# Patient Record
Sex: FEMALE | Race: WHITE | NOT HISPANIC OR LATINO | URBAN - METROPOLITAN AREA
[De-identification: names, ages, dates, MRNs, and addresses within clinical notes are randomized per-mention and may not be internally consistent; named-entity substitution may affect disease eponyms.]

---

## 2017-02-05 ENCOUNTER — EMERGENCY (EMERGENCY)
Facility: HOSPITAL | Age: 27
LOS: 0 days | Discharge: HOME | End: 2017-02-05
Attending: EMERGENCY MEDICINE | Admitting: EMERGENCY MEDICINE
Payer: COMMERCIAL

## 2017-02-05 VITALS — WEIGHT: 229.94 LBS | HEIGHT: 67 IN

## 2017-02-05 VITALS
SYSTOLIC BLOOD PRESSURE: 134 MMHG | HEIGHT: 67 IN | OXYGEN SATURATION: 99 % | RESPIRATION RATE: 17 BRPM | WEIGHT: 229.94 LBS | TEMPERATURE: 99 F | HEART RATE: 86 BPM | DIASTOLIC BLOOD PRESSURE: 80 MMHG

## 2017-02-05 DIAGNOSIS — R10.13 EPIGASTRIC PAIN: ICD-10-CM

## 2017-02-05 DIAGNOSIS — K29.00 ACUTE GASTRITIS WITHOUT BLEEDING: ICD-10-CM

## 2017-02-05 LAB
ALBUMIN SERPL ELPH-MCNC: 3.4 G/DL — SIGNIFICANT CHANGE UP (ref 3.3–5)
ALP SERPL-CCNC: 91 U/L — SIGNIFICANT CHANGE UP (ref 40–120)
ALT FLD-CCNC: 67 U/L — SIGNIFICANT CHANGE UP (ref 12–78)
ANION GAP SERPL CALC-SCNC: 6 MMOL/L — SIGNIFICANT CHANGE UP (ref 5–17)
APPEARANCE UR: CLEAR — SIGNIFICANT CHANGE UP
AST SERPL-CCNC: 51 U/L — HIGH (ref 15–37)
BACTERIA # UR AUTO: (no result)
BASOPHILS # BLD AUTO: 0.1 K/UL — SIGNIFICANT CHANGE UP (ref 0–0.2)
BASOPHILS NFR BLD AUTO: 0.9 % — SIGNIFICANT CHANGE UP (ref 0–2)
BILIRUB SERPL-MCNC: 0.3 MG/DL — SIGNIFICANT CHANGE UP (ref 0.2–1.2)
BILIRUB UR-MCNC: NEGATIVE — SIGNIFICANT CHANGE UP
BUN SERPL-MCNC: 11 MG/DL — SIGNIFICANT CHANGE UP (ref 7–23)
CALCIUM SERPL-MCNC: 8.3 MG/DL — LOW (ref 8.5–10.1)
CHLORIDE SERPL-SCNC: 104 MMOL/L — SIGNIFICANT CHANGE UP (ref 96–108)
CO2 SERPL-SCNC: 30 MMOL/L — SIGNIFICANT CHANGE UP (ref 22–31)
COLOR SPEC: YELLOW — SIGNIFICANT CHANGE UP
CREAT SERPL-MCNC: 0.75 MG/DL — SIGNIFICANT CHANGE UP (ref 0.5–1.3)
DIFF PNL FLD: (no result)
EOSINOPHIL # BLD AUTO: 0.1 K/UL — SIGNIFICANT CHANGE UP (ref 0–0.5)
EOSINOPHIL NFR BLD AUTO: 0.9 % — SIGNIFICANT CHANGE UP (ref 0–6)
EPI CELLS # UR: SIGNIFICANT CHANGE UP
GLUCOSE SERPL-MCNC: 107 MG/DL — HIGH (ref 70–99)
GLUCOSE UR QL: NEGATIVE MG/DL — SIGNIFICANT CHANGE UP
HCT VFR BLD CALC: 43.1 % — SIGNIFICANT CHANGE UP (ref 34.5–45)
HGB BLD-MCNC: 14.6 G/DL — SIGNIFICANT CHANGE UP (ref 11.5–15.5)
KETONES UR-MCNC: NEGATIVE — SIGNIFICANT CHANGE UP
LEUKOCYTE ESTERASE UR-ACNC: NEGATIVE — SIGNIFICANT CHANGE UP
LIDOCAIN IGE QN: 246 U/L — SIGNIFICANT CHANGE UP (ref 73–393)
LYMPHOCYTES # BLD AUTO: 31.2 % — SIGNIFICANT CHANGE UP (ref 13–44)
LYMPHOCYTES # BLD AUTO: 4.6 K/UL — HIGH (ref 1–3.3)
MCHC RBC-ENTMCNC: 27.8 PG — SIGNIFICANT CHANGE UP (ref 27–34)
MCHC RBC-ENTMCNC: 34 GM/DL — SIGNIFICANT CHANGE UP (ref 32–36)
MCV RBC AUTO: 81.9 FL — SIGNIFICANT CHANGE UP (ref 80–100)
MONOCYTES # BLD AUTO: 0.9 K/UL — SIGNIFICANT CHANGE UP (ref 0–0.9)
MONOCYTES NFR BLD AUTO: 6.3 % — SIGNIFICANT CHANGE UP (ref 2–14)
NEUTROPHILS # BLD AUTO: 9 K/UL — HIGH (ref 1.8–7.4)
NEUTROPHILS NFR BLD AUTO: 60.7 % — SIGNIFICANT CHANGE UP (ref 43–77)
NITRITE UR-MCNC: NEGATIVE — SIGNIFICANT CHANGE UP
PH UR: 5 — SIGNIFICANT CHANGE UP (ref 4.8–8)
PLATELET # BLD AUTO: 391 K/UL — SIGNIFICANT CHANGE UP (ref 150–400)
POTASSIUM SERPL-MCNC: 4.1 MMOL/L — SIGNIFICANT CHANGE UP (ref 3.5–5.3)
POTASSIUM SERPL-SCNC: 4.1 MMOL/L — SIGNIFICANT CHANGE UP (ref 3.5–5.3)
PROT SERPL-MCNC: 7.8 GM/DL — SIGNIFICANT CHANGE UP (ref 6–8.3)
PROT UR-MCNC: 30 MG/DL
RBC # BLD: 5.26 M/UL — HIGH (ref 3.8–5.2)
RBC # FLD: 11.9 % — SIGNIFICANT CHANGE UP (ref 10.3–14.5)
RBC CASTS # UR COMP ASSIST: SIGNIFICANT CHANGE UP /HPF (ref 0–4)
SODIUM SERPL-SCNC: 140 MMOL/L — SIGNIFICANT CHANGE UP (ref 135–145)
SP GR SPEC: 1.02 — SIGNIFICANT CHANGE UP (ref 1.01–1.02)
UROBILINOGEN FLD QL: NEGATIVE MG/DL — SIGNIFICANT CHANGE UP
WBC # BLD: 14.8 K/UL — HIGH (ref 3.8–10.5)
WBC # FLD AUTO: 14.8 K/UL — HIGH (ref 3.8–10.5)
WBC UR QL: SIGNIFICANT CHANGE UP

## 2017-02-05 PROCEDURE — 99284 EMERGENCY DEPT VISIT MOD MDM: CPT

## 2017-02-05 PROCEDURE — 76705 ECHO EXAM OF ABDOMEN: CPT | Mod: 26

## 2017-02-05 RX ORDER — ONDANSETRON 8 MG/1
4 TABLET, FILM COATED ORAL ONCE
Qty: 0 | Refills: 0 | Status: COMPLETED | OUTPATIENT
Start: 2017-02-05 | End: 2017-02-05

## 2017-02-05 RX ORDER — MORPHINE SULFATE 50 MG/1
4 CAPSULE, EXTENDED RELEASE ORAL ONCE
Qty: 0 | Refills: 0 | Status: DISCONTINUED | OUTPATIENT
Start: 2017-02-05 | End: 2017-02-05

## 2017-02-05 RX ORDER — LIDOCAINE 4 G/100G
5 CREAM TOPICAL ONCE
Qty: 0 | Refills: 0 | Status: COMPLETED | OUTPATIENT
Start: 2017-02-05 | End: 2017-02-05

## 2017-02-05 RX ORDER — FAMOTIDINE 10 MG/ML
20 INJECTION INTRAVENOUS ONCE
Qty: 0 | Refills: 0 | Status: COMPLETED | OUTPATIENT
Start: 2017-02-05 | End: 2017-02-05

## 2017-02-05 RX ORDER — FAMOTIDINE 10 MG/ML
1 INJECTION INTRAVENOUS
Qty: 20 | Refills: 0 | OUTPATIENT
Start: 2017-02-05 | End: 2017-02-15

## 2017-02-05 RX ADMIN — Medication 30 MILLILITER(S): at 21:07

## 2017-02-05 RX ADMIN — MORPHINE SULFATE 4 MILLIGRAM(S): 50 CAPSULE, EXTENDED RELEASE ORAL at 22:06

## 2017-02-05 RX ADMIN — ONDANSETRON 4 MILLIGRAM(S): 8 TABLET, FILM COATED ORAL at 21:00

## 2017-02-05 RX ADMIN — FAMOTIDINE 20 MILLIGRAM(S): 10 INJECTION INTRAVENOUS at 21:01

## 2017-02-05 NOTE — ED STATDOCS - OBJECTIVE STATEMENT
25 y/o F with Hx of cholecystectomy presents to ED c/o severe epigastric pain and nausea. Pt states she has had no recent episodes like this which concerned her and prompted her to come to the ED. Pain is constant and is worse when lying down. Denies v/d, HA, fever, SOB.

## 2017-02-05 NOTE — ED STATDOCS - NS ED MD SCRIBE ATTENDING SCRIBE SECTIONS
PAST MEDICAL/SURGICAL/SOCIAL HISTORY/REVIEW OF SYSTEMS/DISPOSITION/PHYSICAL EXAM/HISTORY OF PRESENT ILLNESS/VITAL SIGNS( Pullset)

## 2017-02-07 DIAGNOSIS — Z90.49 ACQUIRED ABSENCE OF OTHER SPECIFIED PARTS OF DIGESTIVE TRACT: Chronic | ICD-10-CM

## 2017-03-05 ENCOUNTER — EMERGENCY (EMERGENCY)
Facility: HOSPITAL | Age: 27
LOS: 0 days | Discharge: HOME | End: 2017-03-05
Attending: EMERGENCY MEDICINE | Admitting: EMERGENCY MEDICINE
Payer: COMMERCIAL

## 2017-03-05 VITALS
SYSTOLIC BLOOD PRESSURE: 162 MMHG | DIASTOLIC BLOOD PRESSURE: 104 MMHG | HEART RATE: 114 BPM | OXYGEN SATURATION: 100 % | RESPIRATION RATE: 18 BRPM | TEMPERATURE: 99 F

## 2017-03-05 VITALS — WEIGHT: 229.94 LBS | HEIGHT: 67 IN

## 2017-03-05 DIAGNOSIS — Z90.49 ACQUIRED ABSENCE OF OTHER SPECIFIED PARTS OF DIGESTIVE TRACT: Chronic | ICD-10-CM

## 2017-03-05 DIAGNOSIS — M54.32 SCIATICA, LEFT SIDE: ICD-10-CM

## 2017-03-05 DIAGNOSIS — M54.2 CERVICALGIA: ICD-10-CM

## 2017-03-05 LAB
ALBUMIN SERPL ELPH-MCNC: 4 G/DL — SIGNIFICANT CHANGE UP (ref 3.3–5)
ALP SERPL-CCNC: 104 U/L — SIGNIFICANT CHANGE UP (ref 40–120)
ALT FLD-CCNC: 100 U/L — HIGH (ref 12–78)
ANION GAP SERPL CALC-SCNC: 9 MMOL/L — SIGNIFICANT CHANGE UP (ref 5–17)
APPEARANCE UR: CLEAR — SIGNIFICANT CHANGE UP
AST SERPL-CCNC: 94 U/L — HIGH (ref 15–37)
BACTERIA # UR AUTO: (no result)
BASOPHILS # BLD AUTO: 0.1 K/UL — SIGNIFICANT CHANGE UP (ref 0–0.2)
BASOPHILS NFR BLD AUTO: 1 % — SIGNIFICANT CHANGE UP (ref 0–2)
BILIRUB SERPL-MCNC: 0.2 MG/DL — SIGNIFICANT CHANGE UP (ref 0.2–1.2)
BILIRUB UR-MCNC: NEGATIVE — SIGNIFICANT CHANGE UP
BUN SERPL-MCNC: 14 MG/DL — SIGNIFICANT CHANGE UP (ref 7–23)
CALCIUM SERPL-MCNC: 9.6 MG/DL — SIGNIFICANT CHANGE UP (ref 8.5–10.1)
CHLORIDE SERPL-SCNC: 105 MMOL/L — SIGNIFICANT CHANGE UP (ref 96–108)
CO2 SERPL-SCNC: 26 MMOL/L — SIGNIFICANT CHANGE UP (ref 22–31)
COLOR SPEC: YELLOW — SIGNIFICANT CHANGE UP
CREAT SERPL-MCNC: 0.83 MG/DL — SIGNIFICANT CHANGE UP (ref 0.5–1.3)
CRP SERPL-MCNC: 0.4 MG/DL — SIGNIFICANT CHANGE UP (ref 0–0.4)
DIFF PNL FLD: (no result)
EOSINOPHIL # BLD AUTO: 0 K/UL — SIGNIFICANT CHANGE UP (ref 0–0.5)
EOSINOPHIL NFR BLD AUTO: 0.3 % — SIGNIFICANT CHANGE UP (ref 0–6)
EPI CELLS # UR: SIGNIFICANT CHANGE UP
ERYTHROCYTE [SEDIMENTATION RATE] IN BLOOD: 32 MM/HR — HIGH (ref 0–15)
GLUCOSE SERPL-MCNC: 92 MG/DL — SIGNIFICANT CHANGE UP (ref 70–99)
GLUCOSE UR QL: NEGATIVE MG/DL — SIGNIFICANT CHANGE UP
HCG SERPL-ACNC: <1 MIU/ML — SIGNIFICANT CHANGE UP
HCT VFR BLD CALC: 48.9 % — HIGH (ref 34.5–45)
HGB BLD-MCNC: 16.5 G/DL — HIGH (ref 11.5–15.5)
KETONES UR-MCNC: (no result)
LEUKOCYTE ESTERASE UR-ACNC: (no result)
LYMPHOCYTES # BLD AUTO: 2.7 K/UL — SIGNIFICANT CHANGE UP (ref 1–3.3)
LYMPHOCYTES # BLD AUTO: 23 % — SIGNIFICANT CHANGE UP (ref 13–44)
MCHC RBC-ENTMCNC: 27.7 PG — SIGNIFICANT CHANGE UP (ref 27–34)
MCHC RBC-ENTMCNC: 33.7 GM/DL — SIGNIFICANT CHANGE UP (ref 32–36)
MCV RBC AUTO: 82.1 FL — SIGNIFICANT CHANGE UP (ref 80–100)
MONOCYTES # BLD AUTO: 0.6 K/UL — SIGNIFICANT CHANGE UP (ref 0–0.9)
MONOCYTES NFR BLD AUTO: 5.2 % — SIGNIFICANT CHANGE UP (ref 2–14)
NEUTROPHILS # BLD AUTO: 8.4 K/UL — HIGH (ref 1.8–7.4)
NEUTROPHILS NFR BLD AUTO: 70.4 % — SIGNIFICANT CHANGE UP (ref 43–77)
NITRITE UR-MCNC: NEGATIVE — SIGNIFICANT CHANGE UP
PH UR: 5 — SIGNIFICANT CHANGE UP (ref 4.8–8)
PLATELET # BLD AUTO: 460 K/UL — HIGH (ref 150–400)
POTASSIUM SERPL-MCNC: 3.9 MMOL/L — SIGNIFICANT CHANGE UP (ref 3.5–5.3)
POTASSIUM SERPL-SCNC: 3.9 MMOL/L — SIGNIFICANT CHANGE UP (ref 3.5–5.3)
PROT SERPL-MCNC: 9.1 GM/DL — HIGH (ref 6–8.3)
PROT UR-MCNC: 100 MG/DL
RBC # BLD: 5.96 M/UL — HIGH (ref 3.8–5.2)
RBC # FLD: 12.4 % — SIGNIFICANT CHANGE UP (ref 10.3–14.5)
RBC CASTS # UR COMP ASSIST: SIGNIFICANT CHANGE UP /HPF (ref 0–4)
SODIUM SERPL-SCNC: 140 MMOL/L — SIGNIFICANT CHANGE UP (ref 135–145)
SP GR SPEC: 1.02 — SIGNIFICANT CHANGE UP (ref 1.01–1.02)
UROBILINOGEN FLD QL: NEGATIVE MG/DL — SIGNIFICANT CHANGE UP
WBC # BLD: 12 K/UL — HIGH (ref 3.8–10.5)
WBC # FLD AUTO: 12 K/UL — HIGH (ref 3.8–10.5)
WBC UR QL: SIGNIFICANT CHANGE UP

## 2017-03-05 PROCEDURE — 99285 EMERGENCY DEPT VISIT HI MDM: CPT

## 2017-03-05 PROCEDURE — 74176 CT ABD & PELVIS W/O CONTRAST: CPT | Mod: 26

## 2017-03-05 RX ORDER — ONDANSETRON 8 MG/1
4 TABLET, FILM COATED ORAL ONCE
Qty: 0 | Refills: 0 | Status: COMPLETED | OUTPATIENT
Start: 2017-03-05 | End: 2017-03-05

## 2017-03-05 RX ORDER — METHOCARBAMOL 500 MG/1
1500 TABLET, FILM COATED ORAL ONCE
Qty: 0 | Refills: 0 | Status: COMPLETED | OUTPATIENT
Start: 2017-03-05 | End: 2017-03-05

## 2017-03-05 RX ORDER — SODIUM CHLORIDE 9 MG/ML
1000 INJECTION INTRAMUSCULAR; INTRAVENOUS; SUBCUTANEOUS ONCE
Qty: 0 | Refills: 0 | Status: COMPLETED | OUTPATIENT
Start: 2017-03-05 | End: 2017-03-05

## 2017-03-05 RX ORDER — HYDROMORPHONE HYDROCHLORIDE 2 MG/ML
1 INJECTION INTRAMUSCULAR; INTRAVENOUS; SUBCUTANEOUS ONCE
Qty: 0 | Refills: 0 | Status: DISCONTINUED | OUTPATIENT
Start: 2017-03-05 | End: 2017-03-05

## 2017-03-05 RX ORDER — KETOROLAC TROMETHAMINE 30 MG/ML
15 SYRINGE (ML) INJECTION ONCE
Qty: 0 | Refills: 0 | Status: DISCONTINUED | OUTPATIENT
Start: 2017-03-05 | End: 2017-03-05

## 2017-03-05 RX ADMIN — HYDROMORPHONE HYDROCHLORIDE 1 MILLIGRAM(S): 2 INJECTION INTRAMUSCULAR; INTRAVENOUS; SUBCUTANEOUS at 10:39

## 2017-03-05 RX ADMIN — Medication 15 MILLIGRAM(S): at 10:39

## 2017-03-05 RX ADMIN — Medication 60 MILLIGRAM(S): at 12:30

## 2017-03-05 RX ADMIN — METHOCARBAMOL 1500 MILLIGRAM(S): 500 TABLET, FILM COATED ORAL at 10:40

## 2017-03-05 RX ADMIN — SODIUM CHLORIDE 1000 MILLILITER(S): 9 INJECTION INTRAMUSCULAR; INTRAVENOUS; SUBCUTANEOUS at 11:10

## 2017-03-05 RX ADMIN — ONDANSETRON 4 MILLIGRAM(S): 8 TABLET, FILM COATED ORAL at 11:53

## 2017-03-05 NOTE — ED ADULT NURSE NOTE - CHPI ED SYMPTOMS NEG
no chills/no fever/no decreased eating/drinking/no dizziness/no weakness/no numbness/no vomiting/no tingling/no nausea

## 2017-03-05 NOTE — ED ADULT NURSE NOTE - ED STAT RN HANDOFF DETAILS
Handoff given to Chelita ELLSWORTH, pt s/p pain medication, NSB infusing, labs and CT results pending. Pt reports mild relief of symptoms, appears more comfortably at this time. Safety and comfort maintained.

## 2017-03-05 NOTE — ED STATDOCS - OBJECTIVE STATEMENT
27 y/o female pmhx spinal stenosis, htn, anxiety, ADHD  presents to ED due to severe lower back pain that began yesterday. On Thursday pt c/o dysuria, hematuria, and was prescribed Macrobid. Pt states back pain radiates down left leg. Pt usually takes 7.5 mg hydrocodone once a day for pain. Today she took 3 Advil 5:30 am. Denies fever, chills, numbness, tingling, vaginal discharge, vaginal bleeding, hx of kidney stones, v/d. States she feels tired, fatigued, nauseous. Pt had a nerve ablation in Jan for sciatica, that relieved pain sx. 25 y/o female pmhx spinal stenosis, sciatica, htn, anxiety, ADHD  presents to ED due to severe lower back pain that began yesterday. On Thursday pt c/o dysuria, hematuria, and was prescribed Macrobid. Pt states back pain radiates down left leg. Pt usually takes 7.5 mg hydrocodone once a day for pain, as well as gabapentin. Today she took 3 Advil 5:30 am. Denies fever, chills, numbness, tingling, vaginal discharge, vaginal bleeding, hx of kidney stones, v/d. States she feels tired, fatigued, nauseous. 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 Pt had a nerve ablation in Jan for sciatica, that relieved pain sx.

## 2017-03-05 NOTE — ED STATDOCS - DETAILS:
I, Katerin Harkins, performed the initial face to face bedside interview with this patient regarding history of present illness, review of symptoms and relevant past medical, social and family history.  I completed an independent physical examination.  I was the initial provider who evaluated this patient. I have signed out the follow up of any pending tests (i.e. labs, radiological studies) to the ACP.  I have communicated the patient’s plan of care and disposition with the ACP.  The history, relevant review of systems, past medical and surgical history, medical decision making, and physical examination was documented by the scribe in my presence and I attest to the accuracy of the documentation.

## 2017-03-05 NOTE — ED STATDOCS - MEDICAL DECISION MAKING DETAILS
acute on chronic reticular back pain   no red flags no signs of sx cord compression will check ct stone hunt r/o kidney stone, ua, pain control, reassess

## 2017-03-05 NOTE — ED STATDOCS - PROGRESS NOTE DETAILS
ALICJA Heath:  PA NOTE: Pt seen by intake physician and hpi/orders/plan reviewed. PT presenting to ED with complaints of  left sided back pain started yesterday. Pt had dysuria and hematuria 2 days ago, went to see her PMD Dr. Murphy and was given macrobid. Denies any trauma or injury. Denies any fever, chills, vaginal bleeding, pelvic pain or any other complaint.   PE: GEN: Awake, alert,  NAD,  EYES: PERRL CARDIAC: Reg rate and rhythm, S1,S2, RRR  RESP: No distress noted. Lungs CTA bilaterally no wheeze, ronchi, rales. ABD: soft,  non-tender, no guarding. left CVA tenderness. NEURO: AOx3, no focal deficits, strength intact bilat LE. EXT: LSpien no midline TTP, left paraspinous tenderness.   PLAN: labs. CT A/P, reevaluate. pt is feeling better, results discussed, it is likely exacerbation of her back pain and will refer her to ortho spine.

## 2017-03-05 NOTE — ED ADULT NURSE NOTE - OBJECTIVE STATEMENT
This is a 26b year old female back pain since yesterday currently being treated for a uti denies mechanical injury .

## 2017-03-06 LAB
CULTURE RESULTS: SIGNIFICANT CHANGE UP
SPECIMEN SOURCE: SIGNIFICANT CHANGE UP

## 2017-06-12 ENCOUNTER — INPATIENT (INPATIENT)
Facility: HOSPITAL | Age: 27
LOS: 1 days | Discharge: HOME | End: 2017-06-14
Attending: STUDENT IN AN ORGANIZED HEALTH CARE EDUCATION/TRAINING PROGRAM | Admitting: STUDENT IN AN ORGANIZED HEALTH CARE EDUCATION/TRAINING PROGRAM
Payer: COMMERCIAL

## 2017-06-12 VITALS — WEIGHT: 229.94 LBS | HEIGHT: 67 IN

## 2017-06-12 DIAGNOSIS — Z98.890 OTHER SPECIFIED POSTPROCEDURAL STATES: Chronic | ICD-10-CM

## 2017-06-12 DIAGNOSIS — Z90.49 ACQUIRED ABSENCE OF OTHER SPECIFIED PARTS OF DIGESTIVE TRACT: Chronic | ICD-10-CM

## 2017-06-12 LAB
ALBUMIN SERPL ELPH-MCNC: 3.5 G/DL — SIGNIFICANT CHANGE UP (ref 3.3–5)
ALP SERPL-CCNC: 80 U/L — SIGNIFICANT CHANGE UP (ref 40–120)
ALT FLD-CCNC: 69 U/L — SIGNIFICANT CHANGE UP (ref 12–78)
ANION GAP SERPL CALC-SCNC: 11 MMOL/L — SIGNIFICANT CHANGE UP (ref 5–17)
APPEARANCE UR: CLEAR — SIGNIFICANT CHANGE UP
AST SERPL-CCNC: 78 U/L — HIGH (ref 15–37)
BACTERIA # UR AUTO: (no result)
BASOPHILS # BLD AUTO: 0.1 K/UL — SIGNIFICANT CHANGE UP (ref 0–0.2)
BASOPHILS NFR BLD AUTO: 0.3 % — SIGNIFICANT CHANGE UP (ref 0–2)
BILIRUB SERPL-MCNC: 0.4 MG/DL — SIGNIFICANT CHANGE UP (ref 0.2–1.2)
BILIRUB UR-MCNC: NEGATIVE — SIGNIFICANT CHANGE UP
BUN SERPL-MCNC: 16 MG/DL — SIGNIFICANT CHANGE UP (ref 7–23)
CALCIUM SERPL-MCNC: 9 MG/DL — SIGNIFICANT CHANGE UP (ref 8.5–10.1)
CHLORIDE SERPL-SCNC: 107 MMOL/L — SIGNIFICANT CHANGE UP (ref 96–108)
CO2 SERPL-SCNC: 21 MMOL/L — LOW (ref 22–31)
COLOR SPEC: YELLOW — SIGNIFICANT CHANGE UP
COMMENT - URINE: SIGNIFICANT CHANGE UP
COMMENT - URINE: SIGNIFICANT CHANGE UP
CREAT SERPL-MCNC: 0.89 MG/DL — SIGNIFICANT CHANGE UP (ref 0.5–1.3)
DIFF PNL FLD: (no result)
EOSINOPHIL # BLD AUTO: 0 K/UL — SIGNIFICANT CHANGE UP (ref 0–0.5)
EOSINOPHIL NFR BLD AUTO: 0.1 % — SIGNIFICANT CHANGE UP (ref 0–6)
EPI CELLS # UR: (no result)
GLUCOSE SERPL-MCNC: 93 MG/DL — SIGNIFICANT CHANGE UP (ref 70–99)
GLUCOSE UR QL: NEGATIVE MG/DL — SIGNIFICANT CHANGE UP
HCT VFR BLD CALC: 45.9 % — HIGH (ref 34.5–45)
HGB BLD-MCNC: 16.1 G/DL — HIGH (ref 11.5–15.5)
KETONES UR-MCNC: NEGATIVE — SIGNIFICANT CHANGE UP
LEUKOCYTE ESTERASE UR-ACNC: (no result)
LIDOCAIN IGE QN: 236 U/L — SIGNIFICANT CHANGE UP (ref 73–393)
LYMPHOCYTES # BLD AUTO: 0.9 K/UL — LOW (ref 1–3.3)
LYMPHOCYTES # BLD AUTO: 5.3 % — LOW (ref 13–44)
MCHC RBC-ENTMCNC: 28 PG — SIGNIFICANT CHANGE UP (ref 27–34)
MCHC RBC-ENTMCNC: 35 GM/DL — SIGNIFICANT CHANGE UP (ref 32–36)
MCV RBC AUTO: 80 FL — SIGNIFICANT CHANGE UP (ref 80–100)
MONOCYTES # BLD AUTO: 0.7 K/UL — SIGNIFICANT CHANGE UP (ref 0–0.9)
MONOCYTES NFR BLD AUTO: 3.8 % — SIGNIFICANT CHANGE UP (ref 2–14)
NEUTROPHILS # BLD AUTO: 16 K/UL — HIGH (ref 1.8–7.4)
NEUTROPHILS NFR BLD AUTO: 90.6 % — HIGH (ref 43–77)
NITRITE UR-MCNC: NEGATIVE — SIGNIFICANT CHANGE UP
PH UR: 5 — SIGNIFICANT CHANGE UP (ref 5–8)
PLATELET # BLD AUTO: 390 K/UL — SIGNIFICANT CHANGE UP (ref 150–400)
POTASSIUM SERPL-MCNC: 4.2 MMOL/L — SIGNIFICANT CHANGE UP (ref 3.5–5.3)
POTASSIUM SERPL-SCNC: 4.2 MMOL/L — SIGNIFICANT CHANGE UP (ref 3.5–5.3)
PROT SERPL-MCNC: 8.5 GM/DL — HIGH (ref 6–8.3)
PROT UR-MCNC: 100 MG/DL
RBC # BLD: 5.74 M/UL — HIGH (ref 3.8–5.2)
RBC # FLD: 12.4 % — SIGNIFICANT CHANGE UP (ref 10.3–14.5)
RBC CASTS # UR COMP ASSIST: (no result) /HPF (ref 0–4)
SODIUM SERPL-SCNC: 139 MMOL/L — SIGNIFICANT CHANGE UP (ref 135–145)
SP GR SPEC: 1.02 — SIGNIFICANT CHANGE UP (ref 1.01–1.02)
UROBILINOGEN FLD QL: NEGATIVE MG/DL — SIGNIFICANT CHANGE UP
WBC # BLD: 17.6 K/UL — HIGH (ref 3.8–10.5)
WBC # FLD AUTO: 17.6 K/UL — HIGH (ref 3.8–10.5)
WBC UR QL: SIGNIFICANT CHANGE UP /HPF (ref 0–5)

## 2017-06-12 PROCEDURE — 74177 CT ABD & PELVIS W/CONTRAST: CPT | Mod: 26

## 2017-06-12 RX ORDER — VANCOMYCIN HCL 1 G
125 VIAL (EA) INTRAVENOUS ONCE
Qty: 0 | Refills: 0 | Status: COMPLETED | OUTPATIENT
Start: 2017-06-12 | End: 2017-06-12

## 2017-06-12 RX ORDER — KETOROLAC TROMETHAMINE 30 MG/ML
30 SYRINGE (ML) INJECTION ONCE
Qty: 0 | Refills: 0 | Status: DISCONTINUED | OUTPATIENT
Start: 2017-06-12 | End: 2017-06-12

## 2017-06-12 RX ORDER — CITALOPRAM 10 MG/1
20 TABLET, FILM COATED ORAL DAILY
Qty: 0 | Refills: 0 | Status: DISCONTINUED | OUTPATIENT
Start: 2017-06-12 | End: 2017-06-14

## 2017-06-12 RX ORDER — PANTOPRAZOLE SODIUM 20 MG/1
40 TABLET, DELAYED RELEASE ORAL ONCE
Qty: 0 | Refills: 0 | Status: COMPLETED | OUTPATIENT
Start: 2017-06-12 | End: 2017-06-12

## 2017-06-12 RX ORDER — ONDANSETRON 8 MG/1
4 TABLET, FILM COATED ORAL ONCE
Qty: 0 | Refills: 0 | Status: COMPLETED | OUTPATIENT
Start: 2017-06-12 | End: 2017-06-12

## 2017-06-12 RX ORDER — HYDROXYZINE HCL 10 MG
50 TABLET ORAL AT BEDTIME
Qty: 0 | Refills: 0 | Status: DISCONTINUED | OUTPATIENT
Start: 2017-06-12 | End: 2017-06-14

## 2017-06-12 RX ORDER — LISINOPRIL 2.5 MG/1
20 TABLET ORAL DAILY
Qty: 0 | Refills: 0 | Status: DISCONTINUED | OUTPATIENT
Start: 2017-06-12 | End: 2017-06-14

## 2017-06-12 RX ORDER — MORPHINE SULFATE 50 MG/1
4 CAPSULE, EXTENDED RELEASE ORAL ONCE
Qty: 0 | Refills: 0 | Status: DISCONTINUED | OUTPATIENT
Start: 2017-06-12 | End: 2017-06-12

## 2017-06-12 RX ORDER — SODIUM CHLORIDE 9 MG/ML
1000 INJECTION INTRAMUSCULAR; INTRAVENOUS; SUBCUTANEOUS ONCE
Qty: 0 | Refills: 0 | Status: COMPLETED | OUTPATIENT
Start: 2017-06-12 | End: 2017-06-12

## 2017-06-12 RX ORDER — FAMOTIDINE 10 MG/ML
20 INJECTION INTRAVENOUS
Qty: 0 | Refills: 0 | Status: DISCONTINUED | OUTPATIENT
Start: 2017-06-12 | End: 2017-06-14

## 2017-06-12 RX ORDER — GABAPENTIN 400 MG/1
600 CAPSULE ORAL AT BEDTIME
Qty: 0 | Refills: 0 | Status: DISCONTINUED | OUTPATIENT
Start: 2017-06-12 | End: 2017-06-14

## 2017-06-12 RX ORDER — METOCLOPRAMIDE HCL 10 MG
10 TABLET ORAL ONCE
Qty: 0 | Refills: 0 | Status: COMPLETED | OUTPATIENT
Start: 2017-06-12 | End: 2017-06-12

## 2017-06-12 RX ADMIN — SODIUM CHLORIDE 33.33 MILLILITER(S): 9 INJECTION INTRAMUSCULAR; INTRAVENOUS; SUBCUTANEOUS at 18:00

## 2017-06-12 RX ADMIN — Medication 30 MILLIGRAM(S): at 19:02

## 2017-06-12 RX ADMIN — Medication 30 MILLIGRAM(S): at 17:59

## 2017-06-12 RX ADMIN — PANTOPRAZOLE SODIUM 40 MILLIGRAM(S): 20 TABLET, DELAYED RELEASE ORAL at 18:15

## 2017-06-12 RX ADMIN — MORPHINE SULFATE 4 MILLIGRAM(S): 50 CAPSULE, EXTENDED RELEASE ORAL at 19:21

## 2017-06-12 RX ADMIN — MORPHINE SULFATE 4 MILLIGRAM(S): 50 CAPSULE, EXTENDED RELEASE ORAL at 19:45

## 2017-06-12 RX ADMIN — ONDANSETRON 4 MILLIGRAM(S): 8 TABLET, FILM COATED ORAL at 17:59

## 2017-06-12 RX ADMIN — Medication 10 MILLIGRAM(S): at 21:15

## 2017-06-12 RX ADMIN — SODIUM CHLORIDE 2000 MILLILITER(S): 9 INJECTION INTRAMUSCULAR; INTRAVENOUS; SUBCUTANEOUS at 19:02

## 2017-06-12 NOTE — ED ADULT NURSE NOTE - OBJECTIVE STATEMENT
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  Pt states starting today that she developed n/v/diarrhea, all symptoms similar to her last cdif experience

## 2017-06-12 NOTE — ED STATDOCS - PROGRESS NOTE DETAILS
ALICJA Barrios: Patient has been seen, evaluated and orders have been written by the attending in intake. Patient is stable.  I will follow up the results of orders written and I will continue to evaluate/observe the patient. signed Sarah Barrios PA-C   pt c/o continuous abd pain. elevated WBC 17. Will order CT. Pt agrees with plan of  care. signed Sarah Barrios PA-C signed Sarah Barrios PA-C  Pt with extended ER stay, did not get CT scan first time due to nausea and refused exam. Elevated WBC, pt vomiting after antiemetics, diffculty with PO. will start tx for presumptive c diff while labs pending. Pt feels uncomfortable going home as she feel she will not be able to take her meds or stay hydrated. Pt agrees with admission and plan of care.

## 2017-06-12 NOTE — H&P ADULT - NSHPPHYSICALEXAM_GEN_ALL_CORE
Vital Signs Last 24 Hrs  T(C): 37, Max: 37.3 (06-12 @ 16:54)  T(F): 98.6, Max: 99.2 (06-12 @ 16:54)  HR: 121 (121 - 126)  BP: 148/81 (148/81 - 156/111)  RR: 18 (17 - 18)  SpO2: 97% (97% - 97%)

## 2017-06-12 NOTE — ED STATDOCS - MEDICAL DECISION MAKING DETAILS
26y year old Female presents to ED complaining of abdominal pain. Plan fluids and nausea medication.

## 2017-06-12 NOTE — H&P ADULT - ATTENDING COMMENTS
Patient seen and examined after initial evalution above by ALICJA Lobato. Case discussed and reviewed in detail. Please note my plan below.    27 yo F with PMH of C.diff, mixed connective tissue disorder, PCOS, anxiety, depression, HTN p/w abdominal pain, nausea / vomiting / diarrhea x 1 day.     *Sepsis 2/2 gastroenteritis   -R/o c.diff given h/o c.diff in the past  -IV cipro / flagyl  -NPO for now - advance diet as tolerated  -IVF  -Blood cx   -Stool studies  -Isolation until c.diff ruled out  -CT abdomen doesn't report any acute pathology  -Utox  -Lactate    *DVT ppx  -SCDs

## 2017-06-12 NOTE — ED STATDOCS - OBJECTIVE STATEMENT
27 y/o female pt w/ hx of anxiety, depression, HTN presents to the ED c/o abd pain and vomiting with sudden onset this morning. Reports having watery diarrhea x 5 and feeling nauseous and states that her throat is burning from throwing up. Pt has no other complaints and denies CP and SOB. 25 y/o female pt w/ hx of PCOS, C.diff, anxiety, depression, HTN presents to the ED c/o abd pain and vomiting with sudden onset this morning. Reports having watery diarrhea x 5 and feeling nauseous and states that her throat is burning from throwing up. Pt has no other complaints and denies CP and SOB.  Some abdominal pain in the upper abd.  Hx of Choleystectomy.  Pt a RN.

## 2017-06-12 NOTE — ED STATDOCS - ATTENDING CONTRIBUTION TO CARE
I, Low Davies, performed the initial face to face bedside interview with this patient regarding history of present illness, review of symptoms and relevant past medical, social and family history.  I completed an independent physical examination.  I was the initial provider who evaluated this patient. I have signed out the follow up of any pending tests (i.e. labs, radiological studies) to the ACP.  I have communicated the patient’s plan of care and disposition with the ACP.  The history, relevant review of systems, past medical and surgical history, medical decision making, and physical examination was documented by the scribe in my presence and I attest to the accuracy of the documentation.

## 2017-06-12 NOTE — ED STATDOCS - CARE PLAN
Principal Discharge DX:	Vomiting, persistent, in adult  Secondary Diagnosis:	Diarrhea, unspecified type

## 2017-06-12 NOTE — H&P ADULT - HISTORY OF PRESENT ILLNESS
25 yo F with PMH significant for Hx of C.Diff in the past, Mixed connective tissue disorder, PCOS, Anxiety, Depression, secondary hypertension presents to the ED c/o Abdominal pain, N/V/D x 1 day. Pt states that she is having diffuse abdominal pain ( Although looks comfortable for now),nausea, vomited 20 + times and loose watery diarrhea 10+ times today. Pt had C.diff 3 yrs ago and was treated with IV Flagyl. Denies taking any Abx recently. Denies fever or chills. No recent travel.     In the ED, found to have leucocytosis 17.6 and CTAP came back negative as acute intraabdominal or pelvic pathology.  Meds: Vanco 125 ml PO x1, Toradol, IV Morphine,. IV Zofran, IV Reglan, Protonix and Bentyl 25 yo F with PMH significant for Hx of C.Diff in the past, Mixed connective tissue disorder, PCOS, Anxiety, Depression, secondary hypertension due to MCTD? presents to the ED c/o Abdominal pain, N/V/D x 1 day. Pt states that she is having diffuse abdominal pain ( Although looks comfortable for now),nausea, vomited 20 + times and loose watery diarrhea 10+ times today. Pt had C.diff 3 yrs ago and was treated with IV Flagyl. Denies taking any Abx recently. Denies fever or chills. No recent travel.     In the ED, found to have leucocytosis 17.6 and CTAP came back negative as acute intraabdominal or pelvic pathology.  Meds: Vanco 125 ml PO x1, Toradol, IV Morphine,. IV Zofran, IV Reglan, Protonix and Bentyl

## 2017-06-12 NOTE — H&P ADULT - PMH
Anxiety    Cholecystitis    Depression    HTN (hypertension)    Mixed connective tissue disease    Polycystic ovarian syndrome

## 2017-06-12 NOTE — ED STATDOCS - DIAGNOSIS COUNSELING, MDM
conducted a detailed discussion... Samson MDM:  Young female w/ N/V/D.  Abd soft on exam.  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  Will add CT if labs off or pt not improving.

## 2017-06-12 NOTE — H&P ADULT - ASSESSMENT
25 yo F with PMH significant for Hx of C.Diff in the past, Mixed connective tissue disorder, PCOS, Anxiety, Depression, secondary hypertension presents to the ED c/o Abdominal pain, N/V/D x 1 day.    # Abdominal pain, N/V/D :  2/2 to Viral gastroenteritis Vs C.Diff  - Admit to Med-Surg  - IVF NS hydration  - Pain meds PRN  - Reglan PRN  - Stool for C.Diff and Stool studies  - c/w Prophylactic Vancomycin PO  - f/u BC  - Monitor vitals    # Secondary Hypertension 2/2 to Mixed connective tissue   - c/w Lisinopril  - Monitor BP    # Anxiety/Depression  - c/w home meds    # DVT Ppx  - SCD's    Case d/w      # 27 yo F with PMH significant for Hx of C.Diff in the past, Mixed connective tissue disorder, PCOS, Anxiety, Depression, secondary hypertension presents to the ED c/o Abdominal pain, N/V/D x 1 day.    # Abdominal pain, N/V/D :  2/2 to Viral gastroenteritis Vs C.Diff  - Admit to Med-Surg  - IVF NS hydration  - Pain meds PRN  - Reglan PRN  - Stool for C.Diff and Stool studies  - c/w Prophylactic Vancomycin PO  - ID consult  - f/u BC  - Monitor vitals    # Secondary Hypertension 2/2 to Mixed connective tissue   - c/w Lisinopril  - Monitor BP    # Anxiety/Depression  - c/w home meds    # DVT Ppx  - SCD's    Case d/w  25 yo F with PMH significant for Hx of C.Diff in the past, Mixed connective tissue disorder, PCOS, Anxiety, Depression, secondary hypertension due to MCTD? presents to the ED c/o Abdominal pain, N/V/D x 1 day.    # Abdominal pain, N/V/D :  2/2 to Viral gastroenteritis Vs C.Diff  - Admit to Med-Surg  - IVF NS hydration  - Pain meds PRN  - Reglan PRN  - Stool for C.Diff and Stool studies  - c/w Prophylactic Vancomycin PO  - ID consult  - f/u BC  - Monitor vitals    # Secondary Hypertension 2/2 to Mixed connective tissue disease  - c/w Lisinopril  - Monitor BP    # Anxiety/Depression  - c/w home meds    # DVT Ppx  - SCD's    Case d/w  27 yo F with PMH significant for Hx of C.Diff in the past, Mixed connective tissue disorder, PCOS, Anxiety, Depression, secondary hypertension due to MCTD? presents to the ED c/o Abdominal pain, N/V/D x 1 day.    # Abdominal pain, N/V/D :  2/2 to Viral gastroenteritis Vs C.Diff  - Admit to Med-Surg  - IVF NS hydration  - Pain meds PRN  - Reglan PRN  - Stool for C.Diff and Stool studies - contact precaution  - IV Cipro and IV flagyl  - Utox  - f/u BC  - Monitor vitals  - NPO - advance diet as tolerated    # Secondary Hypertension 2/2 to Mixed connective tissue disease  - c/w Lisinopril  - Monitor BP    # Anxiety/Depression  - c/w home meds    # DVT Ppx  - SCD's    Case d/w Dr. Goins

## 2017-06-12 NOTE — ED ADULT TRIAGE NOTE - CHIEF COMPLAINT QUOTE
c/o nausea and diarrhea started at 11:30 today, c/o mid upper abdominal pain, denies blood in stool, pt states she had similar episode when she was diagnosed with cdiff

## 2017-06-12 NOTE — ED STATDOCS - NS ED MD SCRIBE ATTENDING SCRIBE SECTIONS
PHYSICAL EXAM/RESULTS/PROGRESS NOTE/REVIEW OF SYSTEMS/PAST MEDICAL/SURGICAL/SOCIAL HISTORY/DISPOSITION/HISTORY OF PRESENT ILLNESS

## 2017-06-13 DIAGNOSIS — F41.9 ANXIETY DISORDER, UNSPECIFIED: ICD-10-CM

## 2017-06-13 DIAGNOSIS — I10 ESSENTIAL (PRIMARY) HYPERTENSION: ICD-10-CM

## 2017-06-13 DIAGNOSIS — E28.2 POLYCYSTIC OVARIAN SYNDROME: ICD-10-CM

## 2017-06-13 DIAGNOSIS — R19.7 DIARRHEA, UNSPECIFIED: ICD-10-CM

## 2017-06-13 LAB
ANION GAP SERPL CALC-SCNC: 7 MMOL/L — SIGNIFICANT CHANGE UP (ref 5–17)
APTT BLD: 35 SEC — SIGNIFICANT CHANGE UP (ref 27.5–37.4)
BASOPHILS # BLD AUTO: 0 K/UL — SIGNIFICANT CHANGE UP (ref 0–0.2)
BASOPHILS NFR BLD AUTO: 0.5 % — SIGNIFICANT CHANGE UP (ref 0–2)
BUN SERPL-MCNC: 16 MG/DL — SIGNIFICANT CHANGE UP (ref 7–23)
C DIFF BY PCR RESULT: SIGNIFICANT CHANGE UP
C DIFF TOX GENS STL QL NAA+PROBE: SIGNIFICANT CHANGE UP
CALCIUM SERPL-MCNC: 7.5 MG/DL — LOW (ref 8.5–10.1)
CHLORIDE SERPL-SCNC: 111 MMOL/L — HIGH (ref 96–108)
CO2 SERPL-SCNC: 24 MMOL/L — SIGNIFICANT CHANGE UP (ref 22–31)
CREAT SERPL-MCNC: 0.84 MG/DL — SIGNIFICANT CHANGE UP (ref 0.5–1.3)
EOSINOPHIL # BLD AUTO: 0 K/UL — SIGNIFICANT CHANGE UP (ref 0–0.5)
EOSINOPHIL NFR BLD AUTO: 0.2 % — SIGNIFICANT CHANGE UP (ref 0–6)
GLUCOSE SERPL-MCNC: 115 MG/DL — HIGH (ref 70–99)
HCT VFR BLD CALC: 37.8 % — SIGNIFICANT CHANGE UP (ref 34.5–45)
HGB BLD-MCNC: 13.1 G/DL — SIGNIFICANT CHANGE UP (ref 11.5–15.5)
INR BLD: 1.22 RATIO — HIGH (ref 0.88–1.16)
LACTATE SERPL-SCNC: 1.4 MMOL/L — SIGNIFICANT CHANGE UP (ref 0.7–2)
LACTATE SERPL-SCNC: 2.6 MMOL/L — HIGH (ref 0.7–2)
LYMPHOCYTES # BLD AUTO: 1.1 K/UL — SIGNIFICANT CHANGE UP (ref 1–3.3)
LYMPHOCYTES # BLD AUTO: 17 % — SIGNIFICANT CHANGE UP (ref 13–44)
MAGNESIUM SERPL-MCNC: 1.6 MG/DL — SIGNIFICANT CHANGE UP (ref 1.6–2.6)
MANUAL DIF COMMENT BLD-IMP: SIGNIFICANT CHANGE UP
MCHC RBC-ENTMCNC: 27.8 PG — SIGNIFICANT CHANGE UP (ref 27–34)
MCHC RBC-ENTMCNC: 34.6 GM/DL — SIGNIFICANT CHANGE UP (ref 32–36)
MCV RBC AUTO: 80.4 FL — SIGNIFICANT CHANGE UP (ref 80–100)
MONOCYTES # BLD AUTO: 0.6 K/UL — SIGNIFICANT CHANGE UP (ref 0–0.9)
MONOCYTES NFR BLD AUTO: 9.1 % — SIGNIFICANT CHANGE UP (ref 2–14)
NEUTROPHILS # BLD AUTO: 4.7 K/UL — SIGNIFICANT CHANGE UP (ref 1.8–7.4)
NEUTROPHILS NFR BLD AUTO: 73.2 % — SIGNIFICANT CHANGE UP (ref 43–77)
PHOSPHATE SERPL-MCNC: 2.8 MG/DL — SIGNIFICANT CHANGE UP (ref 2.5–4.5)
PLAT MORPH BLD: NORMAL — SIGNIFICANT CHANGE UP
PLATELET # BLD AUTO: 305 K/UL — SIGNIFICANT CHANGE UP (ref 150–400)
POTASSIUM SERPL-MCNC: 3.7 MMOL/L — SIGNIFICANT CHANGE UP (ref 3.5–5.3)
POTASSIUM SERPL-SCNC: 3.7 MMOL/L — SIGNIFICANT CHANGE UP (ref 3.5–5.3)
PROTHROM AB SERPL-ACNC: 13.2 SEC — HIGH (ref 9.8–12.7)
RBC # BLD: 4.7 M/UL — SIGNIFICANT CHANGE UP (ref 3.8–5.2)
RBC # FLD: 12.6 % — SIGNIFICANT CHANGE UP (ref 10.3–14.5)
RBC BLD AUTO: NORMAL — SIGNIFICANT CHANGE UP
SODIUM SERPL-SCNC: 142 MMOL/L — SIGNIFICANT CHANGE UP (ref 135–145)
WBC # BLD: 6.5 K/UL — SIGNIFICANT CHANGE UP (ref 3.8–10.5)
WBC # FLD AUTO: 6.5 K/UL — SIGNIFICANT CHANGE UP (ref 3.8–10.5)

## 2017-06-13 PROCEDURE — 99285 EMERGENCY DEPT VISIT HI MDM: CPT

## 2017-06-13 RX ORDER — HYDROXYZINE HCL 10 MG
50 TABLET ORAL ONCE
Qty: 0 | Refills: 0 | Status: COMPLETED | OUTPATIENT
Start: 2017-06-13 | End: 2017-06-13

## 2017-06-13 RX ORDER — METOCLOPRAMIDE HCL 10 MG
10 TABLET ORAL EVERY 8 HOURS
Qty: 0 | Refills: 0 | Status: DISCONTINUED | OUTPATIENT
Start: 2017-06-13 | End: 2017-06-14

## 2017-06-13 RX ORDER — METRONIDAZOLE 500 MG
500 TABLET ORAL ONCE
Qty: 0 | Refills: 0 | Status: COMPLETED | OUTPATIENT
Start: 2017-06-13 | End: 2017-06-13

## 2017-06-13 RX ORDER — ALPRAZOLAM 0.25 MG
0.5 TABLET ORAL AT BEDTIME
Qty: 0 | Refills: 0 | Status: DISCONTINUED | OUTPATIENT
Start: 2017-06-13 | End: 2017-06-14

## 2017-06-13 RX ORDER — CIPROFLOXACIN LACTATE 400MG/40ML
400 VIAL (ML) INTRAVENOUS EVERY 12 HOURS
Qty: 0 | Refills: 0 | Status: DISCONTINUED | OUTPATIENT
Start: 2017-06-13 | End: 2017-06-14

## 2017-06-13 RX ORDER — AMITRIPTYLINE HCL 25 MG
10 TABLET ORAL ONCE
Qty: 0 | Refills: 0 | Status: COMPLETED | OUTPATIENT
Start: 2017-06-13 | End: 2017-06-13

## 2017-06-13 RX ORDER — GABAPENTIN 400 MG/1
600 CAPSULE ORAL ONCE
Qty: 0 | Refills: 0 | Status: COMPLETED | OUTPATIENT
Start: 2017-06-13 | End: 2017-06-13

## 2017-06-13 RX ORDER — METRONIDAZOLE 500 MG
500 TABLET ORAL EVERY 8 HOURS
Qty: 0 | Refills: 0 | Status: DISCONTINUED | OUTPATIENT
Start: 2017-06-13 | End: 2017-06-14

## 2017-06-13 RX ORDER — METRONIDAZOLE 500 MG
TABLET ORAL
Qty: 0 | Refills: 0 | Status: DISCONTINUED | OUTPATIENT
Start: 2017-06-13 | End: 2017-06-14

## 2017-06-13 RX ORDER — ACETAMINOPHEN 500 MG
650 TABLET ORAL EVERY 6 HOURS
Qty: 0 | Refills: 0 | Status: DISCONTINUED | OUTPATIENT
Start: 2017-06-12 | End: 2017-06-13

## 2017-06-13 RX ORDER — SODIUM CHLORIDE 9 MG/ML
2000 INJECTION INTRAMUSCULAR; INTRAVENOUS; SUBCUTANEOUS
Qty: 0 | Refills: 0 | Status: DISCONTINUED | OUTPATIENT
Start: 2017-06-13 | End: 2017-06-13

## 2017-06-13 RX ORDER — OXYCODONE HYDROCHLORIDE 5 MG/1
10 TABLET ORAL EVERY 6 HOURS
Qty: 0 | Refills: 0 | Status: DISCONTINUED | OUTPATIENT
Start: 2017-06-13 | End: 2017-06-14

## 2017-06-13 RX ORDER — OXYCODONE HYDROCHLORIDE 5 MG/1
5 TABLET ORAL EVERY 6 HOURS
Qty: 0 | Refills: 0 | Status: DISCONTINUED | OUTPATIENT
Start: 2017-06-13 | End: 2017-06-13

## 2017-06-13 RX ORDER — ZINC OXIDE 200 MG/G
1 OINTMENT TOPICAL
Qty: 0 | Refills: 0 | Status: DISCONTINUED | OUTPATIENT
Start: 2017-06-13 | End: 2017-06-14

## 2017-06-13 RX ORDER — SODIUM CHLORIDE 9 MG/ML
2000 INJECTION INTRAMUSCULAR; INTRAVENOUS; SUBCUTANEOUS
Qty: 0 | Refills: 0 | Status: DISCONTINUED | OUTPATIENT
Start: 2017-06-13 | End: 2017-06-14

## 2017-06-13 RX ADMIN — Medication 100 MILLIGRAM(S): at 21:02

## 2017-06-13 RX ADMIN — FAMOTIDINE 20 MILLIGRAM(S): 10 INJECTION INTRAVENOUS at 06:30

## 2017-06-13 RX ADMIN — GABAPENTIN 600 MILLIGRAM(S): 400 CAPSULE ORAL at 01:58

## 2017-06-13 RX ADMIN — SODIUM CHLORIDE 500 MILLILITER(S): 9 INJECTION INTRAMUSCULAR; INTRAVENOUS; SUBCUTANEOUS at 00:08

## 2017-06-13 RX ADMIN — Medication 10 MILLIGRAM(S): at 11:49

## 2017-06-13 RX ADMIN — OXYCODONE HYDROCHLORIDE 5 MILLIGRAM(S): 5 TABLET ORAL at 06:31

## 2017-06-13 RX ADMIN — Medication 0.5 MILLIGRAM(S): at 21:03

## 2017-06-13 RX ADMIN — Medication 50 MILLIGRAM(S): at 02:10

## 2017-06-13 RX ADMIN — SODIUM CHLORIDE 100 MILLILITER(S): 9 INJECTION INTRAMUSCULAR; INTRAVENOUS; SUBCUTANEOUS at 17:27

## 2017-06-13 RX ADMIN — Medication 10 MILLIGRAM(S): at 01:58

## 2017-06-13 RX ADMIN — GABAPENTIN 600 MILLIGRAM(S): 400 CAPSULE ORAL at 21:03

## 2017-06-13 RX ADMIN — Medication 200 MILLIGRAM(S): at 06:30

## 2017-06-13 RX ADMIN — CITALOPRAM 20 MILLIGRAM(S): 10 TABLET, FILM COATED ORAL at 11:58

## 2017-06-13 RX ADMIN — Medication 10 MILLIGRAM(S): at 02:10

## 2017-06-13 RX ADMIN — ZINC OXIDE 1 APPLICATION(S): 200 OINTMENT TOPICAL at 17:23

## 2017-06-13 RX ADMIN — LISINOPRIL 20 MILLIGRAM(S): 2.5 TABLET ORAL at 06:30

## 2017-06-13 RX ADMIN — Medication 650 MILLIGRAM(S): at 00:14

## 2017-06-13 RX ADMIN — OXYCODONE HYDROCHLORIDE 10 MILLIGRAM(S): 5 TABLET ORAL at 23:10

## 2017-06-13 RX ADMIN — FAMOTIDINE 20 MILLIGRAM(S): 10 INJECTION INTRAVENOUS at 17:27

## 2017-06-13 RX ADMIN — Medication 100 MILLIGRAM(S): at 16:27

## 2017-06-13 RX ADMIN — SODIUM CHLORIDE 125 MILLILITER(S): 9 INJECTION INTRAMUSCULAR; INTRAVENOUS; SUBCUTANEOUS at 01:58

## 2017-06-13 RX ADMIN — OXYCODONE HYDROCHLORIDE 10 MILLIGRAM(S): 5 TABLET ORAL at 17:04

## 2017-06-13 RX ADMIN — Medication 50 MILLIGRAM(S): at 21:03

## 2017-06-13 RX ADMIN — ZINC OXIDE 1 APPLICATION(S): 200 OINTMENT TOPICAL at 11:57

## 2017-06-13 RX ADMIN — Medication 200 MILLIGRAM(S): at 17:27

## 2017-06-13 RX ADMIN — Medication 100 MILLIGRAM(S): at 02:11

## 2017-06-13 RX ADMIN — Medication 125 MILLIGRAM(S): at 00:45

## 2017-06-13 RX ADMIN — ZINC OXIDE 1 APPLICATION(S): 200 OINTMENT TOPICAL at 21:06

## 2017-06-13 NOTE — ED ADULT NURSE REASSESSMENT NOTE - NS ED NURSE REASSESS COMMENT FT1
Pt returned from CT scan without procedure as pt could not tolerate nausea; Pt seen by this nurse upon her return from CT scan, ALICJA Barrios aware pt is requesting something different than Zofran.
Pt returned from CT, Sophie aware pt requesting to speak with her; Pt's mother returned to visit pt at this time. Pt appears comfortable, will continue to monitor.
Pt states nausea relieved by Reglan, pt is well appearing, resting comfortably at this time.
Verbal order of Reglan 10mg to treat pt c/o nausea. Pt given comfort care with warm blanket and medication. Pt states she is ready to try for CT scan again at this time.
Visited patient multiple times for comfort care and to explain to patient about being admitted and how long wait time will be.  Pt states she is still having diarrhea, commode changed and cleaned.  Tylenol given for pain relief.  mother at bedside, will continue to monitor,

## 2017-06-13 NOTE — PROGRESS NOTE ADULT - PROBLEM SELECTOR PLAN 1
Admit to med surg   IVF, lactate was 2.6 and now normal   sepsis secondary to gastroenteritis   cause of diarrhea is viral gastroenteritis vs Colitis   Imaging negative for colitis   Hx pf C diff, C diff by PCR negative , off isolation  on IV cipro 400 mg BID  and Flagyl 500 mg Q8H  Day 1   WBC remarkably improved   advance diet as tolerated   GI consult    stool cx results awaited

## 2017-06-13 NOTE — PROGRESS NOTE ADULT - ASSESSMENT
25 yo F with PMH significant for Hx of C.Diff in the past, Mixed connective tissue disorder, PCOS, Anxiety, Depression, secondary hypertension due to MCTD? presents to the ED c/o Abdominal pain, N/V/D x 1 day.

## 2017-06-13 NOTE — PROGRESS NOTE ADULT - SUBJECTIVE AND OBJECTIVE BOX
HPI:  27 yo F with PMH significant for Hx of C.Diff in the past, Mixed connective tissue disorder, PCOS, Anxiety, Depression, secondary hypertension due to MCTD? presents to the ED c/o Abdominal pain, N/V/D x 1 day. Pt states that she is having diffuse abdominal pain ( Although looks comfortable for now),nausea, vomited 20 + times and loose watery diarrhea 10+ times today. Pt had C.diff 3 yrs ago and was treated with IV Flagyl. Denies taking any Abx recently. Denies fever or chills. No recent travel.     In the ED, found to have leucocytosis 17.6 and CTAP came back negative as acute intraabdominal or pelvic pathology.  Meds: Vanco 125 ml PO x1, Toradol, IV Morphine,. IV Zofran, IV Reglan, Protonix and Bentyl (2017 23:37)      : Pt seen and examined at bedside. Wished to sign out AMA /d/c  to home but was convinced to stay . Still c/o dairrhea. 10-15 episodes.  Watery non bloody. Nausea has improved.     T(C): 37, Max: 37.3 (- @ 02:00)  HR: 88 (77 - 117)  BP: 120/66 (98/44 - 145/87)  RR: 18 (17 - 20)  SpO2: 99% (96% - 100%)  Wt(kg): --                              13.1   6.5   )-----------( 305      ( 2017 06:59 )             37.8     2017 06:59    142    |  111    |  16     ----------------------------<  115    3.7     |  24     |  0.84     Ca    7.5        2017 06:59  Phos  2.8       2017 06:59  Mg     1.6       2017 06:59    TPro  8.5    /  Alb  3.5    /  TBili  0.4    /  DBili  x      /  AST  78     /  ALT  69     /  AlkPhos  80     2017 16:54    PT/INR - ( 2017 06:59 )   PT: 13.2 sec;   INR: 1.22 ratio         PTT - ( 2017 06:59 )  PTT:35.0 sec  CAPILLARY BLOOD GLUCOSE    LIVER FUNCTIONS - ( 2017 16:54 )  Alb: 3.5 g/dL / Pro: 8.5 gm/dL / ALK PHOS: 80 U/L / ALT: 69 U/L / AST: 78 U/L / GGT: x           Urinalysis Basic - ( 2017 18:05 )    Color: Yellow / Appearance: Clear / S.020 / pH: x  Gluc: x / Ketone: Negative  / Bili: Negative / Urobili: Negative mg/dL   Blood: x / Protein: 100 mg/dL / Nitrite: Negative   Leuk Esterase: Trace / RBC: 3-5 /HPF / WBC 5-7 /HPF   Sq Epi: x / Non Sq Epi: Moderate / Bacteria: Many        REVIEW OF SYSTEMS:  General: multiple episodes of diarrhea  HEENT:  Eyes:  No visual loss, blurred vision, double vision or yellow sclerae. Ears, Nose, Throat:  No hearing loss, sneezing, congestion, runny nose or sore throat.  SKIN:  No rash or itching.  CARDIOVASCULAR:  No chest pain, chest pressure or chest discomfort. No palpitations or edema.  RESPIRATORY:  No shortness of breath, cough or sputum.  GASTROINTESTINAL:  abdominal pain, cramping  NEUROLOGICAL:  No headache, dizziness, syncope, paralysis, ataxia, numbness or tingling in the extremities. No change in bowel or bladder control.  MUSCULOSKELETAL:  No muscle, back pain, joint pain or stiffness.  HEMATOLOGIC:  No anemia, bleeding or bruising.  LYMPHATICS:  No enlarged nodes. No history of splenectomy.  ENDOCRINOLOGIC:  No reports of sweating, cold or heat intolerance. No polyuria or polydipsia.  ALLERGIES:  No history of asthma, hives, eczema or rhinitis.    Physical Exam:   GENERAL APPEARANCE:  NAD, hemodynamically stable  HEENT:  Head is normocephalic    Skin:  Warm and dry without any rash   NECK:  Supple without lymphadenopathy.   HEART:  Regular rate and rhythm. normal S1 and S2, No M/R/G  LUNGS:  Good ins/exp effort, no W/R/R/C  ABDOMEN:  multiple episodes of diarrhea described as green   EXTREMITIES:  Without cyanosis, clubbing or edema.   NEUROLOGICAL:  Gross nonfocal     CT abdomen pelvis with IC     IMPRESSION:     No acute intra-abdominal or pelvic pathology.    C Diff by PCR Result: Elizabeth (17 @ 18:26)

## 2017-06-13 NOTE — ED ADULT NURSE REASSESSMENT NOTE - GENERAL PATIENT STATE
improvement verbalized/comfortable appearance/family/SO at bedside/Pt states she is comfortable at this time, changed patients linens and commode.

## 2017-06-13 NOTE — PROGRESS NOTE ADULT - SUBJECTIVE AND OBJECTIVE BOX
Chief Complaint/Reason for Admission: c/o Abodminal pain, N/V/D 	  History of Present Illness: 	  pt is a 25 yo F with PMH significant for Hx of C.Diff in the past, Mixed connective tissue disorder, PCOS, Anxiety, Depression, secondary hypertension due to MCTD? presents to the ED c/o Abdominal pain, N/V/D x 1 day. Pt states that she is having diffuse abdominal pain ( Although looks comfortable for now),nausea, vomited 20 + times and loose watery diarrhea 10+ times today. Pt had C.diff 3 yrs ago and was treated with IV Flagyl. Denies taking any Abx recently. Denies fever or chills. No recent travel.     In the ED, found to have wbc of  17.6 and CTAP came back negative as acute intraabdominal or pelvic pathology.  Meds: Vanco 125 ml PO x1, Toradol, IV Morphine,. IV Zofran, IV Reglan, Protonix and Bentyl    REVIEW OF SYSTEMS:  General: NAD, hemodynamically stable, (-)  fever, (-) chills, (-) weakness  HEENT:  Eyes:  No visual loss, blurred vision, double vision or yellow sclerae. Ears, Nose, Throat:  No hearing loss, sneezing, congestion, runny nose or sore throat.  SKIN:  No rash or itching.  CARDIOVASCULAR:  No chest pain, chest pressure or chest discomfort. No palpitations or edema.  RESPIRATORY:  No shortness of breath, cough or sputum.  GASTROINTESTINAL:  No anorexia, nausea, vomiting or diarrhea. No abdominal pain or blood.  NEUROLOGICAL:  No headache, dizziness, syncope, paralysis, ataxia, numbness or tingling in the extremities. No change in bowel or bladder control.  MUSCULOSKELETAL:  No muscle, back pain, joint pain or stiffness.  HEMATOLOGIC:  No anemia, bleeding or bruising.  LYMPHATICS:  No enlarged nodes. No history of splenectomy.  ENDOCRINOLOGIC:  No reports of sweating, cold or heat intolerance. No polyuria or polydipsia.  ALLERGIES:  No history of asthma, hives, eczema or rhinitis.    Physical Exam:   GENERAL APPEARANCE:  NAD, hemodynamically stable  T(C): 36.8, Max: 37.3 (06-12 @ 16:54)  HR: 77 (77 - 126)  BP: 98/44 (98/44 - 156/111)  RR: 18 (17 - 18)  SpO2: 96% (96% - 97%)  Wt(kg): --  HEENT:  Head is normocephalic    Skin:  Warm and dry without any rash   NECK:  Supple without lymphadenopathy.   HEART:  Regular rate and rhythm. normal S1 and S2, No M/R/G  LUNGS:  Good ins/exp effort, no W/R/R/C  ABDOMEN:  Soft, nontender, nondistended with good bowel sounds heard  EXTREMITIES:  Without cyanosis, clubbing or edema.   NEUROLOGICAL:  Gross nonfocal       CBC Full  -  ( 2017 06:59 )  WBC Count : 6.5 K/uL  Hemoglobin : 13.1 g/dL  Hematocrit : 37.8 %  Platelet Count - Automated : 305 K/uL  Mean Cell Volume : 80.4 fl  Mean Cell Hemoglobin : 27.8 pg  Mean Cell Hemoglobin Concentration : 34.6 gm/dL  Auto Neutrophil # : 4.7 K/uL  Auto Lymphocyte # : 1.1 K/uL  Auto Monocyte # : 0.6 K/uL  Auto Eosinophil # : 0.0 K/uL  Auto Basophil # : 0.0 K/uL  Auto Neutrophil % : 73.2 %  Auto Lymphocyte % : 17.0 %  Auto Monocyte % : 9.1 %  Auto Eosinophil % : 0.2 %  Auto Basophil % : 0.5 %    PT/INR - ( 2017 06:59 )   PT: 13.2 sec;   INR: 1.22 ratio         PTT - ( 2017 06:59 )  PTT:35.0 sec  Urinalysis Basic - ( 2017 18:05 )    Color: Yellow / Appearance: Clear / S.020 / pH: x  Gluc: x / Ketone: Negative  / Bili: Negative / Urobili: Negative mg/dL   Blood: x / Protein: 100 mg/dL / Nitrite: Negative   Leuk Esterase: Trace / RBC: 3-5 /HPF / WBC 5-7 /HPF   Sq Epi: x / Non Sq Epi: Moderate / Bacteria: Many      142  |  111<H>  |  16  ----------------------------<  115<H>  3.7   |  24  |  0.84    Ca    7.5<L>      2017 06:59  Phos  2.8     06-13  Mg     1.6     06-13    TPro  8.5<H>  /  Alb  3.5  /  TBili  0.4  /  DBili  x   /  AST  78<H>  /  ALT  69  /  AlkPhos  80  06-12        # Sepsis 2/2 gastroenteritis   - r/o c.diff given h/o c.diff in the past  - IV cipro / flagyl  - advance po diet  - d/c IV fluids  - Isolation until c.diff ruled out  - CT abdomen doesn't report any acute pathology Chief Complaint/Reason for Admission: c/o Abodminal pain, N/V/D 	  History of Present Illness: 	  pt is a 25 yo F with PMH significant for Hx of C.Diff in the past, Mixed connective tissue disorder, PCOS, Anxiety, Depression, secondary hypertension due to MCTD? presents to the ED c/o Abdominal pain, N/V/D x 1 day. Pt states that she is having diffuse abdominal pain ( Although looks comfortable for now),nausea, vomited 20 + times and loose watery diarrhea 10+ times today. Pt had C.diff 3 yrs ago and was treated with IV Flagyl. Denies taking any Abx recently. Denies fever or chills. No recent travel.     In the ED, found to have wbc of  17.6 and CTAP came back negative as acute intraabdominal or pelvic pathology.  Meds: Vanco 125 ml PO x1, Toradol, IV Morphine,. IV Zofran, IV Reglan, Protonix and Bentyl    : seen and eval. hemodynamically stable. Denies any HA, CP, SOB. No fevers, chills or shakes. Abdominal pain free. 7 episodes of diarrhea overnight.       REVIEW OF SYSTEMS:  General: NAD, hemodynamically stable, (-)  fever, (-) chills, (-) weakness  HEENT:  Eyes:  No visual loss, blurred vision, double vision or yellow sclerae. Ears, Nose, Throat:  No hearing loss, sneezing, congestion, runny nose or sore throat.  SKIN:  No rash or itching.  CARDIOVASCULAR:  No chest pain, chest pressure or chest discomfort. No palpitations or edema.  RESPIRATORY:  No shortness of breath, cough or sputum.  GASTROINTESTINAL:  No anorexia, nausea, vomiting or diarrhea. No abdominal pain or blood.  NEUROLOGICAL:  No headache, dizziness, syncope, paralysis, ataxia, numbness or tingling in the extremities. No change in bowel or bladder control.  MUSCULOSKELETAL:  No muscle, back pain, joint pain or stiffness.  HEMATOLOGIC:  No anemia, bleeding or bruising.  LYMPHATICS:  No enlarged nodes. No history of splenectomy.  ENDOCRINOLOGIC:  No reports of sweating, cold or heat intolerance. No polyuria or polydipsia.  ALLERGIES:  No history of asthma, hives, eczema or rhinitis.    Physical Exam:   GENERAL APPEARANCE:  NAD, hemodynamically stable  T(C): 36.8, Max: 37.3 (12 @ 16:54)  HR: 77 (77 - 126)  BP: 98/44 (98/44 - 156/111)  RR: 18 (17 - 18)  SpO2: 96% (96% - 97%)  HEENT:  Head is normocephalic    Skin:  Warm and dry without any rash   NECK:  Supple without lymphadenopathy.   HEART:  Regular rate and rhythm. normal S1 and S2, No M/R/G  LUNGS:  Good ins/exp effort, no W/R/R/C  ABDOMEN:  Soft, nontender, nondistended with good bowel sounds heard  EXTREMITIES:  Without cyanosis, clubbing or edema.   NEUROLOGICAL:  Gross nonfocal       CBC Full  -  ( 2017 06:59 )  WBC Count : 6.5 K/uL  Hemoglobin : 13.1 g/dL  Hematocrit : 37.8 %  Platelet Count - Automated : 305 K/uL    PT/INR - ( 2017 06:59 )   PT: 13.2 sec; INR: 1.22 ratio         PTT - ( 2017 06:59 )  PTT:35.0 sec  Urinalysis Basic - ( 2017 18:05 )    Color: Yellow / Appearance: Clear / S.020 / pH: x  Gluc: x / Ketone: Negative  / Bili: Negative / Urobili: Negative mg/dL   Blood: x / Protein: 100 mg/dL / Nitrite: Negative   Leuk Esterase: Trace / RBC: 3-5 /HPF / WBC 5-7 /HPF   Sq Epi: x / Non Sq Epi: Moderate / Bacteria: Many      142  |  111<H>  |  16  ----------------------------<  115<H>  3.7   |  24  |  0.84    Ca    7.5<L>      2017 06:59  Phos  2.8     06-13  Mg     1.6     06-13    TPro  8.5<H>  /  Alb  3.5  /  TBili  0.4  /  DBili  x   /  AST  78<H>  /  ALT  69  /  AlkPhos  80  06-12        # Sepsis 2/2 gastroenteritis   - r/o c.diff given h/o c.diff in the past  - IV cipro / flagyl  - advance po diet  - d/c IV fluids  - Isolation until c.diff ruled out  - CT abdomen doesn't report any acute pathology      # Back pain  - takes at home hydrocodone. Chief Complaint/Reason for Admission: c/o Abodminal pain, N/V/D 	  History of Present Illness: 	  pt is a 25 yo F with PMH significant for Hx of C.Diff in the past, Mixed connective tissue disorder, PCOS, Anxiety, Depression, secondary hypertension due to MCTD? presents to the ED c/o Abdominal pain, N/V/D x 1 day. Pt states that she is having diffuse abdominal pain ( Although looks comfortable for now),nausea, vomited 20 + times and loose watery diarrhea 10+ times today. Pt had C.diff 3 yrs ago and was treated with IV Flagyl. Denies taking any Abx recently. Denies fever or chills. No recent travel. last visit at Dr. Alcira galarza's office o    In the ED, found to have wbc of  17.6 and CTAP came back negative as acute intraabdominal or pelvic pathology.  Meds: Vanco 125 ml PO x1, Toradol, IV Morphine,. IV Zofran, IV Reglan, Protonix and Bentyl    : seen and eval. hemodynamically stable. Denies any HA, CP, SOB. No fevers, chills or shakes. Abdominal pain free. 7 episodes of diarrhea overnight.  8 episodes throughout the day. Care discussed with Dr. Alcira Galarza.       REVIEW OF SYSTEMS:  General: multiple episodes of diarrhea  HEENT:  Eyes:  No visual loss, blurred vision, double vision or yellow sclerae. Ears, Nose, Throat:  No hearing loss, sneezing, congestion, runny nose or sore throat.  SKIN:  No rash or itching.  CARDIOVASCULAR:  No chest pain, chest pressure or chest discomfort. No palpitations or edema.  RESPIRATORY:  No shortness of breath, cough or sputum.  GASTROINTESTINAL:  abdominal pain, cramping  NEUROLOGICAL:  No headache, dizziness, syncope, paralysis, ataxia, numbness or tingling in the extremities. No change in bowel or bladder control.  MUSCULOSKELETAL:  No muscle, back pain, joint pain or stiffness.  HEMATOLOGIC:  No anemia, bleeding or bruising.  LYMPHATICS:  No enlarged nodes. No history of splenectomy.  ENDOCRINOLOGIC:  No reports of sweating, cold or heat intolerance. No polyuria or polydipsia.  ALLERGIES:  No history of asthma, hives, eczema or rhinitis.    Physical Exam:   GENERAL APPEARANCE:  NAD, hemodynamically stable  T(C): 36.8, Max: 37.3 (- @ 16:54)  HR: 77 (77 - 126)  BP: 98/44 (98/44 - 156/111)  RR: 18 (17 - 18)  SpO2: 96% (96% - 97%)  HEENT:  Head is normocephalic    Skin:  Warm and dry without any rash   NECK:  Supple without lymphadenopathy.   HEART:  Regular rate and rhythm. normal S1 and S2, No M/R/G  LUNGS:  Good ins/exp effort, no W/R/R/C  ABDOMEN:  multiple episodes of diarrhea described as green   EXTREMITIES:  Without cyanosis, clubbing or edema.   NEUROLOGICAL:  Gross nonfocal       CBC Full  -  ( 2017 06:59 )  WBC Count : 6.5 K/uL  Hemoglobin : 13.1 g/dL  Hematocrit : 37.8 %  Platelet Count - Automated : 305 K/uL  Mean Cell Volume : 80.4 fl  Mean Cell Hemoglobin : 27.8 pg  Mean Cell Hemoglobin Concentration : 34.6 gm/dL  Auto Neutrophil # : 4.7 K/uL  Auto Lymphocyte # : 1.1 K/uL  Auto Monocyte # : 0.6 K/uL  Auto Eosinophil # : 0.0 K/uL  Auto Basophil # : 0.0 K/uL  Auto Neutrophil % : 73.2 %  Auto Lymphocyte % : 17.0 %  Auto Monocyte % : 9.1 %  Auto Eosinophil % : 0.2 %  Auto Basophil % : 0.5 %    PT/INR - ( 2017 06:59 )   PT: 13.2 sec;   INR: 1.22 ratio         PTT - ( 2017 06:59 )  PTT:35.0 sec  Urinalysis Basic - ( 2017 18:05 )    Color: Yellow / Appearance: Clear / S.020 / pH: x  Gluc: x / Ketone: Negative  / Bili: Negative / Urobili: Negative mg/dL   Blood: x / Protein: 100 mg/dL / Nitrite: Negative   Leuk Esterase: Trace / RBC: 3-5 /HPF / WBC 5-7 /HPF   Sq Epi: x / Non Sq Epi: Moderate / Bacteria: Many          142  |  111<H>  |  16  ----------------------------<  115<H>  3.7   |  24  |  0.84    Ca    7.5<L>      2017 06:59  Phos  2.8     06-13  Mg     1.6     06-13    TPro  8.5<H>  /  Alb  3.5  /  TBili  0.4  /  DBili  x   /  AST  78<H>  /  ALT  69  /  AlkPhos  80  06-12          # Sepsis 2/2 gastroenteritis   - c.diff PCR (-)  - IV cipro / flagyl  - advance po diet  - IV fluids  - CT abdomen doesn't report any acute pathology    # Back pain  - takes at home hydrocodone.     # Diffuse steatosis  - advice weight loss

## 2017-06-14 VITALS
DIASTOLIC BLOOD PRESSURE: 63 MMHG | OXYGEN SATURATION: 99 % | HEART RATE: 65 BPM | SYSTOLIC BLOOD PRESSURE: 114 MMHG | RESPIRATION RATE: 17 BRPM

## 2017-06-14 LAB
ANION GAP SERPL CALC-SCNC: 7 MMOL/L — SIGNIFICANT CHANGE UP (ref 5–17)
BUN SERPL-MCNC: 10 MG/DL — SIGNIFICANT CHANGE UP (ref 7–23)
CALCIUM SERPL-MCNC: 7.9 MG/DL — LOW (ref 8.5–10.1)
CHLORIDE SERPL-SCNC: 109 MMOL/L — HIGH (ref 96–108)
CO2 SERPL-SCNC: 27 MMOL/L — SIGNIFICANT CHANGE UP (ref 22–31)
CREAT SERPL-MCNC: 0.76 MG/DL — SIGNIFICANT CHANGE UP (ref 0.5–1.3)
CULTURE RESULTS: SIGNIFICANT CHANGE UP
GLUCOSE SERPL-MCNC: 93 MG/DL — SIGNIFICANT CHANGE UP (ref 70–99)
HBA1C BLD-MCNC: 5.1 % — SIGNIFICANT CHANGE UP (ref 4–5.6)
HCT VFR BLD CALC: 35.6 % — SIGNIFICANT CHANGE UP (ref 34.5–45)
HGB BLD-MCNC: 12.5 G/DL — SIGNIFICANT CHANGE UP (ref 11.5–15.5)
MCHC RBC-ENTMCNC: 28.4 PG — SIGNIFICANT CHANGE UP (ref 27–34)
MCHC RBC-ENTMCNC: 35.2 GM/DL — SIGNIFICANT CHANGE UP (ref 32–36)
MCV RBC AUTO: 80.6 FL — SIGNIFICANT CHANGE UP (ref 80–100)
PLATELET # BLD AUTO: 283 K/UL — SIGNIFICANT CHANGE UP (ref 150–400)
POTASSIUM SERPL-MCNC: 3.4 MMOL/L — LOW (ref 3.5–5.3)
POTASSIUM SERPL-SCNC: 3.4 MMOL/L — LOW (ref 3.5–5.3)
RBC # BLD: 4.42 M/UL — SIGNIFICANT CHANGE UP (ref 3.8–5.2)
RBC # FLD: 12.4 % — SIGNIFICANT CHANGE UP (ref 10.3–14.5)
SODIUM SERPL-SCNC: 143 MMOL/L — SIGNIFICANT CHANGE UP (ref 135–145)
SPECIMEN SOURCE: SIGNIFICANT CHANGE UP
WBC # BLD: 7.1 K/UL — SIGNIFICANT CHANGE UP (ref 3.8–10.5)
WBC # FLD AUTO: 7.1 K/UL — SIGNIFICANT CHANGE UP (ref 3.8–10.5)

## 2017-06-14 RX ORDER — ACETAMINOPHEN 500 MG
2 TABLET ORAL
Qty: 0 | Refills: 0 | COMMUNITY
Start: 2017-06-14

## 2017-06-14 RX ORDER — ALPRAZOLAM 0.25 MG
0.5 TABLET ORAL ONCE
Qty: 0 | Refills: 0 | Status: DISCONTINUED | OUTPATIENT
Start: 2017-06-14 | End: 2017-06-14

## 2017-06-14 RX ORDER — METRONIDAZOLE 500 MG
1 TABLET ORAL
Qty: 15 | Refills: 0 | OUTPATIENT
Start: 2017-06-14 | End: 2017-06-19

## 2017-06-14 RX ORDER — ACETAMINOPHEN 500 MG
650 TABLET ORAL EVERY 6 HOURS
Qty: 0 | Refills: 0 | Status: DISCONTINUED | OUTPATIENT
Start: 2017-06-14 | End: 2017-06-14

## 2017-06-14 RX ORDER — METOCLOPRAMIDE HCL 10 MG
1 TABLET ORAL
Qty: 20 | Refills: 0 | OUTPATIENT
Start: 2017-06-14 | End: 2017-06-19

## 2017-06-14 RX ORDER — TIZANIDINE 4 MG/1
0 TABLET ORAL
Qty: 0 | Refills: 0 | COMMUNITY

## 2017-06-14 RX ORDER — MOXIFLOXACIN HYDROCHLORIDE TABLETS, 400 MG 400 MG/1
1 TABLET, FILM COATED ORAL
Qty: 10 | Refills: 0 | OUTPATIENT
Start: 2017-06-14 | End: 2017-06-19

## 2017-06-14 RX ORDER — ONDANSETRON 8 MG/1
1 TABLET, FILM COATED ORAL
Qty: 16 | Refills: 0 | OUTPATIENT
Start: 2017-06-14 | End: 2017-06-18

## 2017-06-14 RX ADMIN — Medication 100 MILLIGRAM(S): at 05:03

## 2017-06-14 RX ADMIN — FAMOTIDINE 20 MILLIGRAM(S): 10 INJECTION INTRAVENOUS at 05:17

## 2017-06-14 RX ADMIN — SODIUM CHLORIDE 100 MILLILITER(S): 9 INJECTION INTRAMUSCULAR; INTRAVENOUS; SUBCUTANEOUS at 05:02

## 2017-06-14 RX ADMIN — Medication 100 MILLIGRAM(S): at 14:15

## 2017-06-14 RX ADMIN — LISINOPRIL 20 MILLIGRAM(S): 2.5 TABLET ORAL at 05:16

## 2017-06-14 RX ADMIN — OXYCODONE HYDROCHLORIDE 10 MILLIGRAM(S): 5 TABLET ORAL at 14:18

## 2017-06-14 RX ADMIN — ZINC OXIDE 1 APPLICATION(S): 200 OINTMENT TOPICAL at 05:20

## 2017-06-14 RX ADMIN — OXYCODONE HYDROCHLORIDE 10 MILLIGRAM(S): 5 TABLET ORAL at 05:18

## 2017-06-14 RX ADMIN — Medication 1 TABLET(S): at 11:18

## 2017-06-14 RX ADMIN — ZINC OXIDE 1 APPLICATION(S): 200 OINTMENT TOPICAL at 11:18

## 2017-06-14 RX ADMIN — Medication 200 MILLIGRAM(S): at 06:41

## 2017-06-14 RX ADMIN — Medication 650 MILLIGRAM(S): at 09:47

## 2017-06-14 RX ADMIN — Medication 0.5 MILLIGRAM(S): at 09:47

## 2017-06-14 RX ADMIN — CITALOPRAM 20 MILLIGRAM(S): 10 TABLET, FILM COATED ORAL at 11:18

## 2017-06-14 RX ADMIN — Medication 10 MILLIGRAM(S): at 07:48

## 2017-06-14 NOTE — DISCHARGE NOTE ADULT - CARE PLAN
Principal Discharge DX:	Diarrhea, unspecified type  Goal:	soft diet as tolerated cont with Antibiotics  Instructions for follow-up, activity and diet:	likely cause is gastroenteritis   cont with ciprofloxacin and flagyl for 5 days   f/u with Dr. Galarza  Secondary Diagnosis:	Depression  Goal:	stable mood  Instructions for follow-up, activity and diet:	cont with celexa  Secondary Diagnosis:	Anxiety  Goal:	stable mood and prevent attacks  Instructions for follow-up, activity and diet:	cont with home meds and f/u with primary  Secondary Diagnosis:	HTN (hypertension)  Goal:	stable BP  Instructions for follow-up, activity and diet:	cont with lisinopril  Secondary Diagnosis:	Polycystic ovarian syndrome  Instructions for follow-up, activity and diet:	f/u with PCP and OBGYN

## 2017-06-14 NOTE — DISCHARGE NOTE ADULT - CARE PROVIDERS DIRECT ADDRESSES
[email or web address]

## 2017-06-14 NOTE — DISCHARGE NOTE ADULT - PATIENT PORTAL LINK FT
“You can access the Follow[facility] Patient Portal, offered by [facility], by registering with the following website: [email or web address]”

## 2017-06-14 NOTE — DISCHARGE NOTE ADULT - PLAN OF CARE
soft diet as tolerated cont with Antibiotics likely cause is gastroenteritis   cont with ciprofloxacin and flagyl for 5 days   f/u with Dr. Galarza stable mood cont with celexa stable mood and prevent attacks cont with home meds and f/u with primary stable BP cont with lisinopril f/u with PCP and OBGYN

## 2017-06-14 NOTE — PROGRESS NOTE ADULT - SUBJECTIVE AND OBJECTIVE BOX
Chief Complaint/Reason for Admission: c/o Abodminal pain, N/V/D 	  History of Present Illness: 	  pt is a 27 yo F with PMH significant for Hx of C.Diff in the past, Mixed connective tissue disorder, PCOS, Anxiety, Depression, secondary hypertension due to MCTD? presents to the ED c/o Abdominal pain, N/V/D x 1 day. Pt states that she is having diffuse abdominal pain ( Although looks comfortable for now),nausea, vomited 20 + times and loose watery diarrhea 10+ times today. Pt had C.diff 3 yrs ago and was treated with IV Flagyl. Denies taking any Abx recently. Denies fever or chills. No recent travel. last visit at Dr. Alcira galarza's office o    In the ED, found to have wbc of  17.6 and CTAP came back negative as acute intraabdominal or pelvic pathology.  Meds: Vanco 125 ml PO x1, Toradol, IV Morphine,. IV Zofran, IV Reglan, Protonix and Bentyl    : seen and eval. hemodynamically stable. Denies any HA, CP, SOB. No fevers, chills or shakes. Abdominal pain free. 7 episodes of diarrhea overnight.  8 episodes throughout the day. Care discussed with Dr. Alcira Galarza.     : Seen and eval. patient states that her stools are forming. Denies any HA, CP, SOB. No fevers, chills or shakes. comfortably sitting up and wants to go home. GI evaluation appreciated for gastroenteritis. Care discussed with residents. d/c planning with 5 days of antibiotics.       REVIEW OF SYSTEMS:  General: multiple episodes of diarrhea  HEENT:  Eyes:  No visual loss, blurred vision, double vision or yellow sclerae. Ears, Nose, Throat:  No hearing loss, sneezing, congestion, runny nose or sore throat.  SKIN:  No rash or itching.  CARDIOVASCULAR:  No chest pain, chest pressure or chest discomfort. No palpitations or edema.  RESPIRATORY:  No shortness of breath, cough or sputum.  GASTROINTESTINAL:  forming stools  NEUROLOGICAL:  No headache, dizziness, syncope, paralysis, ataxia, numbness or tingling in the extremities. No change in bowel or bladder control.  MUSCULOSKELETAL:  No muscle, back pain, joint pain or stiffness.  HEMATOLOGIC:  No anemia, bleeding or bruising.  LYMPHATICS:  No enlarged nodes. No history of splenectomy.  ENDOCRINOLOGIC:  No reports of sweating, cold or heat intolerance. No polyuria or polydipsia.  ALLERGIES:  No history of asthma, hives, eczema or rhinitis.    Physical Exam:   GENERAL APPEARANCE:  NAD, hemodynamically stable  Vital Signs Last 24 Hrs  T(C): 37.1, Max: 37.1 ( @ 05:40)  T(F): 98.8, Max: 98.8 ( @ 05:40)  HR: 65 (65 - 88)  BP: 114/63 (114/63 - 130/60)  BP(mean): --  RR: 17 (16 - 18)  SpO2: 99% (99% - 99%)  HEENT:  Head is normocephalic    Skin:  Warm and dry without any rash   NECK:  Supple without lymphadenopathy.   HEART:  Regular rate and rhythm. normal S1 and S2, No M/R/G  LUNGS:  Good ins/exp effort, no W/R/R/C  ABDOMEN:  multiple episodes of diarrhea described as green   EXTREMITIES:  Without cyanosis, clubbing or edema.   NEUROLOGICAL:  Gross nonfocal       CBC Full  -  ( 2017 08:49 )  WBC Count : 7.1 K/uL  Hemoglobin : 12.5 g/dL  Hematocrit : 35.6 %  Platelet Count - Automated : 283 K/uL  Mean Cell Volume : 80.6 fl  Mean Cell Hemoglobin : 28.4 pg  Mean Cell Hemoglobin Concentration : 35.2 gm/dL  Auto Neutrophil # : x  Auto Lymphocyte # : x  Auto Monocyte # : x  Auto Eosinophil # : x  Auto Basophil # : x  Auto Neutrophil % : x  Auto Lymphocyte % : x  Auto Monocyte % : x  Auto Eosinophil % : x  Auto Basophil % : x    PT/INR - ( 2017 06:59 )   PT: 13.2 sec;   INR: 1.22 ratio         PTT - ( 2017 06:59 )  PTT:35.0 sec  Urinalysis Basic - ( 2017 18:05 )    Color: Yellow / Appearance: Clear / S.020 / pH: x  Gluc: x / Ketone: Negative  / Bili: Negative / Urobili: Negative mg/dL   Blood: x / Protein: 100 mg/dL / Nitrite: Negative   Leuk Esterase: Trace / RBC: 3-5 /HPF / WBC 5-7 /HPF   Sq Epi: x / Non Sq Epi: Moderate / Bacteria: Many          143  |  109<H>  |  10  ----------------------------<  93  3.4<L>   |  27  |  0.76    Ca    7.9<L>      2017 08:49  Phos  2.8     06-  Mg     1.6     -    TPro  8.5<H>  /  Alb  3.5  /  TBili  0.4  /  DBili  x   /  AST  78<H>  /  ALT  69  /  AlkPhos  80  06-12            # Sepsis 2/2 gastroenteritis   - c.diff PCR (-)  - IV cipro / flagyl - 5 more days of po antibiotics  - tolerating po diet  - encourage po hydration  - CT abdomen doesn't report any acute pathology  - care discussed with Dr. Alcira Galarza    # hypokalemia  - repleated, encourage po diet    # Back pain  - takes at home hydrocodone.     # Diffuse steatosis  - advice weight loss

## 2017-06-14 NOTE — CONSULT NOTE ADULT - ASSESSMENT
n/v diarrhea /epig discomfort  likely gastroenteritis    stool c diff neg  continue cipro and flagyl for 5 more days then stop  follow up in office next week  check stool culture

## 2017-06-14 NOTE — DISCHARGE NOTE ADULT - CARE PROVIDER_API CALL
Kristal Barba), Internal Medicine  [address] [place] [address]  Phone: [phone]  Fax: [phone]    Alcira Valdez), Gastroenterology; Internal Medicine  [address] [place] [address]  Phone: [phone]  Fax: [phone]

## 2017-06-14 NOTE — DISCHARGE NOTE ADULT - MEDICATION SUMMARY - MEDICATIONS TO STOP TAKING
I will STOP taking the medications listed below when I get home from the hospital:    Medrol 4 mg oral tablet  -- 1 dose(s) by mouth as directed  -- It is very important that you take or use this exactly as directed.  Do not skip doses or discontinue unless directed by your doctor.  Obtain medical advice before taking any non-prescription drugs as some may affect the action of this medication.  Take with food or milk.

## 2017-06-14 NOTE — CONSULT NOTE ADULT - SUBJECTIVE AND OBJECTIVE BOX
HPI:  27 yo F with PMH significant for Hx of C.Diff in the past, Mixed connective tissue disorder, PCOS who presents with three days of diarrhea  had n/v three days ago and then upper abdominal cramping and voluminous diarrhea, non bloody   she feels better today with less diarrhea noted. tolerating diet  no blood in stool  no n/v  has her menstrual period today  wants to go home    PAST MEDICAL & SURGICAL HISTORY:  HTN (hypertension)  Polycystic ovarian syndrome  Depression  Anxiety  Mixed connective tissue disease  Cholecystitis  H/O ovarian cystectomy  H/O knee surgery  History of cholecystectomy  Hx c diff diarrhea     Allergies    No Known Drug Allergies  Tree Nuts (Unknown)    Intolerances    morphine (Pruritus)      MEDICATIONS  (STANDING):  lisinopril 20milliGRAM(s) Oral daily  gabapentin 600milliGRAM(s) Oral at bedtime  citalopram 20milliGRAM(s) Oral daily  hydrOXYzine hydrochloride 50milliGRAM(s) Oral at bedtime  famotidine    Tablet 20milliGRAM(s) Oral two times a day  multivitamin 1Tablet(s) Oral daily  ciprofloxacin   IVPB 400milliGRAM(s) IV Intermittent every 12 hours  metroNIDAZOLE  IVPB  IV Intermittent   metroNIDAZOLE  IVPB 500milliGRAM(s) IV Intermittent every 8 hours  zinc oxide 40% Ointment 1Application(s) Topical four times a day  sodium chloride 0.9%. 2000milliLiter(s) IV Continuous <Continuous>  ALPRAZolam 0.5milliGRAM(s) Oral at bedtime    MEDICATIONS  (PRN):  aluminum hydroxide/magnesium hydroxide/simethicone Suspension 30milliLiter(s) Oral every 4 hours PRN Dyspepsia  metoclopramide Injectable 10milliGRAM(s) IV Push every 8 hours PRN Nausea and vomiting  oxyCODONE IR 10milliGRAM(s) Oral every 6 hours PRN Moderate Pain (4 - 6)  acetaminophen   Tablet 650milliGRAM(s) Oral every 6 hours PRN For Temp greater than 38 C (100.4 F)      FAMILY HISTORY:  No pertinent family history in first degree relatives      Social History:    REVIEW OF SYSTEMS      General:	No fever or chills    Skin/Breast: No jaundice or rash   		  ENMT: denies sore throat or thrush    Respiratory and Thorax: Denies dyspnea or cough or shortness of breath  	  Cardiovascular: Denies chest pain or palpitations 	    Gastrointestinal: Denies jaundice or pruritis    Genitourinary: Denies dysuria or hematuria	    Musculoskeletal: Denies muscular pain or swelling	    Neurological: Denies confusion or tremor	    Hematology/Lymphatics: Denies easy bruising or bleeding 	    Endocrine:	Denies polyphagia or polyuria    See above hx otherwise neg for any major organ systems    PHYSICAL EXAM:    Vital Signs Last 24 Hrs  T(C): 37.1, Max: 37.1 (06-14 @ 05:40)  T(F): 98.8, Max: 98.8 (06-14 @ 05:40)  HR: 65 (65 - 88)  BP: 114/63 (110/64 - 130/60)  BP(mean): --  RR: 17 (16 - 20)  SpO2: 99% (99% - 100%)    Constitutional: no acute distress    ENMT: NC/AT scl anicteric opm pink no lesions     Neck: supple. No jvd or LN    Respiratory: Clear     Cardiovascular: RRR s1s2     Gastrointestinal: Pos bs , soft , not tender, no hepatosplenomegaly,  no mass      Back: No CVA tenderness    Extremities: NO cce     Neurological: Alert and oriented x 3     Skin: No rash or jaundice     Date/Time:06-14 @ 08:49    Albumin: --  ALT/SGPT: --  Alk Phos: --  AST/SGOT: --  Bilirubin Direct: --  Bilirubin Total: --  Ca: 7.9  eGFR : 125  eGFR Non-: 108  Lipase: --  Amylase: --  INR: --  PTT: --      06-14    143  |  109<H>  |  10  ----------------------------<  93  3.4<L>   |  27  |  0.76    Ca    7.9<L>      14 Jun 2017 08:49  Phos  2.8     06-13  Mg     1.6     06-13    TPro  8.5<H>  /  Alb  3.5  /  TBili  0.4  /  DBili  x   /  AST  78<H>  /  ALT  69  /  AlkPhos  80  06-12                            12.5   7.1   )-----------( 283      ( 14 Jun 2017 08:49 )             35.6

## 2017-06-14 NOTE — DISCHARGE NOTE ADULT - MEDICATION SUMMARY - MEDICATIONS TO CHANGE
I will SWITCH the dose or number of times a day I take the medications listed below when I get home from the hospital:    Pepcid 20 mg oral tablet  -- 1 tab(s) by mouth 2 times a day  -- It is very important that you take or use this exactly as directed.  Do not skip doses or discontinue unless directed by your doctor.  Obtain medical advice before taking any non-prescription drugs as some may affect the action of this medication.

## 2017-06-14 NOTE — DISCHARGE NOTE ADULT - MEDICATION SUMMARY - MEDICATIONS TO TAKE
I will START or STAY ON the medications listed below when I get home from the hospital:    Flagyl 500 mg oral tablet  -- 1 tab(s) by mouth 3 times a day  -- Do not drink alcoholic beverages when taking this medication.  Finish all this medication unless otherwise directed by prescriber.  May discolor urine or feces.    -- Indication: For gastroenteritis     HYDROcodone  -- 7.5 milligram(s) by mouth , As Needed  -- Indication: For Pain    acetaminophen 325 mg oral tablet  -- 2 tab(s) by mouth every 6 hours, As needed, For Temp greater than 38 C (100.4 F)  -- Indication: For fever    lisinopril 20 mg oral tablet  --  by mouth once a day (at bedtime)  -- Indication: For HTN (hypertension)    Neurontin 600 mg oral tablet  --  by mouth once (at bedtime)  -- Indication: For Pain    CeleXA 20 mg oral tablet  -- 1 tab(s) by mouth once a day  -- Indication: For Depression    metoclopramide 5 mg oral tablet  -- 1 tab(s) by mouth 4 times a day  -- May cause drowsiness.  Alcohol may intensify this effect.  Use care when operating dangerous machinery.  Take medication on an empty stomach 1 hour before or 2 to 3 hours after a meal unless otherwise directed by your doctor.    -- Indication: For nausea    ondansetron 4 mg oral tablet  -- 1 tab(s) by mouth 4 times a day  -- Indication: For nausea    Vistaril 50 mg oral capsule  --  by mouth once (at bedtime)  -- Indication: For Anxiety    Bentyl 20 mg oral tablet  -- 1 tab(s) by mouth 4 times a day  -- Indication: For Abdominal pain    Pepcid 20 mg oral tablet  -- 1 tab(s) by mouth 2 times a day  -- It is very important that you take or use this exactly as directed.  Do not skip doses or discontinue unless directed by your doctor.  Obtain medical advice before taking any non-prescription drugs as some may affect the action of this medication.    -- Indication: For gastritis     Cipro 500 mg oral tablet  -- 1 tab(s) by mouth 2 times a day  -- Avoid prolonged or excessive exposure to direct and/or artificial sunlight while taking this medication.  Check with your doctor before becoming pregnant.  Do not take dairy products, antacids, or iron preparations within one hour of this medication.  Finish all this medication unless otherwise directed by prescriber.  Medication should be taken with plenty of water.    -- Indication: For gastroenteritis     Multiple Vitamins oral tablet  -- 1 tab(s) by mouth once a day  -- Indication: For Minerals and vitamins

## 2017-06-15 LAB
CULTURE RESULTS: SIGNIFICANT CHANGE UP
SPECIMEN SOURCE: SIGNIFICANT CHANGE UP

## 2017-06-18 LAB
CULTURE RESULTS: SIGNIFICANT CHANGE UP
CULTURE RESULTS: SIGNIFICANT CHANGE UP
SPECIMEN SOURCE: SIGNIFICANT CHANGE UP
SPECIMEN SOURCE: SIGNIFICANT CHANGE UP

## 2017-06-19 DIAGNOSIS — I15.9 SECONDARY HYPERTENSION, UNSPECIFIED: ICD-10-CM

## 2017-06-19 DIAGNOSIS — E87.6 HYPOKALEMIA: ICD-10-CM

## 2017-06-19 DIAGNOSIS — M54.9 DORSALGIA, UNSPECIFIED: ICD-10-CM

## 2017-06-19 DIAGNOSIS — D72.829 ELEVATED WHITE BLOOD CELL COUNT, UNSPECIFIED: ICD-10-CM

## 2017-06-19 DIAGNOSIS — E28.2 POLYCYSTIC OVARIAN SYNDROME: ICD-10-CM

## 2017-06-19 DIAGNOSIS — K52.9 NONINFECTIVE GASTROENTERITIS AND COLITIS, UNSPECIFIED: ICD-10-CM

## 2017-06-19 DIAGNOSIS — A41.9 SEPSIS, UNSPECIFIED ORGANISM: ICD-10-CM

## 2017-06-19 DIAGNOSIS — R10.9 UNSPECIFIED ABDOMINAL PAIN: ICD-10-CM

## 2017-06-19 DIAGNOSIS — F41.9 ANXIETY DISORDER, UNSPECIFIED: ICD-10-CM

## 2017-06-19 DIAGNOSIS — M35.1 OTHER OVERLAP SYNDROMES: ICD-10-CM

## 2017-06-19 DIAGNOSIS — K76.0 FATTY (CHANGE OF) LIVER, NOT ELSEWHERE CLASSIFIED: ICD-10-CM

## 2017-06-19 DIAGNOSIS — F32.9 MAJOR DEPRESSIVE DISORDER, SINGLE EPISODE, UNSPECIFIED: ICD-10-CM

## 2017-12-05 ENCOUNTER — INPATIENT (INPATIENT)
Facility: HOSPITAL | Age: 27
LOS: 5 days | Discharge: HOME | End: 2017-12-11
Attending: PSYCHIATRY & NEUROLOGY | Admitting: PSYCHIATRY & NEUROLOGY
Payer: COMMERCIAL

## 2017-12-05 VITALS
HEART RATE: 80 BPM | DIASTOLIC BLOOD PRESSURE: 96 MMHG | RESPIRATION RATE: 17 BRPM | OXYGEN SATURATION: 100 % | SYSTOLIC BLOOD PRESSURE: 159 MMHG | TEMPERATURE: 98 F | WEIGHT: 229.94 LBS

## 2017-12-05 DIAGNOSIS — Z98.890 OTHER SPECIFIED POSTPROCEDURAL STATES: Chronic | ICD-10-CM

## 2017-12-05 DIAGNOSIS — Z90.49 ACQUIRED ABSENCE OF OTHER SPECIFIED PARTS OF DIGESTIVE TRACT: Chronic | ICD-10-CM

## 2017-12-05 DIAGNOSIS — F43.29 ADJUSTMENT DISORDER WITH OTHER SYMPTOMS: ICD-10-CM

## 2017-12-05 DIAGNOSIS — T50.904A POISONING BY UNSPECIFIED DRUGS, MEDICAMENTS AND BIOLOGICAL SUBSTANCES, UNDETERMINED, INITIAL ENCOUNTER: ICD-10-CM

## 2017-12-05 DIAGNOSIS — R69 ILLNESS, UNSPECIFIED: ICD-10-CM

## 2017-12-05 LAB
ALBUMIN SERPL ELPH-MCNC: 3.5 G/DL — SIGNIFICANT CHANGE UP (ref 3.3–5)
ALP SERPL-CCNC: 91 U/L — SIGNIFICANT CHANGE UP (ref 40–120)
ALT FLD-CCNC: 98 U/L — HIGH (ref 12–78)
AMPHET UR-MCNC: NEGATIVE — SIGNIFICANT CHANGE UP
ANION GAP SERPL CALC-SCNC: 9 MMOL/L — SIGNIFICANT CHANGE UP (ref 5–17)
APAP SERPL-MCNC: < 2 UG/ML (ref 10–30)
APPEARANCE UR: CLEAR — SIGNIFICANT CHANGE UP
AST SERPL-CCNC: 54 U/L — HIGH (ref 15–37)
BARBITURATES UR SCN-MCNC: NEGATIVE — SIGNIFICANT CHANGE UP
BASOPHILS # BLD AUTO: 0.1 K/UL — SIGNIFICANT CHANGE UP (ref 0–0.2)
BASOPHILS NFR BLD AUTO: 0.8 % — SIGNIFICANT CHANGE UP (ref 0–2)
BENZODIAZ UR-MCNC: POSITIVE — SIGNIFICANT CHANGE UP
BILIRUB SERPL-MCNC: 0.3 MG/DL — SIGNIFICANT CHANGE UP (ref 0.2–1.2)
BILIRUB UR-MCNC: NEGATIVE — SIGNIFICANT CHANGE UP
BUN SERPL-MCNC: 19 MG/DL — SIGNIFICANT CHANGE UP (ref 7–23)
CALCIUM SERPL-MCNC: 9.2 MG/DL — SIGNIFICANT CHANGE UP (ref 8.5–10.1)
CHLORIDE SERPL-SCNC: 105 MMOL/L — SIGNIFICANT CHANGE UP (ref 96–108)
CO2 SERPL-SCNC: 24 MMOL/L — SIGNIFICANT CHANGE UP (ref 22–31)
COCAINE METAB.OTHER UR-MCNC: NEGATIVE — SIGNIFICANT CHANGE UP
COLOR SPEC: YELLOW — SIGNIFICANT CHANGE UP
CREAT SERPL-MCNC: 0.9 MG/DL — SIGNIFICANT CHANGE UP (ref 0.5–1.3)
DIFF PNL FLD: (no result)
EOSINOPHIL # BLD AUTO: 0.1 K/UL — SIGNIFICANT CHANGE UP (ref 0–0.5)
EOSINOPHIL NFR BLD AUTO: 1.1 % — SIGNIFICANT CHANGE UP (ref 0–6)
ETHANOL SERPL-MCNC: <10 MG/DL — SIGNIFICANT CHANGE UP (ref 0–10)
GLUCOSE SERPL-MCNC: 78 MG/DL — SIGNIFICANT CHANGE UP (ref 70–99)
GLUCOSE UR QL: NEGATIVE MG/DL — SIGNIFICANT CHANGE UP
HCG UR QL: NEGATIVE — SIGNIFICANT CHANGE UP
HCT VFR BLD CALC: 43.4 % — SIGNIFICANT CHANGE UP (ref 34.5–45)
HGB BLD-MCNC: 14.8 G/DL — SIGNIFICANT CHANGE UP (ref 11.5–15.5)
KETONES UR-MCNC: NEGATIVE — SIGNIFICANT CHANGE UP
LEUKOCYTE ESTERASE UR-ACNC: NEGATIVE — SIGNIFICANT CHANGE UP
LYMPHOCYTES # BLD AUTO: 3.5 K/UL — HIGH (ref 1–3.3)
LYMPHOCYTES # BLD AUTO: 35.8 % — SIGNIFICANT CHANGE UP (ref 13–44)
MCHC RBC-ENTMCNC: 27.3 PG — SIGNIFICANT CHANGE UP (ref 27–34)
MCHC RBC-ENTMCNC: 34.1 GM/DL — SIGNIFICANT CHANGE UP (ref 32–36)
MCV RBC AUTO: 80.1 FL — SIGNIFICANT CHANGE UP (ref 80–100)
METHADONE UR-MCNC: NEGATIVE — SIGNIFICANT CHANGE UP
MONOCYTES # BLD AUTO: 0.9 K/UL — SIGNIFICANT CHANGE UP (ref 0–0.9)
MONOCYTES NFR BLD AUTO: 8.7 % — SIGNIFICANT CHANGE UP (ref 2–14)
NEUTROPHILS # BLD AUTO: 5.3 K/UL — SIGNIFICANT CHANGE UP (ref 1.8–7.4)
NEUTROPHILS NFR BLD AUTO: 53.7 % — SIGNIFICANT CHANGE UP (ref 43–77)
NITRITE UR-MCNC: NEGATIVE — SIGNIFICANT CHANGE UP
OPIATES UR-MCNC: POSITIVE — SIGNIFICANT CHANGE UP
PCP SPEC-MCNC: SIGNIFICANT CHANGE UP
PCP UR-MCNC: NEGATIVE — SIGNIFICANT CHANGE UP
PH UR: 6 — SIGNIFICANT CHANGE UP (ref 5–8)
PLATELET # BLD AUTO: 360 K/UL — SIGNIFICANT CHANGE UP (ref 150–400)
POTASSIUM SERPL-MCNC: 4 MMOL/L — SIGNIFICANT CHANGE UP (ref 3.5–5.3)
POTASSIUM SERPL-SCNC: 4 MMOL/L — SIGNIFICANT CHANGE UP (ref 3.5–5.3)
PROT SERPL-MCNC: 8.5 GM/DL — HIGH (ref 6–8.3)
PROT UR-MCNC: 30 MG/DL
RBC # BLD: 5.42 M/UL — HIGH (ref 3.8–5.2)
RBC # FLD: 12.7 % — SIGNIFICANT CHANGE UP (ref 10.3–14.5)
SALICYLATES SERPL-MCNC: <1.7 MG/DL — LOW (ref 2.8–20)
SODIUM SERPL-SCNC: 138 MMOL/L — SIGNIFICANT CHANGE UP (ref 135–145)
SP GR SPEC: 1.01 — SIGNIFICANT CHANGE UP (ref 1.01–1.02)
THC UR QL: NEGATIVE — SIGNIFICANT CHANGE UP
UROBILINOGEN FLD QL: NEGATIVE MG/DL — SIGNIFICANT CHANGE UP
WBC # BLD: 9.9 K/UL — SIGNIFICANT CHANGE UP (ref 3.8–10.5)
WBC # FLD AUTO: 9.9 K/UL — SIGNIFICANT CHANGE UP (ref 3.8–10.5)

## 2017-12-05 PROCEDURE — 93010 ELECTROCARDIOGRAM REPORT: CPT

## 2017-12-05 PROCEDURE — 99285 EMERGENCY DEPT VISIT HI MDM: CPT

## 2017-12-05 RX ORDER — OXYCODONE AND ACETAMINOPHEN 5; 325 MG/1; MG/1
1 TABLET ORAL ONCE
Qty: 0 | Refills: 0 | Status: DISCONTINUED | OUTPATIENT
Start: 2017-12-05 | End: 2017-12-05

## 2017-12-05 RX ORDER — ALPRAZOLAM 0.25 MG
1 TABLET ORAL ONCE
Qty: 0 | Refills: 0 | Status: DISCONTINUED | OUTPATIENT
Start: 2017-12-05 | End: 2017-12-05

## 2017-12-05 RX ORDER — ONDANSETRON 8 MG/1
4 TABLET, FILM COATED ORAL ONCE
Qty: 0 | Refills: 0 | Status: COMPLETED | OUTPATIENT
Start: 2017-12-05 | End: 2017-12-05

## 2017-12-05 RX ORDER — FAMOTIDINE 10 MG/ML
20 INJECTION INTRAVENOUS ONCE
Qty: 0 | Refills: 0 | Status: COMPLETED | OUTPATIENT
Start: 2017-12-05 | End: 2017-12-05

## 2017-12-05 RX ORDER — GABAPENTIN 400 MG/1
600 CAPSULE ORAL ONCE
Qty: 0 | Refills: 0 | Status: COMPLETED | OUTPATIENT
Start: 2017-12-05 | End: 2017-12-05

## 2017-12-05 RX ORDER — CYCLOBENZAPRINE HYDROCHLORIDE 10 MG/1
10 TABLET, FILM COATED ORAL ONCE
Qty: 0 | Refills: 0 | Status: COMPLETED | OUTPATIENT
Start: 2017-12-05 | End: 2017-12-05

## 2017-12-05 RX ORDER — LISINOPRIL 2.5 MG/1
20 TABLET ORAL DAILY
Qty: 0 | Refills: 0 | Status: DISCONTINUED | OUTPATIENT
Start: 2017-12-05 | End: 2017-12-06

## 2017-12-05 RX ADMIN — FAMOTIDINE 20 MILLIGRAM(S): 10 INJECTION INTRAVENOUS at 20:20

## 2017-12-05 RX ADMIN — Medication 1 MILLIGRAM(S): at 22:55

## 2017-12-05 RX ADMIN — ONDANSETRON 4 MILLIGRAM(S): 8 TABLET, FILM COATED ORAL at 20:20

## 2017-12-05 NOTE — ED ADULT NURSE NOTE - CHPI ED SYMPTOMS NEG
no hallucinations/no weakness/no disorientation/no change in level of consciousness/no weight loss/no agitation/no confusion

## 2017-12-05 NOTE — ED BEHAVIORAL HEALTH ASSESSMENT NOTE - DIFFERENTIAL
Impulse Control disorder, MDD, Bipolar disorder, substance misuse, personality dis personality d/o , Impulse Control disorder, MDD, Bipolar disorder, substance misuse

## 2017-12-05 NOTE — ED ADULT NURSE NOTE - ED STAT RN HANDOFF DETAILS
Pt given Celexa, flexeril, and Xanax po , pt rewanded by security , report given to 5N, pt transported to 5n

## 2017-12-05 NOTE — ED PROVIDER NOTE - OBJECTIVE STATEMENT
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 The pt states she got into an argument with a co worker and was terminated, the pt felt depressed and took a handful of Advil- no more than 15 200mg pills. The pt denies taking Tylenol, or asa. Pt feels remorseful and currently denies HI/SI, and had never done this before. Pt currently has epigastric discomfort, nausea and was retching prior to arrival. 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 The pt states she got into an argument with a co worker and was terminated, the pt felt depressed and took a handful of Advil- no more than 15 200mg pills. The pt denies taking Tylenol, or asa. Pt feels remorseful and currently denies HI/SI, and had never done this before. Pt currently has epigastric discomfort, nausea and was retching prior to arrival.

## 2017-12-05 NOTE — ED BEHAVIORAL HEALTH ASSESSMENT NOTE - CURRENT MEDICATION
Celexa 20 mg qd since 2010, Adderall 25 mg time released, qd, Xanax hs for sleep (dose unk) Celexa 20 mg qd since 2010, Adderall 25 mg time released (not found on ISTOP recently), qd, Xanax hs for sleep 0.5 mg po daily

## 2017-12-05 NOTE — ED PROVIDER NOTE - MEDICAL DECISION MAKING DETAILS
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 The pt states she got into an argument with a co worker and was terminated, the pt felt depressed and took a handful of Advil- no more than 15 200mg pills. The pt denies taking Tylenol, or asa. Pt feels remorseful and currently denies HI/SI, and had never done this before. Pt currently has epigastric discomfort, nausea and was retching prior to arrival.  On exam pt is remorseful denies HI or SI at this time, low concern for Advil OD w/ given amount of pills taken, will check for Tylenol or salicylate intake, and psych eval.

## 2017-12-05 NOTE — ED BEHAVIORAL HEALTH ASSESSMENT NOTE - SUICIDE RISK FACTORS
Agitation/severe anxiety/Chronic pain or acute medical issue/Global insomnia/Highly impulsive behavior/Access to means (pills, firearms, etc.)/Family history of suicide/Mood episode Agitation/severe anxiety/Chronic pain or acute medical issue/Access to means (pills, firearms, etc.)/Family history of suicide/Unable to engage in safety planning/Global insomnia/Mood episode/Highly impulsive behavior/Substance abuse/dependence

## 2017-12-05 NOTE — ED ADULT NURSE NOTE - CHIEF COMPLAINT QUOTE
pt presents to ED with mother after taking 15-20advils in suicide attempt. pt has no s/s of acute distress. pt states she wants to "go away"

## 2017-12-05 NOTE — ED BEHAVIORAL HEALTH ASSESSMENT NOTE - HPI (INCLUDE ILLNESS QUALITY, SEVERITY, DURATION, TIMING, CONTEXT, MODIFYING FACTORS, ASSOCIATED SIGNS AND SYMPTOMS)
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    Pt reports taking OD but could not give intention or say why she took the pills.  Admits to feeling upset, but denies this was SA.  Pt reports she was called from work and was told not to come in by NM who she has had issues with in past.  Pt mother reports Pt written up in past due to complaints by patients x 4, which Pt grieved, and won.  Pt was told to report to nursing office tomorrow and feels she will be fired and states she will not go in.  Mother reports no known h/o self harm but is worried about leaving her home alone tomorrow.  Mother called Pt therapist who also recommends admission, per mother.   Pt did not answer when asked if depressed and stated, "Its really anxiety".  Pt admits to "No coping skills, but would not elaborate.  Pt was guarded and withholding and gave minimal responses to questions asked.  Pt reports chronic Insomnia and takes Xanax to sleep.  She has been on ADHD meds since Middle school.  Pt denies SI /plan or intent, however reliability is in question due to evasive and brief responses. 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    Pt reports taking OD but could not give intention or say why she took the pills.  Admits to feeling upset, but denies this was SA.  Pt reports she was called from work and was told not to come in by NM who she has had issues with in past.  Pt mother reports Pt written up in past due to complaints by patients x 4, which Pt grieved, and won.  Pt was told to report to nursing office tomorrow and feels she will be fired and states she will not go in.  Mother reports no known h/o self harm but is worried about leaving her home alone tomorrow.  Mother called Pt therapist who also recommends admission, per mother.   Pt did not answer when asked if depressed and stated, "Its really anxiety".  Pt admits to "No coping skills, but would not elaborate.  Pt was guarded and withholding and gave minimal responses to questions asked.  Pt reports chronic Insomnia and takes Xanax to sleep.  She has been on ADHD meds since Middle school.  Pt denies SI /plan or intent, however reliability is in question due to evasive and brief responses.    12/6/17:  Pt. demonstrating poor frustration tolerance, difficulty waiting for disposition information and upset that "my pain isn't helped and my meds are all messed up; don't you care?"  Mother brought clothes to pt, pt. found in clothing yelling that she would not take them off. Pt. perceives she has not been taken care of despite multiple interventions by ED RN's and CO maintained.   Tearfully yelling that she does not want to be with "them" (psychiatric pts) "they better not talk to me, I won't be in a room with one of them".   Pt. states wants to go home. Denies current A/V/O/T hallucinations. "Impulsive" OD yesterday was reportedly precipitated by call from work with pt. anticipating being fired. Mother called job([employer]). Mother voicing concern that pain management be addressed "that is the base of her problems with others issues".

## 2017-12-05 NOTE — ED ADULT NURSE NOTE - OBJECTIVE STATEMENT
Pt presents to ED BIB mother s/p ingestion of "15-20" advil. Pt at this point denies SI/HI. Pt states that she does not know why she took the advil and reports that she did so at around 1830. 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 Pt states she has a PMH of anxiety, depresion, chronic back pain, kidney disease. Denies pain, chest pain, SOB at this time. Belongings removed and sent home with mother. 1:1 observation initiated.

## 2017-12-05 NOTE — ED ADULT TRIAGE NOTE - CHIEF COMPLAINT QUOTE
pt presents to ED with mother after taking 15-20advils in suicide attempt. pt has no s/s of acute distress. pt states she wants to "go away" pt presents to ED with mother after taking 15-20advils in suicide attempt. pt has no s/s of acute distress.

## 2017-12-05 NOTE — ED ADULT NURSE REASSESSMENT NOTE - NS ED NURSE REASSESS COMMENT FT1
Pt safety maintained. Pt resting in bed. Mother at bedside. 1:1 observation maintained. Belongings sent home w/ father. Security called for pt to be wanded.

## 2017-12-05 NOTE — ED BEHAVIORAL HEALTH ASSESSMENT NOTE - SUICIDE PROTECTIVE FACTORS
Supportive social network or family Positive therapeutic relationships/Supportive social network or family/Engaged in work or school

## 2017-12-05 NOTE — ED PROVIDER NOTE - NS_ ATTENDINGSCRIBEDETAILS _ED_A_ED_FT
I, Zak Antonio MD,  performed the initial face to face bedside interview with this patient regarding history of present illness, review of symptoms and relevant past medical, social and family history.  I completed an independent physical examination.  I was the initial provider who evaluated this patient.  The history, relevant review of systems, past medical and surgical history, medical decision making, and physical examination was documented by the scribe in my presence and I attest to the accuracy of the documentation.

## 2017-12-05 NOTE — ED BEHAVIORAL HEALTH ASSESSMENT NOTE - OTHER PAST PSYCHIATRIC HISTORY (INCLUDE DETAILS REGARDING ONSET, COURSE OF ILLNESS, INPATIENT/OUTPATIENT TREATMENT)
Angelina Stone therapist [phone]  Dr Jansen, neurologist prescriber of psych meds (looking for psychiatrist, has new appt in Jan). Angelina Stone therapist [phone]  Dr Warren, neurologist prescriber of psych meds (looking for psychiatrist, has new appt in Jan).

## 2017-12-05 NOTE — ED BEHAVIORAL HEALTH ASSESSMENT NOTE - SUMMARY
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  Pt could not give explanation for overdose and denies active SI but is guarded and evasive and reliability is questionable.  Pt is  potential danger to self s/p impulsive overdose and will need psych admission for safety.  Case reviewed with Dr Lemons.  No beds available on 5N, but expecting discharge in am.  Pt and mother notified of above. 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  Pt could not give explanation for overdose and denies active SI but is guarded and evasive and reliability is questionable.  Pt is  potential danger to self s/p impulsive overdose and will need psych admission for safety.  Case reviewed with Dr Lemons.  No beds available on , but expecting discharge in am.  Pt and mother notified of above.    12/6/17: Pt. agitated , put on clothing brought in by mother. Had pt. remove clothing while yelling, and demanding. Then reviewed with pt. that bed is expected to be available later today and will be admitted to . Offered transferred to [facility] if did not want to wait for bed availability on  as was complaining of wait time. Both pt. and mother declined [facility].   Report given to , JODEE MJ. 9.39 paperwork completed.   CO maintained. Addressed medication requests with Dr. Verma. Reviewed pharmacy report of prescribed meds and ISTOP.  Plan: 9.39 admission to , Dr. Gould

## 2017-12-05 NOTE — ED PROVIDER NOTE - PHYSICAL EXAMINATION
Constitutional: mild distress AAOx3  Eyes: PERRLA EOMI  Head: Normocephalic atraumatic  Mouth: MMM  Cardiac: regular rate   Resp: Lungs CTAB  GI: Abd s/nt/nd no tenderness to RUQ or epigastric region  Neuro: CN2-12 intact  Skin: No rashes  psych: no SI or HI

## 2017-12-05 NOTE — ED PROVIDER NOTE - NS ED ROS FT
Constitutional: No fever or chills  Eyes: No visual changes  HEENT: No throat pain  CV: No chest pain  Resp: No SOB no cough  GI: + abd pain, epigastric discomfort +nausea + vomiting  : No dysuria  MSK: No musculoskeletal pain  Skin: No rash  Neuro: No headache   psych: anxiety, depression. Constitutional: No fever or chills  Eyes: No visual changes  HEENT: No throat pain  CV: No chest pain  Resp: No SOB no cough  GI: no  abd pain, + epigastric discomfort +nausea + vomiting  : No dysuria  MSK: No musculoskeletal pain  Skin: No rash  Neuro: No headache   psych: anxiety, depression.

## 2017-12-05 NOTE — ED PROVIDER NOTE - PROGRESS NOTE DETAILS
psych evaluated patient - recommends a 939 - hold in same hospital until the morning. no beds at this time. will place in behavioral health. Zak Antonio M.D., Attending Physician
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
## 2017-12-05 NOTE — ED BEHAVIORAL HEALTH ASSESSMENT NOTE - AXIS III
Obese, Back and knee pain, polycystic ovaries, hardened kidneys Obese, Back and knee pain, polycystic ovaries, "hardened kidneys"

## 2017-12-05 NOTE — ED BEHAVIORAL HEALTH ASSESSMENT NOTE - DESCRIPTION
Mood is irritable and depression with anxiety and at times hostile, minimally cooperative, brevity of speech with one word responses with inappropriate smile.  Denies SI or SA but could not give explanation for overdose, including if was impulsive or not, just shrugged shoulders.  Pt affect is constricted, good eye contact, agitated and restless.   Pt judgement and insight are limited at this time and behavior is impulsive "hardening of kidneys", polycystic ovaries, chronic pain back and knee, obese lives with mother and step father, only child, lived in [place] where she attended college and returned to this area in Dec 2016 Mood is irritable and depression with anxiety and at times hostile, minimally cooperative, brevity of speech with one word responses with inappropriate smile.  Denies SI or SA but could not give explanation for overdose, including if was impulsive or not, just shrugged shoulders.  Pt affect is constricted, good eye contact, agitated and restless.   Pt judgement and insight are limited at this time and behavior is impulsive.    12/6/17: poor frustration, tearful, yelling and demanding behaviors. Maintained on CO.

## 2017-12-06 PROBLEM — E28.2 POLYCYSTIC OVARIAN SYNDROME: Chronic | Status: ACTIVE | Noted: 2017-06-12

## 2017-12-06 PROBLEM — F32.9 MAJOR DEPRESSIVE DISORDER, SINGLE EPISODE, UNSPECIFIED: Chronic | Status: ACTIVE | Noted: 2017-06-12

## 2017-12-06 PROBLEM — F41.9 ANXIETY DISORDER, UNSPECIFIED: Chronic | Status: ACTIVE | Noted: 2017-06-12

## 2017-12-06 PROBLEM — M35.1 OTHER OVERLAP SYNDROMES: Chronic | Status: ACTIVE | Noted: 2017-06-12

## 2017-12-06 LAB
BACTERIA # UR AUTO: (no result)
EPI CELLS # UR: SIGNIFICANT CHANGE UP
RBC CASTS # UR COMP ASSIST: SIGNIFICANT CHANGE UP /HPF (ref 0–4)
WBC UR QL: NEGATIVE — SIGNIFICANT CHANGE UP

## 2017-12-06 RX ORDER — GABAPENTIN 400 MG/1
600 CAPSULE ORAL AT BEDTIME
Qty: 0 | Refills: 0 | Status: COMPLETED | OUTPATIENT
Start: 2017-12-06 | End: 2017-12-06

## 2017-12-06 RX ORDER — OXYCODONE AND ACETAMINOPHEN 5; 325 MG/1; MG/1
1 TABLET ORAL ONCE
Qty: 0 | Refills: 0 | Status: DISCONTINUED | OUTPATIENT
Start: 2017-12-06 | End: 2017-12-06

## 2017-12-06 RX ORDER — OXYCODONE HYDROCHLORIDE 5 MG/1
10 TABLET ORAL ONCE
Qty: 0 | Refills: 0 | Status: DISCONTINUED | OUTPATIENT
Start: 2017-12-06 | End: 2017-12-06

## 2017-12-06 RX ORDER — DIPHENHYDRAMINE HCL 50 MG
50 CAPSULE ORAL ONCE
Qty: 0 | Refills: 0 | Status: COMPLETED | OUTPATIENT
Start: 2017-12-06 | End: 2017-12-07

## 2017-12-06 RX ORDER — HYDROCODONE BITARTRATE 50 MG/1
7.5 CAPSULE, EXTENDED RELEASE ORAL
Qty: 0 | Refills: 0 | COMMUNITY

## 2017-12-06 RX ORDER — ALPRAZOLAM 0.25 MG
0.5 TABLET ORAL
Qty: 0 | Refills: 0 | Status: DISCONTINUED | OUTPATIENT
Start: 2017-12-06 | End: 2017-12-11

## 2017-12-06 RX ORDER — LISINOPRIL 2.5 MG/1
0 TABLET ORAL
Qty: 0 | Refills: 0 | COMMUNITY

## 2017-12-06 RX ORDER — CITALOPRAM 10 MG/1
20 TABLET, FILM COATED ORAL DAILY
Qty: 0 | Refills: 0 | Status: DISCONTINUED | OUTPATIENT
Start: 2017-12-06 | End: 2017-12-07

## 2017-12-06 RX ORDER — DIPHENHYDRAMINE HCL 50 MG
50 CAPSULE ORAL ONCE
Qty: 0 | Refills: 0 | Status: DISCONTINUED | OUTPATIENT
Start: 2017-12-06 | End: 2017-12-11

## 2017-12-06 RX ORDER — LISINOPRIL 2.5 MG/1
20 TABLET ORAL
Qty: 0 | Refills: 0 | Status: DISCONTINUED | OUTPATIENT
Start: 2017-12-06 | End: 2017-12-11

## 2017-12-06 RX ORDER — CYCLOBENZAPRINE HYDROCHLORIDE 10 MG/1
10 TABLET, FILM COATED ORAL ONCE
Qty: 0 | Refills: 0 | Status: COMPLETED | OUTPATIENT
Start: 2017-12-06 | End: 2017-12-06

## 2017-12-06 RX ORDER — HALOPERIDOL DECANOATE 100 MG/ML
5 INJECTION INTRAMUSCULAR ONCE
Qty: 0 | Refills: 0 | Status: DISCONTINUED | OUTPATIENT
Start: 2017-12-06 | End: 2017-12-11

## 2017-12-06 RX ORDER — ALPRAZOLAM 0.25 MG
0.5 TABLET ORAL ONCE
Qty: 0 | Refills: 0 | Status: DISCONTINUED | OUTPATIENT
Start: 2017-12-06 | End: 2017-12-06

## 2017-12-06 RX ORDER — MIRTAZAPINE 45 MG/1
7.5 TABLET, ORALLY DISINTEGRATING ORAL AT BEDTIME
Qty: 0 | Refills: 0 | Status: DISCONTINUED | OUTPATIENT
Start: 2017-12-06 | End: 2017-12-11

## 2017-12-06 RX ADMIN — CYCLOBENZAPRINE HYDROCHLORIDE 10 MILLIGRAM(S): 10 TABLET, FILM COATED ORAL at 01:05

## 2017-12-06 RX ADMIN — Medication 0.5 MILLIGRAM(S): at 22:55

## 2017-12-06 RX ADMIN — Medication 0.5 MILLIGRAM(S): at 16:38

## 2017-12-06 RX ADMIN — GABAPENTIN 600 MILLIGRAM(S): 400 CAPSULE ORAL at 01:05

## 2017-12-06 RX ADMIN — OXYCODONE HYDROCHLORIDE 10 MILLIGRAM(S): 5 TABLET ORAL at 22:55

## 2017-12-06 RX ADMIN — CITALOPRAM 20 MILLIGRAM(S): 10 TABLET, FILM COATED ORAL at 16:38

## 2017-12-06 RX ADMIN — OXYCODONE AND ACETAMINOPHEN 1 TABLET(S): 5; 325 TABLET ORAL at 08:06

## 2017-12-06 RX ADMIN — OXYCODONE HYDROCHLORIDE 10 MILLIGRAM(S): 5 TABLET ORAL at 12:08

## 2017-12-06 RX ADMIN — OXYCODONE AND ACETAMINOPHEN 1 TABLET(S): 5; 325 TABLET ORAL at 18:20

## 2017-12-06 RX ADMIN — OXYCODONE HYDROCHLORIDE 10 MILLIGRAM(S): 5 TABLET ORAL at 10:54

## 2017-12-06 RX ADMIN — OXYCODONE AND ACETAMINOPHEN 1 TABLET(S): 5; 325 TABLET ORAL at 06:42

## 2017-12-06 RX ADMIN — OXYCODONE AND ACETAMINOPHEN 1 TABLET(S): 5; 325 TABLET ORAL at 20:11

## 2017-12-06 RX ADMIN — OXYCODONE AND ACETAMINOPHEN 1 TABLET(S): 5; 325 TABLET ORAL at 07:49

## 2017-12-06 RX ADMIN — LISINOPRIL 20 MILLIGRAM(S): 2.5 TABLET ORAL at 01:06

## 2017-12-06 RX ADMIN — Medication 0.5 MILLIGRAM(S): at 08:08

## 2017-12-06 RX ADMIN — Medication 1 MILLIGRAM(S): at 02:15

## 2017-12-06 RX ADMIN — OXYCODONE AND ACETAMINOPHEN 1 TABLET(S): 5; 325 TABLET ORAL at 01:06

## 2017-12-06 RX ADMIN — OXYCODONE HYDROCHLORIDE 10 MILLIGRAM(S): 5 TABLET ORAL at 23:57

## 2017-12-06 RX ADMIN — CYCLOBENZAPRINE HYDROCHLORIDE 10 MILLIGRAM(S): 10 TABLET, FILM COATED ORAL at 16:38

## 2017-12-06 NOTE — ED ADULT NURSE REASSESSMENT NOTE - NS ED NURSE REASSESS COMMENT FT1
Pt became agitated , yelling she wants to leave , pt's mother brought pt's belonging from her car , Bing Keith at bedside ,pt will be admitted to 5[facility], awaiting bed assignment , pt wanded by security and belongings locked up

## 2017-12-06 NOTE — SBIRT NOTE. - NSSBIRTFULLSCREEN_GEN_A_ED_FT
Meeting with patient attempted however Full Screen Not Performed due to    Consult ordered - Psychiatry, Adult; patient admitted to Behavioral Health Unit/5N

## 2017-12-06 NOTE — ED ADULT NURSE REASSESSMENT NOTE - NS ED NURSE REASSESS COMMENT FT1
Pt resting. 1:1 observation maintained. Safety maintained. Pt now agreeable to staying overnight. Concerns verbalized and questions answered. Will continue to monitor.

## 2017-12-06 NOTE — ED ADULT NURSE REASSESSMENT NOTE - NS ED NURSE REASSESS COMMENT FT1
Pt awaiting psych NP for placement to psych unit , pt also awaiting a shower, awaiting admission orders

## 2017-12-06 NOTE — ED BEHAVIORAL HEALTH NOTE - BEHAVIORAL HEALTH NOTE
Pt. having difficulty with wait time, awaiting for bed. Mother gave pt. clothes. After returned to [facility], given option of admission to  or transfer to [facility]. Prefers [facility]. Awaiting bed. 9.39 admission as pt. has poor insight and judgement at this times and presents risk to self at this time.   Support and education provided to pt. and mother.   Orders written. Report given to ERICK Lyons, RN on 5N and Dr. Verma that pt. is requesting medications for pain anxiety and routine meds. CO maintained in ED,  on 5N.  ISTOP([id number]) and pharmacy med. history reviewed with Dr. Verma.

## 2017-12-06 NOTE — ED ADULT NURSE REASSESSMENT NOTE - NS ED NURSE REASSESS COMMENT FT1
Pt reports feeling anxious and scared to be admitted to 5 , xanax 0.5mg po given , awaiting bed assignment to , pt is on a 1:1

## 2017-12-07 DIAGNOSIS — F60.3 BORDERLINE PERSONALITY DISORDER: ICD-10-CM

## 2017-12-07 DIAGNOSIS — F34.1 DYSTHYMIC DISORDER: ICD-10-CM

## 2017-12-07 LAB
CHOLEST SERPL-MCNC: 262 MG/DL — HIGH (ref 10–199)
HBA1C BLD-MCNC: 5.2 % — SIGNIFICANT CHANGE UP (ref 4–5.6)
HDLC SERPL-MCNC: 41 MG/DL — SIGNIFICANT CHANGE UP (ref 40–125)
LIPID PNL WITH DIRECT LDL SERPL: 185 MG/DL — HIGH
TOTAL CHOLESTEROL/HDL RATIO MEASUREMENT: 6.4 RATIO — SIGNIFICANT CHANGE UP (ref 3.3–7.1)
TRIGL SERPL-MCNC: 180 MG/DL — HIGH (ref 10–149)

## 2017-12-07 RX ORDER — DIPHENHYDRAMINE HCL 50 MG
50 CAPSULE ORAL ONCE
Qty: 0 | Refills: 0 | Status: COMPLETED | OUTPATIENT
Start: 2017-12-07 | End: 2017-12-07

## 2017-12-07 RX ORDER — LIDOCAINE 4 G/100G
1 CREAM TOPICAL DAILY
Qty: 0 | Refills: 0 | Status: DISCONTINUED | OUTPATIENT
Start: 2017-12-07 | End: 2017-12-11

## 2017-12-07 RX ORDER — GABAPENTIN 400 MG/1
600 CAPSULE ORAL AT BEDTIME
Qty: 0 | Refills: 0 | Status: DISCONTINUED | OUTPATIENT
Start: 2017-12-07 | End: 2017-12-11

## 2017-12-07 RX ORDER — CYCLOBENZAPRINE HYDROCHLORIDE 10 MG/1
10 TABLET, FILM COATED ORAL THREE TIMES A DAY
Qty: 0 | Refills: 0 | Status: DISCONTINUED | OUTPATIENT
Start: 2017-12-07 | End: 2017-12-11

## 2017-12-07 RX ORDER — ONDANSETRON 8 MG/1
4 TABLET, FILM COATED ORAL
Qty: 0 | Refills: 0 | COMMUNITY

## 2017-12-07 RX ORDER — DULOXETINE HYDROCHLORIDE 30 MG/1
30 CAPSULE, DELAYED RELEASE ORAL DAILY
Qty: 0 | Refills: 0 | Status: DISCONTINUED | OUTPATIENT
Start: 2017-12-08 | End: 2017-12-11

## 2017-12-07 RX ORDER — METOCLOPRAMIDE HCL 10 MG
5 TABLET ORAL
Qty: 0 | Refills: 0 | COMMUNITY

## 2017-12-07 RX ADMIN — Medication 50 MILLIGRAM(S): at 00:21

## 2017-12-07 RX ADMIN — LISINOPRIL 20 MILLIGRAM(S): 2.5 TABLET ORAL at 21:34

## 2017-12-07 RX ADMIN — GABAPENTIN 600 MILLIGRAM(S): 400 CAPSULE ORAL at 00:21

## 2017-12-07 RX ADMIN — Medication 0.1 MILLIGRAM(S): at 20:24

## 2017-12-07 RX ADMIN — CITALOPRAM 20 MILLIGRAM(S): 10 TABLET, FILM COATED ORAL at 08:58

## 2017-12-07 RX ADMIN — Medication 30 MILLILITER(S): at 21:30

## 2017-12-07 RX ADMIN — CYCLOBENZAPRINE HYDROCHLORIDE 10 MILLIGRAM(S): 10 TABLET, FILM COATED ORAL at 20:22

## 2017-12-07 RX ADMIN — Medication 0.5 MILLIGRAM(S): at 07:50

## 2017-12-07 RX ADMIN — LIDOCAINE 1 PATCH: 4 CREAM TOPICAL at 12:45

## 2017-12-07 RX ADMIN — Medication 50 MILLIGRAM(S): at 23:55

## 2017-12-07 RX ADMIN — Medication 0.5 MILLIGRAM(S): at 21:32

## 2017-12-07 RX ADMIN — LISINOPRIL 20 MILLIGRAM(S): 2.5 TABLET ORAL at 08:55

## 2017-12-07 RX ADMIN — CYCLOBENZAPRINE HYDROCHLORIDE 10 MILLIGRAM(S): 10 TABLET, FILM COATED ORAL at 12:46

## 2017-12-07 RX ADMIN — Medication 0.1 MILLIGRAM(S): at 00:21

## 2017-12-07 RX ADMIN — GABAPENTIN 600 MILLIGRAM(S): 400 CAPSULE ORAL at 20:22

## 2017-12-07 NOTE — H&P ADULT - NSHPLABSRESULTS_GEN_ALL_CORE
14.8   9.9   )-----------( 360      ( 05 Dec 2017 19:42 )             43.4     12    138  |  105  |  19  ----------------------------<  78  4.0   |  24  |  0.90    Ca    9.2      05 Dec 2017 19:42    TPro  8.5<H>  /  Alb  3.5  /  TBili  0.3  /  DBili  x   /  AST  54<H>  /  ALT  98<H>  /  AlkPhos  91  12-          Urinalysis Basic - ( 05 Dec 2017 19:50 )    Color: Yellow / Appearance: Clear / S.015 / pH: x  Gluc: x / Ketone: Negative  / Bili: Negative / Urobili: Negative mg/dL   Blood: x / Protein: 30 mg/dL / Nitrite: Negative   Leuk Esterase: Negative / RBC: 0-2 /HPF / WBC Negative   Sq Epi: x / Non Sq Epi: Occasional / Bacteria: Occasional

## 2017-12-07 NOTE — H&P ADULT - ASSESSMENT
27 y/o female with PMHx of HTN, PCOS, Chronic back pain 2/2 herniated disc ( on Clonidine and Vicodin prescribed by pain management), Anxiety, Depression presented to ED for suicidal attempt by OD of Advil.    # Depression/ Anxiety/ Suicidal attempt  -Management as per primary team    # HTN  -C/w Lisinopril 20 mg bid with parameters  -Monitor bp    # Chronic lower back pain 2/2 herniated disc  -ISTOP ([id number])   -C/w Hydrocodone 10 mg q6hrs prn   -C/w Neurontin and Clonidine     # DVT Ppx  Encourage ambulation 25 y/o female with PMHx of HTN, chronic proteinuria, PCOS, Chronic back pain 2/2 herniated disc ( on Clonidine and Vicodin prescribed by pain management), Anxiety, Depression presented to ED for suicidal attempt by OD of Advil.    # Depression/ Anxiety/ Suicidal attempt  -Management as per primary team    # HTN  -C/w Lisinopril 20 mg bid with parameters  -Monitor bp    # Chronic lower back pain 2/2 herniated disc  -ISTOP ([id number])   -C/w Hydrocodone 10 mg q6hrs prn   -C/w Neurontin and Clonidine     # Transaminitis likely related to fatty liver disease  -Repeat LFTs, hepatitis panel     # DVT Ppx  Encourage ambulation 27 y/o female with PMHx of HTN, chronic proteinuria, PCOS, Chronic back pain 2/2 herniated disc ( on Clonidine and Vicodin prescribed by pain management), Anxiety, Depression presented to ED for suicidal attempt by OD of Advil.    # Depression/ Anxiety/ Suicidal attempt  -Management as per primary team    # HTN  -C/w Lisinopril 20 mg bid with parameters  -Monitor bp    # Chronic lower back pain 2/2 herniated disc  -ISTOP ([id number])   -C/w Hydrocodone 10 mg q6hrs prn   -C/w Neurontin and Clonidine     # Transaminitis likely related to fatty liver disease  -Monitor LFTs    # DVT Ppx  Encourage ambulation 25 y/o female with PMHx of HTN, chronic proteinuria, PCOS, Chronic back pain 2/2 herniated disc ( on Clonidine and Vicodin prescribed by pain management), Anxiety, Depression presented to ED for suicidal attempt by OD of Advil.    # Depression/ Anxiety/ Suicidal attempt  -Management as per primary team    # HTN  -C/w Lisinopril 20 mg bid with parameters  -Monitor bp    # Chronic lower back pain 2/2 herniated disc  -ISTOP ([id number])   -C/w Hydrocodone 10 mg q6hrs prn   -C/w Neurontin and Clonidine     # Transaminitis likely related to fatty liver disease  -Monitor LFTs    #chronic proteinuria  -outpatient f/u with nephrology    # DVT Ppx  Encourage ambulation 27 y/o female with PMHx of HTN, chronic proteinuria, PCOS, Chronic back pain 2/2 herniated disc ( on Clonidine and Vicodin prescribed by pain management), Anxiety, Depression presented to ED for suicidal attempt by OD of Advil.    # Depression/ Anxiety/ Suicidal attempt  -Management as per primary team    # HTN  -C/w Lisinopril 20 mg bid with parameters  -Monitor bp    # Chronic lower back pain 2/2 herniated disc  -ISTOP ([id number])   -C/w Hydrocodone 10 mg q6hrs prn   -C/w Neurontin and Clonidine     # Mild  Transaminesemia likely related to fatty liver disease  -Monitor LFTs    #chronic proteinuria  -outpatient f/u with nephrology    # DVT Ppx  Encourage ambulation 25 y/o female with PMHx of HTN, chronic proteinuria, PCOS, Chronic back pain 2/2 herniated disc ( on Clonidine and Vicodin prescribed by pain management), Anxiety, Depression presented to ED for suicidal attempt by OD of Advil.    # Depression/ Anxiety/ Suicidal attempt  -Management as per primary team    # HTN  -C/w Lisinopril 20 mg bid with parameters  -Monitor bp    # Chronic lower back pain 2/2 herniated disc  -ISTOP ([id number])   -C/w Hydrocodone 10 mg q6hrs prn   -C/w Neurontin and Clonidine     # Mild  Transaminesemia likely related to fatty liver disease  -Monitor LFTs    #chronic proteinuria  -outpatient f/u with nephrology    # DVT Ppx  Encourage ambulation    will sign off.  please call hospitalist consult prn

## 2017-12-07 NOTE — PROGRESS NOTE BEHAVIORAL HEALTH - NSBHFUPINTERVALCCFT_PSY_A_CORE
patient complaining of pain and how she does not want to be here Reports feeling depressed and that she feels a medication change is in order

## 2017-12-07 NOTE — PROGRESS NOTE BEHAVIORAL HEALTH - NSBHADMITIPBHPROVFT_PSY_A_CORE
see above One episode of cutting stomach and another possible OD  Patient admits to cutting self superficially but denies other overdose attempt

## 2017-12-07 NOTE — DISCHARGE NOTE BEHAVIORAL HEALTH - MEDICATION SUMMARY - MEDICATIONS TO TAKE
I will START or STAY ON the medications listed below when I get home from the hospital:    HYDROcodone 10 mg oral capsule, extended release  -- 1 cap(s) by mouth every 12 hours until told to discontinue by outpatient MD  -- Indication: For Pain    lisinopril 20 mg oral tablet  -- 20 milligram(s) by mouth 2 times a day until told to discontinue by outpatient MD  -- Indication: For hypertension    cloNIDine 0.1 mg oral tablet  -- 1 tab(s) by mouth once a day (at bedtime) until told to discontinue by outpatient MD  -- Indication: For Blood pressure    Neurontin 600 mg oral tablet  --  by mouth once (at bedtime) until told to discontinue by outpatient MD  -- Indication: For Pain/insomnia    DULoxetine 60 mg oral delayed release capsule  -- 1 cap(s) by mouth once a day until told to discontinue by outpatient MD  -- Indication: For Depression    Xanax 0.5 mg oral tablet  -- 1 tab(s) by mouth once a day (at bedtime), As Needed until told to discontinue by MD  -- Indication: For Anxiety    Vistaril 50 mg oral capsule  --  by mouth once (at bedtime) until told to discontinue by outpatient MD  -- Indication: For insomnia    raNITIdine 150 mg oral tablet  -- 1 tab(s) by mouth 2 times a day  -- Indication: For GI

## 2017-12-07 NOTE — PROGRESS NOTE BEHAVIORAL HEALTH - NSBHCHARTREVIEWLAB_PSY_A_CORE FT
Hemoglobin A1C, Whole Blood (12.07.17 @ 07:05)    Hemoglobin A1C, Whole Blood: 5.2: Method: Immunoassay       Reference Range                4.0-5.6%       High risk (prediabetic)        5.7-6.4%       Diabetic, diagnostic             >=6.5%       ADA diabetic treatment goal       <7.0%  The Hemoglobin A1c testing is NGSP-certified.Reference ranges are based  upon the 2010 recommendations of  the American Diabetes Association.  Interpretation may vary for children  and adolescents. %      Lipid Profile (12.07.17 @ 07:05)    Total Cholesterol/HDL Ratio Measurement: 6.4 RATIO    Cholesterol, Serum: 262 mg/dL    Triglycerides, Serum: 180 mg/dL    HDL Cholesterol, Serum: 41 mg/dL    Direct LDL: 185: LDL Cholesterol --- Interpretive Comment (for adults 18 and over)  Optimal LDL Level may vary based on clinical situation  Below 70                  Ideal for people at very high risk of heart  disease  Below 100                Ideal for people at risk of heart disease  100 - 129                   [place]  130 - 159                   Borderline high  160 - 189                   High  190 and Above          Very high mg/dL

## 2017-12-07 NOTE — DISCHARGE NOTE BEHAVIORAL HEALTH - NSBHDCSUICFCTRSFT_PSY_A_CORE
Mood episode, Highly impulsive behavior, Substance abuse/dependence, Agitation/severe anxiety, Chronic pain

## 2017-12-07 NOTE — DISCHARGE NOTE BEHAVIORAL HEALTH - HPI (INCLUDE ILLNESS QUALITY, SEVERITY, DURATION, TIMING, CONTEXT, MODIFYING FACTORS, ASSOCIATED SIGNS AND SYMPTOMS)
From admit note:  · HPI (include illness quality, severity, duration, timing, context, modifying factors, associated signs and symptoms)  26 yowf, employed as RN at [employer], lives with mother and step father, PPH Anxiety, ADHD, chronic Insomnia, no prior inpt psych admissions or SA denies h/o cutting alcohol or drug abuse, on psych meds by Neurologist Adderall, Xanax, Celexa, at [facility], [facility] Obesity, Chronic Pain, bulging discs, Patella alignment surgery left leg, poly cystic ovaries, "Hardening  of Kidneys", bib mother after taking an OD of Advil, 15-20,  200 mg pills after phone call from work telling her she is off schedule, perceived as "fired".    Pt reports taking OD but could not give intention or say why she took the pills.  Admits to feeling upset, but denies this was SA.  Pt reports she was called from work and was told not to come in by NM who she has had issues with in past.  Pt mother reports Pt written up in past due to complaints by patients x 4, which Pt grieved, and won.  Pt was told to report to nursing office tomorrow and feels she will be fired and states she will not go in.  Mother reports no known h/o self harm but is worried about leaving her home alone tomorrow.  Mother called Pt therapist who also recommends admission, per mother.   Pt did not answer when asked if depressed and stated, "Its really anxiety".  Pt admits to "No coping skills, but would not elaborate.  Pt was guarded and withholding and gave minimal responses to questions asked.  Pt reports chronic Insomnia and takes Xanax to sleep.  She has been on ADHD meds since Middle school.  Pt denies SI /plan or intent, however reliability is in question due to evasive and brief responses. 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    Pt reports taking OD but could not give intention or say why she took the pills.  Admits to feeling upset, but denies this was SA.  Pt reports she was called from work and was told not to come in by NM who she has had issues with in past.  Pt mother reports Pt written up in past due to complaints by patients x 4, which Pt grieved, and won.  Pt was told to report to nursing office tomorrow and feels she will be fired and states she will not go in.  Mother reports no known h/o self harm but is worried about leaving her home alone tomorrow.  Mother called Pt therapist who also recommends admission, per mother.   Pt did not answer when asked if depressed and stated, "Its really anxiety".  Pt admits to "No coping skills, but would not elaborate.  Pt was guarded and withholding and gave minimal responses to questions asked.  Pt reports chronic Insomnia and takes Xanax to sleep.  She has been on ADHD meds since Middle school.  Pt denies SI /plan or intent, however reliability is in question due to evasive and brief responses.

## 2017-12-07 NOTE — DISCHARGE NOTE BEHAVIORAL HEALTH - NSBHDCADDR1FT_A_CORE
[address] [place] [address] [address] [place] [address]  Appointment with : [address] [place] [address]  Appointment with Kimi Lynn LCSW  Appointment with :
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
## 2017-12-07 NOTE — DISCHARGE NOTE BEHAVIORAL HEALTH - NSBHDCADDR3FT_A_CORE
[address]  [place] [address]  Patient has an appointment January 13, 2018  *MD trying to move up appointment

## 2017-12-07 NOTE — DISCHARGE NOTE BEHAVIORAL HEALTH - PAST PSYCHIATRIC HISTORY
· Other Past Psychiatric History (include details regarding onset, course of illness, inpatient/outpatient treatment)  Angelina Stone therapist [phone]  Dr Warren, neurologist prescriber of psych meds (looking for psychiatrist, has new appt in Jan).  Reportedly cut stomach in past but was not admitted.  One other questionable overdose  Celexa 20 mg qd since 2010- had been up to 40 mg with no additional benefit, Adderall 25 mg time released (not found on ISTOP recently), qd, Xanax hs for sleep 0.5 mg po daily

## 2017-12-07 NOTE — DISCHARGE NOTE BEHAVIORAL HEALTH - NSBHDCCRISISPLAN1FT_PSY_A_CORE
Call 911, go to the nearest emergency room, call my MD, tell a family member or call the crisis intervention number provided.

## 2017-12-07 NOTE — DISCHARGE NOTE BEHAVIORAL HEALTH - NSBHDCMEDICALFT_PSY_A_CORE
"hardening of kidneys", polycystic ovaries, chronic pain back and knee, obese  MEDICATIONS  (STANDING):  cloNIDine 0.1 milliGRAM(s) Oral at bedtime  cyclobenzaprine 10 milliGRAM(s) Oral three times a day  gabapentin 600 milliGRAM(s) Oral at bedtime  lidocaine   Patch 1 Patch Transdermal daily  lisinopril 20 milliGRAM(s) Oral two times a day "hardening of kidneys", polycystic ovaries, chronic pain back and knee  MEDICATIONS  (STANDING):  cloNIDine 0.1 milliGRAM(s) Oral at bedtime  cyclobenzaprine 10 milliGRAM(s) Oral three times a day  gabapentin 600 milliGRAM(s) Oral at bedtime  lidocaine   Patch 1 Patch Transdermal daily  lisinopril 20 milliGRAM(s) Oral two times a day

## 2017-12-07 NOTE — DISCHARGE NOTE BEHAVIORAL HEALTH - NSBHDCSUICFCTRMIT_PSY_A_CORE
New antidepressant was started  Patient instructed in DBT techniques  Referral made to facility where she can see psychiatrist and therapist

## 2017-12-07 NOTE — DISCHARGE NOTE BEHAVIORAL HEALTH - NSBHDCMEDSFT_PSY_A_CORE
DULoxetine 60 milliGRAM(s) Oral daily  MEDICATIONS  (PRN):  ALPRAZolam 0.5 milliGRAM(s) Oral two times a day PRN severe anxiety  diphenhydrAMINE   Capsule 50 milliGRAM(s) Oral at bedtime PRN Insomnia DULoxetine 60 milliGRAM(s) Oral daily  MEDICATIONS  (PRN):  ALPRAZolam 0.5 milliGRAM(s) Oral two times a day PRN severe anxiety

## 2017-12-07 NOTE — PROGRESS NOTE BEHAVIORAL HEALTH - NSBHFUPSTRENGTHS_PSY_A_CORE
Motivated and ready for change/Intelligent/Has access to housing/residential stability/Has supportive interpersonal relationships with family, friends or peers/Has vocational interests or hobbies

## 2017-12-07 NOTE — DISCHARGE NOTE BEHAVIORAL HEALTH - REASON FOR ADMISSION
overdose  "I took a bunch of pills..about 15-20 Advil". overdose  "I took a bunch of pills, about 15-20 Advil".

## 2017-12-07 NOTE — PROGRESS NOTE BEHAVIORAL HEALTH - NSBHFUPINTERVALHXFT_PSY_A_CORE
Patient initially demanding to leave and when told she had no providers to see her she was more agreeable to referral to outpatient DBT and a new antidepressant  Patient places a bit too much emphasis on medications as opposed to therapy but later asked therapist for DBT materials
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
## 2017-12-07 NOTE — DISCHARGE NOTE BEHAVIORAL HEALTH - NSBHDCDXVALIDYESFT_PSY_A_CORE
patient initially demanding to leave; however, when she learned that current psychologist would not take her back due to her history, current overdose, and suicidal texts, she agreed to stay and make some changes to her treatment.  She agreed to change Celexa to Cymbalta which may also help her with her pain.  She remained somatically focused but engaged in unit activities and became less irritable.  mood improve although she remained dysphoric and irritable at times.  She was no longer suicidal at time of discharge. patient initially demanding to leave; however, when she learned that current psychologist would not take her back due to her history, current overdose, and suicidal texts, she agreed to stay and make some changes to her treatment.  She agreed to change Celexa to Cymbalta which may also help her with her pain.  She remained somatically focused but engaged in unit activities and became less irritable.  mood improved since admission  She was no longer suicidal at time of discharge.

## 2017-12-08 RX ORDER — HYDROCODONE BITARTRATE 50 MG/1
1 CAPSULE, EXTENDED RELEASE ORAL
Qty: 0 | Refills: 0 | COMMUNITY

## 2017-12-08 RX ORDER — ALPRAZOLAM 0.25 MG
1 TABLET ORAL
Qty: 0 | Refills: 0 | COMMUNITY
Start: 2017-12-08

## 2017-12-08 RX ORDER — CITALOPRAM 10 MG/1
1 TABLET, FILM COATED ORAL
Qty: 0 | Refills: 0 | COMMUNITY

## 2017-12-08 RX ORDER — GABAPENTIN 400 MG/1
0 CAPSULE ORAL
Qty: 0 | Refills: 0 | COMMUNITY

## 2017-12-08 RX ORDER — LISINOPRIL 2.5 MG/1
20 TABLET ORAL
Qty: 0 | Refills: 0 | COMMUNITY

## 2017-12-08 RX ORDER — HYDROXYZINE HCL 10 MG
0 TABLET ORAL
Qty: 0 | Refills: 0 | COMMUNITY

## 2017-12-08 RX ORDER — DIPHENHYDRAMINE HCL 50 MG
50 CAPSULE ORAL AT BEDTIME
Qty: 0 | Refills: 0 | Status: DISCONTINUED | OUTPATIENT
Start: 2017-12-08 | End: 2017-12-11

## 2017-12-08 RX ORDER — DULOXETINE HYDROCHLORIDE 30 MG/1
1 CAPSULE, DELAYED RELEASE ORAL
Qty: 14 | Refills: 1 | OUTPATIENT
Start: 2017-12-08 | End: 2018-01-04

## 2017-12-08 RX ORDER — HYDROCODONE BITARTRATE 50 MG/1
1 CAPSULE, EXTENDED RELEASE ORAL
Qty: 0 | Refills: 0 | COMMUNITY
Start: 2017-12-08

## 2017-12-08 RX ADMIN — CYCLOBENZAPRINE HYDROCHLORIDE 10 MILLIGRAM(S): 10 TABLET, FILM COATED ORAL at 21:13

## 2017-12-08 RX ADMIN — Medication 0.5 MILLIGRAM(S): at 09:53

## 2017-12-08 RX ADMIN — LISINOPRIL 20 MILLIGRAM(S): 2.5 TABLET ORAL at 08:49

## 2017-12-08 RX ADMIN — LISINOPRIL 20 MILLIGRAM(S): 2.5 TABLET ORAL at 21:13

## 2017-12-08 RX ADMIN — DULOXETINE HYDROCHLORIDE 30 MILLIGRAM(S): 30 CAPSULE, DELAYED RELEASE ORAL at 08:49

## 2017-12-08 RX ADMIN — LIDOCAINE 1 PATCH: 4 CREAM TOPICAL at 08:50

## 2017-12-08 RX ADMIN — GABAPENTIN 600 MILLIGRAM(S): 400 CAPSULE ORAL at 21:13

## 2017-12-08 RX ADMIN — CYCLOBENZAPRINE HYDROCHLORIDE 10 MILLIGRAM(S): 10 TABLET, FILM COATED ORAL at 08:49

## 2017-12-08 RX ADMIN — Medication 30 MILLILITER(S): at 07:22

## 2017-12-08 RX ADMIN — CYCLOBENZAPRINE HYDROCHLORIDE 10 MILLIGRAM(S): 10 TABLET, FILM COATED ORAL at 14:27

## 2017-12-08 RX ADMIN — Medication 0.1 MILLIGRAM(S): at 21:13

## 2017-12-08 RX ADMIN — Medication 0.5 MILLIGRAM(S): at 21:12

## 2017-12-08 RX ADMIN — Medication 30 MILLILITER(S): at 14:28

## 2017-12-08 RX ADMIN — Medication 50 MILLIGRAM(S): at 22:27

## 2017-12-08 NOTE — PROGRESS NOTE BEHAVIORAL HEALTH - NSBHFUPINTERVALHXFT_PSY_A_CORE
Patient is acclimating to unit- interacting with peers  Feeling nauseated. this Am  Willing to take referral for outpatient treatment.  Mood is improving, less hostile     MEDICATIONS  (STANDING):  cloNIDine 0.1 milliGRAM(s) Oral at bedtime  cyclobenzaprine 10 milliGRAM(s) Oral three times a day  DULoxetine 30 milliGRAM(s) Oral daily  gabapentin 600 milliGRAM(s) Oral at bedtime  lidocaine   Patch 1 Patch Transdermal daily  lisinopril 20 milliGRAM(s) Oral two times a day    MEDICATIONS  (PRN):  ALPRAZolam 0.5 milliGRAM(s) Oral two times a day PRN severe anxiety  aluminum hydroxide/magnesium hydroxide/simethicone Suspension 30 milliLiter(s) Oral every 6 hours PRN Dyspepsia  diphenhydrAMINE   Capsule 50 milliGRAM(s) Oral at bedtime PRN Insomnia  diphenhydrAMINE   Injectable 50 milliGRAM(s) IntraMuscular once PRN Agitation  haloperidol    Injectable 5 milliGRAM(s) IntraMuscular once PRN Agitation  HYDROcodone  7.5 mG/acetaminophen 300 mG. 2 Tablet(s) Oral every 6 hours PRN Severe Pain (7 - 10)  LORazepam   Injectable 2 milliGRAM(s) IntraMuscular once PRN Agitation  mirtazapine 7.5 milliGRAM(s) Oral at bedtime PRN insomnia

## 2017-12-09 LAB
ALBUMIN SERPL ELPH-MCNC: 3.3 G/DL — SIGNIFICANT CHANGE UP (ref 3.3–5)
ALP SERPL-CCNC: 83 U/L — SIGNIFICANT CHANGE UP (ref 40–120)
ALT FLD-CCNC: 82 U/L — HIGH (ref 12–78)
ANION GAP SERPL CALC-SCNC: 7 MMOL/L — SIGNIFICANT CHANGE UP (ref 5–17)
AST SERPL-CCNC: 54 U/L — HIGH (ref 15–37)
BILIRUB SERPL-MCNC: 0.4 MG/DL — SIGNIFICANT CHANGE UP (ref 0.2–1.2)
BUN SERPL-MCNC: 13 MG/DL — SIGNIFICANT CHANGE UP (ref 7–23)
CALCIUM SERPL-MCNC: 9 MG/DL — SIGNIFICANT CHANGE UP (ref 8.5–10.1)
CHLORIDE SERPL-SCNC: 103 MMOL/L — SIGNIFICANT CHANGE UP (ref 96–108)
CO2 SERPL-SCNC: 29 MMOL/L — SIGNIFICANT CHANGE UP (ref 22–31)
CREAT SERPL-MCNC: 0.81 MG/DL — SIGNIFICANT CHANGE UP (ref 0.5–1.3)
GLUCOSE SERPL-MCNC: 91 MG/DL — SIGNIFICANT CHANGE UP (ref 70–99)
POTASSIUM SERPL-MCNC: 4.2 MMOL/L — SIGNIFICANT CHANGE UP (ref 3.5–5.3)
POTASSIUM SERPL-SCNC: 4.2 MMOL/L — SIGNIFICANT CHANGE UP (ref 3.5–5.3)
PROT SERPL-MCNC: 7.7 GM/DL — SIGNIFICANT CHANGE UP (ref 6–8.3)
SODIUM SERPL-SCNC: 139 MMOL/L — SIGNIFICANT CHANGE UP (ref 135–145)

## 2017-12-09 RX ADMIN — DULOXETINE HYDROCHLORIDE 30 MILLIGRAM(S): 30 CAPSULE, DELAYED RELEASE ORAL at 08:49

## 2017-12-09 RX ADMIN — LISINOPRIL 20 MILLIGRAM(S): 2.5 TABLET ORAL at 08:49

## 2017-12-09 RX ADMIN — Medication 50 MILLIGRAM(S): at 22:20

## 2017-12-09 RX ADMIN — Medication 0.1 MILLIGRAM(S): at 21:10

## 2017-12-09 RX ADMIN — LIDOCAINE 1 PATCH: 4 CREAM TOPICAL at 08:49

## 2017-12-09 RX ADMIN — CYCLOBENZAPRINE HYDROCHLORIDE 10 MILLIGRAM(S): 10 TABLET, FILM COATED ORAL at 21:10

## 2017-12-09 RX ADMIN — GABAPENTIN 600 MILLIGRAM(S): 400 CAPSULE ORAL at 21:10

## 2017-12-09 RX ADMIN — Medication 0.5 MILLIGRAM(S): at 21:12

## 2017-12-09 RX ADMIN — CYCLOBENZAPRINE HYDROCHLORIDE 10 MILLIGRAM(S): 10 TABLET, FILM COATED ORAL at 08:49

## 2017-12-09 RX ADMIN — CYCLOBENZAPRINE HYDROCHLORIDE 10 MILLIGRAM(S): 10 TABLET, FILM COATED ORAL at 12:49

## 2017-12-09 RX ADMIN — LISINOPRIL 20 MILLIGRAM(S): 2.5 TABLET ORAL at 21:11

## 2017-12-09 RX ADMIN — LIDOCAINE 1 PATCH: 4 CREAM TOPICAL at 20:59

## 2017-12-09 RX ADMIN — Medication 0.5 MILLIGRAM(S): at 05:07

## 2017-12-09 NOTE — PROGRESS NOTE BEHAVIORAL HEALTH - NSBHFUPINTERVALHXFT_PSY_A_CORE
Patient is acclimating to unit- interacting with peers  Feeling nauseated. this Am  Willing to take referral for outpatient treatment.  Mood is improving, less hostile     MEDICATIONS  (STANDING):  cloNIDine 0.1 milliGRAM(s) Oral at bedtime  cyclobenzaprine 10 milliGRAM(s) Oral three times a day  DULoxetine 30 milliGRAM(s) Oral daily  gabapentin 600 milliGRAM(s) Oral at bedtime  lidocaine   Patch 1 Patch Transdermal daily  lisinopril 20 milliGRAM(s) Oral two times a day    MEDICATIONS  (PRN):  ALPRAZolam 0.5 milliGRAM(s) Oral two times a day PRN severe anxiety  aluminum hydroxide/magnesium hydroxide/simethicone Suspension 30 milliLiter(s) Oral every 6 hours PRN Dyspepsia  diphenhydrAMINE   Capsule 50 milliGRAM(s) Oral at bedtime PRN Insomnia  diphenhydrAMINE   Injectable 50 milliGRAM(s) IntraMuscular once PRN Agitation  haloperidol    Injectable 5 milliGRAM(s) IntraMuscular once PRN Agitation  HYDROcodone  7.5 mG/acetaminophen 300 mG. 2 Tablet(s) Oral every 6 hours PRN Severe Pain (7 - 10)  LORazepam   Injectable 2 milliGRAM(s) IntraMuscular once PRN Agitation  mirtazapine 7.5 milliGRAM(s) Oral at bedtime PRN insomnia Pt with no initial distress as she was relaxing on her bed and appeared calm and comfortable.  Pt noted to not have any SOB and did not seem uncomfortable. Reviewed the ekg which did not indicate any specific issue.      Pt may use the zantac of her own supply after the pharmacy approves the zantac that mother brought in

## 2017-12-09 NOTE — PROGRESS NOTE BEHAVIORAL HEALTH - NSBHFUPINTERVALCCFT_PSY_A_CORE
Patient had numerous questions about her treatment.  Reported some nausea and some insomnia Covering MD note     "I think I have a chest pain and the pain goes to my arm. Is that something I should be concerned about."

## 2017-12-10 RX ORDER — ASPIRIN/CALCIUM CARB/MAGNESIUM 324 MG
81 TABLET ORAL DAILY
Qty: 0 | Refills: 0 | Status: DISCONTINUED | OUTPATIENT
Start: 2017-12-10 | End: 2017-12-11

## 2017-12-10 RX ORDER — RANITIDINE HYDROCHLORIDE 150 MG/1
150 TABLET, FILM COATED ORAL
Qty: 0 | Refills: 0 | Status: DISCONTINUED | OUTPATIENT
Start: 2017-12-10 | End: 2017-12-11

## 2017-12-10 RX ADMIN — CYCLOBENZAPRINE HYDROCHLORIDE 10 MILLIGRAM(S): 10 TABLET, FILM COATED ORAL at 09:23

## 2017-12-10 RX ADMIN — GABAPENTIN 600 MILLIGRAM(S): 400 CAPSULE ORAL at 21:49

## 2017-12-10 RX ADMIN — LISINOPRIL 20 MILLIGRAM(S): 2.5 TABLET ORAL at 09:23

## 2017-12-10 RX ADMIN — LIDOCAINE 1 PATCH: 4 CREAM TOPICAL at 20:29

## 2017-12-10 RX ADMIN — MIRTAZAPINE 7.5 MILLIGRAM(S): 45 TABLET, ORALLY DISINTEGRATING ORAL at 23:24

## 2017-12-10 RX ADMIN — Medication 0.5 MILLIGRAM(S): at 09:22

## 2017-12-10 RX ADMIN — Medication 0.1 MILLIGRAM(S): at 21:50

## 2017-12-10 RX ADMIN — Medication 50 MILLIGRAM(S): at 22:31

## 2017-12-10 RX ADMIN — Medication 0.5 MILLIGRAM(S): at 22:28

## 2017-12-10 RX ADMIN — CYCLOBENZAPRINE HYDROCHLORIDE 10 MILLIGRAM(S): 10 TABLET, FILM COATED ORAL at 21:49

## 2017-12-10 RX ADMIN — LISINOPRIL 20 MILLIGRAM(S): 2.5 TABLET ORAL at 21:50

## 2017-12-10 RX ADMIN — LIDOCAINE 1 PATCH: 4 CREAM TOPICAL at 09:24

## 2017-12-10 RX ADMIN — DULOXETINE HYDROCHLORIDE 30 MILLIGRAM(S): 30 CAPSULE, DELAYED RELEASE ORAL at 09:23

## 2017-12-10 RX ADMIN — CYCLOBENZAPRINE HYDROCHLORIDE 10 MILLIGRAM(S): 10 TABLET, FILM COATED ORAL at 12:51

## 2017-12-10 NOTE — PROGRESS NOTE BEHAVIORAL HEALTH - NSBHFUPINTERVALCCFT_PSY_A_CORE
Pt claimed that she has chest pt denies any SOB , denies any nausea vomiting, Pt claimed that she has chest pt denies any SOB , denies any nausea vomiting ,"I'm going back to my room relax in my bed and read a book"

## 2017-12-10 NOTE — PROGRESS NOTE BEHAVIORAL HEALTH - NSBHFUPINTERVALHXFT_PSY_A_CORE
Reviewed the labs and the ekg and the vitals pt with no increase in BP or HR no overt signs of distress.  Added asprin 81 daily.    Vital Signs Last 24 Hrs  T(C): 37.3 (10 Dec 2017 10:08), Max: 37.3 (09 Dec 2017 22:00)  T(F): 99.1 (10 Dec 2017 10:08), Max: 99.1 (09 Dec 2017 22:00)  HR: 85 (10 Dec 2017 10:08) (85 - 129)  BP: 123/76 (10 Dec 2017 10:08) (123/76 - 149/86)  BP(mean): --  RR: 14 (10 Dec 2017 10:08) (14 - 16)  SpO2: 100% (10 Dec 2017 10:08) (100% - 100%)

## 2017-12-10 NOTE — PROGRESS NOTE BEHAVIORAL HEALTH - NSBHCHARTREVIEWVS_PSY_A_CORE FT
Vital Signs Last 24 Hrs  T(C): 37.2 (07 Dec 2017 08:37), Max: 37.2 (07 Dec 2017 08:37)  T(F): 99 (07 Dec 2017 08:37), Max: 99 (07 Dec 2017 08:37)  HR: 94 (07 Dec 2017 08:37) (80 - 94)  BP: 150/94 (07 Dec 2017 08:37) (135/79 - 150/94)  BP(mean): 97 (07 Dec 2017 00:52) (97 - 97)  RR: 16 (07 Dec 2017 08:37) (16 - 16)  SpO2: 100% (07 Dec 2017 08:37) (99% - 100%)
Vital Signs Last 24 Hrs  T(C): 37.2 (08 Dec 2017 08:56), Max: 37.2 (07 Dec 2017 20:18)  T(F): 98.9 (08 Dec 2017 08:56), Max: 98.9 (07 Dec 2017 20:18)  HR: 93 (08 Dec 2017 08:56) (93 - 109)  BP: 138/88 (08 Dec 2017 08:56) (138/88 - 144/86)  BP(mean): --  RR: 14 (08 Dec 2017 08:56) (14 - 16)  SpO2: 100% (08 Dec 2017 08:56) (100% - 100%)

## 2017-12-11 VITALS
DIASTOLIC BLOOD PRESSURE: 83 MMHG | TEMPERATURE: 99 F | HEART RATE: 82 BPM | OXYGEN SATURATION: 99 % | SYSTOLIC BLOOD PRESSURE: 141 MMHG | RESPIRATION RATE: 14 BRPM

## 2017-12-11 RX ORDER — RANITIDINE HYDROCHLORIDE 150 MG/1
1 TABLET, FILM COATED ORAL
Qty: 0 | Refills: 0 | COMMUNITY
Start: 2017-12-11

## 2017-12-11 RX ORDER — DULOXETINE HYDROCHLORIDE 30 MG/1
1 CAPSULE, DELAYED RELEASE ORAL
Qty: 14 | Refills: 1 | OUTPATIENT
Start: 2017-12-11 | End: 2018-01-07

## 2017-12-11 RX ADMIN — CYCLOBENZAPRINE HYDROCHLORIDE 10 MILLIGRAM(S): 10 TABLET, FILM COATED ORAL at 13:15

## 2017-12-11 RX ADMIN — DULOXETINE HYDROCHLORIDE 30 MILLIGRAM(S): 30 CAPSULE, DELAYED RELEASE ORAL at 08:59

## 2017-12-11 RX ADMIN — Medication 0.5 MILLIGRAM(S): at 09:29

## 2017-12-11 RX ADMIN — LISINOPRIL 20 MILLIGRAM(S): 2.5 TABLET ORAL at 09:00

## 2017-12-11 RX ADMIN — LIDOCAINE 1 PATCH: 4 CREAM TOPICAL at 10:53

## 2017-12-11 RX ADMIN — RANITIDINE HYDROCHLORIDE 150 MILLIGRAM(S): 150 TABLET, FILM COATED ORAL at 09:00

## 2017-12-11 RX ADMIN — CYCLOBENZAPRINE HYDROCHLORIDE 10 MILLIGRAM(S): 10 TABLET, FILM COATED ORAL at 10:52

## 2017-12-11 NOTE — PROGRESS NOTE BEHAVIORAL HEALTH - PROBLEM SELECTOR PROBLEM 1
Persistent depressive disorder

## 2017-12-11 NOTE — PROGRESS NOTE BEHAVIORAL HEALTH - NSBHFUPINTERVALHXFT_PSY_A_CORE
patient did fairly well over the weekend and despite her initial concerns about being here appeared to get something out of her stay.  Visibly more calm and less depressed and is now taking Cymbalta for her depression.  Still ultimately looking for outpatient DBT treatment    MEDICATIONS  (STANDING):  aspirin enteric coated 81 milliGRAM(s) Oral daily  cloNIDine 0.1 milliGRAM(s) Oral at bedtime  cyclobenzaprine 10 milliGRAM(s) Oral three times a day  DULoxetine 30 milliGRAM(s) Oral daily  gabapentin 600 milliGRAM(s) Oral at bedtime  lidocaine   Patch 1 Patch Transdermal daily  lisinopril 20 milliGRAM(s) Oral two times a day  ranitidine 150 milliGRAM(s) Oral two times a day

## 2017-12-11 NOTE — PROGRESS NOTE BEHAVIORAL HEALTH - NSBHADMITMEDEDUDETAILS_A_CORE FT
Discussed potential beenfits of Cymbalta on depression and pain  For discharge today
Discussed potential beenfits of Cymbalta on depression and pain

## 2017-12-11 NOTE — PROGRESS NOTE BEHAVIORAL HEALTH - RISK ASSESSMENT
moderate- recent suicidal behavior as well as past self-injury, poor impulse control and stress of possible job loss
moderate- recent suicidal behavior as well as past self-injury, poor impulse control and stress of likely job loss

## 2017-12-11 NOTE — PROGRESS NOTE BEHAVIORAL HEALTH - SECONDARY DX1
Borderline personality disorder in adult

## 2017-12-11 NOTE — PROGRESS NOTE BEHAVIORAL HEALTH - AXIS III
Obese, Back and knee pain, polycystic ovaries, "hardened kidneys"

## 2017-12-11 NOTE — PROGRESS NOTE BEHAVIORAL HEALTH - NSBHADMITIPOBSFT_PSY_A_CORE
no imminent risk of harm on unit

## 2017-12-11 NOTE — PROGRESS NOTE BEHAVIORAL HEALTH - PRIMARY DX
Persistent depressive disorder
Persistent depressive disorder
Adjustment disorder with emotional disturbance
Adjustment disorder with emotional disturbance
Persistent depressive disorder

## 2017-12-11 NOTE — PROGRESS NOTE BEHAVIORAL HEALTH - PROBLEM SELECTOR PLAN 2
CBT/DBT therapy  Coping skills groups
CBT/DBT therapy  Coping skills groups
CBT/DBT therapy  Cioping skills groups
CBT/DBT therapy  Coping skills groups
CBT/DBT therapy  Coping skills groups

## 2017-12-11 NOTE — PROGRESS NOTE BEHAVIORAL HEALTH - THOUGHT CONTENT
Preoccupations/Ruminations
Ruminations/Preoccupations

## 2017-12-11 NOTE — PROGRESS NOTE BEHAVIORAL HEALTH - THOUGHT PROCESS
Perseverative/Impaired reasoning/Linear
Linear/Impaired reasoning/Perseverative
Perseverative/Impaired reasoning/Linear

## 2017-12-15 DIAGNOSIS — R74.0 NONSPECIFIC ELEVATION OF LEVELS OF TRANSAMINASE AND LACTIC ACID DEHYDROGENASE [LDH]: ICD-10-CM

## 2017-12-15 DIAGNOSIS — F43.29 ADJUSTMENT DISORDER WITH OTHER SYMPTOMS: ICD-10-CM

## 2017-12-15 DIAGNOSIS — G89.29 OTHER CHRONIC PAIN: ICD-10-CM

## 2017-12-15 DIAGNOSIS — F41.9 ANXIETY DISORDER, UNSPECIFIED: ICD-10-CM

## 2017-12-15 DIAGNOSIS — M35.1 OTHER OVERLAP SYNDROMES: ICD-10-CM

## 2017-12-15 DIAGNOSIS — M54.5 LOW BACK PAIN: ICD-10-CM

## 2017-12-15 DIAGNOSIS — I10 ESSENTIAL (PRIMARY) HYPERTENSION: ICD-10-CM

## 2017-12-15 DIAGNOSIS — F34.1 DYSTHYMIC DISORDER: ICD-10-CM

## 2017-12-15 DIAGNOSIS — F32.9 MAJOR DEPRESSIVE DISORDER, SINGLE EPISODE, UNSPECIFIED: ICD-10-CM

## 2017-12-15 DIAGNOSIS — R80.9 PROTEINURIA, UNSPECIFIED: ICD-10-CM

## 2017-12-15 DIAGNOSIS — T39.312A POISONING BY PROPIONIC ACID DERIVATIVES, INTENTIONAL SELF-HARM, INITIAL ENCOUNTER: ICD-10-CM
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
## 2017-12-15 DIAGNOSIS — G47.00 INSOMNIA, UNSPECIFIED: ICD-10-CM

## 2017-12-15 DIAGNOSIS — F60.3 BORDERLINE PERSONALITY DISORDER: ICD-10-CM

## 2017-12-15 DIAGNOSIS — E66.9 OBESITY, UNSPECIFIED: ICD-10-CM

## 2017-12-15 DIAGNOSIS — E28.2 POLYCYSTIC OVARIAN SYNDROME: ICD-10-CM

## 2017-12-15 DIAGNOSIS — K76.0 FATTY (CHANGE OF) LIVER, NOT ELSEWHERE CLASSIFIED: ICD-10-CM

## 2018-01-01 ENCOUNTER — EMERGENCY (EMERGENCY)
Facility: HOSPITAL | Age: 28
LOS: 0 days | Discharge: HOME | End: 2018-01-02
Attending: EMERGENCY MEDICINE | Admitting: EMERGENCY MEDICINE
Payer: COMMERCIAL

## 2018-01-01 VITALS
OXYGEN SATURATION: 100 % | HEART RATE: 102 BPM | WEIGHT: 235.01 LBS | DIASTOLIC BLOOD PRESSURE: 102 MMHG | SYSTOLIC BLOOD PRESSURE: 158 MMHG | HEIGHT: 67 IN | TEMPERATURE: 99 F

## 2018-01-01 DIAGNOSIS — R45.851 SUICIDAL IDEATIONS: ICD-10-CM

## 2018-01-01 DIAGNOSIS — T42.4X4S: ICD-10-CM

## 2018-01-01 DIAGNOSIS — T42.4X2A POISONING BY BENZODIAZEPINES, INTENTIONAL SELF-HARM, INITIAL ENCOUNTER: ICD-10-CM

## 2018-01-01 DIAGNOSIS — F41.9 ANXIETY DISORDER, UNSPECIFIED: ICD-10-CM

## 2018-01-01 DIAGNOSIS — F60.3 BORDERLINE PERSONALITY DISORDER: ICD-10-CM

## 2018-01-01 DIAGNOSIS — F32.9 MAJOR DEPRESSIVE DISORDER, SINGLE EPISODE, UNSPECIFIED: ICD-10-CM

## 2018-01-01 DIAGNOSIS — Z98.890 OTHER SPECIFIED POSTPROCEDURAL STATES: Chronic | ICD-10-CM

## 2018-01-01 DIAGNOSIS — Z90.49 ACQUIRED ABSENCE OF OTHER SPECIFIED PARTS OF DIGESTIVE TRACT: Chronic | ICD-10-CM
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
## 2018-01-01 LAB
ALBUMIN SERPL ELPH-MCNC: 3.3 G/DL — SIGNIFICANT CHANGE UP (ref 3.3–5)
ALP SERPL-CCNC: 98 U/L — SIGNIFICANT CHANGE UP (ref 40–120)
ALT FLD-CCNC: 72 U/L — SIGNIFICANT CHANGE UP (ref 12–78)
AMPHET UR-MCNC: NEGATIVE — SIGNIFICANT CHANGE UP
ANION GAP SERPL CALC-SCNC: 5 MMOL/L — SIGNIFICANT CHANGE UP (ref 5–17)
APAP SERPL-MCNC: <2 UG/ML — LOW (ref 10–30)
APPEARANCE UR: (no result)
APTT BLD: 34.5 SEC — SIGNIFICANT CHANGE UP (ref 27.5–37.4)
AST SERPL-CCNC: 50 U/L — HIGH (ref 15–37)
BACTERIA # UR AUTO: (no result)
BARBITURATES UR SCN-MCNC: NEGATIVE — SIGNIFICANT CHANGE UP
BASOPHILS # BLD AUTO: 0.1 K/UL — SIGNIFICANT CHANGE UP (ref 0–0.2)
BASOPHILS NFR BLD AUTO: 0.8 % — SIGNIFICANT CHANGE UP (ref 0–2)
BENZODIAZ UR-MCNC: POSITIVE — SIGNIFICANT CHANGE UP
BILIRUB SERPL-MCNC: 0.3 MG/DL — SIGNIFICANT CHANGE UP (ref 0.2–1.2)
BILIRUB UR-MCNC: NEGATIVE — SIGNIFICANT CHANGE UP
BUN SERPL-MCNC: 12 MG/DL — SIGNIFICANT CHANGE UP (ref 7–23)
CALCIUM SERPL-MCNC: 8.8 MG/DL — SIGNIFICANT CHANGE UP (ref 8.5–10.1)
CHLORIDE SERPL-SCNC: 103 MMOL/L — SIGNIFICANT CHANGE UP (ref 96–108)
CO2 SERPL-SCNC: 30 MMOL/L — SIGNIFICANT CHANGE UP (ref 22–31)
COCAINE METAB.OTHER UR-MCNC: NEGATIVE — SIGNIFICANT CHANGE UP
COLOR SPEC: YELLOW — SIGNIFICANT CHANGE UP
CREAT SERPL-MCNC: 0.76 MG/DL — SIGNIFICANT CHANGE UP (ref 0.5–1.3)
DIFF PNL FLD: (no result)
EOSINOPHIL # BLD AUTO: 0.1 K/UL — SIGNIFICANT CHANGE UP (ref 0–0.5)
EOSINOPHIL NFR BLD AUTO: 0.5 % — SIGNIFICANT CHANGE UP (ref 0–6)
EPI CELLS # UR: (no result)
ETHANOL SERPL-MCNC: <10 MG/DL — SIGNIFICANT CHANGE UP (ref 0–10)
GLUCOSE SERPL-MCNC: 86 MG/DL — SIGNIFICANT CHANGE UP (ref 70–99)
GLUCOSE UR QL: NEGATIVE MG/DL — SIGNIFICANT CHANGE UP
HCT VFR BLD CALC: 42.8 % — SIGNIFICANT CHANGE UP (ref 34.5–45)
HGB BLD-MCNC: 14.2 G/DL — SIGNIFICANT CHANGE UP (ref 11.5–15.5)
INR BLD: 1.06 RATIO — SIGNIFICANT CHANGE UP (ref 0.88–1.16)
KETONES UR-MCNC: NEGATIVE — SIGNIFICANT CHANGE UP
LEUKOCYTE ESTERASE UR-ACNC: (no result)
LYMPHOCYTES # BLD AUTO: 27 % — SIGNIFICANT CHANGE UP (ref 13–44)
LYMPHOCYTES # BLD AUTO: 3.4 K/UL — HIGH (ref 1–3.3)
MCHC RBC-ENTMCNC: 27.4 PG — SIGNIFICANT CHANGE UP (ref 27–34)
MCHC RBC-ENTMCNC: 33.3 GM/DL — SIGNIFICANT CHANGE UP (ref 32–36)
MCV RBC AUTO: 82.2 FL — SIGNIFICANT CHANGE UP (ref 80–100)
METHADONE UR-MCNC: NEGATIVE — SIGNIFICANT CHANGE UP
MONOCYTES # BLD AUTO: 0.8 K/UL — SIGNIFICANT CHANGE UP (ref 0–0.9)
MONOCYTES NFR BLD AUTO: 6.6 % — SIGNIFICANT CHANGE UP (ref 2–14)
NEUTROPHILS # BLD AUTO: 8.1 K/UL — HIGH (ref 1.8–7.4)
NEUTROPHILS NFR BLD AUTO: 65.1 % — SIGNIFICANT CHANGE UP (ref 43–77)
NITRITE UR-MCNC: NEGATIVE — SIGNIFICANT CHANGE UP
OPIATES UR-MCNC: POSITIVE — SIGNIFICANT CHANGE UP
PCP SPEC-MCNC: SIGNIFICANT CHANGE UP
PCP UR-MCNC: NEGATIVE — SIGNIFICANT CHANGE UP
PH UR: 6 — SIGNIFICANT CHANGE UP (ref 5–8)
PLATELET # BLD AUTO: 353 K/UL — SIGNIFICANT CHANGE UP (ref 150–400)
POTASSIUM SERPL-MCNC: 4.1 MMOL/L — SIGNIFICANT CHANGE UP (ref 3.5–5.3)
POTASSIUM SERPL-SCNC: 4.1 MMOL/L — SIGNIFICANT CHANGE UP (ref 3.5–5.3)
PROT SERPL-MCNC: 8.1 GM/DL — SIGNIFICANT CHANGE UP (ref 6–8.3)
PROT UR-MCNC: 100 MG/DL
PROTHROM AB SERPL-ACNC: 11.5 SEC — SIGNIFICANT CHANGE UP (ref 9.8–12.7)
RBC # BLD: 5.2 M/UL — SIGNIFICANT CHANGE UP (ref 3.8–5.2)
RBC # FLD: 12.9 % — SIGNIFICANT CHANGE UP (ref 10.3–14.5)
RBC CASTS # UR COMP ASSIST: (no result) /HPF (ref 0–4)
SALICYLATES SERPL-MCNC: <1.7 MG/DL — LOW (ref 2.8–20)
SODIUM SERPL-SCNC: 138 MMOL/L — SIGNIFICANT CHANGE UP (ref 135–145)
SP GR SPEC: 1.01 — SIGNIFICANT CHANGE UP (ref 1.01–1.02)
THC UR QL: NEGATIVE — SIGNIFICANT CHANGE UP
TROPONIN I SERPL-MCNC: <0.015 NG/ML — SIGNIFICANT CHANGE UP (ref 0.01–0.04)
TSH SERPL-MCNC: 1.04 UIU/ML — SIGNIFICANT CHANGE UP (ref 0.36–3.74)
UROBILINOGEN FLD QL: NEGATIVE MG/DL — SIGNIFICANT CHANGE UP
WBC # BLD: 12.5 K/UL — HIGH (ref 3.8–10.5)
WBC # FLD AUTO: 12.5 K/UL — HIGH (ref 3.8–10.5)
WBC UR QL: (no result)

## 2018-01-01 PROCEDURE — 93010 ELECTROCARDIOGRAM REPORT: CPT

## 2018-01-01 RX ORDER — GABAPENTIN 400 MG/1
600 CAPSULE ORAL ONCE
Qty: 0 | Refills: 0 | Status: COMPLETED | OUTPATIENT
Start: 2018-01-01 | End: 2018-01-01

## 2018-01-01 RX ORDER — IBUPROFEN 200 MG
600 TABLET ORAL ONCE
Qty: 0 | Refills: 0 | Status: COMPLETED | OUTPATIENT
Start: 2018-01-01 | End: 2018-01-01

## 2018-01-01 RX ORDER — LISINOPRIL 2.5 MG/1
20 TABLET ORAL DAILY
Qty: 0 | Refills: 0 | Status: DISCONTINUED | OUTPATIENT
Start: 2018-01-01 | End: 2018-01-02

## 2018-01-01 RX ORDER — SODIUM CHLORIDE 9 MG/ML
1000 INJECTION INTRAMUSCULAR; INTRAVENOUS; SUBCUTANEOUS ONCE
Qty: 0 | Refills: 0 | Status: COMPLETED | OUTPATIENT
Start: 2018-01-01 | End: 2018-01-01

## 2018-01-01 RX ADMIN — SODIUM CHLORIDE 1000 MILLILITER(S): 9 INJECTION INTRAMUSCULAR; INTRAVENOUS; SUBCUTANEOUS at 19:22

## 2018-01-01 RX ADMIN — Medication 600 MILLIGRAM(S): at 23:16

## 2018-01-01 RX ADMIN — Medication 1 TABLET(S): at 22:31

## 2018-01-01 RX ADMIN — GABAPENTIN 600 MILLIGRAM(S): 400 CAPSULE ORAL at 22:31

## 2018-01-01 RX ADMIN — LISINOPRIL 20 MILLIGRAM(S): 2.5 TABLET ORAL at 22:31

## 2018-01-01 NOTE — ED ADULT TRIAGE NOTE - CHIEF COMPLAINT QUOTE
report received from EMS. Patient took 20 pills of 0.5 xanax. report received from EMS. Patient took 20 pills of 0.5 xanax. complaining of chest pain.

## 2018-01-01 NOTE — ED BEHAVIORAL HEALTH ASSESSMENT NOTE - OTHER PAST PSYCHIATRIC HISTORY (INCLUDE DETAILS REGARDING ONSET, COURSE OF ILLNESS, INPATIENT/OUTPATIENT TREATMENT)
Angelina Stone therapist [phone]  Dr Wraren, neurologist prescriber of psych meds prior to last hospitalization

## 2018-01-01 NOTE — ED BEHAVIORAL HEALTH ASSESSMENT NOTE - DESCRIPTION
Mood appears sad, admits taking pills was impulsive.   Pt affect is constricted, fair eye contact. Mother voiced annoyance of getting upset at the dog and responding in this manner.       Maintained on CO. Behaviorally maintained control. "hardening of kidneys", polycystic ovaries, chronic pain back and knee, obese lives with mother and step father, only child, lived in [place] where she attended college and returned to this area in Dec 2016
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
## 2018-01-01 NOTE — ED PROVIDER NOTE - PROGRESS NOTE DETAILS
I, Lucita Daniel am scribing for and in the presence of Dr. Yadav for this pt. Discussed results with pt, Will give abx. Pt wants to take her regular medications so as not to fall behind. Will discuss with psych.

## 2018-01-01 NOTE — ED BEHAVIORAL HEALTH ASSESSMENT NOTE - SUICIDE RISK FACTORS
Mood episode/Agitation/severe anxiety/Chronic pain or acute medical issue/Access to means (pills, firearms, etc.)/Highly impulsive behavior/Substance abuse/dependence/Family history of suicide

## 2018-01-01 NOTE — ED PROVIDER NOTE - OBJECTIVE STATEMENT
26 y/o female pmhx anxiety, depression, HTN presents to ED due to overdose. Patient took 20 pills of 0.5 xanax. complaining of chest pain, SI, depression. Denies HI.

## 2018-01-01 NOTE — ED PROVIDER NOTE - CARE PLAN
Principal Discharge DX:	Benzodiazepine overdose of undetermined intent, sequela  Secondary Diagnosis:	Depression  Secondary Diagnosis:	Anxiety

## 2018-01-01 NOTE — ED BEHAVIORAL HEALTH ASSESSMENT NOTE - SUMMARY
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 PPH adhd, anxiety chronic pain, bib EMS after took "an overdose of 20 0.5mg Xanax pills" pr pt. in front of her during a verbal altercation starting after dog ate her dinner. Mother attempted to calm pt. down she reports and pt. spontaneously put pills in her mouth.   Discharged from 1st psychiatric admission on 5N at [facility] on 12/11/17 and states "if I'm going to be admitted I want to only go to 5N". Mother refuses pt. be sent to [facility] as "it's in [place]".   Pt. admits to [facility] for taking pills and denies current SIIP/HIIP. No evidence of psychosis.   Pt. admits has been having difficulty getting OOB and being motivated to engage in interpersonal activities. She does feel that she has improved since prescribed Cymbalta. "Maybe it's isn't enough ot they could give me something with it".   CO maintained. Discussed plan  with Dr. Yadav. Reviewed  report of ISTOP.  Plan: Hold in ED for medical clearance and psychiatric reevaluation in AM
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
## 2018-01-01 NOTE — ED BEHAVIORAL HEALTH ASSESSMENT NOTE - CURRENT MEDICATION
Celexa 20 mg qd since 2010, Adderall 25 mg time released (not found on ISTOP recently), qd, Xanax hs for sleep 0.5 mg po daily

## 2018-01-01 NOTE — ED BEHAVIORAL HEALTH ASSESSMENT NOTE - RISK ASSESSMENT
chronic  potential  risk of self harm due to poor coping skills and attention seeking behaviors , especially with mother

## 2018-01-01 NOTE — PROVIDER CONTACT NOTE (FALL NOTIFICATION) - ASSESSMENT
Able to ambulate, move all extremities. Moved herself back into bed without assistance. Equal strength bi laterally +5 upper and lower extremities. Denies new pain. No external signs of trauma. Denies LOC, denies head trauma.

## 2018-01-01 NOTE — PROVIDER CONTACT NOTE (FALL NOTIFICATION) - SITUATION
Patient was getting changed for safety. Male 1:1 stepped to the side of curtain so mother could assist patient with undressing. Patient slipped and fell onto floor.

## 2018-01-02 VITALS
SYSTOLIC BLOOD PRESSURE: 130 MMHG | RESPIRATION RATE: 15 BRPM | OXYGEN SATURATION: 100 % | TEMPERATURE: 98 F | DIASTOLIC BLOOD PRESSURE: 72 MMHG | HEART RATE: 85 BPM

## 2018-01-02 LAB
CULTURE RESULTS: SIGNIFICANT CHANGE UP
SPECIMEN SOURCE: SIGNIFICANT CHANGE UP

## 2018-01-02 PROCEDURE — 99285 EMERGENCY DEPT VISIT HI MDM: CPT

## 2018-01-02 RX ORDER — CYCLOBENZAPRINE HYDROCHLORIDE 10 MG/1
5 TABLET, FILM COATED ORAL ONCE
Qty: 0 | Refills: 0 | Status: COMPLETED | OUTPATIENT
Start: 2018-01-02 | End: 2018-01-02

## 2018-01-02 RX ORDER — CYCLOBENZAPRINE HYDROCHLORIDE 10 MG/1
10 TABLET, FILM COATED ORAL ONCE
Qty: 0 | Refills: 0 | Status: COMPLETED | OUTPATIENT
Start: 2018-01-02 | End: 2018-01-02

## 2018-01-02 RX ORDER — IBUPROFEN 200 MG
600 TABLET ORAL ONCE
Qty: 0 | Refills: 0 | Status: COMPLETED | OUTPATIENT
Start: 2018-01-02 | End: 2018-01-02

## 2018-01-02 RX ADMIN — LISINOPRIL 20 MILLIGRAM(S): 2.5 TABLET ORAL at 09:59

## 2018-01-02 RX ADMIN — Medication 600 MILLIGRAM(S): at 00:08

## 2018-01-02 RX ADMIN — CYCLOBENZAPRINE HYDROCHLORIDE 10 MILLIGRAM(S): 10 TABLET, FILM COATED ORAL at 04:36

## 2018-01-02 RX ADMIN — Medication 600 MILLIGRAM(S): at 09:56

## 2018-01-02 RX ADMIN — CYCLOBENZAPRINE HYDROCHLORIDE 5 MILLIGRAM(S): 10 TABLET, FILM COATED ORAL at 00:08

## 2018-01-02 RX ADMIN — Medication 600 MILLIGRAM(S): at 04:36

## 2018-01-02 NOTE — ED ADULT NURSE REASSESSMENT NOTE - NS ED NURSE REASSESS COMMENT FT1
Pt c/o back pain at this time. MD made aware. Medicated as ordered. Pt ambulated to bathroom w/o assistance. Steady gait noted. 1:1 maintained for safety. All needs are met and safety maintained. Will continue to monitor.
Pt received in stretcher. Handoff given by JODEE Kendall. +flat effect. stated of feeling fine. C/O back pain and CORONA. MD Yadav Made aware. Motrin to be ordered. VS as documented. 1:1 maintained for safety. All needs are met and safety maintained. Will continue to monitor.
Pt stated of feeling anxious at this time. Appears to be restless. MD made aware. NNO given. Will continue to monitor.
pt restless and wanting to AMA stating "I am in so much pain. Med you are giving me is not working. I want to go home. I want to sign AMA". Pt made aware she can't leave and need to be re-evaluated in AM by psych. Pt back in the stretcher. 1:1 in place for safety. will continue to monitor.
Pt requesting additional Flexeril.  No distress.  CO maintained.
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
## 2018-01-03 RX ORDER — CEPHALEXIN 500 MG
1 CAPSULE ORAL
Qty: 14 | Refills: 0 | OUTPATIENT
Start: 2018-01-03 | End: 2018-01-09

## 2018-02-13 ENCOUNTER — INPATIENT (INPATIENT)
Facility: HOSPITAL | Age: 28
LOS: 12 days | Discharge: HOME | End: 2018-02-26
Attending: PSYCHIATRY & NEUROLOGY | Admitting: PSYCHIATRY & NEUROLOGY
Payer: COMMERCIAL

## 2018-02-13 VITALS
RESPIRATION RATE: 18 BRPM | HEART RATE: 108 BPM | WEIGHT: 225.09 LBS | DIASTOLIC BLOOD PRESSURE: 70 MMHG | HEIGHT: 67 IN | OXYGEN SATURATION: 99 % | SYSTOLIC BLOOD PRESSURE: 131 MMHG | TEMPERATURE: 99 F

## 2018-02-13 DIAGNOSIS — G89.29 OTHER CHRONIC PAIN: ICD-10-CM

## 2018-02-13 DIAGNOSIS — S31.119A LACERATION WITHOUT FOREIGN BODY OF ABDOMINAL WALL, UNSPECIFIED QUADRANT WITHOUT PENETRATION INTO PERITONEAL CAVITY, INITIAL ENCOUNTER: ICD-10-CM

## 2018-02-13 DIAGNOSIS — E28.2 POLYCYSTIC OVARIAN SYNDROME: ICD-10-CM

## 2018-02-13 DIAGNOSIS — Z98.890 OTHER SPECIFIED POSTPROCEDURAL STATES: Chronic | ICD-10-CM

## 2018-02-13 DIAGNOSIS — F60.3 BORDERLINE PERSONALITY DISORDER: ICD-10-CM

## 2018-02-13 DIAGNOSIS — Z90.49 ACQUIRED ABSENCE OF OTHER SPECIFIED PARTS OF DIGESTIVE TRACT: Chronic | ICD-10-CM

## 2018-02-13 DIAGNOSIS — E66.9 OBESITY, UNSPECIFIED: ICD-10-CM

## 2018-02-13 DIAGNOSIS — I10 ESSENTIAL (PRIMARY) HYPERTENSION: ICD-10-CM
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
## 2018-02-13 DIAGNOSIS — F41.9 ANXIETY DISORDER, UNSPECIFIED: ICD-10-CM
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
## 2018-02-13 DIAGNOSIS — F33.1 MAJOR DEPRESSIVE DISORDER, RECURRENT, MODERATE: ICD-10-CM

## 2018-02-13 DIAGNOSIS — N05.1 UNSPECIFIED NEPHRITIC SYNDROME WITH FOCAL AND SEGMENTAL GLOMERULAR LESIONS: ICD-10-CM

## 2018-02-13 DIAGNOSIS — T46.4X2A POISONING BY ANGIOTENSIN-CONVERTING-ENZYME INHIBITORS, INTENTIONAL SELF-HARM, INITIAL ENCOUNTER: ICD-10-CM

## 2018-02-13 DIAGNOSIS — Z91.010 ALLERGY TO PEANUTS: ICD-10-CM

## 2018-02-13 DIAGNOSIS — R45.851 SUICIDAL IDEATIONS: ICD-10-CM

## 2018-02-13 DIAGNOSIS — F51.04 PSYCHOPHYSIOLOGIC INSOMNIA: ICD-10-CM

## 2018-02-13 DIAGNOSIS — F90.9 ATTENTION-DEFICIT HYPERACTIVITY DISORDER, UNSPECIFIED TYPE: ICD-10-CM

## 2018-02-13 LAB
ALBUMIN SERPL ELPH-MCNC: 3.6 G/DL — SIGNIFICANT CHANGE UP (ref 3.3–5)
ALP SERPL-CCNC: 78 U/L — SIGNIFICANT CHANGE UP (ref 40–120)
ALT FLD-CCNC: 51 U/L — SIGNIFICANT CHANGE UP (ref 12–78)
AMPHET UR-MCNC: NEGATIVE — SIGNIFICANT CHANGE UP
ANION GAP SERPL CALC-SCNC: 5 MMOL/L — SIGNIFICANT CHANGE UP (ref 5–17)
APAP SERPL-MCNC: <2 UG/ML — LOW (ref 10–30)
APPEARANCE UR: CLEAR — SIGNIFICANT CHANGE UP
AST SERPL-CCNC: 53 U/L — HIGH (ref 15–37)
BACTERIA # UR AUTO: (no result)
BARBITURATES UR SCN-MCNC: NEGATIVE — SIGNIFICANT CHANGE UP
BASOPHILS # BLD AUTO: 0.1 K/UL — SIGNIFICANT CHANGE UP (ref 0–0.2)
BASOPHILS NFR BLD AUTO: 0.9 % — SIGNIFICANT CHANGE UP (ref 0–2)
BENZODIAZ UR-MCNC: POSITIVE — SIGNIFICANT CHANGE UP
BILIRUB SERPL-MCNC: 0.3 MG/DL — SIGNIFICANT CHANGE UP (ref 0.2–1.2)
BILIRUB UR-MCNC: NEGATIVE — SIGNIFICANT CHANGE UP
BUN SERPL-MCNC: 11 MG/DL — SIGNIFICANT CHANGE UP (ref 7–23)
CALCIUM SERPL-MCNC: 9.6 MG/DL — SIGNIFICANT CHANGE UP (ref 8.5–10.1)
CHLORIDE SERPL-SCNC: 105 MMOL/L — SIGNIFICANT CHANGE UP (ref 96–108)
CO2 SERPL-SCNC: 28 MMOL/L — SIGNIFICANT CHANGE UP (ref 22–31)
COCAINE METAB.OTHER UR-MCNC: NEGATIVE — SIGNIFICANT CHANGE UP
COLOR SPEC: YELLOW — SIGNIFICANT CHANGE UP
CREAT SERPL-MCNC: 0.83 MG/DL — SIGNIFICANT CHANGE UP (ref 0.5–1.3)
DIFF PNL FLD: (no result)
EOSINOPHIL # BLD AUTO: 0.1 K/UL — SIGNIFICANT CHANGE UP (ref 0–0.5)
EOSINOPHIL NFR BLD AUTO: 0.6 % — SIGNIFICANT CHANGE UP (ref 0–6)
ETHANOL SERPL-MCNC: <10 MG/DL — SIGNIFICANT CHANGE UP (ref 0–10)
GLUCOSE SERPL-MCNC: 83 MG/DL — SIGNIFICANT CHANGE UP (ref 70–99)
GLUCOSE UR QL: NEGATIVE MG/DL — SIGNIFICANT CHANGE UP
HCT VFR BLD CALC: 43.7 % — SIGNIFICANT CHANGE UP (ref 34.5–45)
HGB BLD-MCNC: 14.8 G/DL — SIGNIFICANT CHANGE UP (ref 11.5–15.5)
KETONES UR-MCNC: NEGATIVE — SIGNIFICANT CHANGE UP
LEUKOCYTE ESTERASE UR-ACNC: NEGATIVE — SIGNIFICANT CHANGE UP
LYMPHOCYTES # BLD AUTO: 28.3 % — SIGNIFICANT CHANGE UP (ref 13–44)
LYMPHOCYTES # BLD AUTO: 3.5 K/UL — HIGH (ref 1–3.3)
MCHC RBC-ENTMCNC: 27.6 PG — SIGNIFICANT CHANGE UP (ref 27–34)
MCHC RBC-ENTMCNC: 33.8 GM/DL — SIGNIFICANT CHANGE UP (ref 32–36)
MCV RBC AUTO: 81.5 FL — SIGNIFICANT CHANGE UP (ref 80–100)
METHADONE UR-MCNC: NEGATIVE — SIGNIFICANT CHANGE UP
MONOCYTES # BLD AUTO: 1.1 K/UL — HIGH (ref 0–0.9)
MONOCYTES NFR BLD AUTO: 8.7 % — SIGNIFICANT CHANGE UP (ref 2–14)
NEUTROPHILS # BLD AUTO: 7.6 K/UL — HIGH (ref 1.8–7.4)
NEUTROPHILS NFR BLD AUTO: 61.6 % — SIGNIFICANT CHANGE UP (ref 43–77)
NITRITE UR-MCNC: NEGATIVE — SIGNIFICANT CHANGE UP
OPIATES UR-MCNC: POSITIVE — SIGNIFICANT CHANGE UP
PCP SPEC-MCNC: SIGNIFICANT CHANGE UP
PCP UR-MCNC: NEGATIVE — SIGNIFICANT CHANGE UP
PH UR: 5 — SIGNIFICANT CHANGE UP (ref 5–8)
PLATELET # BLD AUTO: 382 K/UL — SIGNIFICANT CHANGE UP (ref 150–400)
POTASSIUM SERPL-MCNC: 4.3 MMOL/L — SIGNIFICANT CHANGE UP (ref 3.5–5.3)
POTASSIUM SERPL-SCNC: 4.3 MMOL/L — SIGNIFICANT CHANGE UP (ref 3.5–5.3)
PROT SERPL-MCNC: 8.3 GM/DL — SIGNIFICANT CHANGE UP (ref 6–8.3)
PROT UR-MCNC: 100 MG/DL
RBC # BLD: 5.36 M/UL — HIGH (ref 3.8–5.2)
RBC # FLD: 12.4 % — SIGNIFICANT CHANGE UP (ref 10.3–14.5)
RBC CASTS # UR COMP ASSIST: (no result) /HPF (ref 0–4)
SALICYLATES SERPL-MCNC: <1.7 MG/DL — LOW (ref 2.8–20)
SODIUM SERPL-SCNC: 138 MMOL/L — SIGNIFICANT CHANGE UP (ref 135–145)
SP GR SPEC: 1.02 — SIGNIFICANT CHANGE UP (ref 1.01–1.02)
THC UR QL: NEGATIVE — SIGNIFICANT CHANGE UP
UROBILINOGEN FLD QL: NEGATIVE MG/DL — SIGNIFICANT CHANGE UP
WBC # BLD: 12.4 K/UL — HIGH (ref 3.8–10.5)
WBC # FLD AUTO: 12.4 K/UL — HIGH (ref 3.8–10.5)
WBC UR QL: SIGNIFICANT CHANGE UP

## 2018-02-13 PROCEDURE — 93010 ELECTROCARDIOGRAM REPORT: CPT | Mod: 76

## 2018-02-13 PROCEDURE — 99285 EMERGENCY DEPT VISIT HI MDM: CPT

## 2018-02-13 RX ORDER — ACETAMINOPHEN 500 MG
650 TABLET ORAL ONCE
Qty: 0 | Refills: 0 | Status: COMPLETED | OUTPATIENT
Start: 2018-02-13 | End: 2018-02-13

## 2018-02-13 RX ADMIN — Medication 650 MILLIGRAM(S): at 23:00

## 2018-02-13 RX ADMIN — Medication 650 MILLIGRAM(S): at 23:30

## 2018-02-13 NOTE — ED ADULT NURSE NOTE - OBJECTIVE STATEMENT
Pt brought into ED by mom for overdose. Pt states that she "wants to kill myself", states that she took a handful of lisinopril. Pt has hx of depression and cutting and previous suicide attempt. Pt has multiple superficial lacerations to her abdomen that she admits to administering today. Pt complaining of chest pain.

## 2018-02-13 NOTE — ED PROVIDER NOTE - PROGRESS NOTE DETAILS
Yajaira Mcneill): 6 hr observation period from time of arrival. Will obtain EKG at end of observation period, around 02:00. telepsych will see patient d/w dr valdes, telepsych, pt will be admitted 5N voluntary status

## 2018-02-13 NOTE — ED PROVIDER NOTE - MEDICAL DECISION MAKING DETAILS
26 y/o F presents to ED s/p overdose on Lisinopril +SI, plan for labs, toxicology. 28 y/o F presents to ED s/p overdose on Lisinopril +SI, plan for labs, toxicology.    Positive opiate and benzo.  Spoke with Luis Espinoza NP here, will give signout to Tele Psych.  Spoke with Toxicology, will be medically clear around 2 AM, will need repeat EKG at that time.  Will signout to Dr. Garcia.

## 2018-02-13 NOTE — ED PROVIDER NOTE - OBJECTIVE STATEMENT
28 y/o F w/ pmhx of depression presents to pt states she took a "hand full" of lisinopril around 18:00 because she "wanted to die". Currently sees family service psychiatrist. On Cymbalta 60mg qd for depression and Remeron 7.5mg qhs. Admits to self harm and SI. Also reports she cut her abdomen and reports previous self-harm. Last attempt 1/1/18.

## 2018-02-14 DIAGNOSIS — F32.89 OTHER SPECIFIED DEPRESSIVE EPISODES: ICD-10-CM

## 2018-02-14 DIAGNOSIS — F60.3 BORDERLINE PERSONALITY DISORDER: ICD-10-CM

## 2018-02-14 DIAGNOSIS — F33.1 MAJOR DEPRESSIVE DISORDER, RECURRENT, MODERATE: ICD-10-CM

## 2018-02-14 LAB
ALBUMIN SERPL ELPH-MCNC: 3.8 G/DL — SIGNIFICANT CHANGE UP (ref 3.3–5)
ALP SERPL-CCNC: 76 U/L — SIGNIFICANT CHANGE UP (ref 40–120)
ALT FLD-CCNC: 52 U/L — SIGNIFICANT CHANGE UP (ref 12–78)
ANION GAP SERPL CALC-SCNC: 7 MMOL/L — SIGNIFICANT CHANGE UP (ref 5–17)
AST SERPL-CCNC: 41 U/L — HIGH (ref 15–37)
BILIRUB SERPL-MCNC: 0.4 MG/DL — SIGNIFICANT CHANGE UP (ref 0.2–1.2)
BUN SERPL-MCNC: 9 MG/DL — SIGNIFICANT CHANGE UP (ref 7–23)
CALCIUM SERPL-MCNC: 9.6 MG/DL — SIGNIFICANT CHANGE UP (ref 8.5–10.1)
CHLORIDE SERPL-SCNC: 105 MMOL/L — SIGNIFICANT CHANGE UP (ref 96–108)
CO2 SERPL-SCNC: 27 MMOL/L — SIGNIFICANT CHANGE UP (ref 22–31)
CREAT SERPL-MCNC: 0.93 MG/DL — SIGNIFICANT CHANGE UP (ref 0.5–1.3)
GLUCOSE SERPL-MCNC: 123 MG/DL — HIGH (ref 70–99)
HCT VFR BLD CALC: 44 % — SIGNIFICANT CHANGE UP (ref 34.5–45)
HGB BLD-MCNC: 15.1 G/DL — SIGNIFICANT CHANGE UP (ref 11.5–15.5)
MCHC RBC-ENTMCNC: 27.8 PG — SIGNIFICANT CHANGE UP (ref 27–34)
MCHC RBC-ENTMCNC: 34.3 GM/DL — SIGNIFICANT CHANGE UP (ref 32–36)
MCV RBC AUTO: 81.2 FL — SIGNIFICANT CHANGE UP (ref 80–100)
PLATELET # BLD AUTO: 428 K/UL — HIGH (ref 150–400)
POTASSIUM SERPL-MCNC: 4.3 MMOL/L — SIGNIFICANT CHANGE UP (ref 3.5–5.3)
POTASSIUM SERPL-SCNC: 4.3 MMOL/L — SIGNIFICANT CHANGE UP (ref 3.5–5.3)
PROT SERPL-MCNC: 8.4 GM/DL — HIGH (ref 6–8.3)
RBC # BLD: 5.42 M/UL — HIGH (ref 3.8–5.2)
RBC # FLD: 12.1 % — SIGNIFICANT CHANGE UP (ref 10.3–14.5)
SODIUM SERPL-SCNC: 139 MMOL/L — SIGNIFICANT CHANGE UP (ref 135–145)
WBC # BLD: 9.1 K/UL — SIGNIFICANT CHANGE UP (ref 3.8–10.5)
WBC # FLD AUTO: 9.1 K/UL — SIGNIFICANT CHANGE UP (ref 3.8–10.5)

## 2018-02-14 PROCEDURE — G0427: CPT | Mod: GT

## 2018-02-14 PROCEDURE — 93010 ELECTROCARDIOGRAM REPORT: CPT

## 2018-02-14 PROCEDURE — 90792 PSYCH DIAG EVAL W/MED SRVCS: CPT | Mod: GT

## 2018-02-14 RX ORDER — LIDOCAINE 4 G/100G
1 CREAM TOPICAL DAILY
Qty: 0 | Refills: 0 | Status: DISCONTINUED | OUTPATIENT
Start: 2018-02-14 | End: 2018-02-26

## 2018-02-14 RX ORDER — GABAPENTIN 400 MG/1
300 CAPSULE ORAL
Qty: 0 | Refills: 0 | Status: DISCONTINUED | OUTPATIENT
Start: 2018-02-14 | End: 2018-02-14

## 2018-02-14 RX ORDER — ALPRAZOLAM 0.25 MG
0.5 TABLET ORAL
Qty: 0 | Refills: 0 | Status: DISCONTINUED | OUTPATIENT
Start: 2018-02-14 | End: 2018-02-18

## 2018-02-14 RX ORDER — ZOLPIDEM TARTRATE 10 MG/1
5 TABLET ORAL AT BEDTIME
Qty: 0 | Refills: 0 | Status: DISCONTINUED | OUTPATIENT
Start: 2018-02-14 | End: 2018-02-18

## 2018-02-14 RX ORDER — OXYCODONE HYDROCHLORIDE 5 MG/1
10 TABLET ORAL EVERY 8 HOURS
Qty: 0 | Refills: 0 | Status: DISCONTINUED | OUTPATIENT
Start: 2018-02-14 | End: 2018-02-14

## 2018-02-14 RX ORDER — OXYCODONE HYDROCHLORIDE 5 MG/1
15 TABLET ORAL EVERY 12 HOURS
Qty: 0 | Refills: 0 | Status: DISCONTINUED | OUTPATIENT
Start: 2018-02-14 | End: 2018-02-14

## 2018-02-14 RX ORDER — BACITRACIN ZINC 500 UNIT/G
1 OINTMENT IN PACKET (EA) TOPICAL
Qty: 0 | Refills: 0 | Status: DISCONTINUED | OUTPATIENT
Start: 2018-02-14 | End: 2018-02-23

## 2018-02-14 RX ORDER — OXYCODONE HYDROCHLORIDE 5 MG/1
10 TABLET ORAL EVERY 6 HOURS
Qty: 0 | Refills: 0 | Status: DISCONTINUED | OUTPATIENT
Start: 2018-02-14 | End: 2018-02-15

## 2018-02-14 RX ORDER — LIDOCAINE 4 G/100G
1 CREAM TOPICAL THREE TIMES A DAY
Qty: 0 | Refills: 0 | Status: DISCONTINUED | OUTPATIENT
Start: 2018-02-14 | End: 2018-02-26

## 2018-02-14 RX ORDER — CARVEDILOL PHOSPHATE 80 MG/1
3.12 CAPSULE, EXTENDED RELEASE ORAL EVERY 12 HOURS
Qty: 0 | Refills: 0 | Status: DISCONTINUED | OUTPATIENT
Start: 2018-02-14 | End: 2018-02-18

## 2018-02-14 RX ORDER — DULOXETINE HYDROCHLORIDE 30 MG/1
60 CAPSULE, DELAYED RELEASE ORAL DAILY
Qty: 0 | Refills: 0 | Status: DISCONTINUED | OUTPATIENT
Start: 2018-02-14 | End: 2018-02-26

## 2018-02-14 RX ORDER — OXYCODONE HYDROCHLORIDE 5 MG/1
10 TABLET ORAL EVERY 12 HOURS
Qty: 0 | Refills: 0 | Status: DISCONTINUED | OUTPATIENT
Start: 2018-02-14 | End: 2018-02-14

## 2018-02-14 RX ORDER — OXYCODONE AND ACETAMINOPHEN 5; 325 MG/1; MG/1
1 TABLET ORAL EVERY 6 HOURS
Qty: 0 | Refills: 0 | Status: DISCONTINUED | OUTPATIENT
Start: 2018-02-14 | End: 2018-02-14

## 2018-02-14 RX ORDER — GABAPENTIN 400 MG/1
600 CAPSULE ORAL AT BEDTIME
Qty: 0 | Refills: 0 | Status: DISCONTINUED | OUTPATIENT
Start: 2018-02-14 | End: 2018-02-26

## 2018-02-14 RX ORDER — CYCLOBENZAPRINE HYDROCHLORIDE 10 MG/1
5 TABLET, FILM COATED ORAL THREE TIMES A DAY
Qty: 0 | Refills: 0 | Status: DISCONTINUED | OUTPATIENT
Start: 2018-02-14 | End: 2018-02-15

## 2018-02-14 RX ORDER — HYDROXYZINE HCL 10 MG
50 TABLET ORAL AT BEDTIME
Qty: 0 | Refills: 0 | Status: DISCONTINUED | OUTPATIENT
Start: 2018-02-14 | End: 2018-02-26

## 2018-02-14 RX ORDER — ACETAMINOPHEN 500 MG
650 TABLET ORAL EVERY 12 HOURS
Qty: 0 | Refills: 0 | Status: DISCONTINUED | OUTPATIENT
Start: 2018-02-14 | End: 2018-02-14

## 2018-02-14 RX ORDER — HALOPERIDOL DECANOATE 100 MG/ML
5 INJECTION INTRAMUSCULAR EVERY 6 HOURS
Qty: 0 | Refills: 0 | Status: DISCONTINUED | OUTPATIENT
Start: 2018-02-14 | End: 2018-02-26

## 2018-02-14 RX ORDER — ONDANSETRON 8 MG/1
4 TABLET, FILM COATED ORAL ONCE
Qty: 0 | Refills: 0 | Status: COMPLETED | OUTPATIENT
Start: 2018-02-14 | End: 2018-02-14

## 2018-02-14 RX ADMIN — OXYCODONE HYDROCHLORIDE 10 MILLIGRAM(S): 5 TABLET ORAL at 15:00

## 2018-02-14 RX ADMIN — OXYCODONE HYDROCHLORIDE 10 MILLIGRAM(S): 5 TABLET ORAL at 22:28

## 2018-02-14 RX ADMIN — Medication 0.5 MILLIGRAM(S): at 16:17

## 2018-02-14 RX ADMIN — CARVEDILOL PHOSPHATE 3.12 MILLIGRAM(S): 80 CAPSULE, EXTENDED RELEASE ORAL at 13:38

## 2018-02-14 RX ADMIN — ZOLPIDEM TARTRATE 5 MILLIGRAM(S): 10 TABLET ORAL at 21:39

## 2018-02-14 RX ADMIN — CYCLOBENZAPRINE HYDROCHLORIDE 5 MILLIGRAM(S): 10 TABLET, FILM COATED ORAL at 17:58

## 2018-02-14 RX ADMIN — CYCLOBENZAPRINE HYDROCHLORIDE 5 MILLIGRAM(S): 10 TABLET, FILM COATED ORAL at 14:31

## 2018-02-14 RX ADMIN — Medication 0.5 MILLIGRAM(S): at 23:51

## 2018-02-14 RX ADMIN — Medication 1 MILLIGRAM(S): at 00:28

## 2018-02-14 RX ADMIN — CARVEDILOL PHOSPHATE 3.12 MILLIGRAM(S): 80 CAPSULE, EXTENDED RELEASE ORAL at 20:32

## 2018-02-14 RX ADMIN — OXYCODONE HYDROCHLORIDE 10 MILLIGRAM(S): 5 TABLET ORAL at 14:32

## 2018-02-14 RX ADMIN — ONDANSETRON 4 MILLIGRAM(S): 8 TABLET, FILM COATED ORAL at 00:28

## 2018-02-14 RX ADMIN — GABAPENTIN 300 MILLIGRAM(S): 400 CAPSULE ORAL at 05:16

## 2018-02-14 RX ADMIN — Medication 2 MILLIGRAM(S): at 21:38

## 2018-02-14 RX ADMIN — GABAPENTIN 600 MILLIGRAM(S): 400 CAPSULE ORAL at 20:33

## 2018-02-14 RX ADMIN — Medication 50 MILLIGRAM(S): at 21:37

## 2018-02-14 RX ADMIN — Medication 0.1 MILLIGRAM(S): at 20:32

## 2018-02-14 RX ADMIN — OXYCODONE HYDROCHLORIDE 10 MILLIGRAM(S): 5 TABLET ORAL at 21:38

## 2018-02-14 RX ADMIN — Medication 2 MILLIGRAM(S): at 09:17

## 2018-02-14 RX ADMIN — Medication 1 APPLICATION(S): at 21:40

## 2018-02-14 RX ADMIN — DULOXETINE HYDROCHLORIDE 60 MILLIGRAM(S): 30 CAPSULE, DELAYED RELEASE ORAL at 09:07

## 2018-02-14 RX ADMIN — OXYCODONE AND ACETAMINOPHEN 1 TABLET(S): 5; 325 TABLET ORAL at 05:14

## 2018-02-14 RX ADMIN — OXYCODONE AND ACETAMINOPHEN 1 TABLET(S): 5; 325 TABLET ORAL at 07:54

## 2018-02-14 NOTE — PATIENT PROFILE BEHAVIORAL HEALTH - SURGICAL SITE INCISION
no/superficial abrasions on abdomen self inflicted  small drainage from one site. evaluated in ED not to need sutures.

## 2018-02-14 NOTE — CONSULT NOTE ADULT - ASSESSMENT
27/F with PMHx of   of C.Diff , DDD,  Mixed connective tissue disorder, PCOS, Anxiety, Depression, secondary hypertension due to MCTD? , HTN, obesity seen in medical consult for     1) Drug Overdose with Lisinopril 400 mg:  monitor for now  KFTs and LFTs are stable for now  check daily  BP elevated  lisinopril on hold  renal consult called in with Dr. Clarence Peterson, discussed the case with him.    2) Uncontrolled HTN:  cont clonidine 0.1 mg po daily  add lopressor 25 mg po q 12 hrs with holding parameters    3) Abd Skin cut: bacitracin topically    4) DDD + chronic pain + cont neurontin, flexeril, opiates, lidocaine patch    5) Suicidal attempt/depression: as per Psych service    poc discussed with pt, team, Dr. Peterson    would f/u pt closely along with you. Thanks for the courtesy of this consult. 27/F with PMHx of   of C.Diff , DDD,  Mixed connective tissue disorder, PCOS, Anxiety, Depression, secondary hypertension due to MCTD? , HTN, obesity seen in medical consult for     1) Drug Overdose with Lisinopril 400 mg:  monitor for now  KFTs and LFTs are stable for now  check daily  BP elevated  lisinopril on hold  renal consult called in with Dr. Clarence Peterson, discussed the case with him.    2) Uncontrolled HTN:  cont clonidine 0.1 mg po daily  add coreg 3.125 mg po q 12 hrs with holding parameters; would titrate as needed.     3) Abd Skin cut: bacitracin topically    4) DDD + chronic pain + cont neurontin, flexeril, opiates, lidocaine patch    5) Suicidal attempt/depression: as per Psych service    poc discussed with pt, team, Dr. Peterson    would f/u pt closely along with you. Thanks for the courtesy of this consult.

## 2018-02-14 NOTE — PATIENT PROFILE BEHAVIORAL HEALTH - NSBHTYPATTPT_PSY_A_CORE
D/c instructions and rx reviewed w/ pt  All questions and concerns addressed at this time         Matthew Corado RN  01/09/18 [id number]
aborted/previous attempts

## 2018-02-14 NOTE — ED BEHAVIORAL HEALTH ASSESSMENT NOTE - AXIS III
Obese, Back and knee pain, polycystic ovaries, "hardened kidneys" Obese, Back and knee pain, polycystic ovaries,

## 2018-02-14 NOTE — CONSULT NOTE ADULT - SUBJECTIVE AND OBJECTIVE BOX
27/F with PMHx of   of C.Diff , Mixed connective tissue disorder, PCOS, Anxiety, Depression, secondary hypertension due to MCTD? , HTN, obesity was admitted to psych service and seen in medical consult for Drug overdose with Lisinopril 400 mg and suicidal attempt. Pt states that she took 20 tabs of 20 mg of Lisinopril on  at 6 pm. Her renal function, LFTs and BP has been stable so far. She also cut herself superficially on abdominal skin. She denies any chest pain/sob/abd pain at this time.    PMHx as above  PSHx as above  Non smoker    ROS all 10 systems have been reviewed and except those mentioned above everything else is negative      PHYSICAL EXAM:    Daily Height in cm: 170.18 (2018 06:02)    Daily     ICU Vital Signs Last 24 Hrs  T(C): 37.5 (2018 09:00), Max: 37.5 (2018 09:00)  T(F): 99.5 (2018 09:00), Max: 99.5 (2018 09:00)  HR: 110 (2018 09:00) (108 - 112)  BP: 140/100 (2018 09:00) (131/70 - 140/100)  BP(mean): --  ABP: --  ABP(mean): --  RR: 14 (2018 09:00) (14 - 18)  SpO2: 99% (2018 09:00) (99% - 100%)      Constitutional: Well appearing  HEENT: Atraumatic,  Respiratory: Breath Sounds normal, no rhonchi/wheeze  Cardiovascular: N S1S2; KARISSA present  Gastrointestinal: Abdomen soft, non tender, Bowel Sounds present;   Extremities: No edema, peripheral pulses present  Neurological: AAO x 3, no gross focal motor deficits  Skin: about 2 inch linear superficial skin cut in mid abdomen                          15.1   9.1   )-----------( 428      ( 2018 09:04 )             44.0       CBC Full  -  ( 2018 09:04 )  WBC Count : 9.1 K/uL  Hemoglobin : 15.1 g/dL  Hematocrit : 44.0 %  Platelet Count - Automated : 428 K/uL  Mean Cell Volume : 81.2 fl  Mean Cell Hemoglobin : 27.8 pg  Mean Cell Hemoglobin Concentration : 34.3 gm/dL  Auto Neutrophil # : x  Auto Lymphocyte # : x  Auto Monocyte # : x  Auto Eosinophil # : x  Auto Basophil # : x  Auto Neutrophil % : x  Auto Lymphocyte % : x  Auto Monocyte % : x  Auto Eosinophil % : x  Auto Basophil % : x          139  |  105  |  9   ----------------------------<  123<H>  4.3   |  27  |  0.93    Ca    9.6      2018 09:04    TPro  8.4<H>  /  Alb  3.8  /  TBili  0.4  /  DBili  x   /  AST  41<H>  /  ALT  52  /  AlkPhos  76        LIVER FUNCTIONS - ( 2018 09:04 )  Alb: 3.8 g/dL / Pro: 8.4 gm/dL / ALK PHOS: 76 U/L / ALT: 52 U/L / AST: 41 U/L / GGT: x                       Urinalysis Basic - ( 2018 21:22 )    Color: Yellow / Appearance: Clear / S.020 / pH: x  Gluc: x / Ketone: Negative  / Bili: Negative / Urobili: Negative mg/dL   Blood: x / Protein: 100 mg/dL / Nitrite: Negative   Leuk Esterase: Negative / RBC: 3-5 /HPF / WBC 0-2   Sq Epi: x / Non Sq Epi: x / Bacteria: Few            MEDICATIONS  (STANDING):  cloNIDine 0.1 milliGRAM(s) Oral daily  DULoxetine 60 milliGRAM(s) Oral daily  gabapentin 600 milliGRAM(s) Oral at bedtime  hydrOXYzine hydrochloride 50 milliGRAM(s) Oral at bedtime  lidocaine   Patch 1 Patch Transdermal daily

## 2018-02-14 NOTE — PROGRESS NOTE BEHAVIORAL HEALTH - NSBHFUPINTERVALHXFT_PSY_A_CORE
Patient remains depressed and anxious, somatically focused and impulsive  Will consider starting mood stabilizer with lower weight gain potential such as Lamictal as opposed to Depakote.        MEDICATIONS  (STANDING):  cloNIDine 0.1 milliGRAM(s) Oral daily  DULoxetine 60 milliGRAM(s) Oral daily  gabapentin 600 milliGRAM(s) Oral at bedtime  hydrOXYzine hydrochloride 50 milliGRAM(s) Oral at bedtime  lidocaine   Patch 1 Patch Transdermal daily    MEDICATIONS  (PRN):  acetaminophen   Tablet 650 milliGRAM(s) Oral every 12 hours PRN mild pain  ALPRAZolam 0.5 milliGRAM(s) Oral two times a day PRN anxiety  cyclobenzaprine 5 milliGRAM(s) Oral three times a day PRN Muscle Spasm  haloperidol     Tablet 5 milliGRAM(s) Oral every 6 hours PRN agitation  LORazepam     Tablet 2 milliGRAM(s) Oral every 8 hours PRN severe anxiety  oxyCODONE    5 mG/acetaminophen 325 mG 1 Tablet(s) Oral every 6 hours PRN Moderate Pain (4 - 6)  zolpidem 5 milliGRAM(s) Oral at bedtime PRN Insomnia  zolpidem 5 milliGRAM(s) Oral at bedtime PRN Insomnia

## 2018-02-14 NOTE — ED BEHAVIORAL HEALTH ASSESSMENT NOTE - AXIS IV
Other psychosocial and environmental problems/Occupational problems/Problems with access to healthcare services

## 2018-02-14 NOTE — PROGRESS NOTE BEHAVIORAL HEALTH - NSBHFUPINTERVALCCFT_PSY_A_CORE
Patient reports poor sleep and that this dysregulated her Joked about how she cut herself with expensive knives she bought for her mother     Would like to shift to psychiatrist outside [facility] Ambien is only medication that helped her sleep and curent provider will not write for it.

## 2018-02-14 NOTE — PROGRESS NOTE BEHAVIORAL HEALTH - NSBHFUPSTRENGTHS_PSY_A_CORE
Able to set and pursue goals/Knowledge of medications/Attempting to realize his/her potential/Has access to housing/residential stability/Motivated and ready for change/Steady employment/Cooperative with treatment/Intelligent

## 2018-02-14 NOTE — CONSULT NOTE ADULT - SUBJECTIVE AND OBJECTIVE BOX
27y Female whom presented to the hospital with a h istory of depression, HTN, FSGS with proteinuria known to Dr. Talamantes and now followed by me as outpatient here with suicide attempt. Patient with apparent lisinopril attempted OD with 20 Tab's. Per ER notes, toxicology was contacted. Patient now admitted to  and being followed by medicine, call from medicine for evaluation. Patient reports leaving job at [employer], pending new job at [employer]. No other current complaints or events.     PAST MEDICAL & SURGICAL HISTORY:  HTN (hypertension)  Polycystic ovarian syndrome  Depression  Anxiety  Mixed connective tissue disease  Cholecystitis  H/O ovarian cystectomy  H/O knee surgery  History of cholecystectomy      MEDICATIONS  (STANDING):  cloNIDine 0.1 milliGRAM(s) Oral daily  DULoxetine 60 milliGRAM(s) Oral daily  gabapentin 600 milliGRAM(s) Oral at bedtime  hydrOXYzine hydrochloride 50 milliGRAM(s) Oral at bedtime  lidocaine   Patch 1 Patch Transdermal daily    MEDICATIONS  (PRN):  acetaminophen   Tablet 650 milliGRAM(s) Oral every 12 hours PRN mild pain  ALPRAZolam 0.5 milliGRAM(s) Oral two times a day PRN anxiety  cyclobenzaprine 5 milliGRAM(s) Oral three times a day PRN Muscle Spasm  haloperidol     Tablet 5 milliGRAM(s) Oral every 6 hours PRN agitation  LORazepam     Tablet 2 milliGRAM(s) Oral every 8 hours PRN severe anxiety  oxyCODONE    5 mG/acetaminophen 325 mG 1 Tablet(s) Oral every 6 hours PRN Moderate Pain (4 - 6)  zolpidem 5 milliGRAM(s) Oral at bedtime PRN Insomnia  zolpidem 5 milliGRAM(s) Oral at bedtime PRN Insomnia      Allergies    No Known Drug Allergies  peanuts (Unknown)  Tree Nuts (Unknown)    Intolerances    morphine (Pruritus)      SOCIAL HISTORY:  no etoh/cigg    FAMILY HISTORY:  No pertinent family history in first degree relatives      REVIEW OF SYSTEMS:    CONSTITUTIONAL: No current weakness, fevers or chills  EYES/ENT: No visual changes;  No vertigo or throat pain   NECK: No pain or stiffness  RESPIRATORY: No cough, wheezing, hemoptysis; No shortness of breath  CARDIOVASCULAR: No chest pain or palpitations  GASTROINTESTINAL: No abdominal or epigastric pain. No nausea, vomiting, or hematemesis; No diarrhea or constipation. No melena or hematochezia.  GENITOURINARY: No dysuria, frequency or hematuria  NEUROLOGICAL: No numbness or weakness  SKIN: No itching, burning, rashes, or lesions   All other review of systems is negative unless indicated above.      T(C): , Max: 37.5 (18 @ 09:00)  T(F): , Max: 99.5 (18 @ 09:00)  HR: 110 (18 09:00)  BP: 140/100 (18 09:00)  BP(mean): --  RR: 14 (18 @ 09:00)  SpO2: 99% (18 09:00)  Wt(kg): --    Height (cm): 170.18 ( 06:02)  Weight (kg): 102.1 ( @ 06:02)  BMI (kg/m2): 35.3 ( @ 06:02)  BSA (m2): 2.13 ( 06:02)    PHYSICAL EXAM:    Constitutional: NAD,  HEENT: PERRLA, EOMI,  MMM  Neck: No LAD, No JVD  Respiratory: good aeration  Cardiovascular: S1 and S2, RRR  Gastrointestinal: BS+, soft, NT/ND  Extremities: No peripheral edema  Neurological: A/O x 3, no focal deficits  Psychiatric: Normal mood, normal affect  : No Alvares  Skin: No rashes  Access: Not applicable      LABS:                        15.1   9.1   )-----------( 428      ( 2018 09:04 )             44.0     2018 09:04    139    |  105    |  9      ----------------------------<  123    4.3     |  27     |  0.93   2018 20:30    138    |  105    |  11     ----------------------------<  83     4.3     |  28     |  0.83     Ca    9.6        2018 09:04  Ca    9.6        2018 20:30    TPro  8.4    /  Alb  3.8    /  TBili  0.4    /  DBili  x      /  AST  41     /  ALT  52     /  AlkPhos  76     2018 09:04  TPro  8.3    /  Alb  3.6    /  TBili  0.3    /  DBili  x      /  AST  53     /  ALT  51     /  AlkPhos  78     2018 20:30          Urine Studies:  Urinalysis Basic - ( 2018 21:22 )    Color: Yellow / Appearance: Clear / S.020 / pH: x  Gluc: x / Ketone: Negative  / Bili: Negative / Urobili: Negative mg/dL   Blood: x / Protein: 100 mg/dL / Nitrite: Negative   Leuk Esterase: Negative / RBC: 3-5 /HPF / WBC 0-2   Sq Epi: x / Non Sq Epi: x / Bacteria: Few            RADIOLOGY & ADDITIONAL STUDIES:

## 2018-02-14 NOTE — ED ADULT NURSE REASSESSMENT NOTE - NS ED NURSE REASSESS COMMENT FT1
Pt remains on constant observation and mother at bedside. Pt with behavioral outburst, pulling off telemetry, and pulling out IV. Pt states that she does not want to stay, she wants to sign out AMA and that she "just wants to die". Pt placed back into bed and medicated with zofran for nausea/vomiting and ativan PO as per MD orders. Pt resting calmly in bed at present time. constant observation remains in effect.
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
## 2018-02-14 NOTE — CONSULT NOTE ADULT - ASSESSMENT
27y Female whom presented to the hospital with a h istory of depression, HTN, FSGS with proteinuria known to Dr. Talamantes and now followed by me as outpatient here with suicide attempt.     FSGS  -Monitor urinalysis/protein:creatinine with outpatient follow up  -NSAID avoidance  -Ok to hold ace for now    SI  -Psych  -Toxicology was following as per ER notes    HTN  -May need addition of agent for bp control with ace stoped  -Coreg may be a consideration with tachycardia    thanks, will follow with you    d/c with Dr. Pedroza

## 2018-02-14 NOTE — ED BEHAVIORAL HEALTH ASSESSMENT NOTE - OTHER PAST PSYCHIATRIC HISTORY (INCLUDE DETAILS REGARDING ONSET, COURSE OF ILLNESS, INPATIENT/OUTPATIENT TREATMENT)
Angelina Stone therapist [phone]  Dr Warren, neurologist prescriber of psych meds prior to last hospitalization

## 2018-02-14 NOTE — ED BEHAVIORAL HEALTH ASSESSMENT NOTE - DESCRIPTION
lives with mother and step father, only child, lived in [place] where she attended college and returned to this area in Dec 2016 "hardening of kidneys", polycystic ovaries, chronic pain back and knee, obese Mood appears sad, admits taking pills was impulsive.   Pt affect is constricted, fair eye contact. Mother voiced annoyance of getting upset at the dog and responding in this manner.       Maintained on CO. Behaviorally maintained control. Patient. remained in good behavioral control during interview
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
## 2018-02-14 NOTE — PROGRESS NOTE BEHAVIORAL HEALTH - RISK ASSESSMENT
· Risk Assessment  chronic  potential  risk of self harm due to poor coping skills and attention seeking behaviors , especially with mother
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
## 2018-02-14 NOTE — ED BEHAVIORAL HEALTH ASSESSMENT NOTE - PRIMARY DX
Benzodiazepine overdose of undetermined intent, sequela Borderline personality disorder MDD (major depressive disorder), recurrent episode, moderate

## 2018-02-14 NOTE — ED BEHAVIORAL HEALTH ASSESSMENT NOTE - SUICIDE RISK FACTORS
Highly impulsive behavior/Substance abuse/dependence/Agitation/severe anxiety/Chronic pain or acute medical issue/Access to means (pills, firearms, etc.)/Family history of suicide/Mood episode

## 2018-02-14 NOTE — ED BEHAVIORAL HEALTH ASSESSMENT NOTE - SUBSTANCE ISSUES AND PLAN (INCLUDE STANDING AND PRN MEDICATION)
per ED MD, prescribed opiates as standing meds  per pt Uses benzos and pain meds and when works adderall

## 2018-02-15 LAB
ALBUMIN SERPL ELPH-MCNC: 3.6 G/DL — SIGNIFICANT CHANGE UP (ref 3.3–5)
ALP SERPL-CCNC: 84 U/L — SIGNIFICANT CHANGE UP (ref 40–120)
ALT FLD-CCNC: 74 U/L — SIGNIFICANT CHANGE UP (ref 12–78)
ANION GAP SERPL CALC-SCNC: 9 MMOL/L — SIGNIFICANT CHANGE UP (ref 5–17)
AST SERPL-CCNC: 110 U/L — HIGH (ref 15–37)
BILIRUB SERPL-MCNC: 0.6 MG/DL — SIGNIFICANT CHANGE UP (ref 0.2–1.2)
BUN SERPL-MCNC: 14 MG/DL — SIGNIFICANT CHANGE UP (ref 7–23)
CALCIUM SERPL-MCNC: 9.1 MG/DL — SIGNIFICANT CHANGE UP (ref 8.5–10.1)
CHLORIDE SERPL-SCNC: 101 MMOL/L — SIGNIFICANT CHANGE UP (ref 96–108)
CO2 SERPL-SCNC: 27 MMOL/L — SIGNIFICANT CHANGE UP (ref 22–31)
CREAT SERPL-MCNC: 0.89 MG/DL — SIGNIFICANT CHANGE UP (ref 0.5–1.3)
GLUCOSE SERPL-MCNC: 94 MG/DL — SIGNIFICANT CHANGE UP (ref 70–99)
POTASSIUM SERPL-MCNC: 3.8 MMOL/L — SIGNIFICANT CHANGE UP (ref 3.5–5.3)
POTASSIUM SERPL-SCNC: 3.8 MMOL/L — SIGNIFICANT CHANGE UP (ref 3.5–5.3)
PROT SERPL-MCNC: 8.3 GM/DL — SIGNIFICANT CHANGE UP (ref 6–8.3)
SODIUM SERPL-SCNC: 137 MMOL/L — SIGNIFICANT CHANGE UP (ref 135–145)

## 2018-02-15 RX ORDER — ACETAMINOPHEN 500 MG
650 TABLET ORAL EVERY 6 HOURS
Qty: 0 | Refills: 0 | Status: DISCONTINUED | OUTPATIENT
Start: 2018-02-15 | End: 2018-02-26

## 2018-02-15 RX ORDER — HALOPERIDOL DECANOATE 100 MG/ML
5 INJECTION INTRAMUSCULAR ONCE
Qty: 0 | Refills: 0 | Status: DISCONTINUED | OUTPATIENT
Start: 2018-02-15 | End: 2018-02-26

## 2018-02-15 RX ORDER — LAMOTRIGINE 25 MG/1
25 TABLET, ORALLY DISINTEGRATING ORAL DAILY
Qty: 0 | Refills: 0 | Status: DISCONTINUED | OUTPATIENT
Start: 2018-02-15 | End: 2018-02-26

## 2018-02-15 RX ORDER — CYCLOBENZAPRINE HYDROCHLORIDE 10 MG/1
10 TABLET, FILM COATED ORAL THREE TIMES A DAY
Qty: 0 | Refills: 0 | Status: DISCONTINUED | OUTPATIENT
Start: 2018-02-15 | End: 2018-02-26

## 2018-02-15 RX ORDER — ACETAMINOPHEN 500 MG
650 TABLET ORAL EVERY 6 HOURS
Qty: 0 | Refills: 0 | Status: COMPLETED | OUTPATIENT
Start: 2018-02-15 | End: 2018-02-15

## 2018-02-15 RX ADMIN — Medication 0.1 MILLIGRAM(S): at 20:10

## 2018-02-15 RX ADMIN — OXYCODONE HYDROCHLORIDE 10 MILLIGRAM(S): 5 TABLET ORAL at 02:52

## 2018-02-15 RX ADMIN — Medication 50 MILLIGRAM(S): at 21:15

## 2018-02-15 RX ADMIN — Medication 650 MILLIGRAM(S): at 11:30

## 2018-02-15 RX ADMIN — Medication 2 MILLIGRAM(S): at 10:31

## 2018-02-15 RX ADMIN — LIDOCAINE 1 APPLICATION(S): 4 CREAM TOPICAL at 22:08

## 2018-02-15 RX ADMIN — CYCLOBENZAPRINE HYDROCHLORIDE 5 MILLIGRAM(S): 10 TABLET, FILM COATED ORAL at 02:52

## 2018-02-15 RX ADMIN — LIDOCAINE 1 PATCH: 4 CREAM TOPICAL at 08:29

## 2018-02-15 RX ADMIN — OXYCODONE HYDROCHLORIDE 10 MILLIGRAM(S): 5 TABLET ORAL at 03:52

## 2018-02-15 RX ADMIN — Medication 650 MILLIGRAM(S): at 15:58

## 2018-02-15 RX ADMIN — OXYCODONE HYDROCHLORIDE 10 MILLIGRAM(S): 5 TABLET ORAL at 09:50

## 2018-02-15 RX ADMIN — OXYCODONE HYDROCHLORIDE 10 MILLIGRAM(S): 5 TABLET ORAL at 10:33

## 2018-02-15 RX ADMIN — DULOXETINE HYDROCHLORIDE 60 MILLIGRAM(S): 30 CAPSULE, DELAYED RELEASE ORAL at 08:29

## 2018-02-15 RX ADMIN — LIDOCAINE 1 PATCH: 4 CREAM TOPICAL at 20:09

## 2018-02-15 RX ADMIN — Medication 1 APPLICATION(S): at 08:30

## 2018-02-15 RX ADMIN — CARVEDILOL PHOSPHATE 3.12 MILLIGRAM(S): 80 CAPSULE, EXTENDED RELEASE ORAL at 08:29

## 2018-02-15 RX ADMIN — Medication 650 MILLIGRAM(S): at 10:53

## 2018-02-15 RX ADMIN — CARVEDILOL PHOSPHATE 3.12 MILLIGRAM(S): 80 CAPSULE, EXTENDED RELEASE ORAL at 20:08

## 2018-02-15 RX ADMIN — Medication 1 APPLICATION(S): at 20:26

## 2018-02-15 RX ADMIN — GABAPENTIN 600 MILLIGRAM(S): 400 CAPSULE ORAL at 20:08

## 2018-02-15 RX ADMIN — Medication 2 MILLIGRAM(S): at 21:17

## 2018-02-15 RX ADMIN — LAMOTRIGINE 25 MILLIGRAM(S): 25 TABLET, ORALLY DISINTEGRATING ORAL at 12:19

## 2018-02-15 RX ADMIN — LIDOCAINE 1 APPLICATION(S): 4 CREAM TOPICAL at 08:29

## 2018-02-15 RX ADMIN — ZOLPIDEM TARTRATE 5 MILLIGRAM(S): 10 TABLET ORAL at 22:07
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
## 2018-02-15 NOTE — PROGRESS NOTE ADULT - ASSESSMENT
27/F with PMHx of   of C.Diff , DDD,  Mixed connective tissue disorder, PCOS, Anxiety, Depression, secondary hypertension due to MCTD? , HTN, obesity seen in medical consult for     1) Drug Overdose with Lisinopril 400 mg:  monitor for now  KFTs and LFTs are stable for now  check in am on 2/16  BP better controlled  lisinopril on hold sec to OD  renal consult called in with Dr. Clarence Peterson, discussed the case with him.    2) Uncontrolled HTN:  cont clonidine 0.1 mg po daily  added coreg 3.125 mg po q 12 hrs with holding parameters on 2/14; would titrate as needed for optimal BP control    3) Abd Skin cut: bacitracin topically    4) DDD + chronic pain + cont neurontin, flexeril, opiates, lidocaine patch, hydrocodone prn    5) Suicidal attempt/depression: as per Psych service    poc discussed with pt, team,    would f/u pt closely along with you. Thanks for the courtesy of this consult. 27/F with PMHx of   of C.Diff , DDD,  Mixed connective tissue disorder, PCOS, Anxiety, Depression, secondary hypertension due to MCTD? , HTN, obesity seen in medical consult for     1) Drug Overdose with Lisinopril 400 mg:  monitor for now  BUN Cr stable; mild increase in AST; monitor for now; check in am on 2/16  BP better controlled  lisinopril on hold sec to OD  renal consult called in with Dr. Clarence Peterson, discussed the case with him.    2) Uncontrolled HTN:  cont clonidine 0.1 mg po daily  added coreg 3.125 mg po q 12 hrs with holding parameters on 2/14; would titrate as needed for optimal BP control    3) Abd Skin cut: bacitracin topically    4) DDD + chronic pain + cont neurontin, flexeril, opiates, lidocaine patch, hydrocodone prn    5) Suicidal attempt/depression: as per Psych service    poc discussed with pt, team,    would f/u pt closely along with you. Thanks for the courtesy of this consult.

## 2018-02-15 NOTE — PROGRESS NOTE ADULT - SUBJECTIVE AND OBJECTIVE BOX
c/c lisinopril overdose 400 mg    HPI:  with PMHx of   of C.Diff , Mixed connective tissue disorder, PCOS, Anxiety, Depression, secondary hypertension due to MCTD? , HTN, obesity was admitted to psych service and seen in medical consult for Drug overdose with Lisinopril 400 mg and suicidal attempt. Pt states that she took 20 tabs of 20 mg of Lisinopril on  at 6 pm. Her renal function, LFTs and BP has been stable so far. She also cut herself superficially on abdominal skin. She denies any chest pain/sob/abd pain at this time.    2/15: BP, LFTs, KFTs stable so far  no complaints       PHYSICAL EXAM:    Daily     Daily     ICU Vital Signs Last 24 Hrs  T(C): 36.6 (15 Feb 2018 07:25), Max: 37.3 (2018 20:17)  T(F): 97.8 (15 Feb 2018 07:25), Max: 99.1 (2018 20:17)  HR: 104 (15 Feb 2018 07:25) (104 - 127)  BP: 135/88 (15 Feb 2018 07:25) (135/88 - 139/94)  BP(mean): --  ABP: --  ABP(mean): --  RR: 16 (15 Feb 2018 07:25) (16 - 16)  SpO2: 99% (15 Feb 2018 07:25) (99% - 100%)      Constitutional: Well appearing  HEENT: Atraumatic,                          15.1   9.1   )-----------( 428      ( 2018 09:04 )             44.0       CBC Full  -  ( 2018 09:04 )  WBC Count : 9.1 K/uL  Hemoglobin : 15.1 g/dL  Hematocrit : 44.0 %  Platelet Count - Automated : 428 K/uL  Mean Cell Volume : 81.2 fl  Mean Cell Hemoglobin : 27.8 pg  Mean Cell Hemoglobin Concentration : 34.3 gm/dL  Auto Neutrophil # : x  Auto Lymphocyte # : x  Auto Monocyte # : x  Auto Eosinophil # : x  Auto Basophil # : x  Auto Neutrophil % : x  Auto Lymphocyte % : x  Auto Monocyte % : x  Auto Eosinophil % : x  Auto Basophil % : x      02-15    137  |  101  |  14  ----------------------------<  94  3.8   |  27  |  0.89    Ca    9.1      15 Feb 2018 07:27    TPro  8.3  /  Alb  3.6  /  TBili  0.6  /  DBili  x   /  AST  110<H>  /  ALT  74  /  AlkPhos  84  02-15      LIVER FUNCTIONS - ( 15 Feb 2018 07:27 )  Alb: 3.6 g/dL / Pro: 8.3 gm/dL / ALK PHOS: 84 U/L / ALT: 74 U/L / AST: 110 U/L / GGT: x                       Urinalysis Basic - ( 2018 21:22 )    Color: Yellow / Appearance: Clear / S.020 / pH: x  Gluc: x / Ketone: Negative  / Bili: Negative / Urobili: Negative mg/dL   Blood: x / Protein: 100 mg/dL / Nitrite: Negative   Leuk Esterase: Negative / RBC: 3-5 /HPF / WBC 0-2   Sq Epi: x / Non Sq Epi: x / Bacteria: Few            MEDICATIONS  (STANDING):  BACItracin   Ointment 1 Application(s) Topical two times a day  carvedilol 3.125 milliGRAM(s) Oral every 12 hours  cloNIDine 0.1 milliGRAM(s) Oral daily  DULoxetine 60 milliGRAM(s) Oral daily  gabapentin 600 milliGRAM(s) Oral at bedtime  hydrOXYzine hydrochloride 50 milliGRAM(s) Oral at bedtime  lamoTRIgine 25 milliGRAM(s) Oral daily  lidocaine   Patch 1 Patch Transdermal daily c/c lisinopril overdose 400 mg    HPI:  with PMHx of   of C.Diff , Mixed connective tissue disorder, PCOS, Anxiety, Depression, secondary hypertension due to MCTD? , HTN, obesity was admitted to psych service and seen in medical consult for Drug overdose with Lisinopril 400 mg and suicidal attempt. Pt states that she took 20 tabs of 20 mg of Lisinopril on  at 6 pm. Her renal function, LFTs and BP has been stable so far. She also cut herself superficially on abdominal skin. She denies any chest pain/sob/abd pain at this time.    2/15: mild increase in AST   BP,  KFTs stable so far  no complaints       PHYSICAL EXAM:    Daily     Daily     ICU Vital Signs Last 24 Hrs  T(C): 36.6 (15 Feb 2018 07:25), Max: 37.3 (2018 20:17)  T(F): 97.8 (15 Feb 2018 07:25), Max: 99.1 (2018 20:17)  HR: 104 (15 Feb 2018 07:25) (104 - 127)  BP: 135/88 (15 Feb 2018 07:25) (135/88 - 139/94)  BP(mean): --  ABP: --  ABP(mean): --  RR: 16 (15 Feb 2018 07:25) (16 - 16)  SpO2: 99% (15 Feb 2018 07:25) (99% - 100%)      Constitutional: Well appearing  HEENT: Atraumatic,                          15.1   9.1   )-----------( 428      ( 2018 09:04 )             44.0       CBC Full  -  ( 2018 09:04 )  WBC Count : 9.1 K/uL  Hemoglobin : 15.1 g/dL  Hematocrit : 44.0 %  Platelet Count - Automated : 428 K/uL  Mean Cell Volume : 81.2 fl  Mean Cell Hemoglobin : 27.8 pg  Mean Cell Hemoglobin Concentration : 34.3 gm/dL  Auto Neutrophil # : x  Auto Lymphocyte # : x  Auto Monocyte # : x  Auto Eosinophil # : x  Auto Basophil # : x  Auto Neutrophil % : x  Auto Lymphocyte % : x  Auto Monocyte % : x  Auto Eosinophil % : x  Auto Basophil % : x      02-15    137  |  101  |  14  ----------------------------<  94  3.8   |  27  |  0.89    Ca    9.1      15 Feb 2018 07:27    TPro  8.3  /  Alb  3.6  /  TBili  0.6  /  DBili  x   /  AST  110<H>  /  ALT  74  /  AlkPhos  84  02-15      LIVER FUNCTIONS - ( 15 Feb 2018 07:27 )  Alb: 3.6 g/dL / Pro: 8.3 gm/dL / ALK PHOS: 84 U/L / ALT: 74 U/L / AST: 110 U/L / GGT: x                       Urinalysis Basic - ( 2018 21:22 )    Color: Yellow / Appearance: Clear / S.020 / pH: x  Gluc: x / Ketone: Negative  / Bili: Negative / Urobili: Negative mg/dL   Blood: x / Protein: 100 mg/dL / Nitrite: Negative   Leuk Esterase: Negative / RBC: 3-5 /HPF / WBC 0-2   Sq Epi: x / Non Sq Epi: x / Bacteria: Few            MEDICATIONS  (STANDING):  BACItracin   Ointment 1 Application(s) Topical two times a day  carvedilol 3.125 milliGRAM(s) Oral every 12 hours  cloNIDine 0.1 milliGRAM(s) Oral daily  DULoxetine 60 milliGRAM(s) Oral daily  gabapentin 600 milliGRAM(s) Oral at bedtime  hydrOXYzine hydrochloride 50 milliGRAM(s) Oral at bedtime  lamoTRIgine 25 milliGRAM(s) Oral daily  lidocaine   Patch 1 Patch Transdermal daily
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
## 2018-02-16 LAB
ALBUMIN SERPL ELPH-MCNC: 3.6 G/DL — SIGNIFICANT CHANGE UP (ref 3.3–5)
ALP SERPL-CCNC: 83 U/L — SIGNIFICANT CHANGE UP (ref 40–120)
ALT FLD-CCNC: 76 U/L — SIGNIFICANT CHANGE UP (ref 12–78)
ANION GAP SERPL CALC-SCNC: 7 MMOL/L — SIGNIFICANT CHANGE UP (ref 5–17)
AST SERPL-CCNC: 71 U/L — HIGH (ref 15–37)
BILIRUB SERPL-MCNC: 0.4 MG/DL — SIGNIFICANT CHANGE UP (ref 0.2–1.2)
BUN SERPL-MCNC: 9 MG/DL — SIGNIFICANT CHANGE UP (ref 7–23)
CALCIUM SERPL-MCNC: 9.2 MG/DL — SIGNIFICANT CHANGE UP (ref 8.5–10.1)
CHLORIDE SERPL-SCNC: 102 MMOL/L — SIGNIFICANT CHANGE UP (ref 96–108)
CO2 SERPL-SCNC: 29 MMOL/L — SIGNIFICANT CHANGE UP (ref 22–31)
CREAT SERPL-MCNC: 0.79 MG/DL — SIGNIFICANT CHANGE UP (ref 0.5–1.3)
GLUCOSE SERPL-MCNC: 89 MG/DL — SIGNIFICANT CHANGE UP (ref 70–99)
POTASSIUM SERPL-MCNC: 3.9 MMOL/L — SIGNIFICANT CHANGE UP (ref 3.5–5.3)
POTASSIUM SERPL-SCNC: 3.9 MMOL/L — SIGNIFICANT CHANGE UP (ref 3.5–5.3)
PROT SERPL-MCNC: 8.3 GM/DL — SIGNIFICANT CHANGE UP (ref 6–8.3)
SODIUM SERPL-SCNC: 138 MMOL/L — SIGNIFICANT CHANGE UP (ref 135–145)

## 2018-02-16 RX ORDER — MAGNESIUM HYDROXIDE 400 MG/1
30 TABLET, CHEWABLE ORAL DAILY
Qty: 0 | Refills: 0 | Status: DISCONTINUED | OUTPATIENT
Start: 2018-02-16 | End: 2018-02-26

## 2018-02-16 RX ORDER — DOCUSATE SODIUM 100 MG
100 CAPSULE ORAL
Qty: 0 | Refills: 0 | Status: DISCONTINUED | OUTPATIENT
Start: 2018-02-16 | End: 2018-02-26

## 2018-02-16 RX ADMIN — Medication 100 MILLIGRAM(S): at 21:08

## 2018-02-16 RX ADMIN — Medication 50 MILLIGRAM(S): at 21:09

## 2018-02-16 RX ADMIN — Medication 0.1 MILLIGRAM(S): at 21:07

## 2018-02-16 RX ADMIN — LIDOCAINE 1 PATCH: 4 CREAM TOPICAL at 09:15

## 2018-02-16 RX ADMIN — Medication 2 MILLIGRAM(S): at 10:45

## 2018-02-16 RX ADMIN — LIDOCAINE 1 PATCH: 4 CREAM TOPICAL at 20:25

## 2018-02-16 RX ADMIN — MAGNESIUM HYDROXIDE 30 MILLILITER(S): 400 TABLET, CHEWABLE ORAL at 10:45

## 2018-02-16 RX ADMIN — Medication 650 MILLIGRAM(S): at 15:32

## 2018-02-16 RX ADMIN — LAMOTRIGINE 25 MILLIGRAM(S): 25 TABLET, ORALLY DISINTEGRATING ORAL at 09:14

## 2018-02-16 RX ADMIN — CARVEDILOL PHOSPHATE 3.12 MILLIGRAM(S): 80 CAPSULE, EXTENDED RELEASE ORAL at 21:08

## 2018-02-16 RX ADMIN — Medication 650 MILLIGRAM(S): at 05:15

## 2018-02-16 RX ADMIN — Medication 2 MILLIGRAM(S): at 21:10

## 2018-02-16 RX ADMIN — LIDOCAINE 1 APPLICATION(S): 4 CREAM TOPICAL at 09:14

## 2018-02-16 RX ADMIN — Medication 1 APPLICATION(S): at 22:14

## 2018-02-16 RX ADMIN — CYCLOBENZAPRINE HYDROCHLORIDE 10 MILLIGRAM(S): 10 TABLET, FILM COATED ORAL at 21:09

## 2018-02-16 RX ADMIN — Medication 0.5 MILLIGRAM(S): at 15:33

## 2018-02-16 RX ADMIN — ZOLPIDEM TARTRATE 5 MILLIGRAM(S): 10 TABLET ORAL at 22:15

## 2018-02-16 RX ADMIN — GABAPENTIN 600 MILLIGRAM(S): 400 CAPSULE ORAL at 21:08

## 2018-02-16 RX ADMIN — CYCLOBENZAPRINE HYDROCHLORIDE 10 MILLIGRAM(S): 10 TABLET, FILM COATED ORAL at 12:21

## 2018-02-16 RX ADMIN — CARVEDILOL PHOSPHATE 3.12 MILLIGRAM(S): 80 CAPSULE, EXTENDED RELEASE ORAL at 09:14

## 2018-02-16 RX ADMIN — Medication 1 APPLICATION(S): at 09:15

## 2018-02-16 RX ADMIN — LIDOCAINE 1 APPLICATION(S): 4 CREAM TOPICAL at 05:15

## 2018-02-16 RX ADMIN — DULOXETINE HYDROCHLORIDE 60 MILLIGRAM(S): 30 CAPSULE, DELAYED RELEASE ORAL at 09:14

## 2018-02-16 NOTE — PROGRESS NOTE ADULT - SUBJECTIVE AND OBJECTIVE BOX
Patient is a 27y Female who reports no complaints overnight. No chest pain or SOB    REVIEW OF SYSTEMS:    CONSTITUTIONAL: No weakness, fevers or chills  RESPIRATORY: No cough, wheezing, hemoptysis; No shortness of breath  CARDIOVASCULAR: No chest pain or palpitations  GENITOURINARY: No dysuria, frequency or hematuria  All other review of systems is negative unless indicated above.    MEDICATIONS  (STANDING):  BACItracin   Ointment 1 Application(s) Topical two times a day  carvedilol 3.125 milliGRAM(s) Oral every 12 hours  cloNIDine 0.1 milliGRAM(s) Oral daily  DULoxetine 60 milliGRAM(s) Oral daily  gabapentin 600 milliGRAM(s) Oral at bedtime  hydrOXYzine hydrochloride 50 milliGRAM(s) Oral at bedtime  lamoTRIgine 25 milliGRAM(s) Oral daily  lidocaine   Patch 1 Patch Transdermal daily    MEDICATIONS  (PRN):  acetaminophen   Tablet 650 milliGRAM(s) Oral every 6 hours PRN pain  ALPRAZolam 0.5 milliGRAM(s) Oral two times a day PRN anxiety  cyclobenzaprine 10 milliGRAM(s) Oral three times a day PRN Muscle Spasm  haloperidol     Tablet 5 milliGRAM(s) Oral every 6 hours PRN agitation  haloperidol    Injectable 5 milliGRAM(s) IntraMuscular once PRN Severe Agitation  HYDROcodone  7.5 mG/acetaminophen 300 mG. 2 Tablet(s) Oral every 12 hours PRN Moderate Pain (4 - 6)  lidocaine 2% Gel 1 Application(s) Topical three times a day PRN pain  LORazepam     Tablet 2 milliGRAM(s) Oral every 8 hours PRN severe anxiety  LORazepam   Injectable 2 milliGRAM(s) IntraMuscular once PRN Severe Anxiety/Agitation  zolpidem 5 milliGRAM(s) Oral at bedtime PRN Insomnia  zolpidem 5 milliGRAM(s) Oral at bedtime PRN Insomnia        T(C): , Max: 37 (02-16-18 @ 07:48)  T(F): , Max: 98.6 (02-16-18 @ 07:48)  HR: 92 (02-16-18 @ 07:48)  BP: 144/76 (02-16-18 @ 07:48)  BP(mean): --  RR: 16 (02-16-18 @ 07:48)  SpO2: 98% (02-16-18 @ 07:48)  Wt(kg): --        PHYSICAL EXAM:    Constitutional: NAD,  HEENT: PERRLA, EOMI,  MMM  Neck: No LAD, No JVD  Respiratory: CTAB  Cardiovascular: S1 and S2, RRR  Gastrointestinal: BS+, soft, NT/ND  Extremities: No peripheral edema  Neurological: A/O x 3, no focal deficits  : No Alvares  Skin: No rashes  Access: Not applicable        LABS:    16 Feb 2018 07:44    138    |  102    |  9      ----------------------------<  89     3.9     |  29     |  0.79   15 Feb 2018 07:27    137    |  101    |  14     ----------------------------<  94     3.8     |  27     |  0.89   14 Feb 2018 09:04    139    |  105    |  9      ----------------------------<  123    4.3     |  27     |  0.93   13 Feb 2018 20:30    138    |  105    |  11     ----------------------------<  83     4.3     |  28     |  0.83     Ca    9.2        16 Feb 2018 07:44  Ca    9.1        15 Feb 2018 07:27  Ca    9.6        14 Feb 2018 09:04  Ca    9.6        13 Feb 2018 20:30    TPro  8.3    /  Alb  3.6    /  TBili  0.4    /  DBili  x      /  AST  71     /  ALT  76     /  AlkPhos  83     16 Feb 2018 07:44  TPro  8.3    /  Alb  3.6    /  TBili  0.6    /  DBili  x      /  AST  110    /  ALT  74     /  AlkPhos  84     15 Feb 2018 07:27  TPro  8.4    /  Alb  3.8    /  TBili  0.4    /  DBili  x      /  AST  41     /  ALT  52     /  AlkPhos  76     14 Feb 2018 09:04  TPro  8.3    /  Alb  3.6    /  TBili  0.3    /  DBili  x      /  AST  53     /  ALT  51     /  AlkPhos  78     13 Feb 2018 20:30          Urine Studies:          RADIOLOGY & ADDITIONAL STUDIES:

## 2018-02-16 NOTE — PROGRESS NOTE BEHAVIORAL HEALTH - NSBHFUPINTERVALCCFT_PSY_A_CORE
Patient reports feeling somewhat better today  Less suicidal thoughts.  Also complains of constipation.  More invested in leaving well this hospitalization.

## 2018-02-16 NOTE — PROGRESS NOTE ADULT - ASSESSMENT
27/F with PMHx of   of C.Diff , DDD,  Mixed connective tissue disorder, PCOS, Anxiety, Depression, secondary hypertension due to MCTD? , HTN, obesity seen in medical consult for     1) Drug Overdose with Lisinopril 400 mg:  monitor for now  BUN Cr stable;LFT trended down today monitor for now; check in am on 2/17  BP better controlled  lisinopril on hold sec to OD  renal consult called in with Dr. Clarence Peterson, discussed the case with him.    2) Uncontrolled HTN:  cont clonidine 0.1 mg po daily  added coreg 3.125 mg po q 12 hrs with holding parameters on 2/14; would titrate as needed for optimal BP control    3) Abd Skin cut: bacitracin topically    4) DDD + chronic pain + cont neurontin, flexeril, opiates, lidocaine patch, hydrocodone prn    5) Suicidal attempt/depression: as per Psych service    poc discussed with pt, team,  if 2/17/18 LFt further trends down will sign off

## 2018-02-16 NOTE — PROGRESS NOTE BEHAVIORAL HEALTH - NSBHFUPINTERVALHXFT_PSY_A_CORE
patient tends to fluctuate throughout the day.  Easily agitated when needs are not met, labile mood, somatic focus.  MEDICATIONS  (STANDING):  BACItracin   Ointment 1 Application(s) Topical two times a day  carvedilol 3.125 milliGRAM(s) Oral every 12 hours  cloNIDine 0.1 milliGRAM(s) Oral daily  docusate sodium 100 milliGRAM(s) Oral two times a day  DULoxetine 60 milliGRAM(s) Oral daily  gabapentin 600 milliGRAM(s) Oral at bedtime  hydrOXYzine hydrochloride 50 milliGRAM(s) Oral at bedtime  lamoTRIgine 25 milliGRAM(s) Oral daily  lidocaine   Patch 1 Patch Transdermal daily    MEDICATIONS  (PRN):  acetaminophen   Tablet 650 milliGRAM(s) Oral every 6 hours PRN pain  ALPRAZolam 0.5 milliGRAM(s) Oral two times a day PRN anxiety  cyclobenzaprine 10 milliGRAM(s) Oral three times a day PRN Muscle Spasm  haloperidol     Tablet 5 milliGRAM(s) Oral every 6 hours PRN agitation  haloperidol    Injectable 5 milliGRAM(s) IntraMuscular once PRN Severe Agitation  HYDROcodone  7.5 mG/acetaminophen 300 mG. 2 Tablet(s) Oral every 12 hours PRN Moderate Pain (4 - 6)  lidocaine 2% Gel 1 Application(s) Topical three times a day PRN pain  LORazepam     Tablet 2 milliGRAM(s) Oral every 8 hours PRN severe anxiety  LORazepam   Injectable 2 milliGRAM(s) IntraMuscular once PRN Severe Anxiety/Agitation  magnesium hydroxide Suspension 30 milliLiter(s) Oral daily PRN Constipation  zolpidem 5 milliGRAM(s) Oral at bedtime PRN Insomnia  zolpidem 5 milliGRAM(s) Oral at bedtime PRN Insomnia

## 2018-02-16 NOTE — PROGRESS NOTE ADULT - SUBJECTIVE AND OBJECTIVE BOX
c/c lisinopril overdose 400 mg    HPI: 27/F with PMHx of   of C.Diff , Mixed connective tissue disorder, PCOS, Anxiety, Depression, secondary hypertension due to MCTD? , HTN, obesity was admitted to psych service and seen in medical consult for Drug overdose with Lisinopril 400 mg and suicidal attempt. Pt states that she took 20 tabs of 20 mg of Lisinopril on 2/13 /18 at 6 pm. Her renal function, LFTs and BP has been stable so far. She also cut herself superficially on abdominal skin. She denies any chest pain/sob/abd pain at this time.    2/15: mild increase in AST   BP,  KFTs stable so far  no complaints     2/16- no complaints    Constitutional: Well appearing  HEENT: Atraumatic,      PHYSICAL EXAM:    Daily     Daily     ICU Vital Signs Last 24 Hrs  T(C): 37 (16 Feb 2018 07:48), Max: 37 (16 Feb 2018 07:48)  T(F): 98.6 (16 Feb 2018 07:48), Max: 98.6 (16 Feb 2018 07:48)  HR: 92 (16 Feb 2018 07:48) (92 - 100)  BP: 144/76 (16 Feb 2018 07:48) (140/82 - 144/76)  BP(mean): --  ABP: --  ABP(mean): --  RR: 16 (16 Feb 2018 07:48) (16 - 16)  SpO2: 98% (16 Feb 2018 07:48) (98% - 98%)              02-16    138  |  102  |  9   ----------------------------<  89  3.9   |  29  |  0.79    Ca    9.2      16 Feb 2018 07:44    TPro  8.3  /  Alb  3.6  /  TBili  0.4  /  DBili  x   /  AST  71<H>  /  ALT  76  /  AlkPhos  83  02-16      LIVER FUNCTIONS - ( 16 Feb 2018 07:44 )  Alb: 3.6 g/dL / Pro: 8.3 gm/dL / ALK PHOS: 83 U/L / ALT: 76 U/L / AST: 71 U/L / GGT: x                               MEDICATIONS  (STANDING):  BACItracin   Ointment 1 Application(s) Topical two times a day  carvedilol 3.125 milliGRAM(s) Oral every 12 hours  cloNIDine 0.1 milliGRAM(s) Oral daily  docusate sodium 100 milliGRAM(s) Oral two times a day  DULoxetine 60 milliGRAM(s) Oral daily  gabapentin 600 milliGRAM(s) Oral at bedtime  hydrOXYzine hydrochloride 50 milliGRAM(s) Oral at bedtime  lamoTRIgine 25 milliGRAM(s) Oral daily  lidocaine   Patch 1 Patch Transdermal daily

## 2018-02-16 NOTE — PROGRESS NOTE ADULT - ASSESSMENT
27y Female whom presented to the hospital with a h istory of depression, HTN, FSGS with proteinuria here with suicide attempt with taking multiple lisinopril T's.     FSGS  -Monitor urinalysis/protein:creatinine with outpatient follow up  -NSAID avoidance  -Ok to hold ace for now    SI  -Psych  -Toxicology was following as per ER notes. Renal function has been stable    HTN  -Clonidine resumed  -May need coreg increased to 6.25 mg po bid if bp not reaching goal    d/c with RN staff

## 2018-02-17 LAB
ALBUMIN SERPL ELPH-MCNC: 3.3 G/DL — SIGNIFICANT CHANGE UP (ref 3.3–5)
ALP SERPL-CCNC: 79 U/L — SIGNIFICANT CHANGE UP (ref 40–120)
ALT FLD-CCNC: 68 U/L — SIGNIFICANT CHANGE UP (ref 12–78)
ANION GAP SERPL CALC-SCNC: 6 MMOL/L — SIGNIFICANT CHANGE UP (ref 5–17)
AST SERPL-CCNC: 55 U/L — HIGH (ref 15–37)
BILIRUB SERPL-MCNC: 0.3 MG/DL — SIGNIFICANT CHANGE UP (ref 0.2–1.2)
BUN SERPL-MCNC: 9 MG/DL — SIGNIFICANT CHANGE UP (ref 7–23)
CALCIUM SERPL-MCNC: 9 MG/DL — SIGNIFICANT CHANGE UP (ref 8.5–10.1)
CHLORIDE SERPL-SCNC: 103 MMOL/L — SIGNIFICANT CHANGE UP (ref 96–108)
CO2 SERPL-SCNC: 29 MMOL/L — SIGNIFICANT CHANGE UP (ref 22–31)
CREAT SERPL-MCNC: 0.72 MG/DL — SIGNIFICANT CHANGE UP (ref 0.5–1.3)
GLUCOSE SERPL-MCNC: 85 MG/DL — SIGNIFICANT CHANGE UP (ref 70–99)
POTASSIUM SERPL-MCNC: 4.1 MMOL/L — SIGNIFICANT CHANGE UP (ref 3.5–5.3)
POTASSIUM SERPL-SCNC: 4.1 MMOL/L — SIGNIFICANT CHANGE UP (ref 3.5–5.3)
PROT SERPL-MCNC: 7.7 GM/DL — SIGNIFICANT CHANGE UP (ref 6–8.3)
SODIUM SERPL-SCNC: 138 MMOL/L — SIGNIFICANT CHANGE UP (ref 135–145)

## 2018-02-17 RX ORDER — LIDOCAINE 4 G/100G
1 CREAM TOPICAL
Qty: 0 | Refills: 0 | Status: DISCONTINUED | OUTPATIENT
Start: 2018-02-17 | End: 2018-02-17

## 2018-02-17 RX ORDER — LIDOCAINE 4 G/100G
1 CREAM TOPICAL THREE TIMES A DAY
Qty: 0 | Refills: 0 | Status: DISCONTINUED | OUTPATIENT
Start: 2018-02-17 | End: 2018-02-26

## 2018-02-17 RX ADMIN — GABAPENTIN 600 MILLIGRAM(S): 400 CAPSULE ORAL at 21:18

## 2018-02-17 RX ADMIN — CYCLOBENZAPRINE HYDROCHLORIDE 10 MILLIGRAM(S): 10 TABLET, FILM COATED ORAL at 15:38

## 2018-02-17 RX ADMIN — LIDOCAINE 1 APPLICATION(S): 4 CREAM TOPICAL at 16:21

## 2018-02-17 RX ADMIN — Medication 0.5 MILLIGRAM(S): at 16:20

## 2018-02-17 RX ADMIN — LIDOCAINE 1 PATCH: 4 CREAM TOPICAL at 21:38

## 2018-02-17 RX ADMIN — LIDOCAINE 1 APPLICATION(S): 4 CREAM TOPICAL at 23:18

## 2018-02-17 RX ADMIN — ZOLPIDEM TARTRATE 5 MILLIGRAM(S): 10 TABLET ORAL at 22:25

## 2018-02-17 RX ADMIN — LIDOCAINE 1 APPLICATION(S): 4 CREAM TOPICAL at 02:47

## 2018-02-17 RX ADMIN — Medication 650 MILLIGRAM(S): at 14:24

## 2018-02-17 RX ADMIN — CYCLOBENZAPRINE HYDROCHLORIDE 10 MILLIGRAM(S): 10 TABLET, FILM COATED ORAL at 02:45

## 2018-02-17 RX ADMIN — LIDOCAINE 1 APPLICATION(S): 4 CREAM TOPICAL at 09:04

## 2018-02-17 RX ADMIN — Medication 1 APPLICATION(S): at 21:23

## 2018-02-17 RX ADMIN — CARVEDILOL PHOSPHATE 3.12 MILLIGRAM(S): 80 CAPSULE, EXTENDED RELEASE ORAL at 09:03

## 2018-02-17 RX ADMIN — Medication 650 MILLIGRAM(S): at 02:25

## 2018-02-17 RX ADMIN — Medication 0.1 MILLIGRAM(S): at 21:20

## 2018-02-17 RX ADMIN — Medication 50 MILLIGRAM(S): at 21:18

## 2018-02-17 RX ADMIN — Medication 100 MILLIGRAM(S): at 09:03

## 2018-02-17 RX ADMIN — LAMOTRIGINE 25 MILLIGRAM(S): 25 TABLET, ORALLY DISINTEGRATING ORAL at 09:03

## 2018-02-17 RX ADMIN — Medication 0.5 MILLIGRAM(S): at 02:24

## 2018-02-17 RX ADMIN — CARVEDILOL PHOSPHATE 3.12 MILLIGRAM(S): 80 CAPSULE, EXTENDED RELEASE ORAL at 21:17

## 2018-02-17 RX ADMIN — LIDOCAINE 1 PATCH: 4 CREAM TOPICAL at 09:04

## 2018-02-17 RX ADMIN — Medication 2 MILLIGRAM(S): at 21:21

## 2018-02-17 RX ADMIN — Medication 100 MILLIGRAM(S): at 21:17

## 2018-02-17 RX ADMIN — DULOXETINE HYDROCHLORIDE 60 MILLIGRAM(S): 30 CAPSULE, DELAYED RELEASE ORAL at 09:04

## 2018-02-17 RX ADMIN — Medication 1 APPLICATION(S): at 10:43

## 2018-02-17 RX ADMIN — Medication 2 MILLIGRAM(S): at 09:03

## 2018-02-17 NOTE — PROGRESS NOTE BEHAVIORAL HEALTH - RISK ASSESSMENT
Risk Assessment  chronic  potential  risk of self harm due to poor coping skills and attention seeking behaviors , especially with mother

## 2018-02-17 NOTE — PROGRESS NOTE BEHAVIORAL HEALTH - NSBHFUPINTERVALCCFT_PSY_A_CORE
Patient had verbal confrontation with peer she knows from the community  became very upset, feeling angry at herself and empty and wonders if she needs residential treatment.  Woke up at 3AM

## 2018-02-17 NOTE — PROGRESS NOTE ADULT - SUBJECTIVE AND OBJECTIVE BOX
COVERING TIFFANY DR. HARRISON 2/17-19    27y Female whom presented to the hospital with a h istory of depression, HTN, FSGS with proteinuria here with suicide attempt with taking multiple lisinopril T's.     2/17 - no cp/sob, reports lower UOP, also admits to sipping on fluids throughout day, no edema    REVIEW OF SYSTEMS:    CONSTITUTIONAL: No weakness, fevers or chills  RESPIRATORY: No cough, wheezing, hemoptysis; No shortness of breath  CARDIOVASCULAR: No chest pain or palpitations  GENITOURINARY: No dysuria, frequency or hematuria  All other review of systems is negative unless indicated above.    MEDICATIONS  (STANDING):  BACItracin   Ointment 1 Application(s) Topical two times a day  carvedilol 3.125 milliGRAM(s) Oral every 12 hours  cloNIDine 0.1 milliGRAM(s) Oral daily  docusate sodium 100 milliGRAM(s) Oral two times a day  DULoxetine 60 milliGRAM(s) Oral daily  gabapentin 600 milliGRAM(s) Oral at bedtime  hydrOXYzine hydrochloride 50 milliGRAM(s) Oral at bedtime  lamoTRIgine 25 milliGRAM(s) Oral daily  lidocaine   Patch 1 Patch Transdermal daily      Vital Signs Last 24 Hrs  T(C): 37.1 (16 Feb 2018 20:37), Max: 37.1 (16 Feb 2018 20:37)  T(F): 98.8 (16 Feb 2018 20:37), Max: 98.8 (16 Feb 2018 20:37)  HR: 100 (16 Feb 2018 20:37) (100 - 100)  BP: 142/83 (17 Feb 2018 20:37) 137/87  BP(mean): --  RR: 16 (16 Feb 2018 20:37) (16 - 16)  SpO2: 98% (16 Feb 2018 20:37) (98% - 98%)  Daily     Daily   I&O's Summary    PHYSICAL EXAM:    Constitutional: NAD, awake  HEENT: PERRLA, EOMI,  MMM  Neck: No LAD, No JVD  Respiratory: CTAB, no wheeze or crackles  Cardiovascular: S1 and S2, RRR  Gastrointestinal: BS+, soft, NT/ND  Extremities: No peripheral edema  Neurological: A/O x 3, no focal deficits  : No Alvares  Skin: No rashes  Access: Not applicable        LABS:      138    |  103    |  9      ----------------------------<  85        17 Feb 2018 07:33  4.1     |  29     |  0.72     138    |  102    |  9      ----------------------------<  89        16 Feb 2018 07:44  3.9     |  29     |  0.79     137    |  101    |  14     ----------------------------<  94        15 Feb 2018 07:27  3.8     |  27     |  0.89     Ca    9.0        17 Feb 2018 07:33  Ca    9.2        16 Feb 2018 07:44        TPro  7.7    /  Alb  3.3    /  TBili  0.3    /        17 Feb 2018 07:33  DBili  x      /  AST  55     /  ALT  68     /  AlkPhos  79       TPro  8.3    /  Alb  3.6    /  TBili  0.4    /        16 Feb 2018 07:44  DBili  x      /  AST  71     /  ALT  76     /  AlkPhos  83         14 Feb 2018 09:04    139    |  105    |  9      ----------------------------<  123    4.3     |  27     |  0.93   13 Feb 2018 20:30    138    |  105    |  11     ----------------------------<  83     4.3     |  28     |  0.83     Ca    9.2        16 Feb 2018 07:44  Ca    9.1        15 Feb 2018 07:27  Ca    9.6        14 Feb 2018 09:04  Ca    9.6        13 Feb 2018 20:30    TPro  8.3    /  Alb  3.6    /  TBili  0.4    /  DBili  x      /  AST  71     /  ALT  76     /  AlkPhos  83     16 Feb 2018 07:44  TPro  8.3    /  Alb  3.6    /  TBili  0.6    /  DBili  x      /  AST  110    /  ALT  74     /  AlkPhos  84     15 Feb 2018 07:27  TPro  8.4    /  Alb  3.8    /  TBili  0.4    /  DBili  x      /  AST  41     /  ALT  52     /  AlkPhos  76     14 Feb 2018 09:04  TPro  8.3    /  Alb  3.6    /  TBili  0.3    /  DBili  x      /  AST  53     /  ALT  51     /  AlkPhos  78     13 Feb 2018 20:30          Urine Studies:          RADIOLOGY & ADDITIONAL STUDIES:

## 2018-02-17 NOTE — PROGRESS NOTE BEHAVIORAL HEALTH - NSBHFUPINTERVALHXFT_PSY_A_CORE
patient continues to fluctuate-  affect is labile  Difficulty interacting with peers, often with complaints of pain    MEDICATIONS  (STANDING):  BACItracin   Ointment 1 Application(s) Topical two times a day  carvedilol 3.125 milliGRAM(s) Oral every 12 hours  cloNIDine 0.1 milliGRAM(s) Oral daily  docusate sodium 100 milliGRAM(s) Oral two times a day  DULoxetine 60 milliGRAM(s) Oral daily  gabapentin 600 milliGRAM(s) Oral at bedtime  hydrOXYzine hydrochloride 50 milliGRAM(s) Oral at bedtime  lamoTRIgine 25 milliGRAM(s) Oral daily  lidocaine   Patch 1 Patch Transdermal daily    MEDICATIONS  (PRN):  acetaminophen   Tablet 650 milliGRAM(s) Oral every 6 hours PRN pain  ALPRAZolam 0.5 milliGRAM(s) Oral two times a day PRN anxiety  cyclobenzaprine 10 milliGRAM(s) Oral three times a day PRN Muscle Spasm  haloperidol     Tablet 5 milliGRAM(s) Oral every 6 hours PRN agitation  haloperidol    Injectable 5 milliGRAM(s) IntraMuscular once PRN Severe Agitation  HYDROcodone  7.5 mG/acetaminophen 300 mG. 2 Tablet(s) Oral every 12 hours PRN Moderate Pain (4 - 6)  lidocaine 2% Gel 1 Application(s) Topical three times a day PRN pain  LORazepam     Tablet 2 milliGRAM(s) Oral every 8 hours PRN severe anxiety  LORazepam   Injectable 2 milliGRAM(s) IntraMuscular once PRN Severe Anxiety/Agitation  magnesium hydroxide Suspension 30 milliLiter(s) Oral daily PRN Constipation  zolpidem 5 milliGRAM(s) Oral at bedtime PRN Insomnia  zolpidem 5 milliGRAM(s) Oral at bedtime PRN Insomnia

## 2018-02-17 NOTE — PROGRESS NOTE ADULT - ASSESSMENT
27y Female whom presented to the hospital with a history of depression, HTN, FSGS with proteinuria here with suicide attempt with taking multiple lisinopril T's.     FSGS  -Monitor urinalysis/protein:creatinine with outpatient follow up  -NSAID avoidance  -Ok to hold ace for now    SI  -Psych  -Toxicology was following as per ER notes. Renal function has been stable    HTN  -Clonidine resumed - BP improved today, will monitor daily  -May need coreg increased to 6.25 mg po bid if bp not reaching goal    d/c with RN staff

## 2018-02-18 RX ORDER — OLANZAPINE 15 MG/1
10 TABLET, FILM COATED ORAL ONCE
Qty: 0 | Refills: 0 | Status: DISCONTINUED | OUTPATIENT
Start: 2018-02-18 | End: 2018-02-26

## 2018-02-18 RX ORDER — CARVEDILOL PHOSPHATE 80 MG/1
6.25 CAPSULE, EXTENDED RELEASE ORAL EVERY 12 HOURS
Qty: 0 | Refills: 0 | Status: DISCONTINUED | OUTPATIENT
Start: 2018-02-18 | End: 2018-02-26

## 2018-02-18 RX ORDER — OLANZAPINE 15 MG/1
10 TABLET, FILM COATED ORAL ONCE
Qty: 0 | Refills: 0 | Status: COMPLETED | OUTPATIENT
Start: 2018-02-18 | End: 2018-02-18

## 2018-02-18 RX ORDER — ALPRAZOLAM 0.25 MG
0.5 TABLET ORAL
Qty: 0 | Refills: 0 | Status: DISCONTINUED | OUTPATIENT
Start: 2018-02-18 | End: 2018-02-23

## 2018-02-18 RX ORDER — CHOLECALCIFEROL (VITAMIN D3) 125 MCG
2000 CAPSULE ORAL DAILY
Qty: 0 | Refills: 0 | Status: DISCONTINUED | OUTPATIENT
Start: 2018-02-19 | End: 2018-02-26

## 2018-02-18 RX ORDER — ZOLPIDEM TARTRATE 10 MG/1
5 TABLET ORAL AT BEDTIME
Qty: 0 | Refills: 0 | Status: DISCONTINUED | OUTPATIENT
Start: 2018-02-18 | End: 2018-02-23

## 2018-02-18 RX ADMIN — ZOLPIDEM TARTRATE 5 MILLIGRAM(S): 10 TABLET ORAL at 22:15

## 2018-02-18 RX ADMIN — LIDOCAINE 1 PATCH: 4 CREAM TOPICAL at 08:47

## 2018-02-18 RX ADMIN — Medication 50 MILLIGRAM(S): at 21:06

## 2018-02-18 RX ADMIN — Medication 0.5 MILLIGRAM(S): at 06:27

## 2018-02-18 RX ADMIN — Medication 100 MILLIGRAM(S): at 21:06

## 2018-02-18 RX ADMIN — CARVEDILOL PHOSPHATE 3.12 MILLIGRAM(S): 80 CAPSULE, EXTENDED RELEASE ORAL at 08:46

## 2018-02-18 RX ADMIN — Medication 1 APPLICATION(S): at 08:51

## 2018-02-18 RX ADMIN — Medication 0.1 MILLIGRAM(S): at 21:05

## 2018-02-18 RX ADMIN — LIDOCAINE 1 APPLICATION(S): 4 CREAM TOPICAL at 23:24

## 2018-02-18 RX ADMIN — Medication 2 MILLIGRAM(S): at 21:07

## 2018-02-18 RX ADMIN — Medication 650 MILLIGRAM(S): at 15:56

## 2018-02-18 RX ADMIN — Medication 1 APPLICATION(S): at 21:05

## 2018-02-18 RX ADMIN — CARVEDILOL PHOSPHATE 6.25 MILLIGRAM(S): 80 CAPSULE, EXTENDED RELEASE ORAL at 21:19

## 2018-02-18 RX ADMIN — Medication 100 MILLIGRAM(S): at 08:45

## 2018-02-18 RX ADMIN — CYCLOBENZAPRINE HYDROCHLORIDE 10 MILLIGRAM(S): 10 TABLET, FILM COATED ORAL at 21:06

## 2018-02-18 RX ADMIN — DULOXETINE HYDROCHLORIDE 60 MILLIGRAM(S): 30 CAPSULE, DELAYED RELEASE ORAL at 08:46

## 2018-02-18 RX ADMIN — CYCLOBENZAPRINE HYDROCHLORIDE 10 MILLIGRAM(S): 10 TABLET, FILM COATED ORAL at 05:10

## 2018-02-18 RX ADMIN — Medication 650 MILLIGRAM(S): at 08:45

## 2018-02-18 RX ADMIN — GABAPENTIN 600 MILLIGRAM(S): 400 CAPSULE ORAL at 21:06

## 2018-02-18 RX ADMIN — OLANZAPINE 10 MILLIGRAM(S): 15 TABLET, FILM COATED ORAL at 11:39

## 2018-02-18 RX ADMIN — LAMOTRIGINE 25 MILLIGRAM(S): 25 TABLET, ORALLY DISINTEGRATING ORAL at 08:45

## 2018-02-18 RX ADMIN — LIDOCAINE 1 APPLICATION(S): 4 CREAM TOPICAL at 08:46

## 2018-02-18 RX ADMIN — LIDOCAINE 1 PATCH: 4 CREAM TOPICAL at 21:04

## 2018-02-18 NOTE — PROGRESS NOTE ADULT - SUBJECTIVE AND OBJECTIVE BOX
COVERING FOR DR. HARRISON 2/17-19    27y Female whom presented to the hospital with a h istory of depression, HTN, FSGS with proteinuria here with suicide attempt with taking multiple lisinopril T's.     2/17 - no cp/sob, reports lower UOP, also admits to sipping on fluids throughout day, no edema  2/18 - agitated in AM, banging head on wall, went to quite room, seen as she was coming out of quiet room, did not want to be examined    REVIEW OF SYSTEMS:    CONSTITUTIONAL: No weakness, fevers or chills  RESPIRATORY: No cough, wheezing, hemoptysis; No shortness of breath  CARDIOVASCULAR: No chest pain or palpitations  GENITOURINARY: No dysuria, frequency or hematuria  All other review of systems is negative unless indicated above.    MEDICATIONS  (STANDING):  BACItracin   Ointment 1 Application(s) Topical two times a day  carvedilol 3.125 milliGRAM(s) Oral every 12 hours  cloNIDine 0.1 milliGRAM(s) Oral daily  docusate sodium 100 milliGRAM(s) Oral two times a day  DULoxetine 60 milliGRAM(s) Oral daily  gabapentin 600 milliGRAM(s) Oral at bedtime  hydrOXYzine hydrochloride 50 milliGRAM(s) Oral at bedtime  lamoTRIgine 25 milliGRAM(s) Oral daily  lidocaine   Patch 1 Patch Transdermal daily      Vital Signs Last 24 Hrs  T(C): 37.1 (18 Feb 2018 09:05), Max: 37.1 (18 Feb 2018 09:05)  T(F): 98.7 (18 Feb 2018 09:05), Max: 98.7 (18 Feb 2018 09:05)  HR: 87 (18 Feb 2018 09:05) (87 - 100)  BP: 143/90 (18 Feb 2018 09:05) (143/90 - 145/90)  BP(mean): --  RR: 14 (18 Feb 2018 09:05) (14 - 14)  SpO2: 99% (18 Feb 2018 09:05) (99% - 99%)    PHYSICAL EXAM: did not examine due to patient's request as coming out of quiet room    Constitutional: agitated, nonverbal  HEENT: MMM  Extremities: No peripheral edema  Neurological: awake  : No Alvares  Skin: No rashes  Access: Not applicable        LABS:        138    |  103    |  9      ----------------------------<  85        17 Feb 2018 07:33  4.1     |  29     |  0.72     138    |  102    |  9      ----------------------------<  89        16 Feb 2018 07:44  3.9     |  29     |  0.79     137    |  101    |  14     ----------------------------<  94        15 Feb 2018 07:27  3.8     |  27     |  0.89     Ca    9.0        17 Feb 2018 07:33  Ca    9.2        16 Feb 2018 07:44        TPro  7.7    /  Alb  3.3    /  TBili  0.3    /        17 Feb 2018 07:33  DBili  x      /  AST  55     /  ALT  68     /  AlkPhos  79       TPro  8.3    /  Alb  3.6    /  TBili  0.4    /        16 Feb 2018 07:44  DBili  x      /  AST  71     /  ALT  76     /  AlkPhos  83         14 Feb 2018 09:04    139    |  105    |  9      ----------------------------<  123    4.3     |  27     |  0.93   13 Feb 2018 20:30    138    |  105    |  11     ----------------------------<  83     4.3     |  28     |  0.83     Ca    9.2        16 Feb 2018 07:44  Ca    9.1        15 Feb 2018 07:27  Ca    9.6        14 Feb 2018 09:04  Ca    9.6        13 Feb 2018 20:30    TPro  8.3    /  Alb  3.6    /  TBili  0.4    /  DBili  x      /  AST  71     /  ALT  76     /  AlkPhos  83     16 Feb 2018 07:44  TPro  8.3    /  Alb  3.6    /  TBili  0.6    /  DBili  x      /  AST  110    /  ALT  74     /  AlkPhos  84     15 Feb 2018 07:27  TPro  8.4    /  Alb  3.8    /  TBili  0.4    /  DBili  x      /  AST  41     /  ALT  52     /  AlkPhos  76     14 Feb 2018 09:04  TPro  8.3    /  Alb  3.6    /  TBili  0.3    /  DBili  x      /  AST  53     /  ALT  51     /  AlkPhos  78     13 Feb 2018 20:30          Urine Studies:          RADIOLOGY & ADDITIONAL STUDIES:

## 2018-02-18 NOTE — PROGRESS NOTE BEHAVIORAL HEALTH - NSBHFUPINTERVALCCFT_PSY_A_CORE
Patient became increasingly agitated attempting to open doors to get off unit.  Banged head on wall when attempts made to redirect her.  Was redirected to quiet room and was pounding on door and IM medication needed for patient's own safety

## 2018-02-18 NOTE — PROGRESS NOTE BEHAVIORAL HEALTH - NSBHFUPINTERVALHXFT_PSY_A_CORE
More agitated today, needed Im medication as above for her own safety  Continues to have extremely poor frustration tolerance and lack of coping skills.    MEDICATIONS  (STANDING):  BACItracin   Ointment 1 Application(s) Topical two times a day  carvedilol 6.25 milliGRAM(s) Oral every 12 hours  cloNIDine 0.1 milliGRAM(s) Oral daily  docusate sodium 100 milliGRAM(s) Oral two times a day  DULoxetine 60 milliGRAM(s) Oral daily  gabapentin 600 milliGRAM(s) Oral at bedtime  hydrOXYzine hydrochloride 50 milliGRAM(s) Oral at bedtime  lamoTRIgine 25 milliGRAM(s) Oral daily  lidocaine   Patch 1 Patch Transdermal daily  OLANZapine Injectable 10 milliGRAM(s) IntraMuscular once    MEDICATIONS  (PRN):  acetaminophen   Tablet 650 milliGRAM(s) Oral every 6 hours PRN pain  ALPRAZolam 0.5 milliGRAM(s) Oral two times a day PRN anxiety  cyclobenzaprine 10 milliGRAM(s) Oral three times a day PRN Muscle Spasm  haloperidol     Tablet 5 milliGRAM(s) Oral every 6 hours PRN agitation  haloperidol    Injectable 5 milliGRAM(s) IntraMuscular once PRN Severe Agitation  HYDROcodone  7.5 mG/acetaminophen 300 mG. 2 Tablet(s) Oral every 8 hours PRN Moderate Pain (4 - 6)  lidocaine 2% Gel 1 Application(s) Topical three times a day PRN pain  lidocaine 5% Ointment 1 Application(s) Topical three times a day PRN pain  LORazepam     Tablet 2 milliGRAM(s) Oral every 8 hours PRN severe anxiety  LORazepam   Injectable 2 milliGRAM(s) IntraMuscular once PRN Severe Anxiety/Agitation  magnesium hydroxide Suspension 30 milliLiter(s) Oral daily PRN Constipation  zolpidem 5 milliGRAM(s) Oral at bedtime PRN Insomnia  zolpidem 5 milliGRAM(s) Oral at bedtime PRN Insomnia

## 2018-02-18 NOTE — PROGRESS NOTE ADULT - ASSESSMENT
27y Female whom presented to the hospital with a history of depression, HTN, FSGS with proteinuria here with suicide attempt with taking multiple lisinopril T's.     FSGS  -Monitor urinalysis/protein:creatinine with outpatient follow up  -NSAID avoidance  -Ok to hold ace for now    SI  -Psych  -Toxicology was following as per ER notes. Renal function has been stable    HTN  -Clonidine resumed - BP remains suboptimal with high/normal HR  -Increase Coreg to 6.25 mg po bid     d/c with RN staff  Dr. Peterson to resume care 2/20

## 2018-02-19 RX ORDER — ACETAMINOPHEN 500 MG
650 TABLET ORAL ONCE
Qty: 0 | Refills: 0 | Status: COMPLETED | OUTPATIENT
Start: 2018-02-19 | End: 2018-02-19

## 2018-02-19 RX ADMIN — CARVEDILOL PHOSPHATE 6.25 MILLIGRAM(S): 80 CAPSULE, EXTENDED RELEASE ORAL at 08:42

## 2018-02-19 RX ADMIN — Medication 650 MILLIGRAM(S): at 13:09

## 2018-02-19 RX ADMIN — GABAPENTIN 600 MILLIGRAM(S): 400 CAPSULE ORAL at 21:21

## 2018-02-19 RX ADMIN — Medication 650 MILLIGRAM(S): at 16:55

## 2018-02-19 RX ADMIN — LIDOCAINE 1 APPLICATION(S): 4 CREAM TOPICAL at 22:21

## 2018-02-19 RX ADMIN — Medication 1 APPLICATION(S): at 21:19

## 2018-02-19 RX ADMIN — LIDOCAINE 1 PATCH: 4 CREAM TOPICAL at 19:26

## 2018-02-19 RX ADMIN — DULOXETINE HYDROCHLORIDE 60 MILLIGRAM(S): 30 CAPSULE, DELAYED RELEASE ORAL at 08:44

## 2018-02-19 RX ADMIN — Medication 650 MILLIGRAM(S): at 15:50

## 2018-02-19 RX ADMIN — Medication 0.5 MILLIGRAM(S): at 23:23

## 2018-02-19 RX ADMIN — Medication 100 MILLIGRAM(S): at 21:20

## 2018-02-19 RX ADMIN — Medication 100 MILLIGRAM(S): at 08:43

## 2018-02-19 RX ADMIN — Medication 1 APPLICATION(S): at 08:42

## 2018-02-19 RX ADMIN — Medication 2 MILLIGRAM(S): at 21:21

## 2018-02-19 RX ADMIN — MAGNESIUM HYDROXIDE 30 MILLILITER(S): 400 TABLET, CHEWABLE ORAL at 18:13

## 2018-02-19 RX ADMIN — CYCLOBENZAPRINE HYDROCHLORIDE 10 MILLIGRAM(S): 10 TABLET, FILM COATED ORAL at 21:21

## 2018-02-19 RX ADMIN — Medication 2000 UNIT(S): at 08:42

## 2018-02-19 RX ADMIN — Medication 650 MILLIGRAM(S): at 03:51

## 2018-02-19 RX ADMIN — LIDOCAINE 1 PATCH: 4 CREAM TOPICAL at 08:44

## 2018-02-19 RX ADMIN — CARVEDILOL PHOSPHATE 6.25 MILLIGRAM(S): 80 CAPSULE, EXTENDED RELEASE ORAL at 21:18

## 2018-02-19 RX ADMIN — LAMOTRIGINE 25 MILLIGRAM(S): 25 TABLET, ORALLY DISINTEGRATING ORAL at 08:43

## 2018-02-19 RX ADMIN — LIDOCAINE 1 APPLICATION(S): 4 CREAM TOPICAL at 08:41

## 2018-02-19 RX ADMIN — Medication 50 MILLIGRAM(S): at 21:21

## 2018-02-19 RX ADMIN — Medication 0.5 MILLIGRAM(S): at 09:29

## 2018-02-19 RX ADMIN — ZOLPIDEM TARTRATE 5 MILLIGRAM(S): 10 TABLET ORAL at 22:20

## 2018-02-19 RX ADMIN — CYCLOBENZAPRINE HYDROCHLORIDE 10 MILLIGRAM(S): 10 TABLET, FILM COATED ORAL at 11:55

## 2018-02-19 NOTE — PROGRESS NOTE BEHAVIORAL HEALTH - NSBHCHARTREVIEWINVESTIGATE_PSY_A_CORE FT
QRS axis to [] ° and NSR at a rate of [] BPM. There was no atrial enlargement. There was no ventricular hypertrophy. There were no ST-T changes and all intervals were normal.

## 2018-02-19 NOTE — PROGRESS NOTE BEHAVIORAL HEALTH - NSBHFUPINTERVALHXFT_PSY_A_CORE
Pt is less agitated and is resting in bed. Pt claims " I am in pain most of the time. I have to learn how to conserve my energy ."  Pt with no other cc at this time and is resting quietly.     MEDICATIONS  (STANDING):  BACItracin   Ointment 1 Application(s) Topical two times a day  carvedilol 6.25 milliGRAM(s) Oral every 12 hours  cholecalciferol 2000 Unit(s) Oral daily  cloNIDine 0.1 milliGRAM(s) Oral daily  docusate sodium 100 milliGRAM(s) Oral two times a day  DULoxetine 60 milliGRAM(s) Oral daily  gabapentin 600 milliGRAM(s) Oral at bedtime  hydrOXYzine hydrochloride 50 milliGRAM(s) Oral at bedtime  lamoTRIgine 25 milliGRAM(s) Oral daily  lidocaine   Patch 1 Patch Transdermal daily

## 2018-02-20 LAB
ALBUMIN SERPL ELPH-MCNC: 3.2 G/DL — LOW (ref 3.3–5)
ALP SERPL-CCNC: 71 U/L — SIGNIFICANT CHANGE UP (ref 40–120)
ALT FLD-CCNC: 49 U/L — SIGNIFICANT CHANGE UP (ref 12–78)
AST SERPL-CCNC: 40 U/L — HIGH (ref 15–37)
BILIRUB DIRECT SERPL-MCNC: <0.1 MG/DL — SIGNIFICANT CHANGE UP (ref 0–0.2)
BILIRUB INDIRECT FLD-MCNC: >0.2 MG/DL — SIGNIFICANT CHANGE UP (ref 0.2–1)
BILIRUB SERPL-MCNC: 0.3 MG/DL — SIGNIFICANT CHANGE UP (ref 0.2–1.2)
PROT SERPL-MCNC: 7.6 GM/DL — SIGNIFICANT CHANGE UP (ref 6–8.3)

## 2018-02-20 RX ADMIN — Medication 0.1 MILLIGRAM(S): at 22:20

## 2018-02-20 RX ADMIN — Medication 50 MILLIGRAM(S): at 22:18

## 2018-02-20 RX ADMIN — Medication 650 MILLIGRAM(S): at 10:42

## 2018-02-20 RX ADMIN — LIDOCAINE 1 APPLICATION(S): 4 CREAM TOPICAL at 23:38

## 2018-02-20 RX ADMIN — LIDOCAINE 1 APPLICATION(S): 4 CREAM TOPICAL at 10:44

## 2018-02-20 RX ADMIN — LIDOCAINE 1 APPLICATION(S): 4 CREAM TOPICAL at 06:01

## 2018-02-20 RX ADMIN — Medication 1 APPLICATION(S): at 08:23

## 2018-02-20 RX ADMIN — LIDOCAINE 1 PATCH: 4 CREAM TOPICAL at 20:48

## 2018-02-20 RX ADMIN — Medication 0.5 MILLIGRAM(S): at 22:17

## 2018-02-20 RX ADMIN — CYCLOBENZAPRINE HYDROCHLORIDE 10 MILLIGRAM(S): 10 TABLET, FILM COATED ORAL at 06:01

## 2018-02-20 RX ADMIN — Medication 100 MILLIGRAM(S): at 22:17

## 2018-02-20 RX ADMIN — Medication 100 MILLIGRAM(S): at 08:25

## 2018-02-20 RX ADMIN — CYCLOBENZAPRINE HYDROCHLORIDE 10 MILLIGRAM(S): 10 TABLET, FILM COATED ORAL at 11:32

## 2018-02-20 RX ADMIN — ZOLPIDEM TARTRATE 5 MILLIGRAM(S): 10 TABLET ORAL at 23:38

## 2018-02-20 RX ADMIN — CARVEDILOL PHOSPHATE 6.25 MILLIGRAM(S): 80 CAPSULE, EXTENDED RELEASE ORAL at 22:18

## 2018-02-20 RX ADMIN — CYCLOBENZAPRINE HYDROCHLORIDE 10 MILLIGRAM(S): 10 TABLET, FILM COATED ORAL at 23:38

## 2018-02-20 RX ADMIN — LIDOCAINE 1 PATCH: 4 CREAM TOPICAL at 08:25

## 2018-02-20 RX ADMIN — DULOXETINE HYDROCHLORIDE 60 MILLIGRAM(S): 30 CAPSULE, DELAYED RELEASE ORAL at 08:25

## 2018-02-20 RX ADMIN — LAMOTRIGINE 25 MILLIGRAM(S): 25 TABLET, ORALLY DISINTEGRATING ORAL at 08:25

## 2018-02-20 RX ADMIN — Medication 2000 UNIT(S): at 08:23

## 2018-02-20 RX ADMIN — CARVEDILOL PHOSPHATE 6.25 MILLIGRAM(S): 80 CAPSULE, EXTENDED RELEASE ORAL at 08:23

## 2018-02-20 RX ADMIN — Medication 1 APPLICATION(S): at 22:17

## 2018-02-20 RX ADMIN — GABAPENTIN 600 MILLIGRAM(S): 400 CAPSULE ORAL at 22:19

## 2018-02-20 RX ADMIN — Medication 5 MILLIGRAM(S): at 10:44

## 2018-02-20 RX ADMIN — Medication 0.5 MILLIGRAM(S): at 08:34

## 2018-02-20 RX ADMIN — Medication 650 MILLIGRAM(S): at 03:36

## 2018-02-20 NOTE — PROGRESS NOTE BEHAVIORAL HEALTH - NSBHFUPINTERVALHXFT_PSY_A_CORE
Patient in better control today but remains highly reactive, prone to mood swings and anger.  Continues to be somatically focused  Family meeting tomorrow for discharge planning      MEDICATIONS  (STANDING):  BACItracin   Ointment 1 Application(s) Topical two times a day  carvedilol 6.25 milliGRAM(s) Oral every 12 hours  cholecalciferol 2000 Unit(s) Oral daily  cloNIDine 0.1 milliGRAM(s) Oral daily  docusate sodium 100 milliGRAM(s) Oral two times a day  DULoxetine 60 milliGRAM(s) Oral daily  gabapentin 600 milliGRAM(s) Oral at bedtime  hydrOXYzine hydrochloride 50 milliGRAM(s) Oral at bedtime  lamoTRIgine 25 milliGRAM(s) Oral daily  lidocaine   Patch 1 Patch Transdermal daily

## 2018-02-20 NOTE — PROGRESS NOTE ADULT - SUBJECTIVE AND OBJECTIVE BOX
Patient is a 27y Female who reports no complaints overnight. She feels mild edema to ankels    REVIEW OF SYSTEMS:    CONSTITUTIONAL: No weakness, fevers or chills  RESPIRATORY: No cough, wheezing, hemoptysis; No shortness of breath  CARDIOVASCULAR: No chest pain or palpitations  GENITOURINARY: No dysuria, frequency or hematuria  All other review of systems is negative unless indicated above.    MEDICATIONS  (STANDING):  BACItracin   Ointment 1 Application(s) Topical two times a day  carvedilol 6.25 milliGRAM(s) Oral every 12 hours  cholecalciferol 2000 Unit(s) Oral daily  cloNIDine 0.1 milliGRAM(s) Oral daily  docusate sodium 100 milliGRAM(s) Oral two times a day  DULoxetine 60 milliGRAM(s) Oral daily  gabapentin 600 milliGRAM(s) Oral at bedtime  hydrOXYzine hydrochloride 50 milliGRAM(s) Oral at bedtime  lamoTRIgine 25 milliGRAM(s) Oral daily  lidocaine   Patch 1 Patch Transdermal daily    MEDICATIONS  (PRN):  acetaminophen   Tablet 650 milliGRAM(s) Oral every 6 hours PRN pain  ALPRAZolam 0.5 milliGRAM(s) Oral two times a day PRN anxiety  bisacodyl 5 milliGRAM(s) Oral every 12 hours PRN Constipation  cyclobenzaprine 10 milliGRAM(s) Oral three times a day PRN Muscle Spasm  haloperidol     Tablet 5 milliGRAM(s) Oral every 6 hours PRN agitation  haloperidol    Injectable 5 milliGRAM(s) IntraMuscular once PRN Severe Agitation  HYDROcodone  7.5 mG/acetaminophen 300 mG. 2 Tablet(s) Oral every 8 hours PRN Moderate Pain (4 - 6)  lidocaine 2% Gel 1 Application(s) Topical three times a day PRN pain  lidocaine 5% Ointment 1 Application(s) Topical three times a day PRN pain  LORazepam     Tablet 2 milliGRAM(s) Oral every 8 hours PRN severe anxiety  LORazepam   Injectable 2 milliGRAM(s) IntraMuscular once PRN Severe Anxiety/Agitation  magnesium hydroxide Suspension 30 milliLiter(s) Oral daily PRN Constipation  OLANZapine Injectable 10 milliGRAM(s) IntraMuscular once PRN agitation  zolpidem 5 milliGRAM(s) Oral at bedtime PRN Insomnia  zolpidem 5 milliGRAM(s) Oral at bedtime PRN Insomnia        T(C): , Max: 37.4 (02-20-18 @ 07:25)  T(F): , Max: 99.4 (02-20-18 @ 07:25)  HR: 84 (02-20-18 @ 07:25)  BP: 141/88 (02-20-18 @ 07:25)  BP(mean): --  RR: 16 (02-20-18 @ 07:25)  SpO2: 99% (02-20-18 @ 07:25)  Wt(kg): --        PHYSICAL EXAM:    Constitutional: NAD,   HEENT: PERRLA, EOMI,  MMM  Neck: No LAD, No JVD  Respiratory: CTAB  Cardiovascular: S1 and S2, RRR  Gastrointestinal: BS+, soft, NT/ND  Extremities: tr to no peripheral edema  Neurological: A/O x 3, no focal deficits  Psychiatric: Normal mood, normal affect  : No Alvares  Skin: No rashes  Access: Not applicable        LABS:    17 Feb 2018 07:33    138    |  103    |  9      ----------------------------<  85     4.1     |  29     |  0.72     Ca    9.0        17 Feb 2018 07:33    TPro  7.6    /  Alb  3.2    /  TBili  0.3    /  DBili  <0.1   /  AST  40     /  ALT  49     /  AlkPhos  71     20 Feb 2018 06:53  TPro  7.7    /  Alb  3.3    /  TBili  0.3    /  DBili  x      /  AST  55     /  ALT  68     /  AlkPhos  79     17 Feb 2018 07:33          Urine Studies:          RADIOLOGY & ADDITIONAL STUDIES:

## 2018-02-20 NOTE — PROGRESS NOTE ADULT - ASSESSMENT
27y Female whom presented to the hospital with a history of depression, HTN, FSGS with proteinuria here with suicide attempt with taking multiple lisinopril T's.     FSGS  -NSAID avoidance  -Ok to hold ace for now    SI  -Psych    HTN  -Clonidine   - Coreg keep at 6.25 mg po bid, doubt BBL playing a role in edema (none noticeable clinically)  -Maintain current anti-htns    d/c with staff

## 2018-02-21 RX ADMIN — LIDOCAINE 1 PATCH: 4 CREAM TOPICAL at 21:42

## 2018-02-21 RX ADMIN — Medication 1 APPLICATION(S): at 21:36

## 2018-02-21 RX ADMIN — CYCLOBENZAPRINE HYDROCHLORIDE 10 MILLIGRAM(S): 10 TABLET, FILM COATED ORAL at 21:35

## 2018-02-21 RX ADMIN — Medication 1 APPLICATION(S): at 10:40

## 2018-02-21 RX ADMIN — Medication 0.1 MILLIGRAM(S): at 20:55

## 2018-02-21 RX ADMIN — Medication 2 MILLIGRAM(S): at 02:20

## 2018-02-21 RX ADMIN — Medication 2000 UNIT(S): at 10:37

## 2018-02-21 RX ADMIN — GABAPENTIN 600 MILLIGRAM(S): 400 CAPSULE ORAL at 21:35

## 2018-02-21 RX ADMIN — CARVEDILOL PHOSPHATE 6.25 MILLIGRAM(S): 80 CAPSULE, EXTENDED RELEASE ORAL at 10:38

## 2018-02-21 RX ADMIN — Medication 50 MILLIGRAM(S): at 21:35

## 2018-02-21 RX ADMIN — Medication 650 MILLIGRAM(S): at 23:40

## 2018-02-21 RX ADMIN — Medication 100 MILLIGRAM(S): at 21:35

## 2018-02-21 RX ADMIN — LIDOCAINE 1 APPLICATION(S): 4 CREAM TOPICAL at 17:21

## 2018-02-21 RX ADMIN — LIDOCAINE 1 PATCH: 4 CREAM TOPICAL at 10:35

## 2018-02-21 RX ADMIN — Medication 650 MILLIGRAM(S): at 16:32

## 2018-02-21 RX ADMIN — LAMOTRIGINE 25 MILLIGRAM(S): 25 TABLET, ORALLY DISINTEGRATING ORAL at 10:38

## 2018-02-21 RX ADMIN — ZOLPIDEM TARTRATE 5 MILLIGRAM(S): 10 TABLET ORAL at 22:33

## 2018-02-21 RX ADMIN — Medication 2 MILLIGRAM(S): at 21:39

## 2018-02-21 RX ADMIN — DULOXETINE HYDROCHLORIDE 60 MILLIGRAM(S): 30 CAPSULE, DELAYED RELEASE ORAL at 10:37

## 2018-02-21 RX ADMIN — Medication 0.5 MILLIGRAM(S): at 12:49

## 2018-02-21 RX ADMIN — Medication 100 MILLIGRAM(S): at 10:38

## 2018-02-21 RX ADMIN — Medication 650 MILLIGRAM(S): at 02:19

## 2018-02-21 RX ADMIN — CARVEDILOL PHOSPHATE 6.25 MILLIGRAM(S): 80 CAPSULE, EXTENDED RELEASE ORAL at 20:54

## 2018-02-21 NOTE — PROGRESS NOTE BEHAVIORAL HEALTH - NSBHFUPINTERVALHXFT_PSY_A_CORE
patient remains somatically focused nut somewhat less easily emotionally dysregulated and less depressed.  More interactive with selected peers    MEDICATIONS  (STANDING):  BACItracin   Ointment 1 Application(s) Topical two times a day  carvedilol 6.25 milliGRAM(s) Oral every 12 hours  cholecalciferol 2000 Unit(s) Oral daily  cloNIDine 0.1 milliGRAM(s) Oral daily  docusate sodium 100 milliGRAM(s) Oral two times a day  DULoxetine 60 milliGRAM(s) Oral daily  gabapentin 600 milliGRAM(s) Oral at bedtime  hydrOXYzine hydrochloride 50 milliGRAM(s) Oral at bedtime  lamoTRIgine 25 milliGRAM(s) Oral daily  lidocaine   Patch 1 Patch Transdermal daily    MEDICATIONS  (PRN):  acetaminophen   Tablet 650 milliGRAM(s) Oral every 6 hours PRN pain  ALPRAZolam 0.5 milliGRAM(s) Oral two times a day PRN anxiety  bisacodyl 5 milliGRAM(s) Oral every 12 hours PRN Constipation  cyclobenzaprine 10 milliGRAM(s) Oral three times a day PRN Muscle Spasm  haloperidol     Tablet 5 milliGRAM(s) Oral every 6 hours PRN agitation  haloperidol    Injectable 5 milliGRAM(s) IntraMuscular once PRN Severe Agitation  HYDROcodone  7.5 mG/acetaminophen 300 mG. 2 Tablet(s) Oral every 8 hours PRN Moderate Pain (4 - 6)  lidocaine 2% Gel 1 Application(s) Topical three times a day PRN pain  lidocaine 5% Ointment 1 Application(s) Topical three times a day PRN pain  LORazepam     Tablet 2 milliGRAM(s) Oral every 8 hours PRN severe anxiety  LORazepam   Injectable 2 milliGRAM(s) IntraMuscular once PRN Severe Anxiety/Agitation  magnesium hydroxide Suspension 30 milliLiter(s) Oral daily PRN Constipation  OLANZapine Injectable 10 milliGRAM(s) IntraMuscular once PRN agitation  zolpidem 5 milliGRAM(s) Oral at bedtime PRN Insomnia  zolpidem 5 milliGRAM(s) Oral at bedtime PRN Insomnia

## 2018-02-21 NOTE — PROGRESS NOTE BEHAVIORAL HEALTH - NSBHFUPINTERVALCCFT_PSY_A_CORE
Patient reporting ambivalence about going away to residential program because of everything she may miss in the process  Also reports ankle swelling

## 2018-02-22 PROCEDURE — 93010 ELECTROCARDIOGRAM REPORT: CPT

## 2018-02-22 RX ADMIN — LIDOCAINE 1 PATCH: 4 CREAM TOPICAL at 21:09

## 2018-02-22 RX ADMIN — GABAPENTIN 600 MILLIGRAM(S): 400 CAPSULE ORAL at 21:25

## 2018-02-22 RX ADMIN — LIDOCAINE 1 APPLICATION(S): 4 CREAM TOPICAL at 02:29

## 2018-02-22 RX ADMIN — Medication 0.1 MILLIGRAM(S): at 21:24

## 2018-02-22 RX ADMIN — Medication 50 MILLIGRAM(S): at 21:25

## 2018-02-22 RX ADMIN — Medication 2000 UNIT(S): at 08:49

## 2018-02-22 RX ADMIN — Medication 1 APPLICATION(S): at 08:48

## 2018-02-22 RX ADMIN — ZOLPIDEM TARTRATE 5 MILLIGRAM(S): 10 TABLET ORAL at 22:21

## 2018-02-22 RX ADMIN — CARVEDILOL PHOSPHATE 6.25 MILLIGRAM(S): 80 CAPSULE, EXTENDED RELEASE ORAL at 08:50

## 2018-02-22 RX ADMIN — Medication 2 MILLIGRAM(S): at 13:33

## 2018-02-22 RX ADMIN — CARVEDILOL PHOSPHATE 6.25 MILLIGRAM(S): 80 CAPSULE, EXTENDED RELEASE ORAL at 21:24

## 2018-02-22 RX ADMIN — Medication 0.5 MILLIGRAM(S): at 02:24

## 2018-02-22 RX ADMIN — LIDOCAINE 1 APPLICATION(S): 4 CREAM TOPICAL at 23:24

## 2018-02-22 RX ADMIN — LIDOCAINE 1 PATCH: 4 CREAM TOPICAL at 09:30

## 2018-02-22 RX ADMIN — Medication 100 MILLIGRAM(S): at 08:51

## 2018-02-22 RX ADMIN — Medication 2 MILLIGRAM(S): at 21:41

## 2018-02-22 RX ADMIN — Medication 1 APPLICATION(S): at 21:24

## 2018-02-22 RX ADMIN — Medication 650 MILLIGRAM(S): at 13:33

## 2018-02-22 RX ADMIN — CYCLOBENZAPRINE HYDROCHLORIDE 10 MILLIGRAM(S): 10 TABLET, FILM COATED ORAL at 13:54

## 2018-02-22 RX ADMIN — Medication 30 MILLILITER(S): at 18:30

## 2018-02-22 RX ADMIN — LAMOTRIGINE 25 MILLIGRAM(S): 25 TABLET, ORALLY DISINTEGRATING ORAL at 08:50

## 2018-02-22 RX ADMIN — Medication 100 MILLIGRAM(S): at 21:25

## 2018-02-22 RX ADMIN — MAGNESIUM HYDROXIDE 30 MILLILITER(S): 400 TABLET, CHEWABLE ORAL at 23:24

## 2018-02-22 RX ADMIN — CYCLOBENZAPRINE HYDROCHLORIDE 10 MILLIGRAM(S): 10 TABLET, FILM COATED ORAL at 22:22

## 2018-02-22 RX ADMIN — Medication 0.5 MILLIGRAM(S): at 08:55

## 2018-02-22 RX ADMIN — DULOXETINE HYDROCHLORIDE 60 MILLIGRAM(S): 30 CAPSULE, DELAYED RELEASE ORAL at 08:51

## 2018-02-22 NOTE — PROGRESS NOTE BEHAVIORAL HEALTH - NSBHFUPINTERVALCCFT_PSY_A_CORE
Patient reports that she is concerned that she has put on 10 lbs in 4 days and that her ankles are swollen. Reports that shortly after she woke today, she felt dizzy with left sided chest pain. Currently, Pt   darrell Patient reports that she is concerned that she has put on 10 lbs in 4 days and that her ankles are swollen. Reports that shortly after she woke today, she felt dizzy with left sided chest pain radiating through the left arm. Reports that her sleeping still has not improved. Reports that her chronic back pain may also be contributing to her disrupted sleep.

## 2018-02-22 NOTE — PROGRESS NOTE BEHAVIORAL HEALTH - OTHER
Per patient she states she has lower leg weakness at times Per patient she states she has lower leg weakness at times yet normal gait was observed

## 2018-02-22 NOTE — PROGRESS NOTE BEHAVIORAL HEALTH - NSBHFUPINTERVALHXFT_PSY_A_CORE
Reports that her sleeping still has not improved. Pt states that she normally takes 10mg of Ambien at night and she is not receiving this . Stat EKG ordered  - NSR.   Consult for cardiology - consult for Md Kang called in.   Bilateral ankles assessed - +1 trace edema noted. Encouraged patient to elevate her feet when at rest.  Reviewed pain medication management and encouraged for the patient to request flexeril and sleeping aide at [facility]
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
## 2018-02-22 NOTE — PROGRESS NOTE BEHAVIORAL HEALTH - RISK ASSESSMENT
Risk Assessment  chronic  potential  risk of self harm due to poor coping skills and attention seeking behaviors , especially with mother Risk Assessment  chronic  potential  risk of self harm due to poor coping skills and attention seeking behaviors ,

## 2018-02-23 PROCEDURE — 93306 TTE W/DOPPLER COMPLETE: CPT | Mod: 26

## 2018-02-23 RX ORDER — LIDOCAINE 4 G/100G
1 CREAM TOPICAL
Qty: 1 | Refills: 0 | OUTPATIENT
Start: 2018-02-23

## 2018-02-23 RX ORDER — DULOXETINE HYDROCHLORIDE 30 MG/1
1 CAPSULE, DELAYED RELEASE ORAL
Qty: 15 | Refills: 1 | OUTPATIENT
Start: 2018-02-23 | End: 2018-03-24

## 2018-02-23 RX ORDER — HYDROXYZINE HCL 10 MG
0 TABLET ORAL
Qty: 0 | Refills: 0 | COMMUNITY

## 2018-02-23 RX ORDER — LAMOTRIGINE 25 MG/1
2 TABLET, ORALLY DISINTEGRATING ORAL
Qty: 30 | Refills: 1 | OUTPATIENT
Start: 2018-02-23 | End: 2018-03-24

## 2018-02-23 RX ORDER — ZOLPIDEM TARTRATE 10 MG/1
10 TABLET ORAL AT BEDTIME
Qty: 0 | Refills: 0 | Status: DISCONTINUED | OUTPATIENT
Start: 2018-02-23 | End: 2018-02-26

## 2018-02-23 RX ORDER — CHOLECALCIFEROL (VITAMIN D3) 125 MCG
2000 CAPSULE ORAL
Qty: 0 | Refills: 0 | COMMUNITY
Start: 2018-02-23

## 2018-02-23 RX ORDER — ACETAMINOPHEN 500 MG
2 TABLET ORAL
Qty: 0 | Refills: 0 | COMMUNITY
Start: 2018-02-23

## 2018-02-23 RX ORDER — ALPRAZOLAM 0.25 MG
1 TABLET ORAL
Qty: 304 | Refills: 0 | OUTPATIENT
Start: 2018-02-23 | End: 2018-07-24

## 2018-02-23 RX ORDER — LISINOPRIL 2.5 MG/1
20 TABLET ORAL
Qty: 0 | Refills: 0 | COMMUNITY

## 2018-02-23 RX ORDER — ALPRAZOLAM 0.25 MG
2 TABLET ORAL
Qty: 0 | Refills: 0 | DISCHARGE
Start: 2018-02-23 | End: 2018-03-09

## 2018-02-23 RX ORDER — LIDOCAINE 4 G/100G
1 CREAM TOPICAL
Qty: 1 | Refills: 0 | OUTPATIENT
Start: 2018-02-23 | End: 2018-03-24

## 2018-02-23 RX ORDER — BACITRACIN ZINC 500 UNIT/G
1 OINTMENT IN PACKET (EA) TOPICAL
Qty: 0 | Refills: 0 | Status: DISCONTINUED | OUTPATIENT
Start: 2018-02-23 | End: 2018-02-26

## 2018-02-23 RX ORDER — ALPRAZOLAM 0.25 MG
1 TABLET ORAL
Qty: 30 | Refills: 0
Start: 2018-02-23 | End: 2018-03-09

## 2018-02-23 RX ORDER — GABAPENTIN 400 MG/1
3 CAPSULE ORAL
Qty: 0 | Refills: 1 | COMMUNITY
Start: 2018-02-23 | End: 2018-03-24

## 2018-02-23 RX ORDER — GABAPENTIN 400 MG/1
2 CAPSULE ORAL
Qty: 30 | Refills: 1 | OUTPATIENT
Start: 2018-02-23 | End: 2018-03-24

## 2018-02-23 RX ORDER — CYCLOBENZAPRINE HYDROCHLORIDE 10 MG/1
1 TABLET, FILM COATED ORAL
Qty: 45 | Refills: 1 | OUTPATIENT
Start: 2018-02-23 | End: 2018-03-24

## 2018-02-23 RX ORDER — BACITRACIN ZINC 500 UNIT/G
1 OINTMENT IN PACKET (EA) TOPICAL
Qty: 0 | Refills: 0 | COMMUNITY
Start: 2018-02-23

## 2018-02-23 RX ORDER — ALPRAZOLAM 0.25 MG
0.5 TABLET ORAL
Qty: 0 | Refills: 0 | Status: DISCONTINUED | OUTPATIENT
Start: 2018-02-23 | End: 2018-02-26

## 2018-02-23 RX ORDER — ZOLPIDEM TARTRATE 10 MG/1
1 TABLET ORAL
Qty: 15 | Refills: 1 | OUTPATIENT
Start: 2018-02-23 | End: 2018-03-24

## 2018-02-23 RX ORDER — FUROSEMIDE 40 MG
20 TABLET ORAL ONCE
Qty: 0 | Refills: 0 | Status: COMPLETED | OUTPATIENT
Start: 2018-02-23 | End: 2018-02-23

## 2018-02-23 RX ORDER — HYDROXYZINE HCL 10 MG
1 TABLET ORAL
Qty: 15 | Refills: 1 | OUTPATIENT
Start: 2018-02-23 | End: 2018-03-24

## 2018-02-23 RX ORDER — LAMOTRIGINE 25 MG/1
4 TABLET, ORALLY DISINTEGRATING ORAL
Qty: 0 | Refills: 1 | COMMUNITY
Start: 2018-02-23 | End: 2018-03-24

## 2018-02-23 RX ORDER — CARVEDILOL PHOSPHATE 80 MG/1
1 CAPSULE, EXTENDED RELEASE ORAL
Qty: 30 | Refills: 1 | OUTPATIENT
Start: 2018-02-23 | End: 2018-03-24

## 2018-02-23 RX ORDER — GABAPENTIN 400 MG/1
0 CAPSULE ORAL
Qty: 0 | Refills: 0 | COMMUNITY

## 2018-02-23 RX ADMIN — Medication 2 MILLIGRAM(S): at 13:49

## 2018-02-23 RX ADMIN — Medication 2000 UNIT(S): at 09:08

## 2018-02-23 RX ADMIN — Medication 650 MILLIGRAM(S): at 09:54

## 2018-02-23 RX ADMIN — CYCLOBENZAPRINE HYDROCHLORIDE 10 MILLIGRAM(S): 10 TABLET, FILM COATED ORAL at 09:54

## 2018-02-23 RX ADMIN — Medication 100 MILLIGRAM(S): at 21:33

## 2018-02-23 RX ADMIN — CARVEDILOL PHOSPHATE 6.25 MILLIGRAM(S): 80 CAPSULE, EXTENDED RELEASE ORAL at 21:33

## 2018-02-23 RX ADMIN — LIDOCAINE 1 PATCH: 4 CREAM TOPICAL at 21:37

## 2018-02-23 RX ADMIN — CYCLOBENZAPRINE HYDROCHLORIDE 10 MILLIGRAM(S): 10 TABLET, FILM COATED ORAL at 21:32

## 2018-02-23 RX ADMIN — LIDOCAINE 1 APPLICATION(S): 4 CREAM TOPICAL at 09:54

## 2018-02-23 RX ADMIN — LIDOCAINE 1 APPLICATION(S): 4 CREAM TOPICAL at 22:41

## 2018-02-23 RX ADMIN — Medication 20 MILLIGRAM(S): at 08:15

## 2018-02-23 RX ADMIN — Medication 0.1 MILLIGRAM(S): at 21:33

## 2018-02-23 RX ADMIN — ZOLPIDEM TARTRATE 10 MILLIGRAM(S): 10 TABLET ORAL at 22:48

## 2018-02-23 RX ADMIN — LAMOTRIGINE 25 MILLIGRAM(S): 25 TABLET, ORALLY DISINTEGRATING ORAL at 09:08

## 2018-02-23 RX ADMIN — Medication 1 APPLICATION(S): at 21:32

## 2018-02-23 RX ADMIN — CARVEDILOL PHOSPHATE 6.25 MILLIGRAM(S): 80 CAPSULE, EXTENDED RELEASE ORAL at 09:08

## 2018-02-23 RX ADMIN — DULOXETINE HYDROCHLORIDE 60 MILLIGRAM(S): 30 CAPSULE, DELAYED RELEASE ORAL at 09:08

## 2018-02-23 RX ADMIN — Medication 1 APPLICATION(S): at 09:08

## 2018-02-23 RX ADMIN — Medication 5 MILLIGRAM(S): at 21:35

## 2018-02-23 RX ADMIN — LIDOCAINE 1 PATCH: 4 CREAM TOPICAL at 09:07

## 2018-02-23 RX ADMIN — Medication 0.5 MILLIGRAM(S): at 08:15

## 2018-02-23 RX ADMIN — Medication 50 MILLIGRAM(S): at 21:33

## 2018-02-23 RX ADMIN — Medication 0.5 MILLIGRAM(S): at 21:32

## 2018-02-23 RX ADMIN — Medication 100 MILLIGRAM(S): at 09:07

## 2018-02-23 RX ADMIN — GABAPENTIN 600 MILLIGRAM(S): 400 CAPSULE ORAL at 21:33

## 2018-02-23 NOTE — PROGRESS NOTE BEHAVIORAL HEALTH - NSBHFUPINTERVALCCFT_PSY_A_CORE
patient reporting pain as well as swelling.  Still maintains she is looking forward to discharge next week.

## 2018-02-23 NOTE — DISCHARGE NOTE BEHAVIORAL HEALTH - NSBHDCMEDICALFT_PSY_A_CORE
renal consult for glomerulosclerosis  Cardiology consult for LE edema and chest pain    BACItracin   Ointment 1 Application(s) Topical two times a day for cut on abdomen  carvedilol 6.25 milliGRAM(s) Oral every 12 hours  cholecalciferol 2000 Unit(s) Oral daily  cloNIDine 0.1 milliGRAM(s) Oral daily  docusate sodium 100 milliGRAM(s) Oral two times a day  gabapentin 600 milliGRAM(s) Oral at bedtime    lidocaine   Patch 1 Patch Transdermal daily    MEDICATIONS  (PRN):  acetaminophen   Tablet 650 milliGRAM(s) Oral every 6 hours PRN pain  aluminum hydroxide/magnesium hydroxide/simethicone Suspension 30 milliLiter(s) Oral every 6 hours PRN Dyspepsia  bisacodyl 5 milliGRAM(s) Oral every 12 hours PRN Constipation  cyclobenzaprine 10 milliGRAM(s) Oral three times a day PRN Muscle Spasm  haloperidol     Tablet 5 milliGRAM(s) Oral every 6 hours PRN agitation  HYDROcodone  7.5 mG/acetaminophen 300 mG. 2 Tablet(s) Oral every 8 hours PRN Moderate Pain (4 - 6)  lidocaine 5% Ointment 1 Application(s) Topical three times a day PRN pain  magnesium hydroxide Suspension 30 milliLiter(s) Oral daily PRN Constipation

## 2018-02-23 NOTE — DISCHARGE NOTE BEHAVIORAL HEALTH - REASON FOR ADMISSION
· Reason For Referral  overdose Xanax  · Patient's Chief Complaint  "I took a handful of pills ,Lisinipril - about 20

## 2018-02-23 NOTE — DISCHARGE NOTE BEHAVIORAL HEALTH - NSBHDCDXVALIDYESFT_PSY_A_CORE
Patient was continued on Cymbalta and lamotrirgien was added for mood stabilization in patient with borderline personality disorder.  patient continued to strugglew with anxiety and chronic pain as well as overall somatic preoccupation.  She was seen byrenal and cardiology consults during her stay.  Patient became less labile, less depressed and no longer suicidal.  Family meeting held and patient decided to transfer to [facility] outpatient psychiatrist along with DBT Therapy.  Residential DBT program was also discussed as an option but patient decided against that as she did not want to miss certain events that were going to be happening Patient was continued on Cymbalta and lamotrigine was added for mood stabilization in patient with borderline personality disorder.  patient continued to strugglew with anxiety and chronic pain as well as overall somatic preoccupation.  She was seen byrenal and cardiology consults during her stay.  Patient became less labile, less depressed and no longer suicidal.  Family meeting held and [facility]  Residential DBT program was also discussed as an option but patient decided against that as she did not want to miss certain events that were going to be happening

## 2018-02-23 NOTE — PROGRESS NOTE ADULT - SUBJECTIVE AND OBJECTIVE BOX
Patient is a 27y Female who reports no complaints overnight. Reports some swelling increase. Seen by [facility]    REVIEW OF SYSTEMS:    CONSTITUTIONAL: No weakness, fevers or chills  RESPIRATORY: No cough, wheezing, hemoptysis; No shortness of breath  CARDIOVASCULAR: No chest pain or palpitations  GENITOURINARY: No dysuria, frequency or hematuria  All other review of systems is negative unless indicated above.    MEDICATIONS  (STANDING):  BACItracin   Ointment 1 Application(s) Topical two times a day  carvedilol 6.25 milliGRAM(s) Oral every 12 hours  cholecalciferol 2000 Unit(s) Oral daily  cloNIDine 0.1 milliGRAM(s) Oral daily  docusate sodium 100 milliGRAM(s) Oral two times a day  DULoxetine 60 milliGRAM(s) Oral daily  gabapentin 600 milliGRAM(s) Oral at bedtime  hydrOXYzine hydrochloride 50 milliGRAM(s) Oral at bedtime  lamoTRIgine 25 milliGRAM(s) Oral daily  lidocaine   Patch 1 Patch Transdermal daily    MEDICATIONS  (PRN):  acetaminophen   Tablet 650 milliGRAM(s) Oral every 6 hours PRN pain  ALPRAZolam 0.5 milliGRAM(s) Oral two times a day PRN anxiety  aluminum hydroxide/magnesium hydroxide/simethicone Suspension 30 milliLiter(s) Oral every 6 hours PRN Dyspepsia  bisacodyl 5 milliGRAM(s) Oral every 12 hours PRN Constipation  cyclobenzaprine 10 milliGRAM(s) Oral three times a day PRN Muscle Spasm  haloperidol     Tablet 5 milliGRAM(s) Oral every 6 hours PRN agitation  haloperidol    Injectable 5 milliGRAM(s) IntraMuscular once PRN Severe Agitation  HYDROcodone  7.5 mG/acetaminophen 300 mG. 2 Tablet(s) Oral every 8 hours PRN Moderate Pain (4 - 6)  lidocaine 2% Gel 1 Application(s) Topical three times a day PRN pain  lidocaine 5% Ointment 1 Application(s) Topical three times a day PRN pain  LORazepam     Tablet 2 milliGRAM(s) Oral every 8 hours PRN severe anxiety  magnesium hydroxide Suspension 30 milliLiter(s) Oral daily PRN Constipation  OLANZapine Injectable 10 milliGRAM(s) IntraMuscular once PRN agitation  zolpidem 10 milliGRAM(s) Oral at bedtime PRN Insomnia        T(C): , Max: 37.2 (02-23-18 @ 08:52)  T(F): , Max: 99 (02-23-18 @ 08:52)  HR: 97 (02-23-18 @ 08:52)  BP: 151/88 (02-23-18 @ 08:52)  BP(mean): --  RR: 14 (02-23-18 @ 08:52)  SpO2: 100% (02-23-18 @ 08:52)  Wt(kg): --        PHYSICAL EXAM:    Constitutional: NAD, well-groomed, well-developed  HEENT: PERRLA, EOMI,  MMM  Neck: No LAD, No JVD  Respiratory: CTAB  Cardiovascular: S1 and S2, RRR  Gastrointestinal: BS+, soft, NT/ND  Extremities: tr peripheral edema  Neurological: A/O x 3, no focal deficits  Psychiatric: Normal mood, normal affect  : No Alvares  Skin: No rashes  Access: Not applicable        LABS:        TPro  7.6    /  Alb  3.2    /  TBili  0.3    /  DBili  <0.1   /  AST  40     /  ALT  49     /  AlkPhos  71     20 Feb 2018 06:53          Urine Studies:          RADIOLOGY & ADDITIONAL STUDIES:

## 2018-02-23 NOTE — CONSULT NOTE ADULT - SUBJECTIVE AND OBJECTIVE BOX
CHIEF COMPLAINT: Patient is a 27y old  Female who presents with a chief complaint of SA from OD on HTN medications after an upsetting phone call with biological dad. (14 Feb 2018 05:30)      HPI:27/F with PMHx of   of C.Diff , Mixed connective tissue disorder, PCOS, Anxiety, Depression, secondary hypertension due to MCTD? , HTN, obesity was admitted to psych service and seen in medical consult for Drug overdose with Lisinopril 400 mg and suicidal attempt. Pt states that she took 20 tabs of 20 mg of Lisinopril on 2/13 /18 at 6 pm. Her renal function, LFTs and BP has been stable so far. She also cut herself superficially on abdominal skin. She denies any chest pain/sob/abd pain at this time.    complains of LE edema , tachycardia      PMHx: PAST MEDICAL & SURGICAL HISTORY:  HTN (hypertension)  Polycystic ovarian syndrome  Depression  Anxiety  Mixed connective tissue disease  Cholecystitis  H/O ovarian cystectomy  H/O knee surgery  History of cholecystectomy        Allergies: Allergies    No Known Drug Allergies  peanuts (Unknown)  Tree Nuts (Unknown)    Intolerances    morphine (Pruritus)        REVIEW OF SYSTEMS:    CONSTITUTIONAL: No weakness, fevers or chills  EYES/ENT: No visual changes;  No vertigo or throat pain   NECK: No pain or stiffness  RESPIRATORY: No cough, wheezing, hemoptysis; No shortness of breath  CARDIOVASCULAR: No chest pain or palpitations  GASTROINTESTINAL: No abdominal or epigastric pain. No nausea, vomiting, or hematemesis; No diarrhea or constipation. No melena or hematochezia.  GENITOURINARY: No dysuria, frequency or hematuria  NEUROLOGICAL: No numbness or weakness  SKIN: No itching, burning, rashes, or lesions   All other review of systems is negative unless indicated above    Vital Signs Last 24 Hrs  T(C): 37.2 (22 Feb 2018 08:59), Max: 37.2 (22 Feb 2018 08:59)  T(F): 99 (22 Feb 2018 08:59), Max: 99 (22 Feb 2018 08:59)  HR: 83 (22 Feb 2018 10:59) (83 - 97)  BP: 140/90 (22 Feb 2018 10:59) (133/94 - 140/90)  BP(mean): --  RR: 14 (22 Feb 2018 10:59) (14 - 14)  SpO2: 100% (22 Feb 2018 10:59) (100% - 100%)    I&O's Summary          PHYSICAL EXAM:   Constitutional: NAD, awake and alert, well-developed  HEENT: PERR, EOMI, Normal Hearing, MMM  Neck: Soft and supple, No LAD, No JVD  Respiratory: Breath sounds are clear bilaterally, No wheezing, rales or rhonchi  Cardiovascular: S1 and S2, regular rate and rhythm, no Murmurs, gallops or rubs  Gastrointestinal: Bowel Sounds present, soft, nontender, nondistended, no guarding, no rebound  Extremities: 1+ peripheral edema  Vascular: 2+ peripheral pulses  Neurological: A/O x 3, no focal deficits  Musculoskeletal: 5/5 strength b/l upper and lower extremities  Skin: No rashes    MEDICATIONS:  MEDICATIONS  (STANDING):  BACItracin   Ointment 1 Application(s) Topical two times a day  carvedilol 6.25 milliGRAM(s) Oral every 12 hours  cholecalciferol 2000 Unit(s) Oral daily  cloNIDine 0.1 milliGRAM(s) Oral daily  docusate sodium 100 milliGRAM(s) Oral two times a day  DULoxetine 60 milliGRAM(s) Oral daily  gabapentin 600 milliGRAM(s) Oral at bedtime  hydrOXYzine hydrochloride 50 milliGRAM(s) Oral at bedtime  lamoTRIgine 25 milliGRAM(s) Oral daily  lidocaine   Patch 1 Patch Transdermal daily      LABS: All Labs Reviewed:                  EKG: NSR, normal axis and intervals no signfiicant ST changes

## 2018-02-23 NOTE — DISCHARGE NOTE BEHAVIORAL HEALTH - SECONDARY DIAGNOSIS.
Borderline personality disorder in adult Hypertension, unspecified type Polycystic ovarian syndrome Chronic pain disorder Glomerulosclerosis, focal

## 2018-02-23 NOTE — CONSULT NOTE ADULT - ASSESSMENT
27 year old woman with HTN ahd LE edema with episode of atypical chest pain and palpitations     recommend repeat chemistry  will give 1 dose of lasix and monitor LE edema    may consider echo as an out patient     will follow as needed

## 2018-02-23 NOTE — DISCHARGE NOTE BEHAVIORAL HEALTH - FAMILY HISTORY OF PSYCHIATRIC ILLNESS
lives with mother and step father, only child, lived in [place] where she attended college and returned to this area in Dec 2016

## 2018-02-23 NOTE — DISCHARGE NOTE BEHAVIORAL HEALTH - MEDICATION SUMMARY - MEDICATIONS TO CHANGE
I will SWITCH the dose or number of times a day I take the medications listed below when I get home from the hospital:    cloNIDine 0.1 mg oral tablet  -- 1 tab(s) by mouth once a day (at bedtime) until told to discontinue by outpatient MD    HYDROcodone 10 mg oral capsule, extended release  -- 1 cap(s) by mouth every 12 hours until told to discontinue by outpatient MD    Xanax 0.5 mg oral tablet  -- 1 tab(s) by mouth once a day (at bedtime), As Needed until told to discontinue by MD    cloNIDine 0.1 mg oral tablet  -- 1 tab(s) by mouth once a day until told to discontinue by MD
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
## 2018-02-23 NOTE — DISCHARGE NOTE BEHAVIORAL HEALTH - CONDITIONS AT DISCHARGE
Pt mood shows improvement since admission to . Pt denies SI/HI. Pt denies experiencing any A/V hallucinations. Pt verbalizes intent to remain compliant with all medications and to attend all aftercare appointments. Pt was provided with medication education for all prescribed medications and verbalized understanding of the information provided. Pt mood shows improvement since admission to [facility]. Pt denies SI/HI. Pt denies experiencing any A/V hallucinations. Pt verbalizes intent to remain compliant with all medications and to attend all aftercare appointments. Pt was provided with medication education for all prescribed medications and verbalized understanding of the information provided.  Patient was provided with a copy of discharge papers.

## 2018-02-23 NOTE — DISCHARGE NOTE BEHAVIORAL HEALTH - MEDICATION SUMMARY - MEDICATIONS TO TAKE
I will START or STAY ON the medications listed below when I get home from the hospital:    Vicodin ES 7.5 mg-300 mg oral tablet  -- 2 tab(s) by mouth every 8 hours, As needed, Moderate Pain (4 - 6) until told to discontinue by MD MDD:6 tabs  -- Indication: For Pain    acetaminophen 325 mg oral tablet  -- 2 tab(s) by mouth every 6 hours, As needed, pain  -- Indication: For Pain    Catapres 0.1 mg oral tablet  -- 1 tab(s) by mouth 2 times a day until told to discontinue by MD  -- Indication: For Hypertension, unspecified type    gabapentin 300 mg oral capsule  -- 2 cap(s) by mouth once a day (at bedtime) until told to discontinue by MD  -- Indication: For Pain    lamoTRIgine 25 mg oral tablet  -- 2 tab(s) by mouth once a day until told to discontinue by MD  Take 25 mg through Feb 28 then increase dose to 50 mg  -- Indication: For Mood stabilizer    DULoxetine 60 mg oral delayed release capsule  -- 1 cap(s) by mouth once a day until told to discontinue by MD  -- Indication: For Depression, unspecified depression type    hydrOXYzine hydrochloride 50 mg oral tablet  -- 1 tab(s) by mouth once a day (at bedtime) until told to discontinue by MD  -- Indication: For insomnia    zolpidem 10 mg oral tablet  -- 1 tab(s) by mouth once a day (at bedtime), As needed, Insomnia until told to discontinue by MD MDD:1 tab  -- Indication: For insomnia    ALPRAZolam 0.5 mg oral tablet  -- 1 tab(s) by mouth 2 times a day, As needed, anxiety until told to discontinue by MD MDD:2 tabs  -- Indication: For anxiety    carvedilol 6.25 mg oral tablet  -- 1 tab(s) by mouth every 12 hours until told to discontinue by MD  -- Indication: For Hypertension, unspecified type    bacitracin 500 units/g topical ointment  -- 1 application on skin 2 times a day  -- Indication: For infection    lidocaine 5% topical film  -- Apply on skin to affected area once a day  until told to discontinue by MD  -- Indication: For Pain    lidocaine 5% topical ointment  -- 1 application on skin 3 times a day, As needed, pain until told to discontinue by MD  -- Indication: For Pain    raNITIdine 150 mg oral tablet  -- 1 tab(s) by mouth 2 times a day  -- Indication: For GERD    cyclobenzaprine 10 mg oral tablet  -- 1 tab(s) by mouth 3 times a day, As needed, Muscle Spasm until told to discontinue by MD  -- Indication: For Muscle relaxant    cholecalciferol oral tablet  -- 2000 unit(s) by mouth once a day  -- Indication: For General

## 2018-02-23 NOTE — DISCHARGE NOTE BEHAVIORAL HEALTH - NSBHDCADMRISKMITFT_PSY_A_CORE
Referred for twice a week DBT and new psychiatrist  medical issues were addressed with consults and pain was also addressed  Family meeting held with mother and patient to discuss treatment

## 2018-02-23 NOTE — DISCHARGE NOTE BEHAVIORAL HEALTH - INSTRUCTIONS
All instructions for aftercare were provided and pt was also provided with education on all prescribed medications. Pt stated that she understood all instructions provided.

## 2018-02-23 NOTE — DISCHARGE NOTE BEHAVIORAL HEALTH - MEDICATION SUMMARY - MEDICATIONS TO STOP TAKING
I will STOP taking the medications listed below when I get home from the hospital:    lisinopril 20 mg oral tablet  -- 20 milligram(s) by mouth 2 times a day until told to discontinue by outpatient MD

## 2018-02-23 NOTE — DISCHARGE NOTE BEHAVIORAL HEALTH - NSBHDCHANDOFFFT_PSY_A_CORE
to outpatient provider Clinical reviewed by [facility] and accepted back into the program

## 2018-02-23 NOTE — DISCHARGE NOTE BEHAVIORAL HEALTH - NSBHDCMEDSFT_PSY_A_CORE
DULoxetine 60 milliGRAM(s) Oral daily  gabapentin 600 milliGRAM(s) Oral at bedtime  hydrOXYzine hydrochloride 50 milliGRAM(s) Oral at bedtime  lamoTRIgine 25 milliGRAM(s) Oral daily  lidocaine   Patch 1 Patch Transdermal daily    MEDICATIONS  (PRN):  ALPRAZolam 0.5 milliGRAM(s) Oral two times a day PRN anxiety  zolpidem 10 milliGRAM(s) Oral at bedtime PRN Insomnia

## 2018-02-23 NOTE — PROGRESS NOTE BEHAVIORAL HEALTH - RISK ASSESSMENT
Risk Assessment  chronic  potential  risk of self harm due to poor coping skills and attention seeking behaviors ,

## 2018-02-23 NOTE — PROGRESS NOTE BEHAVIORAL HEALTH - NSBHFUPINTERVALHXFT_PSY_A_CORE
patient remains somatically focused especially related to pain  Now also reporting constipation  Appears less depressed and anxious and seen socializing with peers  Cardiology consult completed and reviewed- lasix given for edema    MEDICATIONS  (STANDING):  BACItracin   Ointment 1 Application(s) Topical two times a day  carvedilol 6.25 milliGRAM(s) Oral every 12 hours  cholecalciferol 2000 Unit(s) Oral daily  cloNIDine 0.1 milliGRAM(s) Oral daily  docusate sodium 100 milliGRAM(s) Oral two times a day  DULoxetine 60 milliGRAM(s) Oral daily  gabapentin 600 milliGRAM(s) Oral at bedtime  hydrOXYzine hydrochloride 50 milliGRAM(s) Oral at bedtime  lamoTRIgine 25 milliGRAM(s) Oral daily  lidocaine   Patch 1 Patch Transdermal daily

## 2018-02-24 LAB
ALBUMIN SERPL ELPH-MCNC: 3.4 G/DL — SIGNIFICANT CHANGE UP (ref 3.3–5)
ALP SERPL-CCNC: 83 U/L — SIGNIFICANT CHANGE UP (ref 40–120)
ALT FLD-CCNC: 69 U/L — SIGNIFICANT CHANGE UP (ref 12–78)
ANION GAP SERPL CALC-SCNC: 7 MMOL/L — SIGNIFICANT CHANGE UP (ref 5–17)
AST SERPL-CCNC: 57 U/L — HIGH (ref 15–37)
BILIRUB SERPL-MCNC: 0.2 MG/DL — SIGNIFICANT CHANGE UP (ref 0.2–1.2)
BUN SERPL-MCNC: 10 MG/DL — SIGNIFICANT CHANGE UP (ref 7–23)
CALCIUM SERPL-MCNC: 8.7 MG/DL — SIGNIFICANT CHANGE UP (ref 8.5–10.1)
CHLORIDE SERPL-SCNC: 104 MMOL/L — SIGNIFICANT CHANGE UP (ref 96–108)
CO2 SERPL-SCNC: 26 MMOL/L — SIGNIFICANT CHANGE UP (ref 22–31)
CREAT SERPL-MCNC: 0.81 MG/DL — SIGNIFICANT CHANGE UP (ref 0.5–1.3)
GLUCOSE SERPL-MCNC: 118 MG/DL — HIGH (ref 70–99)
POTASSIUM SERPL-MCNC: 3.6 MMOL/L — SIGNIFICANT CHANGE UP (ref 3.5–5.3)
POTASSIUM SERPL-SCNC: 3.6 MMOL/L — SIGNIFICANT CHANGE UP (ref 3.5–5.3)
PROT SERPL-MCNC: 8.2 GM/DL — SIGNIFICANT CHANGE UP (ref 6–8.3)
SODIUM SERPL-SCNC: 137 MMOL/L — SIGNIFICANT CHANGE UP (ref 135–145)

## 2018-02-24 RX ADMIN — Medication 0.1 MILLIGRAM(S): at 08:49

## 2018-02-24 RX ADMIN — Medication 100 MILLIGRAM(S): at 21:27

## 2018-02-24 RX ADMIN — GABAPENTIN 600 MILLIGRAM(S): 400 CAPSULE ORAL at 21:28

## 2018-02-24 RX ADMIN — Medication 100 MILLIGRAM(S): at 08:49

## 2018-02-24 RX ADMIN — Medication 2000 UNIT(S): at 14:58

## 2018-02-24 RX ADMIN — Medication 0.5 MILLIGRAM(S): at 08:49

## 2018-02-24 RX ADMIN — Medication 2 MILLIGRAM(S): at 14:58

## 2018-02-24 RX ADMIN — CYCLOBENZAPRINE HYDROCHLORIDE 10 MILLIGRAM(S): 10 TABLET, FILM COATED ORAL at 14:00

## 2018-02-24 RX ADMIN — Medication 1 APPLICATION(S): at 08:48

## 2018-02-24 RX ADMIN — LIDOCAINE 1 APPLICATION(S): 4 CREAM TOPICAL at 22:10

## 2018-02-24 RX ADMIN — LIDOCAINE 1 APPLICATION(S): 4 CREAM TOPICAL at 09:26

## 2018-02-24 RX ADMIN — CARVEDILOL PHOSPHATE 6.25 MILLIGRAM(S): 80 CAPSULE, EXTENDED RELEASE ORAL at 08:49

## 2018-02-24 RX ADMIN — CARVEDILOL PHOSPHATE 6.25 MILLIGRAM(S): 80 CAPSULE, EXTENDED RELEASE ORAL at 21:27

## 2018-02-24 RX ADMIN — LAMOTRIGINE 25 MILLIGRAM(S): 25 TABLET, ORALLY DISINTEGRATING ORAL at 08:49

## 2018-02-24 RX ADMIN — ZOLPIDEM TARTRATE 10 MILLIGRAM(S): 10 TABLET ORAL at 22:04

## 2018-02-24 RX ADMIN — Medication 0.1 MILLIGRAM(S): at 21:27

## 2018-02-24 RX ADMIN — Medication 0.5 MILLIGRAM(S): at 21:30

## 2018-02-24 RX ADMIN — Medication 1 APPLICATION(S): at 21:29

## 2018-02-24 RX ADMIN — DULOXETINE HYDROCHLORIDE 60 MILLIGRAM(S): 30 CAPSULE, DELAYED RELEASE ORAL at 08:49

## 2018-02-24 RX ADMIN — LIDOCAINE 1 PATCH: 4 CREAM TOPICAL at 08:48

## 2018-02-24 RX ADMIN — CYCLOBENZAPRINE HYDROCHLORIDE 10 MILLIGRAM(S): 10 TABLET, FILM COATED ORAL at 21:29

## 2018-02-24 RX ADMIN — LIDOCAINE 1 PATCH: 4 CREAM TOPICAL at 21:33

## 2018-02-24 RX ADMIN — Medication 50 MILLIGRAM(S): at 21:28

## 2018-02-25 RX ORDER — ACETAMINOPHEN 500 MG
650 TABLET ORAL EVERY 8 HOURS
Qty: 0 | Refills: 0 | Status: DISCONTINUED | OUTPATIENT
Start: 2018-02-25 | End: 2018-02-25

## 2018-02-25 RX ADMIN — LAMOTRIGINE 25 MILLIGRAM(S): 25 TABLET, ORALLY DISINTEGRATING ORAL at 08:56

## 2018-02-25 RX ADMIN — Medication 0.1 MILLIGRAM(S): at 21:15

## 2018-02-25 RX ADMIN — Medication 0.5 MILLIGRAM(S): at 07:55

## 2018-02-25 RX ADMIN — MAGNESIUM HYDROXIDE 30 MILLILITER(S): 400 TABLET, CHEWABLE ORAL at 18:44

## 2018-02-25 RX ADMIN — Medication 2 MILLIGRAM(S): at 13:11

## 2018-02-25 RX ADMIN — CARVEDILOL PHOSPHATE 6.25 MILLIGRAM(S): 80 CAPSULE, EXTENDED RELEASE ORAL at 08:56

## 2018-02-25 RX ADMIN — LIDOCAINE 1 PATCH: 4 CREAM TOPICAL at 22:12

## 2018-02-25 RX ADMIN — LIDOCAINE 1 PATCH: 4 CREAM TOPICAL at 08:58

## 2018-02-25 RX ADMIN — ZOLPIDEM TARTRATE 10 MILLIGRAM(S): 10 TABLET ORAL at 22:13

## 2018-02-25 RX ADMIN — DULOXETINE HYDROCHLORIDE 60 MILLIGRAM(S): 30 CAPSULE, DELAYED RELEASE ORAL at 08:57

## 2018-02-25 RX ADMIN — CYCLOBENZAPRINE HYDROCHLORIDE 10 MILLIGRAM(S): 10 TABLET, FILM COATED ORAL at 21:17

## 2018-02-25 RX ADMIN — Medication 50 MILLIGRAM(S): at 21:16

## 2018-02-25 RX ADMIN — LIDOCAINE 1 APPLICATION(S): 4 CREAM TOPICAL at 07:56

## 2018-02-25 RX ADMIN — LIDOCAINE 1 APPLICATION(S): 4 CREAM TOPICAL at 23:25

## 2018-02-25 RX ADMIN — Medication 2000 UNIT(S): at 08:56

## 2018-02-25 RX ADMIN — Medication 1 APPLICATION(S): at 07:56

## 2018-02-25 RX ADMIN — Medication 100 MILLIGRAM(S): at 21:15

## 2018-02-25 RX ADMIN — Medication 100 MILLIGRAM(S): at 08:56

## 2018-02-25 RX ADMIN — GABAPENTIN 600 MILLIGRAM(S): 400 CAPSULE ORAL at 21:15

## 2018-02-25 RX ADMIN — CARVEDILOL PHOSPHATE 6.25 MILLIGRAM(S): 80 CAPSULE, EXTENDED RELEASE ORAL at 21:15

## 2018-02-25 RX ADMIN — CYCLOBENZAPRINE HYDROCHLORIDE 10 MILLIGRAM(S): 10 TABLET, FILM COATED ORAL at 07:55

## 2018-02-25 RX ADMIN — Medication 0.5 MILLIGRAM(S): at 21:16

## 2018-02-25 RX ADMIN — Medication 650 MILLIGRAM(S): at 08:57

## 2018-02-25 RX ADMIN — Medication 0.1 MILLIGRAM(S): at 08:56

## 2018-02-25 NOTE — PROGRESS NOTE ADULT - SUBJECTIVE AND OBJECTIVE BOX
Patient is a 27y Female who reports no complaints overnight. She reports edema absent, bp better.     REVIEW OF SYSTEMS:    CONSTITUTIONAL: No weakness, fevers or chills  RESPIRATORY: No cough, wheezing, hemoptysis; No shortness of breath  CARDIOVASCULAR: No chest pain or palpitations  GENITOURINARY: No dysuria, frequency or hematuria  All other review of systems is negative unless indicated above.    MEDICATIONS  (STANDING):  BACItracin   Ointment 1 Application(s) Topical two times a day  carvedilol 6.25 milliGRAM(s) Oral every 12 hours  cholecalciferol 2000 Unit(s) Oral daily  cloNIDine 0.1 milliGRAM(s) Oral two times a day  docusate sodium 100 milliGRAM(s) Oral two times a day  DULoxetine 60 milliGRAM(s) Oral daily  gabapentin 600 milliGRAM(s) Oral at bedtime  hydrOXYzine hydrochloride 50 milliGRAM(s) Oral at bedtime  lamoTRIgine 25 milliGRAM(s) Oral daily  lidocaine   Patch 1 Patch Transdermal daily    MEDICATIONS  (PRN):  acetaminophen   Tablet 650 milliGRAM(s) Oral every 6 hours PRN pain  ALPRAZolam 0.5 milliGRAM(s) Oral two times a day PRN anxiety  aluminum hydroxide/magnesium hydroxide/simethicone Suspension 30 milliLiter(s) Oral every 6 hours PRN Dyspepsia  bisacodyl 5 milliGRAM(s) Oral every 12 hours PRN Constipation  cyclobenzaprine 10 milliGRAM(s) Oral three times a day PRN Muscle Spasm  haloperidol     Tablet 5 milliGRAM(s) Oral every 6 hours PRN agitation  haloperidol    Injectable 5 milliGRAM(s) IntraMuscular once PRN Severe Agitation  HYDROcodone  7.5 mG/acetaminophen 300 mG. 2 Tablet(s) Oral every 8 hours PRN Moderate Pain (4 - 6)  lidocaine 2% Gel 1 Application(s) Topical three times a day PRN pain  lidocaine 5% Ointment 1 Application(s) Topical three times a day PRN pain  LORazepam     Tablet 2 milliGRAM(s) Oral every 8 hours PRN severe anxiety  magnesium hydroxide Suspension 30 milliLiter(s) Oral daily PRN Constipation  OLANZapine Injectable 10 milliGRAM(s) IntraMuscular once PRN agitation  zolpidem 10 milliGRAM(s) Oral at bedtime PRN Insomnia        T(C): , Max: 37.3 (02-24-18 @ 20:40)  T(F): , Max: 99.1 (02-24-18 @ 20:40)  HR: 101 (02-25-18 @ 07:19)  BP: 143/87 (02-25-18 @ 07:19)  BP(mean): --  RR: 16 (02-25-18 @ 07:19)  SpO2: 99% (02-25-18 @ 07:19)  Wt(kg): --        PHYSICAL EXAM:    Constitutional: NAD, well-groomed, well-developed  HEENT: PERRLA, EOMI,  MMM  Neck: No LAD, No JVD  Respiratory: CTAB  Cardiovascular: S1 and S2, RRR  Gastrointestinal: BS+, soft, NT/ND  Extremities: trace to no peripheral edema  Neurological: A/O x 3, no focal deficits  Psychiatric: Normal mood, normal affect  : No Alvares  Skin: No rashes  Access: Not applicable        LABS:    24 Feb 2018 07:19    137    |  104    |  10     ----------------------------<  118    3.6     |  26     |  0.81     Ca    8.7        24 Feb 2018 07:19    TPro  8.2    /  Alb  3.4    /  TBili  0.2    /  DBili  x      /  AST  57     /  ALT  69     /  AlkPhos  83     24 Feb 2018 07:19          Urine Studies:          RADIOLOGY & ADDITIONAL STUDIES:

## 2018-02-25 NOTE — PROGRESS NOTE ADULT - ASSESSMENT
27y Female whom presented to the hospital with a history of depression, HTN, FSGS with proteinuria here with suicide attempt with taking multiple lisinopril T's.     FSGS  -NSAID avoidance  -Ok to hold ace for now  -No urine available, check at office    HTN  -Clonidine at BID  - Coreg6.25 mg po bid    [facility]  -[facility] following  -ECHO    see me as outpatient in 2-3 weeks to monitor bp, fsgs

## 2018-02-26 VITALS
TEMPERATURE: 98 F | RESPIRATION RATE: 18 BRPM | OXYGEN SATURATION: 100 % | HEART RATE: 94 BPM | DIASTOLIC BLOOD PRESSURE: 85 MMHG | SYSTOLIC BLOOD PRESSURE: 131 MMHG

## 2018-02-26 RX ADMIN — Medication 100 MILLIGRAM(S): at 08:49

## 2018-02-26 RX ADMIN — CARVEDILOL PHOSPHATE 6.25 MILLIGRAM(S): 80 CAPSULE, EXTENDED RELEASE ORAL at 08:49

## 2018-02-26 RX ADMIN — Medication 650 MILLIGRAM(S): at 03:33

## 2018-02-26 RX ADMIN — LIDOCAINE 1 PATCH: 4 CREAM TOPICAL at 09:30

## 2018-02-26 RX ADMIN — Medication 0.5 MILLIGRAM(S): at 09:26

## 2018-02-26 RX ADMIN — Medication 2000 UNIT(S): at 08:49

## 2018-02-26 RX ADMIN — LAMOTRIGINE 25 MILLIGRAM(S): 25 TABLET, ORALLY DISINTEGRATING ORAL at 08:49

## 2018-02-26 RX ADMIN — Medication 0.1 MILLIGRAM(S): at 08:49

## 2018-02-26 RX ADMIN — Medication 1 APPLICATION(S): at 08:48

## 2018-02-26 RX ADMIN — DULOXETINE HYDROCHLORIDE 60 MILLIGRAM(S): 30 CAPSULE, DELAYED RELEASE ORAL at 08:49

## 2018-02-26 RX ADMIN — Medication 2 MILLIGRAM(S): at 03:33

## 2018-02-26 RX ADMIN — LIDOCAINE 1 PATCH: 4 CREAM TOPICAL at 08:48

## 2018-02-26 NOTE — PROGRESS NOTE BEHAVIORAL HEALTH - PROBLEM SELECTOR PLAN 2
Started Lamictal as mood stabilizer
Consider Lamictal as mood stabilizer
Started Lamictal as mood stabilizer

## 2018-02-26 NOTE — PROGRESS NOTE BEHAVIORAL HEALTH - NSBHADMITDANGERSELF_PSY_A_CORE
suicidal behavior

## 2018-02-26 NOTE — PROGRESS NOTE BEHAVIORAL HEALTH - AXIS III
Obese, Back and knee pain, polycystic ovaries, glomerulosclerosis
Obese, Back and knee pain, polycystic ovaries, glomerulosclerosis
Overweight, Back and knee pain, polycystic ovaries, glomerulosclerosis
Obese, Back and knee pain, polycystic ovaries, glomerulosclerosis
Overweight, Back and knee pain, polycystic ovaries, glomerulosclerosis
Obese, Back and knee pain, polycystic ovaries, glomerulosclerosis
Overweight, Back and knee pain, polycystic ovaries, glomerulosclerosis
Obese, Back and knee pain, polycystic ovaries, glomerulosclerosis
Obese, Back and knee pain, polycystic ovaries, glomerulosclerosis

## 2018-02-26 NOTE — PROGRESS NOTE BEHAVIORAL HEALTH - NSBHFUPMEDSE_PSY_A_CORE
None known

## 2018-02-26 NOTE — PROGRESS NOTE BEHAVIORAL HEALTH - NSBHADMITIPOBSFT_PSY_A_CORE
no current risk to self on unit

## 2018-02-26 NOTE — PROGRESS NOTE BEHAVIORAL HEALTH - NSBHCHARTREVIEWLAB_PSY_A_CORE FT
Comprehensive Metabolic Panel in AM (02.24.18 @ 07:19)    Sodium, Serum: 137 mmol/L    Potassium, Serum: 3.6 mmol/L    Chloride, Serum: 104 mmol/L    Carbon Dioxide, Serum: 26 mmol/L    Anion Gap, Serum: 7 mmol/L    Blood Urea Nitrogen, Serum: 10 mg/dL    Creatinine, Serum: 0.81 mg/dL    Glucose, Serum: 118 mg/dL    Calcium, Total Serum: 8.7 mg/dL    Protein Total, Serum: 8.2 gm/dL    Albumin, Serum: 3.4 g/dL    Bilirubin Total, Serum: 0.2 mg/dL    Alkaline Phosphatase, Serum: 83 U/L    Aspartate Aminotransferase (AST/SGOT): 57 U/L    Alanine Aminotransferase (ALT/SGPT): 69 U/L    eGFR if Non : 100: Interpretative comment  The units for eGFR are ml/min/1.73m2 (normalized body surface area). The  eGFR is calculated from a serum creatinine using the CKD-EPI equation.  Other variables required for calculation are race, age and sex. Among  patients with chronic kidney disease (CKD), the eGFR is useful in  determining the stage of disease according to KDOQI CKD classification.  All eGFR results are reported numerically with the following  interpretation.          GFR                    With                 Without     (ml/min/1.73 m2)    Kidney Damage       Kidney Damage        >= 90                    Stage 1                     Normal        60-89                    Stage 2                     Decreased GFR        30-59     Stage 3                     Stage 3        15-29                    Stage 4                     Stage 4        < 15                      Stage 5                     Stage 5  Each stage of CKD assumes that the associated GFR level has been in  effect for at least 3 months. Determination of stages one and two (with  eGFR > 59 ml/min/m2) requires estimation of kidney damage for at least 3  months as defined by structural or functional abnormalities.  Limitations: All estimates of GFR will be less accurate for patients at  extremes of muscle mass (including but not limited to frail elderly,  critically ill, or cancer patients), those with unusual diets, and those  with conditions associated with reduced secretion or extrarenal  elimination of creatinine. The eGFR equation is not recommended for use  in patients with unstable creatinine levels. mL/min/1.73M2    eGFR if African American: 115 mL/min/1.73M2
Comprehensive Metabolic Panel in AM (02.15.18 @ 07:27)    Sodium, Serum: 137 mmol/L    Potassium, Serum: 3.8 mmol/L    Chloride, Serum: 101 mmol/L    Carbon Dioxide, Serum: 27 mmol/L    Anion Gap, Serum: 9 mmol/L    Blood Urea Nitrogen, Serum: 14 mg/dL    Creatinine, Serum: 0.89 mg/dL    Glucose, Serum: 94 mg/dL    Calcium, Total Serum: 9.1 mg/dL    Protein Total, Serum: 8.3 gm/dL    Albumin, Serum: 3.6 g/dL    Bilirubin Total, Serum: 0.6 mg/dL    Alkaline Phosphatase, Serum: 84 U/L    Aspartate Aminotransferase (AST/SGOT): 110 U/L    Alanine Aminotransferase (ALT/SGPT): 74 U/L    eGFR if Non : 89: Interpretative comment  The units for eGFR are ml/min/1.73m2 (normalized body surface area). The  eGFR is calculated from a serum creatinine using the CKD-EPI equation.  Other variables required for calculation are race, age and sex. Among  patients with chronic kidney disease (CKD), the eGFR is useful in  determining the stage of disease according to KDOQI CKD classification.  All eGFR results are reported numerically with the following  interpretation.          GFR                    With                 Without     (ml/min/1.73 m2)    Kidney Damage       Kidney Damage        >= 90                    Stage 1                     Normal        60-89                    Stage 2                     Decreased GFR        30-59     Stage 3                     Stage 3        15-29                    Stage 4                     Stage 4        < 15                      Stage 5                     Stage 5  Each stage of CKD assumes that the associated GFR level has been in  effect for at least 3 months. Determination of stages one and two (with  eGFR > 59 ml/min/m2) requires estimation of kidney damage for at least 3  months as defined by structural or functional abnormalities.  Limitations: All estimates of GFR will be less accurate for patients at  extremes of muscle mass (including but not limited to frail elderly,  critically ill, or cancer patients), those with unusual diets, and those  with conditions associated with reduced secretion or extrarenal  elimination of creatinine. The eGFR equation is not recommended for use  in patients with unstable creatinine levels. mL/min/1.73M2    eGFR if African American: 103 mL/min/1.73M2
Comprehensive Metabolic Panel in AM (02.17.18 @ 07:33)    Sodium, Serum: 138 mmol/L    Potassium, Serum: 4.1 mmol/L    Chloride, Serum: 103 mmol/L    Carbon Dioxide, Serum: 29 mmol/L    Anion Gap, Serum: 6 mmol/L    Blood Urea Nitrogen, Serum: 9 mg/dL    Creatinine, Serum: 0.72 mg/dL    Glucose, Serum: 85 mg/dL    Calcium, Total Serum: 9.0 mg/dL    Protein Total, Serum: 7.7 gm/dL    Albumin, Serum: 3.3 g/dL    Bilirubin Total, Serum: 0.3 mg/dL    Alkaline Phosphatase, Serum: 79 U/L    Aspartate Aminotransferase (AST/SGOT): 55 U/L    Alanine Aminotransferase (ALT/SGPT): 68 U/L    eGFR if Non : 115: Interpretative comment  The units for eGFR are ml/min/1.73m2 (normalized body surface area). The  eGFR is calculated from a serum creatinine using the CKD-EPI equation.  Other variables required for calculation are race, age and sex. Among  patients with chronic kidney disease (CKD), the eGFR is useful in  determining the stage of disease according to KDOQI CKD classification.  All eGFR results are reported numerically with the following  interpretation.          GFR                    With                 Without     (ml/min/1.73 m2)    Kidney Damage       Kidney Damage        >= 90                    Stage 1                     Normal        60-89                    Stage 2                     Decreased GFR        30-59     Stage 3                     Stage 3        15-29                    Stage 4                     Stage 4        < 15                      Stage 5                     Stage 5  Each stage of CKD assumes that the associated GFR level has been in  effect for at least 3 months. Determination of stages one and two (with  eGFR > 59 ml/min/m2) requires estimation of kidney damage for at least 3  months as defined by structural or functional abnormalities.  Limitations: All estimates of GFR will be less accurate for patients at  extremes of muscle mass (including but not limited to frail elderly,  critically ill, or cancer patients), those with unusual diets, and those  with conditions associated with reduced secretion or extrarenal  elimination of creatinine. The eGFR equation is not recommended for use  in patients with unstable creatinine levels. mL/min/1.73M2    eGFR if African American: 133 mL/min/1.73M2
Comprehensive Metabolic Panel in AM (02.16.18 @ 07:44)    Sodium, Serum: 138 mmol/L    Potassium, Serum: 3.9 mmol/L    Chloride, Serum: 102 mmol/L    Carbon Dioxide, Serum: 29 mmol/L    Anion Gap, Serum: 7 mmol/L    Blood Urea Nitrogen, Serum: 9 mg/dL    Creatinine, Serum: 0.79 mg/dL    Glucose, Serum: 89 mg/dL    Calcium, Total Serum: 9.2 mg/dL    Protein Total, Serum: 8.3 gm/dL    Albumin, Serum: 3.6 g/dL    Bilirubin Total, Serum: 0.4 mg/dL    Alkaline Phosphatase, Serum: 83 U/L    Aspartate Aminotransferase (AST/SGOT): 71 U/L    Alanine Aminotransferase (ALT/SGPT): 76 U/L    eGFR if Non : 103: Interpretative comment  The units for eGFR are ml/min/1.73m2 (normalized body surface area). The  eGFR is calculated from a serum creatinine using the CKD-EPI equation.  Other variables required for calculation are race, age and sex. Among  patients with chronic kidney disease (CKD), the eGFR is useful in  determining the stage of disease according to KDOQI CKD classification.  All eGFR results are reported numerically with the following  interpretation.          GFR                    With                 Without     (ml/min/1.73 m2)    Kidney Damage       Kidney Damage        >= 90                    Stage 1                     Normal        60-89                    Stage 2                     Decreased GFR        30-59     Stage 3                     Stage 3        15-29                    Stage 4                     Stage 4        < 15                      Stage 5                     Stage 5  Each stage of CKD assumes that the associated GFR level has been in  effect for at least 3 months. Determination of stages one and two (with  eGFR > 59 ml/min/m2) requires estimation of kidney damage for at least 3  months as defined by structural or functional abnormalities.  Limitations: All estimates of GFR will be less accurate for patients at  extremes of muscle mass (including but not limited to frail elderly,  critically ill, or cancer patients), those with unusual diets, and those  with conditions associated with reduced secretion or extrarenal  elimination of creatinine. The eGFR equation is not recommended for use  in patients with unstable creatinine levels. mL/min/1.73M2    eGFR if African American: 119 mL/min/1.73M2

## 2018-02-26 NOTE — PROGRESS NOTE BEHAVIORAL HEALTH - NSBHFUPINTERVALHXFT_PSY_A_CORE
Patient is improved from admission Continues to have some difficulty with frustration tolerance but otherwise in fair control    MEDICATIONS  (STANDING):  BACItracin   Ointment 1 Application(s) Topical two times a day  carvedilol 6.25 milliGRAM(s) Oral every 12 hours  cholecalciferol 2000 Unit(s) Oral daily  cloNIDine 0.1 milliGRAM(s) Oral two times a day  docusate sodium 100 milliGRAM(s) Oral two times a day  DULoxetine 60 milliGRAM(s) Oral daily  gabapentin 600 milliGRAM(s) Oral at bedtime  hydrOXYzine hydrochloride 50 milliGRAM(s) Oral at bedtime  lamoTRIgine 25 milliGRAM(s) Oral daily  lidocaine   Patch 1 Patch Transdermal daily    MEDICATIONS  (PRN):  acetaminophen   Tablet 650 milliGRAM(s) Oral every 6 hours PRN pain  ALPRAZolam 0.5 milliGRAM(s) Oral two times a day PRN anxiety  aluminum hydroxide/magnesium hydroxide/simethicone Suspension 30 milliLiter(s) Oral every 6 hours PRN Dyspepsia  bisacodyl 5 milliGRAM(s) Oral every 12 hours PRN Constipation  cyclobenzaprine 10 milliGRAM(s) Oral three times a day PRN Muscle Spasm  haloperidol     Tablet 5 milliGRAM(s) Oral every 6 hours PRN agitation  haloperidol    Injectable 5 milliGRAM(s) IntraMuscular once PRN Severe Agitation  HYDROcodone  7.5 mG/acetaminophen 300 mG. 2 Tablet(s) Oral every 8 hours PRN Moderate Pain (4 - 6)  lidocaine 2% Gel 1 Application(s) Topical three times a day PRN pain  lidocaine 5% Ointment 1 Application(s) Topical three times a day PRN pain  LORazepam     Tablet 2 milliGRAM(s) Oral every 8 hours PRN severe anxiety  magnesium hydroxide Suspension 30 milliLiter(s) Oral daily PRN Constipation  OLANZapine Injectable 10 milliGRAM(s) IntraMuscular once PRN agitation  zolpidem 10 milliGRAM(s) Oral at bedtime PRN Insomnia

## 2018-02-26 NOTE — PROGRESS NOTE BEHAVIORAL HEALTH - NS ED [facility] REVIEW OF ED CHART AVAILABLE INVESTIGATIONS REVIEWED
None available
Yes
None available
Yes
None available

## 2018-02-26 NOTE — PROGRESS NOTE BEHAVIORAL HEALTH - PROBLEM SELECTOR PLAN 1
Continue Cymbalta 60 mg    Ambien for insomnia
Continue Cymbalta 60 mg  Add Ambien for insomnia
Continue Cymbalta 60 mg    Ambien for insomnia
Continue Cymbalta 60 mg    Ambien for insomnia
Continue Cymbalta 60 mg    Ambien for insomnia increased to 10 mg
Continue Cymbalta 60 mg  Add Ambien for insomnia
Continue Cymbalta 60 mg    Ambien for insomnia increased to 10 mg
Continue Cymbalta 60 mg    Ambien for insomnia
Continue Cymbalta 60 mg  Add Ambien for insomnia

## 2018-02-26 NOTE — PROGRESS NOTE BEHAVIORAL HEALTH - NSBHCHARTREVIEWVS_PSY_A_CORE FT
Vital Signs Last 24 Hrs  T(C): 36.6 (26 Feb 2018 07:14), Max: 37.3 (25 Feb 2018 21:02)  T(F): 97.9 (26 Feb 2018 07:14), Max: 99.2 (25 Feb 2018 21:02)  HR: 94 (26 Feb 2018 07:14) (94 - 100)  BP: 131/85 (26 Feb 2018 07:14) (131/85 - 138/74)  BP(mean): --  RR: 18 (26 Feb 2018 07:14) (16 - 18)  SpO2: 100% (26 Feb 2018 07:14) (99% - 100%)
Vital Signs Last 24 Hrs  T(C): 37.2 (23 Feb 2018 08:52), Max: 37.2 (23 Feb 2018 08:52)  T(F): 99 (23 Feb 2018 08:52), Max: 99 (23 Feb 2018 08:52)  HR: 97 (23 Feb 2018 08:52) (97 - 97)  BP: 151/88 (23 Feb 2018 08:52) (151/88 - 151/88)  BP(mean): --  RR: 14 (23 Feb 2018 08:52) (14 - 14)  SpO2: 100% (23 Feb 2018 08:52) (100% - 100%)
ICU Vital Signs Last 24 Hrs  T(C): 37.2 (22 Feb 2018 08:59), Max: 37.2 (22 Feb 2018 08:59)  T(F): 99 (22 Feb 2018 08:59), Max: 99 (22 Feb 2018 08:59)  HR: 83 (22 Feb 2018 10:59) (83 - 106)  BP: 140/90 (22 Feb 2018 10:59) (133/94 - 154/92)  BP(mean): --  ABP: --  ABP(mean): --  RR: 14 (22 Feb 2018 10:59) (14 - 18)  SpO2: 100% (22 Feb 2018 10:59) (100% - 100%)
Vital Signs Last 24 Hrs  T(C): 36.6 (15 Feb 2018 07:25), Max: 37.3 (14 Feb 2018 20:17)  T(F): 97.8 (15 Feb 2018 07:25), Max: 99.1 (14 Feb 2018 20:17)  HR: 104 (15 Feb 2018 07:25) (104 - 127)  BP: 135/88 (15 Feb 2018 07:25) (135/88 - 139/94)  BP(mean): --  RR: 16 (15 Feb 2018 07:25) (16 - 16)  SpO2: 99% (15 Feb 2018 07:25) (99% - 100%)
Vital Signs Last 24 Hrs  T(C): 37.1 (16 Feb 2018 20:37), Max: 37.1 (16 Feb 2018 20:37)  T(F): 98.8 (16 Feb 2018 20:37), Max: 98.8 (16 Feb 2018 20:37)  HR: 100 (16 Feb 2018 20:37) (100 - 100)  BP: 142/83 (16 Feb 2018 20:37) (142/83 - 142/83)  BP(mean): --  RR: 16 (16 Feb 2018 20:37) (16 - 16)  SpO2: 98% (16 Feb 2018 20:37) (98% - 98%)
Vital Signs Last 24 Hrs  T(C): 37.2 (21 Feb 2018 08:28), Max: 37.4 (20 Feb 2018 20:51)  T(F): 99 (21 Feb 2018 08:28), Max: 99.3 (20 Feb 2018 20:51)  HR: 90 (21 Feb 2018 08:28) (90 - 100)  BP: 133/95 (21 Feb 2018 08:28) (133/95 - 146/94)  BP(mean): --  RR: 16 (21 Feb 2018 08:28) (16 - 16)  SpO2: 100% (21 Feb 2018 08:28) (99% - 100%)
Vital Signs Last 24 Hrs  T(C): 37.5 (14 Feb 2018 09:00), Max: 37.5 (14 Feb 2018 09:00)  T(F): 99.5 (14 Feb 2018 09:00), Max: 99.5 (14 Feb 2018 09:00)  HR: 110 (14 Feb 2018 09:00) (108 - 112)  BP: 140/100 (14 Feb 2018 09:00) (131/70 - 140/100)  BP(mean): --  RR: 14 (14 Feb 2018 09:00) (14 - 18)  SpO2: 99% (14 Feb 2018 09:00) (99% - 100%)
Vital Signs Last 24 Hrs  T(C): 37 (16 Feb 2018 07:48), Max: 37 (16 Feb 2018 07:48)  T(F): 98.6 (16 Feb 2018 07:48), Max: 98.6 (16 Feb 2018 07:48)  HR: 92 (16 Feb 2018 07:48) (92 - 100)  BP: 144/76 (16 Feb 2018 07:48) (140/82 - 144/76)  BP(mean): --  RR: 16 (16 Feb 2018 07:48) (16 - 16)  SpO2: 98% (16 Feb 2018 07:48) (98% - 98%)
Vital Signs Last 24 Hrs  T(C): 37.2 (19 Feb 2018 08:36), Max: 37.2 (19 Feb 2018 08:36)  T(F): 98.9 (19 Feb 2018 08:36), Max: 98.9 (19 Feb 2018 08:36)  HR: 102 (19 Feb 2018 08:36) (102 - 102)  BP: 142/84 (19 Feb 2018 08:36) (134/82 - 142/84)  BP(mean): --  RR: 16 (19 Feb 2018 08:36) (16 - 16)  SpO2: 100% (19 Feb 2018 08:36) (100% - 100%)
Vital Signs Last 24 Hrs  T(C): 37.4 (20 Feb 2018 07:25), Max: 37.4 (20 Feb 2018 07:25)  T(F): 99.4 (20 Feb 2018 07:25), Max: 99.4 (20 Feb 2018 07:25)  HR: 84 (20 Feb 2018 07:25) (84 - 84)  BP: 141/88 (20 Feb 2018 07:25) (141/88 - 144/88)  BP(mean): --  RR: 16 (20 Feb 2018 07:25) (16 - 16)  SpO2: 99% (20 Feb 2018 07:25) (99% - 100%)

## 2018-02-26 NOTE — PROGRESS NOTE BEHAVIORAL HEALTH - NSBHFUPTYPE_PSY_A_CORE
Inpatient
Inpatient-On Service Note

## 2018-02-26 NOTE — PROGRESS NOTE BEHAVIORAL HEALTH - ESTIMATED INTELLIGENCE
Above average
Average
Above average

## 2018-02-26 NOTE — PROGRESS NOTE BEHAVIORAL HEALTH - NS ED BHA MSE GENERAL APPEARANCE
Well developed

## 2018-02-26 NOTE — PROGRESS NOTE BEHAVIORAL HEALTH - THOUGHT CONTENT
Preoccupations/Ruminations
Preoccupations
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
## 2018-02-26 NOTE — PROGRESS NOTE BEHAVIORAL HEALTH - NSBHADMITIPREASON_PSY_A_CORE
Danger to self; mental illness expected to respond to inpatient care

## 2018-02-26 NOTE — PROGRESS NOTE BEHAVIORAL HEALTH - PROBLEM SELECTOR PROBLEM 2
Borderline personality disorder in adult

## 2018-02-26 NOTE — PROGRESS NOTE BEHAVIORAL HEALTH - IMPULSE CONTROL
Impaired
Impaired/Other
Impaired

## 2018-02-26 NOTE — PROGRESS NOTE BEHAVIORAL HEALTH - THOUGHT PROCESS
Impaired reasoning/Circumstantial/Overinclusive
Overinclusive/Linear
Overinclusive
Overinclusive/Linear
Overinclusive
Overinclusive/Linear
Linear/Overinclusive
Overinclusive/Linear
Overinclusive/Linear
Linear/Overinclusive
Linear/Overinclusive

## 2018-02-26 NOTE — PROGRESS NOTE BEHAVIORAL HEALTH - AFFECT CONGRUENCE
Congruent

## 2018-02-26 NOTE — PROGRESS NOTE BEHAVIORAL HEALTH - PERCEPTIONS
Auditory hallucinations/No abnormalities
No abnormalities

## 2018-02-26 NOTE — PROGRESS NOTE BEHAVIORAL HEALTH - NSBHPTASSESSDT_PSY_A_CORE
15-Feb-2018 13:24
16-Feb-2018 10:44
17-Feb-2018 11:47
18-Feb-2018 11:39
20-Feb-2018 10:10
21-Feb-2018 14:39
22-Feb-2018 10:00
23-Feb-2018 13:38
26-Feb-2018 11:24
19-Feb-2018 14:14
14-Feb-2018 11:09

## 2018-02-26 NOTE — PROGRESS NOTE BEHAVIORAL HEALTH - BEHAVIOR
Cooperative

## 2018-02-26 NOTE — PROGRESS NOTE BEHAVIORAL HEALTH - MOOD
Depressed/Anxious
Irritable/Anxious/Angry/Depressed
Depressed/Anxious
Anxious/Irritable/Depressed/Angry
Depressed
Depressed/Anxious/Irritable
Depressed/Anxious
Depressed
Depressed/Anxious/Angry
Normal
Depressed/Irritable/Anxious/Angry

## 2018-02-26 NOTE — PROGRESS NOTE BEHAVIORAL HEALTH - PRIMARY DX
Other depression
MDD (major depressive disorder), recurrent episode, moderate
Other depression
Other depression
MDD (major depressive disorder), recurrent episode, moderate
Other depression
Other depression
MDD (major depressive disorder), recurrent episode, moderate

## 2018-02-26 NOTE — PROGRESS NOTE BEHAVIORAL HEALTH - AFFECT QUALITY
Irritable/Anxious/Depressed
Depressed/Anxious
Depressed
Depressed/Anxious/Irritable
Depressed/Anxious
Depressed
Depressed/Anxious
Euthymic
Depressed/Anxious

## 2018-02-26 NOTE — PROGRESS NOTE BEHAVIORAL HEALTH - PROBLEM SELECTOR PROBLEM 1
Other depression

## 2018-02-26 NOTE — PROGRESS NOTE BEHAVIORAL HEALTH - AFFECT RANGE
Constricted
Labile
Constricted
Labile
Constricted
Labile
Constricted
Labile

## 2018-04-06 NOTE — PROGRESS NOTE BEHAVIORAL HEALTH - NSBHADMITIPDSM_PSY_A_CORE
see above for Axis I, II, III
Statement Selected
see above for Axis I, II, III

## 2018-04-19 ENCOUNTER — APPOINTMENT (OUTPATIENT)
Dept: NEUROSURGERY | Facility: CLINIC | Age: 28
End: 2018-04-19
Payer: COMMERCIAL

## 2018-04-19 DIAGNOSIS — E78.5 HYPERLIPIDEMIA, UNSPECIFIED: ICD-10-CM

## 2018-04-19 DIAGNOSIS — I10 ESSENTIAL (PRIMARY) HYPERTENSION: ICD-10-CM

## 2018-04-19 DIAGNOSIS — M54.5 LOW BACK PAIN: ICD-10-CM

## 2018-04-19 DIAGNOSIS — R20.0 ANESTHESIA OF SKIN: ICD-10-CM

## 2018-04-19 PROCEDURE — 99244 OFF/OP CNSLTJ NEW/EST MOD 40: CPT

## 2018-04-20 VITALS
RESPIRATION RATE: 16 BRPM | HEIGHT: 67 IN | TEMPERATURE: 98.8 F | SYSTOLIC BLOOD PRESSURE: 156 MMHG | HEART RATE: 93 BPM | WEIGHT: 240 LBS | BODY MASS INDEX: 37.67 KG/M2 | DIASTOLIC BLOOD PRESSURE: 85 MMHG

## 2018-04-20 PROBLEM — I10 HIGH BLOOD PRESSURE: Status: RESOLVED | Noted: 2018-04-20 | Resolved: 2018-04-20

## 2018-04-20 PROBLEM — E78.5 HYPERLIPIDEMIA: Status: RESOLVED | Noted: 2018-04-20 | Resolved: 2018-04-20

## 2018-04-22 PROBLEM — M54.5 LUMBAR PAIN WITH RADIATION DOWN LEFT LEG: Status: ACTIVE | Noted: 2018-04-22

## 2018-04-22 PROBLEM — R20.0 NUMBNESS IN LEFT LEG: Status: ACTIVE | Noted: 2018-04-22

## 2018-06-03 ENCOUNTER — EMERGENCY (EMERGENCY)
Facility: HOSPITAL | Age: 28
LOS: 0 days | Discharge: HOME | End: 2018-06-03
Attending: EMERGENCY MEDICINE | Admitting: EMERGENCY MEDICINE
Payer: COMMERCIAL

## 2018-06-03 VITALS
WEIGHT: 244.93 LBS | TEMPERATURE: 98 F | SYSTOLIC BLOOD PRESSURE: 151 MMHG | DIASTOLIC BLOOD PRESSURE: 80 MMHG | RESPIRATION RATE: 18 BRPM | OXYGEN SATURATION: 98 % | HEART RATE: 114 BPM

## 2018-06-03 DIAGNOSIS — Z98.890 OTHER SPECIFIED POSTPROCEDURAL STATES: Chronic | ICD-10-CM

## 2018-06-03 DIAGNOSIS — Z91.010 ALLERGY TO PEANUTS: ICD-10-CM

## 2018-06-03 DIAGNOSIS — M54.5 LOW BACK PAIN: ICD-10-CM

## 2018-06-03 DIAGNOSIS — Z90.49 ACQUIRED ABSENCE OF OTHER SPECIFIED PARTS OF DIGESTIVE TRACT: Chronic | ICD-10-CM

## 2018-06-03 DIAGNOSIS — F32.9 MAJOR DEPRESSIVE DISORDER, SINGLE EPISODE, UNSPECIFIED: ICD-10-CM

## 2018-06-03 DIAGNOSIS — Z79.891 LONG TERM (CURRENT) USE OF OPIATE ANALGESIC: ICD-10-CM

## 2018-06-03 DIAGNOSIS — I10 ESSENTIAL (PRIMARY) HYPERTENSION: ICD-10-CM

## 2018-06-03 DIAGNOSIS — Z91.018 ALLERGY TO OTHER FOODS: ICD-10-CM

## 2018-06-03 DIAGNOSIS — E28.2 POLYCYSTIC OVARIAN SYNDROME: ICD-10-CM

## 2018-06-03 DIAGNOSIS — F41.9 ANXIETY DISORDER, UNSPECIFIED: ICD-10-CM

## 2018-06-03 PROCEDURE — 99283 EMERGENCY DEPT VISIT LOW MDM: CPT

## 2018-06-03 RX ORDER — KETOROLAC TROMETHAMINE 30 MG/ML
60 SYRINGE (ML) INJECTION ONCE
Qty: 0 | Refills: 0 | Status: DISCONTINUED | OUTPATIENT
Start: 2018-06-03 | End: 2018-06-03

## 2018-06-03 RX ORDER — KETOROLAC TROMETHAMINE 30 MG/ML
30 SYRINGE (ML) INJECTION ONCE
Qty: 0 | Refills: 0 | Status: DISCONTINUED | OUTPATIENT
Start: 2018-06-03 | End: 2018-06-03

## 2018-06-03 RX ORDER — LIDOCAINE 4 G/100G
1 CREAM TOPICAL ONCE
Qty: 0 | Refills: 0 | Status: COMPLETED | OUTPATIENT
Start: 2018-06-03 | End: 2018-06-03

## 2018-06-03 RX ADMIN — Medication 60 MILLIGRAM(S): at 12:09

## 2018-06-03 NOTE — ED STATDOCS - ATTENDING CONTRIBUTION TO CARE
I, Yaakov Chavarria, performed the initial face to face bedside interview with this patient regarding history of present illness, review of symptoms and relevant past medical, social and family history.  I completed an independent physical examination.  I was the initial provider who evaluated this patient. I have signed out the follow up of any pending tests (i.e. labs, radiological studies) to the ACP.  I have communicated the patient’s plan of care and disposition with the ACP.  The history, relevant review of systems, past medical and surgical history, medical decision making, and physical examination was documented by the scribe in my presence and I attest to the accuracy of the documentation.

## 2018-06-03 NOTE — ED ADULT NURSE NOTE - CHPI ED SYMPTOMS NEG
no decreased eating/drinking/no nausea/no numbness/no tingling/no dizziness/no chills/no weakness/no vomiting/no fever

## 2018-06-03 NOTE — ED STATDOCS - PROGRESS NOTE DETAILS
Pt. evaluated by Dr. Chavarria in intake.  Pt. with normal gait.  States "morphine just makes me itch".  No morphine to be ordered as per attending.  Will give Toradol and Lidoderm patch.  Malinda Whitley PA-C Pt. refusing LIdoderm patch.  Malinda Whitley PA-C I spoke to Dr. Ha on the phone saying that pt called him and stated she wanted to be evaluated by psychiatry, i spoke to pt in the presence of PA and denied HI, SI twice. Will give pt IV Toradol.

## 2018-06-03 NOTE — ED ADULT NURSE NOTE - OBJECTIVE STATEMENT
Pt complains that chronic low back pain has "been unmanageable," reports pain radiating down L leg. Pt states pain is chronic and will have "triggers" for pain, this time has no "triggers" and pain just intensified over the last 2 days with no trauma. Pt states her pain is unresponsive to hydrocodone/apap and she cannot take steroids due to abrasion in her eye that she is being worked up for and the pain is exacerbated by her fallopian tubes putting pressure on the nerve in her back due to accumulating fluid in them. Pt states she called her pain management MD who referred her to ED and asked ED MD to called pain management MD on arrival for further discussion to manage her pain

## 2018-06-03 NOTE — ED STATDOCS - OBJECTIVE STATEMENT
26 y/o f Anxiety, Depression, HTN on Lisinopril, chronic back pain presenting to the ED c/o chronic lower back pain, with worsening sciatica pain down BLLE. States pain is shooting. Denies fever, chills, loss of bladder function, any other acute c/o. Hx of nerve ablations, epidurals in the past for current sx. Prescribed Gabapentin 900mg at night, 300mg in the morning, muscle relaxer, hydrocodone x4/day by pain management. Recently saw spinal surgeon Dr. Oz Sena. Pain management Dr. Zak Nguyen.

## 2018-06-03 NOTE — ED STATDOCS - MUSCULOSKELETAL, MLM
+Left lower lumbar perispinal muscles TTP. +limited extension of lumbar spine +ROM limited in all motions.

## 2018-06-03 NOTE — ED STATDOCS - PMH
Anxiety    Cholecystitis    Chronic back pain    Depression    HTN (hypertension)    Mixed connective tissue disease    Polycystic ovarian syndrome

## 2018-06-03 NOTE — ED STATDOCS - MEDICAL DECISION MAKING DETAILS
Toradol, topical lidocaine. Toradol, topical lidocaine.  No red flags for cord compression.  Pt denies SI/HI

## 2018-06-03 NOTE — ED ADULT NURSE REASSESSMENT NOTE - NS ED NURSE REASSESS COMMENT FT1
Pt was verbally abusive to Dr Chavarria upon discharge and stated "FUCK YOU" to Dr Chavarria at the intake desk. Pt stated she was not satisfied with the treatment she received while her mother was asking her to please take the discharge papers and leave. Dr Chavarria attempted to have a conversation with the patient and she continued to escalate verbally and then walked out.

## 2018-06-03 NOTE — ED ADULT NURSE REASSESSMENT NOTE - NS ED NURSE REASSESS COMMENT FT1
Pt yelling and crying and stating she does not want an "IM injection" Refusing lidocaine patch and toradol at this time. Dr. Shields made aware. MD shields at bedside

## 2018-06-04 ENCOUNTER — INPATIENT (INPATIENT)
Facility: HOSPITAL | Age: 28
LOS: 8 days | Discharge: HOME | End: 2018-06-13
Attending: PSYCHIATRY & NEUROLOGY | Admitting: PSYCHIATRY & NEUROLOGY
Payer: COMMERCIAL

## 2018-06-04 VITALS — WEIGHT: 240.08 LBS

## 2018-06-04 DIAGNOSIS — Z98.890 OTHER SPECIFIED POSTPROCEDURAL STATES: Chronic | ICD-10-CM

## 2018-06-04 DIAGNOSIS — Z90.49 ACQUIRED ABSENCE OF OTHER SPECIFIED PARTS OF DIGESTIVE TRACT: Chronic | ICD-10-CM

## 2018-06-04 LAB
ALBUMIN SERPL ELPH-MCNC: 3.5 G/DL — SIGNIFICANT CHANGE UP (ref 3.3–5)
ALP SERPL-CCNC: 78 U/L — SIGNIFICANT CHANGE UP (ref 40–120)
ALT FLD-CCNC: 59 U/L — SIGNIFICANT CHANGE UP (ref 12–78)
AMPHET UR-MCNC: NEGATIVE — SIGNIFICANT CHANGE UP
ANION GAP SERPL CALC-SCNC: 7 MMOL/L — SIGNIFICANT CHANGE UP (ref 5–17)
APAP SERPL-MCNC: <2 UG/ML — LOW (ref 10–30)
APPEARANCE UR: CLEAR — SIGNIFICANT CHANGE UP
AST SERPL-CCNC: 71 U/L — HIGH (ref 15–37)
BACTERIA # UR AUTO: ABNORMAL
BARBITURATES UR SCN-MCNC: NEGATIVE — SIGNIFICANT CHANGE UP
BASOPHILS # BLD AUTO: 0.06 K/UL — SIGNIFICANT CHANGE UP (ref 0–0.2)
BASOPHILS NFR BLD AUTO: 0.4 % — SIGNIFICANT CHANGE UP (ref 0–2)
BENZODIAZ UR-MCNC: POSITIVE — SIGNIFICANT CHANGE UP
BILIRUB SERPL-MCNC: 0.3 MG/DL — SIGNIFICANT CHANGE UP (ref 0.2–1.2)
BILIRUB UR-MCNC: NEGATIVE — SIGNIFICANT CHANGE UP
BUN SERPL-MCNC: 15 MG/DL — SIGNIFICANT CHANGE UP (ref 7–23)
CALCIUM SERPL-MCNC: 9.1 MG/DL — SIGNIFICANT CHANGE UP (ref 8.5–10.1)
CHLORIDE SERPL-SCNC: 105 MMOL/L — SIGNIFICANT CHANGE UP (ref 96–108)
CO2 SERPL-SCNC: 26 MMOL/L — SIGNIFICANT CHANGE UP (ref 22–31)
COCAINE METAB.OTHER UR-MCNC: NEGATIVE — SIGNIFICANT CHANGE UP
COLOR SPEC: YELLOW — SIGNIFICANT CHANGE UP
CREAT SERPL-MCNC: 0.86 MG/DL — SIGNIFICANT CHANGE UP (ref 0.5–1.3)
DIFF PNL FLD: NEGATIVE — SIGNIFICANT CHANGE UP
EOSINOPHIL # BLD AUTO: 0.01 K/UL — SIGNIFICANT CHANGE UP (ref 0–0.5)
EOSINOPHIL NFR BLD AUTO: 0.1 % — SIGNIFICANT CHANGE UP (ref 0–6)
EPI CELLS # UR: SIGNIFICANT CHANGE UP
ETHANOL SERPL-MCNC: <10 MG/DL — SIGNIFICANT CHANGE UP (ref 0–10)
GLUCOSE SERPL-MCNC: 82 MG/DL — SIGNIFICANT CHANGE UP (ref 70–99)
GLUCOSE UR QL: NEGATIVE MG/DL — SIGNIFICANT CHANGE UP
HCT VFR BLD CALC: 41.1 % — SIGNIFICANT CHANGE UP (ref 34.5–45)
HGB BLD-MCNC: 14.5 G/DL — SIGNIFICANT CHANGE UP (ref 11.5–15.5)
IMM GRANULOCYTES NFR BLD AUTO: 1.1 % — SIGNIFICANT CHANGE UP (ref 0–1.5)
KETONES UR-MCNC: NEGATIVE — SIGNIFICANT CHANGE UP
LEUKOCYTE ESTERASE UR-ACNC: ABNORMAL
LYMPHOCYTES # BLD AUTO: 18.8 % — SIGNIFICANT CHANGE UP (ref 13–44)
LYMPHOCYTES # BLD AUTO: 2.98 K/UL — SIGNIFICANT CHANGE UP (ref 1–3.3)
MCHC RBC-ENTMCNC: 28.1 PG — SIGNIFICANT CHANGE UP (ref 27–34)
MCHC RBC-ENTMCNC: 35.3 GM/DL — SIGNIFICANT CHANGE UP (ref 32–36)
MCV RBC AUTO: 79.7 FL — LOW (ref 80–100)
METHADONE UR-MCNC: NEGATIVE — SIGNIFICANT CHANGE UP
MONOCYTES # BLD AUTO: 0.98 K/UL — HIGH (ref 0–0.9)
MONOCYTES NFR BLD AUTO: 6.2 % — SIGNIFICANT CHANGE UP (ref 2–14)
NEUTROPHILS # BLD AUTO: 11.6 K/UL — HIGH (ref 1.8–7.4)
NEUTROPHILS NFR BLD AUTO: 73.4 % — SIGNIFICANT CHANGE UP (ref 43–77)
NITRITE UR-MCNC: NEGATIVE — SIGNIFICANT CHANGE UP
NRBC # BLD: 0 /100 WBCS — SIGNIFICANT CHANGE UP (ref 0–0)
OPIATES UR-MCNC: POSITIVE — SIGNIFICANT CHANGE UP
PCP SPEC-MCNC: SIGNIFICANT CHANGE UP
PCP UR-MCNC: NEGATIVE — SIGNIFICANT CHANGE UP
PH UR: 6 — SIGNIFICANT CHANGE UP (ref 5–8)
PLATELET # BLD AUTO: 391 K/UL — SIGNIFICANT CHANGE UP (ref 150–400)
POTASSIUM SERPL-MCNC: 4.1 MMOL/L — SIGNIFICANT CHANGE UP (ref 3.5–5.3)
POTASSIUM SERPL-SCNC: 4.1 MMOL/L — SIGNIFICANT CHANGE UP (ref 3.5–5.3)
PROT SERPL-MCNC: 8.5 GM/DL — HIGH (ref 6–8.3)
PROT UR-MCNC: 30 MG/DL
RBC # BLD: 5.16 M/UL — SIGNIFICANT CHANGE UP (ref 3.8–5.2)
RBC # FLD: 13.5 % — SIGNIFICANT CHANGE UP (ref 10.3–14.5)
RBC CASTS # UR COMP ASSIST: SIGNIFICANT CHANGE UP /HPF (ref 0–4)
SALICYLATES SERPL-MCNC: <1.7 MG/DL — LOW (ref 2.8–20)
SODIUM SERPL-SCNC: 138 MMOL/L — SIGNIFICANT CHANGE UP (ref 135–145)
SP GR SPEC: 1.01 — SIGNIFICANT CHANGE UP (ref 1.01–1.02)
THC UR QL: NEGATIVE — SIGNIFICANT CHANGE UP
TSH SERPL-MCNC: 0.83 UIU/ML — SIGNIFICANT CHANGE UP (ref 0.36–3.74)
UROBILINOGEN FLD QL: NEGATIVE MG/DL — SIGNIFICANT CHANGE UP
WBC # BLD: 15.81 K/UL — HIGH (ref 3.8–10.5)
WBC # FLD AUTO: 15.81 K/UL — HIGH (ref 3.8–10.5)
WBC UR QL: SIGNIFICANT CHANGE UP

## 2018-06-04 PROCEDURE — 93010 ELECTROCARDIOGRAM REPORT: CPT

## 2018-06-04 PROCEDURE — 90792 PSYCH DIAG EVAL W/MED SRVCS: CPT

## 2018-06-04 PROCEDURE — G0427: CPT | Mod: GT

## 2018-06-04 PROCEDURE — 99285 EMERGENCY DEPT VISIT HI MDM: CPT

## 2018-06-04 RX ORDER — ACETAMINOPHEN 500 MG
1000 TABLET ORAL ONCE
Qty: 0 | Refills: 0 | Status: COMPLETED | OUTPATIENT
Start: 2018-06-04 | End: 2018-06-04

## 2018-06-04 RX ORDER — OXYCODONE HYDROCHLORIDE 5 MG/1
5 TABLET ORAL ONCE
Qty: 0 | Refills: 0 | Status: DISCONTINUED | OUTPATIENT
Start: 2018-06-04 | End: 2018-06-04

## 2018-06-04 RX ORDER — CYCLOBENZAPRINE HYDROCHLORIDE 10 MG/1
10 TABLET, FILM COATED ORAL ONCE
Qty: 0 | Refills: 0 | Status: COMPLETED | OUTPATIENT
Start: 2018-06-04 | End: 2018-06-04

## 2018-06-04 RX ORDER — LIDOCAINE 4 G/100G
1 CREAM TOPICAL ONCE
Qty: 0 | Refills: 0 | Status: COMPLETED | OUTPATIENT
Start: 2018-06-04 | End: 2018-06-04

## 2018-06-04 RX ADMIN — LIDOCAINE 1 PATCH: 4 CREAM TOPICAL at 21:38

## 2018-06-04 RX ADMIN — Medication 1000 MILLIGRAM(S): at 18:04

## 2018-06-04 RX ADMIN — CYCLOBENZAPRINE HYDROCHLORIDE 10 MILLIGRAM(S): 10 TABLET, FILM COATED ORAL at 19:27

## 2018-06-04 RX ADMIN — OXYCODONE HYDROCHLORIDE 5 MILLIGRAM(S): 5 TABLET ORAL at 19:27

## 2018-06-04 NOTE — ED PROVIDER NOTE - MUSCULOSKELETAL, MLM
Low salt, low cholesterol diet
Spine appears normal, range of motion is not limited, +Tense lower back

## 2018-06-04 NOTE — ED PROVIDER NOTE - CARE PLAN
Principal Discharge DX:	Suicidal behavior with attempted self-injury  Secondary Diagnosis:	Depression, unspecified depression type

## 2018-06-04 NOTE — ED STATDOCS - PROGRESS NOTE DETAILS
Radha BIRD for ED attending, Dr. Lynne: 28 y/o female with a PMHx of anxiety, depression, chronic back pain, HTN, PCOS presents to the ED c/o SI. Pt states she has had SI and tried to hang herself last night. Pt sees a psychiatrist who called Dr. Gould for pt to be admitted with a medication change. Pt was recently fired from her job due to falling asleep on the job from taking the psych medications. Will send pt to main ED for further evaluation.

## 2018-06-04 NOTE — ED ADULT NURSE NOTE - OBJECTIVE STATEMENT
Pt reports that she has chronic back pain/sciatica/disability that contributes to her depression and suicidal thoughts. Pt reports that she has suicidal thoughts at this time, but denies plan. Pt mother reports that pt has prior SA by taking more blood pressure pills than prescribed. Pt belongings taken by mother. Pt reports that her psychiatrist promised her a room in the psych department upstairs and that she will walk out if she needs to stay overnight in ED.  Pt and mother notified that RN cannot guarantee room availability or specific timing, but verified with Psych NP that pt will be evaluated today.  Labs and meds as ordered. Pt reports she has not taken any tylenol/vicodin today.  Will monitor for relief.  Constant observation maintained for safety.

## 2018-06-04 NOTE — ED PROVIDER NOTE - OBJECTIVE STATEMENT
26 y/o female with PMHx of PCOS presents to the ED with suicidal thoughts and sciatic pain to the left leg. Pt states that she tried to commit suicide yesterday due to her physical and mental pain. Pt states that she has gone through so much within the past seven months and is currently a different person and believes that she does not deserve such bad luck. Yesterday, she and her mother spoke about relieving her physical pain which is what brought her to the ED today. 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 Psychiatric meds were changed by doctor Sb Ha. Pt was approved for medical marijuana due to chronic pain, but pain has been higher in the past 2 days.

## 2018-06-04 NOTE — ED ADULT NURSE REASSESSMENT NOTE - NS ED NURSE REASSESS COMMENT FT1
patient calm and cooperative at this time. brief episode of agitation due to waiting now resolved. spoke to telepsych on phone, who is currently speaking to patient's mother. during episode of agitation, patient got dressed. instructed that she would have to remove clothing and get back in patient gown if she is admitted to hospital, patient agreeable to plan. assistant nurse manager aware. awaiting telepsych consult, will continue to monitor.

## 2018-06-05 LAB
CHOLEST SERPL-MCNC: 238 MG/DL — HIGH (ref 10–199)
CHOLEST SERPL-MCNC: 250 MG/DL — HIGH (ref 10–199)
HBA1C BLD-MCNC: 5.6 % — SIGNIFICANT CHANGE UP (ref 4–5.6)
HCT VFR BLD CALC: 41.5 % — SIGNIFICANT CHANGE UP (ref 34.5–45)
HDLC SERPL-MCNC: 46 MG/DL — SIGNIFICANT CHANGE UP (ref 40–125)
HDLC SERPL-MCNC: 48 MG/DL — SIGNIFICANT CHANGE UP (ref 40–125)
HGB BLD-MCNC: 14.6 G/DL — SIGNIFICANT CHANGE UP (ref 11.5–15.5)
LIPID PNL WITH DIRECT LDL SERPL: 154 MG/DL — HIGH
LIPID PNL WITH DIRECT LDL SERPL: 170 MG/DL — HIGH
MCHC RBC-ENTMCNC: 28 PG — SIGNIFICANT CHANGE UP (ref 27–34)
MCHC RBC-ENTMCNC: 35.2 GM/DL — SIGNIFICANT CHANGE UP (ref 32–36)
MCV RBC AUTO: 79.5 FL — LOW (ref 80–100)
NRBC # BLD: 0 /100 WBCS — SIGNIFICANT CHANGE UP (ref 0–0)
PLATELET # BLD AUTO: 407 K/UL — HIGH (ref 150–400)
RBC # BLD: 5.22 M/UL — HIGH (ref 3.8–5.2)
RBC # FLD: 13.2 % — SIGNIFICANT CHANGE UP (ref 10.3–14.5)
TOTAL CHOLESTEROL/HDL RATIO MEASUREMENT: 5.2 RATIO — SIGNIFICANT CHANGE UP (ref 3.3–7.1)
TOTAL CHOLESTEROL/HDL RATIO MEASUREMENT: 5.2 RATIO — SIGNIFICANT CHANGE UP (ref 3.3–7.1)
TRIGL SERPL-MCNC: 161 MG/DL — HIGH (ref 10–149)
TRIGL SERPL-MCNC: 190 MG/DL — HIGH (ref 10–149)
WBC # BLD: 14.37 K/UL — HIGH (ref 3.8–10.5)
WBC # FLD AUTO: 14.37 K/UL — HIGH (ref 3.8–10.5)

## 2018-06-05 PROCEDURE — 99223 1ST HOSP IP/OBS HIGH 75: CPT

## 2018-06-05 RX ORDER — ZOLPIDEM TARTRATE 10 MG/1
5 TABLET ORAL AT BEDTIME
Qty: 0 | Refills: 0 | Status: DISCONTINUED | OUTPATIENT
Start: 2018-06-05 | End: 2018-06-05

## 2018-06-05 RX ORDER — LAMOTRIGINE 25 MG/1
200 TABLET, ORALLY DISINTEGRATING ORAL ONCE
Qty: 0 | Refills: 0 | Status: COMPLETED | OUTPATIENT
Start: 2018-06-05 | End: 2018-06-05

## 2018-06-05 RX ORDER — ZOLPIDEM TARTRATE 10 MG/1
10 TABLET ORAL AT BEDTIME
Qty: 0 | Refills: 0 | Status: DISCONTINUED | OUTPATIENT
Start: 2018-06-05 | End: 2018-06-12

## 2018-06-05 RX ORDER — LIDOCAINE 4 G/100G
1 CREAM TOPICAL THREE TIMES A DAY
Qty: 0 | Refills: 0 | Status: DISCONTINUED | OUTPATIENT
Start: 2018-06-05 | End: 2018-06-13

## 2018-06-05 RX ORDER — OXYCODONE AND ACETAMINOPHEN 5; 325 MG/1; MG/1
1 TABLET ORAL ONCE
Qty: 0 | Refills: 0 | Status: DISCONTINUED | OUTPATIENT
Start: 2018-06-05 | End: 2018-06-05

## 2018-06-05 RX ORDER — CITALOPRAM 10 MG/1
20 TABLET, FILM COATED ORAL DAILY
Qty: 0 | Refills: 0 | Status: DISCONTINUED | OUTPATIENT
Start: 2018-06-05 | End: 2018-06-05

## 2018-06-05 RX ORDER — CARVEDILOL PHOSPHATE 80 MG/1
6.25 CAPSULE, EXTENDED RELEASE ORAL EVERY 12 HOURS
Qty: 0 | Refills: 0 | Status: DISCONTINUED | OUTPATIENT
Start: 2018-06-05 | End: 2018-06-05

## 2018-06-05 RX ORDER — LISINOPRIL 2.5 MG/1
20 TABLET ORAL DAILY
Qty: 0 | Refills: 0 | Status: DISCONTINUED | OUTPATIENT
Start: 2018-06-05 | End: 2018-06-09

## 2018-06-05 RX ORDER — LAMOTRIGINE 25 MG/1
200 TABLET, ORALLY DISINTEGRATING ORAL AT BEDTIME
Qty: 0 | Refills: 0 | Status: DISCONTINUED | OUTPATIENT
Start: 2018-06-05 | End: 2018-06-05

## 2018-06-05 RX ORDER — ALPRAZOLAM 0.25 MG
1 TABLET ORAL ONCE
Qty: 0 | Refills: 0 | Status: DISCONTINUED | OUTPATIENT
Start: 2018-06-05 | End: 2018-06-05

## 2018-06-05 RX ORDER — IBUPROFEN 200 MG
600 TABLET ORAL EVERY 6 HOURS
Qty: 0 | Refills: 0 | Status: DISCONTINUED | OUTPATIENT
Start: 2018-06-05 | End: 2018-06-13

## 2018-06-05 RX ORDER — GABAPENTIN 400 MG/1
600 CAPSULE ORAL AT BEDTIME
Qty: 0 | Refills: 0 | Status: DISCONTINUED | OUTPATIENT
Start: 2018-06-05 | End: 2018-06-13

## 2018-06-05 RX ORDER — CYCLOBENZAPRINE HYDROCHLORIDE 10 MG/1
5 TABLET, FILM COATED ORAL THREE TIMES A DAY
Qty: 0 | Refills: 0 | Status: DISCONTINUED | OUTPATIENT
Start: 2018-06-05 | End: 2018-06-06

## 2018-06-05 RX ORDER — GABAPENTIN 400 MG/1
900 CAPSULE ORAL ONCE
Qty: 0 | Refills: 0 | Status: COMPLETED | OUTPATIENT
Start: 2018-06-05 | End: 2018-06-05

## 2018-06-05 RX ORDER — LISINOPRIL 2.5 MG/1
10 TABLET ORAL DAILY
Qty: 0 | Refills: 0 | Status: DISCONTINUED | OUTPATIENT
Start: 2018-06-05 | End: 2018-06-05

## 2018-06-05 RX ORDER — DULOXETINE HYDROCHLORIDE 30 MG/1
30 CAPSULE, DELAYED RELEASE ORAL DAILY
Qty: 0 | Refills: 0 | Status: DISCONTINUED | OUTPATIENT
Start: 2018-06-05 | End: 2018-06-08

## 2018-06-05 RX ORDER — LAMOTRIGINE 25 MG/1
300 TABLET, ORALLY DISINTEGRATING ORAL AT BEDTIME
Qty: 0 | Refills: 0 | Status: DISCONTINUED | OUTPATIENT
Start: 2018-06-05 | End: 2018-06-13

## 2018-06-05 RX ADMIN — CYCLOBENZAPRINE HYDROCHLORIDE 5 MILLIGRAM(S): 10 TABLET, FILM COATED ORAL at 09:36

## 2018-06-05 RX ADMIN — LISINOPRIL 10 MILLIGRAM(S): 2.5 TABLET ORAL at 00:23

## 2018-06-05 RX ADMIN — LAMOTRIGINE 200 MILLIGRAM(S): 25 TABLET, ORALLY DISINTEGRATING ORAL at 00:23

## 2018-06-05 RX ADMIN — Medication 0.1 MILLIGRAM(S): at 00:23

## 2018-06-05 RX ADMIN — LISINOPRIL 20 MILLIGRAM(S): 2.5 TABLET ORAL at 12:06

## 2018-06-05 RX ADMIN — CITALOPRAM 20 MILLIGRAM(S): 10 TABLET, FILM COATED ORAL at 09:31

## 2018-06-05 RX ADMIN — CYCLOBENZAPRINE HYDROCHLORIDE 5 MILLIGRAM(S): 10 TABLET, FILM COATED ORAL at 02:31

## 2018-06-05 RX ADMIN — OXYCODONE AND ACETAMINOPHEN 1 TABLET(S): 5; 325 TABLET ORAL at 03:30

## 2018-06-05 RX ADMIN — Medication 600 MILLIGRAM(S): at 10:48

## 2018-06-05 RX ADMIN — LAMOTRIGINE 300 MILLIGRAM(S): 25 TABLET, ORALLY DISINTEGRATING ORAL at 21:35

## 2018-06-05 RX ADMIN — Medication 1 MILLIGRAM(S): at 00:24

## 2018-06-05 RX ADMIN — Medication 600 MILLIGRAM(S): at 05:35

## 2018-06-05 RX ADMIN — LIDOCAINE 1 PATCH: 4 CREAM TOPICAL at 09:33

## 2018-06-05 RX ADMIN — Medication 0.1 MILLIGRAM(S): at 21:35

## 2018-06-05 RX ADMIN — Medication 600 MILLIGRAM(S): at 21:40

## 2018-06-05 RX ADMIN — Medication 2 MILLIGRAM(S): at 22:38

## 2018-06-05 RX ADMIN — GABAPENTIN 900 MILLIGRAM(S): 400 CAPSULE ORAL at 00:23

## 2018-06-05 RX ADMIN — Medication 2 MILLIGRAM(S): at 08:39

## 2018-06-05 RX ADMIN — Medication 0.1 MILLIGRAM(S): at 09:32

## 2018-06-05 RX ADMIN — CYCLOBENZAPRINE HYDROCHLORIDE 5 MILLIGRAM(S): 10 TABLET, FILM COATED ORAL at 22:38

## 2018-06-05 RX ADMIN — GABAPENTIN 600 MILLIGRAM(S): 400 CAPSULE ORAL at 21:35

## 2018-06-05 RX ADMIN — Medication 600 MILLIGRAM(S): at 09:36

## 2018-06-05 RX ADMIN — Medication 2 MILLIGRAM(S): at 16:29

## 2018-06-05 RX ADMIN — ZOLPIDEM TARTRATE 5 MILLIGRAM(S): 10 TABLET ORAL at 02:31

## 2018-06-05 RX ADMIN — ZOLPIDEM TARTRATE 10 MILLIGRAM(S): 10 TABLET ORAL at 21:35

## 2018-06-05 RX ADMIN — OXYCODONE AND ACETAMINOPHEN 1 TABLET(S): 5; 325 TABLET ORAL at 02:31

## 2018-06-05 RX ADMIN — Medication 600 MILLIGRAM(S): at 04:35

## 2018-06-05 RX ADMIN — Medication 600 MILLIGRAM(S): at 19:57

## 2018-06-05 NOTE — ED BEHAVIORAL HEALTH ASSESSMENT NOTE - SUICIDE RISK FACTORS
Highly impulsive behavior/Access to means (pills, firearms, etc.)/Substance abuse/dependence/Chronic pain or acute medical issue/Agitation/severe anxiety/Family history of suicide/Mood episode

## 2018-06-05 NOTE — ED BEHAVIORAL HEALTH ASSESSMENT NOTE - DESCRIPTION
Patient. remained in good behavioral control during interview "hardening of kidneys", polycystic ovaries, chronic pain back and knee, obese lives with mother and step father, only child, lived in [place] where she attended college and returned to this area in Dec 2016 "hardening of kidneys", polycystic ovaries, chronic pain back and knee, obese, htn

## 2018-06-05 NOTE — ED BEHAVIORAL HEALTH ASSESSMENT NOTE - SUMMARY
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  Patient is depressed, hopeless, suicidal; s/p reported attempted hanging last night; with a hx of impulsive suicide attempts; she is unable to contract for safety; she is a danger to herself and will benefit from inpatient hospitalization for safety and stabilization.

## 2018-06-05 NOTE — ED BEHAVIORAL HEALTH ASSESSMENT NOTE - RISK ASSESSMENT
chronic  potential  risk of self harm due to poor coping skills and impulsive, parasuicidal and attention seeking behaviors , especially with mother acute risk elevated due to s/p suicide attempt and continued suicidal ideation and hopelessness    chronic  potential  risk of self harm due to poor coping skills and impulsive, parasuicidal and attention seeking behaviors , especially with mother
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
## 2018-06-05 NOTE — PROGRESS NOTE BEHAVIORAL HEALTH - NSBHFUPINTERVALHXFT_PSY_A_CORE
somatically focused as he was on previous stays.  Asking for pain meds  Discussed potential residential treatment but reluctant to go due to cost.    MEDICATIONS  (STANDING):  cloNIDine 0.1 milliGRAM(s) Oral two times a day  DULoxetine 30 milliGRAM(s) Oral daily  gabapentin 600 milliGRAM(s) Oral at bedtime  HYDROcodone  7.5 mG/acetaminophen 300 mG. 2 Tablet(s) Oral <User Schedule>  lamoTRIgine 300 milliGRAM(s) Oral at bedtime  lidocaine 2% Gel 1 Application(s) Topical three times a day  lisinopril 20 milliGRAM(s) Oral daily    MEDICATIONS  (PRN):  cyclobenzaprine 5 milliGRAM(s) Oral three times a day PRN Muscle Spasm  ibuprofen  Tablet 600 milliGRAM(s) Oral every 6 hours PRN Pain Mild to Moderate (1-8)  LORazepam     Tablet 2 milliGRAM(s) Oral every 6 hours PRN anxiety or agitation  zolpidem 10 milliGRAM(s) Oral at bedtime PRN Insomnia

## 2018-06-05 NOTE — ED ADULT NURSE REASSESSMENT NOTE - NS ED NURSE REASSESS COMMENT FT1
patient spoke to telepsychiatry. voluntary admission legal paperwork signed and faxed. spoke to telepsych regarding patient's evening medications, stated it was ok to give, patients medication list confirmed with mother earlier today, medications ordered and given. plan to order evening medications and administer while in emergency department approved by emergency MD. patient calm and cooperative at this time. 1:1 observation on. patient to be transported to [address] [place] [address] after report given.

## 2018-06-05 NOTE — ED BEHAVIORAL HEALTH ASSESSMENT NOTE - HPI (INCLUDE ILLNESS QUALITY, SEVERITY, DURATION, TIMING, CONTEXT, MODIFYING FACTORS, ASSOCIATED SIGNS AND SYMPTOMS)
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  Patient states she has chronic back pain and cannot live with it anymore. She feels she is in "constant physical and emotional pain." Last night she decided to end her life and tied a sheet to the shower head and then around her neck but her mother was able to stop her. Patient has recently had her medications changed by Dr. Ha, from cymbalta to celexa. 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 She reports being helpless, hopeless, with continued suicidal ideations;  weight gain , low energy and anhedonia.  Denies manic/psychotic sx's, denies substance use.   Collateral Althea Gomez [phone]  Collateral reports she brought patient to the ER today after verbalizing that life is not worth living in chronic pain; collateral noted that patient attempted to hang herself on 6/4/18 at approximately 2:30 AM, she was not brought to the hospital because mother was attempting to calm her down. Collateral endorsed that patient is very depressed, sleeping all the time, has decreased appetite, limited motivation, and has experienced weight gain. Collateral reports stressor including unemployment and patient started a new job in April but was terminated during orientation for falling asleep during a lecture. Patient is also undergoing medication change, for the last month she has been transitioning from Cymbalta, which was effective but patient wanted to switch medication due to black box warning, to Celexa. Collateral reports she believes that patient should be monitored closely during medication changes.  Collateral reports that patient is followed by [facility] in [place]. Collateral reported recent admission to [facility] in December after an intentional overdose of Advil and again in February for an intentional overdose of blood pressure medication. Collateral reports that patient has suffered from depression and anxiety as a result of chronic pain.. Collateral denies history of self-injurious behaviors. Collateral denies knowledge of substance use and legal involvement. Collateral denies family history of mental illness, suicide, and substance use. Collateral denied access to guns or weapons. Collateral reports patient should be admitted and does not feel safe bringing patient home.  pt looked up on [employer] - recent px's in  May for hydrocodone -acetaminoph 10/325 #120; zolpidem 5mg #30; xanax 1mg #60

## 2018-06-05 NOTE — ED BEHAVIORAL HEALTH ASSESSMENT NOTE - CURRENT MEDICATION
Celexa 20 mg qd lamictal 200mg hydrocodone-acet 10/325 q4, xanax 1mg bid prn, cyclobenzaprine, lidocaine 5%, carvedilol, hydrozyzine, neurontin, ranitidine

## 2018-06-05 NOTE — ED BEHAVIORAL HEALTH ASSESSMENT NOTE - OTHER PAST PSYCHIATRIC HISTORY (INCLUDE DETAILS REGARDING ONSET, COURSE OF ILLNESS, INPATIENT/OUTPATIENT TREATMENT)
three prior suicide attempts, at least two prior hosptializations    in outpt tx at [facility]

## 2018-06-05 NOTE — PROGRESS NOTE BEHAVIORAL HEALTH - NSBHFUPINTERVALCCFT_PSY_A_CORE
Patient feeling depressed Uncertain if her medications are working.  Discussed how she came to be fired from job  for falling asleep

## 2018-06-05 NOTE — ED BEHAVIORAL HEALTH ASSESSMENT NOTE - MEDICAL ISSUES AND PLAN (INCLUDE STANDING AND PRN MEDICATION)
pain management consult , cont carvedilol catapres; prn hydrocodone-acetaminophen pain management consult , cont carvedilol catapres for htn; neurontin, cyclobenzaprine for  chronic pain

## 2018-06-05 NOTE — ED BEHAVIORAL HEALTH ASSESSMENT NOTE - PSYCHIATRIC ISSUES AND PLAN (INCLUDE STANDING AND PRN MEDICATION)
will restart current psychotropics and defer to primary team will restart current psychotropics (lamictal, celexa) and defer any changes to primary team; ativan prn anxiety/agitation

## 2018-06-06 LAB
CULTURE RESULTS: SIGNIFICANT CHANGE UP
SPECIMEN SOURCE: SIGNIFICANT CHANGE UP

## 2018-06-06 PROCEDURE — 99233 SBSQ HOSP IP/OBS HIGH 50: CPT

## 2018-06-06 RX ORDER — ALPRAZOLAM 0.25 MG
1 TABLET ORAL EVERY 6 HOURS
Qty: 0 | Refills: 0 | Status: DISCONTINUED | OUTPATIENT
Start: 2018-06-06 | End: 2018-06-13

## 2018-06-06 RX ORDER — MENTHOL AND METHYL SALICYLATE 10; 30 G/100G; G/100G
1 STICK TOPICAL THREE TIMES A DAY
Qty: 0 | Refills: 0 | Status: DISCONTINUED | OUTPATIENT
Start: 2018-06-06 | End: 2018-06-13

## 2018-06-06 RX ORDER — CYCLOBENZAPRINE HYDROCHLORIDE 10 MG/1
10 TABLET, FILM COATED ORAL THREE TIMES A DAY
Qty: 0 | Refills: 0 | Status: DISCONTINUED | OUTPATIENT
Start: 2018-06-06 | End: 2018-06-13

## 2018-06-06 RX ADMIN — CYCLOBENZAPRINE HYDROCHLORIDE 10 MILLIGRAM(S): 10 TABLET, FILM COATED ORAL at 23:24

## 2018-06-06 RX ADMIN — Medication 600 MILLIGRAM(S): at 02:01

## 2018-06-06 RX ADMIN — CYCLOBENZAPRINE HYDROCHLORIDE 5 MILLIGRAM(S): 10 TABLET, FILM COATED ORAL at 07:35

## 2018-06-06 RX ADMIN — Medication 600 MILLIGRAM(S): at 03:01

## 2018-06-06 RX ADMIN — Medication 600 MILLIGRAM(S): at 12:29

## 2018-06-06 RX ADMIN — Medication 1 MILLIGRAM(S): at 10:52

## 2018-06-06 RX ADMIN — CYCLOBENZAPRINE HYDROCHLORIDE 5 MILLIGRAM(S): 10 TABLET, FILM COATED ORAL at 00:00

## 2018-06-06 RX ADMIN — GABAPENTIN 600 MILLIGRAM(S): 400 CAPSULE ORAL at 21:33

## 2018-06-06 RX ADMIN — Medication 1 MILLIGRAM(S): at 21:38

## 2018-06-06 RX ADMIN — ZOLPIDEM TARTRATE 10 MILLIGRAM(S): 10 TABLET ORAL at 22:11

## 2018-06-06 RX ADMIN — Medication 600 MILLIGRAM(S): at 13:15

## 2018-06-06 RX ADMIN — DULOXETINE HYDROCHLORIDE 30 MILLIGRAM(S): 30 CAPSULE, DELAYED RELEASE ORAL at 09:10

## 2018-06-06 RX ADMIN — CYCLOBENZAPRINE HYDROCHLORIDE 5 MILLIGRAM(S): 10 TABLET, FILM COATED ORAL at 04:34

## 2018-06-06 RX ADMIN — MENTHOL AND METHYL SALICYLATE 1 APPLICATION(S): 10; 30 STICK TOPICAL at 12:32

## 2018-06-06 RX ADMIN — Medication 0.1 MILLIGRAM(S): at 21:33

## 2018-06-06 RX ADMIN — LAMOTRIGINE 300 MILLIGRAM(S): 25 TABLET, ORALLY DISINTEGRATING ORAL at 21:33

## 2018-06-06 RX ADMIN — LIDOCAINE 1 APPLICATION(S): 4 CREAM TOPICAL at 09:10

## 2018-06-06 RX ADMIN — MENTHOL AND METHYL SALICYLATE 1 APPLICATION(S): 10; 30 STICK TOPICAL at 23:25

## 2018-06-06 RX ADMIN — LISINOPRIL 20 MILLIGRAM(S): 2.5 TABLET ORAL at 09:10

## 2018-06-06 RX ADMIN — Medication 2 MILLIGRAM(S): at 04:33

## 2018-06-06 RX ADMIN — Medication 0.1 MILLIGRAM(S): at 09:10

## 2018-06-06 NOTE — PROGRESS NOTE BEHAVIORAL HEALTH - NSBHFUPINTERVALHXFT_PSY_A_CORE
Patient remains somatically preoccupied, focused on pain, also minimizing use of controlled substances  Remains ambivalent about attending residential treatment    MEDICATIONS  (STANDING):  cloNIDine 0.1 milliGRAM(s) Oral two times a day  DULoxetine 30 milliGRAM(s) Oral daily  gabapentin 600 milliGRAM(s) Oral at bedtime  HYDROcodone  7.5 mG/acetaminophen 300 mG. 2 Tablet(s) Oral <User Schedule>  lamoTRIgine 300 milliGRAM(s) Oral at bedtime  lidocaine 2% Gel 1 Application(s) Topical three times a day  lisinopril 20 milliGRAM(s) Oral daily    MEDICATIONS  (PRN):  ALPRAZolam 1 milliGRAM(s) Oral every 6 hours PRN anxiety  cyclobenzaprine 10 milliGRAM(s) Oral three times a day PRN Muscle Spasm  ibuprofen  Tablet 600 milliGRAM(s) Oral every 6 hours PRN Pain Mild to Moderate (1-8)  methyl salicylate 14%/menthol 6% Topical Ointment 1 Application(s) Topical three times a day PRN pain  zolpidem 10 milliGRAM(s) Oral at bedtime PRN Insomnia

## 2018-06-06 NOTE — PROGRESS NOTE ADULT - SUBJECTIVE AND OBJECTIVE BOX
CHIEF COMPLAINT/Diagnosis:  suicidal ideation    SUBJECTIVE: no complaints    REVIEW OF SYSTEMS:    CONSTITUTIONAL: No weakness, fevers or chills  EYES/ENT: No visual changes;  No vertigo or throat pain   NECK: No pain or stiffness  RESPIRATORY: No cough, wheezing, hemoptysis; No shortness of breath  CARDIOVASCULAR: No chest pain or palpitations  GASTROINTESTINAL: No abdominal or epigastric pain. No nausea, vomiting, or hematemesis; No diarrhea or constipation. No melena or hematochezia.  GENITOURINARY: No dysuria, frequency or hematuria  NEUROLOGICAL: No numbness or weakness  SKIN: No itching, burning, rashes, or lesions   All other review of systems is negative unless indicated above    Vital Signs Last 24 Hrs  T(C): 36.9 (06 Jun 2018 08:14), Max: 36.9 (06 Jun 2018 08:14)  T(F): 98.4 (06 Jun 2018 08:14), Max: 98.4 (06 Jun 2018 08:14)  HR: 96 (06 Jun 2018 08:14) (96 - 101)  BP: 145/90 (06 Jun 2018 08:14) (139/91 - 145/90)  BP(mean): --  RR: 12 (06 Jun 2018 08:14) (12 - 12)  SpO2: 100% (06 Jun 2018 08:14) (100% - 100%)    I&O's Summary      CAPILLARY BLOOD GLUCOSE          PHYSICAL EXAM:    Constitutional: NAD, awake and alert, well-developed  HEENT: PERR, EOMI, Normal Hearing, MMM  Neck: Soft and supple, No LAD, No JVD  Respiratory: Breath sounds are clear bilaterally, No wheezing, rales or rhonchi  Cardiovascular: S1 and S2, regular rate and rhythm, no Murmurs, gallops or rubs  Gastrointestinal: Bowel Sounds present, soft, nontender, nondistended, no guarding, no rebound  Extremities: No peripheral edema  Vascular: 2+ peripheral pulses  Neurological: A/O x 3, no focal deficits  Musculoskeletal: 5/5 strength b/l upper and lower extremities  Skin: No rashes    MEDICATIONS:  MEDICATIONS  (STANDING):  cloNIDine 0.1 milliGRAM(s) Oral two times a day  DULoxetine 30 milliGRAM(s) Oral daily  gabapentin 600 milliGRAM(s) Oral at bedtime  HYDROcodone  7.5 mG/acetaminophen 300 mG. 2 Tablet(s) Oral <User Schedule>  lamoTRIgine 300 milliGRAM(s) Oral at bedtime  lidocaine 2% Gel 1 Application(s) Topical three times a day  lisinopril 20 milliGRAM(s) Oral daily      LABS: All Labs Reviewed:                        14.6   14.37 )-----------( 407      ( 05 Jun 2018 12:12 )             41.5     06-04    138  |  105  |  15  ----------------------------<  82  4.1   |  26  |  0.86    Ca    9.1      04 Jun 2018 18:31    TPro  8.5<H>  /  Alb  3.5  /  TBili  0.3  /  DBili  x   /  AST  71<H>  /  ALT  59  /  AlkPhos  78  06-04          Blood Culture: 06-04 @ 18:27  Organism --  Gram Stain Blood -- Gram Stain --  Specimen Source .Urine Clean Catch (Midstream)  Culture-Blood --        Assessment and Plan:      28 Y/O Female w/  PMH of connective tissue disease, HTN, FSGS for which she follows with Dr. Peterson, nephrologist. As per her she has visited his office few weeks ago and everything was fine. no h/o cough, runny nose, no h/o fever , no h/o suggestive of uti     #review of systems- rest of review of systems are negative except as mentioned in hpi    mild Leukocytosis  , now trending down.     HyperLipidemia and Hypertriglycerdemia- Will educate patient on diet and excercise. Will also discuss starting low dose cholesterol medication.    dvt proph-promote ambulation

## 2018-06-06 NOTE — PROGRESS NOTE BEHAVIORAL HEALTH - NSBHFUPINTERVALCCFT_PSY_A_CORE
Complaining of pain. Interested in exploring DBT programs locally  Sees grecianathaly when she closes her eyes bit they are not visual hallucinations.  Had difficulty sleeping last night

## 2018-06-07 PROCEDURE — 99232 SBSQ HOSP IP/OBS MODERATE 35: CPT

## 2018-06-07 RX ADMIN — ZOLPIDEM TARTRATE 10 MILLIGRAM(S): 10 TABLET ORAL at 22:15

## 2018-06-07 RX ADMIN — Medication 0.1 MILLIGRAM(S): at 21:23

## 2018-06-07 RX ADMIN — Medication 30 MILLILITER(S): at 09:30

## 2018-06-07 RX ADMIN — Medication 600 MILLIGRAM(S): at 20:11

## 2018-06-07 RX ADMIN — Medication 0.1 MILLIGRAM(S): at 09:30

## 2018-06-07 RX ADMIN — Medication 1 MILLIGRAM(S): at 16:08

## 2018-06-07 RX ADMIN — CYCLOBENZAPRINE HYDROCHLORIDE 10 MILLIGRAM(S): 10 TABLET, FILM COATED ORAL at 22:15

## 2018-06-07 RX ADMIN — GABAPENTIN 600 MILLIGRAM(S): 400 CAPSULE ORAL at 21:24

## 2018-06-07 RX ADMIN — MENTHOL AND METHYL SALICYLATE 1 APPLICATION(S): 10; 30 STICK TOPICAL at 09:33

## 2018-06-07 RX ADMIN — Medication 600 MILLIGRAM(S): at 13:35

## 2018-06-07 RX ADMIN — LAMOTRIGINE 300 MILLIGRAM(S): 25 TABLET, ORALLY DISINTEGRATING ORAL at 21:24

## 2018-06-07 RX ADMIN — Medication 600 MILLIGRAM(S): at 02:19

## 2018-06-07 RX ADMIN — Medication 600 MILLIGRAM(S): at 03:20

## 2018-06-07 RX ADMIN — Medication 1 MILLIGRAM(S): at 22:15

## 2018-06-07 RX ADMIN — DULOXETINE HYDROCHLORIDE 30 MILLIGRAM(S): 30 CAPSULE, DELAYED RELEASE ORAL at 09:30

## 2018-06-07 RX ADMIN — LIDOCAINE 1 APPLICATION(S): 4 CREAM TOPICAL at 09:30

## 2018-06-07 RX ADMIN — LISINOPRIL 20 MILLIGRAM(S): 2.5 TABLET ORAL at 09:30

## 2018-06-07 RX ADMIN — Medication 1 MILLIGRAM(S): at 10:06

## 2018-06-07 RX ADMIN — CYCLOBENZAPRINE HYDROCHLORIDE 10 MILLIGRAM(S): 10 TABLET, FILM COATED ORAL at 12:59

## 2018-06-07 NOTE — PROGRESS NOTE BEHAVIORAL HEALTH - NSBHFUPINTERVALHXFT_PSY_A_CORE
Patient remains somatically preoccupied and often asking for prns.  Still has intermittent suicidal thoughts   Remains depressed and anxious    MEDICATIONS  (STANDING):  cloNIDine 0.1 milliGRAM(s) Oral two times a day  DULoxetine 30 milliGRAM(s) Oral daily  gabapentin 600 milliGRAM(s) Oral at bedtime  HYDROcodone  7.5 mG/acetaminophen 300 mG. 2 Tablet(s) Oral <User Schedule>  lamoTRIgine 300 milliGRAM(s) Oral at bedtime  lidocaine 2% Gel 1 Application(s) Topical three times a day  lisinopril 20 milliGRAM(s) Oral daily    MEDICATIONS  (PRN):  ALPRAZolam 1 milliGRAM(s) Oral every 6 hours PRN anxiety  aluminum hydroxide/magnesium hydroxide/simethicone Suspension 30 milliLiter(s) Oral every 4 hours PRN Dyspepsia  cyclobenzaprine 10 milliGRAM(s) Oral three times a day PRN Muscle Spasm  ibuprofen  Tablet 600 milliGRAM(s) Oral every 6 hours PRN Pain Mild to Moderate (1-8)  methyl salicylate 14%/menthol 6% Topical Ointment 1 Application(s) Topical three times a day PRN pain  zolpidem 10 milliGRAM(s) Oral at bedtime PRN Insomnia

## 2018-06-07 NOTE — PROGRESS NOTE ADULT - SUBJECTIVE AND OBJECTIVE BOX
CHIEF COMPLAINT/Diagnosis: suicidal ideations/ hyperlipidemia/ hypertriglycerdemia    SUBJECTIVE: no complaints    REVIEW OF SYSTEMS:    CONSTITUTIONAL: No weakness, fevers or chills  EYES/ENT: No visual changes;  No vertigo or throat pain   NECK: No pain or stiffness  RESPIRATORY: No cough, wheezing, hemoptysis; No shortness of breath  CARDIOVASCULAR: No chest pain or palpitations  GASTROINTESTINAL: No abdominal or epigastric pain. No nausea, vomiting, or hematemesis; No diarrhea or constipation. No melena or hematochezia.  GENITOURINARY: No dysuria, frequency or hematuria  NEUROLOGICAL: No numbness or weakness  SKIN: No itching, burning, rashes, or lesions   All other review of systems is negative unless indicated above    Vital Signs Last 24 Hrs  T(C): 37.1 (07 Jun 2018 08:08), Max: 37.1 (07 Jun 2018 08:08)  T(F): 98.7 (07 Jun 2018 08:08), Max: 98.7 (07 Jun 2018 08:08)  HR: 78 (07 Jun 2018 08:08) (78 - 88)  BP: 124/69 (07 Jun 2018 08:08) (124/69 - 145/71)  BP(mean): --  RR: 12 (07 Jun 2018 08:08) (12 - 12)  SpO2: 99% (07 Jun 2018 08:08) (99% - 99%)    I&O's Summary      CAPILLARY BLOOD GLUCOSE          PHYSICAL EXAM:    Constitutional: NAD, awake and alert, well-developed  HEENT: PERR, EOMI, Normal Hearing, MMM  Neck: Soft and supple, No LAD, No JVD  Respiratory: Breath sounds are clear bilaterally, No wheezing, rales or rhonchi  Cardiovascular: S1 and S2, regular rate and rhythm, no Murmurs, gallops or rubs  Gastrointestinal: Bowel Sounds present, soft, nontender, nondistended, no guarding, no rebound  Extremities: No peripheral edema  Vascular: 2+ peripheral pulses  Neurological: A/O x 3, no focal deficits  Musculoskeletal: 5/5 strength b/l upper and lower extremities  Skin: No rashes    MEDICATIONS:  MEDICATIONS  (STANDING):  cloNIDine 0.1 milliGRAM(s) Oral two times a day  DULoxetine 30 milliGRAM(s) Oral daily  gabapentin 600 milliGRAM(s) Oral at bedtime  HYDROcodone  7.5 mG/acetaminophen 300 mG. 2 Tablet(s) Oral <User Schedule>  lamoTRIgine 300 milliGRAM(s) Oral at bedtime  lidocaine 2% Gel 1 Application(s) Topical three times a day  lisinopril 20 milliGRAM(s) Oral daily      LABS: All Labs Reviewed:      Blood Culture: 06-04 @ 18:27  Organism --  Gram Stain Blood -- Gram Stain --  Specimen Source .Urine Clean Catch (Midstream)  Culture-Blood --        Assessment and Plan:      26 Y/O Female w/  PMH of connective tissue disease, HTN, FSGS for which she follows with Dr. Peterson, nephrologist. As per her she has visited his office few weeks ago and everything was fine. no h/o cough, runny nose, no h/o fever , no h/o suggestive of uti     #review of systems- rest of review of systems are negative except as mentioned in hpi    mild Leukocytosis  , now trending down.     HyperLipidemia and Hypertriglycerdemia- educated patient on diet and excercise; studiees on starting anti-lipid medications in this age group are limited. Would advice to attempt with diet and exceirse and repeat blood work for lipid profile in few months. Will need outpatient primary care followup.     dvt proph-promote ambulation    Currently no active issues, will sign off at this time. please call if any changes in condition

## 2018-06-08 PROCEDURE — 72197 MRI PELVIS W/O & W/DYE: CPT | Mod: 26

## 2018-06-08 PROCEDURE — 99232 SBSQ HOSP IP/OBS MODERATE 35: CPT

## 2018-06-08 RX ORDER — ALPRAZOLAM 0.25 MG
2 TABLET ORAL ONCE
Qty: 0 | Refills: 0 | Status: DISCONTINUED | OUTPATIENT
Start: 2018-06-08 | End: 2018-06-08

## 2018-06-08 RX ORDER — ALPRAZOLAM 0.25 MG
1 TABLET ORAL ONCE
Qty: 0 | Refills: 0 | Status: DISCONTINUED | OUTPATIENT
Start: 2018-06-08 | End: 2018-06-08

## 2018-06-08 RX ORDER — DULOXETINE HYDROCHLORIDE 30 MG/1
60 CAPSULE, DELAYED RELEASE ORAL DAILY
Qty: 0 | Refills: 0 | Status: DISCONTINUED | OUTPATIENT
Start: 2018-06-09 | End: 2018-06-13

## 2018-06-08 RX ADMIN — GABAPENTIN 600 MILLIGRAM(S): 400 CAPSULE ORAL at 21:31

## 2018-06-08 RX ADMIN — Medication 600 MILLIGRAM(S): at 09:24

## 2018-06-08 RX ADMIN — MENTHOL AND METHYL SALICYLATE 1 APPLICATION(S): 10; 30 STICK TOPICAL at 09:15

## 2018-06-08 RX ADMIN — Medication 600 MILLIGRAM(S): at 13:39

## 2018-06-08 RX ADMIN — LAMOTRIGINE 300 MILLIGRAM(S): 25 TABLET, ORALLY DISINTEGRATING ORAL at 21:32

## 2018-06-08 RX ADMIN — CYCLOBENZAPRINE HYDROCHLORIDE 10 MILLIGRAM(S): 10 TABLET, FILM COATED ORAL at 07:56

## 2018-06-08 RX ADMIN — Medication 600 MILLIGRAM(S): at 04:16

## 2018-06-08 RX ADMIN — Medication 600 MILLIGRAM(S): at 07:56

## 2018-06-08 RX ADMIN — LISINOPRIL 20 MILLIGRAM(S): 2.5 TABLET ORAL at 09:15

## 2018-06-08 RX ADMIN — MENTHOL AND METHYL SALICYLATE 1 APPLICATION(S): 10; 30 STICK TOPICAL at 17:00

## 2018-06-08 RX ADMIN — MENTHOL AND METHYL SALICYLATE 1 APPLICATION(S): 10; 30 STICK TOPICAL at 00:10

## 2018-06-08 RX ADMIN — Medication 600 MILLIGRAM(S): at 19:27

## 2018-06-08 RX ADMIN — ZOLPIDEM TARTRATE 10 MILLIGRAM(S): 10 TABLET ORAL at 22:25

## 2018-06-08 RX ADMIN — Medication 0.1 MILLIGRAM(S): at 21:31

## 2018-06-08 RX ADMIN — Medication 0.1 MILLIGRAM(S): at 09:15

## 2018-06-08 RX ADMIN — Medication 1 MILLIGRAM(S): at 22:25

## 2018-06-08 RX ADMIN — CYCLOBENZAPRINE HYDROCHLORIDE 10 MILLIGRAM(S): 10 TABLET, FILM COATED ORAL at 14:27

## 2018-06-08 RX ADMIN — DULOXETINE HYDROCHLORIDE 30 MILLIGRAM(S): 30 CAPSULE, DELAYED RELEASE ORAL at 09:15

## 2018-06-08 RX ADMIN — CYCLOBENZAPRINE HYDROCHLORIDE 10 MILLIGRAM(S): 10 TABLET, FILM COATED ORAL at 22:25

## 2018-06-08 RX ADMIN — MENTHOL AND METHYL SALICYLATE 1 APPLICATION(S): 10; 30 STICK TOPICAL at 23:31

## 2018-06-08 RX ADMIN — Medication 1 MILLIGRAM(S): at 11:04

## 2018-06-08 RX ADMIN — Medication 600 MILLIGRAM(S): at 02:10

## 2018-06-08 RX ADMIN — Medication 600 MILLIGRAM(S): at 13:55

## 2018-06-08 RX ADMIN — Medication 1 MILLIGRAM(S): at 10:28

## 2018-06-08 NOTE — PROGRESS NOTE BEHAVIORAL HEALTH - NSBHFUPINTERVALHXFT_PSY_A_CORE
Depression is lifting, interacting with peers  Stilt largely somatically focused    MEDICATIONS  (STANDING):  cloNIDine 0.1 milliGRAM(s) Oral two times a day  gabapentin 600 milliGRAM(s) Oral at bedtime  HYDROcodone  7.5 mG/acetaminophen 300 mG. 2 Tablet(s) Oral <User Schedule>  lamoTRIgine 300 milliGRAM(s) Oral at bedtime  lidocaine 2% Gel 1 Application(s) Topical three times a day  lisinopril 20 milliGRAM(s) Oral daily    MEDICATIONS  (PRN):  ALPRAZolam 1 milliGRAM(s) Oral every 6 hours PRN anxiety  aluminum hydroxide/magnesium hydroxide/simethicone Suspension 30 milliLiter(s) Oral every 4 hours PRN Dyspepsia  cyclobenzaprine 10 milliGRAM(s) Oral three times a day PRN Muscle Spasm  ibuprofen  Tablet 600 milliGRAM(s) Oral every 6 hours PRN Pain Mild to Moderate (1-8)  methyl salicylate 14%/menthol 6% Topical Ointment 1 Application(s) Topical three times a day PRN pain  zolpidem 10 milliGRAM(s) Oral at bedtime PRN Insomnia Depression is lifting, interacting with peers  Stilt largely somatically focused on medical issues  Cymbalta increased to 60 mg for depression  MRI to be done to assess hydrosalpinx as per discussion with Dr. Mcknight's office    MEDICATIONS  (STANDING):  cloNIDine 0.1 milliGRAM(s) Oral two times a day  gabapentin 600 milliGRAM(s) Oral at bedtime  HYDROcodone  7.5 mG/acetaminophen 300 mG. 2 Tablet(s) Oral <User Schedule>  lamoTRIgine 300 milliGRAM(s) Oral at bedtime  lidocaine 2% Gel 1 Application(s) Topical three times a day  lisinopril 20 milliGRAM(s) Oral daily    MEDICATIONS  (PRN):  ALPRAZolam 1 milliGRAM(s) Oral every 6 hours PRN anxiety  aluminum hydroxide/magnesium hydroxide/simethicone Suspension 30 milliLiter(s) Oral every 4 hours PRN Dyspepsia  cyclobenzaprine 10 milliGRAM(s) Oral three times a day PRN Muscle Spasm  ibuprofen  Tablet 600 milliGRAM(s) Oral every 6 hours PRN Pain Mild to Moderate (1-8)  methyl salicylate 14%/menthol 6% Topical Ointment 1 Application(s) Topical three times a day PRN pain  zolpidem 10 milliGRAM(s) Oral at bedtime PRN Insomnia

## 2018-06-08 NOTE — PROGRESS NOTE ADULT - SUBJECTIVE AND OBJECTIVE BOX
CHIEF COMPLAINT/Diagnosis: suicidal ideation/ hyperlipidemia/ hypertriglycerdemia    SUBJECTIVE: no complaints    REVIEW OF SYSTEMS:    CONSTITUTIONAL: No weakness, fevers or chills  EYES/ENT: No visual changes;  No vertigo or throat pain   NECK: No pain or stiffness  RESPIRATORY: No cough, wheezing, hemoptysis; No shortness of breath  CARDIOVASCULAR: No chest pain or palpitations  GASTROINTESTINAL: No abdominal or epigastric pain. No nausea, vomiting, or hematemesis; No diarrhea or constipation. No melena or hematochezia.  GENITOURINARY: No dysuria, frequency or hematuria  NEUROLOGICAL: No numbness or weakness  SKIN: No itching, burning, rashes, or lesions   All other review of systems is negative unless indicated above    Vital Signs Last 24 Hrs  T(C): 36.9 (08 Jun 2018 08:05), Max: 36.9 (08 Jun 2018 08:05)  T(F): 98.5 (08 Jun 2018 08:05), Max: 98.5 (08 Jun 2018 08:05)  HR: 98 (08 Jun 2018 08:05) (98 - 98)  BP: 125/82 (08 Jun 2018 08:05) (125/82 - 148/86)  BP(mean): --  RR: 16 (08 Jun 2018 08:05) (16 - 16)  SpO2: 99% (08 Jun 2018 08:05) (99% - 99%)    I&O's Summary      CAPILLARY BLOOD GLUCOSE          PHYSICAL EXAM:    Constitutional: NAD, awake and alert, well-developed  HEENT: PERR, EOMI, Normal Hearing, MMM  Neck: Soft and supple, No LAD, No JVD  Respiratory: Breath sounds are clear bilaterally, No wheezing, rales or rhonchi  Cardiovascular: S1 and S2, regular rate and rhythm, no Murmurs, gallops or rubs  Gastrointestinal: Bowel Sounds present, soft, nontender, nondistended, no guarding, no rebound  Extremities: No peripheral edema  Vascular: 2+ peripheral pulses  Neurological: A/O x 3, no focal deficits  Musculoskeletal: 5/5 strength b/l upper and lower extremities  Skin: No rashes    MEDICATIONS:  MEDICATIONS  (STANDING):  cloNIDine 0.1 milliGRAM(s) Oral two times a day  gabapentin 600 milliGRAM(s) Oral at bedtime  HYDROcodone  7.5 mG/acetaminophen 300 mG. 2 Tablet(s) Oral <User Schedule>  lamoTRIgine 300 milliGRAM(s) Oral at bedtime  lidocaine 2% Gel 1 Application(s) Topical three times a day  lisinopril 20 milliGRAM(s) Oral daily      LABS: All Labs Reviewed:      Blood Culture: 06-04 @ 18:27  Organism --  Gram Stain Blood -- Gram Stain --  Specimen Source .Urine Clean Catch (Midstream)  Culture-Blood --      Assessment and Plan:      28 Y/O Female w/  PMH of connective tissue disease, HTN, FSGS for which she follows with Dr. Peterson, nephrologist. As per her she has visited his office few weeks ago and everything was fine. no h/o cough, runny nose, no h/o fever , no h/o suggestive of uti     review of systems- rest of review of systems are negative except as mentioned in hpi    mild Leukocytosis  , now trending down.     HyperLipidemia and Hypertriglycerdemia- educated patient on diet and excercise; studiees on starting anti-lipid medications in this age group are limited. Educated patient to attempt with diet and exceirse . Will need outpatient primary care followup.   of note: patient says the blood was drawn after she ate. >>> will repeat blood work for AM    dvt proph-promote ambulation

## 2018-06-09 LAB
CHOLEST SERPL-MCNC: 194 MG/DL — SIGNIFICANT CHANGE UP (ref 10–199)
HDLC SERPL-MCNC: 39 MG/DL — LOW (ref 40–125)
LIPID PNL WITH DIRECT LDL SERPL: 126 MG/DL — SIGNIFICANT CHANGE UP
TOTAL CHOLESTEROL/HDL RATIO MEASUREMENT: 5 RATIO — SIGNIFICANT CHANGE UP (ref 3.3–7.1)
TRIGL SERPL-MCNC: 144 MG/DL — SIGNIFICANT CHANGE UP (ref 10–149)

## 2018-06-09 RX ORDER — LISINOPRIL 2.5 MG/1
20 TABLET ORAL
Qty: 0 | Refills: 0 | Status: DISCONTINUED | OUTPATIENT
Start: 2018-06-10 | End: 2018-06-13

## 2018-06-09 RX ADMIN — DULOXETINE HYDROCHLORIDE 60 MILLIGRAM(S): 30 CAPSULE, DELAYED RELEASE ORAL at 09:10

## 2018-06-09 RX ADMIN — Medication 0.1 MILLIGRAM(S): at 21:49

## 2018-06-09 RX ADMIN — Medication 1 MILLIGRAM(S): at 10:29

## 2018-06-09 RX ADMIN — Medication 600 MILLIGRAM(S): at 03:54

## 2018-06-09 RX ADMIN — MENTHOL AND METHYL SALICYLATE 1 APPLICATION(S): 10; 30 STICK TOPICAL at 12:31

## 2018-06-09 RX ADMIN — ZOLPIDEM TARTRATE 10 MILLIGRAM(S): 10 TABLET ORAL at 22:30

## 2018-06-09 RX ADMIN — LAMOTRIGINE 300 MILLIGRAM(S): 25 TABLET, ORALLY DISINTEGRATING ORAL at 21:49

## 2018-06-09 RX ADMIN — Medication 600 MILLIGRAM(S): at 13:33

## 2018-06-09 RX ADMIN — MENTHOL AND METHYL SALICYLATE 1 APPLICATION(S): 10; 30 STICK TOPICAL at 02:40

## 2018-06-09 RX ADMIN — Medication 1 MILLIGRAM(S): at 22:30

## 2018-06-09 RX ADMIN — Medication 600 MILLIGRAM(S): at 02:39

## 2018-06-09 RX ADMIN — Medication 0.1 MILLIGRAM(S): at 09:10

## 2018-06-09 RX ADMIN — CYCLOBENZAPRINE HYDROCHLORIDE 10 MILLIGRAM(S): 10 TABLET, FILM COATED ORAL at 21:52

## 2018-06-09 RX ADMIN — CYCLOBENZAPRINE HYDROCHLORIDE 10 MILLIGRAM(S): 10 TABLET, FILM COATED ORAL at 12:30

## 2018-06-09 RX ADMIN — LISINOPRIL 20 MILLIGRAM(S): 2.5 TABLET ORAL at 09:10

## 2018-06-09 RX ADMIN — Medication 600 MILLIGRAM(S): at 14:30

## 2018-06-09 RX ADMIN — Medication 30 MILLILITER(S): at 17:41

## 2018-06-09 RX ADMIN — MENTHOL AND METHYL SALICYLATE 1 APPLICATION(S): 10; 30 STICK TOPICAL at 21:49

## 2018-06-09 RX ADMIN — GABAPENTIN 600 MILLIGRAM(S): 400 CAPSULE ORAL at 21:49

## 2018-06-09 NOTE — PROGRESS NOTE ADULT - SUBJECTIVE AND OBJECTIVE BOX
CHIEF COMPLAINT/Diagnosis: suicidal ideations/ possible hyper lipidemia    SUBJECTIVE: no compalints    REVIEW OF SYSTEMS:    CONSTITUTIONAL: No weakness, fevers or chills  EYES/ENT: No visual changes;  No vertigo or throat pain   NECK: No pain or stiffness  RESPIRATORY: No cough, wheezing, hemoptysis; No shortness of breath  CARDIOVASCULAR: No chest pain or palpitations  GASTROINTESTINAL: No abdominal or epigastric pain. No nausea, vomiting, or hematemesis; No diarrhea or constipation. No melena or hematochezia.  GENITOURINARY: No dysuria, frequency or hematuria  NEUROLOGICAL: No numbness or weakness  SKIN: No itching, burning, rashes, or lesions   All other review of systems is negative unless indicated above    Vital Signs Last 24 Hrs  T(C): 36.8 (09 Jun 2018 08:23), Max: 36.8 (09 Jun 2018 08:23)  T(F): 98.2 (09 Jun 2018 08:23), Max: 98.2 (09 Jun 2018 08:23)  HR: 84 (09 Jun 2018 08:23) (84 - 84)  BP: 137/83 (09 Jun 2018 08:23) (137/83 - 137/83)  BP(mean): --  RR: 17 (09 Jun 2018 08:23) (17 - 17)  SpO2: 100% (09 Jun 2018 08:23) (100% - 100%)    I&O's Summary      CAPILLARY BLOOD GLUCOSE          PHYSICAL EXAM:    Constitutional: NAD, awake and alert, well-developed  HEENT: PERR, EOMI, Normal Hearing, MMM  Neck: Soft and supple, No LAD, No JVD  Respiratory: Breath sounds are clear bilaterally, No wheezing, rales or rhonchi  Cardiovascular: S1 and S2, regular rate and rhythm, no Murmurs, gallops or rubs  Gastrointestinal: Bowel Sounds present, soft, nontender, nondistended, no guarding, no rebound  Extremities: No peripheral edema  Vascular: 2+ peripheral pulses  Neurological: A/O x 3, no focal deficits  Musculoskeletal: 5/5 strength b/l upper and lower extremities  Skin: No rashes    MEDICATIONS:  MEDICATIONS  (STANDING):  cloNIDine 0.1 milliGRAM(s) Oral two times a day  DULoxetine 60 milliGRAM(s) Oral daily  gabapentin 600 milliGRAM(s) Oral at bedtime  HYDROcodone  7.5 mG/acetaminophen 300 mG. 2 Tablet(s) Oral <User Schedule>  lamoTRIgine 300 milliGRAM(s) Oral at bedtime  lidocaine 2% Gel 1 Application(s) Topical three times a day  lisinopril 20 milliGRAM(s) Oral daily      LABS: All Labs Reviewed:                Blood Culture: 06-04 @ 18:27  Organism --  Gram Stain Blood -- Gram Stain --  Specimen Source .Urine Clean Catch (Midstream)  Culture-Blood --            Assessment and Plan:      28 Y/O Female w/  PMH of connective tissue disease, HTN, FSGS for which she follows with Dr. Peterson, nephrologist. As per her she has visited his office few weeks ago and everything was fine. no h/o cough, runny nose, no h/o fever , no h/o suggestive of uti     review of systems- rest of review of systems are negative except as mentioned in hpi    mild Leukocytosis  , now trending down.     HyperLipidemia and Hypertriglycerdemia diagnosed in [facility].  -repeat labs done in Am hours show WNLs lipid profile  -paitent educated on weightloss, healthy eating regardless    dvt proph-promote ambulation CHIEF COMPLAINT/Diagnosis: suicidal ideations/ possible hyper lipidemia    SUBJECTIVE: no compalints    REVIEW OF SYSTEMS:    CONSTITUTIONAL: No weakness, fevers or chills  EYES/ENT: No visual changes;  No vertigo or throat pain   NECK: No pain or stiffness  RESPIRATORY: No cough, wheezing, hemoptysis; No shortness of breath  CARDIOVASCULAR: No chest pain or palpitations  GASTROINTESTINAL: No abdominal or epigastric pain. No nausea, vomiting, or hematemesis; No diarrhea or constipation. No melena or hematochezia.  GENITOURINARY: No dysuria, frequency or hematuria  NEUROLOGICAL: No numbness or weakness  SKIN: No itching, burning, rashes, or lesions   All other review of systems is negative unless indicated above    Vital Signs Last 24 Hrs  T(C): 36.8 (09 Jun 2018 08:23), Max: 36.8 (09 Jun 2018 08:23)  T(F): 98.2 (09 Jun 2018 08:23), Max: 98.2 (09 Jun 2018 08:23)  HR: 84 (09 Jun 2018 08:23) (84 - 84)  BP: 137/83 (09 Jun 2018 08:23) (137/83 - 137/83)  BP(mean): --  RR: 17 (09 Jun 2018 08:23) (17 - 17)  SpO2: 100% (09 Jun 2018 08:23) (100% - 100%)    I&O's Summary      CAPILLARY BLOOD GLUCOSE          PHYSICAL EXAM:    Constitutional: NAD, awake and alert, well-developed  HEENT: PERR, EOMI, Normal Hearing, MMM  Neck: Soft and supple, No LAD, No JVD  Respiratory: Breath sounds are clear bilaterally, No wheezing, rales or rhonchi  Cardiovascular: S1 and S2, regular rate and rhythm, no Murmurs, gallops or rubs  Gastrointestinal: Bowel Sounds present, soft, nontender, nondistended, no guarding, no rebound  Extremities: No peripheral edema  Vascular: 2+ peripheral pulses  Neurological: A/O x 3, no focal deficits  Musculoskeletal: 5/5 strength b/l upper and lower extremities  Skin: No rashes    MEDICATIONS:  MEDICATIONS  (STANDING):  cloNIDine 0.1 milliGRAM(s) Oral two times a day  DULoxetine 60 milliGRAM(s) Oral daily  gabapentin 600 milliGRAM(s) Oral at bedtime  HYDROcodone  7.5 mG/acetaminophen 300 mG. 2 Tablet(s) Oral <User Schedule>  lamoTRIgine 300 milliGRAM(s) Oral at bedtime  lidocaine 2% Gel 1 Application(s) Topical three times a day  lisinopril 20 milliGRAM(s) Oral daily      LABS: All Labs Reviewed:                Blood Culture: 06-04 @ 18:27  Organism --  Gram Stain Blood -- Gram Stain --  Specimen Source .Urine Clean Catch (Midstream)  Culture-Blood --            Assessment and Plan:      28 Y/O Female w/  PMH of connective tissue disease, HTN, FSGS for which she follows with Dr. Peterson, nephrologist. As per her she has visited his office few weeks ago and everything was fine. no h/o cough, runny nose, no h/o fever , no h/o suggestive of uti     review of systems- rest of review of systems are negative except as mentioned in hpi    mild Leukocytosis  , now trending down.     HyperLipidemia and Hypertriglycerdemia diagnosed in [facility].  -repeat labs done in Am hours show WNLs lipid profile  -paitent educated on weightloss, healthy eating regardless    dvt proph-promote ambulation      Will sign off case, thank you for this consult.

## 2018-06-09 NOTE — PROGRESS NOTE BEHAVIORAL HEALTH - NSBHFUPINTERVALHXFT_PSY_A_CORE
Patient encountered at the bedside, denying acute distress or intolerable symptoms presently.  She acknowledges the rationale for her current hospital-level treatment, denying current medication side effects.  She denies active suicidal ideation, intent, or plan, stating that she will inform the staff should this recur.  She also denies ideations to harm others, hallucinations, or current paranoid ideation.  She is noted by the staff to be pleasant, interacting appropriately with others in the milieu, endorsing intermittent, tolerable pain, controlled with her current medication regimen.  Of note, she inquired about her recent serum labs, particularly her lipid panel and current white blood cell count, also inquiring about a nephrology consultation and an MRI.  I reviewed with her the current status of these queries and she said she would continue discussing these with her primary team.

## 2018-06-09 NOTE — CONSULT NOTE ADULT - ASSESSMENT
26 Y/O Female w/  PMH of connective tissue disease, HTN, FSGS for which she follows with Dr. Peterson.   As per her she has visited his office few weeks ago and was titrating up her lisinopriil for better evening BP control.   Renal eval called for assistance of this mgt of HTN and hx of FSGS      HTN - borderline     - resume home BP med dose - lisinopril 20 mg BID    - continue home dose clonidine    - check  Cr on monday     fup with Dr Peterson as outpatient    d/w 5 Nathan RN    Thank you for the courtesy of this consult. We will follow this patient with you.   Management is subject to change if new information becomes available or patient condition changes.  '
A/P    # Suicidal ideation- management as per surgical team     #HTN- number within acceptable range, ct her home meds -further management as an outpt     #Abnormal Lipid panel- discussed with her about it and advised life style modification- diet and exercise and advised to have it out pt monitoring as well   -check her fasting lipid panel     #Leucocytosis- ? etiology -possibly stress or pain- no sign and symptoms of infection -blood culture, repeat labx and monitor it     #Above discussed with pt and all questions have been answered, Above discussed with psych team

## 2018-06-09 NOTE — CONSULT NOTE ADULT - SUBJECTIVE AND OBJECTIVE BOX
seen earlier today ~ 11: 45 am   28 Y/O Female w/  PMH of connective tissue disease, HTN, FSGS for which she follows with Dr. Peterson.   As per her she has visited his office few weeks ago and was titrating up her lisinopriil for better evening BP control.   Renal eval called for assistance of this mgt of HTN and hx of FSGS    today    pt feeling well   states she was taking lisinopril 20 mg BID at home recently for elevated 's at home      PAST MEDICAL & SURGICAL HISTORY:  Chronic back pain  HTN (hypertension)  Polycystic ovarian syndrome  Depression  Anxiety  Mixed connective tissue disease  Cholecystitis  H/O ovarian cystectomy  H/O knee surgery  History of cholecystectomy    Home Medications:  acetaminophen 325 mg oral tablet: 2 tab(s) orally every 6 hours, As needed, pain (04 Jun 2018 21:59)  ALPRAZolam 0.5 mg oral tablet: 2 tab(s) orally 2 times a day, As Needed - anxiety (05 Jun 2018 00:19)  bacitracin 500 units/g topical ointment: 1 application topically 2 times a day (04 Jun 2018 21:59)  cholecalciferol oral tablet: 2000 unit(s) orally once a day (04 Jun 2018 21:59)  gabapentin 300 mg oral capsule: 3 cap(s) orally once a day (at bedtime) (05 Jun 2018 00:19)  gabapentin 600 mg/24 hours oral tablet, extended release: 1 tab(s) orally once a day (05 Jun 2018 00:19)  lamoTRIgine 25 mg oral tablet: 4 tab(s) orally once a day (at bedtime) (05 Jun 2018 00:19)  raNITIdine 150 mg oral tablet: 1 tab(s) orally 2 times a day (04 Jun 2018 21:59)      MEDICATIONS  (STANDING):  cloNIDine 0.1 milliGRAM(s) Oral two times a day  DULoxetine 60 milliGRAM(s) Oral daily  gabapentin 600 milliGRAM(s) Oral at bedtime  HYDROcodone  7.5 mG/acetaminophen 300 mG. 2 Tablet(s) Oral <User Schedule>  lamoTRIgine 300 milliGRAM(s) Oral at bedtime  lidocaine 2% Gel 1 Application(s) Topical three times a day      Allergies    No Known Drug Allergies  peanuts (Unknown)  Tree Nuts (Unknown)    Intolerances    morphine (Pruritus)      SOCIAL HISTORY:  Denies ETOh,Smoking,     FAMILY HISTORY:  No pertinent family history in first degree relatives      REVIEW OF SYSTEMS:    CONSTITUTIONAL: No weakness, fevers or chills  EYES/ENT: No visual changes;  No vertigo or throat pain   NECK: No pain or stiffness  RESPIRATORY: No cough, wheezing, hemoptysis; No shortness of breath  CARDIOVASCULAR: No chest pain or palpitations  GASTROINTESTINAL: No abdominal or epigastric pain. No nausea, vomiting, or hematemesis; No diarrhea or constipation. No melena or hematochezia.  GENITOURINARY: No dysuria, frequency or hematuria  NEUROLOGICAL: No numbness or weakness  SKIN: No itching, burning, rashes, or lesions   All other review of systems is negative unless indicated above.    VITAL:  T(C): , Max: 36.8 (06-09-18 @ 08:23)  T(F): , Max: 98.2 (06-09-18 @ 08:23)  HR: 84 (06-09-18 @ 08:23)  BP: 146/84 (06-09-18 @ 21:44)  BP(mean): --  RR: 17 (06-09-18 @ 08:23)  SpO2: 100% (06-09-18 @ 08:23)  Wt(kg): --    I and O's:        PHYSICAL EXAM:    Constitutional: NAD  HEENT:  EOMI,  MMM  Neck: No LAD, No JVD  Respiratory: CTAB  Cardiovascular: S1 and S2  Gastrointestinal: BS+, soft, NT/ND  Extremities: No peripheral edema  Neurological: A/O x 3, no focal deficits  Psychiatric: calm, flat affect   : No Alvares  Skin: No rashes  Access: Not applicable    LABS:    Comprehensive Metabolic Panel (06.04.18 @ 18:31)    Sodium, Serum: 138 mmol/L    Potassium, Serum: 4.1 mmol/L    Chloride, Serum: 105 mmol/L    Carbon Dioxide, Serum: 26 mmol/L    Anion Gap, Serum: 7 mmol/L    Blood Urea Nitrogen, Serum: 15 mg/dL    Creatinine, Serum: 0.86 mg/dL    Glucose, Serum: 82 mg/dL    Calcium, Total Serum: 9.1 mg/dL    Protein Total, Serum: 8.5 gm/dL    Albumin, Serum: 3.5 g/dL    Bilirubin Total, Serum: 0.3 mg/dL    Alkaline Phosphatase, Serum: 78 U/L    Aspartate Aminotransferase (AST/SGOT): 71 U/L    Alanine Aminotransferase (ALT/SGPT): 59 U/L              Urine Studies:          RADIOLOGY & ADDITIONAL STUDIES:
Patient is a 27y old  Female who presents with a chief complaint of Suicide attempt/pain (2018 02:02)    HPI: 26 Y/O Female admitted under psychiatric unit due to suicidal ideation. Medical consult for medical management . She has PMH of connective tissue disease, HTN, FSGS for which she follows with Dr. Peterson, nephrologist. As per her she has visited his office few weeks ago and everything was fine. no h/o cough, runny nose, no h/o fever , no h/o suggestive of uti     #review of systems- rest of review of systems are negative except as mentioned in hpi    PAST MEDICAL & SURGICAL HISTORY:  Chronic back pain  HTN (hypertension)  Polycystic ovarian syndrome  Depression  Anxiety  Mixed connective tissue disease  Cholecystitis  H/O ovarian cystectomy  H/O knee surgery  History of cholecystectomy    FAMILY HISTORY:  No pertinent family history in first degree relatives    Social history -no smoking, no drinking alcohol, no recreational drug abuse     #Vital Signs Last 24 Hrs  T(C): 36.9 (2018 08:18), Max: 37 (2018 01:39)  T(F): 98.5 (2018 08:18), Max: 98.6 (2018 01:39)  HR: 78 (2018 08:18) (78 - 96)  BP: 139/89 (2018 08:18) (139/85 - 139/89)  BP(mean): --  RR: 14 (2018 08:18) (14 - 16)  SpO2: 100% (2018 08:18) (100% - 100%)    General: WN/WD NAD  Head- Atraumatic, normocephalic   Neurology: A&Ox3, nonfocal, CN II to XII intact, power intact 5/5 in all muscle group  HEENT- PERRLA, moist muscous membrane  Neck-supple, no JVD  Respiratory: Air entry equal b/l, CTA   CVS:  S1S2, no murmurs, rubs or gallops  Abdominal: Soft, NT, ND +BS,   Genitourinary- voiding, non palpable bladder  Extremities: No edema, + peripheral pulses  Skin- no rash, no ulcer  Psychiatric- mood stable   LN- no lymphadenopathy                           14.5   15.81 )-----------( 391      ( 2018 18:31 )             41.1     06-04    138  |  105  |  15  ----------------------------<  82  4.1   |  26  |  0.86    Ca    9.1      2018 18:31    TPro  8.5<H>  /  Alb  3.5  /  TBili  0.3  /  DBili  x   /  AST  71<H>  /  ALT  59  /  AlkPhos  78  06-04    LIVER FUNCTIONS - ( 2018 18:31 )  Alb: 3.5 g/dL / Pro: 8.5 gm/dL / ALK PHOS: 78 U/L / ALT: 59 U/L / AST: 71 U/L / GGT: x               Urinalysis Basic - ( 2018 18:27 )    Color: Yellow / Appearance: Clear / S.010 / pH: x  Gluc: x / Ketone: Negative  / Bili: Negative / Urobili: Negative mg/dL   Blood: x / Protein: 30 mg/dL / Nitrite: Negative   Leuk Esterase: Trace / RBC: 0-2 /HPF / WBC 3-5   Sq Epi: x / Non Sq Epi: Few / Bacteria: Moderate          CAPILLARY BLOOD GLUCOSE          MEDICATIONS  (STANDING):  cloNIDine 0.1 milliGRAM(s) Oral two times a day  DULoxetine 30 milliGRAM(s) Oral daily  gabapentin 600 milliGRAM(s) Oral at bedtime  HYDROcodone  7.5 mG/acetaminophen 300 mG. 2 Tablet(s) Oral once  HYDROcodone  7.5 mG/acetaminophen 300 mG. 2 Tablet(s) Oral <User Schedule>  lamoTRIgine 300 milliGRAM(s) Oral at bedtime  lidocaine 2% Gel 1 Application(s) Topical three times a day  lisinopril 20 milliGRAM(s) Oral daily    MEDICATIONS  (PRN):  cyclobenzaprine 5 milliGRAM(s) Oral three times a day PRN Muscle Spasm  ibuprofen  Tablet 600 milliGRAM(s) Oral every 6 hours PRN Pain Mild to Moderate (1-8)  LORazepam     Tablet 2 milliGRAM(s) Oral every 6 hours PRN anxiety or agitation  zolpidem 10 milliGRAM(s) Oral at bedtime PRN Insomnia

## 2018-06-10 RX ADMIN — Medication 600 MILLIGRAM(S): at 02:06

## 2018-06-10 RX ADMIN — Medication 1 MILLIGRAM(S): at 16:36

## 2018-06-10 RX ADMIN — LISINOPRIL 20 MILLIGRAM(S): 2.5 TABLET ORAL at 21:42

## 2018-06-10 RX ADMIN — LAMOTRIGINE 300 MILLIGRAM(S): 25 TABLET, ORALLY DISINTEGRATING ORAL at 21:42

## 2018-06-10 RX ADMIN — Medication 1 MILLIGRAM(S): at 10:33

## 2018-06-10 RX ADMIN — ZOLPIDEM TARTRATE 10 MILLIGRAM(S): 10 TABLET ORAL at 22:42

## 2018-06-10 RX ADMIN — Medication 1 MILLIGRAM(S): at 04:33

## 2018-06-10 RX ADMIN — Medication 600 MILLIGRAM(S): at 04:35

## 2018-06-10 RX ADMIN — Medication 1 MILLIGRAM(S): at 22:42

## 2018-06-10 RX ADMIN — LISINOPRIL 20 MILLIGRAM(S): 2.5 TABLET ORAL at 09:19

## 2018-06-10 RX ADMIN — Medication 600 MILLIGRAM(S): at 14:27

## 2018-06-10 RX ADMIN — Medication 0.1 MILLIGRAM(S): at 09:20

## 2018-06-10 RX ADMIN — Medication 0.1 MILLIGRAM(S): at 21:42

## 2018-06-10 RX ADMIN — DULOXETINE HYDROCHLORIDE 60 MILLIGRAM(S): 30 CAPSULE, DELAYED RELEASE ORAL at 09:19

## 2018-06-10 RX ADMIN — CYCLOBENZAPRINE HYDROCHLORIDE 10 MILLIGRAM(S): 10 TABLET, FILM COATED ORAL at 21:46

## 2018-06-10 RX ADMIN — GABAPENTIN 600 MILLIGRAM(S): 400 CAPSULE ORAL at 21:42

## 2018-06-10 RX ADMIN — CYCLOBENZAPRINE HYDROCHLORIDE 10 MILLIGRAM(S): 10 TABLET, FILM COATED ORAL at 10:33

## 2018-06-10 RX ADMIN — Medication 30 MILLILITER(S): at 18:37

## 2018-06-10 RX ADMIN — LIDOCAINE 1 APPLICATION(S): 4 CREAM TOPICAL at 23:10

## 2018-06-10 RX ADMIN — Medication 600 MILLIGRAM(S): at 15:08

## 2018-06-10 NOTE — PROGRESS NOTE BEHAVIORAL HEALTH - PRN MEDS
prn flexeril, xanax, ambien, mylanta, ibuprofen, and methyl salicylate received
prn flexeril, xanax, ambien, and ibuprofen received

## 2018-06-10 NOTE — PROGRESS NOTE BEHAVIORAL HEALTH - NSBHFUPINTERVALHXFT_PSY_A_CORE
Patient encountered at the bedside, denying acute distress or intolerable symptoms presently.  She she again denies current medication side effects, also denying active suicidal ideation, intent, or plan, stating that she will inform the staff should this recur.  She also denies ideations to harm others, hallucinations, or current paranoid ideation.  She is noted by the staff to be pleasant, interacting appropriately with others in the milieu, yet continues to endorse numerous somatic complaints periodically throughout the day and request prn medications, including the following within the last 24 hours: xanax (x 3 doses), flexeril (x 2 doses), mylanta (x 1 dose), ibuprofen (x 2 doses), and methl salicylate (x 2 doses).  She expressed current satisfaction with her inpatient treatment regimen and an intention to discuss it further with her primary treatment team.

## 2018-06-11 LAB
ALBUMIN SERPL ELPH-MCNC: 3.2 G/DL — LOW (ref 3.3–5)
ANION GAP SERPL CALC-SCNC: 4 MMOL/L — LOW (ref 5–17)
BUN SERPL-MCNC: 13 MG/DL — SIGNIFICANT CHANGE UP (ref 7–23)
CALCIUM SERPL-MCNC: 9 MG/DL — SIGNIFICANT CHANGE UP (ref 8.5–10.1)
CHLORIDE SERPL-SCNC: 103 MMOL/L — SIGNIFICANT CHANGE UP (ref 96–108)
CO2 SERPL-SCNC: 29 MMOL/L — SIGNIFICANT CHANGE UP (ref 22–31)
CREAT SERPL-MCNC: 0.83 MG/DL — SIGNIFICANT CHANGE UP (ref 0.5–1.3)
GLUCOSE SERPL-MCNC: 83 MG/DL — SIGNIFICANT CHANGE UP (ref 70–99)
PHOSPHATE SERPL-MCNC: 4.2 MG/DL — SIGNIFICANT CHANGE UP (ref 2.5–4.5)
POTASSIUM SERPL-MCNC: 4.2 MMOL/L — SIGNIFICANT CHANGE UP (ref 3.5–5.3)
POTASSIUM SERPL-SCNC: 4.2 MMOL/L — SIGNIFICANT CHANGE UP (ref 3.5–5.3)
SODIUM SERPL-SCNC: 136 MMOL/L — SIGNIFICANT CHANGE UP (ref 135–145)

## 2018-06-11 PROCEDURE — 99232 SBSQ HOSP IP/OBS MODERATE 35: CPT

## 2018-06-11 RX ORDER — MAGNESIUM HYDROXIDE 400 MG/1
30 TABLET, CHEWABLE ORAL DAILY
Qty: 0 | Refills: 0 | Status: DISCONTINUED | OUTPATIENT
Start: 2018-06-11 | End: 2018-06-13

## 2018-06-11 RX ADMIN — Medication 600 MILLIGRAM(S): at 20:33

## 2018-06-11 RX ADMIN — Medication 1 MILLIGRAM(S): at 21:34

## 2018-06-11 RX ADMIN — MAGNESIUM HYDROXIDE 30 MILLILITER(S): 400 TABLET, CHEWABLE ORAL at 23:26

## 2018-06-11 RX ADMIN — GABAPENTIN 600 MILLIGRAM(S): 400 CAPSULE ORAL at 21:33

## 2018-06-11 RX ADMIN — Medication 600 MILLIGRAM(S): at 11:36

## 2018-06-11 RX ADMIN — LAMOTRIGINE 300 MILLIGRAM(S): 25 TABLET, ORALLY DISINTEGRATING ORAL at 21:33

## 2018-06-11 RX ADMIN — Medication 1 MILLIGRAM(S): at 09:31

## 2018-06-11 RX ADMIN — LISINOPRIL 20 MILLIGRAM(S): 2.5 TABLET ORAL at 09:30

## 2018-06-11 RX ADMIN — ZOLPIDEM TARTRATE 10 MILLIGRAM(S): 10 TABLET ORAL at 21:33

## 2018-06-11 RX ADMIN — Medication 30 MILLILITER(S): at 03:29

## 2018-06-11 RX ADMIN — CYCLOBENZAPRINE HYDROCHLORIDE 10 MILLIGRAM(S): 10 TABLET, FILM COATED ORAL at 23:35

## 2018-06-11 RX ADMIN — Medication 600 MILLIGRAM(S): at 03:29

## 2018-06-11 RX ADMIN — MENTHOL AND METHYL SALICYLATE 1 APPLICATION(S): 10; 30 STICK TOPICAL at 11:38

## 2018-06-11 RX ADMIN — LISINOPRIL 20 MILLIGRAM(S): 2.5 TABLET ORAL at 21:33

## 2018-06-11 RX ADMIN — CYCLOBENZAPRINE HYDROCHLORIDE 10 MILLIGRAM(S): 10 TABLET, FILM COATED ORAL at 11:38

## 2018-06-11 RX ADMIN — DULOXETINE HYDROCHLORIDE 60 MILLIGRAM(S): 30 CAPSULE, DELAYED RELEASE ORAL at 09:28

## 2018-06-11 RX ADMIN — Medication 600 MILLIGRAM(S): at 18:51

## 2018-06-11 RX ADMIN — LIDOCAINE 1 APPLICATION(S): 4 CREAM TOPICAL at 21:39

## 2018-06-11 RX ADMIN — Medication 0.1 MILLIGRAM(S): at 09:29

## 2018-06-11 RX ADMIN — Medication 0.1 MILLIGRAM(S): at 21:33

## 2018-06-11 RX ADMIN — Medication 1 MILLIGRAM(S): at 15:39

## 2018-06-11 NOTE — PROGRESS NOTE ADULT - ASSESSMENT
28 Y/O Female w/  PMH of  HTN, FSGS here with suicide attempt.    HTN/FSGS   - Maintain lisinopril bid dosing   - Clonidine po bid for now, qday consideration if fatigue   -renal function and lytes ok   -f/u with me on discharge    While inpatient, will not actively follow. Please call with any questions or issues    '

## 2018-06-11 NOTE — PROGRESS NOTE BEHAVIORAL HEALTH - NSBHFUPINTERVALCCFT_PSY_A_CORE
Patient became upset when told residential tx was recommended for her.  Prior to that she said she was doing well

## 2018-06-11 NOTE — PROGRESS NOTE BEHAVIORAL HEALTH - NSBHFUPINTERVALHXFT_PSY_A_CORE
Patient continues to have conflict with staff but overall appears to be improving  Remains ambivalent about residential program due to cost.    MEDICATIONS  (STANDING):  cloNIDine 0.1 milliGRAM(s) Oral two times a day  DULoxetine 60 milliGRAM(s) Oral daily  gabapentin 600 milliGRAM(s) Oral at bedtime  HYDROcodone  7.5 mG/acetaminophen 300 mG. 2 Tablet(s) Oral <User Schedule>  lamoTRIgine 300 milliGRAM(s) Oral at bedtime  lidocaine 2% Gel 1 Application(s) Topical three times a day  lisinopril 20 milliGRAM(s) Oral two times a day    MEDICATIONS  (PRN):  ALPRAZolam 1 milliGRAM(s) Oral every 6 hours PRN anxiety  aluminum hydroxide/magnesium hydroxide/simethicone Suspension 30 milliLiter(s) Oral every 4 hours PRN Dyspepsia  cyclobenzaprine 10 milliGRAM(s) Oral three times a day PRN Muscle Spasm  ibuprofen  Tablet 600 milliGRAM(s) Oral every 6 hours PRN Pain Mild to Moderate (1-8)  methyl salicylate 14%/menthol 6% Topical Ointment 1 Application(s) Topical three times a day PRN pain  zolpidem 10 milliGRAM(s) Oral at bedtime PRN Insomnia

## 2018-06-11 NOTE — PROGRESS NOTE ADULT - SUBJECTIVE AND OBJECTIVE BOX
Patient is a 27y Female who reports no complaints overnight. Walking in hallway. Reports bp is fairly stable, hoping to leave in a few days.     REVIEW OF SYSTEMS:    CONSTITUTIONAL: No weakness, fevers or chills  RESPIRATORY: No cough, wheezing, hemoptysis; No shortness of breath  CARDIOVASCULAR: No chest pain or palpitations  GENITOURINARY: No dysuria, frequency or hematuria  All other review of systems is negative unless indicated above.    MEDICATIONS  (STANDING):  cloNIDine 0.1 milliGRAM(s) Oral two times a day  DULoxetine 60 milliGRAM(s) Oral daily  gabapentin 600 milliGRAM(s) Oral at bedtime  HYDROcodone  7.5 mG/acetaminophen 300 mG. 2 Tablet(s) Oral <User Schedule>  lamoTRIgine 300 milliGRAM(s) Oral at bedtime  lidocaine 2% Gel 1 Application(s) Topical three times a day  lisinopril 20 milliGRAM(s) Oral two times a day    MEDICATIONS  (PRN):  ALPRAZolam 1 milliGRAM(s) Oral every 6 hours PRN anxiety  aluminum hydroxide/magnesium hydroxide/simethicone Suspension 30 milliLiter(s) Oral every 4 hours PRN Dyspepsia  cyclobenzaprine 10 milliGRAM(s) Oral three times a day PRN Muscle Spasm  ibuprofen  Tablet 600 milliGRAM(s) Oral every 6 hours PRN Pain Mild to Moderate (1-8)  magnesium hydroxide Suspension 30 milliLiter(s) Oral daily PRN Constipation  methyl salicylate 14%/menthol 6% Topical Ointment 1 Application(s) Topical three times a day PRN pain  zolpidem 10 milliGRAM(s) Oral at bedtime PRN Insomnia        T(C): , Max: 36.8 (06-11-18 @ 07:58)  T(F): , Max: 98.3 (06-11-18 @ 07:58)  HR: 95 (06-11-18 @ 07:58)  BP: 142/85 (06-11-18 @ 07:58)  BP(mean): --  RR: 16 (06-11-18 @ 07:58)  SpO2: 100% (06-11-18 @ 07:58)  Wt(kg): --        PHYSICAL EXAM:    Constitutional: NAD  HEENT: PERRLA, EOMI,  MMM  Neck: No LAD, No JVD  Respiratory: good aeration   Cardiovascular: S1 and S2, RRR  Gastrointestinal: BS+, soft, NT/ND  Extremities: no peripheral edema  Neurological: A/O x 3  : No Alvares          LABS:    11 Jun 2018 08:22    136    |  103    |  13     ----------------------------<  83     4.2     |  29     |  0.83     Ca    9.0        11 Jun 2018 08:22  Phos  4.2       11 Jun 2018 08:22    TPro  x      /  Alb  3.2    /  TBili  x      /  DBili  x      /  AST  x      /  ALT  x      /  AlkPhos  x      11 Jun 2018 08:22          Urine Studies:          RADIOLOGY & ADDITIONAL STUDIES:

## 2018-06-12 RX ORDER — ZOLPIDEM TARTRATE 10 MG/1
10 TABLET ORAL AT BEDTIME
Qty: 0 | Refills: 0 | Status: DISCONTINUED | OUTPATIENT
Start: 2018-06-13 | End: 2018-06-13

## 2018-06-12 RX ORDER — SENNA PLUS 8.6 MG/1
2 TABLET ORAL AT BEDTIME
Qty: 0 | Refills: 0 | Status: DISCONTINUED | OUTPATIENT
Start: 2018-06-12 | End: 2018-06-13

## 2018-06-12 RX ORDER — DOCUSATE SODIUM 100 MG
100 CAPSULE ORAL
Qty: 0 | Refills: 0 | Status: DISCONTINUED | OUTPATIENT
Start: 2018-06-12 | End: 2018-06-12

## 2018-06-12 RX ORDER — DOCUSATE SODIUM 100 MG
100 CAPSULE ORAL DAILY
Qty: 0 | Refills: 0 | Status: DISCONTINUED | OUTPATIENT
Start: 2018-06-12 | End: 2018-06-13

## 2018-06-12 RX ADMIN — SENNA PLUS 2 TABLET(S): 8.6 TABLET ORAL at 21:06

## 2018-06-12 RX ADMIN — ZOLPIDEM TARTRATE 10 MILLIGRAM(S): 10 TABLET ORAL at 21:59

## 2018-06-12 RX ADMIN — Medication 1 MILLIGRAM(S): at 09:15

## 2018-06-12 RX ADMIN — Medication 0.1 MILLIGRAM(S): at 21:06

## 2018-06-12 RX ADMIN — Medication 0.1 MILLIGRAM(S): at 09:15

## 2018-06-12 RX ADMIN — LISINOPRIL 20 MILLIGRAM(S): 2.5 TABLET ORAL at 21:06

## 2018-06-12 RX ADMIN — Medication 1 MILLIGRAM(S): at 15:20

## 2018-06-12 RX ADMIN — GABAPENTIN 600 MILLIGRAM(S): 400 CAPSULE ORAL at 21:06

## 2018-06-12 RX ADMIN — Medication 30 MILLILITER(S): at 21:59

## 2018-06-12 RX ADMIN — CYCLOBENZAPRINE HYDROCHLORIDE 10 MILLIGRAM(S): 10 TABLET, FILM COATED ORAL at 13:24

## 2018-06-12 RX ADMIN — Medication 600 MILLIGRAM(S): at 14:09

## 2018-06-12 RX ADMIN — Medication 600 MILLIGRAM(S): at 23:37

## 2018-06-12 RX ADMIN — MENTHOL AND METHYL SALICYLATE 1 APPLICATION(S): 10; 30 STICK TOPICAL at 12:34

## 2018-06-12 RX ADMIN — LISINOPRIL 20 MILLIGRAM(S): 2.5 TABLET ORAL at 09:14

## 2018-06-12 RX ADMIN — Medication 600 MILLIGRAM(S): at 13:23

## 2018-06-12 RX ADMIN — LAMOTRIGINE 300 MILLIGRAM(S): 25 TABLET, ORALLY DISINTEGRATING ORAL at 21:06

## 2018-06-12 RX ADMIN — LIDOCAINE 1 APPLICATION(S): 4 CREAM TOPICAL at 21:09

## 2018-06-12 RX ADMIN — DULOXETINE HYDROCHLORIDE 60 MILLIGRAM(S): 30 CAPSULE, DELAYED RELEASE ORAL at 09:15

## 2018-06-12 RX ADMIN — Medication 100 MILLIGRAM(S): at 18:46

## 2018-06-12 NOTE — PROGRESS NOTE BEHAVIORAL HEALTH - NSBHFUPINTERVALCCFT_PSY_A_CORE
"I needed some colace" "I needed something better than colace for the constipation,  and I have back pain "

## 2018-06-12 NOTE — PROGRESS NOTE BEHAVIORAL HEALTH - NSBHFUPINTERVALHXFT_PSY_A_CORE
Pt with cc of getting constipated and needing a stool softener  DULoxetine 60 milliGRAM(s) Oral daily  gabapentin 600 milliGRAM(s) Oral at bedtime  HYDROcodone  7.5 mG/acetaminophen 300 mG. 2 Tablet(s) Oral <User Schedule>  lamoTRIgine 300 milliGRAM(s) Oral at bedtime  lidocaine 2% Gel 1 Application(s) Topical three times a day  lisinopril 20 milliGRAM(s) Oral two times a day    MEDICATIONS  (PRN):  ALPRAZolam 1 milliGRAM(s) Oral every 6 hours PRN anxiety  aluminum hydroxide/magnesium hydroxide/simethicone Suspension 30 milliLiter(s) Oral every 4 hours PRN Dyspepsia  cyclobenzaprine 10 milliGRAM(s) Oral three times a day PRN Muscle Spasm  ibuprofen  Tablet 600 milliGRAM(s) Oral every 6 hours PRN Pain Mild to Moderate (1-8)  methyl salicylate 14%/menthol 6% Topical Ointment 1 Application(s) Topical three times a day PRN pain  zolpidem 10 milliGRAM(s) Oral at bedtime PRN Insomnia Pt with cc of getting constipated and needing a stool softener, but then she opted for the senna.  Pt also with chronic back pain and is already on the Neurontin and the Cymbalta.  Reviewed the use of the medications  and of the potential side effects and pt decides to leave the dosages alone and decided to take a nap instead.       DULoxetine 60 milliGRAM(s) Oral daily  gabapentin 600 milliGRAM(s) Oral at bedtime  HYDROcodone  7.5 mG/acetaminophen 300 mG. 2 Tablet(s) Oral <User Schedule>  lamoTRIgine 300 milliGRAM(s) Oral at bedtime  lidocaine 2% Gel 1 Application(s) Topical three times a day  lisinopril 20 milliGRAM(s) Oral two times a day    MEDICATIONS  (PRN):  ALPRAZolam 1 milliGRAM(s) Oral every 6 hours PRN anxiety  aluminum hydroxide/magnesium hydroxide/simethicone Suspension 30 milliLiter(s) Oral every 4 hours PRN Dyspepsia  cyclobenzaprine 10 milliGRAM(s) Oral three times a day PRN Muscle Spasm  ibuprofen  Tablet 600 milliGRAM(s) Oral every 6 hours PRN Pain Mild to Moderate (1-8)  methyl salicylate 14%/menthol 6% Topical Ointment 1 Application(s) Topical three times a day PRN pain  zolpidem 10 milliGRAM(s) Oral at bedtime PRN Insomnia

## 2018-06-13 VITALS
DIASTOLIC BLOOD PRESSURE: 83 MMHG | HEART RATE: 91 BPM | SYSTOLIC BLOOD PRESSURE: 132 MMHG | RESPIRATION RATE: 12 BRPM | TEMPERATURE: 99 F | OXYGEN SATURATION: 100 %

## 2018-06-13 RX ORDER — GABAPENTIN 400 MG/1
2 CAPSULE ORAL
Qty: 0 | Refills: 0 | COMMUNITY
Start: 2018-06-13

## 2018-06-13 RX ORDER — LIDOCAINE 4 G/100G
1 CREAM TOPICAL
Qty: 0 | Refills: 0 | COMMUNITY
Start: 2018-06-13

## 2018-06-13 RX ORDER — GABAPENTIN 400 MG/1
1 CAPSULE ORAL
Qty: 0 | Refills: 0 | COMMUNITY
Start: 2018-06-13

## 2018-06-13 RX ORDER — ZOLPIDEM TARTRATE 10 MG/1
5 TABLET ORAL AT BEDTIME
Qty: 0 | Refills: 0 | Status: DISCONTINUED | OUTPATIENT
Start: 2018-06-13 | End: 2018-06-13

## 2018-06-13 RX ORDER — MENTHOL AND METHYL SALICYLATE 10; 30 G/100G; G/100G
1 STICK TOPICAL
Qty: 0 | Refills: 0 | COMMUNITY
Start: 2018-06-13

## 2018-06-13 RX ORDER — MAGNESIUM HYDROXIDE 400 MG/1
30 TABLET, CHEWABLE ORAL
Qty: 0 | Refills: 0 | COMMUNITY
Start: 2018-06-13

## 2018-06-13 RX ORDER — DULOXETINE HYDROCHLORIDE 30 MG/1
1 CAPSULE, DELAYED RELEASE ORAL
Qty: 14 | Refills: 0 | OUTPATIENT
Start: 2018-06-13 | End: 2018-06-26

## 2018-06-13 RX ORDER — LISINOPRIL 2.5 MG/1
1 TABLET ORAL
Qty: 0 | Refills: 0 | COMMUNITY
Start: 2018-06-13

## 2018-06-13 RX ORDER — LAMOTRIGINE 25 MG/1
2 TABLET, ORALLY DISINTEGRATING ORAL
Qty: 0 | Refills: 0 | COMMUNITY
Start: 2018-06-13

## 2018-06-13 RX ORDER — IBUPROFEN 200 MG
1 TABLET ORAL
Qty: 0 | Refills: 0 | COMMUNITY
Start: 2018-06-13

## 2018-06-13 RX ORDER — CYCLOBENZAPRINE HYDROCHLORIDE 10 MG/1
1 TABLET, FILM COATED ORAL
Qty: 0 | Refills: 0 | COMMUNITY
Start: 2018-06-13

## 2018-06-13 RX ORDER — GABAPENTIN 400 MG/1
1 CAPSULE ORAL
Qty: 0 | Refills: 0 | COMMUNITY

## 2018-06-13 RX ORDER — SENNA PLUS 8.6 MG/1
2 TABLET ORAL
Qty: 0 | Refills: 0 | COMMUNITY
Start: 2018-06-13

## 2018-06-13 RX ADMIN — DULOXETINE HYDROCHLORIDE 60 MILLIGRAM(S): 30 CAPSULE, DELAYED RELEASE ORAL at 09:15

## 2018-06-13 RX ADMIN — Medication 100 MILLIGRAM(S): at 09:14

## 2018-06-13 RX ADMIN — Medication 0.1 MILLIGRAM(S): at 09:14

## 2018-06-13 RX ADMIN — Medication 1 MILLIGRAM(S): at 15:20

## 2018-06-13 RX ADMIN — LISINOPRIL 20 MILLIGRAM(S): 2.5 TABLET ORAL at 09:14

## 2018-06-13 RX ADMIN — Medication 1 MILLIGRAM(S): at 09:14

## 2018-06-13 NOTE — PROGRESS NOTE BEHAVIORAL HEALTH - MOOD
Depressed/Anxious
Normal
Depressed/Anxious
Normal
Normal
Depressed/Normal
Depressed/Anxious
Normal/Depressed
Depressed/Anxious

## 2018-06-13 NOTE — DISCHARGE NOTE BEHAVIORAL HEALTH - NSBHDCSUICFCTRSFT_PSY_A_CORE
1. depression- Pt is directed to continue with all medications until directed by his MD to change or discontinued with medications 1. depression- Pt is directed to continue with all medications until directed by his MD to change or discontinued with medications  2.pt given appts to reconnect with the Psychiatrist and therapist

## 2018-06-13 NOTE — PROGRESS NOTE BEHAVIORAL HEALTH - PROBLEM SELECTOR PLAN 2
Increase lamotrigine to 300 mg
continue lamotrigine to 300 mg
continue lamotrigine to 300 mg
Increase lamotrigine to 300 mg
continue lamotrigine to 300 mg

## 2018-06-13 NOTE — DISCHARGE NOTE BEHAVIORAL HEALTH - NSBHDCMEDICALFT_PSY_A_CORE
Chronic back pain  HTN (hypertension)  Polycystic ovarian syndrome Chronic back pain  HTN (hypertension)  Polycystic ovarian syndrome  Glomerulosclerosis, focal.

## 2018-06-13 NOTE — DISCHARGE NOTE BEHAVIORAL HEALTH - PAST PSYCHIATRIC HISTORY
3 previous suicide attempts, h/o depression, anxiety, chronic pain,  ADHD, chronic Insomnia, mood instability last hospitalization in February 2018 at ; followed at [facility] as outpatient;   hx of pt. overdosed on Lisinopril

## 2018-06-13 NOTE — DISCHARGE NOTE BEHAVIORAL HEALTH - NSBHDCADMRISKREASONS_PSY_A_CORE
Co-occur mental health and subst use/Co-occur mental health and medical/High utilizer of Inpateint/ED services/Poor engagement in outpt treatment/Non-adherence with medications

## 2018-06-13 NOTE — DISCHARGE NOTE BEHAVIORAL HEALTH - NSBHDCCONDITIONFT_PSY_A_CORE
Pt with good eye contact Alert Pt with poor social skills and after yelling out sarcastic remarks , and unrealistic demands would then offer a superficial apologize to exonerate herself .

## 2018-06-13 NOTE — PROGRESS NOTE BEHAVIORAL HEALTH - AFFECT QUALITY
Anxious/Depressed/Irritable
Depressed
Irritable/Anxious/Depressed
Depressed
Depressed
Euthymic/Depressed
Depressed/Anxious
Depressed/Euthymic
Anxious/Depressed

## 2018-06-13 NOTE — DISCHARGE NOTE BEHAVIORAL HEALTH - NSBHDCSIGEVENTSFT_PSY_A_CORE
Pt on 6/13 was yelling and demanding to be discharged . As the pt was in the hospital on voluntary status she was informed that she has the right to submit a 72 hr letter. Pt on 6/13 was yelling and demanding to be discharged . As the pt was in the hospital on voluntary status she was informed that she has the right to submit a 72 hr letter. Pt denies any suicidal ideation and denies any plan. Discussed with her about use of mood stabilizing medication such as trileptal , depakote  but the pt refused and indicated that she was not wanting any additional medication Pt denies any suicidal ideation and denies any plan. Discussed with her about use of mood stabilizing medication such as trileptal , depakote  but the pt refused and indicated that she was not wanting any additional medication Family is aware of the pt asking for discharge and the mother did not indicate any concerns.  The pt had submitted a 72 hour letter and that she was wanting to be discharged today .

## 2018-06-13 NOTE — PROGRESS NOTE BEHAVIORAL HEALTH - NSBHADMITDANGERSELF_PSY_A_CORE
suicidal behavior

## 2018-06-13 NOTE — PROGRESS NOTE BEHAVIORAL HEALTH - THOUGHT CONTENT
Preoccupations/Ruminations
Unremarkable/Preoccupations
Unremarkable/Preoccupations
Preoccupations/Ruminations
Unremarkable/Preoccupations
Preoccupations/Ruminations
Ruminations/Preoccupations
Patient/Caregiver provided printed discharge information.

## 2018-06-13 NOTE — PROGRESS NOTE BEHAVIORAL HEALTH - RISK ASSESSMENT
acute risk elevated due to s/p suicide attempt and continued suicidal ideation and hopelessness    chronic  potential  risk of self harm due to poor coping skills and impulsive, para suicidal and attention seeking behaviors , especially with mother
acute risk elevated due to s/p suicide attempt and continued suicidal ideation and hopelessness    chronic  potential  risk of self harm due to poor coping skills and impulsive, para suicidal and attention seeking behaviors , especially with mother    Depression beginning to improve  less overt suicidality
acute risk elevated due to s/p suicide attempt and continued suicidal ideation and hopelessness    chronic  potential  risk of self harm due to poor coping skills and impulsive, parasuicidal and attention seeking behaviors , especially with mother
acute risk elevated due to s/p suicide attempt and continued suicidal ideation and hopelessness    chronic  potential  risk of self harm due to poor coping skills and impulsive, para suicidal and attention seeking behaviors , especially with mother
acute risk elevated due to s/p suicide attempt and continued suicidal ideation and hopelessness    chronic  potential  risk of self harm due to poor coping skills and impulsive, para suicidal and attention seeking behaviors , especially with mother
acute risk elevated due to s/p suicide attempt and continued suicidal ideation and hopelessness    chronic  potential  risk of self harm due to poor coping skills and impulsive, parasuicidal and attention seeking behaviors , especially with mother
acute risk elevated due to s/p suicide attempt and continued suicidal ideation and hopelessness    chronic  potential  risk of self harm due to poor coping skills and impulsive, para suicidal and attention seeking behaviors , especially with mother

## 2018-06-13 NOTE — PROGRESS NOTE BEHAVIORAL HEALTH - ESTIMATED DISCHARGE DATE
12-Jun-2018

## 2018-06-13 NOTE — PROGRESS NOTE BEHAVIORAL HEALTH - NSBHPTASSESSDT_PSY_A_CORE
05-Jun-2018 14:42
06-Jun-2018 11:35
08-Jun-2018 14:32
08-Jun-2018 14:32
12-Jun-2018 15:35
13-Jun-2018 17:33
07-Jun-2018 10:47
08-Jun-2018 14:32
11-Jun-2018 12:37

## 2018-06-13 NOTE — PROGRESS NOTE BEHAVIORAL HEALTH - NSBHADMITIPDSM_PSY_A_CORE
see above for Axis I, II, III

## 2018-06-13 NOTE — DISCHARGE NOTE BEHAVIORAL HEALTH - MEDICATION SUMMARY - MEDICATIONS TO TAKE
I will START or STAY ON the medications listed below when I get home from the hospital:    HYDROcodone-acetaminophen 7.5 mg-300 mg oral tablet  -- 2 tab(s) by mouth   -- Indication: For Pain    ibuprofen 600 mg oral tablet  -- 1 tab(s) by mouth every 6 hours, As needed, Pain Mild to Moderate (1-8)  -- Indication: For Pain    lisinopril 20 mg oral tablet  -- 1 tab(s) by mouth 2 times a day  -- Indication: For htn    magnesium hydroxide 8% oral suspension  -- 30 milliliter(s) by mouth once a day, As needed, Constipation  -- Indication: For gerd    cloNIDine 0.1 mg oral tablet  -- 1 tab(s) by mouth 2 times a day  -- Indication: For htn    gabapentin 300 mg oral capsule  -- 2 cap(s) by mouth once a day (at bedtime)  -- Indication: For Chronic back pain    lamoTRIgine 150 mg oral tablet  -- 2 tab(s) by mouth once a day (at bedtime)  -- Indication: For Depression, unspecified depression type    DULoxetine 60 mg oral delayed release capsule  -- 1 cap(s) by mouth once a day  -- Indication: For Depression, unspecified depression type    ALPRAZolam 0.5 mg oral tablet  -- 2 tab(s) by mouth 2 times a day, As Needed - anxiety  -- Indication: For anxiety    lidocaine 2% topical gel with applicator  -- 1 application on skin 3 times a day  -- Indication: For Pain    methyl salicylate topical ointment  -- 1 application on skin 3 times a day, As needed, pain  -- Indication: For Pain    senna oral tablet  -- 2 tab(s) by mouth once a day (at bedtime)  -- Indication: For Constipation    cyclobenzaprine 10 mg oral tablet  -- 1 tab(s) by mouth 3 times a day, As needed, Muscle Spasm  -- Indication: For Chronic back pain

## 2018-06-13 NOTE — DISCHARGE NOTE BEHAVIORAL HEALTH - MEDICATION SUMMARY - MEDICATIONS TO CHANGE
I will SWITCH the dose or number of times a day I take the medications listed below when I get home from the hospital:    zolpidem 10 mg oral tablet  -- 1 tab(s) by mouth once a day (at bedtime), As needed, Insomnia until told to discontinue by MD MDD:1 tab

## 2018-06-13 NOTE — PROGRESS NOTE BEHAVIORAL HEALTH - PROBLEM SELECTOR PLAN 1
Restarted Cymbalta at 30 mg for depression and pain  Ambien for insomnia 10 mg having some benefit
Restarted Cymbalta at 30 mg for depression and pain  Ambien for insomnia 10 mg having some benefit
Restart Cymbalta at 30 mg for depression and pain  Ambien for insomnia 10 mg
Continue Cymbalta at 60 mg for depression and pain  Ambien for insomnia 10 mg having some benefit
Continue Cymbalta at 60 mg for depression and pain  Ambien for insomnia 10 mg having some benefit
Restart Cymbalta at 30 mg for depression and pain  Ambien for insomnia 10 mg
Continue Cymbalta at 60 mg for depression and pain  Ambien for insomnia 10 mg having some benefit

## 2018-06-13 NOTE — PROGRESS NOTE BEHAVIORAL HEALTH - THOUGHT PROCESS
Overinclusive/Circumstantial
Linear
Circumstantial/Impaired reasoning/Overinclusive
Linear
Overinclusive/Circumstantial
Linear
Linear

## 2018-06-13 NOTE — DISCHARGE NOTE BEHAVIORAL HEALTH - NSBHDCADMRISKMITFT_PSY_A_CORE
1. Pt is directed to continue with all medications until directed by his MD to change or discontinued with medications  2. Depression- Pt is referred back to her Psychiatrist and therapist both of whom she is familiar with

## 2018-06-13 NOTE — DISCHARGE NOTE BEHAVIORAL HEALTH - NSBHDCADDFT_PSY_A_CORE
ISTOP - recent px's in  May for hydrocodone -acetaminoph 10/325 #120; zolpidem 5mg #30; xanax 1mg #60  so pt has these medication at home.      06-05 [id number] 5.6  06-09 Chol 194  HDL 39<L> Trig 144, 06-05 Chol 238<H> <H> HDL 46 Trig 190<H>, 06-04 Chol 250<H> <H> HDL 48 Trig 161<H>  QRS axis to [] ° and NSR at a rate of [] BPM. There was no atrial enlargement. There was no ventricular hypertrophy. There were no ST-T changes and all intervals were normal. ISTOP - recent px's in  May/14 /2018  for hydrocodone -acetaminoph 10/325 #120; zolpidem 5mg #30; xanax 1mg #60 5/29/2018 so pt has these medication at home.      06-05 [id number] 5.6  06-09 Chol 194  HDL 39<L> Trig 144, 06-05 Chol 238<H> <H> HDL 46 Trig 190<H>, 06-04 Chol 250<H> <H> HDL 48 Trig 161<H>  QRS axis to [] ° and NSR at a rate of [] BPM. There was no atrial enlargement. There was no ventricular hypertrophy. There were no ST-T changes and all intervals were normal.

## 2018-06-13 NOTE — PROGRESS NOTE BEHAVIORAL HEALTH - NSBHADMITIPOBSFT_PSY_A_CORE
no current risk to self on unit

## 2018-06-13 NOTE — PROGRESS NOTE BEHAVIORAL HEALTH - AFFECT RANGE
Constricted
Full/Labile
Constricted
Full/Labile
Full/Labile
Full
Constricted

## 2018-06-13 NOTE — PROGRESS NOTE BEHAVIORAL HEALTH - PRIMARY DX
MDD (major depressive disorder), recurrent episode, moderate

## 2018-06-13 NOTE — DISCHARGE NOTE BEHAVIORAL HEALTH - NSBHDCRESPONSEFT_PSY_A_CORE
MEDICATIONS  (STANDING):  cloNIDine 0.1 milliGRAM(s) Oral two times a day  docusate sodium 100 milliGRAM(s) Oral daily  DULoxetine 60 milliGRAM(s) Oral daily  gabapentin 600 milliGRAM(s) Oral at bedtime  HYDROcodone  7.5 mG/acetaminophen 300 mG. 2 Tablet(s) Oral <User Schedule>  lamoTRIgine 300 milliGRAM(s) Oral at bedtime  lidocaine 2% Gel 1 Application(s) Topical three times a day  lisinopril 20 milliGRAM(s) Oral two times a day  senna 2 Tablet(s) Oral at bedtime MEDICATIONS  (STANDING):  cloNIDine 0.1 milliGRAM(s) Oral two times a day  docusate sodium 100 milliGRAM(s) Oral daily  DULoxetine 60 milliGRAM(s) Oral daily  gabapentin 600 milliGRAM(s) Oral at bedtime  HYDROcodone  7.5 mG/acetaminophen 300 mG. 2 Tablet(s) Oral <User Schedule>  lamoTRIgine 300 milliGRAM(s) Oral at bedtime  lidocaine 2% Gel 1 Application(s) Topical three times a day  lisinopril 20 milliGRAM(s) Oral two times a day  senna 2 Tablet(s) Oral at bedtime  Pt with denial of any suicidal ideation or plan Pt with verbalization of willing to attend aftercare  and to continue with her medication.  Pt

## 2018-06-13 NOTE — PROGRESS NOTE BEHAVIORAL HEALTH - BEHAVIOR
Cooperative

## 2018-06-13 NOTE — DISCHARGE NOTE BEHAVIORAL HEALTH - NSBHDCSWCOMMENTSFT_PSY_A_CORE
Patient educated to not stop any medication or treatment unless otherwise told to stop by outpatient provider

## 2018-06-13 NOTE — PROGRESS NOTE BEHAVIORAL HEALTH - NSBHADMITIPREASON_PSY_A_CORE
Danger to self; mental illness expected to respond to inpatient care

## 2018-06-13 NOTE — PROGRESS NOTE BEHAVIORAL HEALTH - NSBHCONSORIP_PSY_A_CORE
Inpatient Admission...

## 2018-06-13 NOTE — PROGRESS NOTE BEHAVIORAL HEALTH - PROBLEM SELECTOR PROBLEM 1
Other depression

## 2018-06-13 NOTE — DISCHARGE NOTE BEHAVIORAL HEALTH - HPI (INCLUDE ILLNESS QUALITY, SEVERITY, DURATION, TIMING, CONTEXT, MODIFYING FACTORS, ASSOCIATED SIGNS AND SYMPTOMS)
26 yo single  female, domiciled with mother, no children/dependents, unemployed nurse, with h/o  3 previous suicide attempts, h/o depression, anxiety, chronic pain,  ADHD, chronic Insomnia, mood instability,with last hospitlaization in February 2018 at ; followed at [facility] [place] as outpatient; no known aggressive/substance/legal hx; bib mother for suicidal ideation and having attempted to hang herself last night.  Patient states she has chronic back pain and cannot live with it anymore. She feels she is in "constant physical and emotional pain." Last night she decided to end her life and tied a sheet to the shower head and then around her neck but her mother was able to stop her. Patient has recently had her medications changed by Dr. Ha, from cymbalta to celexa. 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 She reports being helpless, hopeless, with continued suicidal ideations;  weight gain , low energy and anhedonia.  Denies manic/psychotic sx's, denies substance use.   Collateral Althea Gomez [phone]  Collateral reports she brought patient to the ER today after verbalizing that life is not worth living in chronic pain; collateral noted that patient attempted to hang herself on 6/4/18 at approximately 2:30 AM, she was not brought to the hospital because mother was attempting to calm her down. Collateral endorsed that patient is very depressed, sleeping all the time, has decreased appetite, limited motivation, and has experienced weight gain. Collateral reports stressor including unemployment and patient started a new job in April but was terminated during orientation for falling asleep during a lecture. Patient is also undergoing medication change, for the last month she has been transitioning from Cymbalta, which was effective but patient wanted to switch medication due to black box warning, to Celexa. Collateral reports she believes that patient should be monitored closely during medication changes.  Collateral reports that patient is followed by [facility] [place] in [place]. Collateral reported recent admission to [facility] in December after an intentional overdose of Advil and again in February for an intentional overdose of blood pressure medication. Collateral reports that patient has suffered from depression and anxiety as a result of chronic pain.. Collateral denies history of self-injurious behaviors. Collateral denies knowledge of substance use and legal involvement. Collateral denies family history of mental illness, suicide, and substance use. Collateral denied access to guns or weapons. Collateral reports patient should be admitted and does not feel safe bringing patient home.  pt looked up on ISTOP - recent px's in  May for hydrocodone -acetaminoph 10/325 #120; zolpidem 5mg #30; xanax 1mg #60

## 2018-06-13 NOTE — PROGRESS NOTE BEHAVIORAL HEALTH - NSBHCHARTREVIEWLAB_PSY_A_CORE FT
Renal Panel (06.11.18 @ 08:22)    eGFR if : 112 mL/min/1.73M2    eGFR if Non : 97: Interpretative comment  The units for eGFR are ml/min/1.73m2 (normalized body surface area). The  eGFR is calculated from a serum creatinine using the CKD-EPI equation.  Other variables required for calculation are race, age and sex. Among  patients with chronic kidney disease (CKD), the eGFR is useful in  determining the stage of disease according to KDOQI CKD classification.  All eGFR results are reported numerically with the following  interpretation.          GFR                    With                 Without     (ml/min/1.73 m2)    Kidney Damage       Kidney Damage        >= 90                    Stage 1                     Normal        60-89                    Stage 2                     Decreased GFR        30-59     Stage 3                     Stage 3        15-29                    Stage 4                     Stage 4        < 15                      Stage 5                     Stage 5  Each stage of CKD assumes that the associated GFR level has been in  effect for at least 3 months. Determination of stages one and two (with  eGFR > 59 ml/min/m2) requires estimation of kidney damage for at least 3  months as defined by structural or functional abnormalities.  Limitations: All estimates of GFR will be less accurate for patients at  extremes of muscle mass (including but not limited to frail elderly,  critically ill, or cancer patients), those with unusual diets, and those  with conditions associated with reduced secretion or extrarenal  elimination of creatinine. The eGFR equation is not recommended for use  in patients with unstable creatinine levels. mL/min/1.73M2    Potassium, Serum: 4.2 mmol/L    Sodium, Serum: 136 mmol/L    Chloride, Serum: 103 mmol/L    Carbon Dioxide, Serum: 29 mmol/L    Blood Urea Nitrogen, Serum: 13 mg/dL    Calcium, Total Serum: 9.0 mg/dL    Creatinine, Serum: 0.83 mg/dL    Albumin, Serum: 3.2 g/dL    Anion Gap, Serum: 4 mmol/L    Glucose, Serum: 83 mg/dL    Phosphorus Level, Serum: 4.2 mg/dL
Hemoglobin A1C, Whole Blood (06.05.18 @ 07:14)    Hemoglobin A1C, Whole Blood: 5.6: Method: Immunoassay       Reference Range                4.0-5.6%       High risk (prediabetic)        5.7-6.4%       Diabetic, diagnostic             >=6.5%       ADA diabetic treatment goal       <7.0%  The Hemoglobin A1c testing is NGSP-certified.Reference ranges are based  upon the 2010 recommendations of  the American Diabetes Association.  Interpretation may vary for children  and adolescents. %  Lipid Profile (12.07.17 @ 07:05)    Total Cholesterol/HDL Ratio Measurement: 6.4 RATIO    Cholesterol, Serum: 262 mg/dL    Triglycerides, Serum: 180 mg/dL    HDL Cholesterol, Serum: 41 mg/dL    Direct LDL: 185: LDL Cholesterol --- Interpretive Comment (for adults 18 and over)  Optimal LDL Level may vary based on clinical situation  Below 70                  Ideal for people at very high risk of heart  disease  Below 100                Ideal for people at risk of heart disease  100 - 129                   [place]  130 - 159                   Borderline high  160 - 189                   High  190 and Above          Very high mg/dL

## 2018-06-13 NOTE — DISCHARGE NOTE BEHAVIORAL HEALTH - NSBHDCSUICFCTRMIT_PSY_A_CORE
1. pt with family support  2. pt with professional support  3. Pt denies any suicidal ideation or plan

## 2018-06-13 NOTE — PROGRESS NOTE BEHAVIORAL HEALTH - NSBHFUPTYPE_PSY_A_CORE
Inpatient
Inpatient-On Service Note
Inpatient

## 2018-06-13 NOTE — DISCHARGE NOTE BEHAVIORAL HEALTH - CONDITIONS AT DISCHARGE
Patient alert, oriented x3.  Pt denies thoughts to harm herself or others.  Pt put in a 72 hours letter to be discharged. Pt would not wait for a therapy appointment but does have an appointment with the psychiatrist. Spoke with patient's mother who is in agreement with discharge and ok with Rebecca returning back home.  Pt provided with a copy of discharge paperwork.

## 2018-06-13 NOTE — PROGRESS NOTE BEHAVIORAL HEALTH - SUMMARY
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  Patient is depressed, hopeless, suicidal; s/p reported attempted hanging last night; with a hx of impulsive suicide attempts; she is unable to contract for safety; she is a danger to herself and will benefit from inpatient hospitalization for safety and stabilization.
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  Patient is depressed, hopeless, suicidal; s/p reported attempted hanging last night; with a hx of impulsive suicide attempts; she is unable to contract for safety; she is a danger to herself and will benefit from inpatient hospitalization for safety and stabilization.
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  Patient is depressed, hopeless, suicidal; s/p reported attempted hanging last night; with a hx of impulsive suicide attempts; she is unable to contract for safety; she is a danger to herself and will benefit from inpatient hospitalization for safety and stabilization.
28 yo single  female, domiciled with mother, no children/dependents, unemployed nurse, with h/o  3 previous suicide attempts, h/o depression, anxiety, chronic pain,  ADHD, chronic Insomnia, mood instability, with last hospitalization in February 2018 at ; followed at [facility] as outpatient; no known aggressive/substance/legal hx; bib mother for suicidal ideation and having attempted to hang herself the night prior to presentation.  She endorsed depressed mood, hopelessness, and ongoing suicidal ideation; s/p reported attempted hanging the night prior to admission; with a hx of impulsive suicide attempts.  This morning she endorses ongoing depressed mood, yet expresses hopefulness that with the start of outpatient DBT, she will seek/obtain clinical improvement.  She denies current active suicidal intent or plan, and said she will inform the staff should she harbor active ideation, intent, or plan.  She continues to require inpatient-level treatment for safety, symptom stabilization, and ongoing risk for recurrent self harm.    # Major depressive disorder  # Borderline personality disorder  [] Continue current treatment plan of care (as per [facility] orders)
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  She endorsed depressed mood, hopelessness, and ongoing suicidal ideation; s/p reported attempted hanging the night prior to admission; with a hx of impulsive suicide attempts.  This morning she endorses ongoing depressed mood, yet expresses hopefulness that with the start of outpatient DBT, she will seek/obtain clinical improvement.  She again denies current active suicidal intent or plan, and said she will inform the staff should she harbor active ideation, intent, or plan.  She also continues to request frequent prn medications for various somatic complaints, despite her denial of acute distress and expressed satisfaction with her current treatment regimen.  She continues to require inpatient-level treatment for safety, symptom stabilization, and ongoing risk for recurrent self harm.    # Major depressive disorder  # Borderline personality disorder  [] Continue current treatment plan of care (as per [facility] orders)
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  She endorsed depressed mood, hopelessness, and ongoing suicidal ideation; s/p reported attempted hanging the night prior to admission; with a hx of impulsive suicide attempts.  This morning she endorses ongoing depressed mood, yet expresses hopefulness that with the start of outpatient DBT, she will seek/obtain clinical improvement.  She again denies current active suicidal intent or plan, and said she will inform the staff should she harbor active ideation, intent, or plan.  She also continues to request frequent prn medications for various somatic complaints, despite her denial of acute distress and expressed satisfaction with her current treatment regimen.  She continues to require inpatient-level treatment for safety, symptom stabilization, and ongoing risk for recurrent self harm.    # Major depressive disorder  # Borderline personality disorder  [] Continue current treatment plan of care (as per [facility] orders)
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  Patient is depressed, hopeless, suicidal; s/p reported attempted hanging last night; with a hx of impulsive suicide attempts; she is unable to contract for safety; she is a danger to herself and will benefit from inpatient hospitalization for safety and stabilization.
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  She endorsed depressed mood, hopelessness, and ongoing suicidal ideation; s/p reported attempted hanging the night prior to admission; with a hx of impulsive suicide attempts.  This morning she endorses ongoing depressed mood, yet expresses hopefulness that with the start of outpatient DBT, she will seek/obtain clinical improvement.  She again denies current active suicidal intent or plan, and said she will inform the staff should she harbor active ideation, intent, or plan.  She also continues to request frequent prn medications for various somatic complaints, despite her denial of acute distress and expressed satisfaction with her current treatment regimen.  She continues to require inpatient-level treatment for safety, symptom stabilization, and ongoing risk for recurrent self harm.    # Major depressive disorder  # Borderline personality disorder  [] Continue current treatment plan of care (as per [facility] orders)
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  She endorsed depressed mood, hopelessness, and ongoing suicidal ideation; s/p reported attempted hanging the night prior to admission; with a hx of impulsive suicide attempts.  This morning she endorses ongoing depressed mood, yet expresses hopefulness that with the start of outpatient DBT, she will seek/obtain clinical improvement.  She again denies current active suicidal intent or plan, and said she will inform the staff should she harbor active ideation, intent, or plan.  She also continues to request frequent prn medications for various somatic complaints, despite her denial of acute distress and expressed satisfaction with her current treatment regimen.  She continues to require inpatient-level treatment for safety, symptom stabilization, and ongoing risk for recurrent self harm.    # Major depressive disorder  # Borderline personality disorder  [] Continue current treatment plan of care (as per [facility] orders)

## 2018-06-13 NOTE — PROGRESS NOTE BEHAVIORAL HEALTH - GAIT / STATION
Normal gait / station

## 2018-06-13 NOTE — DISCHARGE NOTE BEHAVIORAL HEALTH - NSBHDCSUICPROTECTFT_PSY_A_CORE
1. Pt is directed to stop using substances and alcohol.  Pt was also given information on the effects of substances on his thought process and behavior. Pt was also told of the dangers of the effects of substances in causing metabolic changes and can even lead to disability and death 1. Pt is directed to stop using substances and alcohol.  Pt was also given information on the effects of substances on his thought process and behavior. Pt was also told of the dangers of the effects of substances in causing metabolic changes and can even lead to disability and death  2. pt has family support and family is aware of the pt's request for discharge and will be coming to get pt

## 2018-06-13 NOTE — DISCHARGE NOTE BEHAVIORAL HEALTH - MEDICATION SUMMARY - MEDICATIONS TO STOP TAKING
I will STOP taking the medications listed below when I get home from the hospital:    hydrOXYzine hydrochloride 50 mg oral tablet  -- 1 tab(s) by mouth once a day (at bedtime) until told to discontinue by MD    carvedilol 6.25 mg oral tablet  -- 1 tab(s) by mouth every 12 hours until told to discontinue by MD    bacitracin 500 units/g topical ointment  -- 1 application on skin 2 times a day

## 2018-06-13 NOTE — DISCHARGE NOTE BEHAVIORAL HEALTH - NSBHDCSUICFCTROTHERFT_PSY_A_CORE
1.Pt with increased medical conditions or worsening 1.Pt with increased medical conditions or worseningof her chronic pain issue

## 2018-06-13 NOTE — DISCHARGE NOTE BEHAVIORAL HEALTH - NSBHDCMEDSFT_PSY_A_CORE
PATIENT EDUCATED TO NOT DISCONTINUE ANY MEDICATIONS OR TREATMENT UNLESS OTHERWISE TOLD TO STOP BY OUTPATIENT PROVIDER MEDICATIONS  (STANDING):  cloNIDine 0.1 milliGRAM(s) Oral two times a day  docusate sodium 100 milliGRAM(s) Oral daily  DULoxetine 60 milliGRAM(s) Oral daily  gabapentin 600 milliGRAM(s) Oral at bedtime  HYDROcodone  7.5 mG/acetaminophen 300 mG. 2 Tablet(s) Oral <User Schedule>  lamoTRIgine 300 milliGRAM(s) Oral at bedtime  lidocaine 2% Gel 1 Application(s) Topical three times a day  lisinopril 20 milliGRAM(s) Oral two times a day  senna 2 Tablet(s) Oral at bedtime

## 2018-06-13 NOTE — PROGRESS NOTE BEHAVIORAL HEALTH - AXIS III
Obese, Back and knee pain, polycystic ovaries, htn

## 2018-06-13 NOTE — PROGRESS NOTE BEHAVIORAL HEALTH - NSBHCHARTREVIEWVS_PSY_A_CORE FT
foot wound dx with cellulitis
Vital Signs Last 24 Hrs  T(C): 36.8 (11 Jun 2018 07:58), Max: 36.8 (11 Jun 2018 07:58)  T(F): 98.3 (11 Jun 2018 07:58), Max: 98.3 (11 Jun 2018 07:58)  HR: 95 (11 Jun 2018 07:58) (95 - 95)  BP: 142/85 (11 Jun 2018 07:58) (142/85 - 142/85)  BP(mean): --  RR: 16 (11 Jun 2018 07:58) (16 - 16)  SpO2: 100% (11 Jun 2018 07:58) (100% - 100%)
Vital Signs Last 24 Hrs  T(C): --  T(F): --  HR: --  BP: 146/84 (09 Jun 2018 21:44) (146/84 - 146/84)  BP(mean): --  RR: --  SpO2: --
Vital Signs Last 24 Hrs  T(C): 36.8 (11 Jun 2018 07:58), Max: 36.8 (11 Jun 2018 07:58)  T(F): 98.3 (11 Jun 2018 07:58), Max: 98.3 (11 Jun 2018 07:58)  HR: 95 (11 Jun 2018 07:58) (95 - 95)  BP: 142/85 (11 Jun 2018 07:58) (142/85 - 142/85)  BP(mean): --  RR: 16 (11 Jun 2018 07:58) (16 - 16)  SpO2: 100% (11 Jun 2018 07:58) (100% - 100%)
Vital Signs Last 24 Hrs  T(C): 36.8 (09 Jun 2018 08:23), Max: 36.8 (09 Jun 2018 08:23)  T(F): 98.2 (09 Jun 2018 08:23), Max: 98.2 (09 Jun 2018 08:23)  HR: 84 (09 Jun 2018 08:23) (84 - 84)  BP: 137/83 (09 Jun 2018 08:23) (137/83 - 137/83)  BP(mean): --  RR: 17 (09 Jun 2018 08:23) (17 - 17)  SpO2: 100% (09 Jun 2018 08:23) (100% - 100%)
Vital Signs Last 24 Hrs  T(C): 37.1 (06-07-18 @ 08:08), Max: 37.1 (06-07-18 @ 08:08)  T(F): 98.7 (06-07-18 @ 08:08), Max: 98.7 (06-07-18 @ 08:08)  HR: 78 (06-07-18 @ 08:08) (78 - 88)  BP: 124/69 (06-07-18 @ 08:08) (124/69 - 145/71)  BP(mean): --  RR: 12 (06-07-18 @ 08:08) (12 - 12)  SpO2: 99% (06-07-18 @ 08:08) (99% - 99%)
Vital Signs Last 24 Hrs  T(C): 36.9 (08 Jun 2018 08:05), Max: 36.9 (08 Jun 2018 08:05)  T(F): 98.5 (08 Jun 2018 08:05), Max: 98.5 (08 Jun 2018 08:05)  HR: 98 (08 Jun 2018 08:05) (98 - 98)  BP: 125/82 (08 Jun 2018 08:05) (125/82 - 148/86)  BP(mean): --  RR: 16 (08 Jun 2018 08:05) (16 - 16)  SpO2: 99% (08 Jun 2018 08:05) (99% - 99%)
Vital Signs Last 24 Hrs  T(C): 36.9 (06 Jun 2018 08:14), Max: 36.9 (06 Jun 2018 08:14)  T(F): 98.4 (06 Jun 2018 08:14), Max: 98.4 (06 Jun 2018 08:14)  HR: 96 (06 Jun 2018 08:14) (96 - 101)  BP: 145/90 (06 Jun 2018 08:14) (139/91 - 145/90)  BP(mean): --  RR: 12 (06 Jun 2018 08:14) (12 - 12)  SpO2: 100% (06 Jun 2018 08:14) (100% - 100%)
Vital Signs Last 24 Hrs  T(C): 36.8 (11 Jun 2018 07:58), Max: 36.8 (11 Jun 2018 07:58)  T(F): 98.3 (11 Jun 2018 07:58), Max: 98.3 (11 Jun 2018 07:58)  HR: 95 (11 Jun 2018 07:58) (95 - 95)  BP: 142/85 (11 Jun 2018 07:58) (142/85 - 142/85)  BP(mean): --  RR: 16 (11 Jun 2018 07:58) (16 - 16)  SpO2: 100% (11 Jun 2018 07:58) (100% - 100%)
Vital Signs Last 24 Hrs  T(C): 36.9 (05 Jun 2018 08:18), Max: 37 (05 Jun 2018 01:39)  T(F): 98.5 (05 Jun 2018 08:18), Max: 98.6 (05 Jun 2018 01:39)  HR: 78 (05 Jun 2018 08:18) (78 - 96)  BP: 139/89 (05 Jun 2018 08:18) (139/85 - 139/89)  BP(mean): --  RR: 14 (05 Jun 2018 08:18) (14 - 16)  SpO2: 100% (05 Jun 2018 08:18) (100% - 100%)

## 2018-06-13 NOTE — PROGRESS NOTE BEHAVIORAL HEALTH - MUSCLE TONE / STRENGTH
Normal muscle tone/strength

## 2018-06-13 NOTE — PROGRESS NOTE BEHAVIORAL HEALTH - PROBLEM SELECTOR PROBLEM 2
Borderline personality disorder in adult

## 2018-06-13 NOTE — PROGRESS NOTE BEHAVIORAL HEALTH - LANGUAGE
No abnormalities noted

## 2018-06-13 NOTE — DISCHARGE NOTE BEHAVIORAL HEALTH - NSBHDCSUICSAFETYFT_PSY_A_CORE
1, pt is aware that if the symptoms should reappear that she can go to the ER and be evaluated   2. pt given appt to return to the care of the psychiatrist and the therapist  3. in the interim the pt may contact Dr Gould at [facility]

## 2018-06-17 DIAGNOSIS — F51.04 PSYCHOPHYSIOLOGIC INSOMNIA: ICD-10-CM

## 2018-06-17 DIAGNOSIS — Z91.010 ALLERGY TO PEANUTS: ICD-10-CM

## 2018-06-17 DIAGNOSIS — I10 ESSENTIAL (PRIMARY) HYPERTENSION: ICD-10-CM

## 2018-06-17 DIAGNOSIS — F90.9 ATTENTION-DEFICIT HYPERACTIVITY DISORDER, UNSPECIFIED TYPE: ICD-10-CM

## 2018-06-17 DIAGNOSIS — M35.1 OTHER OVERLAP SYNDROMES: ICD-10-CM

## 2018-06-17 DIAGNOSIS — R45.851 SUICIDAL IDEATIONS: ICD-10-CM

## 2018-06-17 DIAGNOSIS — Z91.018 ALLERGY TO OTHER FOODS: ICD-10-CM

## 2018-06-17 DIAGNOSIS — D72.829 ELEVATED WHITE BLOOD CELL COUNT, UNSPECIFIED: ICD-10-CM

## 2018-06-17 DIAGNOSIS — Z79.891 LONG TERM (CURRENT) USE OF OPIATE ANALGESIC: ICD-10-CM

## 2018-06-17 DIAGNOSIS — F33.1 MAJOR DEPRESSIVE DISORDER, RECURRENT, MODERATE: ICD-10-CM

## 2018-06-17 DIAGNOSIS — M54.42 LUMBAGO WITH SCIATICA, LEFT SIDE: ICD-10-CM

## 2018-06-17 DIAGNOSIS — F60.3 BORDERLINE PERSONALITY DISORDER: ICD-10-CM

## 2018-06-17 DIAGNOSIS — E66.9 OBESITY, UNSPECIFIED: ICD-10-CM

## 2018-06-17 DIAGNOSIS — Z90.49 ACQUIRED ABSENCE OF OTHER SPECIFIED PARTS OF DIGESTIVE TRACT: ICD-10-CM

## 2018-06-17 DIAGNOSIS — F41.9 ANXIETY DISORDER, UNSPECIFIED: ICD-10-CM

## 2018-06-17 DIAGNOSIS — G89.29 OTHER CHRONIC PAIN: ICD-10-CM

## 2018-06-17 DIAGNOSIS — E28.2 POLYCYSTIC OVARIAN SYNDROME: ICD-10-CM

## 2018-06-17 DIAGNOSIS — Z91.5 PERSONAL HISTORY OF SELF-HARM: ICD-10-CM

## 2018-06-17 DIAGNOSIS — N05.1 UNSPECIFIED NEPHRITIC SYNDROME WITH FOCAL AND SEGMENTAL GLOMERULAR LESIONS: ICD-10-CM

## 2018-07-01 NOTE — DISCHARGE NOTE ADULT - HOSPITAL COURSE
all other ROS negative except as per HPI
HPI:  27 yo F with PMH significant for Hx of C.Diff in the past, Mixed connective tissue disorder, PCOS, Anxiety, Depression, secondary hypertension due to MCTD? presents to the ED c/o Abdominal pain, N/V/D x 1 day. Pt states that she is having diffuse abdominal pain ( Although looks comfortable for now),nausea, vomited 20 + times and loose watery diarrhea 10+ times today. Pt had C.diff 3 yrs ago and was treated with IV Flagyl. Denies taking any Abx recently. Denies fever or chills. No recent travel.     In the ED, found to have leucocytosis 17.6 and CTAP came back negative as acute intraabdominal or pelvic pathology.  Meds: Vanco 125 ml PO x1, Toradol, IV Morphine,. IV Zofran, IV Reglan, Protonix and Bentyl (2017 23:37)  Pt was started on ciprofloxacin and flagyl . test for C diff was negative . CT abdomen was also negative. during the course of hosp her diarhhea improved and nausea resolved. The abdominal pain also improved. She was evalauted by Dr. Valdez , GI and was fit for D/c home with f/u with Dr. Valdez. Likely cause was gastroenteritis .     T(C): 37.1, Max: 37.1 (-14 @ 05:40)  HR: 65 (65 - 88)  BP: 114/63 (114/63 - 130/60)  RR: 17 (16 - 18)  SpO2: 99% (99% - 99%)  Wt(kg): --                              12.5   7.1   )-----------( 283      ( 2017 08:49 )             35.6     2017 08:49    143    |  109    |  10     ----------------------------<  93     3.4     |  27     |  0.76     Ca    7.9        2017 08:49  Phos  2.8       2017 06:59  Mg     1.6       2017 06:59    TPro  8.5    /  Alb  3.5    /  TBili  0.4    /  DBili  x      /  AST  78     /  ALT  69     /  AlkPhos  80     2017 16:54    PT/INR - ( 2017 06:59 )   PT: 13.2 sec;   INR: 1.22 ratio         PTT - ( 2017 06:59 )  PTT:35.0 sec  CAPILLARY BLOOD GLUCOSE    LIVER FUNCTIONS - ( 2017 16:54 )  Alb: 3.5 g/dL / Pro: 8.5 gm/dL / ALK PHOS: 80 U/L / ALT: 69 U/L / AST: 78 U/L / GGT: x           Urinalysis Basic - ( 2017 18:05 )    Color: Yellow / Appearance: Clear / S.020 / pH: x  Gluc: x / Ketone: Negative  / Bili: Negative / Urobili: Negative mg/dL   Blood: x / Protein: 100 mg/dL / Nitrite: Negative   Leuk Esterase: Trace / RBC: 3-5 /HPF / WBC 5-7 /HPF   Sq Epi: x / Non Sq Epi: Moderate / Bacteria: Many      Hemoglobin A1C, Whole Blood: 5.1 % ( @ 07:29)        C Diff by PCR Result: NotDetec (17 @ 18:26)    CT abdomen and Pelvis with contrast PROCEDURE DATE:  2017      IMPRESSION:     No acute intra-abdominal or pelvic pathology.

## 2018-07-19 ENCOUNTER — EMERGENCY (EMERGENCY)
Facility: HOSPITAL | Age: 28
LOS: 0 days | Discharge: HOME | End: 2018-07-19
Attending: EMERGENCY MEDICINE
Payer: COMMERCIAL

## 2018-07-19 VITALS — WEIGHT: 240.08 LBS

## 2018-07-19 VITALS
TEMPERATURE: 99 F | OXYGEN SATURATION: 100 % | DIASTOLIC BLOOD PRESSURE: 89 MMHG | RESPIRATION RATE: 19 BRPM | SYSTOLIC BLOOD PRESSURE: 152 MMHG | HEART RATE: 94 BPM

## 2018-07-19 DIAGNOSIS — E28.2 POLYCYSTIC OVARIAN SYNDROME: ICD-10-CM

## 2018-07-19 DIAGNOSIS — Z90.49 ACQUIRED ABSENCE OF OTHER SPECIFIED PARTS OF DIGESTIVE TRACT: ICD-10-CM

## 2018-07-19 DIAGNOSIS — Z98.890 OTHER SPECIFIED POSTPROCEDURAL STATES: Chronic | ICD-10-CM

## 2018-07-19 DIAGNOSIS — F60.3 BORDERLINE PERSONALITY DISORDER: ICD-10-CM

## 2018-07-19 DIAGNOSIS — Z98.890 OTHER SPECIFIED POSTPROCEDURAL STATES: ICD-10-CM

## 2018-07-19 DIAGNOSIS — F41.9 ANXIETY DISORDER, UNSPECIFIED: ICD-10-CM

## 2018-07-19 DIAGNOSIS — E66.9 OBESITY, UNSPECIFIED: ICD-10-CM

## 2018-07-19 DIAGNOSIS — F13.230 SEDATIVE, HYPNOTIC OR ANXIOLYTIC DEPENDENCE WITH WITHDRAWAL, UNCOMPLICATED: ICD-10-CM

## 2018-07-19 DIAGNOSIS — I10 ESSENTIAL (PRIMARY) HYPERTENSION: ICD-10-CM

## 2018-07-19 DIAGNOSIS — Z90.49 ACQUIRED ABSENCE OF OTHER SPECIFIED PARTS OF DIGESTIVE TRACT: Chronic | ICD-10-CM

## 2018-07-19 DIAGNOSIS — M35.1 OTHER OVERLAP SYNDROMES: ICD-10-CM

## 2018-07-19 DIAGNOSIS — Z79.899 OTHER LONG TERM (CURRENT) DRUG THERAPY: ICD-10-CM

## 2018-07-19 DIAGNOSIS — F32.9 MAJOR DEPRESSIVE DISORDER, SINGLE EPISODE, UNSPECIFIED: ICD-10-CM

## 2018-07-19 DIAGNOSIS — Z87.19 PERSONAL HISTORY OF OTHER DISEASES OF THE DIGESTIVE SYSTEM: ICD-10-CM

## 2018-07-19 PROBLEM — M54.9 DORSALGIA, UNSPECIFIED: Chronic | Status: ACTIVE | Noted: 2018-06-03

## 2018-07-19 LAB
ADD ON TEST-SPECIMEN IN LAB: SIGNIFICANT CHANGE UP
ANION GAP SERPL CALC-SCNC: 11 MMOL/L — SIGNIFICANT CHANGE UP (ref 5–17)
APAP SERPL-MCNC: <2 UG/ML — LOW (ref 10–30)
APPEARANCE UR: CLEAR — SIGNIFICANT CHANGE UP
APTT BLD: 30.7 SEC — SIGNIFICANT CHANGE UP (ref 27.5–37.4)
BACTERIA # UR AUTO: ABNORMAL
BASOPHILS # BLD AUTO: 0.03 K/UL — SIGNIFICANT CHANGE UP (ref 0–0.2)
BASOPHILS NFR BLD AUTO: 0.2 % — SIGNIFICANT CHANGE UP (ref 0–2)
BILIRUB UR-MCNC: NEGATIVE — SIGNIFICANT CHANGE UP
BUN SERPL-MCNC: 23 MG/DL — SIGNIFICANT CHANGE UP (ref 7–23)
CALCIUM SERPL-MCNC: 8.9 MG/DL — SIGNIFICANT CHANGE UP (ref 8.5–10.1)
CHLORIDE SERPL-SCNC: 105 MMOL/L — SIGNIFICANT CHANGE UP (ref 96–108)
CO2 SERPL-SCNC: 23 MMOL/L — SIGNIFICANT CHANGE UP (ref 22–31)
COLOR SPEC: YELLOW — SIGNIFICANT CHANGE UP
CREAT SERPL-MCNC: 0.97 MG/DL — SIGNIFICANT CHANGE UP (ref 0.5–1.3)
D DIMER BLD IA.RAPID-MCNC: <150 NG/ML DDU — SIGNIFICANT CHANGE UP
DIFF PNL FLD: ABNORMAL
EOSINOPHIL # BLD AUTO: 0 K/UL — SIGNIFICANT CHANGE UP (ref 0–0.5)
EOSINOPHIL NFR BLD AUTO: 0 % — SIGNIFICANT CHANGE UP (ref 0–6)
EPI CELLS # UR: SIGNIFICANT CHANGE UP
ETHANOL SERPL-MCNC: <10 MG/DL — SIGNIFICANT CHANGE UP (ref 0–10)
GLUCOSE SERPL-MCNC: 196 MG/DL — HIGH (ref 70–99)
GLUCOSE UR QL: 250 MG/DL
HCT VFR BLD CALC: 38.9 % — SIGNIFICANT CHANGE UP (ref 34.5–45)
HGB BLD-MCNC: 13.9 G/DL — SIGNIFICANT CHANGE UP (ref 11.5–15.5)
IMM GRANULOCYTES NFR BLD AUTO: 1.3 % — SIGNIFICANT CHANGE UP (ref 0–1.5)
INR BLD: 1.04 RATIO — SIGNIFICANT CHANGE UP (ref 0.88–1.16)
KETONES UR-MCNC: ABNORMAL
LEUKOCYTE ESTERASE UR-ACNC: NEGATIVE — SIGNIFICANT CHANGE UP
LYMPHOCYTES # BLD AUTO: 1.74 K/UL — SIGNIFICANT CHANGE UP (ref 1–3.3)
LYMPHOCYTES # BLD AUTO: 9.8 % — LOW (ref 13–44)
MCHC RBC-ENTMCNC: 28.5 PG — SIGNIFICANT CHANGE UP (ref 27–34)
MCHC RBC-ENTMCNC: 35.7 GM/DL — SIGNIFICANT CHANGE UP (ref 32–36)
MCV RBC AUTO: 79.9 FL — LOW (ref 80–100)
MONOCYTES # BLD AUTO: 0.91 K/UL — HIGH (ref 0–0.9)
MONOCYTES NFR BLD AUTO: 5.1 % — SIGNIFICANT CHANGE UP (ref 2–14)
NEUTROPHILS # BLD AUTO: 14.84 K/UL — HIGH (ref 1.8–7.4)
NEUTROPHILS NFR BLD AUTO: 83.6 % — HIGH (ref 43–77)
NITRITE UR-MCNC: NEGATIVE — SIGNIFICANT CHANGE UP
NRBC # BLD: 0 /100 WBCS — SIGNIFICANT CHANGE UP (ref 0–0)
PH UR: 6 — SIGNIFICANT CHANGE UP (ref 5–8)
PLATELET # BLD AUTO: 355 K/UL — SIGNIFICANT CHANGE UP (ref 150–400)
POTASSIUM SERPL-MCNC: 4.3 MMOL/L — SIGNIFICANT CHANGE UP (ref 3.5–5.3)
POTASSIUM SERPL-SCNC: 4.3 MMOL/L — SIGNIFICANT CHANGE UP (ref 3.5–5.3)
PROT UR-MCNC: 100 MG/DL
PROTHROM AB SERPL-ACNC: 11.2 SEC — SIGNIFICANT CHANGE UP (ref 9.8–12.7)
RBC # BLD: 4.87 M/UL — SIGNIFICANT CHANGE UP (ref 3.8–5.2)
RBC # FLD: 13.5 % — SIGNIFICANT CHANGE UP (ref 10.3–14.5)
RBC CASTS # UR COMP ASSIST: SIGNIFICANT CHANGE UP /HPF (ref 0–4)
SALICYLATES SERPL-MCNC: <1.7 MG/DL — LOW (ref 2.8–20)
SODIUM SERPL-SCNC: 139 MMOL/L — SIGNIFICANT CHANGE UP (ref 135–145)
SP GR SPEC: 1.02 — SIGNIFICANT CHANGE UP (ref 1.01–1.02)
UROBILINOGEN FLD QL: NEGATIVE MG/DL — SIGNIFICANT CHANGE UP
WBC # BLD: 17.75 K/UL — HIGH (ref 3.8–10.5)
WBC # FLD AUTO: 17.75 K/UL — HIGH (ref 3.8–10.5)
WBC UR QL: SIGNIFICANT CHANGE UP

## 2018-07-19 PROCEDURE — 99284 EMERGENCY DEPT VISIT MOD MDM: CPT

## 2018-07-19 PROCEDURE — 71045 X-RAY EXAM CHEST 1 VIEW: CPT | Mod: 26

## 2018-07-19 PROCEDURE — 90792 PSYCH DIAG EVAL W/MED SRVCS: CPT

## 2018-07-19 PROCEDURE — 93010 ELECTROCARDIOGRAM REPORT: CPT

## 2018-07-19 RX ORDER — ALPRAZOLAM 0.25 MG
1 TABLET ORAL ONCE
Qty: 0 | Refills: 0 | Status: DISCONTINUED | OUTPATIENT
Start: 2018-07-19 | End: 2018-07-19

## 2018-07-19 RX ORDER — ALPRAZOLAM 0.25 MG
1 TABLET ORAL
Qty: 3 | Refills: 0 | OUTPATIENT
Start: 2018-07-19 | End: 2018-07-19

## 2018-07-19 RX ORDER — OXYCODONE HYDROCHLORIDE 5 MG/1
5 TABLET ORAL ONCE
Qty: 0 | Refills: 0 | Status: COMPLETED | OUTPATIENT
Start: 2018-07-19 | End: 2018-07-19

## 2018-07-19 RX ADMIN — Medication 1 MILLIGRAM(S): at 15:31

## 2018-07-19 NOTE — ED PROVIDER NOTE - MEDICAL DECISION MAKING DETAILS
27 year old F hx of anxiety, depression, POCS, DM recently started on birth control this past week presents to ED feeling jittery, anxious as well as CP and palpitation. Pt was found to be tachycardic. Pt also states she hasn't taken Xanax in past couple days because she ran out. Concern for PE given new birth control. +palpitation +tachycardia. Will order labs, psych consult, D Dimer, reassess.

## 2018-07-19 NOTE — ED PROVIDER NOTE - NS ED ROS FT
Constitutional: No fever or chills  Eyes: No visual changes  HEENT: No throat pain  CV: + chest pain  Resp: No SOB no cough  GI: No abd pain, nausea or vomiting  : No dysuria  MSK: No musculoskeletal pain  Skin: No rash  Neuro: No headache  Psych: + anxiety

## 2018-07-19 NOTE — ED BEHAVIORAL HEALTH ASSESSMENT NOTE - PATIENT'S CHIEF COMPLAINT
"I have had such anxiety since Sunday, my heart is racing, I have tunnel anxiety, I can't articulate my words right ,y face and arms are flushed and I have not been sleeping well".

## 2018-07-19 NOTE — ED STATDOCS - OBJECTIVE STATEMENT
Pt is a 28 y/o F, with PMHx of anxiety, depression, HTN, and PCOS, presenting to the ED with c/o worsening anxiety over the last week. Pt describes sxs of chest discomfort, palpitations, and insomnia. She states her sxs have been progressively worsening. Denies recent fevers. Was recently started on metformin and oral contraceptives. Per pt, she is compliant with all her medications but over the last 6 months, her psychiatrist has been making frequent adjustments to her meds regimen. Per mother, reports "she just isn't herself anymore." She typically takes xanex for her anxiety but states she ran out a few days ago and has f/u scheduled for tomorrow with psych. Pt denies SI/HI.  Sb Ha- psych Pt sent to main ED.

## 2018-07-19 NOTE — ED PROVIDER NOTE - CARE PLAN
Principal Discharge DX:	Anxiety  Assessment and plan of treatment:	1. return for worsening symptoms or anything concerning to you  2. take all home meds as prescribed  3. follow up with your pmd call to make an appointment  4. see your psychcologist tomorrow call to make an appointment

## 2018-07-19 NOTE — ED BEHAVIORAL HEALTH ASSESSMENT NOTE - CURRENT MEDICATION
Cymbalta 60 mg qd Lamictal 200mg,  hydrocodone-acet 10/325 q6, xanax 1mg bid prn, Tizanidine, lidocaine 5%, carvedilol, Neurontin 900 mg, ,Clonidine 0.1 mg (Yaznim, Metformin started 7/15 for PCOS

## 2018-07-19 NOTE — ED BEHAVIORAL HEALTH ASSESSMENT NOTE - SUICIDE RISK FACTORS
Substance abuse/dependence/Agitation/severe anxiety/Highly impulsive behavior/Access to means (pills, firearms, etc.)/Family history of suicide/Chronic pain or acute medical issue

## 2018-07-19 NOTE — ED BEHAVIORAL HEALTH ASSESSMENT NOTE - DESCRIPTION
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

## 2018-07-19 NOTE — ED PROVIDER NOTE - PROGRESS NOTE DETAILS
Scribe DV for ED attending, Doctor ANITHA Antonio: Pt seen by psych; wants pt to receive 1 mg of Xanax by mouth and will reassess pt in 1 hour. pt cleared by psych - adressed wbc likely high 2/2 to steroids pt taking for chronic back pain states its been high before. no fever or cough. symptoms improved and no cp now states the cp she had was consistent with her previous anxiety. pt wants to go home. will d/c with follow up with Dr. Ha. Zak Antonio M.D., Attending Physician

## 2018-07-19 NOTE — ED STATDOCS - PROGRESS NOTE DETAILS
Radha Vaughn for Dr Yaakov Chavarria : Pt is a 26 y/o F, with PMHx of anxiety, depression, HTN, and PCOS, presenting to the ED with c/o worsening anxiety over the last week. Pt describes sxs of chest discomfort, palpitations, and insomnia. She states her sxs have been progressively worsening. Denies recent fevers. Was recently started on metformin and oral contraceptives. Per pt, she is compliant with all her medications but over the last 6 months, her psychiatrist has been making frequent adjustments to her meds regimen. Per mother, reports "she just isn't herself anymore." She typically takes xanex for her anxiety but states she ran out a few days ago and has f/u scheduled for tomorrow with psych. Pt denies SI/HI. Will sent to [facility] for further evaluation and psych eval.   Sb Ha- psych

## 2018-07-19 NOTE — ED PROVIDER NOTE - PLAN OF CARE
1. return for worsening symptoms or anything concerning to you  2. take all home meds as prescribed  3. follow up with your pmd call to make an appointment  4. see your psychcologist tomorrow call to make an appointment

## 2018-07-19 NOTE — ED BEHAVIORAL HEALTH ASSESSMENT NOTE - SUMMARY
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  Pt reports running out of Xanax Sunday and is reporting rebound anxiety racing HR, which is tachy on flow she, difficilty with speak articulation and and flow, mildly disorganized.  Pt denies SIIP or safety concerns, denies psychotic or manic symptoms.  Recommend a dose of Xanax 1 mg stat and if symptoms improve may be discharged with follow up with Dr Ha tomorrow, which was confirmed with him.  Please provide 3 tabs of Xanax 1 mg to bridge until appt with MD, Dr Ha.  Pt is not an imminent danger to self or others and is cleared psychiatrically for discharge  Case reviewed with Dr Vyas

## 2018-07-19 NOTE — ED ADULT TRIAGE NOTE - CHIEF COMPLAINT QUOTE
pt c/o severe anxiety since saturday with chest discomfort and feeling as if her heart is racing. pt states she tried anxiety medication with no relief, [place]. pt  denies SI/HI.. pt denies hx of Mi or CVA, pt states she has hx of HTN and is on medications.  pt denies SOb dizziness or weakness

## 2018-07-19 NOTE — ED BEHAVIORAL HEALTH ASSESSMENT NOTE - OTHER PAST PSYCHIATRIC HISTORY (INCLUDE DETAILS REGARDING ONSET, COURSE OF ILLNESS, INPATIENT/OUTPATIENT TREATMENT)
three prior suicide attempts, at three prior hospitalizations    Dr Sb Ha psychiatrist, Dr Cal Stephenson, therapist

## 2018-07-19 NOTE — ED BEHAVIORAL HEALTH ASSESSMENT NOTE - DESCRIPTION (FIRST USE, LAST USE, QUANTITY, FREQUENCY, DURATION)
prescribed  opiates q6h prescribed Alprazolam 1mg mg po BID prn(takes 2x daily per pt, but 3-4 since Ambien decreased and subsequently ran out early).

## 2018-07-19 NOTE — ED PROVIDER NOTE - OBJECTIVE STATEMENT
26 y/o male with a PMHx of Anxiety on Xanax (last dose was a few days ago), Cholecystitis, Chronic back pain, nerve ablation of the spine, Depression on Lamictal, HTN, Polycystic ovarian syndrome presents to the ED c/o anxiety, CP, rapid heartbeat since Saturday. +ticks +jerking movements +flushed +trouble articulating. Denies recent surgery or blood clots. Pt started Maria Ines birth control on Sunday. 28 y/o male with a PMHx of Anxiety on Xanax (last dose was a few days ago), Cholecystitis, Chronic back pain, nerve ablation of the spine, Depression on Lamictal, HTN, Polycystic ovarian syndrome presents to the ED c/o anxiety, CP, rapid heartbeat since Saturday. +ticks +jerking movements +flushed +trouble articulating/worsening anxiety. Denies recent surgery leg swelling or blood clots. Pt started Maria Ines birth control on Sunday.

## 2018-07-19 NOTE — ED PROVIDER NOTE - PHYSICAL EXAMINATION
Constitutional: mild distress AAOx3  Eyes: PERRLA EOMI  Head: Normocephalic atraumatic  Mouth: MMM  Cardiac: tachycardic  Resp: Lungs CTAB  GI: Abd s/nt/nd  Neuro: CN2-12 intact  Skin: No rashes   PSych: + anxiety no si/hi

## 2018-07-19 NOTE — ED BEHAVIORAL HEALTH ASSESSMENT NOTE - RISK ASSESSMENT
Pt is a chronic risk of self harm, however will report SI when a suicidal and is currently denying risk of self harm.  Pt in treatment and attends visits.  Pt has chronic substance abuse and noncompliance history which undermines progress. Mother is supportive, possible enabling.

## 2018-07-19 NOTE — ED BEHAVIORAL HEALTH ASSESSMENT NOTE - HPI (INCLUDE ILLNESS QUALITY, SEVERITY, DURATION, TIMING, CONTEXT, MODIFYING FACTORS, ASSOCIATED SIGNS AND SYMPTOMS)
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    Pt has been followed by Dr NAYAN Ha since last psych admission on 5N June of this yr s/p SA after fired from new job.  Pt denies SI at this time, but is c/o severe anxiety, impaired sleep since Ambien decreased 2 weeks ago and somatic symptoms which include racing heart, jerky tics, change in her ability to articulate words, flushed face and arms and "tunnel vision".  Pt has ablation yesterday but symptoms predate yesterdays procedure.  Pt admits to taking more Xanax since her Ambien 2 weeks ago and has run out on Sunday and has had none since, other than Versed yesterday at procedure.  Pt reports Dr Ha is trying to  narcotics, however has not yet started tapering benzo, and is unwilling to go over her usual dose.  Per [facility]top Bt last rx for Xanax was 6/26 and is ready for renewal on Monday.  Discussed with Dr Ha by phone and has appt with her tomorrow to discuss further treatment plan with her.  Mother is concerned about some side effects of meds and was advised to discuss with Dr Ha.  Mother denies any safety concerns taking her home and has noticed changes in speech fluidity.  Pt reports mood is stable denies SI/HI/AH/VH delusions and no evidence of christophe.

## 2018-07-29 ENCOUNTER — EMERGENCY (EMERGENCY)
Facility: HOSPITAL | Age: 28
LOS: 0 days | Discharge: HOME | End: 2018-07-30
Attending: EMERGENCY MEDICINE | Admitting: EMERGENCY MEDICINE
Payer: COMMERCIAL

## 2018-07-29 VITALS
OXYGEN SATURATION: 100 % | SYSTOLIC BLOOD PRESSURE: 152 MMHG | DIASTOLIC BLOOD PRESSURE: 90 MMHG | HEART RATE: 114 BPM | TEMPERATURE: 98 F | RESPIRATION RATE: 18 BRPM

## 2018-07-29 DIAGNOSIS — N83.292 OTHER OVARIAN CYST, LEFT SIDE: ICD-10-CM

## 2018-07-29 DIAGNOSIS — Z90.49 ACQUIRED ABSENCE OF OTHER SPECIFIED PARTS OF DIGESTIVE TRACT: Chronic | ICD-10-CM

## 2018-07-29 DIAGNOSIS — R10.30 LOWER ABDOMINAL PAIN, UNSPECIFIED: ICD-10-CM

## 2018-07-29 DIAGNOSIS — Z98.890 OTHER SPECIFIED POSTPROCEDURAL STATES: Chronic | ICD-10-CM

## 2018-07-29 DIAGNOSIS — F32.9 MAJOR DEPRESSIVE DISORDER, SINGLE EPISODE, UNSPECIFIED: ICD-10-CM

## 2018-07-29 DIAGNOSIS — M54.9 DORSALGIA, UNSPECIFIED: ICD-10-CM

## 2018-07-29 DIAGNOSIS — N83.291 OTHER OVARIAN CYST, RIGHT SIDE: ICD-10-CM

## 2018-07-29 DIAGNOSIS — F41.9 ANXIETY DISORDER, UNSPECIFIED: ICD-10-CM

## 2018-07-29 DIAGNOSIS — I10 ESSENTIAL (PRIMARY) HYPERTENSION: ICD-10-CM

## 2018-07-29 LAB
ALBUMIN SERPL ELPH-MCNC: 3.5 G/DL — SIGNIFICANT CHANGE UP (ref 3.3–5)
ALP SERPL-CCNC: 101 U/L — SIGNIFICANT CHANGE UP (ref 40–120)
ALT FLD-CCNC: 51 U/L — SIGNIFICANT CHANGE UP (ref 12–78)
ANION GAP SERPL CALC-SCNC: 10 MMOL/L — SIGNIFICANT CHANGE UP (ref 5–17)
APPEARANCE UR: CLEAR — SIGNIFICANT CHANGE UP
APTT BLD: 31.5 SEC — SIGNIFICANT CHANGE UP (ref 27.5–37.4)
AST SERPL-CCNC: 51 U/L — HIGH (ref 15–37)
BACTERIA # UR AUTO: ABNORMAL
BASOPHILS # BLD AUTO: 0.1 K/UL — SIGNIFICANT CHANGE UP (ref 0–0.2)
BASOPHILS NFR BLD AUTO: 0.4 % — SIGNIFICANT CHANGE UP (ref 0–2)
BILIRUB SERPL-MCNC: 0.3 MG/DL — SIGNIFICANT CHANGE UP (ref 0.2–1.2)
BILIRUB UR-MCNC: NEGATIVE — SIGNIFICANT CHANGE UP
BLD GP AB SCN SERPL QL: SIGNIFICANT CHANGE UP
BUN SERPL-MCNC: 21 MG/DL — SIGNIFICANT CHANGE UP (ref 7–23)
CALCIUM SERPL-MCNC: 9.8 MG/DL — SIGNIFICANT CHANGE UP (ref 8.5–10.1)
CHLORIDE SERPL-SCNC: 105 MMOL/L — SIGNIFICANT CHANGE UP (ref 96–108)
CO2 SERPL-SCNC: 23 MMOL/L — SIGNIFICANT CHANGE UP (ref 22–31)
COLOR SPEC: YELLOW — SIGNIFICANT CHANGE UP
CREAT SERPL-MCNC: 1.1 MG/DL — SIGNIFICANT CHANGE UP (ref 0.5–1.3)
DIFF PNL FLD: ABNORMAL
EOSINOPHIL # BLD AUTO: 0.07 K/UL — SIGNIFICANT CHANGE UP (ref 0–0.5)
EOSINOPHIL NFR BLD AUTO: 0.3 % — SIGNIFICANT CHANGE UP (ref 0–6)
EPI CELLS # UR: ABNORMAL
GLUCOSE SERPL-MCNC: 87 MG/DL — SIGNIFICANT CHANGE UP (ref 70–99)
GLUCOSE UR QL: NEGATIVE MG/DL — SIGNIFICANT CHANGE UP
HCG SERPL-ACNC: <1 MIU/ML — SIGNIFICANT CHANGE UP
HCT VFR BLD CALC: 45 % — SIGNIFICANT CHANGE UP (ref 34.5–45)
HGB BLD-MCNC: 16.1 G/DL — HIGH (ref 11.5–15.5)
IMM GRANULOCYTES NFR BLD AUTO: 0.7 % — SIGNIFICANT CHANGE UP (ref 0–1.5)
INR BLD: 1.04 RATIO — SIGNIFICANT CHANGE UP (ref 0.88–1.16)
KETONES UR-MCNC: NEGATIVE — SIGNIFICANT CHANGE UP
LEUKOCYTE ESTERASE UR-ACNC: ABNORMAL
LYMPHOCYTES # BLD AUTO: 23.7 % — SIGNIFICANT CHANGE UP (ref 13–44)
LYMPHOCYTES # BLD AUTO: 5.43 K/UL — HIGH (ref 1–3.3)
MANUAL SMEAR VERIFICATION: SIGNIFICANT CHANGE UP
MCHC RBC-ENTMCNC: 28.5 PG — SIGNIFICANT CHANGE UP (ref 27–34)
MCHC RBC-ENTMCNC: 35.8 GM/DL — SIGNIFICANT CHANGE UP (ref 32–36)
MCV RBC AUTO: 79.8 FL — LOW (ref 80–100)
MONOCYTES # BLD AUTO: 1.88 K/UL — HIGH (ref 0–0.9)
MONOCYTES NFR BLD AUTO: 8.2 % — SIGNIFICANT CHANGE UP (ref 2–14)
NEUTROPHILS # BLD AUTO: 15.25 K/UL — HIGH (ref 1.8–7.4)
NEUTROPHILS NFR BLD AUTO: 66.7 % — SIGNIFICANT CHANGE UP (ref 43–77)
NITRITE UR-MCNC: NEGATIVE — SIGNIFICANT CHANGE UP
NRBC # BLD: 0 /100 WBCS — SIGNIFICANT CHANGE UP (ref 0–0)
PH UR: 6 — SIGNIFICANT CHANGE UP (ref 5–8)
PLAT MORPH BLD: NORMAL — SIGNIFICANT CHANGE UP
PLATELET # BLD AUTO: 545 K/UL — HIGH (ref 150–400)
POTASSIUM SERPL-MCNC: 4.2 MMOL/L — SIGNIFICANT CHANGE UP (ref 3.5–5.3)
POTASSIUM SERPL-SCNC: 4.2 MMOL/L — SIGNIFICANT CHANGE UP (ref 3.5–5.3)
PROT SERPL-MCNC: 9.3 GM/DL — HIGH (ref 6–8.3)
PROT UR-MCNC: 100 MG/DL
PROTHROM AB SERPL-ACNC: 11.2 SEC — SIGNIFICANT CHANGE UP (ref 9.8–12.7)
RBC # BLD: 5.64 M/UL — HIGH (ref 3.8–5.2)
RBC # FLD: 13.6 % — SIGNIFICANT CHANGE UP (ref 10.3–14.5)
RBC BLD AUTO: NORMAL — SIGNIFICANT CHANGE UP
RBC CASTS # UR COMP ASSIST: ABNORMAL /HPF (ref 0–4)
SODIUM SERPL-SCNC: 138 MMOL/L — SIGNIFICANT CHANGE UP (ref 135–145)
SP GR SPEC: 1.02 — SIGNIFICANT CHANGE UP (ref 1.01–1.02)
TYPE + AB SCN PNL BLD: SIGNIFICANT CHANGE UP
UROBILINOGEN FLD QL: NEGATIVE MG/DL — SIGNIFICANT CHANGE UP
WBC # BLD: 22.9 K/UL — HIGH (ref 3.8–10.5)
WBC # FLD AUTO: 22.9 K/UL — HIGH (ref 3.8–10.5)
WBC UR QL: ABNORMAL

## 2018-07-29 PROCEDURE — 99285 EMERGENCY DEPT VISIT HI MDM: CPT | Mod: 25

## 2018-07-29 PROCEDURE — 74177 CT ABD & PELVIS W/CONTRAST: CPT | Mod: 26

## 2018-07-29 PROCEDURE — 99282 EMERGENCY DEPT VISIT SF MDM: CPT

## 2018-07-29 PROCEDURE — 76856 US EXAM PELVIC COMPLETE: CPT | Mod: 26

## 2018-07-29 RX ORDER — PANTOPRAZOLE SODIUM 20 MG/1
40 TABLET, DELAYED RELEASE ORAL ONCE
Qty: 0 | Refills: 0 | Status: COMPLETED | OUTPATIENT
Start: 2018-07-29 | End: 2018-07-29

## 2018-07-29 RX ORDER — KETOROLAC TROMETHAMINE 30 MG/ML
30 SYRINGE (ML) INJECTION ONCE
Qty: 0 | Refills: 0 | Status: DISCONTINUED | OUTPATIENT
Start: 2018-07-29 | End: 2018-07-29

## 2018-07-29 RX ORDER — SODIUM CHLORIDE 9 MG/ML
1000 INJECTION INTRAMUSCULAR; INTRAVENOUS; SUBCUTANEOUS ONCE
Qty: 0 | Refills: 0 | Status: COMPLETED | OUTPATIENT
Start: 2018-07-29 | End: 2018-07-29

## 2018-07-29 RX ORDER — ONDANSETRON 8 MG/1
4 TABLET, FILM COATED ORAL ONCE
Qty: 0 | Refills: 0 | Status: COMPLETED | OUTPATIENT
Start: 2018-07-29 | End: 2018-07-29

## 2018-07-29 RX ADMIN — ONDANSETRON 4 MILLIGRAM(S): 8 TABLET, FILM COATED ORAL at 22:11

## 2018-07-29 RX ADMIN — PANTOPRAZOLE SODIUM 40 MILLIGRAM(S): 20 TABLET, DELAYED RELEASE ORAL at 22:11

## 2018-07-29 RX ADMIN — Medication 30 MILLIGRAM(S): at 22:21

## 2018-07-29 RX ADMIN — SODIUM CHLORIDE 1000 MILLILITER(S): 9 INJECTION INTRAMUSCULAR; INTRAVENOUS; SUBCUTANEOUS at 22:10

## 2018-07-29 NOTE — ED STATDOCS - PROGRESS NOTE DETAILS
Gynecology consulted; gyn resident aware and will speak to attending Dr. Jordan about patient. elevated WBC likely from Spinal procedure of spinal nerve with steroid injection 3 days ago. Patient is feeling much better, tests/labs reviewed. case discussed with attending. OK to dc home. ALICJA Walker

## 2018-07-29 NOTE — ED STATDOCS - PHYSICAL EXAMINATION
GEN: AOX3, NAD. HEENT: Throat clear. Head NC/AT. NECK: Supple, No JVD. FROM. C-spine non-tender. CV:S1S2, RRR, LUNGS: CTA b/l, no w/r/r. ABD: Soft, +Mild diffuse tenderness. no rebound, no guarding. No CVAT. EXT: No e/c/c. 2+ distal pulses. SKIN: No rashes. NEURO: No focal deficits. CN II-XII intact. FROM. 5/5 motor and sensory. ALICJA Walker

## 2018-07-29 NOTE — ED STATDOCS - OBJECTIVE STATEMENT
26 y/o female with a PMHx of HTN, anxiety, depression, and PCOS presents to the ED c/o abd pain with N/V x10 days. +diaphoresis. Denies fever, dysuria, vaginal discharge. LMP 6/24/18.

## 2018-07-29 NOTE — ED STATDOCS - MEDICAL DECISION MAKING DETAILS
Hx of PCOS now with lower abdominal pain.  Will check labs, urine and US.  May need CT if pain persists or labs are abnormal.

## 2018-07-29 NOTE — ED STATDOCS - ATTENDING CONTRIBUTION TO CARE
Attending Contribution to Care: I, Zahraa Smith, performed the initial face to face bedside interview with this patient regarding history of present illness, review of symptoms and relevant past medical, social and family history.  I completed an independent physical examination.  I was the initial provider who evaluated this patient. I have signed out the follow up of any pending tests (i.e. labs, radiological studies) to the ACP.  I have communicated the patient’s plan of care and disposition with the ACP.

## 2018-07-30 VITALS
RESPIRATION RATE: 17 BRPM | DIASTOLIC BLOOD PRESSURE: 94 MMHG | HEART RATE: 86 BPM | OXYGEN SATURATION: 99 % | TEMPERATURE: 99 F | SYSTOLIC BLOOD PRESSURE: 158 MMHG

## 2018-07-30 LAB
C TRACH RRNA SPEC QL NAA+PROBE: SIGNIFICANT CHANGE UP
CULTURE RESULTS: SIGNIFICANT CHANGE UP
N GONORRHOEA RRNA SPEC QL NAA+PROBE: SIGNIFICANT CHANGE UP
SPECIMEN SOURCE: SIGNIFICANT CHANGE UP
SPECIMEN SOURCE: SIGNIFICANT CHANGE UP

## 2018-07-30 RX ORDER — ONDANSETRON 8 MG/1
1 TABLET, FILM COATED ORAL
Qty: 20 | Refills: 0 | OUTPATIENT
Start: 2018-07-30

## 2018-07-30 RX ORDER — ONDANSETRON 8 MG/1
1 TABLET, FILM COATED ORAL
Qty: 0 | Refills: 0 | COMMUNITY
Start: 2018-07-30

## 2018-07-30 RX ORDER — NITROFURANTOIN MACROCRYSTAL 50 MG
1 CAPSULE ORAL
Qty: 14 | Refills: 0 | OUTPATIENT
Start: 2018-07-30

## 2018-07-30 NOTE — CONSULT NOTE ADULT - CONSULT REASON
SUBJECTIVE:                                                    Silverio Stroud is a 22 year old female who presents to clinic today for the following health issues:    Requesting letter for college to drop some classes due to medical condition    Patient still having some pain from costochondritis, but improving.  She does feel that she would continue to improve if she had a reduced work load at school.        Problem list and histories reviewed & adjusted, as indicated.  Additional history: as documented        Reviewed and updated as needed this visit by clinical staff       Reviewed and updated as needed this visit by Provider         ROS:  Constitutional, HEENT, cardiovascular, pulmonary, gi and gu systems are negative, except as otherwise noted.    OBJECTIVE:                                                    /70 (BP Location: Right arm, Patient Position: Chair, Cuff Size: Adult Regular)  Pulse 73  Temp 97.7  F (36.5  C) (Oral)  Wt 157 lb (71.2 kg)  LMP 01/27/2017  SpO2 99%  Breastfeeding? No  BMI 26.95 kg/m2  Body mass index is 26.95 kg/(m^2).  NA    Diagnostic Test Results:  none      ASSESSMENT/PLAN:                                                            1. Costochondritis  Slowly improving.  Letter given for school to reduce workload.      Follow up prn and for physical with PAP.    Dasia Atwood MD  [facility]  
Ovarian Cyst

## 2018-07-30 NOTE — CONSULT NOTE ADULT - SUBJECTIVE AND OBJECTIVE BOX
26 y/o  LMP 6/2018 with hx of multiple medical and psychiatric problems here for N/V over the past two weeks that has gotten better today.  Pt with recent nerve ablation on Wed and S/P two doses of steroids this week and the past week.  Pt on Adele and Metformin for PCOS.  Pt states she has hydrosalpinx as well.     PMH - PCOS; Depression; Suicidal Ideation; HTN; Mixed Connective Tissue Disease, Anxiety; Chronic Back Pain  PSH - ovarian cystectomy (age 13); cholecystectomy; Nerve ablation  POb - not sig      O: -/94; afebrile; HR - 86    PE -ab -soft/nt  Pelvic - deferred (pt already discharged by ED when we arrived)    Sonogram - EXAM:  US PELVIC COMPLETE                            PROCEDURE DATE:  07/29/2018          INTERPRETATION:  Pelvic ultrasound    Indication: Pelvic pain, history of PCO S    Technique: Transabdominal and transvaginal images of the pelvis are   submitted.    Comparison: Pelvic ultrasound to 15, 2011    Findings:    The uterus measures 6.7 x 3.8 x 5.1 cm. There is a 2.3 x 1.4 x 1.6 cm   posterior intramural fibroid. The endometrium is not thickened, measuring   10 mm.    There is no free pelvic fluid.    The right ovary measures 5.3 x 4.5 x 4.6 cm, with a 4.7 x 4.0 x 4.3 cm   cyst. Normal flow is detected in the right ovary.    The left ovary measures 4.4 x 4.4 x 3.8 cm, with a 3.4 x 4.0 x 2.8 cm   cyst. Normal flow is detected in the left ovary.    Impression:    Bilateral ovarian cysts, measuring up to 4.7 cm on the right and 4 cm on   the left. Flow is detected in both ovaries at this time. Please note that   intermittent torsion/detorsion should be excluded on clinical grounds.   Follow-up ultrasound in 4-6 weeks should be performed for further   assessment of these bilateral ovarian cysts.        EXAM:  CT ABDOMEN AND PELVIS IC                            PROCEDURE DATE:  07/29/2018          INTERPRETATION:  Abdominal/Pelvic CT    7/29/2018 11:46 PM    Indication: Right-sided abdominal pain, nausea and vomiting, elevated   white count    Technique: Axial images were obtained following IV contrast from the lung   bases through pubic symphysis.  90 cc of Omnipaque 350 was administered   intravenously without complication and 10 cc was discarded.  Reformatted   coronal and sagittal images are submitted.    Comparison: CT of June 12, 2017    FINDINGS:    LUNG BASES:  There are no pleural effusions.  PERITONEUM:  There is no free air or focal collection.  No free fluid.  LIVER: Normal.  SPLEEN: Normal.  GALLBLADDER: Cholecystectomy.  BILIARY TREE: Unremarkable.  PANCREAS: Normal.  ADRENAL GLANDS: Normal.  KIDNEYS: Normal.  BOWEL: The stomach is incompletely distended.  There is no small bowel   obstruction, focal bowel wall thickening or diverticulitis. The appendix   is unremarkable.    URINARY BLADDER: Collapsed.  PELVIC ORGANS: There are bilateral ovarian cysts, measuring 5 x 4 cm on   the right and 3.8 x 3.2 cm on the left.    There is no significant adenopathy.  VASCULATURE: Unremarkable.  RETROPERITONEUM:  There is no mass.  BONES: Unremarkable.  ABDOMINAL WALL: Unremarkable.    IMPRESSION:    No explanation for abdominal pain on this CT.  Bilateral renal cysts, measuring up to 5 cm in the right and 3.8 cm on   the left. Recommend follow-up with pelvic ultrasound in 4-6 weeks.  Status post cholecystectomy without biliary dilatation or small bowel   obstruction.  Normal appendix.      BRITNEY BOBBY   This document has been electronically signed. Jul 30 2018 12:00AM    WBC - 22  U/A - tr emy; WBC 6-10

## 2018-07-30 NOTE — CONSULT NOTE ADULT - ASSESSMENT
A/P Pt with hx of bilateral ovarian cysts seen today on CT and sonogram.  No hydrosalpinx noted.  Both ovaries with good flow.  WBC 22 ---may be due to hx of recent steroid use.     Rec:  1) No acute Gyn intervention needed at this time  2) Adele may be source of N/V (discussed such with pt)   3) Pt to F/U with PMD in am  4) Possible UTI may be source of ab pain  5) Pt to come back if any increased pain, temps, increased N/V

## 2018-07-31 ENCOUNTER — INPATIENT (INPATIENT)
Facility: HOSPITAL | Age: 28
LOS: 1 days | Discharge: HOME | End: 2018-08-02
Attending: INTERNAL MEDICINE | Admitting: INTERNAL MEDICINE
Payer: COMMERCIAL

## 2018-07-31 VITALS
RESPIRATION RATE: 15 BRPM | HEART RATE: 116 BPM | DIASTOLIC BLOOD PRESSURE: 91 MMHG | SYSTOLIC BLOOD PRESSURE: 164 MMHG | TEMPERATURE: 98 F | OXYGEN SATURATION: 99 %

## 2018-07-31 DIAGNOSIS — Z90.49 ACQUIRED ABSENCE OF OTHER SPECIFIED PARTS OF DIGESTIVE TRACT: Chronic | ICD-10-CM

## 2018-07-31 DIAGNOSIS — Z98.890 OTHER SPECIFIED POSTPROCEDURAL STATES: Chronic | ICD-10-CM

## 2018-07-31 LAB
ALBUMIN SERPL ELPH-MCNC: 3.5 G/DL — SIGNIFICANT CHANGE UP (ref 3.3–5)
ALP SERPL-CCNC: 100 U/L — SIGNIFICANT CHANGE UP (ref 40–120)
ALT FLD-CCNC: 50 U/L — SIGNIFICANT CHANGE UP (ref 12–78)
ANION GAP SERPL CALC-SCNC: 8 MMOL/L — SIGNIFICANT CHANGE UP (ref 5–17)
AST SERPL-CCNC: 51 U/L — HIGH (ref 15–37)
BASOPHILS # BLD AUTO: 0.06 K/UL — SIGNIFICANT CHANGE UP (ref 0–0.2)
BASOPHILS NFR BLD AUTO: 0.3 % — SIGNIFICANT CHANGE UP (ref 0–2)
BILIRUB SERPL-MCNC: 0.3 MG/DL — SIGNIFICANT CHANGE UP (ref 0.2–1.2)
BUN SERPL-MCNC: 16 MG/DL — SIGNIFICANT CHANGE UP (ref 7–23)
CALCIUM SERPL-MCNC: 9.3 MG/DL — SIGNIFICANT CHANGE UP (ref 8.5–10.1)
CHLORIDE SERPL-SCNC: 103 MMOL/L — SIGNIFICANT CHANGE UP (ref 96–108)
CO2 SERPL-SCNC: 26 MMOL/L — SIGNIFICANT CHANGE UP (ref 22–31)
CREAT SERPL-MCNC: 0.89 MG/DL — SIGNIFICANT CHANGE UP (ref 0.5–1.3)
EOSINOPHIL # BLD AUTO: 0.11 K/UL — SIGNIFICANT CHANGE UP (ref 0–0.5)
EOSINOPHIL NFR BLD AUTO: 0.6 % — SIGNIFICANT CHANGE UP (ref 0–6)
GLUCOSE SERPL-MCNC: 86 MG/DL — SIGNIFICANT CHANGE UP (ref 70–99)
HCT VFR BLD CALC: 44.4 % — SIGNIFICANT CHANGE UP (ref 34.5–45)
HGB BLD-MCNC: 15.2 G/DL — SIGNIFICANT CHANGE UP (ref 11.5–15.5)
IMM GRANULOCYTES NFR BLD AUTO: 0.4 % — SIGNIFICANT CHANGE UP (ref 0–1.5)
LIDOCAIN IGE QN: 495 U/L — HIGH (ref 73–393)
LYMPHOCYTES # BLD AUTO: 22.8 % — SIGNIFICANT CHANGE UP (ref 13–44)
LYMPHOCYTES # BLD AUTO: 3.92 K/UL — HIGH (ref 1–3.3)
MCHC RBC-ENTMCNC: 27.2 PG — SIGNIFICANT CHANGE UP (ref 27–34)
MCHC RBC-ENTMCNC: 34.2 GM/DL — SIGNIFICANT CHANGE UP (ref 32–36)
MCV RBC AUTO: 79.6 FL — LOW (ref 80–100)
MONOCYTES # BLD AUTO: 1.18 K/UL — HIGH (ref 0–0.9)
MONOCYTES NFR BLD AUTO: 6.9 % — SIGNIFICANT CHANGE UP (ref 2–14)
NEUTROPHILS # BLD AUTO: 11.82 K/UL — HIGH (ref 1.8–7.4)
NEUTROPHILS NFR BLD AUTO: 69 % — SIGNIFICANT CHANGE UP (ref 43–77)
NRBC # BLD: 0 /100 WBCS — SIGNIFICANT CHANGE UP (ref 0–0)
PLATELET # BLD AUTO: 516 K/UL — HIGH (ref 150–400)
POTASSIUM SERPL-MCNC: 4.2 MMOL/L — SIGNIFICANT CHANGE UP (ref 3.5–5.3)
POTASSIUM SERPL-SCNC: 4.2 MMOL/L — SIGNIFICANT CHANGE UP (ref 3.5–5.3)
PROT SERPL-MCNC: 9.2 GM/DL — HIGH (ref 6–8.3)
RBC # BLD: 5.58 M/UL — HIGH (ref 3.8–5.2)
RBC # FLD: 13.8 % — SIGNIFICANT CHANGE UP (ref 10.3–14.5)
SODIUM SERPL-SCNC: 137 MMOL/L — SIGNIFICANT CHANGE UP (ref 135–145)
WBC # BLD: 17.16 K/UL — HIGH (ref 3.8–10.5)
WBC # FLD AUTO: 17.16 K/UL — HIGH (ref 3.8–10.5)

## 2018-07-31 PROCEDURE — 99285 EMERGENCY DEPT VISIT HI MDM: CPT

## 2018-07-31 RX ORDER — ONDANSETRON 8 MG/1
4 TABLET, FILM COATED ORAL ONCE
Qty: 0 | Refills: 0 | Status: COMPLETED | OUTPATIENT
Start: 2018-07-31 | End: 2018-07-31

## 2018-07-31 RX ORDER — ALPRAZOLAM 0.25 MG
0.5 TABLET ORAL ONCE
Qty: 0 | Refills: 0 | Status: DISCONTINUED | OUTPATIENT
Start: 2018-07-31 | End: 2018-07-31

## 2018-07-31 RX ORDER — DIPHENHYDRAMINE HCL 50 MG
25 CAPSULE ORAL ONCE
Qty: 0 | Refills: 0 | Status: COMPLETED | OUTPATIENT
Start: 2018-07-31 | End: 2018-07-31

## 2018-07-31 RX ORDER — PROCHLORPERAZINE MALEATE 5 MG
10 TABLET ORAL ONCE
Qty: 0 | Refills: 0 | Status: COMPLETED | OUTPATIENT
Start: 2018-07-31 | End: 2018-07-31

## 2018-07-31 RX ORDER — LIDOCAINE 4 G/100G
30 CREAM TOPICAL ONCE
Qty: 0 | Refills: 0 | Status: COMPLETED | OUTPATIENT
Start: 2018-07-31 | End: 2018-07-31

## 2018-07-31 RX ORDER — SODIUM CHLORIDE 9 MG/ML
1000 INJECTION INTRAMUSCULAR; INTRAVENOUS; SUBCUTANEOUS ONCE
Qty: 0 | Refills: 0 | Status: COMPLETED | OUTPATIENT
Start: 2018-07-31 | End: 2018-07-31

## 2018-07-31 RX ORDER — MORPHINE SULFATE 50 MG/1
4 CAPSULE, EXTENDED RELEASE ORAL ONCE
Qty: 0 | Refills: 0 | Status: DISCONTINUED | OUTPATIENT
Start: 2018-07-31 | End: 2018-07-31

## 2018-07-31 RX ADMIN — LIDOCAINE 30 MILLILITER(S): 4 CREAM TOPICAL at 19:02

## 2018-07-31 RX ADMIN — ONDANSETRON 4 MILLIGRAM(S): 8 TABLET, FILM COATED ORAL at 19:37

## 2018-07-31 RX ADMIN — Medication 10 MILLIGRAM(S): at 20:03

## 2018-07-31 RX ADMIN — SODIUM CHLORIDE 1000 MILLILITER(S): 9 INJECTION INTRAMUSCULAR; INTRAVENOUS; SUBCUTANEOUS at 19:38

## 2018-07-31 RX ADMIN — MORPHINE SULFATE 4 MILLIGRAM(S): 50 CAPSULE, EXTENDED RELEASE ORAL at 20:10

## 2018-07-31 RX ADMIN — Medication 30 MILLILITER(S): at 19:03

## 2018-07-31 RX ADMIN — Medication 0.5 MILLIGRAM(S): at 23:33

## 2018-07-31 RX ADMIN — SODIUM CHLORIDE 1000 MILLILITER(S): 9 INJECTION INTRAMUSCULAR; INTRAVENOUS; SUBCUTANEOUS at 20:38

## 2018-07-31 RX ADMIN — Medication 25 MILLIGRAM(S): at 20:04

## 2018-07-31 NOTE — H&P ADULT - PROBLEM SELECTOR PLAN 7
-continue gabapentin  -continue cyclobenzaprine   -continue oxycodone as needed -Previous US showed cysts.   -No current intervention needed  -follow up  gyn consult

## 2018-07-31 NOTE — H&P ADULT - ATTENDING COMMENTS
Patient seen and examined at bedside after initial evaluation above by family medicine resident. Case discussed and reviewed in detail. Please note my plan below.    26 y/o F with PMH of PCOS, HTN, mixed connective tissue disease, anxiety, p/w abdominal pain and vomiting    *Abdominal pain / vomiting / leukocytosis 2/2 large ovarian cysts vs GERD / gastritis  -Patient awaiting endoscopy  -NPO  -IVF  -Zofran PRN  -PPI  -GI consult  -Gyn consult  -ESR / CRP    *H/o PCOS  -Gyn consult     *HTN / mixed connective tissue disease / anxiety  -C/w home medications and if stable during hospitalization refer for outpatient f/u at discharge     *DVT ppx  -SCDs Patient seen and examined at bedside after initial evaluation above by family medicine resident. Case discussed and reviewed in detail. Please note my plan below.    28 y/o F with PMH of PCOS, HTN, mixed connective tissue disease, anxiety, p/w abdominal pain and vomiting    *Abdominal pain / vomiting / leukocytosis 2/2 large ovarian cysts vs GERD / gastritis  -Patient awaiting endoscopy  -NPO  -IVF  -Zofran PRN  -PPI  -GI consult  -Gyn consult    *H/o PCOS  -Gyn consult     *HTN / mixed connective tissue disease / anxiety  -C/w home medications and if stable during hospitalization refer for outpatient f/u at discharge     *DVT ppx  -SCDs

## 2018-07-31 NOTE — H&P ADULT - PROBLEM SELECTOR PLAN 3
-Continue home medication lisinopril 20mg   -Monitor blood pressures Pt UA was weakly positive and has an elevated WBC.  -urine and blood cx  -Urinanalysis  -start ceftriaxone

## 2018-07-31 NOTE — H&P ADULT - PROBLEM SELECTOR PLAN 5
- continue home medications of xanax - continue home medication of xanax Hepatic panel  Lipase panel  Continue monitoring

## 2018-07-31 NOTE — H&P ADULT - PROBLEM SELECTOR PLAN 1
Abdominal pain likely 2/2 gastritis vs GERD vs peptic ulcer disease  -NPO at midnight  -EGD tomorrow w/ Dr. Galarza  -Continue zofran for Nausea and vomiting Abdominal pain likely 2/2 gastritis vs GERD vs peptic ulcer disease. CT abdomen said no explanation of the abdominal pain  -NPO at midnight  -EGD tomorrow w/ Dr. Galarza  -Continue zofran for Nausea and vomiting Abdominal pain likely 2/2 gastritis vs GERD vs peptic ulcer disease. CT abdomen said no explanation of the abdominal pain  -NPO at midnight  -EGD tomorrow w/ Dr. Galarza  -Continue zofran for Nausea and vomiting  -F/U GI consult Abdominal pain likely 2/2 gastritis vs GERD vs peptic ulcer disease. CT abdomen said no explanation of the abdominal pain  -NPO   -EGD tomorrow w/ Dr. Galarza  -Continue zofran for Nausea and vomiting  -F/U GI consult

## 2018-07-31 NOTE — ED STATDOCS - OBJECTIVE STATEMENT
26 y/o female with PMHx of PCOS, HTN, depression, anxiety, mild gastritis, s/p cholecystectomy, Cdiff presents to the ED c/o epigastric abd pain x2 weeks. Exacerbated with PO intake. +decreased appetite. +intermittent N/V. +subjective fever. Elevated WBC as per paperwork in GI's office. Denies diarrhea. Last endoscopy over a year ago. NKDA. Surgeon/Dr. Molina. GI/Dr. Valdez. 28 y/o female with PMHx of PCOS, HTN, depression, anxiety, mild gastritis, s/p cholecystectomy, Cdiff presents to the ED c/o epigastric abd pain x2 weeks. Exacerbated with PO intake. +decreased appetite. +intermittent N/V. +subjective fever. Elevated WBC as per paperwork in GI's office. Denies diarrhea. Last endoscopy over a year ago. No trauma, chest pain, sob. No lower abd symptoms. No urinary or vaginal symptoms. takes pepcid without relief  NKDA. Surgeon/Dr. Molina. GI/Dr. Valdez.

## 2018-07-31 NOTE — H&P ADULT - HISTORY OF PRESENT ILLNESS
26 yo F w/ PMH of PCOS, HTN, connective tissue disease, anxiety, and depression who recently came to the ED on 7/29 for one episode of vomiting and right sided abdominal pain that started 8 days prior and was discharged and was told to follow up with GI physician. Pt saw Dr. Alcira Valdez today(7/31/18) for the now epigastric pain and had an episode of nonbilious nonbloody vomiting in the office and was sent to the ED. The epigastric pain feels like a spasm and is non radiating. It is worsened with food and laying flat and it improves with sitting up. Ranitidine and famotidine did not improve the symptoms but compazine helped for a short period of time. Pt denies any fever, CP, or change in bowel movement. 28 yo F w/ PMH of PCOS, HTN, connective tissue disease, anxiety, and depression who recently came to the ED on 7/29 for one episode of vomiting and right sided abdominal pain that started 8 days prior and was discharged and was told to follow up with GI physician. Pt saw Dr. Alcira Valdez today(7/31/18) for the now epigastric pain and had an episode of nonbilious nonbloody vomiting in the office and was sent to the ED. The epigastric pain feels like a spasm and is non radiating. It is worsened with food and laying flat and it improves with sitting up. Ranitidine and famotidine did not improve the symptoms but compazine helped for a short period of time. Pt denies any fever, CP, or change in bowel movement.   Pt recently got a nerve ablation procedure in her back on wednesday and received 2 doses of steroids.  ED 1L IV bolus, morphine 4mg IV, Compazine 10mg IV

## 2018-07-31 NOTE — H&P ADULT - NSHPREVIEWOFSYSTEMS_GEN_ALL_CORE
Vital Signs Last 24 Hrs  T(C): 36.9 (31 Jul 2018 22:05), Max: 36.9 (31 Jul 2018 18:24)  T(F): 98.5 (31 Jul 2018 22:05), Max: 98.5 (31 Jul 2018 22:05)  HR: 99 (31 Jul 2018 22:05) (99 - 116)  BP: 138/78 (31 Jul 2018 22:05) (138/78 - 164/91)  BP(mean): --  RR: 18 (31 Jul 2018 22:05) (15 - 18)  SpO2: 99% (31 Jul 2018 22:05) (99% - 99%)

## 2018-07-31 NOTE — H&P ADULT - ASSESSMENT
26 yo F w/ PMH of PCOS, HTN, Depression, anxiety, and mixed connective tissue disease who p/w abdominal pain of 10 days with nausea and vomiting x2.

## 2018-07-31 NOTE — ED STATDOCS - MEDICAL DECISION MAKING DETAILS
Pt presenting with persistent epigastric pain, N/V. Sent by GI MD for admission, pain, control, and endoscopy.

## 2018-07-31 NOTE — H&P ADULT - PROBLEM SELECTOR PLAN 4
-Previous US showed cysts.   -No current intervention needed  -follow w/ gynecologist -Previous US showed cysts.   -No current intervention needed  -follow w/ gynecologist outpatient -Continue to monitor

## 2018-07-31 NOTE — ED ADULT NURSE NOTE - NSIMPLEMENTINTERV_GEN_ALL_ED
Implemented All Universal Safety Interventions:  [place] to call system. Call bell, personal items and telephone within reach. Instruct patient to call for assistance. Room bathroom lighting operational. Non-slip footwear when patient is off stretcher. Physically safe environment: no spills, clutter or unnecessary equipment. Stretcher in lowest position, wheels locked, appropriate side rails in place.

## 2018-07-31 NOTE — H&P ADULT - NSHPLABSRESULTS_GEN_ALL_CORE
15.2   17.16 )-----------( 516      ( 31 Jul 2018 19:04 )             44.4     31 Jul 2018 19:04    137    |  103    |  16     ----------------------------<  86     4.2     |  26     |  0.89     Ca    9.3        31 Jul 2018 19:04    TPro  9.2    /  Alb  3.5    /  TBili  0.3    /  DBili  x      /  AST  51     /  ALT  50     /  AlkPhos  100    31 Jul 2018 19:04      CAPILLARY BLOOD GLUCOSE        LIVER FUNCTIONS - ( 31 Jul 2018 19:04 )  Alb: 3.5 g/dL / Pro: 9.2 gm/dL / ALK PHOS: 100 U/L / ALT: 50 U/L / AST: 51 U/L / GGT: x Labs:    15.2   17.16 )-----------( 516      ( 31 Jul 2018 19:04 )             44.4     31 Jul 2018 19:04    137    |  103    |  16     ----------------------------<  86     4.2     |  26     |  0.89     Ca    9.3        31 Jul 2018 19:04    TPro  9.2    /  Alb  3.5    /  TBili  0.3    /  DBili  x      /  AST  51     /  ALT  50     /  AlkPhos  100    31 Jul 2018 19:04      CAPILLARY BLOOD GLUCOSE        LIVER FUNCTIONS - ( 31 Jul 2018 19:04 )  Alb: 3.5 g/dL / Pro: 9.2 gm/dL / ALK PHOS: 100 U/L / ALT: 50 U/L / AST: 51 U/L / GGT: x    Radiology    < from: CT Abdomen and Pelvis w/ IV Cont (07.29.18 @ 23:09) >    IMPRESSION:    No explanation for abdominal pain on this CT.  Bilateral renal cysts, measuring up to 5 cm in the right and 3.8 cm on   the left. Recommend follow-up with pelvic ultrasound in 4-6 weeks.  Status post cholecystectomy without biliary dilatation or small bowel   obstruction.  Normal appendix.        < from: US Pelvis Complete (07.29.18 @ 21:50) >    Impression:    Bilateral ovarian cysts, measuring up to 4.7 cm on the right and 4 cm on   the left. Flow is detected in both ovaries at this time. Please note that   intermittent torsion/detorsion should be excluded on clinical grounds.   Follow-up ultrasound in 4-6 weeks should be performed for further   assessment of these bilateral ovarian cysts.

## 2018-07-31 NOTE — ED POST DISCHARGE NOTE - RESULT SUMMARY
Urine culture with < 10,000 normal urogenital koki.  No urinary c/o.  No need for call back.  CLARA jarvis PA-C

## 2018-07-31 NOTE — H&P ADULT - PROBLEM SELECTOR PLAN 8
-padua score of 0  -no pharmacological prophylaxis needed  -Encourage walking - continue home medication of xanax

## 2018-07-31 NOTE — H&P ADULT - PROBLEM SELECTOR PLAN 6
-Observe for signs of depression and suicidal ideation  - continue home medications -Continue home medication lisinopril 20mg   -Monitor blood pressures

## 2018-08-01 ENCOUNTER — RESULT REVIEW (OUTPATIENT)
Age: 28
End: 2018-08-01

## 2018-08-01 DIAGNOSIS — F32.9 MAJOR DEPRESSIVE DISORDER, SINGLE EPISODE, UNSPECIFIED: ICD-10-CM

## 2018-08-01 DIAGNOSIS — D72.829 ELEVATED WHITE BLOOD CELL COUNT, UNSPECIFIED: ICD-10-CM

## 2018-08-01 DIAGNOSIS — R10.9 UNSPECIFIED ABDOMINAL PAIN: ICD-10-CM

## 2018-08-01 DIAGNOSIS — Z29.9 ENCOUNTER FOR PROPHYLACTIC MEASURES, UNSPECIFIED: ICD-10-CM

## 2018-08-01 DIAGNOSIS — E28.2 POLYCYSTIC OVARIAN SYNDROME: ICD-10-CM

## 2018-08-01 DIAGNOSIS — F41.9 ANXIETY DISORDER, UNSPECIFIED: ICD-10-CM

## 2018-08-01 DIAGNOSIS — D47.3 ESSENTIAL (HEMORRHAGIC) THROMBOCYTHEMIA: ICD-10-CM

## 2018-08-01 DIAGNOSIS — M54.9 DORSALGIA, UNSPECIFIED: ICD-10-CM

## 2018-08-01 DIAGNOSIS — R74.0 NONSPECIFIC ELEVATION OF LEVELS OF TRANSAMINASE AND LACTIC ACID DEHYDROGENASE [LDH]: ICD-10-CM

## 2018-08-01 DIAGNOSIS — I10 ESSENTIAL (PRIMARY) HYPERTENSION: ICD-10-CM

## 2018-08-01 DIAGNOSIS — N39.0 URINARY TRACT INFECTION, SITE NOT SPECIFIED: ICD-10-CM

## 2018-08-01 LAB
ALBUMIN SERPL ELPH-MCNC: 2.9 G/DL — LOW (ref 3.3–5)
ALP SERPL-CCNC: 73 U/L — SIGNIFICANT CHANGE UP (ref 40–120)
ALT FLD-CCNC: 42 U/L — SIGNIFICANT CHANGE UP (ref 12–78)
ANION GAP SERPL CALC-SCNC: 9 MMOL/L — SIGNIFICANT CHANGE UP (ref 5–17)
APPEARANCE UR: CLEAR — SIGNIFICANT CHANGE UP
AST SERPL-CCNC: 45 U/L — HIGH (ref 15–37)
BACTERIA # UR AUTO: ABNORMAL
BASOPHILS # BLD AUTO: 0.06 K/UL — SIGNIFICANT CHANGE UP (ref 0–0.2)
BASOPHILS NFR BLD AUTO: 0.3 % — SIGNIFICANT CHANGE UP (ref 0–2)
BILIRUB DIRECT SERPL-MCNC: <0.1 MG/DL — SIGNIFICANT CHANGE UP (ref 0–0.2)
BILIRUB INDIRECT FLD-MCNC: >0.3 MG/DL — SIGNIFICANT CHANGE UP (ref 0.2–1)
BILIRUB SERPL-MCNC: 0.4 MG/DL — SIGNIFICANT CHANGE UP (ref 0.2–1.2)
BILIRUB UR-MCNC: NEGATIVE — SIGNIFICANT CHANGE UP
BUN SERPL-MCNC: 14 MG/DL — SIGNIFICANT CHANGE UP (ref 7–23)
CALCIUM SERPL-MCNC: 8.9 MG/DL — SIGNIFICANT CHANGE UP (ref 8.5–10.1)
CHLORIDE SERPL-SCNC: 106 MMOL/L — SIGNIFICANT CHANGE UP (ref 96–108)
CO2 SERPL-SCNC: 23 MMOL/L — SIGNIFICANT CHANGE UP (ref 22–31)
COLOR SPEC: YELLOW — SIGNIFICANT CHANGE UP
CREAT SERPL-MCNC: 0.77 MG/DL — SIGNIFICANT CHANGE UP (ref 0.5–1.3)
DIFF PNL FLD: ABNORMAL
EOSINOPHIL # BLD AUTO: 0.04 K/UL — SIGNIFICANT CHANGE UP (ref 0–0.5)
EOSINOPHIL NFR BLD AUTO: 0.2 % — SIGNIFICANT CHANGE UP (ref 0–6)
EPI CELLS # UR: SIGNIFICANT CHANGE UP
GLUCOSE SERPL-MCNC: 85 MG/DL — SIGNIFICANT CHANGE UP (ref 70–99)
GLUCOSE UR QL: NEGATIVE MG/DL — SIGNIFICANT CHANGE UP
HCG UR QL: NEGATIVE — SIGNIFICANT CHANGE UP
HCT VFR BLD CALC: 36.9 % — SIGNIFICANT CHANGE UP (ref 34.5–45)
HGB BLD-MCNC: 12.9 G/DL — SIGNIFICANT CHANGE UP (ref 11.5–15.5)
IMM GRANULOCYTES NFR BLD AUTO: 0.5 % — SIGNIFICANT CHANGE UP (ref 0–1.5)
KETONES UR-MCNC: NEGATIVE — SIGNIFICANT CHANGE UP
LEUKOCYTE ESTERASE UR-ACNC: ABNORMAL
LIDOCAIN IGE QN: 309 U/L — SIGNIFICANT CHANGE UP (ref 73–393)
LYMPHOCYTES # BLD AUTO: 18.6 % — SIGNIFICANT CHANGE UP (ref 13–44)
LYMPHOCYTES # BLD AUTO: 3.55 K/UL — HIGH (ref 1–3.3)
MCHC RBC-ENTMCNC: 28 PG — SIGNIFICANT CHANGE UP (ref 27–34)
MCHC RBC-ENTMCNC: 35 GM/DL — SIGNIFICANT CHANGE UP (ref 32–36)
MCV RBC AUTO: 80 FL — SIGNIFICANT CHANGE UP (ref 80–100)
MONOCYTES # BLD AUTO: 1.28 K/UL — HIGH (ref 0–0.9)
MONOCYTES NFR BLD AUTO: 6.7 % — SIGNIFICANT CHANGE UP (ref 2–14)
NEUTROPHILS # BLD AUTO: 14.08 K/UL — HIGH (ref 1.8–7.4)
NEUTROPHILS NFR BLD AUTO: 73.7 % — SIGNIFICANT CHANGE UP (ref 43–77)
NITRITE UR-MCNC: NEGATIVE — SIGNIFICANT CHANGE UP
NRBC # BLD: 0 /100 WBCS — SIGNIFICANT CHANGE UP (ref 0–0)
PH UR: 6 — SIGNIFICANT CHANGE UP (ref 5–8)
PLATELET # BLD AUTO: 423 K/UL — HIGH (ref 150–400)
POTASSIUM SERPL-MCNC: 4.2 MMOL/L — SIGNIFICANT CHANGE UP (ref 3.5–5.3)
POTASSIUM SERPL-SCNC: 4.2 MMOL/L — SIGNIFICANT CHANGE UP (ref 3.5–5.3)
PROT SERPL-MCNC: 7.7 GM/DL — SIGNIFICANT CHANGE UP (ref 6–8.3)
PROT UR-MCNC: 100 MG/DL
RBC # BLD: 4.61 M/UL — SIGNIFICANT CHANGE UP (ref 3.8–5.2)
RBC # FLD: 13.8 % — SIGNIFICANT CHANGE UP (ref 10.3–14.5)
RBC CASTS # UR COMP ASSIST: ABNORMAL /HPF (ref 0–4)
SODIUM SERPL-SCNC: 138 MMOL/L — SIGNIFICANT CHANGE UP (ref 135–145)
SP GR SPEC: 1.02 — SIGNIFICANT CHANGE UP (ref 1.01–1.02)
UROBILINOGEN FLD QL: NEGATIVE MG/DL — SIGNIFICANT CHANGE UP
WBC # BLD: 19.11 K/UL — HIGH (ref 3.8–10.5)
WBC # FLD AUTO: 19.11 K/UL — HIGH (ref 3.8–10.5)
WBC UR QL: SIGNIFICANT CHANGE UP

## 2018-08-01 PROCEDURE — 88313 SPECIAL STAINS GROUP 2: CPT | Mod: 26

## 2018-08-01 PROCEDURE — 88312 SPECIAL STAINS GROUP 1: CPT | Mod: 26

## 2018-08-01 PROCEDURE — 99253 IP/OBS CNSLTJ NEW/EST LOW 45: CPT

## 2018-08-01 PROCEDURE — 74181 MRI ABDOMEN W/O CONTRAST: CPT | Mod: 26

## 2018-08-01 PROCEDURE — 88305 TISSUE EXAM BY PATHOLOGIST: CPT | Mod: 26

## 2018-08-01 RX ORDER — OXYCODONE AND ACETAMINOPHEN 5; 325 MG/1; MG/1
2 TABLET ORAL EVERY 6 HOURS
Qty: 0 | Refills: 0 | Status: DISCONTINUED | OUTPATIENT
Start: 2018-08-01 | End: 2018-08-02

## 2018-08-01 RX ORDER — SODIUM CHLORIDE 9 MG/ML
1200 INJECTION INTRAMUSCULAR; INTRAVENOUS; SUBCUTANEOUS
Qty: 0 | Refills: 0 | Status: DISCONTINUED | OUTPATIENT
Start: 2018-08-01 | End: 2018-08-02

## 2018-08-01 RX ORDER — CEFTRIAXONE 500 MG/1
INJECTION, POWDER, FOR SOLUTION INTRAMUSCULAR; INTRAVENOUS
Qty: 0 | Refills: 0 | Status: DISCONTINUED | OUTPATIENT
Start: 2018-08-01 | End: 2018-08-01

## 2018-08-01 RX ORDER — PANTOPRAZOLE SODIUM 20 MG/1
40 TABLET, DELAYED RELEASE ORAL DAILY
Qty: 0 | Refills: 0 | Status: DISCONTINUED | OUTPATIENT
Start: 2018-08-01 | End: 2018-08-02

## 2018-08-01 RX ORDER — OXYCODONE AND ACETAMINOPHEN 5; 325 MG/1; MG/1
2 TABLET ORAL EVERY 6 HOURS
Qty: 0 | Refills: 0 | Status: DISCONTINUED | OUTPATIENT
Start: 2018-08-01 | End: 2018-08-01

## 2018-08-01 RX ORDER — ONDANSETRON 8 MG/1
4 TABLET, FILM COATED ORAL EVERY 6 HOURS
Qty: 0 | Refills: 0 | Status: DISCONTINUED | OUTPATIENT
Start: 2018-08-01 | End: 2018-08-02

## 2018-08-01 RX ORDER — ALPRAZOLAM 0.25 MG
1 TABLET ORAL EVERY 8 HOURS
Qty: 0 | Refills: 0 | Status: DISCONTINUED | OUTPATIENT
Start: 2018-08-01 | End: 2018-08-02

## 2018-08-01 RX ORDER — LAMOTRIGINE 25 MG/1
300 TABLET, ORALLY DISINTEGRATING ORAL AT BEDTIME
Qty: 0 | Refills: 0 | Status: DISCONTINUED | OUTPATIENT
Start: 2018-08-01 | End: 2018-08-01

## 2018-08-01 RX ORDER — GABAPENTIN 400 MG/1
600 CAPSULE ORAL AT BEDTIME
Qty: 0 | Refills: 0 | Status: DISCONTINUED | OUTPATIENT
Start: 2018-08-01 | End: 2018-08-02

## 2018-08-01 RX ORDER — HYDROMORPHONE HYDROCHLORIDE 2 MG/ML
0.5 INJECTION INTRAMUSCULAR; INTRAVENOUS; SUBCUTANEOUS EVERY 6 HOURS
Qty: 0 | Refills: 0 | Status: DISCONTINUED | OUTPATIENT
Start: 2018-08-01 | End: 2018-08-01

## 2018-08-01 RX ORDER — LISINOPRIL 2.5 MG/1
20 TABLET ORAL ONCE
Qty: 0 | Refills: 0 | Status: COMPLETED | OUTPATIENT
Start: 2018-08-01 | End: 2018-08-01

## 2018-08-01 RX ORDER — LAMOTRIGINE 25 MG/1
200 TABLET, ORALLY DISINTEGRATING ORAL AT BEDTIME
Qty: 0 | Refills: 0 | Status: DISCONTINUED | OUTPATIENT
Start: 2018-08-01 | End: 2018-08-02

## 2018-08-01 RX ORDER — ALPRAZOLAM 0.25 MG
1 TABLET ORAL AT BEDTIME
Qty: 0 | Refills: 0 | Status: DISCONTINUED | OUTPATIENT
Start: 2018-08-01 | End: 2018-08-01

## 2018-08-01 RX ORDER — MAGNESIUM HYDROXIDE 400 MG/1
30 TABLET, CHEWABLE ORAL DAILY
Qty: 0 | Refills: 0 | Status: DISCONTINUED | OUTPATIENT
Start: 2018-08-01 | End: 2018-08-02

## 2018-08-01 RX ORDER — CYCLOBENZAPRINE HYDROCHLORIDE 10 MG/1
10 TABLET, FILM COATED ORAL THREE TIMES A DAY
Qty: 0 | Refills: 0 | Status: DISCONTINUED | OUTPATIENT
Start: 2018-08-01 | End: 2018-08-02

## 2018-08-01 RX ORDER — ZOLPIDEM TARTRATE 10 MG/1
5 TABLET ORAL AT BEDTIME
Qty: 0 | Refills: 0 | Status: DISCONTINUED | OUTPATIENT
Start: 2018-08-01 | End: 2018-08-02

## 2018-08-01 RX ORDER — LAMOTRIGINE 25 MG/1
200 TABLET, ORALLY DISINTEGRATING ORAL ONCE
Qty: 0 | Refills: 0 | Status: COMPLETED | OUTPATIENT
Start: 2018-08-01 | End: 2018-08-01

## 2018-08-01 RX ORDER — LISINOPRIL 2.5 MG/1
20 TABLET ORAL
Qty: 0 | Refills: 0 | Status: DISCONTINUED | OUTPATIENT
Start: 2018-08-01 | End: 2018-08-02

## 2018-08-01 RX ORDER — DULOXETINE HYDROCHLORIDE 30 MG/1
60 CAPSULE, DELAYED RELEASE ORAL DAILY
Qty: 0 | Refills: 0 | Status: DISCONTINUED | OUTPATIENT
Start: 2018-08-01 | End: 2018-08-02

## 2018-08-01 RX ORDER — CEFTRIAXONE 500 MG/1
1000 INJECTION, POWDER, FOR SOLUTION INTRAMUSCULAR; INTRAVENOUS ONCE
Qty: 0 | Refills: 0 | Status: COMPLETED | OUTPATIENT
Start: 2018-08-01 | End: 2018-08-01

## 2018-08-01 RX ORDER — LIDOCAINE 4 G/100G
1 CREAM TOPICAL ONCE
Qty: 0 | Refills: 0 | Status: COMPLETED | OUTPATIENT
Start: 2018-08-01 | End: 2018-08-02

## 2018-08-01 RX ORDER — ZOLPIDEM TARTRATE 10 MG/1
5 TABLET ORAL ONCE
Qty: 0 | Refills: 0 | Status: DISCONTINUED | OUTPATIENT
Start: 2018-07-31 | End: 2018-08-01

## 2018-08-01 RX ORDER — HYDROMORPHONE HYDROCHLORIDE 2 MG/ML
2 INJECTION INTRAMUSCULAR; INTRAVENOUS; SUBCUTANEOUS EVERY 6 HOURS
Qty: 0 | Refills: 0 | Status: DISCONTINUED | OUTPATIENT
Start: 2018-08-01 | End: 2018-08-01

## 2018-08-01 RX ORDER — GABAPENTIN 400 MG/1
600 CAPSULE ORAL ONCE
Qty: 0 | Refills: 0 | Status: COMPLETED | OUTPATIENT
Start: 2018-08-01 | End: 2018-08-01

## 2018-08-01 RX ORDER — SENNA PLUS 8.6 MG/1
2 TABLET ORAL AT BEDTIME
Qty: 0 | Refills: 0 | Status: DISCONTINUED | OUTPATIENT
Start: 2018-08-01 | End: 2018-08-02

## 2018-08-01 RX ORDER — LIDOCAINE 4 G/100G
1 CREAM TOPICAL THREE TIMES A DAY
Qty: 0 | Refills: 0 | Status: DISCONTINUED | OUTPATIENT
Start: 2018-08-01 | End: 2018-08-01

## 2018-08-01 RX ORDER — HYDROMORPHONE HYDROCHLORIDE 2 MG/ML
0.5 INJECTION INTRAMUSCULAR; INTRAVENOUS; SUBCUTANEOUS EVERY 6 HOURS
Qty: 0 | Refills: 0 | Status: DISCONTINUED | OUTPATIENT
Start: 2018-08-01 | End: 2018-08-02

## 2018-08-01 RX ORDER — IBUPROFEN 200 MG
600 TABLET ORAL EVERY 6 HOURS
Qty: 0 | Refills: 0 | Status: DISCONTINUED | OUTPATIENT
Start: 2018-08-01 | End: 2018-08-02

## 2018-08-01 RX ADMIN — LAMOTRIGINE 200 MILLIGRAM(S): 25 TABLET, ORALLY DISINTEGRATING ORAL at 22:08

## 2018-08-01 RX ADMIN — HYDROMORPHONE HYDROCHLORIDE 0.5 MILLIGRAM(S): 2 INJECTION INTRAMUSCULAR; INTRAVENOUS; SUBCUTANEOUS at 13:44

## 2018-08-01 RX ADMIN — LAMOTRIGINE 200 MILLIGRAM(S): 25 TABLET, ORALLY DISINTEGRATING ORAL at 02:15

## 2018-08-01 RX ADMIN — ZOLPIDEM TARTRATE 5 MILLIGRAM(S): 10 TABLET ORAL at 02:30

## 2018-08-01 RX ADMIN — OXYCODONE AND ACETAMINOPHEN 2 TABLET(S): 5; 325 TABLET ORAL at 09:35

## 2018-08-01 RX ADMIN — Medication 1 MILLIGRAM(S): at 22:06

## 2018-08-01 RX ADMIN — Medication 0.1 MILLIGRAM(S): at 09:35

## 2018-08-01 RX ADMIN — Medication 1 MILLIGRAM(S): at 01:48

## 2018-08-01 RX ADMIN — Medication 0.1 MILLIGRAM(S): at 17:45

## 2018-08-01 RX ADMIN — Medication 0.1 MILLIGRAM(S): at 02:15

## 2018-08-01 RX ADMIN — OXYCODONE AND ACETAMINOPHEN 2 TABLET(S): 5; 325 TABLET ORAL at 10:25

## 2018-08-01 RX ADMIN — OXYCODONE AND ACETAMINOPHEN 2 TABLET(S): 5; 325 TABLET ORAL at 03:29

## 2018-08-01 RX ADMIN — GABAPENTIN 600 MILLIGRAM(S): 400 CAPSULE ORAL at 02:15

## 2018-08-01 RX ADMIN — SODIUM CHLORIDE 100 MILLILITER(S): 9 INJECTION INTRAMUSCULAR; INTRAVENOUS; SUBCUTANEOUS at 02:31

## 2018-08-01 RX ADMIN — CYCLOBENZAPRINE HYDROCHLORIDE 10 MILLIGRAM(S): 10 TABLET, FILM COATED ORAL at 02:30

## 2018-08-01 RX ADMIN — LISINOPRIL 20 MILLIGRAM(S): 2.5 TABLET ORAL at 17:45

## 2018-08-01 RX ADMIN — ZOLPIDEM TARTRATE 5 MILLIGRAM(S): 10 TABLET ORAL at 22:06

## 2018-08-01 RX ADMIN — GABAPENTIN 600 MILLIGRAM(S): 400 CAPSULE ORAL at 22:08

## 2018-08-01 RX ADMIN — HYDROMORPHONE HYDROCHLORIDE 0.5 MILLIGRAM(S): 2 INJECTION INTRAMUSCULAR; INTRAVENOUS; SUBCUTANEOUS at 14:14

## 2018-08-01 RX ADMIN — DULOXETINE HYDROCHLORIDE 60 MILLIGRAM(S): 30 CAPSULE, DELAYED RELEASE ORAL at 09:35

## 2018-08-01 RX ADMIN — Medication 600 MILLIGRAM(S): at 20:05

## 2018-08-01 RX ADMIN — CEFTRIAXONE 1000 MILLIGRAM(S): 500 INJECTION, POWDER, FOR SOLUTION INTRAMUSCULAR; INTRAVENOUS at 03:33

## 2018-08-01 RX ADMIN — LISINOPRIL 20 MILLIGRAM(S): 2.5 TABLET ORAL at 02:15

## 2018-08-01 RX ADMIN — OXYCODONE AND ACETAMINOPHEN 2 TABLET(S): 5; 325 TABLET ORAL at 17:44

## 2018-08-01 RX ADMIN — CYCLOBENZAPRINE HYDROCHLORIDE 10 MILLIGRAM(S): 10 TABLET, FILM COATED ORAL at 10:24

## 2018-08-01 RX ADMIN — PANTOPRAZOLE SODIUM 40 MILLIGRAM(S): 20 TABLET, DELAYED RELEASE ORAL at 09:36

## 2018-08-01 NOTE — PROGRESS NOTE ADULT - PROBLEM SELECTOR PLAN 1
Abdominal pain likely 2/2 gastritis vs GERD vs peptic ulcer disease. CT abdomen said no explanation of the abdominal pain  -NPO   -EGD today with Dr. Galarza  -Continue zofran for Nausea and vomiting  -F/U GI consult

## 2018-08-01 NOTE — ED ADULT NURSE REASSESSMENT NOTE - NS ED NURSE REASSESS COMMENT FT1
Pt with episode of dry heaving and crying s/p medications stating she needs something IV, PO medications will not work. NP Carmen made aware.
Pt. medicated as per order with xanax as per order.
Pt. moved to main and report given to overnight RN.
pt awaiting orders and bed assignment.  pt. upset about the time it took to be seen by hospitalist for admission.  hospitalist at bedside examing and speaking with Pt.

## 2018-08-01 NOTE — CONSULT NOTE ADULT - SUBJECTIVE AND OBJECTIVE BOX
27yyo Last Menstrual Period   Gestational Age      ROS:  Gen: no fatigue  CV: no chest pain  Resp: no SOB, wheezing  Neuro: no vision change, headache  GI: no abdominal pain, diarrhea, constipation  : no dysuria, increased frequency, urge  Gyn: no vaginal bleeding, abnormal discharge  ID: no fevers, chills  Int: no rash  MSK: no weakness    PMH:   Chronic back pain  HTN (hypertension)  Polycystic ovarian syndrome  Depression  Anxiety  Mixed connective tissue disease  Cholecystitis  H/O ovarian cystectomy  H/O knee surgery  History of cholecystectomy    PSH:   ObHx:  GynHx:      triad:   / reg periods / 5d     last Pap:   , no h/o abnormal Paps   denies h/o fibroids, cysts, STIs   not sexually active  FamHx: FAMILY HISTORY:  No pertinent family history in first degree relatives    SocHx:  All: Allergies    No Known Drug Allergies  peanuts (Unknown)  Tree Nuts (Unknown)    Intolerances    morphine (Pruritus)    Meds:  ALPRAZolam 1 milliGRAM(s) Oral every 8 hours PRN  cloNIDine 0.1 milliGRAM(s) Oral two times a day  cyclobenzaprine 10 milliGRAM(s) Oral three times a day PRN  DULoxetine 60 milliGRAM(s) Oral daily  gabapentin Oral Tab/Cap - Peds 600 milliGRAM(s) Oral at bedtime  ibuprofen  Tablet 600 milliGRAM(s) Oral every 6 hours PRN  lamoTRIgine 200 milliGRAM(s) Oral at bedtime  lidocaine 2% Gel 1 Application(s) Topical three times a day  lisinopril 20 milliGRAM(s) Oral two times a day  magnesium hydroxide Suspension 30 milliLiter(s) Oral daily PRN  ondansetron   Disintegrating Tablet 4 milliGRAM(s) Oral every 6 hours PRN  oxyCODONE    5 mG/acetaminophen 325 mG 2 Tablet(s) Oral every 6 hours PRN  pantoprazole   Suspension 40 milliGRAM(s) Oral daily  senna 2 Tablet(s) Oral at bedtime  sodium chloride 0.9%. 1200 milliLiter(s) IV Continuous <Continuous>  zolpidem 5 milliGRAM(s) Oral at bedtime PRN  zolpidem 5 milliGRAM(s) Oral at bedtime PRN    Home Medications:  ALPRAZolam 0.5 mg oral tablet: 2 tab(s) orally 2 times a day, As Needed - anxiety (2018 15:37)  cloNIDine 0.1 mg oral tablet: 1 tab(s) orally 2 times a day (2018 15:37)  cyclobenzaprine 10 mg oral tablet: 1 tab(s) orally 3 times a day, As needed, Muscle Spasm (2018 15:37)  gabapentin 300 mg oral capsule: 2 cap(s) orally once a day (at bedtime) (2018 15:37)  HYDROcodone-acetaminophen 7.5 mg-300 mg oral tablet: 2 tab(s) orally every 6 hours, As Needed (01 Aug 2018 00:43)  ibuprofen 600 mg oral tablet: 1 tab(s) orally every 6 hours, As needed, Pain Mild to Moderate (1-8) (2018 15:37)  lamoTRIgine 150 mg oral tablet: 2 tab(s) orally once a day (at bedtime) (2018 17:24)  lidocaine 2% topical gel with applicator: 1 application topically 3 times a day (2018 17:24)  lisinopril 20 mg oral tablet: 1 tab(s) orally 2 times a day (2018 17:24)  magnesium hydroxide 8% oral suspension: 30 milliliter(s) orally once a day, As needed, Constipation (2018 17:24)  methyl salicylate topical ointment: 1 application topically 3 times a day, As needed, pain (2018 17:24)  senna oral tablet: 2 tab(s) orally once a day (at bedtime) (2018 17:24)    Health Maintenance:     PhysEx  Height (cm): 170.18 (18 @ 18:32)  Weight (kg): 108.9 (18 @ 18:32)  BMI (kg/m2): 37.6 (18 @ 18:32)  BSA (m2): 2.19 (18 @ 18:32)  T(C): 37.4 (18 @ 10:22), Max: 37.4 (18 @ 05:10)  HR: 102 (18 @ 10:22) (94 - 116)  BP: 144/90 (18 @ 10:22) (138/78 - 176/98)  RR: 18 (18 @ 10:22) (15 - 18)  SpO2: 95% (18 @ 10:22) (95% - 99%)  Gen: alert oriented no acute distress  HEENT: atraumatic, normocephalic  CV: Regular rate rhythm  Pulm: clear to auscultation  Abd: soft non-tender, no surgical incisions  : no CVA tenderness  Gyn:    external genitalia normal in apperance no lesions    on speculum exam no abnormal lesions visualized, no abnormal discharge or bleeding    on bimanual exam small anteverted uterus no adnexal masses/tenderness .  no CMT.  os closed.  Msk: no erythema or swelling          138  |  106  |  14  ----------------------------<  85  4.2   |  23  |  0.77    Ca    8.9      01 Aug 2018 05:39    TPro  7.7  /  Alb  2.9<L>  /  TBili  0.4  /  DBili  <0.1  /  AST  45<H>  /  ALT  42  /  AlkPhos  73      LIVER FUNCTIONS - ( 01 Aug 2018 05:39 )  Alb: 2.9 g/dL / Pro: 7.7 gm/dL / ALK PHOS: 73 U/L / ALT: 42 U/L / AST: 45 U/L / GGT: x           Urinalysis Basic - ( 01 Aug 2018 04:00 )    Color: Yellow / Appearance: Clear / S.020 / pH: x  Gluc: x / Ketone: Negative  / Bili: Negative / Urobili: Negative mg/dL   Blood: x / Protein: 100 mg/dL / Nitrite: Negative   Leuk Esterase: Trace / RBC: 3-5 /HPF / WBC 3-5   Sq Epi: x / Non Sq Epi: Few / Bacteria: Few 26yo G0 Last Menstrual Period 18, PMH of chronic back pain, HTN, PCOS s/p ovarian cystectomy, Depression/Anxiety, cholelithiasis s/p cholecystectomy, presents with epigastric abdominal pain for 3 days. Abdominal pain is located in the epigastric area, 10/10 radiates to chest, occurs after meals within 30 minutes and relieved by vomiting, worse with lying on her right side. Better with lying on her left. The patient reports she was curled up on the floor in pain prior to coming to the ED. Her pain is associated with nausea and vomiting. Vomit consists of food, non bloody, non bilious.  Reports taking Advil/NSAIDs 600 mg q4 hours for the past week for RLQ abdominal pain from previous admission on . Patient reports she had to stop taking NSAIDs due to epigastric pain. Patient has been eating chicken noodle soup for the past several days, unable to tolerate heavier foods. Of note, was started on Maria Ines and Metformin recently, approx 1-2 weeks ago by her OBGYN. Metformin was stopped due to nausea.  Hx of cholelithiasis s/p cholecystectomy and Hx of ovarian cystectomy at age 13.      Patient was recently in  ED for RLQ abdominal pain on  (). RLQ radiated to the umbilicus and was 10/10.  Patient was discharged from the ED and told to follow up with OBGYN and GI outpatient.  Followed up with Dr. Bella (OBGYN) , and was found to have fluid in her fallopian tube. She went to the GI on  and  found to have leukocytosis. Patient vomited in the office after abdominal palpation and was sent to the ED.       ROS:  Gen: no fatigue, no weight loss  CV: no chest pain, palpitation  Resp: no SOB, wheezing  Neuro: no vision change, headache  GI: + abdominal pain, Denies diarrhea, constipation  : no dysuria, increased frequency, urge  Gyn: no vaginal bleeding, abnormal discharge  ID:  +subjective fevers, no chills  Int: no rash, + flushing face  MSK: no weakness, no joint pain    PMH:   Chronic back pain  Sciatica  Herniated Disk  HTN (hypertension)  Polycystic ovarian syndrome  Depression  Anxiety  Mixed connective tissue disease  Cholecystitis  Insomnia    PSH:  Cholecystectomy  Ovarian cystectomy (unknown which side)   bl knee patellar surgery       ObHx: None  GynHx: LMP 2018 . Had  Heavy bleed 2018, soaked through clothes  Large ovarian cyst at age 14yo, which presented with Nausea and vomiting but without pain, s/p cystectomy   triad:  monthly/ heavy periods / 5 d    last Pap:  normal,  , no h/o abnormal Paps   h/o bilateral cysts (PCOS), Denies STIs   not sexually active since 2 years  FamHx: FAMILY HISTORY:  Mom and Dad CAD , HTN    SocHx: Denies ETOH, smoking, rec drug use  Not sexually active. Last intercourse 2 years ago. No history of STIs or treatment.   All: Allergies    No Known Drug Allergies  peanuts (Unknown)  Tree Nuts (Unknown)    Intolerances  morphine (Pruritus)    Meds:  ALPRAZolam 1 milliGRAM(s) Oral every 8 hours PRN  cloNIDine 0.1 milliGRAM(s) Oral two times a day  cyclobenzaprine 10 milliGRAM(s) Oral three times a day PRN  DULoxetine 60 milliGRAM(s) Oral daily  gabapentin Oral Tab/Cap - Peds 600 milliGRAM(s) Oral at bedtime  ibuprofen  Tablet 600 milliGRAM(s) Oral every 6 hours PRN  lamoTRIgine 200 milliGRAM(s) Oral at bedtime  lidocaine 2% Gel 1 Application(s) Topical three times a day  lisinopril 20 milliGRAM(s) Oral two times a day  magnesium hydroxide Suspension 30 milliLiter(s) Oral daily PRN  ondansetron   Disintegrating Tablet 4 milliGRAM(s) Oral every 6 hours PRN  oxyCODONE    5 mG/acetaminophen 325 mG 2 Tablet(s) Oral every 6 hours PRN  pantoprazole   Suspension 40 milliGRAM(s) Oral daily  senna 2 Tablet(s) Oral at bedtime  sodium chloride 0.9%. 1200 milliLiter(s) IV Continuous <Continuous>  zolpidem 5 milliGRAM(s) Oral at bedtime PRN  zolpidem 5 milliGRAM(s) Oral at bedtime PRN    Home Medications:  ALPRAZolam 0.5 mg oral tablet: 2 tab(s) orally 2 times a day, As Needed - anxiety (2018 15:37)  cloNIDine 0.1 mg oral tablet: 1 tab(s) orally 2 times a day (2018 15:37)  cyclobenzaprine 10 mg oral tablet: 1 tab(s) orally 3 times a day, As needed, Muscle Spasm (2018 15:37)  gabapentin 300 mg oral capsule: 2 cap(s) orally once a day (at bedtime) (2018 15:37)  HYDROcodone-acetaminophen 7.5 mg-300 mg oral tablet: 2 tab(s) orally every 6 hours, As Needed (01 Aug 2018 00:43)  ibuprofen 600 mg oral tablet: 1 tab(s) orally every 6 hours, As needed, Pain Mild to Moderate (1-8) (2018 15:37)  lamoTRIgine 150 mg oral tablet: 2 tab(s) orally once a day (at bedtime) (2018 17:24)  lidocaine 2% topical gel with applicator: 1 application topically 3 times a day (2018 17:24)  lisinopril 20 mg oral tablet: 1 tab(s) orally 2 times a day (2018 17:24)  magnesium hydroxide 8% oral suspension: 30 milliliter(s) orally once a day, As needed, Constipation (2018 17:24)  methyl salicylate topical ointment: 1 application topically 3 times a day, As needed, pain (2018 17:24)  Maria Ines DROSPIRENONE; ETHINYL ESTRADIOL      Health Maintenance:     PhysEx  Height (cm): 170.18 (18 @ 18:32)  Weight (kg): 108.9 (18 @ 18:32)  BMI (kg/m2): 37.6 (18 @ 18:32)  BSA (m2): 2.19 (18 @ 18:32)  T(C): 37.4 (18 @ 10:22), Max: 37.4 (18 @ 05:10)  HR: 102 (18 @ 10:22) (94 - 116)  BP: 144/90 (18 @ 10:22) (138/78 - 176/98)  RR: 18 (18 @ 10:22) (15 - 18)  SpO2: 95% (18 @ 10:22) (95% - 99%)    Gen: alert oriented x3 , obese female in no acute distress  HEENT: atraumatic, normocephalic  CV: Regular rate rhythm, s1,s2, no murmurs, pulses 1+  Pulm: clear to auscultation navin, no wheezes, rhonchi, rales  Abd: +BS, abdomen soft, TTP epigastric and LUQ,  surgical laparoscopic scars  : no CVA tenderness  Gyn:    external genitalia normal in apperance no lesions    on speculum exam no abnormal lesions visualized, no abnormal discharge or bleeding    on bimanual exam small anteverted uterus no adnexal masses/tenderness .  no CMT.  os closed.  Msk: no edema, calfs non tender bl  Skin; warm, dry, non erythematous, no rashes or lesions          138  |  106  |  14  ----------------------------<  85  4.2   |  23  |  0.77    Ca    8.9      01 Aug 2018 05:39    TPro  7.7  /  Alb  2.9<L>  /  TBili  0.4  /  DBili  <0.1  /  AST  45<H>  /  ALT  42  /  AlkPhos  73  08-01    LIVER FUNCTIONS - ( 01 Aug 2018 05:39 )  Alb: 2.9 g/dL / Pro: 7.7 gm/dL / ALK PHOS: 73 U/L / ALT: 42 U/L / AST: 45 U/L / GGT: x           Urinalysis Basic - ( 01 Aug 2018 04:00 )    Color: Yellow / Appearance: Clear / S.020 / pH: x  Gluc: x / Ketone: Negative  / Bili: Negative / Urobili: Negative mg/dL   Blood: x / Protein: 100 mg/dL / Nitrite: Negative   Leuk Esterase: Trace / RBC: 3-5 /HPF / WBC 3-5   Sq Epi: x / Non Sq Epi: Few / Bacteria: Few      < from: CT Abdomen and Pelvis w/ IV Cont (18 @ 23:09) >  EXAM:  CT ABDOMEN AND PELVIS IC                            PROCEDURE DATE:  2018          INTERPRETATION:  Abdominal/Pelvic CT    2018 11:46 PM    Indication: Right-sided abdominal pain, nausea and vomiting, elevated   white count    Technique: Axial images were obtained following IV contrast from the lung   bases through pubic symphysis.  90 cc of Omnipaque 350 was administered   intravenously without complication and 10 cc was discarded.  Reformatted   coronal and sagittal images are submitted.    Comparison: CT of 2017    FINDINGS:    LUNG BASES:  There are no pleural effusions.  PERITONEUM:  There is no free air or focal collection.  No free fluid.  LIVER: Normal.  SPLEEN: Normal.  GALLBLADDER: Cholecystectomy.  BILIARY TREE: Unremarkable.  PANCREAS: Normal.  ADRENAL GLANDS: Normal.  KIDNEYS: Normal.  BOWEL: The stomach is incompletely distended.  There is no small bowel   obstruction, focal bowel wall thickening or diverticulitis. The appendix   is unremarkable.    URINARY BLADDER: Collapsed.  PELVIC ORGANS: There are bilateral ovarian cysts, measuring 5 x 4 cm on   the right and 3.8 x 3.2 cm on the left.    There is no significant adenopathy.  VASCULATURE: Unremarkable.  RETROPERITONEUM:  There is no mass.  BONES: Unremarkable.  ABDOMINAL WALL: Unremarkable.    IMPRESSION:    No explanation for abdominal pain on this CT.  Bilateral renal cysts, measuring up to 5 cm in the right and 3.8 cm on   the left. Recommend follow-up with pelvic ultrasound in 4-6 weeks.  Status post cholecystectomy without biliary dilatation or small bowel   obstruction.  Normal appendix.      < from: US Pelvis Complete (18 @ 21:50) >  EXAM:  US PELVIC COMPLETE                            PROCEDURE DATE:  2018          INTERPRETATION:  Pelvic ultrasound    Indication: Pelvic pain, history of PCO S    Technique: Transabdominal and transvaginal images of the pelvis are   submitted.    Comparison: Pelvic ultrasound to 15, 2011    Findings:    The uterus measures 6.7 x 3.8 x 5.1 cm. There is a 2.3 x 1.4 x 1.6 cm   posterior intramural fibroid. The endometrium is not thickened, measuring   10 mm.    There is no free pelvic fluid.    The right ovary measures 5.3 x 4.5 x 4.6 cm, with a 4.7 x 4.0 x 4.3 cm   cyst. Normal flow is detected in the right ovary.    The left ovary measures 4.4 x 4.4 x 3.8 cm, with a 3.4 x 4.0 x 2.8 cm   cyst. Normal flow is detected in the left ovary.    Impression:    Bilateral ovarian cysts, measuring up to 4.7 cm on the right and 4 cm on   the left. Flow is detected in both ovaries at this time. Please note that   intermittent torsion/detorsion should be excluded on clinical grounds.   Follow-up ultrasound in 4-6 weeks should be performed for further   assessment of these bilateral ovarian cysts.      BRITNEY BOBBY   This document has been electronically signed. 2018 10:46P 28yo G0 Last Menstrual Period 18, PMH of chronic back pain, HTN, PCOS s/p ovarian cystectomy, Depression/Anxiety, cholelithiasis s/p cholecystectomy, presents with epigastric abdominal pain for 3 days. Abdominal pain is located in the epigastric area, 10/10 radiates to chest, occurs after meals within 30 minutes and relieved by vomiting, worse with lying on her right side. Better with lying on her left. The patient reports she was curled up on the floor in pain prior to coming to the ED. Her pain is associated with nausea and vomiting. Vomit consists of food, non bloody, non bilious.  Reports taking Advil/NSAIDs 600 mg q4 hours for the past week for RLQ abdominal pain from previous admission on . Patient reports she had to stop taking NSAIDs due to epigastric pain. Patient has been eating chicken noodle soup for the past several days, unable to tolerate heavier foods. Of note, was started on Maria Ines and Metformin recently, approx 1-2 weeks ago by her OBGYN. Metformin was stopped due to nausea.  Hx of cholelithiasis s/p cholecystectomy and Hx of ovarian cystectomy at age 13.      Patient was recently in  ED for RLQ abdominal pain on  (). RLQ radiated to the umbilicus and was 10/10.  Patient was discharged from the ED and told to follow up with OBGYN and GI outpatient.  Followed up with Dr. Bella (OBGYN) , and was found to have fluid in her fallopian tube. She went to the GI on  and  found to have leukocytosis. Patient vomited in the office after abdominal palpation and was sent to the ED.       ROS:  Gen: no fatigue, no weight loss  CV: no chest pain, palpitation  Resp: no SOB, wheezing  Neuro: no vision change, headache  GI: + abdominal pain, Denies diarrhea, constipation  : no dysuria, increased frequency, urge  Gyn: no vaginal bleeding, abnormal discharge  ID:  +subjective fevers, no chills  Int: no rash, + flushing face  MSK: no weakness, no joint pain    PMH:   Chronic back pain  Sciatica  Herniated Disk  HTN (hypertension)  Polycystic ovarian syndrome  Depression  Anxiety  Mixed connective tissue disease  Cholecystitis  Insomnia    PSH:  Cholecystectomy  Ovarian cystectomy (unknown which side)   bl knee patellar surgery       ObHx: None  GynHx: LMP 2018 . Had  Heavy bleed 2018, soaked through clothes  Large ovarian cyst at age 12yo, which presented with Nausea and vomiting but without pain, s/p cystectomy   triad:  monthly/ heavy periods / 5 d    last Pap:  normal,  , no h/o abnormal Paps   h/o bilateral cysts (PCOS), Denies STIs   not sexually active since 2 years  FamHx: FAMILY HISTORY:  Mom and Dad CAD , HTN    SocHx: Denies ETOH, smoking, rec drug use  Not sexually active. Last intercourse 2 years ago. No history of STIs or treatment.   All: Allergies    No Known Drug Allergies  peanuts (Unknown)  Tree Nuts (Unknown)    Intolerances  morphine (Pruritus)    Meds:  ALPRAZolam 1 milliGRAM(s) Oral every 8 hours PRN  cloNIDine 0.1 milliGRAM(s) Oral two times a day  cyclobenzaprine 10 milliGRAM(s) Oral three times a day PRN  DULoxetine 60 milliGRAM(s) Oral daily  gabapentin Oral Tab/Cap - Peds 600 milliGRAM(s) Oral at bedtime  ibuprofen  Tablet 600 milliGRAM(s) Oral every 6 hours PRN  lamoTRIgine 200 milliGRAM(s) Oral at bedtime  lidocaine 2% Gel 1 Application(s) Topical three times a day  lisinopril 20 milliGRAM(s) Oral two times a day  magnesium hydroxide Suspension 30 milliLiter(s) Oral daily PRN  ondansetron   Disintegrating Tablet 4 milliGRAM(s) Oral every 6 hours PRN  oxyCODONE    5 mG/acetaminophen 325 mG 2 Tablet(s) Oral every 6 hours PRN  pantoprazole   Suspension 40 milliGRAM(s) Oral daily  senna 2 Tablet(s) Oral at bedtime  sodium chloride 0.9%. 1200 milliLiter(s) IV Continuous <Continuous>  zolpidem 5 milliGRAM(s) Oral at bedtime PRN  zolpidem 5 milliGRAM(s) Oral at bedtime PRN    Home Medications:  ALPRAZolam 0.5 mg oral tablet: 2 tab(s) orally 2 times a day, As Needed - anxiety (2018 15:37)  cloNIDine 0.1 mg oral tablet: 1 tab(s) orally 2 times a day (2018 15:37)  cyclobenzaprine 10 mg oral tablet: 1 tab(s) orally 3 times a day, As needed, Muscle Spasm (2018 15:37)  gabapentin 300 mg oral capsule: 2 cap(s) orally once a day (at bedtime) (2018 15:37)  HYDROcodone-acetaminophen 7.5 mg-300 mg oral tablet: 2 tab(s) orally every 6 hours, As Needed (01 Aug 2018 00:43)  ibuprofen 600 mg oral tablet: 1 tab(s) orally every 6 hours, As needed, Pain Mild to Moderate (1-8) (2018 15:37)  lamoTRIgine 150 mg oral tablet: 2 tab(s) orally once a day (at bedtime) (2018 17:24)  lidocaine 2% topical gel with applicator: 1 application topically 3 times a day (2018 17:24)  lisinopril 20 mg oral tablet: 1 tab(s) orally 2 times a day (2018 17:24)  magnesium hydroxide 8% oral suspension: 30 milliliter(s) orally once a day, As needed, Constipation (2018 17:24)  methyl salicylate topical ointment: 1 application topically 3 times a day, As needed, pain (2018 17:24)  Maria Ines DROSPIRENONE; ETHINYL ESTRADIOL      Health Maintenance:     PhysEx  Height (cm): 170.18 (18 @ 18:32)  Weight (kg): 108.9 (18 @ 18:32)  BMI (kg/m2): 37.6 (18 @ 18:32)  BSA (m2): 2.19 (18 @ 18:32)  T(C): 37.4 (18 @ 10:22), Max: 37.4 (18 @ 05:10)  HR: 102 (18 @ 10:22) (94 - 116)  BP: 144/90 (18 @ 10:22) (138/78 - 176/98)  RR: 18 (18 @ 10:22) (15 - 18)  SpO2: 95% (18 @ 10:22) (95% - 99%)    Gen: alert oriented x3 , obese female in no acute distress  HEENT: atraumatic, normocephalic  CV: Regular rate rhythm, s1,s2, no murmurs, pulses 1+  Pulm: clear to auscultation navin, no wheezes, rhonchi, rales  Abd: +BS, abdomen soft, TTP epigastric and LUQ,  surgical laparoscopic scars  : no CVA tenderness  Gyn:  Patient declined pelvic exam     Msk: no edema, calfs non tender bl  Skin; warm, dry, non erythematous, no rashes or lesions          138  |  106  |  14  ----------------------------<  85  4.2   |  23  |  0.77    Ca    8.9      01 Aug 2018 05:39    TPro  7.7  /  Alb  2.9<L>  /  TBili  0.4  /  DBili  <0.1  /  AST  45<H>  /  ALT  42  /  AlkPhos  73      LIVER FUNCTIONS - ( 01 Aug 2018 05:39 )  Alb: 2.9 g/dL / Pro: 7.7 gm/dL / ALK PHOS: 73 U/L / ALT: 42 U/L / AST: 45 U/L / GGT: x           Urinalysis Basic - ( 01 Aug 2018 04:00 )    Color: Yellow / Appearance: Clear / S.020 / pH: x  Gluc: x / Ketone: Negative  / Bili: Negative / Urobili: Negative mg/dL   Blood: x / Protein: 100 mg/dL / Nitrite: Negative   Leuk Esterase: Trace / RBC: 3-5 /HPF / WBC 3-5   Sq Epi: x / Non Sq Epi: Few / Bacteria: Few      < from: CT Abdomen and Pelvis w/ IV Cont (18 @ 23:09) >  EXAM:  CT ABDOMEN AND PELVIS IC                            PROCEDURE DATE:  2018          INTERPRETATION:  Abdominal/Pelvic CT    2018 11:46 PM    Indication: Right-sided abdominal pain, nausea and vomiting, elevated   white count    Technique: Axial images were obtained following IV contrast from the lung   bases through pubic symphysis.  90 cc of Omnipaque 350 was administered   intravenously without complication and 10 cc was discarded.  Reformatted   coronal and sagittal images are submitted.    Comparison: CT of 2017    FINDINGS:    LUNG BASES:  There are no pleural effusions.  PERITONEUM:  There is no free air or focal collection.  No free fluid.  LIVER: Normal.  SPLEEN: Normal.  GALLBLADDER: Cholecystectomy.  BILIARY TREE: Unremarkable.  PANCREAS: Normal.  ADRENAL GLANDS: Normal.  KIDNEYS: Normal.  BOWEL: The stomach is incompletely distended.  There is no small bowel   obstruction, focal bowel wall thickening or diverticulitis. The appendix   is unremarkable.    URINARY BLADDER: Collapsed.  PELVIC ORGANS: There are bilateral ovarian cysts, measuring 5 x 4 cm on   the right and 3.8 x 3.2 cm on the left.    There is no significant adenopathy.  VASCULATURE: Unremarkable.  RETROPERITONEUM:  There is no mass.  BONES: Unremarkable.  ABDOMINAL WALL: Unremarkable.    IMPRESSION:    No explanation for abdominal pain on this CT.  Bilateral renal cysts, measuring up to 5 cm in the right and 3.8 cm on   the left. Recommend follow-up with pelvic ultrasound in 4-6 weeks.  Status post cholecystectomy without biliary dilatation or small bowel   obstruction.  Normal appendix.      < from: US Pelvis Complete (18 @ 21:50) >  EXAM:  US PELVIC COMPLETE                            PROCEDURE DATE:  2018          INTERPRETATION:  Pelvic ultrasound    Indication: Pelvic pain, history of PCO S    Technique: Transabdominal and transvaginal images of the pelvis are   submitted.    Comparison: Pelvic ultrasound to 15, 2011    Findings:    The uterus measures 6.7 x 3.8 x 5.1 cm. There is a 2.3 x 1.4 x 1.6 cm   posterior intramural fibroid. The endometrium is not thickened, measuring   10 mm.    There is no free pelvic fluid.    The right ovary measures 5.3 x 4.5 x 4.6 cm, with a 4.7 x 4.0 x 4.3 cm   cyst. Normal flow is detected in the right ovary.    The left ovary measures 4.4 x 4.4 x 3.8 cm, with a 3.4 x 4.0 x 2.8 cm   cyst. Normal flow is detected in the left ovary.    Impression:    Bilateral ovarian cysts, measuring up to 4.7 cm on the right and 4 cm on   the left. Flow is detected in both ovaries at this time. Please note that   intermittent torsion/detorsion should be excluded on clinical grounds.   Follow-up ultrasound in 4-6 weeks should be performed for further   assessment of these bilateral ovarian cysts.      BRITNEY BOBBY   This document has been electronically signed. 2018 10:46P 28yo G0 Last Menstrual Period 18, PMH of chronic back pain, HTN, PCOS s/p ovarian cystectomy, Depression/Anxiety, cholelithiasis s/p cholecystectomy, presents with epigastric abdominal pain for 3 days. Abdominal pain is located in the epigastric area, 10/10 radiates to chest, occurs after meals within 30 minutes and relieved by vomiting, worse with lying on her right side.  reports pain is improved with lying on her left. The patient reports she was curled up on the floor in pain prior to coming to the ED. Her pain is associated with nausea and vomiting. Vomit consists of food, non bloody, non bilious.  Reports taking Advil/NSAIDs 600 mg q4 hours for the past week for RLQ abdominal pain from previous admission on . Patient reports she had to stop taking NSAIDs due to epigastric pain. Patient has been eating chicken noodle soup for the past several days, unable to tolerate heavier foods. Of note, was started on Maria Ines and Metformin recently, approx 1-2 weeks ago by her OBGYN. Metformin was stopped due to nausea.  Hx of cholelithiasis s/p cholecystectomy and Hx of ovarian cystectomy at age 13.      Patient was recently in  ED for RLQ abdominal pain on  (). RLQ radiated to the umbilicus and was 10/10.  Patient was discharged from the ED and told to follow up with OBGYN and GI outpatient.  Followed up with Dr. Bella (OBGYN) , and was found to have fluid in her fallopian tube. She went to the GI on  and  found to have leukocytosis. Patient vomited in the office after abdominal palpation and was sent to the ED.       ROS:  Gen: no fatigue, no weight loss  CV: no chest pain, palpitation  Resp: no SOB, wheezing  Neuro: no vision change, headache  GI: + abdominal pain, Denies diarrhea, constipation  : no dysuria, increased frequency, urge  Gyn: no vaginal bleeding, abnormal discharge  ID:  +subjective fevers, no chills  Int: no rash, + flushing face  MSK: no weakness, no joint pain    PMH:   Chronic back pain  Sciatica  Herniated Disk  HTN (hypertension)  Polycystic ovarian syndrome  Depression  Anxiety  Mixed connective tissue disease  Cholecystitis  Insomnia    PSH:  Cholecystectomy  Ovarian cystectomy (unknown which side)   bl knee patellar surgery       ObHx: None  GynHx: LMP 2018 . Had  Heavy bleed 2018, soaked through clothes  Large ovarian cyst at age 12yo, which presented with Nausea and vomiting but without pain, s/p cystectomy   triad:  monthly/ heavy periods / 5 d    last Pap:  normal,  , no h/o abnormal Paps   h/o bilateral cysts (PCOS), Denies STIs   not sexually active since 2 years  FamHx: FAMILY HISTORY:  Mom and Dad CAD , HTN    SocHx: Denies ETOH, smoking, rec drug use  Not sexually active. Last intercourse 2 years ago. No history of STIs or treatment.   All: Allergies    No Known Drug Allergies  peanuts (Unknown)  Tree Nuts (Unknown)    Intolerances  morphine (Pruritus)    Meds:  ALPRAZolam 1 milliGRAM(s) Oral every 8 hours PRN  cloNIDine 0.1 milliGRAM(s) Oral two times a day  cyclobenzaprine 10 milliGRAM(s) Oral three times a day PRN  DULoxetine 60 milliGRAM(s) Oral daily  gabapentin Oral Tab/Cap - Peds 600 milliGRAM(s) Oral at bedtime  ibuprofen  Tablet 600 milliGRAM(s) Oral every 6 hours PRN  lamoTRIgine 200 milliGRAM(s) Oral at bedtime  lidocaine 2% Gel 1 Application(s) Topical three times a day  lisinopril 20 milliGRAM(s) Oral two times a day  magnesium hydroxide Suspension 30 milliLiter(s) Oral daily PRN  ondansetron   Disintegrating Tablet 4 milliGRAM(s) Oral every 6 hours PRN  oxyCODONE    5 mG/acetaminophen 325 mG 2 Tablet(s) Oral every 6 hours PRN  pantoprazole   Suspension 40 milliGRAM(s) Oral daily  senna 2 Tablet(s) Oral at bedtime  sodium chloride 0.9%. 1200 milliLiter(s) IV Continuous <Continuous>  zolpidem 5 milliGRAM(s) Oral at bedtime PRN  zolpidem 5 milliGRAM(s) Oral at bedtime PRN    Home Medications:  ALPRAZolam 0.5 mg oral tablet: 2 tab(s) orally 2 times a day, As Needed - anxiety (2018 15:37)  cloNIDine 0.1 mg oral tablet: 1 tab(s) orally 2 times a day (2018 15:37)  cyclobenzaprine 10 mg oral tablet: 1 tab(s) orally 3 times a day, As needed, Muscle Spasm (2018 15:37)  gabapentin 300 mg oral capsule: 2 cap(s) orally once a day (at bedtime) (2018 15:37)  HYDROcodone-acetaminophen 7.5 mg-300 mg oral tablet: 2 tab(s) orally every 6 hours, As Needed (01 Aug 2018 00:43)  ibuprofen 600 mg oral tablet: 1 tab(s) orally every 6 hours, As needed, Pain Mild to Moderate (1-8) (2018 15:37)  lamoTRIgine 150 mg oral tablet: 2 tab(s) orally once a day (at bedtime) (2018 17:24)  lidocaine 2% topical gel with applicator: 1 application topically 3 times a day (2018 17:24)  lisinopril 20 mg oral tablet: 1 tab(s) orally 2 times a day (2018 17:24)  magnesium hydroxide 8% oral suspension: 30 milliliter(s) orally once a day, As needed, Constipation (2018 17:24)  methyl salicylate topical ointment: 1 application topically 3 times a day, As needed, pain (2018 17:24)  Maria Ines DROSPIRENONE; ETHINYL ESTRADIOL    PhysEx  BMI (kg/m2): 37.6 (18 @ 18:32)  HR: 102 (18 @ 10:22) (94 - 116)  BP: 144/90 (18 @ 10:22) (138/78 - 176/98)  RR: 18 (18 @ 10:22) (15 - 18)  SpO2: 95% (18 @ 10:22) (95% - 99%)    Gen: alert oriented x3 , obese female in no acute distress  HEENT: atraumatic, normocephalic  CV: Regular rate rhythm, s1,s2, no murmurs, pulses 1+  Pulm: clear to auscultation navin, no wheezes, rhonchi, rales  Abd: +BS, abdomen soft, TTP epigastric and LUQ,  surgical laparoscopic scars  : no CVA tenderness  Gyn:  Patient declined pelvic exam     Msk: no edema, calfs non tender bl  Skin; warm, dry, non erythematous, no rashes or lesions          138  |  106  |  14  ----------------------------<  85  4.2   |  23  |  0.77    Ca    8.9      01 Aug 2018 05:39    TPro  7.7  /  Alb  2.9<L>  /  TBili  0.4  /  DBili  <0.1  /  AST  45<H>  /  ALT  42  /  AlkPhos  73      LIVER FUNCTIONS - ( 01 Aug 2018 05:39 )  Alb: 2.9 g/dL / Pro: 7.7 gm/dL / ALK PHOS: 73 U/L / ALT: 42 U/L / AST: 45 U/L / GGT: x           Urinalysis Basic - ( 01 Aug 2018 04:00 )    Color: Yellow / Appearance: Clear / S.020 / pH: x  Gluc: x / Ketone: Negative  / Bili: Negative / Urobili: Negative mg/dL   Blood: x / Protein: 100 mg/dL / Nitrite: Negative   Leuk Esterase: Trace / RBC: 3-5 /HPF / WBC 3-5   Sq Epi: x / Non Sq Epi: Few / Bacteria: Few      < from: CT Abdomen and Pelvis w/ IV Cont (18 @ 23:09) >  EXAM:  CT ABDOMEN AND PELVIS IC                            PROCEDURE DATE:  2018          INTERPRETATION:  Abdominal/Pelvic CT    2018 11:46 PM    Indication: Right-sided abdominal pain, nausea and vomiting, elevated   white count    Technique: Axial images were obtained following IV contrast from the lung   bases through pubic symphysis.  90 cc of Omnipaque 350 was administered   intravenously without complication and 10 cc was discarded.  Reformatted   coronal and sagittal images are submitted.    Comparison: CT of 2017    FINDINGS:    LUNG BASES:  There are no pleural effusions.  PERITONEUM:  There is no free air or focal collection.  No free fluid.  LIVER: Normal.  SPLEEN: Normal.  GALLBLADDER: Cholecystectomy.  BILIARY TREE: Unremarkable.  PANCREAS: Normal.  ADRENAL GLANDS: Normal.  KIDNEYS: Normal.  BOWEL: The stomach is incompletely distended.  There is no small bowel   obstruction, focal bowel wall thickening or diverticulitis. The appendix   is unremarkable.    URINARY BLADDER: Collapsed.  PELVIC ORGANS: There are bilateral ovarian cysts, measuring 5 x 4 cm on   the right and 3.8 x 3.2 cm on the left.    There is no significant adenopathy.  VASCULATURE: Unremarkable.  RETROPERITONEUM:  There is no mass.  BONES: Unremarkable.  ABDOMINAL WALL: Unremarkable.    IMPRESSION:    No explanation for abdominal pain on this CT.  Bilateral renal cysts, measuring up to 5 cm in the right and 3.8 cm on   the left. Recommend follow-up with pelvic ultrasound in 4-6 weeks.  Status post cholecystectomy without biliary dilatation or small bowel   obstruction.  Normal appendix.      < from: US Pelvis Complete (18 @ 21:50) >  EXAM:  US PELVIC COMPLETE                            PROCEDURE DATE:  2018      INTERPRETATION:  Pelvic ultrasound      Findings:    The uterus measures 6.7 x 3.8 x 5.1 cm. There is a 2.3 x 1.4 x 1.6 cm   posterior intramural fibroid. The endometrium is not thickened, measuring   10 mm.    There is no free pelvic fluid.    The right ovary measures 5.3 x 4.5 x 4.6 cm, with a 4.7 x 4.0 x 4.3 cm   cyst. Normal flow is detected in the right ovary.    The left ovary measures 4.4 x 4.4 x 3.8 cm, with a 3.4 x 4.0 x 2.8 cm   cyst. Normal flow is detected in the left ovary.    Impression:    Bilateral ovarian cysts, measuring up to 4.7 cm on the right and 4 cm on   the left. Flow is detected in both ovaries at this time. Please note that   intermittent torsion/detorsion should be excluded on clinical grounds.   Follow-up ultrasound in 4-6 weeks should be performed for further   assessment of these bilateral ovarian cysts.

## 2018-08-01 NOTE — CONSULT NOTE ADULT - ATTENDING COMMENTS
I have seen/examined the patient and agree with exam/assessment/plan as documented per resident.  She declines pelvic exam at this time.  Pain is in upper quadrants and associated with PO intake, unlikely Gyn in etiology.

## 2018-08-01 NOTE — PROGRESS NOTE ADULT - SUBJECTIVE AND OBJECTIVE BOX
CHIEF COMPLAINT: Abdominal pain    SUBJECTIVE:   HPI: 26 yo F w/ PMH of PCOS, HTN, connective tissue disease, anxiety, and depression who recently came to the ED on 7/29 for one episode of vomiting and right sided abdominal pain that started 8 days prior and was discharged and was told to follow up with GI physician. Pt saw Dr. Alcira Valdez today(7/31/18) for the now epigastric pain and had an episode of nonbilious nonbloody vomiting in the office and was sent to the ED. The epigastric pain feels like a spasm and is non radiating. It is worsened with food and laying flat and it improves with sitting up. Ranitidine and famotidine did not improve the symptoms but compazine helped for a short period of time. Pt denies any fever, CP, or change in bowel movement.   Pt recently got a nerve ablation procedure in her back on wednesday and received 2 doses of steroids.  ED 1L IV bolus, morphine 4mg IV, Compazine 10mg IV      8/1/18:   Pt was seen and evaluated. Pt reports 1 episode of nonbilious nonbloody intractable vomiting in the ED after administration of po lidocaine viscous and maalox. No other episode of vomiting since then.  Pt also reports chronic advil use for the past 15 years for chronic back pain. Pt has a history of Advil overdose in a suicidal attempt in december. Never drank coffee or tea. Doesn't ingest any source of caffeine or spicy foods. Diet is diverse and eats anything from chicken, salad, potato chips and chicken noodle soup. Nothing relieved the pain. GI notes appreciated.    REVIEW OF SYSTEMS:  CONSTITUTIONAL: No weakness, fevers or chills  EYES/ENT: No visual changes;  No vertigo or throat pain   NECK: No pain or stiffness  RESPIRATORY: No cough, wheezing, hemoptysis; No shortness of breath  CARDIOVASCULAR: No chest pain or palpitations  GASTROINTESTINAL: No abdominal or epigastric pain. No nausea, vomiting, or hematemesis; No diarrhea or constipation. No melena or hematochezia.  GENITOURINARY: No dysuria, frequency or hematuria  NEUROLOGICAL: No numbness or weakness  SKIN: No itching, burning, rashes, or lesions   All other review of systems is negative unless indicated above    Vital Signs Last 24 Hrs  T(C): 37.4 (01 Aug 2018 10:22), Max: 37.4 (01 Aug 2018 05:10)  T(F): 99.3 (01 Aug 2018 10:22), Max: 99.4 (01 Aug 2018 05:10)  HR: 102 (01 Aug 2018 10:22) (94 - 116)  BP: 144/90 (01 Aug 2018 10:22) (138/78 - 176/98)  BP(mean): --  RR: 18 (01 Aug 2018 10:22) (15 - 18)  SpO2: 95% (01 Aug 2018 10:22) (95% - 99%)    I&O's Summary      CAPILLARY BLOOD GLUCOSE          PHYSICAL EXAM:  Constitutional: NAD, awake and alert, well-developed  HEENT: PERR, EOMI, Normal Hearing, MMM  Neck: Soft and supple, No LAD, No JVD  Respiratory: Breath sounds are clear bilaterally, No wheezing, rales or rhonchi  Cardiovascular: S1 and S2, regular rate and rhythm, no Murmurs, gallops or rubs  Gastrointestinal: Bowel Sounds present, soft, nontender, nondistended, no guarding, no rebound  Extremities: No peripheral edema  Vascular: 2+ peripheral pulses  Neurological: A/O x 3, no focal deficits  Musculoskeletal: 5/5 strength b/l upper and lower extremities  Skin: No rashes    MEDICATIONS:  MEDICATIONS  (STANDING):  cloNIDine 0.1 milliGRAM(s) Oral two times a day  DULoxetine 60 milliGRAM(s) Oral daily  gabapentin Oral Tab/Cap - Peds 600 milliGRAM(s) Oral at bedtime  lamoTRIgine 200 milliGRAM(s) Oral at bedtime  lidocaine   Patch 1 Patch Transdermal once  lisinopril 20 milliGRAM(s) Oral two times a day  pantoprazole   Suspension 40 milliGRAM(s) Oral daily  senna 2 Tablet(s) Oral at bedtime  sodium chloride 0.9%. 1200 milliLiter(s) (100 mL/Hr) IV Continuous <Continuous>      LABS: All Labs Reviewed:                        12.9   19.11 )-----------( 423      ( 01 Aug 2018 05:39 )             36.9     08-01    138  |  106  |  14  ----------------------------<  85  4.2   |  23  |  0.77    Ca    8.9      01 Aug 2018 05:39    TPro  7.7  /  Alb  2.9<L>  /  TBili  0.4  /  DBili  <0.1  /  AST  45<H>  /  ALT  42  /  AlkPhos  73  08-01          Blood Culture: 07-29 @ 21:19  Organism --  Gram Stain Blood -- Gram Stain --  Specimen Source .Urine None  Culture-Blood --        RADIOLOGY/EKG: < from: CT Abdomen and Pelvis w/ IV Cont (07.29.18 @ 23:09) >  EXAM:  CT ABDOMEN AND PELVIS IC                            PROCEDURE DATE:  07/29/2018          INTERPRETATION:  Abdominal/Pelvic CT    7/29/2018 11:46 PM    Indication: Right-sided abdominal pain, nausea and vomiting, elevated   white count    Technique: Axial images were obtained following IV contrast from the lung   bases through pubic symphysis.  90 cc of Omnipaque 350 was administered   intravenously without complication and 10 cc was discarded.  Reformatted   coronal and sagittal images are submitted.    Comparison: CT of June 12, 2017    FINDINGS:    LUNG BASES:  There are no pleural effusions.  PERITONEUM:  There is no free air or focal collection.  No free fluid.  LIVER: Normal.  SPLEEN: Normal.  GALLBLADDER: Cholecystectomy.  BILIARY TREE: Unremarkable.  PANCREAS: Normal.  ADRENAL GLANDS: Normal.  KIDNEYS: Normal.  BOWEL: The stomach is incompletely distended.  There is no small bowel   obstruction, focal bowel wall thickening or diverticulitis. The appendix   is unremarkable.    URINARY BLADDER: Collapsed.  PELVIC ORGANS: There are bilateral ovarian cysts, measuring 5 x 4 cm on   the right and 3.8 x 3.2 cm on the left.    There is no significant adenopathy.  VASCULATURE: Unremarkable.  RETROPERITONEUM:  There is no mass.  BONES: Unremarkable.  ABDOMINAL WALL: Unremarkable.    IMPRESSION:    No explanation for abdominal pain on this CT.  Bilateral renal cysts, measuring up to 5 cm in the right and 3.8 cm on   the left. Recommend follow-up with pelvic ultrasound in 4-6 weeks.  Status post cholecystectomy without biliary dilatation or small bowel   obstruction.  Normal appendix.    < end of copied text >          DVT PPX:    ADVANCED DIRECTIVE:    DISPOSITION:

## 2018-08-01 NOTE — PROGRESS NOTE ADULT - ATTENDING COMMENTS
on exam- comfortable   -pa-soft,bs+    #MRCP to follow if normal then possible EGD     #pain control

## 2018-08-01 NOTE — CONSULT NOTE ADULT - PROBLEM SELECTOR RECOMMENDATION 2
Afebrile in ED  Unlikely Urinary source   UA: Nitrites neg, Leuks- trace, blood small, protein 100mg  UCx neg, showed <10,000 CFUs E.coli Continue on OCPs  follow up with endocrine and OBGYN

## 2018-08-01 NOTE — PROGRESS NOTE ADULT - ASSESSMENT
26 yo F w/ PMH of PCOS, HTN, Depression, anxiety, and mixed connective tissue disease with possible gastritis presenting with epigastric pain and episodes of nonbloody, nonbilious vomiting. Elevated WBC = 19.11.

## 2018-08-01 NOTE — CONSULT NOTE ADULT - ASSESSMENT
28 yo F w/ PMH of PCOS, HTN, connective tissue disease, anxiety, and depression who recently came to the ED on 7/29 for one episode of vomiting and right sided abdominal pain that started 8 days prior and was discharged and was told to follow up with GI physician. Pt saw Dr. Alcira Valdez (7/31/18) for the now epigastric pain and had an episode of nonbilious nonbloody vomiting in the office and was sent to the ED. The epigastric pain feels like a spasm and is non radiating. It is worsened with food and laying flat and it improves with sitting up. Ranitidine and famotidine did not improve the symptoms but compazine helped for a short period of time. Pt denies any fever, CP, or change in bowel movement.   Pt recently got a nerve ablation procedure in her back on 7/25 and received 2 doses of steroids. Here noted with pyuria was given IV rocephin for possible UTI no urinary sxs, no diarrhea, afebrile.     1. nausea/vomiting/abd pain/pyuria/hx of CDAD  - no diarrhea, CT abd/pelvis unremarkable  - has leukocytosis could be initially related to steroids, now improving  - f/u cbc  - urine cx no growth  - dc antbiotics and observe  - has has CDAD twice prior, no diarrhea currently, avoid antibiotics, probiotics bid  - plan for EGD per GI  - monitor temps  - f/u cbc
28yo F LMP 7/24 PMH sign for HTN, PCOS, Chronic pain, Herniated Disk, depression and anxiety with 3 days of epigastric abdominal pain and vomiting.

## 2018-08-01 NOTE — PROGRESS NOTE ADULT - PROBLEM SELECTOR PLAN 2
-Currently trending up; WBCs 17.16 on admission; 19.11 today  -continue monitoring WBCs and monitor temp

## 2018-08-01 NOTE — CONSULT NOTE ADULT - PROBLEM SELECTOR RECOMMENDATION 9
Likely gastritis/ duodenal ulcer vs pancreatitis vs. ovarian torsion vs. PID  Beta HCG neg, not pregnant  low suspicion of PID, no hx of STIs, GC and CT negative   Low suspicion of ovarian torsion on clinical exam  Transvaginal US showed bilateral flow to ovaries and bilateral 4.7 cm cysts  CT negative for SBO  Bimanual exam ***** Likely gastritis/ duodenal ulcer vs pancreatitis   Beta HCG neg, not pregnant  low suspicion ovarian torsion based on clinical exam  low suspicion of PID, no hx of STIs, GC and CT negative   Low suspicion of ovarian torsion on clinical exam  Transvaginal US showed bilateral flow to ovaries and bilateral 4.7 cm cysts  CT negative for SBO  Follow up outpatient OBGYN

## 2018-08-02 ENCOUNTER — TRANSCRIPTION ENCOUNTER (OUTPATIENT)
Age: 28
End: 2018-08-02

## 2018-08-02 VITALS
DIASTOLIC BLOOD PRESSURE: 65 MMHG | TEMPERATURE: 99 F | HEART RATE: 86 BPM | SYSTOLIC BLOOD PRESSURE: 134 MMHG | RESPIRATION RATE: 16 BRPM | OXYGEN SATURATION: 98 %

## 2018-08-02 LAB
ALBUMIN SERPL ELPH-MCNC: 2.9 G/DL — LOW (ref 3.3–5)
ALP SERPL-CCNC: 82 U/L — SIGNIFICANT CHANGE UP (ref 40–120)
ALT FLD-CCNC: 96 U/L — HIGH (ref 12–78)
ANION GAP SERPL CALC-SCNC: 8 MMOL/L — SIGNIFICANT CHANGE UP (ref 5–17)
AST SERPL-CCNC: 102 U/L — HIGH (ref 15–37)
BASOPHILS # BLD AUTO: 0.05 K/UL — SIGNIFICANT CHANGE UP (ref 0–0.2)
BASOPHILS NFR BLD AUTO: 0.5 % — SIGNIFICANT CHANGE UP (ref 0–2)
BILIRUB SERPL-MCNC: 0.5 MG/DL — SIGNIFICANT CHANGE UP (ref 0.2–1.2)
BUN SERPL-MCNC: 12 MG/DL — SIGNIFICANT CHANGE UP (ref 7–23)
CALCIUM SERPL-MCNC: 8.9 MG/DL — SIGNIFICANT CHANGE UP (ref 8.5–10.1)
CHLORIDE SERPL-SCNC: 104 MMOL/L — SIGNIFICANT CHANGE UP (ref 96–108)
CO2 SERPL-SCNC: 26 MMOL/L — SIGNIFICANT CHANGE UP (ref 22–31)
CREAT SERPL-MCNC: 0.9 MG/DL — SIGNIFICANT CHANGE UP (ref 0.5–1.3)
CULTURE RESULTS: SIGNIFICANT CHANGE UP
EOSINOPHIL # BLD AUTO: 0.08 K/UL — SIGNIFICANT CHANGE UP (ref 0–0.5)
EOSINOPHIL NFR BLD AUTO: 0.7 % — SIGNIFICANT CHANGE UP (ref 0–6)
GLUCOSE SERPL-MCNC: 83 MG/DL — SIGNIFICANT CHANGE UP (ref 70–99)
HCT VFR BLD CALC: 38.7 % — SIGNIFICANT CHANGE UP (ref 34.5–45)
HGB BLD-MCNC: 13.4 G/DL — SIGNIFICANT CHANGE UP (ref 11.5–15.5)
IMM GRANULOCYTES NFR BLD AUTO: 0.4 % — SIGNIFICANT CHANGE UP (ref 0–1.5)
LYMPHOCYTES # BLD AUTO: 2.81 K/UL — SIGNIFICANT CHANGE UP (ref 1–3.3)
LYMPHOCYTES # BLD AUTO: 25.5 % — SIGNIFICANT CHANGE UP (ref 13–44)
MANUAL SMEAR VERIFICATION: SIGNIFICANT CHANGE UP
MCHC RBC-ENTMCNC: 28.1 PG — SIGNIFICANT CHANGE UP (ref 27–34)
MCHC RBC-ENTMCNC: 34.6 GM/DL — SIGNIFICANT CHANGE UP (ref 32–36)
MCV RBC AUTO: 81.1 FL — SIGNIFICANT CHANGE UP (ref 80–100)
MONOCYTES # BLD AUTO: 0.89 K/UL — SIGNIFICANT CHANGE UP (ref 0–0.9)
MONOCYTES NFR BLD AUTO: 8.1 % — SIGNIFICANT CHANGE UP (ref 2–14)
NEUTROPHILS # BLD AUTO: 7.17 K/UL — SIGNIFICANT CHANGE UP (ref 1.8–7.4)
NEUTROPHILS NFR BLD AUTO: 64.8 % — SIGNIFICANT CHANGE UP (ref 43–77)
NRBC # BLD: 0 /100 WBCS — SIGNIFICANT CHANGE UP (ref 0–0)
PLAT MORPH BLD: NORMAL — SIGNIFICANT CHANGE UP
PLATELET # BLD AUTO: 387 K/UL — SIGNIFICANT CHANGE UP (ref 150–400)
POTASSIUM SERPL-MCNC: 4.1 MMOL/L — SIGNIFICANT CHANGE UP (ref 3.5–5.3)
POTASSIUM SERPL-SCNC: 4.1 MMOL/L — SIGNIFICANT CHANGE UP (ref 3.5–5.3)
PROT SERPL-MCNC: 7.6 GM/DL — SIGNIFICANT CHANGE UP (ref 6–8.3)
RBC # BLD: 4.77 M/UL — SIGNIFICANT CHANGE UP (ref 3.8–5.2)
RBC # FLD: 13.9 % — SIGNIFICANT CHANGE UP (ref 10.3–14.5)
RBC BLD AUTO: NORMAL — SIGNIFICANT CHANGE UP
SODIUM SERPL-SCNC: 138 MMOL/L — SIGNIFICANT CHANGE UP (ref 135–145)
SPECIMEN SOURCE: SIGNIFICANT CHANGE UP
WBC # BLD: 11.04 K/UL — HIGH (ref 3.8–10.5)
WBC # FLD AUTO: 11.04 K/UL — HIGH (ref 3.8–10.5)

## 2018-08-02 RX ORDER — SUCRALFATE 1 G
1 TABLET ORAL
Qty: 120 | Refills: 0 | OUTPATIENT
Start: 2018-08-02 | End: 2018-08-31

## 2018-08-02 RX ORDER — PANTOPRAZOLE SODIUM 20 MG/1
1 TABLET, DELAYED RELEASE ORAL
Qty: 30 | Refills: 0 | OUTPATIENT
Start: 2018-08-02 | End: 2018-08-31

## 2018-08-02 RX ORDER — CHOLESTYRAMINE 4 G/9G
4 POWDER, FOR SUSPENSION ORAL
Qty: 0 | Refills: 0 | Status: DISCONTINUED | OUTPATIENT
Start: 2018-08-02 | End: 2018-08-02

## 2018-08-02 RX ORDER — CHOLESTYRAMINE 4 G/9G
1 POWDER, FOR SUSPENSION ORAL
Qty: 1 | Refills: 0 | OUTPATIENT
Start: 2018-08-02 | End: 2018-08-15

## 2018-08-02 RX ADMIN — OXYCODONE AND ACETAMINOPHEN 2 TABLET(S): 5; 325 TABLET ORAL at 00:26

## 2018-08-02 RX ADMIN — Medication 1 MILLIGRAM(S): at 09:38

## 2018-08-02 RX ADMIN — PANTOPRAZOLE SODIUM 40 MILLIGRAM(S): 20 TABLET, DELAYED RELEASE ORAL at 09:35

## 2018-08-02 RX ADMIN — ONDANSETRON 4 MILLIGRAM(S): 8 TABLET, FILM COATED ORAL at 13:38

## 2018-08-02 RX ADMIN — LIDOCAINE 1 PATCH: 4 CREAM TOPICAL at 06:58

## 2018-08-02 RX ADMIN — CHOLESTYRAMINE 4 GRAM(S): 4 POWDER, FOR SUSPENSION ORAL at 14:22

## 2018-08-02 RX ADMIN — DULOXETINE HYDROCHLORIDE 60 MILLIGRAM(S): 30 CAPSULE, DELAYED RELEASE ORAL at 09:36

## 2018-08-02 RX ADMIN — OXYCODONE AND ACETAMINOPHEN 2 TABLET(S): 5; 325 TABLET ORAL at 06:15

## 2018-08-02 RX ADMIN — Medication 0.1 MILLIGRAM(S): at 06:14

## 2018-08-02 RX ADMIN — Medication 600 MILLIGRAM(S): at 09:33

## 2018-08-02 RX ADMIN — LISINOPRIL 20 MILLIGRAM(S): 2.5 TABLET ORAL at 06:15

## 2018-08-02 RX ADMIN — CYCLOBENZAPRINE HYDROCHLORIDE 10 MILLIGRAM(S): 10 TABLET, FILM COATED ORAL at 09:36

## 2018-08-02 RX ADMIN — OXYCODONE AND ACETAMINOPHEN 2 TABLET(S): 5; 325 TABLET ORAL at 12:29

## 2018-08-02 RX ADMIN — ONDANSETRON 4 MILLIGRAM(S): 8 TABLET, FILM COATED ORAL at 06:14

## 2018-08-02 NOTE — DISCHARGE NOTE ADULT - CARE PLAN
Principal Discharge DX:	Abdominal pain, recurrent  Goal:	Symptom resolution  Assessment and plan of treatment:	- protonix 40 mg daily  - carafate one gram qid  - follow up with Dr. Galarza (GI) as outpt 2 weeks  - f/u with Dr Barba (PCP) with in 1 week.  - bland diet  - low fat diet  Secondary Diagnosis:	Leukocytosis  Goal:	Stable patient  Assessment and plan of treatment:	- Monitor temperature  - F/u with Dr. Barba  Secondary Diagnosis:	Thrombocytosis  Goal:	Stable patient  Assessment and plan of treatment:	- f/u with Dr. Barba  Secondary Diagnosis:	Transaminitis  Goal:	Stable Patient  Assessment and plan of treatment:	- F/u with Dr. Barba  Secondary Diagnosis:	HTN (hypertension)  Goal:	Good Bp control  Assessment and plan of treatment:	-Continue home medication lisinopril 20mg   -Monitor blood pressures.  Secondary Diagnosis:	Polycystic ovarian syndrome  Goal:	Stable patient  Assessment and plan of treatment:	- F/u gyn (Dr. Bella) outpatient  Secondary Diagnosis:	Anxiety  Goal:	Stable patient  Assessment and plan of treatment:	- continue home medication of xanax Principal Discharge DX:	Abdominal pain, recurrent  Goal:	Symptom resolution  Assessment and plan of treatment:	- protonix 40 mg daily  - carafate one gram qid  - follow up with Dr. Valdez (GI) as outpt 2 weeks  - f/u with Dr Barba (PCP) with in 1 week.  - bland diet  - low fat diet  Secondary Diagnosis:	Leukocytosis  Goal:	Stable patient  Assessment and plan of treatment:	- Monitor temperature  - F/u with Dr. Barba  Secondary Diagnosis:	Thrombocytosis  Goal:	Stable patient  Assessment and plan of treatment:	- f/u with Dr. Barba  Secondary Diagnosis:	Transaminitis  Goal:	Stable Patient  Assessment and plan of treatment:	- F/u with Dr. Barba  Secondary Diagnosis:	HTN (hypertension)  Goal:	Good Bp control  Assessment and plan of treatment:	-Continue home medication lisinopril 20mg   -Monitor blood pressures.  Secondary Diagnosis:	Polycystic ovarian syndrome  Goal:	Stable patient  Assessment and plan of treatment:	- F/u gyn (Dr. Bella) outpatient  Secondary Diagnosis:	Anxiety  Goal:	Stable patient  Assessment and plan of treatment:	- continue home medication of xanax Principal Discharge DX:	Abdominal pain, recurrent  Goal:	Symptom resolution  Assessment and plan of treatment:	- protonix 40 mg daily  - carafate one gram qid  - follow up with Dr. Valdez (GI) as outpt 2 weeks  - f/u with Dr Barba (PCP) with in 1 week.  - bland and low fat diet  - avoid NSAIDS  Secondary Diagnosis:	Leukocytosis  Goal:	Stable patient  Assessment and plan of treatment:	- Leukocytosis, likely stress/ gastritis related and now downtrending.   - F/u with Dr. Barba  Secondary Diagnosis:	Thrombocytosis  Goal:	Stable patient  Assessment and plan of treatment:	-likely acute reactant (acute stress), now resolved to normal  -f/u with Dr. Barba  Secondary Diagnosis:	Transaminitis  Goal:	Stable Patient  Assessment and plan of treatment:	-Secondary to possible fatty liver, routine follow-up with GI.  - F/u with Dr. Barba  Secondary Diagnosis:	HTN (hypertension)  Goal:	Good Bp control  Assessment and plan of treatment:	-Continue home medication lisinopril 20mg   -Monitor blood pressure regularly.  Secondary Diagnosis:	Polycystic ovarian syndrome  Goal:	Stable patient  Assessment and plan of treatment:	- F/u gyn (Dr. Bella) outpatient in 4-6 weeks.  Secondary Diagnosis:	Anxiety  Goal:	Stable patient  Assessment and plan of treatment:	- continue home medications

## 2018-08-02 NOTE — PROGRESS NOTE ADULT - SUBJECTIVE AND OBJECTIVE BOX
Pt has been seen and examined with FP resident, resident supervised agree with a/p       Patient is a 27y old  Female who presents with a chief complaint of epigastric pain with nausea, vomiting (02 Aug 2018 09:12)          PHYSICAL EXAM:  Vital Signs Last 24 Hrs  T(C): 37.5 (02 Aug 2018 10:23), Max: 37.5 (02 Aug 2018 10:23)  T(F): 99.5 (02 Aug 2018 10:23), Max: 99.5 (02 Aug 2018 10:23)  HR: 88 (02 Aug 2018 10:23) (80 - 88)  BP: 121/58 (02 Aug 2018 10:23) (121/58 - 154/85)  BP(mean): --  RR: 18 (02 Aug 2018 10:23) (18 - 20)  SpO2: 99% (02 Aug 2018 10:23) (98% - 100%)  general- comfortable   -rs-aeeb,cta  -cvs-s1s2 normal   -p/a-soft,bs+  -extremity- no asymmetrical swelling noted   -cns- non focal         A/P    #d/c today, time spent 65 minutes

## 2018-08-02 NOTE — CDI QUERY NOTE - NSCDIOTHERTXTBX_GEN_ALL_CORE_HH
26 yo F w/ PMH of PCOS, HTN, Depression, anxiety, and mixed connective tissue disease who p/w abdominal pain of 10 days with nausea and vomiting x2. She was diagnosed with UTI and ceftriaxone was given  In addition, she was found to have    WBC : 11.04 --> 19.11---> 17.16    HR:  102--->105--> 94    Please clarify if the following diagnosis was addressed    A) Sepsis    B) Systemic Inflammatory Response Syndrome ( SIRS)    C) Severe sepsis    D) Other ( please specify)    E) Clinically insignificant clinical indicators

## 2018-08-02 NOTE — DISCHARGE NOTE ADULT - HOSPITAL COURSE
28 yo F w/ PMH of PCOS, HTN, Depression, anxiety, and mixed connective tissue disease with possible gastritis presenting with epigastric pain and episodes of nonbloody, nonbilious vomiting. Elevated WBC = 19.11. EGD 28 yo F w/ PMH of PCOS, HTN, Depression, anxiety, and mixed connective tissue disease with possible gastritis presenting with epigastric pain and episodes of nonbloody, nonbilious vomiting. Elevated WBC = 19.11. EGD showed Z-line irregular, 38cm from incisors - biopsied medium-sized hiatal hernia. Erythematous mucosa in greater and lesser curvature of gastric body and antrum - biopsied. 28 yo F w/ PMH of PCOS, HTN, Depression, anxiety, and mixed connective tissue disease with possible gastritis presenting with epigastric pain and episodes of nonbloody, nonbilious vomiting. Elevated WBC = 19.11. EGD showed Z-line irregular, 38cm from incisors - biopsied medium-sized hiatal hernia. Erythematous mucosa in greater and lesser curvature of gastric body and antrum - biopsied.     8/2/2018: Tolerating diet today, denies any reflux symptoms. Hemodynamically stable. Reports symptoms have improved to tolerable levels.     T(C): 37.5 (08-02-18 @ 10:23)  T(F): 99.5 (08-02-18 @ 10:23), Max: 99.5 (08-02-18 @ 10:23)  HR: 88 (08-02-18 @ 10:23) (80 - 88)  BP: 121/58 (08-02-18 @ 10:23) (121/58 - 154/85)  RR:  (18 - 20)  SpO2:  (98% - 100%)  Wt(kg): --    PHYSICAL EXAM:    GENERAL: NAD  HEAD:  NC/AT  EYES: EOMI, no scleral icterus  HEENT: Moist mucous membranes  NECK: Supple, No JVD  CNS:  Alert & Oriented X3  LUNG: Clear to auscultation bilaterally; No rales, rhonchi, wheezing, or rubs  HEART: RRR; No murmurs, rubs, or gallops  ABDOMEN: +BS, ST/ND/NT  EXTREMITIES:  2+ Peripheral Pulses  MUSCULOSKELTAL- Joints normal ROM, no Muscle or joint tenderness

## 2018-08-02 NOTE — PROGRESS NOTE ADULT - ASSESSMENT
abd pain  gastritis  fatty liver     protonix 40 mg daily  carafate one gram qid  follow up as outpt 2 weeks  bland diet  low fat diet

## 2018-08-02 NOTE — PROGRESS NOTE ADULT - SUBJECTIVE AND OBJECTIVE BOX
feels good  denies abd pain  no n/v  neo regular diet breakfast      Allergies    No Known Drug Allergies  peanuts (Unknown)  Tree Nuts (Unknown)    Intolerances    morphine (Pruritus)      MEDICATIONS  (STANDING):  cloNIDine 0.1 milliGRAM(s) Oral two times a day  DULoxetine 60 milliGRAM(s) Oral daily  gabapentin Oral Tab/Cap - Peds 600 milliGRAM(s) Oral at bedtime  lamoTRIgine 200 milliGRAM(s) Oral at bedtime  lisinopril 20 milliGRAM(s) Oral two times a day  pantoprazole   Suspension 40 milliGRAM(s) Oral daily  senna 2 Tablet(s) Oral at bedtime  sodium chloride 0.9%. 1200 milliLiter(s) (100 mL/Hr) IV Continuous <Continuous>    MEDICATIONS  (PRN):  ALPRAZolam 1 milliGRAM(s) Oral every 8 hours PRN anxiety  cyclobenzaprine 10 milliGRAM(s) Oral three times a day PRN Muscle Spasm  HYDROmorphone  Injectable 0.5 milliGRAM(s) IV Push every 6 hours PRN Severe Pain (7 - 10)  ibuprofen  Tablet 600 milliGRAM(s) Oral every 6 hours PRN Pain Mild to Moderate (1-8)  magnesium hydroxide Suspension 30 milliLiter(s) Oral daily PRN Constipation  ondansetron   Disintegrating Tablet 4 milliGRAM(s) Oral every 6 hours PRN Nausea and/or Vomiting  oxyCODONE    5 mG/acetaminophen 325 mG 2 Tablet(s) Oral every 6 hours PRN Severe Pain (7 - 10)  zolpidem 5 milliGRAM(s) Oral at bedtime PRN Insomnia  zolpidem 5 milliGRAM(s) Oral at bedtime PRN Insomnia      REVIEW OF SYSTEMS      General:	No fever or chills    Skin/Breast: No jaundice or rash   		  ENMT: denies sore throat or thrush    Respiratory and Thorax: Denies dyspnea or cough or shortness of breath  	  Cardiovascular: Denies chest pain or palpitations 	    Gastrointestinal: Denies jaundice or pruritis    Genitourinary: Denies dysuria or hematuria	    Musculoskeletal: Denies muscular pain or swelling	    Neurological: Denies confusion or tremor	    Hematology/Lymphatics: Denies easy bruising or bleeding 	    Endocrine:	Denies polyphagia or polyuria    See above hx otherwise neg for any major organ systems    PHYSICAL EXAM:    Vital Signs Last 24 Hrs  T(C): 37.4 (02 Aug 2018 04:48), Max: 37.4 (01 Aug 2018 10:22)  T(F): 99.3 (02 Aug 2018 04:48), Max: 99.3 (01 Aug 2018 10:22)  HR: 81 (02 Aug 2018 04:48) (80 - 102)  BP: 154/85 (02 Aug 2018 04:48) (144/90 - 154/85)  BP(mean): --  RR: 20 (01 Aug 2018 17:47) (18 - 20)  SpO2: 100% (02 Aug 2018 04:48) (95% - 100%)    Constitutional: no acute distress    ENMT: NC/AT scl anicteric opm pink no lesions     Neck: supple. No jvd or LN    Respiratory: Clear     Cardiovascular: RRR s1s2     Gastrointestinal: Pos bs , soft , not tender, no hepatosplenomegaly,  no mass        Back: No CVA tenderness    Extremities: NO cce     Neurological: Alert and oriented x 3     Skin: No rash or jaundice    Date/Time:08-02 @ 05:18    ALT/SGPT: 96  Albumin: 2.9  AST/SGOT: 102  Bilirubin Direct: --  Bilirubin Total: 0.5  Ca: 8.9  eGFR : 102  eGFR Non-: 88  Lipase: --  Amylase: --  INR: --  PTT: --        Date/Time:08-01 @ 20:47    ALT/SGPT: --  Albumin: --  AST/SGOT: --  Bilirubin Direct: --  Bilirubin Total: --  Ca: --  eGFR : --  eGFR Non-African American: --  Lipase: 309  Amylase: --  INR: --  PTT: --        Date/Time:08-01 @ 05:39    ALT/SGPT: 42  Albumin: 2.9  AST/SGOT: 45  Bilirubin Direct: <0.1  Bilirubin Total: 0.4  Ca: 8.9  eGFR : 123  eGFR Non-: 106  Lipase: --  Amylase: --  INR: --  PTT: --        Date/Time:07-31 @ 19:04    ALT/SGPT: 50  Albumin: 3.5  AST/SGOT: 51  Bilirubin Direct: --  Bilirubin Total: 0.3  Ca: 9.3  eGFR : 103  eGFR Non-: 89  Lipase: 495  Amylase: --  INR: --  PTT: --            08-02    138  |  104  |  12  ----------------------------<  83  4.1   |  26  |  0.90    Ca    8.9      02 Aug 2018 05:18    TPro  7.6  /  Alb  2.9<L>  /  TBili  0.5  /  DBili  x   /  AST  102<H>  /  ALT  96<H>  /  AlkPhos  82  08-02                            13.4   11.04 )-----------( 387      ( 02 Aug 2018 05:18 )             38.7     Lipase, Serum: 309 U/L (08.01.18 @ 20:47)

## 2018-08-02 NOTE — DISCHARGE NOTE ADULT - CARE PROVIDERS DIRECT ADDRESSES
[email or web address]

## 2018-08-02 NOTE — DISCHARGE NOTE ADULT - CARE PROVIDER_API CALL
Kristal Barba), Internal Medicine  [address] [place] [address]  Phone: [phone]  Fax: [phone]    Alcira Valdez), Gastroenterology; Internal Medicine  [address] [place] [address]  Phone: [phone]  Fax: [phone]    Live Bella), Obstetrics and Gynecology  [address] [place] [address]  Phone: [phone]  Fax: [phone]

## 2018-08-02 NOTE — CHART NOTE - NSCHARTNOTEFT_GEN_A_CORE
Pt reports 2 episodes of loose stools before discharge. As per Dr. Valdez, GI, send stool samples for C.diff and prescribe Questran 4mg BID and monitor for more loose stools for >4 hours. To discharge pt on questron. Pt to call him monday and follow up 2 weeks.   Pt had no stools during the observation period and is being discharged home.

## 2018-08-02 NOTE — DISCHARGE NOTE ADULT - PLAN OF CARE
Stable patient - Monitor temperature  - F/u with Dr. Barba - f/u with Dr. Barba Stable Patient - F/u with Dr. Barba Good Bp control -Continue home medication lisinopril 20mg   -Monitor blood pressures. - F/u gyn (Dr. Bella) outpatient - continue home medication of xanax Symptom resolution - protonix 40 mg daily  - carafate one gram qid  - follow up with Dr. Galarza (GI) as outpt 2 weeks  - f/u with Dr Barba (PCP) with in 1 week.  - bland diet  - low fat diet - protonix 40 mg daily  - carafate one gram qid  - follow up with Dr. Valdez (GI) as outpt 2 weeks  - f/u with Dr Barba (PCP) with in 1 week.  - bland diet  - low fat diet - protonix 40 mg daily  - carafate one gram qid  - follow up with Dr. Valdez (GI) as outpt 2 weeks  - f/u with Dr Barba (PCP) with in 1 week.  - bland and low fat diet  - avoid NSAIDS - Leukocytosis, likely stress/ gastritis related and now downtrending.   - F/u with Dr. Barba -likely acute reactant (acute stress), now resolved to normal  -f/u with Dr. Barba -Secondary to possible fatty liver, routine follow-up with GI.  - F/u with Dr. Barba -Continue home medication lisinopril 20mg   -Monitor blood pressure regularly. - F/u gyn (Dr. Bella) outpatient in 4-6 weeks. - continue home medications

## 2018-08-02 NOTE — DISCHARGE NOTE ADULT - ADDITIONAL INSTRUCTIONS
Depression.    - continue home medications.     Chronic back pain.   -continue home meds gabapentin, cyclobenzaprine and oxycodone.

## 2018-08-02 NOTE — DISCHARGE NOTE ADULT - MEDICATION SUMMARY - MEDICATIONS TO TAKE
I will START or STAY ON the medications listed below when I get home from the hospital:    ibuprofen 600 mg oral tablet  -- 1 tab(s) by mouth every 6 hours, As needed, Pain Mild to Moderate (1-8)  -- Indication: For Pain    HYDROcodone-acetaminophen 7.5 mg-300 mg oral tablet  -- 2 tab(s) by mouth every 6 hours, As Needed  -- Indication: For Pain    lisinopril 20 mg oral tablet  -- 1 tab(s) by mouth 2 times a day  -- Indication: For HTN (hypertension)    magnesium hydroxide 8% oral suspension  -- 30 milliliter(s) by mouth once a day, As needed, Constipation  -- Indication: For Constipation    cloNIDine 0.1 mg oral tablet  -- 1 tab(s) by mouth 2 times a day  -- Indication: For HTN (hypertension)    gabapentin 300 mg oral capsule  -- 2 cap(s) by mouth once a day (at bedtime)  -- Indication: For neuropathic pain    lamoTRIgine 150 mg oral tablet  -- 2 tab(s) by mouth once a day (at bedtime)  -- Indication: For Anxiety    DULoxetine 60 mg oral delayed release capsule  -- 1 cap(s) by mouth once a day  -- Indication: For Depression    ondansetron 4 mg oral tablet, disintegrating  -- 1 tab(s) by mouth every 6 hours, As Needed   -- Indication: For nausea and vomiting    ALPRAZolam 0.5 mg oral tablet  -- 2 tab(s) by mouth 2 times a day, As Needed - anxiety  -- Indication: For Anxiety    ALPRAZolam 1 mg oral tablet  -- 1 tab(s) by mouth 3 times a day MDD:3  -- Avoid grapefruit and grapefruit juice while taking this medication.  Caution federal law prohibits the transfer of this drug to any person other  than the person for whom it was prescribed.  Do not take this drug if you are pregnant.  May cause drowsiness.  Alcohol may intensify this effect.  Use care when operating dangerous machinery.    -- Indication: For Anxiety    lidocaine 2% topical gel with applicator  -- 1 application on skin 3 times a day  -- Indication: For Chronic back pain    methyl salicylate topical ointment  -- 1 application on skin 3 times a day, As needed, pain  -- Indication: For Chronic back pain    senna oral tablet  -- 2 tab(s) by mouth once a day (at bedtime)  -- Indication: For Constipation    Carafate 1 g oral tablet  -- 1 tab(s) by mouth 4 times a day   -- Do not take dairy products, antacids, or iron preparations within one hour of this medication.  It is very important that you take or use this exactly as directed.  Do not skip doses or discontinue unless directed by your doctor.  Take medication on an empty stomach 1 hour before or 2 to 3 hours after a meal unless otherwise directed by your doctor.    -- Indication: For Gastric protection    cyclobenzaprine 10 mg oral tablet  -- 1 tab(s) by mouth 3 times a day, As needed, Muscle Spasm  -- Indication: For muscle relaxant    Protonix 40 mg oral delayed release tablet  -- 1 tab(s) by mouth once a day -for heartburn   -- It is very important that you take or use this exactly as directed.  Do not skip doses or discontinue unless directed by your doctor.  Obtain medical advice before taking any non-prescription drugs as some may affect the action of this medication.  Swallow whole.  Do not crush.    -- Indication: For Gastric protection

## 2018-08-03 LAB — SURGICAL PATHOLOGY FINAL REPORT - CH: SIGNIFICANT CHANGE UP

## 2018-08-03 RX ORDER — SUCRALFATE 1 G
1 TABLET ORAL
Qty: 120 | Refills: 0 | OUTPATIENT
Start: 2018-08-03 | End: 2018-09-01

## 2018-08-03 RX ORDER — PANTOPRAZOLE SODIUM 20 MG/1
1 TABLET, DELAYED RELEASE ORAL
Qty: 30 | Refills: 0 | OUTPATIENT
Start: 2018-08-03 | End: 2018-09-01

## 2018-08-06 LAB
CULTURE RESULTS: SIGNIFICANT CHANGE UP
SPECIMEN SOURCE: SIGNIFICANT CHANGE UP

## 2018-08-09 ENCOUNTER — EMERGENCY (EMERGENCY)
Facility: HOSPITAL | Age: 28
LOS: 0 days | Discharge: HOME | End: 2018-08-09
Attending: EMERGENCY MEDICINE
Payer: COMMERCIAL

## 2018-08-09 VITALS
WEIGHT: 240.08 LBS | HEART RATE: 91 BPM | SYSTOLIC BLOOD PRESSURE: 113 MMHG | HEIGHT: 67 IN | TEMPERATURE: 98 F | DIASTOLIC BLOOD PRESSURE: 72 MMHG | OXYGEN SATURATION: 98 % | RESPIRATION RATE: 17 BRPM

## 2018-08-09 VITALS
OXYGEN SATURATION: 100 % | DIASTOLIC BLOOD PRESSURE: 79 MMHG | SYSTOLIC BLOOD PRESSURE: 142 MMHG | HEART RATE: 90 BPM | RESPIRATION RATE: 16 BRPM | TEMPERATURE: 98 F

## 2018-08-09 DIAGNOSIS — G89.29 OTHER CHRONIC PAIN: ICD-10-CM

## 2018-08-09 DIAGNOSIS — Z87.19 PERSONAL HISTORY OF OTHER DISEASES OF THE DIGESTIVE SYSTEM: ICD-10-CM

## 2018-08-09 DIAGNOSIS — I10 ESSENTIAL (PRIMARY) HYPERTENSION: ICD-10-CM

## 2018-08-09 DIAGNOSIS — Z98.890 OTHER SPECIFIED POSTPROCEDURAL STATES: Chronic | ICD-10-CM

## 2018-08-09 DIAGNOSIS — E28.2 POLYCYSTIC OVARIAN SYNDROME: ICD-10-CM

## 2018-08-09 DIAGNOSIS — F32.9 MAJOR DEPRESSIVE DISORDER, SINGLE EPISODE, UNSPECIFIED: ICD-10-CM

## 2018-08-09 DIAGNOSIS — Z90.49 ACQUIRED ABSENCE OF OTHER SPECIFIED PARTS OF DIGESTIVE TRACT: Chronic | ICD-10-CM

## 2018-08-09 DIAGNOSIS — R20.0 ANESTHESIA OF SKIN: ICD-10-CM

## 2018-08-09 DIAGNOSIS — M54.5 LOW BACK PAIN: ICD-10-CM

## 2018-08-09 DIAGNOSIS — M35.1 OTHER OVERLAP SYNDROMES: ICD-10-CM

## 2018-08-09 DIAGNOSIS — R33.9 RETENTION OF URINE, UNSPECIFIED: ICD-10-CM

## 2018-08-09 DIAGNOSIS — Z90.49 ACQUIRED ABSENCE OF OTHER SPECIFIED PARTS OF DIGESTIVE TRACT: ICD-10-CM

## 2018-08-09 DIAGNOSIS — Z79.899 OTHER LONG TERM (CURRENT) DRUG THERAPY: ICD-10-CM

## 2018-08-09 DIAGNOSIS — F41.9 ANXIETY DISORDER, UNSPECIFIED: ICD-10-CM

## 2018-08-09 DIAGNOSIS — Z98.890 OTHER SPECIFIED POSTPROCEDURAL STATES: ICD-10-CM

## 2018-08-09 LAB
ALBUMIN SERPL ELPH-MCNC: 2.8 G/DL — LOW (ref 3.3–5)
ALP SERPL-CCNC: 72 U/L — SIGNIFICANT CHANGE UP (ref 40–120)
ALT FLD-CCNC: 82 U/L — HIGH (ref 12–78)
ANION GAP SERPL CALC-SCNC: 10 MMOL/L — SIGNIFICANT CHANGE UP (ref 5–17)
APTT BLD: 34.5 SEC — SIGNIFICANT CHANGE UP (ref 27.5–37.4)
AST SERPL-CCNC: 144 U/L — HIGH (ref 15–37)
BASOPHILS # BLD AUTO: 0.03 K/UL — SIGNIFICANT CHANGE UP (ref 0–0.2)
BASOPHILS NFR BLD AUTO: 0.3 % — SIGNIFICANT CHANGE UP (ref 0–2)
BILIRUB SERPL-MCNC: 0.4 MG/DL — SIGNIFICANT CHANGE UP (ref 0.2–1.2)
BUN SERPL-MCNC: 15 MG/DL — SIGNIFICANT CHANGE UP (ref 7–23)
CALCIUM SERPL-MCNC: 8.6 MG/DL — SIGNIFICANT CHANGE UP (ref 8.5–10.1)
CHLORIDE SERPL-SCNC: 106 MMOL/L — SIGNIFICANT CHANGE UP (ref 96–108)
CO2 SERPL-SCNC: 26 MMOL/L — SIGNIFICANT CHANGE UP (ref 22–31)
CREAT SERPL-MCNC: 0.87 MG/DL — SIGNIFICANT CHANGE UP (ref 0.5–1.3)
EOSINOPHIL # BLD AUTO: 0.06 K/UL — SIGNIFICANT CHANGE UP (ref 0–0.5)
EOSINOPHIL NFR BLD AUTO: 0.6 % — SIGNIFICANT CHANGE UP (ref 0–6)
GLUCOSE SERPL-MCNC: 90 MG/DL — SIGNIFICANT CHANGE UP (ref 70–99)
HCG SERPL-ACNC: <1 MIU/ML — SIGNIFICANT CHANGE UP
HCT VFR BLD CALC: 37.1 % — SIGNIFICANT CHANGE UP (ref 34.5–45)
HGB BLD-MCNC: 13 G/DL — SIGNIFICANT CHANGE UP (ref 11.5–15.5)
IMM GRANULOCYTES NFR BLD AUTO: 0.5 % — SIGNIFICANT CHANGE UP (ref 0–1.5)
INR BLD: 1.14 RATIO — SIGNIFICANT CHANGE UP (ref 0.88–1.16)
LYMPHOCYTES # BLD AUTO: 2.71 K/UL — SIGNIFICANT CHANGE UP (ref 1–3.3)
LYMPHOCYTES # BLD AUTO: 28.8 % — SIGNIFICANT CHANGE UP (ref 13–44)
MCHC RBC-ENTMCNC: 28.1 PG — SIGNIFICANT CHANGE UP (ref 27–34)
MCHC RBC-ENTMCNC: 35 GM/DL — SIGNIFICANT CHANGE UP (ref 32–36)
MCV RBC AUTO: 80.3 FL — SIGNIFICANT CHANGE UP (ref 80–100)
MONOCYTES # BLD AUTO: 0.71 K/UL — SIGNIFICANT CHANGE UP (ref 0–0.9)
MONOCYTES NFR BLD AUTO: 7.5 % — SIGNIFICANT CHANGE UP (ref 2–14)
NEUTROPHILS # BLD AUTO: 5.85 K/UL — SIGNIFICANT CHANGE UP (ref 1.8–7.4)
NEUTROPHILS NFR BLD AUTO: 62.3 % — SIGNIFICANT CHANGE UP (ref 43–77)
PLATELET # BLD AUTO: 333 K/UL — SIGNIFICANT CHANGE UP (ref 150–400)
POTASSIUM SERPL-MCNC: 3.9 MMOL/L — SIGNIFICANT CHANGE UP (ref 3.5–5.3)
POTASSIUM SERPL-SCNC: 3.9 MMOL/L — SIGNIFICANT CHANGE UP (ref 3.5–5.3)
PROT SERPL-MCNC: 7.6 GM/DL — SIGNIFICANT CHANGE UP (ref 6–8.3)
PROTHROM AB SERPL-ACNC: 12.3 SEC — SIGNIFICANT CHANGE UP (ref 9.8–12.7)
RBC # BLD: 4.62 M/UL — SIGNIFICANT CHANGE UP (ref 3.8–5.2)
RBC # FLD: 13.3 % — SIGNIFICANT CHANGE UP (ref 10.3–14.5)
SODIUM SERPL-SCNC: 142 MMOL/L — SIGNIFICANT CHANGE UP (ref 135–145)
WBC # BLD: 9.41 K/UL — SIGNIFICANT CHANGE UP (ref 3.8–10.5)
WBC # FLD AUTO: 9.41 K/UL — SIGNIFICANT CHANGE UP (ref 3.8–10.5)

## 2018-08-09 PROCEDURE — 72157 MRI CHEST SPINE W/O & W/DYE: CPT | Mod: 26

## 2018-08-09 PROCEDURE — 99284 EMERGENCY DEPT VISIT MOD MDM: CPT

## 2018-08-09 PROCEDURE — 72158 MRI LUMBAR SPINE W/O & W/DYE: CPT | Mod: 26

## 2018-08-09 PROCEDURE — 93010 ELECTROCARDIOGRAM REPORT: CPT

## 2018-08-09 RX ORDER — DIAZEPAM 5 MG
5 TABLET ORAL ONCE
Qty: 0 | Refills: 0 | Status: DISCONTINUED | OUTPATIENT
Start: 2018-08-09 | End: 2018-08-09

## 2018-08-09 RX ORDER — MORPHINE SULFATE 50 MG/1
4 CAPSULE, EXTENDED RELEASE ORAL ONCE
Qty: 0 | Refills: 0 | Status: DISCONTINUED | OUTPATIENT
Start: 2018-08-09 | End: 2018-08-09

## 2018-08-09 RX ORDER — ACETAMINOPHEN 500 MG
975 TABLET ORAL ONCE
Qty: 0 | Refills: 0 | Status: COMPLETED | OUTPATIENT
Start: 2018-08-09 | End: 2018-08-09

## 2018-08-09 RX ORDER — METOCLOPRAMIDE HCL 10 MG
10 TABLET ORAL ONCE
Qty: 0 | Refills: 0 | Status: COMPLETED | OUTPATIENT
Start: 2018-08-09 | End: 2018-08-09

## 2018-08-09 RX ADMIN — MORPHINE SULFATE 4 MILLIGRAM(S): 50 CAPSULE, EXTENDED RELEASE ORAL at 11:42

## 2018-08-09 RX ADMIN — Medication 975 MILLIGRAM(S): at 15:24

## 2018-08-09 RX ADMIN — Medication 5 MILLIGRAM(S): at 11:42

## 2018-08-09 RX ADMIN — Medication 10 MILLIGRAM(S): at 15:24

## 2018-08-09 NOTE — ED PROVIDER NOTE - PHYSICAL EXAMINATION
Constitutional: mild distress AAOx3  Eyes: PERRLA EOMI  Head: Normocephalic atraumatic  Mouth: MMM  Cardiac: regular rate   Resp: Lungs CTAB  GI: Abd s/nt/nd  Gu: 100cc urine in bladder- FAST exam at bedside.   Neuro: CN2-12 intact. +mild saddle anesthesia   Musculoskeletal: +B/L straight leg raise. +Spinal TTP.  Skin: No rashes Constitutional: mild distress AAOx3  Eyes: PERRLA EOMI  Head: Normocephalic atraumatic  Mouth: MMM  Cardiac: regular rate   Resp: Lungs CTAB  GI: Abd s/nt/nd  Gu: 100cc urine in bladder- Bladder exam at bedside.   Neuro: CN2-12 intact. +mild saddle anesthesia   Musculoskeletal: +B/L straight leg raise. +Spinal TTP in lower spine  Skin: No rashes

## 2018-08-09 NOTE — ED PROCEDURE NOTE - PROCEDURE ADDITIONAL DETAILS
POCUS - urinary bladder with 100cc. + ovarian cysts present - no abdominal pain. pt with known hx pcos.

## 2018-08-09 NOTE — ED ADULT NURSE NOTE - NSIMPLEMENTINTERV_GEN_ALL_ED
Implemented All Fall Risk Interventions:  [place] to call system. Call bell, personal items and telephone within reach. Instruct patient to call for assistance. Room bathroom lighting operational. Non-slip footwear when patient is off stretcher. Physically safe environment: no spills, clutter or unnecessary equipment. Stretcher in lowest position, wheels locked, appropriate side rails in place. Provide visual cue, wrist band, yellow gown, etc. Monitor gait and stability. Monitor for mental status changes and reorient to person, place, and time. Review medications for side effects contributing to fall risk. Reinforce activity limits and safety measures with patient and family.

## 2018-08-09 NOTE — ED PROVIDER NOTE - PROGRESS NOTE DETAILS
Spoke with Angelina in MRI - will do patients MRI shortly. Zak Antonio M.D., Attending Physician Patient states asymptomatic, ambulatory in ED, asking to be d/c'd.   MRI unremarkable -- will d/c with spine f/u

## 2018-08-09 NOTE — ED PROVIDER NOTE - OBJECTIVE STATEMENT
26 y/o female with PMHx PCOS, HTN, depression, chronic back pain presents to the ED c/o urinary rentention starting last night. Also c/o lower back pain radiating down to LE worsening last night spinal nerve ablation surgery 2 weeks ago. Pain in lower back is described as a compression. +Groin anesthesia starting this morning. Denies NVD, fever. Pt states that pain at this time is different/worse than chronic back pain which she is usually able to control at home.

## 2018-08-09 NOTE — ED ADULT TRIAGE NOTE - CHIEF COMPLAINT QUOTE
complaining of lower back pain and urinary retention since 2am. 2 weeks ago patient had spinal ablation. EMS also reports patient has blisters on back due to heat pads.

## 2018-08-09 NOTE — ED PROVIDER NOTE - NS ED ROS FT
Constitutional: No fever or chills  Eyes: No visual changes  HEENT: No throat pain  CV: No chest pain  Resp: No SOB no cough  GI: No abd pain, nausea or vomiting  : No dysuria. +Urinary retention.   MSK: +Back pain +LE pain +groin anesthesia   Skin: No rash  Neuro: No headache

## 2018-08-09 NOTE — ED ADULT NURSE REASSESSMENT NOTE - NS ED NURSE REASSESS COMMENT FT1
pt c/o chest pain after MRI, MD Hogan notified, received order for ekg done, EKG done upon arrival back to ER, pt on monitor - nsr.  vss, afebrile, awaiting for mri report, will con't to monitor
MRI questionaire sheet faxed

## 2018-08-15 DIAGNOSIS — I10 ESSENTIAL (PRIMARY) HYPERTENSION: ICD-10-CM

## 2018-08-15 DIAGNOSIS — N39.0 URINARY TRACT INFECTION, SITE NOT SPECIFIED: ICD-10-CM

## 2018-08-15 DIAGNOSIS — Z91.010 ALLERGY TO PEANUTS: ICD-10-CM

## 2018-08-15 DIAGNOSIS — G89.29 OTHER CHRONIC PAIN: ICD-10-CM

## 2018-08-15 DIAGNOSIS — D47.3 ESSENTIAL (HEMORRHAGIC) THROMBOCYTHEMIA: ICD-10-CM

## 2018-08-15 DIAGNOSIS — R74.0 NONSPECIFIC ELEVATION OF LEVELS OF TRANSAMINASE AND LACTIC ACID DEHYDROGENASE [LDH]: ICD-10-CM

## 2018-08-15 DIAGNOSIS — K76.0 FATTY (CHANGE OF) LIVER, NOT ELSEWHERE CLASSIFIED: ICD-10-CM

## 2018-08-15 DIAGNOSIS — F32.9 MAJOR DEPRESSIVE DISORDER, SINGLE EPISODE, UNSPECIFIED: ICD-10-CM

## 2018-08-15 DIAGNOSIS — Z91.018 ALLERGY TO OTHER FOODS: ICD-10-CM

## 2018-08-15 DIAGNOSIS — Z88.5 ALLERGY STATUS TO NARCOTIC AGENT: ICD-10-CM

## 2018-08-15 DIAGNOSIS — K44.9 DIAPHRAGMATIC HERNIA WITHOUT OBSTRUCTION OR GANGRENE: ICD-10-CM

## 2018-08-15 DIAGNOSIS — M54.9 DORSALGIA, UNSPECIFIED: ICD-10-CM

## 2018-08-15 DIAGNOSIS — E28.2 POLYCYSTIC OVARIAN SYNDROME: ICD-10-CM

## 2018-08-15 DIAGNOSIS — M35.1 OTHER OVERLAP SYNDROMES: ICD-10-CM

## 2018-08-15 DIAGNOSIS — F41.9 ANXIETY DISORDER, UNSPECIFIED: ICD-10-CM

## 2018-08-15 DIAGNOSIS — K29.70 GASTRITIS, UNSPECIFIED, WITHOUT BLEEDING: ICD-10-CM

## 2018-10-23 NOTE — ED ADULT TRIAGE NOTE - NSWEIGHTCALCTOOLDRUG_GEN_A_CORE
54 yo male with no significant PMH presents to the ER for "fluttering in the chest" today. States this occurred once before 3 years ago, and it resolved on its own. Pt states that he today he had palpitations while sitting around at 4:30 pm. +lightheadedness with it. NO chest discomfort/SOB/HA/neck pain/back pain/sweats/leg pain or swelling. NO dysuria or diarrhea. Last stress test was 3 years ago. Admits to drinking more than usual amount of alcohol this weekend, which he feels may have contributed to his palpitations.     PMH Afib (paroxysmal), GERD  All: NKDA  Meds: nexium  SH: no smoking, no etoh  PMD none  Cardiologist Dr Pratt
 used

## 2018-12-10 NOTE — ED ADULT TRIAGE NOTE - NS ED NURSE DIRECT TO ROOM YN
MATERNAL FETAL MEDICINE IS FOLLOWING THE PATIENT  FOR   1. Poor fetal growth affecting management of mother in third trimester, single or unspecified fetus        SUBJECTIVE:   The patient reports no problems, since last visit    CURRENT MEDICATIONS  Current Outpatient Medications   Medication Sig Dispense Refill   • amoxicillin-clavulanate (AUGMENTIN) 875-125 MG per tablet Take 1 tablet by mouth 2 times daily.     • omeprazole (PRILOSEC) 20 MG capsule Take 1 capsule by mouth daily. 30 capsule 1   • Prenatal Vit-Fe Fumarate-FA (MULTIVITAMIN & MINERAL W/FOLIC ACID- PRENATAL) 27-1 MG Tab Take 1 tablet by mouth daily.       No current facility-administered medications for this visit.        Allergies as of 12/10/2018 - Reviewed 12/10/2018   Allergen Reaction Noted   • Ciprofloxacin HIVES 11/27/2017   • Sertraline GI UPSET and Other (See Comments) 11/12/2015         PHYSICAL EXAMINATION:  VITAL SIGNS:    Visit Vitals  /64   Ht 5' 10\" (1.778 m)   Wt 87.2 kg   BMI 27.59 kg/m²       LABORATORY RESULTS SINCE LAST VISIT:  No visits with results within 1 Month(s) from this visit.   Latest known visit with results is:   Lab Services on 11/05/2018   Component Date Value   • CYTOMEGALOVIRUS IGG AB 11/05/2018 0.28    • CYTOMEGALOVIRUS IGM 11/05/2018 0.05    • TOXOPLASMA IGM 11/05/2018 0.14    • Toxoplasma IgG AB 11/05/2018 <0.50        TODAY'S ULTRASOUND SHOWED:   biophysical 8 out of 8    ASSESSMENT AND PLAN: A 33w3d pregnancy being followed by [facility] for   PROBLEM 1.  1. Poor fetal growth affecting management of mother in third trimester, single or unspecified fetus      PLAN;  1. Continue twice weekly assessment per protocol  2. Continue to follow-up with Von Castellanos MD for routine obstetrical care    Total time of today's office visit 10 minutes, greater than 50% spent face to face discussion on  A. Reviewing with the patient the care plan noted above  B. responding to biologic mother's inquiries    
Yes

## 2018-12-14 ENCOUNTER — OUTPATIENT (OUTPATIENT)
Dept: OUTPATIENT SERVICES | Facility: HOSPITAL | Age: 28
LOS: 1 days | Discharge: HOME | End: 2018-12-14
Payer: COMMERCIAL

## 2018-12-14 VITALS
HEART RATE: 100 BPM | DIASTOLIC BLOOD PRESSURE: 82 MMHG | HEIGHT: 67 IN | WEIGHT: 240.08 LBS | RESPIRATION RATE: 20 BRPM | SYSTOLIC BLOOD PRESSURE: 134 MMHG | TEMPERATURE: 98 F | OXYGEN SATURATION: 99 %

## 2018-12-14 DIAGNOSIS — N94.5 SECONDARY DYSMENORRHEA: ICD-10-CM

## 2018-12-14 DIAGNOSIS — Z98.890 OTHER SPECIFIED POSTPROCEDURAL STATES: Chronic | ICD-10-CM

## 2018-12-14 DIAGNOSIS — E28.2 POLYCYSTIC OVARIAN SYNDROME: ICD-10-CM

## 2018-12-14 DIAGNOSIS — Z90.49 ACQUIRED ABSENCE OF OTHER SPECIFIED PARTS OF DIGESTIVE TRACT: Chronic | ICD-10-CM

## 2018-12-14 LAB
ANION GAP SERPL CALC-SCNC: 7 MMOL/L — SIGNIFICANT CHANGE UP (ref 5–17)
BASOPHILS # BLD AUTO: 0.04 K/UL — SIGNIFICANT CHANGE UP (ref 0–0.2)
BASOPHILS NFR BLD AUTO: 0.5 % — SIGNIFICANT CHANGE UP (ref 0–2)
BLD GP AB SCN SERPL QL: SIGNIFICANT CHANGE UP
BUN SERPL-MCNC: 16 MG/DL — SIGNIFICANT CHANGE UP (ref 7–23)
CALCIUM SERPL-MCNC: 9 MG/DL — SIGNIFICANT CHANGE UP (ref 8.5–10.1)
CHLORIDE SERPL-SCNC: 105 MMOL/L — SIGNIFICANT CHANGE UP (ref 96–108)
CO2 SERPL-SCNC: 26 MMOL/L — SIGNIFICANT CHANGE UP (ref 22–31)
CREAT SERPL-MCNC: 0.9 MG/DL — SIGNIFICANT CHANGE UP (ref 0.5–1.3)
EOSINOPHIL # BLD AUTO: 0.07 K/UL — SIGNIFICANT CHANGE UP (ref 0–0.5)
EOSINOPHIL NFR BLD AUTO: 0.9 % — SIGNIFICANT CHANGE UP (ref 0–6)
GLUCOSE SERPL-MCNC: 75 MG/DL — SIGNIFICANT CHANGE UP (ref 70–99)
HCT VFR BLD CALC: 38.3 % — SIGNIFICANT CHANGE UP (ref 34.5–45)
HGB BLD-MCNC: 13.3 G/DL — SIGNIFICANT CHANGE UP (ref 11.5–15.5)
IMM GRANULOCYTES NFR BLD AUTO: 0.4 % — SIGNIFICANT CHANGE UP (ref 0–1.5)
LYMPHOCYTES # BLD AUTO: 1.99 K/UL — SIGNIFICANT CHANGE UP (ref 1–3.3)
LYMPHOCYTES # BLD AUTO: 26.6 % — SIGNIFICANT CHANGE UP (ref 13–44)
MCHC RBC-ENTMCNC: 27.9 PG — SIGNIFICANT CHANGE UP (ref 27–34)
MCHC RBC-ENTMCNC: 34.7 GM/DL — SIGNIFICANT CHANGE UP (ref 32–36)
MCV RBC AUTO: 80.3 FL — SIGNIFICANT CHANGE UP (ref 80–100)
MONOCYTES # BLD AUTO: 0.81 K/UL — SIGNIFICANT CHANGE UP (ref 0–0.9)
MONOCYTES NFR BLD AUTO: 10.8 % — SIGNIFICANT CHANGE UP (ref 2–14)
NEUTROPHILS # BLD AUTO: 4.53 K/UL — SIGNIFICANT CHANGE UP (ref 1.8–7.4)
NEUTROPHILS NFR BLD AUTO: 60.8 % — SIGNIFICANT CHANGE UP (ref 43–77)
NRBC # BLD: 0 /100 WBCS — SIGNIFICANT CHANGE UP (ref 0–0)
PLATELET # BLD AUTO: 342 K/UL — SIGNIFICANT CHANGE UP (ref 150–400)
POTASSIUM SERPL-MCNC: 4.3 MMOL/L — SIGNIFICANT CHANGE UP (ref 3.5–5.3)
POTASSIUM SERPL-SCNC: 4.3 MMOL/L — SIGNIFICANT CHANGE UP (ref 3.5–5.3)
RBC # BLD: 4.77 M/UL — SIGNIFICANT CHANGE UP (ref 3.8–5.2)
RBC # FLD: 13.2 % — SIGNIFICANT CHANGE UP (ref 10.3–14.5)
SODIUM SERPL-SCNC: 138 MMOL/L — SIGNIFICANT CHANGE UP (ref 135–145)
TYPE + AB SCN PNL BLD: SIGNIFICANT CHANGE UP
WBC # BLD: 7.47 K/UL — SIGNIFICANT CHANGE UP (ref 3.8–10.5)
WBC # FLD AUTO: 7.47 K/UL — SIGNIFICANT CHANGE UP (ref 3.8–10.5)

## 2018-12-14 PROCEDURE — 93010 ELECTROCARDIOGRAM REPORT: CPT

## 2018-12-14 NOTE — H&P PST ADULT - ASSESSMENT
26 y/o female with polycystic ovarian syndrome. Pt reports irregular menstrual bleeding and dysmenorrhea. Scheduled for Laparoscopy, ovarian drilling.     Plan  1. Stop all NSAIDS, herbal supplements and vitamins for 7 days.  2. NPO at midnight.  3. Take the following medications ( Duloxetine, clonidine, lisinopril ) with small sips of water on the morning of your procedure/surgery.  4. Use EZ sponges as directed  5. Labs, EKG done today. STAT urine pregnancy test on admission day.   6. PMD visit for optimization prior to surgery as per surgeon

## 2018-12-14 NOTE — H&P PST ADULT - HISTORY OF PRESENT ILLNESS
28 y/o female with polycystic ovarian syndrome. Pt reports irregular menstrual bleeding and dysmenorrhea. Takes Motrin with mild pain relief. Scheduled for Laparoscopy, ovarian drilling.

## 2018-12-14 NOTE — H&P PST ADULT - PMH
Anxiety    Cholecystitis  S/P cholycystectomy  Chronic back pain    Depression    GERD (gastroesophageal reflux disease)    HTN (hypertension)    Mixed connective tissue disease    Obesity    Polycystic ovarian syndrome Anxiety    Cholecystitis  S/P cholycystectomy  Chronic back pain    Depression    GERD (gastroesophageal reflux disease)    HTN (hypertension)    Mixed connective tissue disease    Nephrosclerosis    Obesity    Polycystic ovarian syndrome

## 2018-12-15 PROBLEM — K81.9 CHOLECYSTITIS, UNSPECIFIED: Chronic | Status: ACTIVE | Noted: 2017-02-07

## 2018-12-18 NOTE — CONSULT NOTE ADULT - PROVIDER SPECIALTY LIST ADULT
Infectious Disease Mason Baer MD, saw and evaluated the patient  I have discussed the patient with the resident/non-physician practitioner and agree with the resident's/non-physician practitioner's findings, Plan of Care, and MDM as documented in the resident's/non-physician practitioner's note, except where noted  All available labs and Radiology studies were reviewed  At this point I agree with the current assessment done in the Emergency Department  I have conducted an independent evaluation of this patient a history and physical is as follows:      Critical Care Time  CritCare Time    Procedures     C/o L eye redness laterally last week  3 days ago, he had L eye pain and decreased visual acuity    C/o intermittent R sided eye pain today  C/o headache for 3 days  No injury, no contacts, no history of eye problems  No h/o HTN - but hasn't been to a dr     L lateral sclera redness (mild), PERRL, fluorescene exam as per resident no temporal tenderness, RRR, CTA b/l, abd  -nontender, ext  - no edema    No retinal detachment on bedside US  Visual acuity as documented  Will check fluorescene exam and ocular pressures

## 2018-12-27 ENCOUNTER — RESULT REVIEW (OUTPATIENT)
Age: 28
End: 2018-12-27

## 2018-12-27 ENCOUNTER — OUTPATIENT (OUTPATIENT)
Dept: OUTPATIENT SERVICES | Facility: HOSPITAL | Age: 28
LOS: 1 days | Discharge: HOME | End: 2018-12-27
Payer: COMMERCIAL

## 2018-12-27 VITALS
HEIGHT: 67 IN | HEART RATE: 79 BPM | RESPIRATION RATE: 16 BRPM | WEIGHT: 240.08 LBS | TEMPERATURE: 98 F | DIASTOLIC BLOOD PRESSURE: 56 MMHG | SYSTOLIC BLOOD PRESSURE: 112 MMHG | OXYGEN SATURATION: 98 %

## 2018-12-27 DIAGNOSIS — Z98.890 OTHER SPECIFIED POSTPROCEDURAL STATES: Chronic | ICD-10-CM

## 2018-12-27 DIAGNOSIS — Z90.49 ACQUIRED ABSENCE OF OTHER SPECIFIED PARTS OF DIGESTIVE TRACT: Chronic | ICD-10-CM

## 2018-12-27 LAB — HCG UR QL: NEGATIVE — SIGNIFICANT CHANGE UP

## 2018-12-27 PROCEDURE — 88304 TISSUE EXAM BY PATHOLOGIST: CPT | Mod: 26

## 2018-12-27 PROCEDURE — 88104 CYTOPATH FL NONGYN SMEARS: CPT | Mod: 26

## 2018-12-27 PROCEDURE — 58661 LAPAROSCOPY REMOVE ADNEXA: CPT | Mod: AS

## 2018-12-27 PROCEDURE — 88305 TISSUE EXAM BY PATHOLOGIST: CPT | Mod: 26

## 2018-12-27 PROCEDURE — 58662 LAPAROSCOPY EXCISE LESIONS: CPT | Mod: AS,59

## 2018-12-27 PROCEDURE — 49322 LAPAROSCOPY ASPIRATION: CPT | Mod: AS,59

## 2018-12-27 RX ORDER — OXYCODONE AND ACETAMINOPHEN 5; 325 MG/1; MG/1
1 TABLET ORAL EVERY 4 HOURS
Qty: 0 | Refills: 0 | Status: DISCONTINUED | OUTPATIENT
Start: 2018-12-27 | End: 2018-12-28

## 2018-12-27 RX ORDER — METOCLOPRAMIDE HCL 10 MG
10 TABLET ORAL ONCE
Qty: 0 | Refills: 0 | Status: COMPLETED | OUTPATIENT
Start: 2018-12-27 | End: 2018-12-27

## 2018-12-27 RX ORDER — SODIUM CHLORIDE 9 MG/ML
1000 INJECTION, SOLUTION INTRAVENOUS
Qty: 0 | Refills: 0 | Status: DISCONTINUED | OUTPATIENT
Start: 2018-12-27 | End: 2018-12-27

## 2018-12-27 RX ORDER — LAMOTRIGINE 25 MG/1
1 TABLET, ORALLY DISINTEGRATING ORAL
Qty: 0 | Refills: 0 | COMMUNITY

## 2018-12-27 RX ORDER — GABAPENTIN 400 MG/1
600 CAPSULE ORAL
Qty: 0 | Refills: 0 | Status: DISCONTINUED | OUTPATIENT
Start: 2018-12-27 | End: 2018-12-28

## 2018-12-27 RX ORDER — ONDANSETRON 8 MG/1
4 TABLET, FILM COATED ORAL EVERY 6 HOURS
Qty: 0 | Refills: 0 | Status: DISCONTINUED | OUTPATIENT
Start: 2018-12-27 | End: 2018-12-28

## 2018-12-27 RX ORDER — ALPRAZOLAM 0.25 MG
1 TABLET ORAL THREE TIMES A DAY
Qty: 0 | Refills: 0 | Status: DISCONTINUED | OUTPATIENT
Start: 2018-12-27 | End: 2018-12-28

## 2018-12-27 RX ORDER — AMLODIPINE BESYLATE 2.5 MG/1
1 TABLET ORAL
Qty: 0 | Refills: 0 | COMMUNITY

## 2018-12-27 RX ORDER — CHOLECALCIFEROL (VITAMIN D3) 125 MCG
1 CAPSULE ORAL
Qty: 0 | Refills: 0 | COMMUNITY

## 2018-12-27 RX ORDER — HYDROMORPHONE HYDROCHLORIDE 2 MG/ML
1 INJECTION INTRAMUSCULAR; INTRAVENOUS; SUBCUTANEOUS EVERY 4 HOURS
Qty: 0 | Refills: 0 | Status: DISCONTINUED | OUTPATIENT
Start: 2018-12-27 | End: 2018-12-28

## 2018-12-27 RX ORDER — ZOLPIDEM TARTRATE 10 MG/1
10 TABLET ORAL AT BEDTIME
Qty: 0 | Refills: 0 | Status: DISCONTINUED | OUTPATIENT
Start: 2018-12-27 | End: 2018-12-28

## 2018-12-27 RX ORDER — SACCHAROMYCES BOULARDII 250 MG
250 POWDER IN PACKET (EA) ORAL
Qty: 0 | Refills: 0 | Status: DISCONTINUED | OUTPATIENT
Start: 2018-12-27 | End: 2018-12-28

## 2018-12-27 RX ORDER — LISINOPRIL 2.5 MG/1
1 TABLET ORAL
Qty: 0 | Refills: 0 | COMMUNITY

## 2018-12-27 RX ORDER — MEPERIDINE HYDROCHLORIDE 50 MG/ML
12.5 INJECTION INTRAMUSCULAR; INTRAVENOUS; SUBCUTANEOUS
Qty: 0 | Refills: 0 | Status: DISCONTINUED | OUTPATIENT
Start: 2018-12-27 | End: 2018-12-27

## 2018-12-27 RX ORDER — IBUPROFEN 200 MG
600 TABLET ORAL EVERY 6 HOURS
Qty: 0 | Refills: 0 | Status: DISCONTINUED | OUTPATIENT
Start: 2018-12-27 | End: 2018-12-28

## 2018-12-27 RX ORDER — OXYCODONE HYDROCHLORIDE 5 MG/1
5 TABLET ORAL ONCE
Qty: 0 | Refills: 0 | Status: DISCONTINUED | OUTPATIENT
Start: 2018-12-27 | End: 2018-12-27

## 2018-12-27 RX ORDER — FAMOTIDINE 10 MG/ML
20 INJECTION INTRAVENOUS ONCE
Qty: 0 | Refills: 0 | Status: COMPLETED | OUTPATIENT
Start: 2018-12-27 | End: 2018-12-27

## 2018-12-27 RX ORDER — SODIUM CHLORIDE 9 MG/ML
3 INJECTION INTRAMUSCULAR; INTRAVENOUS; SUBCUTANEOUS EVERY 8 HOURS
Qty: 0 | Refills: 0 | Status: DISCONTINUED | OUTPATIENT
Start: 2018-12-27 | End: 2018-12-27

## 2018-12-27 RX ORDER — OXYCODONE AND ACETAMINOPHEN 5; 325 MG/1; MG/1
2 TABLET ORAL EVERY 4 HOURS
Qty: 0 | Refills: 0 | Status: DISCONTINUED | OUTPATIENT
Start: 2018-12-27 | End: 2018-12-28

## 2018-12-27 RX ORDER — METFORMIN HYDROCHLORIDE 850 MG/1
1 TABLET ORAL
Qty: 0 | Refills: 0 | COMMUNITY

## 2018-12-27 RX ORDER — ACETAMINOPHEN 500 MG
975 TABLET ORAL ONCE
Qty: 0 | Refills: 0 | Status: COMPLETED | OUTPATIENT
Start: 2018-12-27 | End: 2018-12-27

## 2018-12-27 RX ORDER — AMLODIPINE BESYLATE 2.5 MG/1
2.5 TABLET ORAL DAILY
Qty: 0 | Refills: 0 | Status: DISCONTINUED | OUTPATIENT
Start: 2018-12-27 | End: 2018-12-28

## 2018-12-27 RX ORDER — DULOXETINE HYDROCHLORIDE 30 MG/1
1 CAPSULE, DELAYED RELEASE ORAL
Qty: 0 | Refills: 0 | COMMUNITY

## 2018-12-27 RX ORDER — ONDANSETRON 8 MG/1
4 TABLET, FILM COATED ORAL ONCE
Qty: 0 | Refills: 0 | Status: DISCONTINUED | OUTPATIENT
Start: 2018-12-27 | End: 2018-12-27

## 2018-12-27 RX ORDER — SACCHAROMYCES BOULARDII 250 MG
2 POWDER IN PACKET (EA) ORAL
Qty: 0 | Refills: 0 | COMMUNITY

## 2018-12-27 RX ORDER — OXYCODONE HYDROCHLORIDE 5 MG/1
10 TABLET ORAL ONCE
Qty: 0 | Refills: 0 | Status: DISCONTINUED | OUTPATIENT
Start: 2018-12-27 | End: 2018-12-27

## 2018-12-27 RX ORDER — METFORMIN HYDROCHLORIDE 850 MG/1
1000 TABLET ORAL DAILY
Qty: 0 | Refills: 0 | Status: DISCONTINUED | OUTPATIENT
Start: 2018-12-27 | End: 2018-12-28

## 2018-12-27 RX ORDER — ZOLPIDEM TARTRATE 10 MG/1
1 TABLET ORAL
Qty: 0 | Refills: 0 | COMMUNITY

## 2018-12-27 RX ORDER — ASCORBIC ACID 60 MG
1 TABLET,CHEWABLE ORAL
Qty: 0 | Refills: 0 | COMMUNITY

## 2018-12-27 RX ORDER — LISINOPRIL 2.5 MG/1
20 TABLET ORAL EVERY 12 HOURS
Qty: 0 | Refills: 0 | Status: DISCONTINUED | OUTPATIENT
Start: 2018-12-27 | End: 2018-12-28

## 2018-12-27 RX ORDER — LAMOTRIGINE 25 MG/1
200 TABLET, ORALLY DISINTEGRATING ORAL DAILY
Qty: 0 | Refills: 0 | Status: DISCONTINUED | OUTPATIENT
Start: 2018-12-27 | End: 2018-12-28

## 2018-12-27 RX ORDER — SODIUM CHLORIDE 9 MG/ML
1000 INJECTION, SOLUTION INTRAVENOUS
Qty: 0 | Refills: 0 | Status: DISCONTINUED | OUTPATIENT
Start: 2018-12-27 | End: 2018-12-28

## 2018-12-27 RX ORDER — CYCLOBENZAPRINE HYDROCHLORIDE 10 MG/1
10 TABLET, FILM COATED ORAL AT BEDTIME
Qty: 0 | Refills: 0 | Status: DISCONTINUED | OUTPATIENT
Start: 2018-12-27 | End: 2018-12-28

## 2018-12-27 RX ORDER — HYDROMORPHONE HYDROCHLORIDE 2 MG/ML
1 INJECTION INTRAMUSCULAR; INTRAVENOUS; SUBCUTANEOUS
Qty: 0 | Refills: 0 | Status: DISCONTINUED | OUTPATIENT
Start: 2018-12-27 | End: 2018-12-27

## 2018-12-27 RX ORDER — DULOXETINE HYDROCHLORIDE 30 MG/1
60 CAPSULE, DELAYED RELEASE ORAL DAILY
Qty: 0 | Refills: 0 | Status: DISCONTINUED | OUTPATIENT
Start: 2018-12-27 | End: 2018-12-28

## 2018-12-27 RX ORDER — TIZANIDINE 4 MG/1
1 TABLET ORAL
Qty: 0 | Refills: 0 | COMMUNITY

## 2018-12-27 RX ADMIN — OXYCODONE HYDROCHLORIDE 10 MILLIGRAM(S): 5 TABLET ORAL at 12:23

## 2018-12-27 RX ADMIN — OXYCODONE AND ACETAMINOPHEN 2 TABLET(S): 5; 325 TABLET ORAL at 16:20

## 2018-12-27 RX ADMIN — OXYCODONE AND ACETAMINOPHEN 2 TABLET(S): 5; 325 TABLET ORAL at 22:12

## 2018-12-27 RX ADMIN — Medication 975 MILLIGRAM(S): at 12:23

## 2018-12-27 RX ADMIN — HYDROMORPHONE HYDROCHLORIDE 1 MILLIGRAM(S): 2 INJECTION INTRAMUSCULAR; INTRAVENOUS; SUBCUTANEOUS at 16:03

## 2018-12-27 RX ADMIN — OXYCODONE AND ACETAMINOPHEN 2 TABLET(S): 5; 325 TABLET ORAL at 16:21

## 2018-12-27 RX ADMIN — SODIUM CHLORIDE 100 MILLILITER(S): 9 INJECTION, SOLUTION INTRAVENOUS at 16:10

## 2018-12-27 RX ADMIN — FAMOTIDINE 20 MILLIGRAM(S): 10 INJECTION INTRAVENOUS at 12:23

## 2018-12-27 RX ADMIN — SODIUM CHLORIDE 75 MILLILITER(S): 9 INJECTION, SOLUTION INTRAVENOUS at 19:26

## 2018-12-27 RX ADMIN — HYDROMORPHONE HYDROCHLORIDE 1 MILLIGRAM(S): 2 INJECTION INTRAMUSCULAR; INTRAVENOUS; SUBCUTANEOUS at 19:39

## 2018-12-27 RX ADMIN — GABAPENTIN 600 MILLIGRAM(S): 400 CAPSULE ORAL at 19:25

## 2018-12-27 RX ADMIN — LAMOTRIGINE 200 MILLIGRAM(S): 25 TABLET, ORALLY DISINTEGRATING ORAL at 19:25

## 2018-12-27 RX ADMIN — LISINOPRIL 20 MILLIGRAM(S): 2.5 TABLET ORAL at 19:25

## 2018-12-27 RX ADMIN — HYDROMORPHONE HYDROCHLORIDE 1 MILLIGRAM(S): 2 INJECTION INTRAMUSCULAR; INTRAVENOUS; SUBCUTANEOUS at 23:30

## 2018-12-27 RX ADMIN — Medication 600 MILLIGRAM(S): at 19:25

## 2018-12-27 RX ADMIN — AMLODIPINE BESYLATE 2.5 MILLIGRAM(S): 2.5 TABLET ORAL at 19:25

## 2018-12-27 RX ADMIN — CYCLOBENZAPRINE HYDROCHLORIDE 10 MILLIGRAM(S): 10 TABLET, FILM COATED ORAL at 21:06

## 2018-12-27 RX ADMIN — HYDROMORPHONE HYDROCHLORIDE 1 MILLIGRAM(S): 2 INJECTION INTRAMUSCULAR; INTRAVENOUS; SUBCUTANEOUS at 16:10

## 2018-12-27 RX ADMIN — HYDROMORPHONE HYDROCHLORIDE 1 MILLIGRAM(S): 2 INJECTION INTRAMUSCULAR; INTRAVENOUS; SUBCUTANEOUS at 23:46

## 2018-12-27 RX ADMIN — Medication 0.1 MILLIGRAM(S): at 21:06

## 2018-12-27 RX ADMIN — Medication 1 MILLIGRAM(S): at 21:10

## 2018-12-27 RX ADMIN — Medication 600 MILLIGRAM(S): at 19:39

## 2018-12-27 RX ADMIN — Medication 600 MILLIGRAM(S): at 23:31

## 2018-12-27 RX ADMIN — Medication 250 MILLIGRAM(S): at 19:24

## 2018-12-27 RX ADMIN — Medication 10 MILLIGRAM(S): at 12:22

## 2018-12-27 RX ADMIN — HYDROMORPHONE HYDROCHLORIDE 1 MILLIGRAM(S): 2 INJECTION INTRAMUSCULAR; INTRAVENOUS; SUBCUTANEOUS at 16:00

## 2018-12-27 RX ADMIN — OXYCODONE AND ACETAMINOPHEN 2 TABLET(S): 5; 325 TABLET ORAL at 22:43

## 2018-12-27 RX ADMIN — HYDROMORPHONE HYDROCHLORIDE 1 MILLIGRAM(S): 2 INJECTION INTRAMUSCULAR; INTRAVENOUS; SUBCUTANEOUS at 19:34

## 2018-12-27 NOTE — ASU DISCHARGE PLAN (ADULT/PEDIATRIC). - ACTIVITY LEVEL
weight bearing as tolerated/no douching/no intercourse/vaginal restrictions only for one week/exercise/no tampons

## 2018-12-27 NOTE — ASU PATIENT PROFILE, ADULT - PMH
Anxiety    Cholecystitis  S/P cholycystectomy  Chronic back pain    Depression    GERD (gastroesophageal reflux disease)    HTN (hypertension)    Mixed connective tissue disease    Nephrosclerosis    Obesity    Polycystic ovarian syndrome

## 2018-12-27 NOTE — BRIEF OPERATIVE NOTE - PRE-OP DX
Ovarian cyst  12/27/2018  bilateral  Active  Live Bella  Polycystic ovarian syndrome  12/27/2018    Active  Live Bella

## 2018-12-27 NOTE — ASU DISCHARGE PLAN (ADULT/PEDIATRIC). - NOTIFY
heavy vaginal bleeding/Unable to Urinate/Pain not relieved by Medications/Persistent Nausea and Vomiting/Fever greater than 101

## 2018-12-27 NOTE — BRIEF OPERATIVE NOTE - PROCEDURE
<<-----Click on this checkbox to enter Procedure Exam under anesthesia, gynecologic  12/27/2018    Active  FABIOLA  Laparoscopy, diagnostic, single incision  12/27/2018    Active  FABIOLA  Laparoscopic bilateral ovarian cystectomy  12/27/2018  LESS  Active  FABIOLA  Ovarian drilling  12/27/2018  LESS  Active  FABIOLA Lysis of adhesions  12/27/2018  LESS  Active  FABIOLA  Right salpingectomy  12/27/2018  LESS  Active  FABIOLA  Laparoscopic bilateral ovarian cystectomy  12/27/2018  LESS  Active  Live Bella  Laparoscopy, diagnostic, single incision  12/27/2018    Active  Live Bella  Ovarian drilling  12/27/2018  LESS  Active  Live Bella  Exam under anesthesia, gynecologic  12/27/2018    Active  Live Bella

## 2018-12-27 NOTE — BRIEF OPERATIVE NOTE - POST-OP DX
Ovarian cyst  12/27/2018    Active  Live Bella  Polycystic ovarian syndrome  12/27/2018    Active  Live Bella Abdominal adhesions  12/27/2018    Active  Live Bella  Ovarian cyst  12/27/2018    Active  Live Bella  Polycystic ovarian syndrome  12/27/2018    Active  Live Bella

## 2018-12-28 VITALS
DIASTOLIC BLOOD PRESSURE: 55 MMHG | SYSTOLIC BLOOD PRESSURE: 105 MMHG | TEMPERATURE: 98 F | HEART RATE: 78 BPM | RESPIRATION RATE: 18 BRPM | OXYGEN SATURATION: 97 %

## 2018-12-28 RX ADMIN — HYDROMORPHONE HYDROCHLORIDE 1 MILLIGRAM(S): 2 INJECTION INTRAMUSCULAR; INTRAVENOUS; SUBCUTANEOUS at 07:10

## 2018-12-28 RX ADMIN — Medication 600 MILLIGRAM(S): at 04:10

## 2018-12-28 RX ADMIN — Medication 0.1 MILLIGRAM(S): at 04:11

## 2018-12-28 RX ADMIN — LISINOPRIL 20 MILLIGRAM(S): 2.5 TABLET ORAL at 04:10

## 2018-12-28 RX ADMIN — OXYCODONE AND ACETAMINOPHEN 2 TABLET(S): 5; 325 TABLET ORAL at 11:21

## 2018-12-28 RX ADMIN — Medication 600 MILLIGRAM(S): at 11:21

## 2018-12-28 RX ADMIN — OXYCODONE AND ACETAMINOPHEN 2 TABLET(S): 5; 325 TABLET ORAL at 04:03

## 2018-12-28 RX ADMIN — AMLODIPINE BESYLATE 2.5 MILLIGRAM(S): 2.5 TABLET ORAL at 04:10

## 2018-12-28 RX ADMIN — Medication 600 MILLIGRAM(S): at 00:03

## 2018-12-28 RX ADMIN — Medication 600 MILLIGRAM(S): at 04:34

## 2018-12-28 RX ADMIN — Medication 250 MILLIGRAM(S): at 04:10

## 2018-12-28 RX ADMIN — HYDROMORPHONE HYDROCHLORIDE 1 MILLIGRAM(S): 2 INJECTION INTRAMUSCULAR; INTRAVENOUS; SUBCUTANEOUS at 06:55

## 2018-12-28 RX ADMIN — DULOXETINE HYDROCHLORIDE 60 MILLIGRAM(S): 30 CAPSULE, DELAYED RELEASE ORAL at 09:11

## 2018-12-28 RX ADMIN — METFORMIN HYDROCHLORIDE 1000 MILLIGRAM(S): 850 TABLET ORAL at 09:11

## 2018-12-28 RX ADMIN — OXYCODONE AND ACETAMINOPHEN 2 TABLET(S): 5; 325 TABLET ORAL at 04:34

## 2018-12-28 RX ADMIN — GABAPENTIN 600 MILLIGRAM(S): 400 CAPSULE ORAL at 04:10

## 2018-12-28 RX ADMIN — Medication 1 MILLIGRAM(S): at 09:11

## 2018-12-28 NOTE — PROGRESS NOTE ADULT - SUBJECTIVE AND OBJECTIVE BOX
Postoperative day: 1  surgery: LESS ovarian cystectomy  patient resting comfortably  complaints: mild tingline in fingertips  OR findings and course d/w patient in detail -- all questions answered  nursing input solicited  Focused ROS: negative    Physical exam:    Vital Signs Last 24 Hrs  T(C): 36.7 (28 Dec 2018 11:16), Max: 37.6 (27 Dec 2018 15:53)  T(F): 98 (28 Dec 2018 11:16), Max: 99.7 (27 Dec 2018 15:53)  HR: 78 (28 Dec 2018 11:16) (78 - 106)  BP: 105/55 (28 Dec 2018 11:16) (105/55 - 143/74)  BP(mean): --  RR: 18 (28 Dec 2018 11:16) (16 - 18)  SpO2: 97% (28 Dec 2018 11:16) (95% - 100%)      Abdomen: Soft,  appropriately tender, non-distended  Incision is clean dry and intact  Vagina: minimal bleeding  Ext: lower extremities symmetric and without calf tenderness    LABS:            Allergies    No Known Drug Allergies  peanuts (Anaphylaxis)  Tree Nuts (Anaphylaxis)    Intolerances    morphine (Pruritus)        Assessment and Plan  Doing well.  Tolerating regular diet.  Routine post op care.  Plan discharge home  --- routine restrictions  patient given written and verbal discharge instructions

## 2018-12-31 LAB — SURGICAL PATHOLOGY FINAL REPORT - CH: SIGNIFICANT CHANGE UP

## 2019-01-02 DIAGNOSIS — K21.9 GASTRO-ESOPHAGEAL REFLUX DISEASE WITHOUT ESOPHAGITIS: ICD-10-CM

## 2019-01-02 DIAGNOSIS — N83.8 OTHER NONINFLAMMATORY DISORDERS OF OVARY, FALLOPIAN TUBE AND BROAD LIGAMENT: ICD-10-CM

## 2019-01-02 DIAGNOSIS — E66.9 OBESITY, UNSPECIFIED: ICD-10-CM

## 2019-01-02 DIAGNOSIS — N92.6 IRREGULAR MENSTRUATION, UNSPECIFIED: ICD-10-CM

## 2019-01-02 DIAGNOSIS — Z91.010 ALLERGY TO PEANUTS: ICD-10-CM

## 2019-01-02 DIAGNOSIS — F41.9 ANXIETY DISORDER, UNSPECIFIED: ICD-10-CM

## 2019-01-02 DIAGNOSIS — Z90.49 ACQUIRED ABSENCE OF OTHER SPECIFIED PARTS OF DIGESTIVE TRACT: ICD-10-CM

## 2019-01-02 DIAGNOSIS — E28.2 POLYCYSTIC OVARIAN SYNDROME: ICD-10-CM

## 2019-01-02 DIAGNOSIS — N83.202 UNSPECIFIED OVARIAN CYST, LEFT SIDE: ICD-10-CM

## 2019-01-02 DIAGNOSIS — N70.11 CHRONIC SALPINGITIS: ICD-10-CM

## 2019-01-02 DIAGNOSIS — M54.5 LOW BACK PAIN: ICD-10-CM

## 2019-01-02 DIAGNOSIS — Z88.5 ALLERGY STATUS TO NARCOTIC AGENT: ICD-10-CM

## 2019-01-02 DIAGNOSIS — E11.9 TYPE 2 DIABETES MELLITUS WITHOUT COMPLICATIONS: ICD-10-CM

## 2019-01-02 DIAGNOSIS — N85.4 MALPOSITION OF UTERUS: ICD-10-CM

## 2019-01-02 DIAGNOSIS — Z91.018 ALLERGY TO OTHER FOODS: ICD-10-CM

## 2019-01-02 DIAGNOSIS — K66.0 PERITONEAL ADHESIONS (POSTPROCEDURAL) (POSTINFECTION): ICD-10-CM

## 2019-01-02 DIAGNOSIS — I10 ESSENTIAL (PRIMARY) HYPERTENSION: ICD-10-CM

## 2019-01-02 DIAGNOSIS — G43.909 MIGRAINE, UNSPECIFIED, NOT INTRACTABLE, WITHOUT STATUS MIGRAINOSUS: ICD-10-CM

## 2019-01-02 DIAGNOSIS — F32.9 MAJOR DEPRESSIVE DISORDER, SINGLE EPISODE, UNSPECIFIED: ICD-10-CM

## 2019-01-02 DIAGNOSIS — N94.6 DYSMENORRHEA, UNSPECIFIED: ICD-10-CM

## 2019-01-02 DIAGNOSIS — N83.201 UNSPECIFIED OVARIAN CYST, RIGHT SIDE: ICD-10-CM

## 2019-01-02 DIAGNOSIS — G89.29 OTHER CHRONIC PAIN: ICD-10-CM

## 2019-01-02 DIAGNOSIS — Z79.84 LONG TERM (CURRENT) USE OF ORAL HYPOGLYCEMIC DRUGS: ICD-10-CM

## 2019-01-02 DIAGNOSIS — N26.9 RENAL SCLEROSIS, UNSPECIFIED: ICD-10-CM

## 2019-01-02 DIAGNOSIS — F90.9 ATTENTION-DEFICIT HYPERACTIVITY DISORDER, UNSPECIFIED TYPE: ICD-10-CM

## 2019-01-15 NOTE — PROGRESS NOTE BEHAVIORAL HEALTH - NSBHFUPSUICACTIVE_PSY_A_CORE
Referral order placed.     Encounter routed to [facility] to schedule follow up skin exam. none known

## 2019-03-14 NOTE — PROGRESS NOTE BEHAVIORAL HEALTH - SUMMARY
25 yo woman with history of depression and past suicide attempts/ gestures presents s/p overdose in light of stress of potential job loss  patient initially reluctant but now agreeing for longer stay and to try Cymbalta as opposed to Celexa which has not been helpful even in higher dose in the past.    Patient is improving  Planning for potential discharge on 12/11.
Please send Suprep  to pharmacy on file.  
25 yo woman with history of depression and past suicide attempts/ gestures presents s/p overdose in light of stress of potential job loss  patient initially reluctant but now agreeing for longer stay and to try Cymbalta as opposed to Celexa which has not been helpful even in higher dose in the past.    Patient is improving  Planning for potential discharge on 12/11.
27 yo woman with history of depression and past suicide attempts/ gestures presents s/p overdose in light of stress of potential job loss  patient initially reluctant but now agreeing for longer stay and to try Cymbalta as opposed to Celexa which has not been helpful even in higher dose in the past.    Patient is improving  Planning for potential discharge on 12/11.
25 yo woman with history of depression and past suicide attempts/ gestures presents s/p overdose in light of stress of potential job loss  patient initially reluctant but now agreeing for longer stay and to try Cymbalta as opposed to Celexa which has not been helpful even in higher dose in the past.    Patient is improving  Planning for potential discharge on 12/11.
27 yo woman with history of depression and past suicide attempts/ gestures presents s/p overdose in light of stress of potential job loss  patient initially reluctant but now agreeing for longer stay and to try Cymbalta as opposed to Celexa which has not been helpful even in higher dose in the past.

## 2019-04-24 ENCOUNTER — INPATIENT (INPATIENT)
Facility: HOSPITAL | Age: 29
LOS: 8 days | End: 2019-05-03
Attending: PSYCHIATRY & NEUROLOGY | Admitting: PSYCHIATRY & NEUROLOGY
Payer: COMMERCIAL

## 2019-04-24 VITALS
WEIGHT: 179.9 LBS | SYSTOLIC BLOOD PRESSURE: 151 MMHG | HEART RATE: 130 BPM | TEMPERATURE: 98 F | OXYGEN SATURATION: 100 % | DIASTOLIC BLOOD PRESSURE: 36 MMHG | RESPIRATION RATE: 18 BRPM

## 2019-04-24 DIAGNOSIS — Z98.890 OTHER SPECIFIED POSTPROCEDURAL STATES: Chronic | ICD-10-CM

## 2019-04-24 DIAGNOSIS — Z90.49 ACQUIRED ABSENCE OF OTHER SPECIFIED PARTS OF DIGESTIVE TRACT: Chronic | ICD-10-CM

## 2019-04-24 LAB
ALBUMIN SERPL ELPH-MCNC: 3.4 G/DL — SIGNIFICANT CHANGE UP (ref 3.3–5)
ALP SERPL-CCNC: 94 U/L — SIGNIFICANT CHANGE UP (ref 40–120)
ALT FLD-CCNC: 112 U/L — HIGH (ref 12–78)
AMPHET UR-MCNC: POSITIVE — SIGNIFICANT CHANGE UP
ANION GAP SERPL CALC-SCNC: 9 MMOL/L — SIGNIFICANT CHANGE UP (ref 5–17)
APAP SERPL-MCNC: < 2 UG/ML (ref 10–30)
APPEARANCE UR: CLEAR — SIGNIFICANT CHANGE UP
APTT BLD: 32.2 SEC — SIGNIFICANT CHANGE UP (ref 27.5–36.3)
AST SERPL-CCNC: 133 U/L — HIGH (ref 15–37)
BACTERIA # UR AUTO: ABNORMAL
BARBITURATES UR SCN-MCNC: NEGATIVE — SIGNIFICANT CHANGE UP
BASOPHILS # BLD AUTO: 0.06 K/UL — SIGNIFICANT CHANGE UP (ref 0–0.2)
BASOPHILS NFR BLD AUTO: 0.5 % — SIGNIFICANT CHANGE UP (ref 0–2)
BENZODIAZ UR-MCNC: NEGATIVE — SIGNIFICANT CHANGE UP
BILIRUB SERPL-MCNC: 0.5 MG/DL — SIGNIFICANT CHANGE UP (ref 0.2–1.2)
BILIRUB UR-MCNC: NEGATIVE — SIGNIFICANT CHANGE UP
BUN SERPL-MCNC: 12 MG/DL — SIGNIFICANT CHANGE UP (ref 7–23)
CALCIUM SERPL-MCNC: 9.2 MG/DL — SIGNIFICANT CHANGE UP (ref 8.5–10.1)
CHLORIDE SERPL-SCNC: 104 MMOL/L — SIGNIFICANT CHANGE UP (ref 96–108)
CO2 SERPL-SCNC: 24 MMOL/L — SIGNIFICANT CHANGE UP (ref 22–31)
COCAINE METAB.OTHER UR-MCNC: NEGATIVE — SIGNIFICANT CHANGE UP
COLOR SPEC: YELLOW — SIGNIFICANT CHANGE UP
COMMENT - URINE: SIGNIFICANT CHANGE UP
CREAT SERPL-MCNC: 0.82 MG/DL — SIGNIFICANT CHANGE UP (ref 0.5–1.3)
DIFF PNL FLD: ABNORMAL
EOSINOPHIL # BLD AUTO: 0.04 K/UL — SIGNIFICANT CHANGE UP (ref 0–0.5)
EOSINOPHIL NFR BLD AUTO: 0.3 % — SIGNIFICANT CHANGE UP (ref 0–6)
EPI CELLS # UR: ABNORMAL
ETHANOL SERPL-MCNC: <10 MG/DL — SIGNIFICANT CHANGE UP (ref 0–10)
GLUCOSE SERPL-MCNC: 91 MG/DL — SIGNIFICANT CHANGE UP (ref 70–99)
GLUCOSE UR QL: NEGATIVE MG/DL — SIGNIFICANT CHANGE UP
HCT VFR BLD CALC: 38.8 % — SIGNIFICANT CHANGE UP (ref 34.5–45)
HGB BLD-MCNC: 13.5 G/DL — SIGNIFICANT CHANGE UP (ref 11.5–15.5)
IMM GRANULOCYTES NFR BLD AUTO: 0.4 % — SIGNIFICANT CHANGE UP (ref 0–1.5)
INR BLD: 1.17 RATIO — HIGH (ref 0.88–1.16)
KETONES UR-MCNC: ABNORMAL
LEUKOCYTE ESTERASE UR-ACNC: NEGATIVE — SIGNIFICANT CHANGE UP
LYMPHOCYTES # BLD AUTO: 2.87 K/UL — SIGNIFICANT CHANGE UP (ref 1–3.3)
LYMPHOCYTES # BLD AUTO: 23 % — SIGNIFICANT CHANGE UP (ref 13–44)
MCHC RBC-ENTMCNC: 27.7 PG — SIGNIFICANT CHANGE UP (ref 27–34)
MCHC RBC-ENTMCNC: 34.8 GM/DL — SIGNIFICANT CHANGE UP (ref 32–36)
MCV RBC AUTO: 79.7 FL — LOW (ref 80–100)
METHADONE UR-MCNC: NEGATIVE — SIGNIFICANT CHANGE UP
MONOCYTES # BLD AUTO: 0.82 K/UL — SIGNIFICANT CHANGE UP (ref 0–0.9)
MONOCYTES NFR BLD AUTO: 6.6 % — SIGNIFICANT CHANGE UP (ref 2–14)
NEUTROPHILS # BLD AUTO: 8.65 K/UL — HIGH (ref 1.8–7.4)
NEUTROPHILS NFR BLD AUTO: 69.2 % — SIGNIFICANT CHANGE UP (ref 43–77)
NITRITE UR-MCNC: NEGATIVE — SIGNIFICANT CHANGE UP
NRBC # BLD: 0 /100 WBCS — SIGNIFICANT CHANGE UP (ref 0–0)
OPIATES UR-MCNC: POSITIVE — SIGNIFICANT CHANGE UP
PCP SPEC-MCNC: SIGNIFICANT CHANGE UP
PCP UR-MCNC: NEGATIVE — SIGNIFICANT CHANGE UP
PH UR: 6 — SIGNIFICANT CHANGE UP (ref 5–8)
PLATELET # BLD AUTO: 348 K/UL — SIGNIFICANT CHANGE UP (ref 150–400)
POTASSIUM SERPL-MCNC: 3.9 MMOL/L — SIGNIFICANT CHANGE UP (ref 3.5–5.3)
POTASSIUM SERPL-SCNC: 3.9 MMOL/L — SIGNIFICANT CHANGE UP (ref 3.5–5.3)
PROT SERPL-MCNC: 8.4 GM/DL — HIGH (ref 6–8.3)
PROT UR-MCNC: 100 MG/DL
PROTHROM AB SERPL-ACNC: 13.1 SEC — HIGH (ref 10–12.9)
RBC # BLD: 4.87 M/UL — SIGNIFICANT CHANGE UP (ref 3.8–5.2)
RBC # FLD: 13.3 % — SIGNIFICANT CHANGE UP (ref 10.3–14.5)
RBC CASTS # UR COMP ASSIST: ABNORMAL /HPF (ref 0–4)
SALICYLATES SERPL-MCNC: <1.7 MG/DL (ref 2.8–20)
SODIUM SERPL-SCNC: 137 MMOL/L — SIGNIFICANT CHANGE UP (ref 135–145)
SP GR SPEC: 1.02 — SIGNIFICANT CHANGE UP (ref 1.01–1.02)
THC UR QL: NEGATIVE — SIGNIFICANT CHANGE UP
UROBILINOGEN FLD QL: NEGATIVE MG/DL — SIGNIFICANT CHANGE UP
WBC # BLD: 12.49 K/UL — HIGH (ref 3.8–10.5)
WBC # FLD AUTO: 12.49 K/UL — HIGH (ref 3.8–10.5)
WBC UR QL: SIGNIFICANT CHANGE UP

## 2019-04-24 PROCEDURE — 90792 PSYCH DIAG EVAL W/MED SRVCS: CPT | Mod: GT

## 2019-04-24 PROCEDURE — G0427: CPT | Mod: GT

## 2019-04-24 PROCEDURE — 93010 ELECTROCARDIOGRAM REPORT: CPT

## 2019-04-24 RX ORDER — ACTIVATED CHARCOAL 208 MG/ML
50 SUSPENSION ORAL ONCE
Qty: 0 | Refills: 0 | Status: COMPLETED | OUTPATIENT
Start: 2019-04-24 | End: 2019-04-24

## 2019-04-24 RX ORDER — SODIUM CHLORIDE 9 MG/ML
2000 INJECTION INTRAMUSCULAR; INTRAVENOUS; SUBCUTANEOUS ONCE
Qty: 0 | Refills: 0 | Status: COMPLETED | OUTPATIENT
Start: 2019-04-24 | End: 2019-04-24

## 2019-04-24 RX ADMIN — SODIUM CHLORIDE 2000 MILLILITER(S): 9 INJECTION INTRAMUSCULAR; INTRAVENOUS; SUBCUTANEOUS at 19:01

## 2019-04-24 RX ADMIN — SODIUM CHLORIDE 2000 MILLILITER(S): 9 INJECTION INTRAMUSCULAR; INTRAVENOUS; SUBCUTANEOUS at 18:01

## 2019-04-24 RX ADMIN — ACTIVATED CHARCOAL 50 GRAM(S): 208 SUSPENSION ORAL at 18:01

## 2019-04-24 NOTE — ED BEHAVIORAL HEALTH ASSESSMENT NOTE - CURRENT MEDICATION
Pt reporting medications which are not listed in her d/c summary from [facility] and should therefore be confirmed.     Per d/c summary: Neurontin 600mg HS (pt states BID), Cymbalta 60mg AM and 30mg HS, Flexeril 10mg TID (pt reports using Zanaflex instead of Flexaril), Lamictal 200mg HS, Tramadol 50mg Q24H PRN for pain 7/10 (pt didn't include), Percocet 5-325mg TID and Q24H PRN for pain 10/10 (pt reports using 10-325mg up to 4 times per day), Remeron 30mg HS (pt reports taking 7.5mg), Trazodone 100mg HS (pt didn't include), Klonopin 0.5mg Q12H PRN for anxiety.    Pt also listed: Clonidine 0.1mg HS, Adderall XR 25mg AM (on work days), Lisinopril 20mg BID, Metformin 1000mg AM, and Amlodipine 2.5mg Q8P. Pt reporting medications which are not listed in her d/c summary from [facility] and should therefore be confirmed (also ISTOP reveals concern for polypharmacy/possible doctor shopping, pt with reported hx drug-seeking behavior thus reliability is limited).     Per d/c summary: Neurontin 600mg HS (pt states BID), Cymbalta 60mg AM and 30mg HS, Flexeril 10mg TID (pt reports using Zanaflex instead of Flexaril), Lamictal 200mg HS, Tramadol 50mg Q24H PRN for pain 7/10 (pt didn't include), Percocet 5-325mg TID and Q24H PRN for pain 10/10 (pt reports using 10-325mg up to 4 times per day), Remeron 30mg HS (pt reports taking 7.5mg), Trazodone 100mg HS (pt didn't include), Klonopin 0.5mg Q12H PRN for anxiety.    Pt also listed: Clonidine 0.1mg HS, Adderall XR 25mg AM (on work days), Lisinopril 20mg BID, Metformin 1000mg AM, and Amlodipine 2.5mg Q8P.

## 2019-04-24 NOTE — ED BEHAVIORAL HEALTH NOTE - BEHAVIORAL HEALTH NOTE
ISTOP Reference #: [id number]     04/17/2019 04/17/2019 clonazepam 0.5 mg tablet  14 7 Eligio Foley MD     04/11/2019 04/11/2019 clonazepam 0.5 mg tablet  14 7 Corinne Fuer     04/08/2019 04/08/2019 hydrocodone-acetaminophen 5-325 mg tablet  90 30 Eduin Scott D, MD     03/22/2019 04/05/2019 tramadol hcl 50 mg tablet  30 15 JassonashlynLayton     04/05/2019 04/05/2019 clonazepam 0.5 mg tablet  14 7 Erich Bradfordhash     04/05/2019 04/05/2019 hydrocodone-acetaminophen 5-325 mg tablet  9 3 Eduin Scott D, MD     03/17/2019 03/17/2019 alprazolam 1 mg tablet  21 7 Sb Ha MD     03/15/2019 03/16/2019 zolpidem tartrate 10 mg tablet  30 30 Sb Ha MD     03/12/2019 03/12/2019 tramadol hcl 50 mg tablet  20 20 Kristal Barba     03/10/2019 03/10/2019 alprazolam 1 mg tablet  21 7 Sb Ha MD     02/27/2019 02/27/2019 hydrocodone-acetaminophen 5-325 mg tablet  120 30 Eduin Scott D, MD     02/19/2019 02/19/2019 hydrocodone-acetaminophen 7.5-325 mg tablet  28 7 Eduin Scott D, MD     02/14/2019 02/14/2019 zolpidem tartrate 10 mg tablet  30 30 Sb Ha MD     02/14/2019 02/14/2019 alprazolam 1 mg tablet  80 27 Sb Ha MD     01/30/2019 01/30/2019 hydrocodone-acetaminophen 7.5-325 mg tablet  120 15 Eduin Scott D, MD     01/30/2019 01/30/2019 tramadol hcl 50 mg tablet  20 20 Kristal Barba     01/18/2019 01/28/2019 dextroamp-amphet er 25 mg cap  30 30 Sb Ha MD     01/18/2019 01/18/2019 alprazolam 1 mg tablet  80 30 Sb Ha MD     01/18/2019 01/18/2019 zolpidem tartrate 10 mg tablet  30 30 Sb Ha MD     01/08/2019 01/08/2019 hydrocodone-acetaminophen 7.5-325 mg tablet  120 15 Eduin Scott D, MD     12/31/2018 01/01/2019 hydrocodone-acetaminophen 7.5-325 mg tablet  30 5 Eduin Scott D, MD     12/12/2018 12/26/2018 hydrocodone-acetaminophen 7.5-325 mg tablet  30 8 Eduin Scott D, MD     12/18/2018 12/23/2018 dextroamp-amphet er 25 mg cap  30 30 Sb Ha MD     12/23/2018 12/23/2018 alprazolam 1 mg tablet  80 27 Sb Ha MD     12/18/2018 12/19/2018 tramadol hcl 50 mg tablet  20 20 Kristal Barba     12/18/2018 12/19/2018 zolpidem tartrate 10 mg tablet  30 30 Sb Ha MD     11/29/2018 11/29/2018 hydrocodone-acetaminophen 7.5-325 mg tablet  120 30 Eduin Scott D, MD     11/25/2018 11/25/2018 alprazolam 1 mg tablet  83 27 Sb Ha MD     11/19/2018 11/20/2018 zolpidem tartrate 10 mg tablet  30 30 Sb Ha MD     10/30/2018 10/30/2018 hydrocodone-acetaminophen  mg tablet  120 30 Eduin Scott D, MD     10/26/2018 10/28/2018 alprazolam 1 mg tablet  85 28 Sb Ha MD     10/17/2018 10/21/2018 zolpidem tartrate 10 mg tablet  30 30 Sb Ha MD     09/28/2018 10/03/2018 hydrocodone-acetaminophen  mg tablet  120 30 Zak Caldera     09/28/2018 09/28/2018 alprazolam 1 mg tablet  90 30 Sb Ha MD     09/28/2018 09/28/2018 dextroamp-amphet er 25 mg cap  30 30 Sb Ha MD     09/18/2018 09/19/2018 zolpidem tartrate 10 mg tablet  30 30 Sb Ha MD     09/04/2018 09/05/2018 hydrocodone-acetaminophen  mg tablet  120 30 Zak Caldera     06/05/2018 08/27/2018 tramadol hcl 50 mg tablet  90 30 Zak Caldera     08/27/2018 08/27/2018 alprazolam 1 mg tablet  90 30 Sb Ha MD     08/20/2018 08/21/2018 zolpidem tartrate 10 mg tablet  30 30 Sb Ha MD     08/07/2018 08/08/2018 hydrocodone-acetaminophen  mg tablet  120 30 Zak Caldera     07/20/2018 07/20/2018 alprazolam 1 mg tablet  90 30 Sb Ha MD     07/20/2018 07/20/2018 zolpidem tartrate 10 mg tablet  30 30 Sb Ha MD     07/11/2018 07/11/2018 hydrocodone-acetaminophen  mg tablet  120 30 Zak Caldera     06/25/2018 06/26/2018 alprazolam 1 mg tablet  60 30 Sb Ha MD     06/25/2018 06/26/2018 zolpidem tartrate 5 mg tablet  30 30 Sb Ha MD     06/01/2018 06/20/2018 1t:20c 0.28mg thc and 6.3mg cbd/0.5ml sublingual drops  28 28 Zak Caldera)     06/05/2018 06/13/2018 tramadol hcl 50 mg tablet  90 30 Zak Caldera (MD)     06/13/2018 06/13/2018 hydrocodone-acetaminophen  mg tablet  120 30 Zak Caldera)     05/29/2018 05/29/2018 alprazolam 1 mg tablet  60 30 Sb Ha MD     05/29/2018 05/29/2018 zolpidem tartrate 5 mg tablet  30 30 Sb Ha MD     05/14/2018 05/14/2018 hydrocodone-acetaminophen  mg tablet  120 30 Zak Caldera)     05/01/2018 05/02/2018 zolpidem tartrate 5 mg tablet  30 30 Sb Ha MD     05/01/2018 05/02/2018 alprazolam 1 mg tablet  60 30 Sb Ha MD
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
## 2019-04-24 NOTE — ED PROVIDER NOTE - PROGRESS NOTE DETAILS
spoke to Dr. Aj kaur , reviewed labs and ekg, pt states she is normally tachycardic and needs her medications for htn at night.  awake, alert and oriented doug taking any other medications other than what is prescribed. pt will be medically cleared will get repeat ekg and give her pm medications NAVEEN Ramirez DO spoke with telepsych pt tba  will repeat vital signs B James FONTENOT pt with hr 111, bp 130/89 will clear medically for admission to psych NAVEEN Ramirez DO

## 2019-04-24 NOTE — ED PROVIDER NOTE - NEUROLOGICAL, MLM
Marylu calling again to follow up on how Christiano's appointment went and if the paperwork was discussed, please call her to discuss further at [phone]. The patient stated it is okay for clinical staff to leave a detailed message on the patient's voice mail with their results or other medical information.       Alert and oriented, no focal deficits, no motor or sensory deficits.

## 2019-04-24 NOTE — ED PROVIDER NOTE - OBJECTIVE STATEMENT
27 y/o F with PMHx of PCOS, HTN, depression, chronic back pain presenting to the ED for psychiatric evaluation, and +SI that occurred today. Pt says that today she took 16 and a half Ambien at 4:30 pm today with the intention to overdose. Pt says she "wants her life to be over". Patient also says she has diarrhea, abd pain due to psych medications she is taking and does not want to deal with these sx anymore. Pt has history of +SI, and attempted to hang herself recently. Pt was admitted to [facility] after she attempted to hang herself, while there her Cymbalta was increased from 90mg [id number]. Patient says that she should have just stayed home and left her for dead. No HI or any other sx at this time. On Cymbalta and Lamictal. Allergic to morphine.

## 2019-04-24 NOTE — ED BEHAVIORAL HEALTH ASSESSMENT NOTE - SUBSTANCE ISSUES AND PLAN (INCLUDE STANDING AND PRN MEDICATION)
Would discuss use/abuse of Benzodiazepines and Opiates with patient and obtain pain management consult to determine appropriate medications.

## 2019-04-24 NOTE — ED BEHAVIORAL HEALTH ASSESSMENT NOTE - SUMMARY
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 Pt is s/p suicide attempt via overdose. She is superficial and labile on exam most consistent with Borderline PD. Pt is highly focused on the negative aspects of her prior hospitalizations, appears attention seeking at times and impulsive. She is not a reliable historian per her medications (was observed editing a list of hand written meds while in the ED) and would therefore recommend that her medications be verified in the AM, especially given her polypharmacy and high amounts of controlled substances and muscle relaxers.

## 2019-04-24 NOTE — ED BEHAVIORAL HEALTH ASSESSMENT NOTE - RISK ASSESSMENT
Risk factors: Chronic mental illness, mood episode, current SI/I/P, likely abusing prescribed substances, irritability/severe anxiety, chronic pain/acute medical problems, access to means, history of trauma, prior history of suicide attempts, recent inpatient hospitalization, impulsivity, cluster B personality traits, hopelessness/despair, and unable to engage in safety planning.     Protective factors: Young, medically stable, future orientation, intact familiy supports,  compliance with psychiatric treatment and medications, positive therapeutic relationships, residential stability, no access to firearms.     Acute Suicide Risk  (X) High   (  ) Moderate   (  ) Low   (  ) Unable to determine   Rationale - See Above Risk/Protective Factors    Elevated Chronic Risk   (  ) No    (X) Yes  Details - See Above Risk/Protective Factors

## 2019-04-24 NOTE — ED ADULT NURSE NOTE - OBJECTIVE STATEMENT
Pt is a 28y female, A & o x 3, presents to ED s/p SI attempt, pt states she took 15-16 Ambian because she wants to die, pt states SI in past, pt is tearful, denies chest pain, shortness of breath, + sinus tach, IVL placed, fluids initiated, 1:1 at bedside, all belongings removed and given to mom to remove from premises. Waiting to wand pt when she is able to stand. Pt placed on monitor & EKG obtained.

## 2019-04-24 NOTE — ED ADULT NURSE REASSESSMENT NOTE - NS ED NURSE REASSESS COMMENT FT1
Pt awake, alert & airway patent prior to administration of charcoal, able to tolerate PO medication. Mother at bedside.

## 2019-04-24 NOTE — ED BEHAVIORAL HEALTH ASSESSMENT NOTE - DESCRIPTION
Pt was in ED ~5 hours prior to telepsychiatry consult request at 22:12. Patient presents to ED by EMS for overdose. RN reports that patient was groggy on arrival, but was cooperative with triage process, provided routine bloodwork and urine willingly, and allowed gowning without incident. Utox is positive for amphetamines and opiates, BAL is negative. RN reports that patient was tearful and anxious during interview. Patient reported that she took 16 Ambien because she wanted to die. RN reports that patient told several different staff members different amount of pills she took, varied from 15-17 pills. RN also reports that patient came in with a handwritten list of medications and was crossing out and changing the medications and doses in the ED, while requesting medications. RN reports that patient was given 50g of charcoal orally and was also given 2L of IV fluids. She has not thrown up from the charcoal. RN reports patient is more alert now, A&Ox4 and is less groggy. RN reports patient is somewhat demanding in the ER now, wanting her clothes in the ED even though she was informed they cannot give them to her. RN reports patient is somewhat agitated in the ED, no aggression or threatening behavior. Patient has not required security intervention or restraints at any time. RN reports patient’s speech is clear, thought process is logical and linear. RN reports patient’s hygiene is fair, but she appears somewhat unkempt. RN states that patient’s mother is at bedside, and they appear to be getting along well. RN reports that patient was placed on 1:1 observation and in private room for telepsychiatry evaluation.     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

## 2019-04-24 NOTE — ED BEHAVIORAL HEALTH ASSESSMENT NOTE - NS ED BHA PLAN ADMIT TO PSYCHIATRY BH CONTACTED FT
[facility], unable to reach on-call provider outside of crisis hotline. Left voicemail on regular line.

## 2019-04-24 NOTE — ED PROVIDER NOTE - NS_ ATTENDINGSCRIBEDETAILS _ED_A_ED_FT
I, Vinod Marr MD,  performed the initial face to face bedside interview with this patient regarding history of present illness, review of symptoms and relevant past medical, social and family history.  I completed an independent physical examination.    The history, relevant review of systems, past medical and surgical history, medical decision making, and physical examination was documented by the scribe in my presence and I attest to the accuracy of the documentation.

## 2019-04-24 NOTE — ED BEHAVIORAL HEALTH ASSESSMENT NOTE - CASE SUMMARY
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 Patient reports ongoing depression and hopelessness about her condition culminating in trying to end her life this evening by overdose. Unclear whether this was indeed a suicide attempt or whether there was another motivation for this gesture. 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 She is prescribed multiple controlled substances by several different doctors and per inpatient RN, has historically acted out while admitted in the past seemingly in order to obtain PRN medication. Will admit for safety for now but the above should be considered and addressed while inpatient.

## 2019-04-24 NOTE — ED BEHAVIORAL HEALTH ASSESSMENT NOTE - SUICIDE RISK FACTORS
Highly impulsive behavior/Substance abuse/dependence/Hopelessness/Mood episode/Agitation/severe anxiety/Chronic pain or acute medical issue/Access to means (pills, firearms, etc.)/Family history of suicide

## 2019-04-24 NOTE — ED BEHAVIORAL HEALTH ASSESSMENT NOTE - PSYCHIATRIC ISSUES AND PLAN (INCLUDE STANDING AND PRN MEDICATION)
Continue Cymbalta 60mg Am and 30mg HS, Lamictal 200mg HS, Remeron 7.5mg HS (was on 30mg so would confirm dose), Klonopin 0.5mg BID. Continue Cymbalta 60mg Am and 30mg HS, Lamictal 200mg HS, Remeron 7.5mg HS (was on 30mg so would confirm dose), Klonopin 0.5mg BID. Can use Vistaril 50mg PO Q6H PRN for anxiety/agitation. Additional PRNs can be determine by primary team. Continue Cymbalta 60mg Am and 30mg HS, Lamictal 200mg HS, Remeron 7.5mg HS (was on 30mg so would confirm dose), Klonopin 0.5mg BID. Can use Vistaril 50mg PO Q6H PRN for anxiety/agitation. Additional PRNs can be determined by primary team.

## 2019-04-24 NOTE — ED ADULT NURSE NOTE - NSIMPLEMENTINTERV_GEN_ALL_ED
Implemented All Fall Risk Interventions:  [place] to call system. Call bell, personal items and telephone within reach. Instruct patient to call for assistance. Room bathroom lighting operational. Non-slip footwear when patient is off stretcher. Physically safe environment: no spills, clutter or unnecessary equipment. Stretcher in lowest position, wheels locked, appropriate side rails in place. Provide visual cue, wrist band, yellow gown, etc. Monitor gait and stability. Monitor for mental status changes and reorient to person, place, and time. Review medications for side effects contributing to fall risk. Reinforce activity limits and safety measures with patient and family.

## 2019-04-24 NOTE — ED BEHAVIORAL HEALTH ASSESSMENT NOTE - ORAL MEDICATION DETAILS
Remeron 7.5mg and Lamictal 200mg along with several medical medications (Amlodipine 2.5mg, Lisinopril 20mg, Clonidine 0.1mg, Flexeril 10mg, Gabapentin 600mg, and Percocet 5-325 2 tabs)
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
## 2019-04-24 NOTE — ED BEHAVIORAL HEALTH ASSESSMENT NOTE - MEDICAL ISSUES AND PLAN (INCLUDE STANDING AND PRN MEDICATION)
Confirm medication in AM (EM attending gave dose of: Amlodipine 2.5mg, Lisinopril 20mg, Clonidine 0.1mg, Flexeril 10mg, Gabapentin 600mg, and Percocet 5-325 2 tabs) Recommend medical consult. Need to confirm medications in AM so will not order tonight. Of note - EM attending gave dose of: Amlodipine 2.5mg, Lisinopril 20mg, Clonidine 0.1mg, Flexeril 10mg, Gabapentin 600mg, and Percocet 5-325 2 tabs at 0035 Recommend medical consult. Need to confirm medications in AM so defer ordering standing medication. Of note - EM attending gave dose of: Amlodipine 2.5mg, Lisinopril 20mg, Clonidine 0.1mg, Flexeril 10mg, Gabapentin 600mg, and Percocet 5-325 2 tabs at 0035

## 2019-04-24 NOTE — ED BEHAVIORAL HEALTH ASSESSMENT NOTE - OTHER
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

## 2019-04-24 NOTE — ED ADULT NURSE REASSESSMENT NOTE - NS ED NURSE REASSESS COMMENT FT1
assumed care for pt, pt awake alert and oriented x 4, tachycardic on continuous tele monitor, 1:1 continues, pt wanded by security, mother took pt belongings to car, urine tests sent.
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
## 2019-04-25 DIAGNOSIS — F33.2 MAJOR DEPRESSIVE DISORDER, RECURRENT SEVERE WITHOUT PSYCHOTIC FEATURES: ICD-10-CM

## 2019-04-25 DIAGNOSIS — F60.3 BORDERLINE PERSONALITY DISORDER: ICD-10-CM

## 2019-04-25 DIAGNOSIS — F41.9 ANXIETY DISORDER, UNSPECIFIED: ICD-10-CM

## 2019-04-25 PROBLEM — E66.9 OBESITY, UNSPECIFIED: Chronic | Status: ACTIVE | Noted: 2018-12-14

## 2019-04-25 PROBLEM — K21.9 GASTRO-ESOPHAGEAL REFLUX DISEASE WITHOUT ESOPHAGITIS: Chronic | Status: ACTIVE | Noted: 2018-12-14

## 2019-04-25 PROBLEM — I12.9 HYPERTENSIVE CHRONIC KIDNEY DISEASE WITH STAGE 1 THROUGH STAGE 4 CHRONIC KIDNEY DISEASE, OR UNSPECIFIED CHRONIC KIDNEY DISEASE: Chronic | Status: ACTIVE | Noted: 2018-12-14

## 2019-04-25 LAB
CULTURE RESULTS: SIGNIFICANT CHANGE UP
SPECIMEN SOURCE: SIGNIFICANT CHANGE UP

## 2019-04-25 PROCEDURE — 99222 1ST HOSP IP/OBS MODERATE 55: CPT

## 2019-04-25 PROCEDURE — 99285 EMERGENCY DEPT VISIT HI MDM: CPT | Mod: 25

## 2019-04-25 RX ORDER — LAMOTRIGINE 25 MG/1
200 TABLET, ORALLY DISINTEGRATING ORAL AT BEDTIME
Qty: 0 | Refills: 0 | Status: DISCONTINUED | OUTPATIENT
Start: 2019-04-25 | End: 2019-05-03

## 2019-04-25 RX ORDER — MIRTAZAPINE 45 MG/1
7.5 TABLET, ORALLY DISINTEGRATING ORAL AT BEDTIME
Qty: 0 | Refills: 0 | Status: DISCONTINUED | OUTPATIENT
Start: 2019-04-25 | End: 2019-04-26

## 2019-04-25 RX ORDER — MIRTAZAPINE 45 MG/1
7.5 TABLET, ORALLY DISINTEGRATING ORAL ONCE
Qty: 0 | Refills: 0 | Status: COMPLETED | OUTPATIENT
Start: 2019-04-25 | End: 2019-04-25

## 2019-04-25 RX ORDER — ACETAMINOPHEN 500 MG
650 TABLET ORAL EVERY 6 HOURS
Qty: 0 | Refills: 0 | Status: DISCONTINUED | OUTPATIENT
Start: 2019-04-25 | End: 2019-05-03

## 2019-04-25 RX ORDER — OXYCODONE AND ACETAMINOPHEN 5; 325 MG/1; MG/1
2 TABLET ORAL ONCE
Qty: 0 | Refills: 0 | Status: DISCONTINUED | OUTPATIENT
Start: 2019-04-25 | End: 2019-04-25

## 2019-04-25 RX ORDER — OXYCODONE AND ACETAMINOPHEN 5; 325 MG/1; MG/1
1 TABLET ORAL EVERY 8 HOURS
Qty: 0 | Refills: 0 | Status: DISCONTINUED | OUTPATIENT
Start: 2019-04-25 | End: 2019-05-01

## 2019-04-25 RX ORDER — AMLODIPINE BESYLATE 2.5 MG/1
2.5 TABLET ORAL ONCE
Qty: 0 | Refills: 0 | Status: COMPLETED | OUTPATIENT
Start: 2019-04-25 | End: 2019-04-25

## 2019-04-25 RX ORDER — HYDROXYZINE HCL 10 MG
50 TABLET ORAL EVERY 6 HOURS
Qty: 0 | Refills: 0 | Status: DISCONTINUED | OUTPATIENT
Start: 2019-04-25 | End: 2019-04-29

## 2019-04-25 RX ORDER — IBUPROFEN 200 MG
600 TABLET ORAL EVERY 6 HOURS
Qty: 0 | Refills: 0 | Status: DISCONTINUED | OUTPATIENT
Start: 2019-04-25 | End: 2019-05-03

## 2019-04-25 RX ORDER — DIPHENHYDRAMINE HCL 50 MG
50 CAPSULE ORAL EVERY 4 HOURS
Qty: 0 | Refills: 0 | Status: DISCONTINUED | OUTPATIENT
Start: 2019-04-25 | End: 2019-04-27

## 2019-04-25 RX ORDER — DULOXETINE HYDROCHLORIDE 30 MG/1
30 CAPSULE, DELAYED RELEASE ORAL AT BEDTIME
Qty: 0 | Refills: 0 | Status: DISCONTINUED | OUTPATIENT
Start: 2019-04-25 | End: 2019-05-03

## 2019-04-25 RX ORDER — CLONAZEPAM 1 MG
1 TABLET ORAL ONCE
Qty: 0 | Refills: 0 | Status: DISCONTINUED | OUTPATIENT
Start: 2019-04-25 | End: 2019-04-25

## 2019-04-25 RX ORDER — LISINOPRIL 2.5 MG/1
20 TABLET ORAL DAILY
Qty: 0 | Refills: 0 | Status: DISCONTINUED | OUTPATIENT
Start: 2019-04-25 | End: 2019-04-25

## 2019-04-25 RX ORDER — CYCLOBENZAPRINE HYDROCHLORIDE 10 MG/1
10 TABLET, FILM COATED ORAL ONCE
Qty: 0 | Refills: 0 | Status: COMPLETED | OUTPATIENT
Start: 2019-04-25 | End: 2019-04-25

## 2019-04-25 RX ORDER — CLONAZEPAM 1 MG
0.5 TABLET ORAL
Qty: 0 | Refills: 0 | Status: DISCONTINUED | OUTPATIENT
Start: 2019-04-25 | End: 2019-04-30

## 2019-04-25 RX ORDER — LISINOPRIL 2.5 MG/1
20 TABLET ORAL
Qty: 0 | Refills: 0 | Status: DISCONTINUED | OUTPATIENT
Start: 2019-04-25 | End: 2019-05-03

## 2019-04-25 RX ORDER — GABAPENTIN 400 MG/1
600 CAPSULE ORAL ONCE
Qty: 0 | Refills: 0 | Status: COMPLETED | OUTPATIENT
Start: 2019-04-25 | End: 2019-04-25

## 2019-04-25 RX ORDER — LAMOTRIGINE 25 MG/1
200 TABLET, ORALLY DISINTEGRATING ORAL ONCE
Qty: 0 | Refills: 0 | Status: COMPLETED | OUTPATIENT
Start: 2019-04-25 | End: 2019-04-25

## 2019-04-25 RX ORDER — DULOXETINE HYDROCHLORIDE 30 MG/1
60 CAPSULE, DELAYED RELEASE ORAL DAILY
Qty: 0 | Refills: 0 | Status: DISCONTINUED | OUTPATIENT
Start: 2019-04-25 | End: 2019-05-03

## 2019-04-25 RX ORDER — MENTHOL AND METHYL SALICYLATE 10; 30 G/100G; G/100G
1 STICK TOPICAL
Qty: 0 | Refills: 0 | Status: DISCONTINUED | OUTPATIENT
Start: 2019-04-25 | End: 2019-05-03

## 2019-04-25 RX ORDER — HALOPERIDOL DECANOATE 100 MG/ML
5 INJECTION INTRAMUSCULAR EVERY 6 HOURS
Qty: 0 | Refills: 0 | Status: DISCONTINUED | OUTPATIENT
Start: 2019-04-25 | End: 2019-05-03

## 2019-04-25 RX ORDER — ARIPIPRAZOLE 15 MG/1
2 TABLET ORAL DAILY
Qty: 0 | Refills: 0 | Status: DISCONTINUED | OUTPATIENT
Start: 2019-04-25 | End: 2019-04-25

## 2019-04-25 RX ORDER — ARIPIPRAZOLE 15 MG/1
5 TABLET ORAL DAILY
Qty: 0 | Refills: 0 | Status: DISCONTINUED | OUTPATIENT
Start: 2019-04-25 | End: 2019-04-29

## 2019-04-25 RX ADMIN — Medication 1 MILLIGRAM(S): at 02:48

## 2019-04-25 RX ADMIN — LAMOTRIGINE 200 MILLIGRAM(S): 25 TABLET, ORALLY DISINTEGRATING ORAL at 01:18

## 2019-04-25 RX ADMIN — AMLODIPINE BESYLATE 2.5 MILLIGRAM(S): 2.5 TABLET ORAL at 00:53

## 2019-04-25 RX ADMIN — DULOXETINE HYDROCHLORIDE 60 MILLIGRAM(S): 30 CAPSULE, DELAYED RELEASE ORAL at 08:59

## 2019-04-25 RX ADMIN — Medication 50 MILLIGRAM(S): at 13:06

## 2019-04-25 RX ADMIN — OXYCODONE AND ACETAMINOPHEN 1 TABLET(S): 5; 325 TABLET ORAL at 08:35

## 2019-04-25 RX ADMIN — MIRTAZAPINE 7.5 MILLIGRAM(S): 45 TABLET, ORALLY DISINTEGRATING ORAL at 20:58

## 2019-04-25 RX ADMIN — Medication 50 MILLIGRAM(S): at 16:58

## 2019-04-25 RX ADMIN — MENTHOL AND METHYL SALICYLATE 1 APPLICATION(S): 10; 30 STICK TOPICAL at 20:58

## 2019-04-25 RX ADMIN — Medication 600 MILLIGRAM(S): at 15:43

## 2019-04-25 RX ADMIN — DULOXETINE HYDROCHLORIDE 30 MILLIGRAM(S): 30 CAPSULE, DELAYED RELEASE ORAL at 20:58

## 2019-04-25 RX ADMIN — Medication 50 MILLIGRAM(S): at 11:11

## 2019-04-25 RX ADMIN — MIRTAZAPINE 7.5 MILLIGRAM(S): 45 TABLET, ORALLY DISINTEGRATING ORAL at 01:20

## 2019-04-25 RX ADMIN — Medication 600 MILLIGRAM(S): at 21:00

## 2019-04-25 RX ADMIN — Medication 2 MILLIGRAM(S): at 13:05

## 2019-04-25 RX ADMIN — Medication 600 MILLIGRAM(S): at 13:12

## 2019-04-25 RX ADMIN — OXYCODONE AND ACETAMINOPHEN 2 TABLET(S): 5; 325 TABLET ORAL at 00:53

## 2019-04-25 RX ADMIN — OXYCODONE AND ACETAMINOPHEN 2 TABLET(S): 5; 325 TABLET ORAL at 01:25

## 2019-04-25 RX ADMIN — ARIPIPRAZOLE 2 MILLIGRAM(S): 15 TABLET ORAL at 11:14

## 2019-04-25 RX ADMIN — LAMOTRIGINE 200 MILLIGRAM(S): 25 TABLET, ORALLY DISINTEGRATING ORAL at 20:58

## 2019-04-25 RX ADMIN — OXYCODONE AND ACETAMINOPHEN 1 TABLET(S): 5; 325 TABLET ORAL at 16:00

## 2019-04-25 RX ADMIN — HALOPERIDOL DECANOATE 5 MILLIGRAM(S): 100 INJECTION INTRAMUSCULAR at 13:05

## 2019-04-25 RX ADMIN — GABAPENTIN 600 MILLIGRAM(S): 400 CAPSULE ORAL at 00:53

## 2019-04-25 RX ADMIN — Medication 0.5 MILLIGRAM(S): at 20:58

## 2019-04-25 RX ADMIN — OXYCODONE AND ACETAMINOPHEN 1 TABLET(S): 5; 325 TABLET ORAL at 07:33

## 2019-04-25 RX ADMIN — Medication 600 MILLIGRAM(S): at 22:00

## 2019-04-25 RX ADMIN — Medication 0.5 MILLIGRAM(S): at 08:59

## 2019-04-25 RX ADMIN — LISINOPRIL 20 MILLIGRAM(S): 2.5 TABLET ORAL at 00:53

## 2019-04-25 RX ADMIN — OXYCODONE AND ACETAMINOPHEN 1 TABLET(S): 5; 325 TABLET ORAL at 17:00

## 2019-04-25 RX ADMIN — LISINOPRIL 20 MILLIGRAM(S): 2.5 TABLET ORAL at 20:58

## 2019-04-25 RX ADMIN — LISINOPRIL 20 MILLIGRAM(S): 2.5 TABLET ORAL at 08:59

## 2019-04-25 RX ADMIN — CYCLOBENZAPRINE HYDROCHLORIDE 10 MILLIGRAM(S): 10 TABLET, FILM COATED ORAL at 00:52

## 2019-04-25 RX ADMIN — Medication 0.1 MILLIGRAM(S): at 00:52

## 2019-04-25 NOTE — PROGRESS NOTE BEHAVIORAL HEALTH - NSBHFUPINTERVALHXFT_PSY_A_CORE
PT is casually dressed, expressing depressed mood, poor sleep, that is better with Remeron 7.5mg and melatonin 10mg at HS.   Pt had poor appetite yesterday, bu5t this AM had a good breakfast. Energy level is described as "chanelle". PT denies current SI, feels safe on the unit. when asked about Suicide attempt she states "I wish it worked". No current plan or intent to harm herself. PT admits to having urges ton cut her writs and her stomach and reports cutting behavior as recent as yesterday,. She does not want to show her wrists. Denies current urges to self-mutilate. Denies AH, VH PI. Denies HI.   Denies substance abuse. Demanding her pain killers.
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
## 2019-04-25 NOTE — PROGRESS NOTE ADULT - SUBJECTIVE AND OBJECTIVE BOX
event note    Called by RN IN re : PT is agitated in the light of demanding pain killer. Pt si cursing, yelling, threatening and not responding to redirection and reassurance.   ON exam, she continues to be aggressive verbally abusive, threatening, we needed to call code gray and medicate pt. She refused oral meds and IM had to be given. She kept  repeating that she does not want haldol or prolixin, refuses any meds for agitation, refusing ativan to be given as the only meds,  Even state that she has allergy to haldol.   However, she si not allergic to haldol she was receiving  it in recent hospitalization in [facility].   Benadryl was given in case that she gets EPS.;   Overall she tolerated it well and calmed down.   She continues to ask for pain killer (percocet 5/325), despite of having it ordered since admission,   She is requesting additional ibuprofen   Written order for ibuprofen and for Tylenol PRN Moderate and low pain.  Hospitalist input pending.

## 2019-04-25 NOTE — ED ADULT NURSE REASSESSMENT NOTE - NS ED NURSE REASSESS COMMENT FT1
pt  aox3, nighttime meds given, pt states " I don't want to sleep, I want to go upstairs now" Pt given emotional support, safety maintained. HR remains elevated 119. Pt's belongings sent to security, pt wanded. 1:1 maintained. Will continue to monitor.

## 2019-04-25 NOTE — H&P ADULT - ASSESSMENT
28F with Depression and suicide attempt c/o right hand swelling      *Depression with SI  -inpatient 5N, mgmt per psych      *right hand swelling  -r/o fx  -ibuprofen prn pain  -xrays  -ortho eval      *HTN  -c/w home medications, Lisinopril 20mg BID      *PCOS  -outpt f/u with GYN      *Chronic back pain  -analgesic gel ordered for prn topical pain control  -percocet and motrin prn      *Elevated LFTs  -appear chronic, similar elevation in the past, monitor      *DVT px  -ambulatory

## 2019-04-25 NOTE — CHART NOTE - NSCHARTNOTEFT_GEN_A_CORE
SW completed psychosocial hx. based on chart review. Will attempt to meet with pt. again since unavailable during attempt today. SW to FU.

## 2019-04-25 NOTE — H&P ADULT - NSHPPHYSICALEXAM_GEN_ALL_CORE
PHYSICAL EXAM:    Constitutional: NAD, awake and alert, well-developed  HEENT: PERR, EOMI, Normal Hearing, MMM  Neck: Soft and supple, No LAD, No JVD  Respiratory: Breath sounds are clear bilaterally, No wheezing, rales or rhonchi  Cardiovascular: S1 and S2, regular rate and rhythm, no Murmurs, gallops or rubs  Gastrointestinal: Bowel Sounds present, soft, nontender, nondistended, no guarding, no rebound  Extremities: No peripheral edema  Vascular: 2+ peripheral pulses  Neurological: A/O x 3, no focal deficits  Musculoskeletal: 5/5 strength b/l upper and lower extremities. swelling and tenderness of right hand  Skin: No rashes

## 2019-04-25 NOTE — PROGRESS NOTE BEHAVIORAL HEALTH - NSBHCHARTREVIEWLAB_PSY_A_CORE FT
13.5   12.49 )-----------( 348      ( 24 Apr 2019 17:41 )             38.8   04-24    137  |  104  |  12  ----------------------------<  91  3.9   |  24  |  0.82    Ca    9.2      24 Apr 2019 17:41    TPro  8.4<H>  /  Alb  3.4  /  TBili  0.5  /  DBili  x   /  AST  133<H>  /  ALT  112<H>  /  AlkPhos  94  04-24
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
## 2019-04-25 NOTE — H&P ADULT - NSHPREVIEWOFSYSTEMS_GEN_ALL_CORE
REVIEW OF SYSTEMS:    CONSTITUTIONAL: No weakness, fevers or chills  EYES/ENT: No visual changes;  No vertigo or throat pain   NECK: No pain or stiffness  RESPIRATORY: No cough, wheezing, hemoptysis; No shortness of breath  CARDIOVASCULAR: No chest pain or palpitations  GASTROINTESTINAL: No abdominal or epigastric pain. No nausea, vomiting, or hematemesis; No diarrhea or constipation. No melena or hematochezia.  GENITOURINARY: No dysuria, frequency or hematuria  NEUROLOGICAL: No numbness or weakness  SKIN: No itching, burning, rashes, or lesions   MUSCULOSKELETAL: right hand pain/swelling  All other review of systems is negative unless indicated above

## 2019-04-25 NOTE — PATIENT PROFILE BEHAVIORAL HEALTH - NSBHATTSPAN_PSY_A_CORE
HISTORY:

chest pain  



COMPARISON:

Prior chest radiographs 07/22/2013



TECHNIQUE:

Chest PA and lateral



FINDINGS:



LUNGS:

Diminished history volume identified. No acute infiltrate.



PLEURA:

No significant pleural effusion identified. No pneumothorax apparent.



CARDIOVASCULAR:

Upper limits of normal cardiac silhouette may be a function of 

diminished inspiratory volume. Pulmonary vascular appears normal 

nevertheless.



OSSEOUS STRUCTURES:

No significant abnormalities.



VISUALIZED UPPER ABDOMEN:

Normal.



OTHER FINDINGS:

None.



IMPRESSION:

Diminished inspiratory volume. No acute infiltrate or pleural 

effusion identified bilaterally. fair

## 2019-04-25 NOTE — H&P ADULT - HISTORY OF PRESENT ILLNESS
28F with hx of HTN, anxiety, depression presents after suicide attempt with Ambien overdose. currently c/o right hand pain and swelling and denying active SI.

## 2019-04-25 NOTE — H&P ADULT - NSICDXFAMILYHX_GEN_ALL_CORE_FT
FAMILY HISTORY:  Father  Still living? Unknown  Family history of heart disease, Age at diagnosis: Age Unknown    Mother  Still living? Unknown  Family history of heart disease, Age at diagnosis: Age Unknown

## 2019-04-25 NOTE — H&P ADULT - NSICDXPASTMEDICALHX_GEN_ALL_CORE_FT
PAST MEDICAL HISTORY:  Anxiety     Cholecystitis S/P cholycystectomy    Chronic back pain     Depression     GERD (gastroesophageal reflux disease)     HTN (hypertension)     Mixed connective tissue disease     Nephrosclerosis     Obesity     Polycystic ovarian syndrome

## 2019-04-25 NOTE — H&P ADULT - NSHPLABSRESULTS_GEN_ALL_CORE
T(C): 37 (19 @ 07:41), Max: 37.1 (19 @ 00:51)  HR: 111 (19 @ 03:29) (111 - 128)  BP: 131/89 (19 @ 03:29) (131/89 - 152/99)  RR: 16 (19 @ 07:41) (14 - 20)  SpO2: 100% (19 @ 07:41) (98% - 100%)  Wt(kg): --                              13.5   12.49 )-----------( 348      ( 2019 17:41 )             38.8     2019 17:41    137    |  104    |  12     ----------------------------<  91     3.9     |  24     |  0.82     Ca    9.2        2019 17:41    TPro  8.4    /  Alb  3.4    /  TBili  0.5    /  DBili  x      /  AST  133    /  ALT  112    /  AlkPhos  94     2019 17:41    PT/INR - ( 2019 17:41 )   PT: 13.1 sec;   INR: 1.17 ratio         PTT - ( 2019 17:41 )  PTT:32.2 sec  CAPILLARY BLOOD GLUCOSE        LIVER FUNCTIONS - ( 2019 17:41 )  Alb: 3.4 g/dL / Pro: 8.4 gm/dL / ALK PHOS: 94 U/L / ALT: 112 U/L / AST: 133 U/L / GGT: x           Urinalysis Basic - ( 2019 19:13 )    Color: Yellow / Appearance: Clear / S.020 / pH: x  Gluc: x / Ketone: Trace  / Bili: Negative / Urobili: Negative mg/dL   Blood: x / Protein: 100 mg/dL / Nitrite: Negative   Leuk Esterase: Negative / RBC: 6-10 /HPF / WBC 0-2   Sq Epi: x / Non Sq Epi: Moderate / Bacteria: Few

## 2019-04-25 NOTE — PATIENT PROFILE BEHAVIORAL HEALTH - PATIENT REPRESENTATIVE: ( YOU CAN CHOOSE ANY PERSON THAT CAN ASSIST YOU WITH YOUR HEALTH CARE PREFERENCES, DOES NOT HAVE TO BE A SPOUSE, IMMEDIATE FAMILY OR SIGNIFICANT OTHER/PARTNER)
Office Visit      CHIEF COMPLAINT    Chief Complaint   Patient presents with   • Follow-up       History of present illness    Cherrie Bernardo  is a 41 year old female here today for:  1. Essential hypertension    2. Seizure disorder (CMS/HCC)    3. History of stroke    4. Thalamic pain syndrome    5. Prediabetes      Last seen 4 months ago.  Since last visit: 1 seizure, seen in ER (Records including ED notes,labs and imaging reviewed and discussed with patient); Keppra was increased. No seizure since than.   Seen by neurologist 3 days ago.   Continues to have some fatigability in the right leg and arm.     HTN: no chest pain/palpitations/dyspnea.  Resumed medications 3 days ago - medications were refilled by Neurologist    ROS negative for signs and symptoms of cerebral ischemia: no change in speech, vision, gait, nor weakness or loss of sensation in face or extremities.     SOC: working cleaning offices  No tobacco  Alcohol: 2-3 drinks/sitting about 1-2 x/ month.   No other drug use.    Last 7 PHQ 2/9 Test Results         PHQ9 SCREENING FLOWSHEET 7/6/2018   PHQ2 Score 1   PHQ9 Total Score 2   Little interest/pleasure in doing thing not at all   Feeling down, depressed or hopeless several days   Trouble with sleep not at all   Feeling tired; having little energy several days   Poor appetite, weight loss or overeating not at all   Feeling bad about yourself not at all   Trouble concentrating not at all   Moving/speaking slowly or fidgety not at all   Thoughts of death or hurting yourself not at all       Current Outpatient Prescriptions   Medication Sig   • amLODIPine (NORVASC) 5 MG tablet Take 1 tablet by mouth daily.   • amitriptyline (ELAVIL) 25 MG tablet Take 1 tablet by mouth nightly.   • lisinopril-hydroCHLOROthiazide (PRINZIDE,ZESTORETIC) 20-12.5 MG per tablet Take 2 tablets by mouth daily.   • levetiracetam (KEPPRA) 1000 MG tablet Take 1 tablet by mouth every 12 hours.   • simvastatin (ZOCOR) 10 MG tablet  Take 1 tablet by mouth daily.   • albuterol 108 (90 Base) MCG/ACT inhaler Inhale 2 puffs into the lungs every 4 hours as needed for Shortness of Breath or Wheezing.   • ergocalciferol (DRISDOL) 44059 units capsule Take 50,000 Units by mouth once a week. Wednesdays     No current facility-administered medications for this visit.        I have reviewed the past medical, family, and social history sections including the medications and allergies listed in the medical record as well as the nursing notes.     Review of systems    Pertinent positive and negatives are listed in the HPI.    Physical Exam    Vital Signs:   Visit Vitals  /86 ([place] [facility] [place], Cuff Size: Large Adult)   Pulse 94   Ht 5' 7\" (1.702 m)   Wt 93.7 kg   LMP 06/15/2018 (Approximate)   BMI 32.36 kg/m²     General: Well developed, well nourished, no acute distress.   Skin: Warm and dry without rash.   Head: Normocephalic, atraumatic.   Cardiovascular: Symmetrical pulses. Regular rate and rhythm without murmur.  Respiratory: Normal respiratory effort. Clear to auscultation. No wheezes, rales or rhonchi.  Gastrointestinal: Soft and non tender. Normal bowel sounds. No hepatomegaly or splenomegaly.   Neurologic: Oriented x4. Cranial nerves II-XII are intact. No focal deficits or lateralizing signs.  Psychiatric: Cooperative. Appropriate mood and affect.       Assessment & Plan    (I10) Essential hypertension  (primary encounter diagnosis)  Comment: at goal  Plan: amLODIPine (NORVASC) 5 MG tablet,         lisinopril-hydroCHLOROthiazide         (PRINZIDE,ZESTORETIC) 20-12.5 MG per tablet,         COMPREHENSIVE METABOLIC PANEL        DASH diet    (G40.909) Seizure disorder (CMS/HCC)  Comment: well controlled  Plan: continue Keppra    (Z86.73) History of stroke  Comment: Right sided weakness much improved  Plan: continue home exercise program    (G89.0) Thalamic pain syndrome  Comment: improved with TCA  Plan: amitriptyline  (ELAVIL) 25 MG tablet            (R73.03) Prediabetes  Comment: Reviewed results of A1c and glucose readings with Cherrie today - which were essentially below the threshold for the diagnosis of Diabetes   Plan: diabetes prevention diet      Return in about 2 months (around 9/6/2018).    The patient indicated understanding of the diagnosis and agreed with the plan of care.     Rashard Gonzalez MD     Yes

## 2019-04-26 DIAGNOSIS — F33.3 MAJOR DEPRESSIVE DISORDER, RECURRENT, SEVERE WITH PSYCHOTIC SYMPTOMS: ICD-10-CM

## 2019-04-26 DIAGNOSIS — F41.1 GENERALIZED ANXIETY DISORDER: ICD-10-CM

## 2019-04-26 PROCEDURE — 99231 SBSQ HOSP IP/OBS SF/LOW 25: CPT

## 2019-04-26 PROCEDURE — 73130 X-RAY EXAM OF HAND: CPT | Mod: 26,RT

## 2019-04-26 RX ORDER — BENZTROPINE MESYLATE 1 MG
0.5 TABLET ORAL ONCE
Qty: 0 | Refills: 0 | Status: COMPLETED | OUTPATIENT
Start: 2019-04-26 | End: 2019-04-26

## 2019-04-26 RX ORDER — LANOLIN ALCOHOL/MO/W.PET/CERES
5 CREAM (GRAM) TOPICAL AT BEDTIME
Qty: 0 | Refills: 0 | Status: DISCONTINUED | OUTPATIENT
Start: 2019-04-26 | End: 2019-05-03

## 2019-04-26 RX ORDER — GABAPENTIN 400 MG/1
300 CAPSULE ORAL THREE TIMES A DAY
Qty: 0 | Refills: 0 | Status: DISCONTINUED | OUTPATIENT
Start: 2019-04-26 | End: 2019-04-29

## 2019-04-26 RX ORDER — MIRTAZAPINE 45 MG/1
15 TABLET, ORALLY DISINTEGRATING ORAL AT BEDTIME
Qty: 0 | Refills: 0 | Status: DISCONTINUED | OUTPATIENT
Start: 2019-04-26 | End: 2019-04-29

## 2019-04-26 RX ORDER — DIPHENHYDRAMINE HCL 50 MG
50 CAPSULE ORAL EVERY 4 HOURS
Qty: 0 | Refills: 0 | Status: COMPLETED | OUTPATIENT
Start: 2019-04-26 | End: 2019-04-26

## 2019-04-26 RX ADMIN — Medication 0.5 MILLIGRAM(S): at 09:17

## 2019-04-26 RX ADMIN — GABAPENTIN 300 MILLIGRAM(S): 400 CAPSULE ORAL at 10:29

## 2019-04-26 RX ADMIN — Medication 0.5 MILLIGRAM(S): at 20:09

## 2019-04-26 RX ADMIN — Medication 650 MILLIGRAM(S): at 04:13

## 2019-04-26 RX ADMIN — Medication 600 MILLIGRAM(S): at 23:00

## 2019-04-26 RX ADMIN — Medication 50 MILLIGRAM(S): at 11:56

## 2019-04-26 RX ADMIN — ARIPIPRAZOLE 5 MILLIGRAM(S): 15 TABLET ORAL at 09:17

## 2019-04-26 RX ADMIN — Medication 600 MILLIGRAM(S): at 22:01

## 2019-04-26 RX ADMIN — Medication 0.5 MILLIGRAM(S): at 16:30

## 2019-04-26 RX ADMIN — Medication 5 MILLIGRAM(S): at 21:10

## 2019-04-26 RX ADMIN — OXYCODONE AND ACETAMINOPHEN 1 TABLET(S): 5; 325 TABLET ORAL at 02:00

## 2019-04-26 RX ADMIN — Medication 600 MILLIGRAM(S): at 13:29

## 2019-04-26 RX ADMIN — OXYCODONE AND ACETAMINOPHEN 1 TABLET(S): 5; 325 TABLET ORAL at 10:25

## 2019-04-26 RX ADMIN — Medication 50 MILLIGRAM(S): at 14:40

## 2019-04-26 RX ADMIN — Medication 50 MILLIGRAM(S): at 18:06

## 2019-04-26 RX ADMIN — LISINOPRIL 20 MILLIGRAM(S): 2.5 TABLET ORAL at 09:17

## 2019-04-26 RX ADMIN — OXYCODONE AND ACETAMINOPHEN 1 TABLET(S): 5; 325 TABLET ORAL at 00:56

## 2019-04-26 RX ADMIN — Medication 650 MILLIGRAM(S): at 15:54

## 2019-04-26 RX ADMIN — OXYCODONE AND ACETAMINOPHEN 1 TABLET(S): 5; 325 TABLET ORAL at 09:19

## 2019-04-26 RX ADMIN — GABAPENTIN 300 MILLIGRAM(S): 400 CAPSULE ORAL at 21:11

## 2019-04-26 RX ADMIN — Medication 650 MILLIGRAM(S): at 03:13

## 2019-04-26 RX ADMIN — GABAPENTIN 300 MILLIGRAM(S): 400 CAPSULE ORAL at 15:54

## 2019-04-26 RX ADMIN — DULOXETINE HYDROCHLORIDE 60 MILLIGRAM(S): 30 CAPSULE, DELAYED RELEASE ORAL at 09:17

## 2019-04-26 RX ADMIN — Medication 50 MILLIGRAM(S): at 00:57

## 2019-04-26 RX ADMIN — MIRTAZAPINE 15 MILLIGRAM(S): 45 TABLET, ORALLY DISINTEGRATING ORAL at 21:10

## 2019-04-26 RX ADMIN — MENTHOL AND METHYL SALICYLATE 1 APPLICATION(S): 10; 30 STICK TOPICAL at 09:18

## 2019-04-26 RX ADMIN — Medication 650 MILLIGRAM(S): at 19:25

## 2019-04-26 RX ADMIN — Medication 50 MILLIGRAM(S): at 05:21

## 2019-04-26 RX ADMIN — Medication 50 MILLIGRAM(S): at 05:20

## 2019-04-26 RX ADMIN — LAMOTRIGINE 200 MILLIGRAM(S): 25 TABLET, ORALLY DISINTEGRATING ORAL at 21:11

## 2019-04-26 RX ADMIN — LISINOPRIL 20 MILLIGRAM(S): 2.5 TABLET ORAL at 21:11

## 2019-04-26 RX ADMIN — OXYCODONE AND ACETAMINOPHEN 1 TABLET(S): 5; 325 TABLET ORAL at 21:10

## 2019-04-26 RX ADMIN — OXYCODONE AND ACETAMINOPHEN 1 TABLET(S): 5; 325 TABLET ORAL at 22:30

## 2019-04-26 RX ADMIN — MENTHOL AND METHYL SALICYLATE 1 APPLICATION(S): 10; 30 STICK TOPICAL at 21:12

## 2019-04-26 RX ADMIN — DULOXETINE HYDROCHLORIDE 30 MILLIGRAM(S): 30 CAPSULE, DELAYED RELEASE ORAL at 21:11

## 2019-04-26 NOTE — CHART NOTE - NSCHARTNOTEFT_GEN_A_CORE
KRISTEL met with pt. to begin DC planning. Pt. reported feeling anxious but also less depressed than yesterday. Pt. attributed anxiety to the uncertainty of her DC plans at this point. SW met with pt. to begin DC planning. Pt. reported feeling anxious but also less depressed than yesterday. Pt. attributed current anxiety to the uncertainty of her DC plans at this point. SW explored pt.'s DC needs. Pt. confirmed she was attending [facility] and seeing psych MD Sb Ha prior to admission. Pt. expressed interest in attending [facility] again for 1 week after DC and stated [facility] was helpful in developing coping and groups were supportive. Pt. interested in returning to psych MD but advised that he likely will not accept her back due to recent SA. Pt. provided verbal consent to reach out to psych MD in effort to first refer back. Pt. states she is open to [facility] referral for both psych. MD and therapist if cannot return to Dr. Ha. Pt. states she currently has no outpt. therapist. Pt. familiar to [facility] from the past and described a poor experience with therapist; SW encouraged pt. to advocate for herself around therapist rapport if future issues occurred. Pt. states she had an interview with Christine for the outpt. DBT group but missed while admitted to . Pt. would like to pursue a DBT group again after DC. Pt. advised that she is expected to return to work this Monday and is unsure about decisions for partial hosp. program after DC based on work needs; pt. states she will discuss with her mother. Pt. provided SW consent to speak to pt.'s mother around DC plans and requested SW to call prior to Monday. Pt. also had several inquiries about her medications which SW directed pt. to address with psych NP/MD. Pt. During meeting, pt. did discuss some mental health needs related to her current admission. Pt. denied current SI in meeting. Pt. had difficulty identifying trigger to recent SA but did endorse worsening depressed mood for the past 2 weeks. Pt. SW met with pt. to begin DC planning. Pt. reported feeling anxious but also less depressed than yesterday. Pt. attributed current anxiety to the uncertainty of her DC plans at this point. SW explored pt.'s DC needs. Pt. confirmed she was attending [facility] program and seeing psych MD Sb Ha prior to admission. Pt. expressed interest in attending [facility] again for 1 week after DC and stated [facility] was helpful in developing coping and groups were supportive. Pt. interested in returning to psych MD but advised that he likely will not accept her back due to recent SA. Pt. provided verbal consent to reach out to psych MD in effort to first refer back. Pt. states she is open to [facility] referral for both psych. MD and therapist if cannot return to Dr. Ha. Pt. states she currently has no outpt. therapist. Pt. familiar to [facility] from the past and described a poor experience with therapist; SW encouraged pt. to advocate for herself around therapist rapport if future issues occurred. Pt. states she had an interview with Christine for the outpt. DBT group but missed while admitted to . Pt. would like to pursue a DBT group again after DC. Pt. advised that she is expected to return to work this Monday and is unsure about decisions for partial hosp. program after DC based on work needs; pt. states she will discuss with her mother. Pt. provided SW consent to speak to pt.'s mother around DC plans and requested SW to call prior to Monday. Pt. also had several inquiries about her medications which SW directed pt. to address with psych NP/MD. Pt. advised that she had been taking her medications as prescribed and continued to express desire for Klonopin. SW inquired about any concerns for abuse of requested meds. but pt. denied. Pt. denied interest in substance abuse counseling program also for DC. During meeting, pt. did discuss some mental health needs related to her current admission. Pt. denied current SI in meeting. Pt. had difficulty identifying trigger to recent SA but did endorse worsening depressed mood for the past 2 weeks. SW notified psych NP that pt. had inquiries about meds. KRISTEL called pt.'s mother later after meeting with pt. Pt.'s mother also have several inquiries about medications which SW explained would need psych NP/MD for answers. SW agreed to relay to pt. treatment team mother's in speaking to NP/MD about meds. and also concerns for pt.'s depressive symptoms. Pt.'s mother expressed concerns that pt. appears to be getting closer to completing suicide after most recent attempt. Pt.'s mother advised that pt. had been compliant with medications and attending outpt. treatment. SW to FU.

## 2019-04-26 NOTE — PROGRESS NOTE BEHAVIORAL HEALTH - NSBHCHARTREVIEWLAB_PSY_A_CORE FT
13.5   12.49 )-----------( 348      ( 2019 17:41 )             38.8           137  |  104  |  12  ----------------------------<  91  3.9   |  24  |  0.82    Ca    9.2      2019 17:41    TPro  8.4<H>  /  Alb  3.4  /  TBili  0.5  /  DBili  x   /  AST  133<H>  /  ALT  112<H>  /  AlkPhos  94                Urinalysis Basic - ( 2019 19:13 )    Color: Yellow / Appearance: Clear / S.020 / pH: x  Gluc: x / Ketone: Trace  / Bili: Negative / Urobili: Negative mg/dL   Blood: x / Protein: 100 mg/dL / Nitrite: Negative   Leuk Esterase: Negative / RBC: 6-10 /HPF / WBC 0-2   Sq Epi: x / Non Sq Epi: Moderate / Bacteria: Few        PT/INR - ( 2019 17:41 )   PT: 13.1 sec;   INR: 1.17 ratio         PTT - ( 2019 17:41 )  PTT:32.2 sec

## 2019-04-26 NOTE — PROGRESS NOTE BEHAVIORAL HEALTH - NSBHFUPSUICINTERVALFT_PSY_A_CORE
passive suicidal ideation of wanting to die; no active suicidal ideation; no thoughts of wanting to harm self on unit.

## 2019-04-26 NOTE — PROGRESS NOTE BEHAVIORAL HEALTH - PROBLEM SELECTOR PLAN 1
Cymbalta 60 mg daily and 30 mg at bedtime  Abilify 5 mg daily  Lamictal 200 mg at bedtime  Remeron 15 mg at bedtime

## 2019-04-26 NOTE — PROGRESS NOTE BEHAVIORAL HEALTH - NSBHFUPINTERVALHXFT_PSY_A_CORE
Patient presenting anxious, however seemingly calm state, pleasant during interview, apologizing for yesterdays behavior where she was erratic, belligerent, agitated, aggressive, threatening, demanding, loud, profane, not responding to verbal deescalation, requiring STAT IM PRN medication which was effective: Haldol 5 mg Ativan 2 mg and Benadryl 50 mg. Patient complaining of being allergic to Haldol however this is invalid and patient has been given this medication many times before with no issues. Patient complaining of inner restlessness however likely confabulating symptoms. Patient otherwise state in better mood control today. Reports to have lost control yesterday out of frustration with being admitted. Reports suicide attempt being impulsive secondary to "wanting to die," which is a chronic feeling she experiences. Endorsing passive suicidal ideation but denying active suicidal ideation. Denies wanting to hurt self on unit. Reports poor sleep last night: agreeable to increasing Remeron 15 mg and adding melatonin which she states was effective for her in past. Patient remains perseverative on medications: which work, which do not, which she should be on etc. Patient denying manic / psychotic symptoms. Denies other complaints. No side effects observed. X ray completed on R hand which is swollen from ?IV. Results pending.

## 2019-04-27 RX ORDER — DIPHENHYDRAMINE HCL 50 MG
50 CAPSULE ORAL EVERY 6 HOURS
Qty: 0 | Refills: 0 | Status: DISCONTINUED | OUTPATIENT
Start: 2019-04-27 | End: 2019-05-03

## 2019-04-27 RX ADMIN — Medication 5 MILLIGRAM(S): at 21:39

## 2019-04-27 RX ADMIN — Medication 650 MILLIGRAM(S): at 01:02

## 2019-04-27 RX ADMIN — LAMOTRIGINE 200 MILLIGRAM(S): 25 TABLET, ORALLY DISINTEGRATING ORAL at 21:39

## 2019-04-27 RX ADMIN — Medication 600 MILLIGRAM(S): at 14:36

## 2019-04-27 RX ADMIN — Medication 600 MILLIGRAM(S): at 15:36

## 2019-04-27 RX ADMIN — Medication 650 MILLIGRAM(S): at 16:40

## 2019-04-27 RX ADMIN — Medication 50 MILLIGRAM(S): at 14:36

## 2019-04-27 RX ADMIN — ARIPIPRAZOLE 5 MILLIGRAM(S): 15 TABLET ORAL at 09:15

## 2019-04-27 RX ADMIN — GABAPENTIN 300 MILLIGRAM(S): 400 CAPSULE ORAL at 16:40

## 2019-04-27 RX ADMIN — OXYCODONE AND ACETAMINOPHEN 1 TABLET(S): 5; 325 TABLET ORAL at 10:36

## 2019-04-27 RX ADMIN — Medication 650 MILLIGRAM(S): at 18:00

## 2019-04-27 RX ADMIN — MENTHOL AND METHYL SALICYLATE 1 APPLICATION(S): 10; 30 STICK TOPICAL at 09:16

## 2019-04-27 RX ADMIN — GABAPENTIN 300 MILLIGRAM(S): 400 CAPSULE ORAL at 09:14

## 2019-04-27 RX ADMIN — OXYCODONE AND ACETAMINOPHEN 1 TABLET(S): 5; 325 TABLET ORAL at 02:32

## 2019-04-27 RX ADMIN — DULOXETINE HYDROCHLORIDE 30 MILLIGRAM(S): 30 CAPSULE, DELAYED RELEASE ORAL at 21:39

## 2019-04-27 RX ADMIN — OXYCODONE AND ACETAMINOPHEN 1 TABLET(S): 5; 325 TABLET ORAL at 21:03

## 2019-04-27 RX ADMIN — OXYCODONE AND ACETAMINOPHEN 1 TABLET(S): 5; 325 TABLET ORAL at 02:31

## 2019-04-27 RX ADMIN — Medication 50 MILLIGRAM(S): at 12:25

## 2019-04-27 RX ADMIN — Medication 2 MILLIGRAM(S): at 15:21

## 2019-04-27 RX ADMIN — LISINOPRIL 20 MILLIGRAM(S): 2.5 TABLET ORAL at 09:14

## 2019-04-27 RX ADMIN — MENTHOL AND METHYL SALICYLATE 1 APPLICATION(S): 10; 30 STICK TOPICAL at 21:39

## 2019-04-27 RX ADMIN — Medication 600 MILLIGRAM(S): at 05:34

## 2019-04-27 RX ADMIN — GABAPENTIN 300 MILLIGRAM(S): 400 CAPSULE ORAL at 21:39

## 2019-04-27 RX ADMIN — DULOXETINE HYDROCHLORIDE 60 MILLIGRAM(S): 30 CAPSULE, DELAYED RELEASE ORAL at 09:15

## 2019-04-27 RX ADMIN — Medication 0.5 MILLIGRAM(S): at 21:03

## 2019-04-27 RX ADMIN — Medication 0.5 MILLIGRAM(S): at 09:15

## 2019-04-27 RX ADMIN — Medication 50 MILLIGRAM(S): at 04:05

## 2019-04-27 RX ADMIN — LISINOPRIL 20 MILLIGRAM(S): 2.5 TABLET ORAL at 21:39

## 2019-04-27 RX ADMIN — Medication 50 MILLIGRAM(S): at 19:03

## 2019-04-27 RX ADMIN — OXYCODONE AND ACETAMINOPHEN 1 TABLET(S): 5; 325 TABLET ORAL at 22:30

## 2019-04-27 RX ADMIN — MIRTAZAPINE 15 MILLIGRAM(S): 45 TABLET, ORALLY DISINTEGRATING ORAL at 21:39

## 2019-04-27 RX ADMIN — OXYCODONE AND ACETAMINOPHEN 1 TABLET(S): 5; 325 TABLET ORAL at 11:30

## 2019-04-27 NOTE — PROGRESS NOTE BEHAVIORAL HEALTH - NSBHFUPINTERVALHXFT_PSY_A_CORE
Pt with hx of preferring the Cogentin, Will give prn of benadryl .  Reviewed the lab results, ordered the lipid profile, Hga1c and the TSH for completeness of assessment .  Pt with no Pt with hx of preferring the Cogentin, Will give prn of benadryl .  Reviewed the lab results, ordered the lipid profile, Hga1c and the TSH for completeness of assessment .  Pt with no insight and frequent request for changing medications. Pt seen later interactive with peers and not appearing to be in distress.

## 2019-04-28 LAB
CHOLEST SERPL-MCNC: 203 MG/DL — HIGH (ref 10–199)
HBA1C BLD-MCNC: 5.4 % — SIGNIFICANT CHANGE UP (ref 4–5.6)
HDLC SERPL-MCNC: 41 MG/DL — LOW
LIPID PNL WITH DIRECT LDL SERPL: 134 MG/DL — HIGH
TOTAL CHOLESTEROL/HDL RATIO MEASUREMENT: 5 RATIO — SIGNIFICANT CHANGE UP (ref 3.3–7.1)
TRIGL SERPL-MCNC: 138 MG/DL — SIGNIFICANT CHANGE UP (ref 10–149)
TSH SERPL-MCNC: 1.21 UU/ML — SIGNIFICANT CHANGE UP (ref 0.34–4.82)

## 2019-04-28 RX ORDER — TRAZODONE HCL 50 MG
100 TABLET ORAL ONCE
Qty: 0 | Refills: 0 | Status: COMPLETED | OUTPATIENT
Start: 2019-04-28 | End: 2019-04-28

## 2019-04-28 RX ADMIN — Medication 50 MILLIGRAM(S): at 13:00

## 2019-04-28 RX ADMIN — MENTHOL AND METHYL SALICYLATE 1 APPLICATION(S): 10; 30 STICK TOPICAL at 21:01

## 2019-04-28 RX ADMIN — OXYCODONE AND ACETAMINOPHEN 1 TABLET(S): 5; 325 TABLET ORAL at 22:29

## 2019-04-28 RX ADMIN — Medication 2 MILLIGRAM(S): at 14:25

## 2019-04-28 RX ADMIN — Medication 600 MILLIGRAM(S): at 19:30

## 2019-04-28 RX ADMIN — Medication 50 MILLIGRAM(S): at 02:06

## 2019-04-28 RX ADMIN — DULOXETINE HYDROCHLORIDE 30 MILLIGRAM(S): 30 CAPSULE, DELAYED RELEASE ORAL at 21:01

## 2019-04-28 RX ADMIN — GABAPENTIN 300 MILLIGRAM(S): 400 CAPSULE ORAL at 17:35

## 2019-04-28 RX ADMIN — Medication 650 MILLIGRAM(S): at 02:06

## 2019-04-28 RX ADMIN — DULOXETINE HYDROCHLORIDE 60 MILLIGRAM(S): 30 CAPSULE, DELAYED RELEASE ORAL at 09:07

## 2019-04-28 RX ADMIN — OXYCODONE AND ACETAMINOPHEN 1 TABLET(S): 5; 325 TABLET ORAL at 10:00

## 2019-04-28 RX ADMIN — LISINOPRIL 20 MILLIGRAM(S): 2.5 TABLET ORAL at 09:07

## 2019-04-28 RX ADMIN — Medication 0.5 MILLIGRAM(S): at 21:01

## 2019-04-28 RX ADMIN — Medication 650 MILLIGRAM(S): at 13:00

## 2019-04-28 RX ADMIN — Medication 650 MILLIGRAM(S): at 14:00

## 2019-04-28 RX ADMIN — Medication 100 MILLIGRAM(S): at 21:37

## 2019-04-28 RX ADMIN — Medication 0.5 MILLIGRAM(S): at 09:07

## 2019-04-28 RX ADMIN — Medication 30 MILLILITER(S): at 14:34

## 2019-04-28 RX ADMIN — Medication 600 MILLIGRAM(S): at 18:11

## 2019-04-28 RX ADMIN — Medication 5 MILLIGRAM(S): at 21:01

## 2019-04-28 RX ADMIN — MENTHOL AND METHYL SALICYLATE 1 APPLICATION(S): 10; 30 STICK TOPICAL at 09:08

## 2019-04-28 RX ADMIN — OXYCODONE AND ACETAMINOPHEN 1 TABLET(S): 5; 325 TABLET ORAL at 21:01

## 2019-04-28 RX ADMIN — MIRTAZAPINE 15 MILLIGRAM(S): 45 TABLET, ORALLY DISINTEGRATING ORAL at 21:01

## 2019-04-28 RX ADMIN — LISINOPRIL 20 MILLIGRAM(S): 2.5 TABLET ORAL at 21:01

## 2019-04-28 RX ADMIN — LAMOTRIGINE 200 MILLIGRAM(S): 25 TABLET, ORALLY DISINTEGRATING ORAL at 21:01

## 2019-04-28 RX ADMIN — GABAPENTIN 300 MILLIGRAM(S): 400 CAPSULE ORAL at 12:32

## 2019-04-28 RX ADMIN — Medication 600 MILLIGRAM(S): at 02:04

## 2019-04-28 RX ADMIN — GABAPENTIN 300 MILLIGRAM(S): 400 CAPSULE ORAL at 21:01

## 2019-04-28 RX ADMIN — Medication 600 MILLIGRAM(S): at 00:15

## 2019-04-28 RX ADMIN — OXYCODONE AND ACETAMINOPHEN 1 TABLET(S): 5; 325 TABLET ORAL at 09:06

## 2019-04-28 NOTE — CHART NOTE - NSCHARTNOTEFT_GEN_A_CORE
Pt. requested to speak to SW on 4/17 at approx. 8:15pm to discuss DC needs further. Pt. confirmed that she does want referral to [facility] hosp. program at [place]. Pt. also relayed that her mother believes pt. would benefit from MRI to rule out pituitary gland issues that could account for pt.'s mental health needs. Pt. requested that request for MRI be relayed to pt.'s MD. KRISTEL to FU.

## 2019-04-28 NOTE — PROGRESS NOTE BEHAVIORAL HEALTH - NSBHFUPINTERVALHXFT_PSY_A_CORE
Pt requesting trazodone for prn for insomnia and maalox for "in digestion"  Reviewed the vitals and the pt with no hypotension .LFTs remain elevated , and pt already on Remeron..

## 2019-04-28 NOTE — CHART NOTE - NSCHARTNOTEFT_GEN_A_CORE
SW's mother requesting to meet with SW during visiting hours. SW advised that she was unavailable at this time for meeting. SW to provide handoff to Monday SW to FU with mother.

## 2019-04-29 LAB
APPEARANCE UR: CLEAR — SIGNIFICANT CHANGE UP
BACTERIA # UR AUTO: ABNORMAL
BILIRUB UR-MCNC: NEGATIVE — SIGNIFICANT CHANGE UP
COLOR SPEC: YELLOW — SIGNIFICANT CHANGE UP
COMMENT - URINE: SIGNIFICANT CHANGE UP
DIFF PNL FLD: ABNORMAL
EPI CELLS # UR: SIGNIFICANT CHANGE UP
GLUCOSE UR QL: NEGATIVE MG/DL — SIGNIFICANT CHANGE UP
KETONES UR-MCNC: ABNORMAL
LEUKOCYTE ESTERASE UR-ACNC: NEGATIVE — SIGNIFICANT CHANGE UP
NITRITE UR-MCNC: NEGATIVE — SIGNIFICANT CHANGE UP
PH UR: 6 — SIGNIFICANT CHANGE UP (ref 5–8)
PROT UR-MCNC: 100 MG/DL
RBC CASTS # UR COMP ASSIST: SIGNIFICANT CHANGE UP /HPF (ref 0–4)
SP GR SPEC: 1.02 — SIGNIFICANT CHANGE UP (ref 1.01–1.02)
UROBILINOGEN FLD QL: NEGATIVE MG/DL — SIGNIFICANT CHANGE UP
WBC UR QL: NEGATIVE — SIGNIFICANT CHANGE UP

## 2019-04-29 PROCEDURE — 99233 SBSQ HOSP IP/OBS HIGH 50: CPT

## 2019-04-29 RX ORDER — HYDROXYZINE HCL 10 MG
100 TABLET ORAL EVERY 8 HOURS
Qty: 0 | Refills: 0 | Status: DISCONTINUED | OUTPATIENT
Start: 2019-04-29 | End: 2019-05-03

## 2019-04-29 RX ORDER — MIRTAZAPINE 45 MG/1
7.5 TABLET, ORALLY DISINTEGRATING ORAL AT BEDTIME
Qty: 0 | Refills: 0 | Status: COMPLETED | OUTPATIENT
Start: 2019-04-29 | End: 2019-04-29

## 2019-04-29 RX ORDER — TRAZODONE HCL 50 MG
100 TABLET ORAL AT BEDTIME
Qty: 0 | Refills: 0 | Status: DISCONTINUED | OUTPATIENT
Start: 2019-04-29 | End: 2019-05-03

## 2019-04-29 RX ORDER — GABAPENTIN 400 MG/1
600 CAPSULE ORAL
Qty: 0 | Refills: 0 | Status: DISCONTINUED | OUTPATIENT
Start: 2019-04-29 | End: 2019-05-03

## 2019-04-29 RX ADMIN — LAMOTRIGINE 200 MILLIGRAM(S): 25 TABLET, ORALLY DISINTEGRATING ORAL at 21:12

## 2019-04-29 RX ADMIN — Medication 5 MILLIGRAM(S): at 21:12

## 2019-04-29 RX ADMIN — Medication 50 MILLIGRAM(S): at 02:06

## 2019-04-29 RX ADMIN — Medication 600 MILLIGRAM(S): at 14:57

## 2019-04-29 RX ADMIN — LISINOPRIL 20 MILLIGRAM(S): 2.5 TABLET ORAL at 09:26

## 2019-04-29 RX ADMIN — Medication 650 MILLIGRAM(S): at 02:08

## 2019-04-29 RX ADMIN — GABAPENTIN 300 MILLIGRAM(S): 400 CAPSULE ORAL at 09:26

## 2019-04-29 RX ADMIN — GABAPENTIN 600 MILLIGRAM(S): 400 CAPSULE ORAL at 21:11

## 2019-04-29 RX ADMIN — Medication 600 MILLIGRAM(S): at 06:06

## 2019-04-29 RX ADMIN — MENTHOL AND METHYL SALICYLATE 1 APPLICATION(S): 10; 30 STICK TOPICAL at 09:26

## 2019-04-29 RX ADMIN — MIRTAZAPINE 7.5 MILLIGRAM(S): 45 TABLET, ORALLY DISINTEGRATING ORAL at 21:13

## 2019-04-29 RX ADMIN — DULOXETINE HYDROCHLORIDE 30 MILLIGRAM(S): 30 CAPSULE, DELAYED RELEASE ORAL at 21:11

## 2019-04-29 RX ADMIN — DULOXETINE HYDROCHLORIDE 60 MILLIGRAM(S): 30 CAPSULE, DELAYED RELEASE ORAL at 09:26

## 2019-04-29 RX ADMIN — Medication 50 MILLIGRAM(S): at 09:27

## 2019-04-29 RX ADMIN — Medication 0.5 MILLIGRAM(S): at 21:11

## 2019-04-29 RX ADMIN — OXYCODONE AND ACETAMINOPHEN 1 TABLET(S): 5; 325 TABLET ORAL at 21:36

## 2019-04-29 RX ADMIN — LISINOPRIL 20 MILLIGRAM(S): 2.5 TABLET ORAL at 21:16

## 2019-04-29 RX ADMIN — Medication 650 MILLIGRAM(S): at 16:24

## 2019-04-29 RX ADMIN — Medication 0.5 MILLIGRAM(S): at 09:26

## 2019-04-29 RX ADMIN — Medication 100 MILLIGRAM(S): at 21:13

## 2019-04-29 RX ADMIN — Medication 100 MILLIGRAM(S): at 15:12

## 2019-04-29 RX ADMIN — OXYCODONE AND ACETAMINOPHEN 1 TABLET(S): 5; 325 TABLET ORAL at 21:24

## 2019-04-29 RX ADMIN — Medication 50 MILLIGRAM(S): at 13:18

## 2019-04-29 RX ADMIN — Medication 650 MILLIGRAM(S): at 21:43

## 2019-04-29 RX ADMIN — Medication 50 MILLIGRAM(S): at 21:13

## 2019-04-29 RX ADMIN — MENTHOL AND METHYL SALICYLATE 1 APPLICATION(S): 10; 30 STICK TOPICAL at 21:17

## 2019-04-29 RX ADMIN — OXYCODONE AND ACETAMINOPHEN 1 TABLET(S): 5; 325 TABLET ORAL at 10:15

## 2019-04-29 RX ADMIN — OXYCODONE AND ACETAMINOPHEN 1 TABLET(S): 5; 325 TABLET ORAL at 09:27

## 2019-04-29 RX ADMIN — Medication 650 MILLIGRAM(S): at 02:06

## 2019-04-29 NOTE — PROGRESS NOTE BEHAVIORAL HEALTH - NSBHFUPINTERVALHXFT_PSY_A_CORE
Patient presenting reactive yet significantly anxious, restless, emotionally distressed, complaining of depressed mood intermittent hopelessness although no suicidal ideation; stating still feeling dysregulated, irritable and labile stating wanting more medication management to help regulate mood symptoms. Reports last 48 hours have been "bad" with mood dysregulation, agitation, verbal and physical aggression to staff leading to PRN medication and restraints. Reports however being motivated for change and treatment. Denies suicidal ideation at this time, stating "sometimes it comes and I want to die." Denies active suicidal ideation. Denies other manic / psychotic symptoms. Reports sleep being better with Trazodone: willing to switch from Remeron. Denies issues with appetite. Patient presenting reactive yet significantly anxious, restless, emotionally distressed, complaining of depressed mood intermittent hopelessness although no suicidal ideation; stating still feeling dysregulated, irritable and labile stating wanting more medication management to help regulate mood symptoms. Reports last 48 hours have been "bad" with mood dysregulation, agitation, verbal and physical aggression to staff leading to PRN medication and restraints. Reports however being motivated for change and treatment. Denies suicidal ideation at this time, stating "sometimes it comes and I want to die." Denies active suicidal ideation. Denies other manic / psychotic symptoms. Reports wanting to discontinue Abilify. Reports sleep being better with Trazodone: willing to switch from Remeron. Denies issues with appetite.

## 2019-04-29 NOTE — PROGRESS NOTE BEHAVIORAL HEALTH - NSBHATTESTSEENBY_PSY_A_CORE
attending Psychiatrist without NP/Trainee NP with telephonic supervision from Attending Psychiatrist

## 2019-04-29 NOTE — PROGRESS NOTE BEHAVIORAL HEALTH - PROBLEM SELECTOR PLAN 1
Cymbalta 60 mg day time and 30 mg at bedtime  Trazadone 100 mg at bedtime  Lamictal 200 mg at bedtime. Cymbalta 60 mg day time and 30 mg at bedtime  Trazadone 100 mg at bedtime  Lamictal 200 mg at bedtime.  Discontinue Abilify as per patient request.

## 2019-04-30 DIAGNOSIS — F33.1 MAJOR DEPRESSIVE DISORDER, RECURRENT, MODERATE: ICD-10-CM

## 2019-04-30 DIAGNOSIS — N39.0 URINARY TRACT INFECTION, SITE NOT SPECIFIED: ICD-10-CM

## 2019-04-30 LAB
ALBUMIN SERPL ELPH-MCNC: 3.3 G/DL — SIGNIFICANT CHANGE UP (ref 3.3–5)
ALP SERPL-CCNC: 99 U/L — SIGNIFICANT CHANGE UP (ref 40–120)
ALT FLD-CCNC: 103 U/L — HIGH (ref 12–78)
AST SERPL-CCNC: 115 U/L — HIGH (ref 15–37)
BASOPHILS # BLD AUTO: 0.07 K/UL — SIGNIFICANT CHANGE UP (ref 0–0.2)
BASOPHILS NFR BLD AUTO: 0.6 % — SIGNIFICANT CHANGE UP (ref 0–2)
BILIRUB DIRECT SERPL-MCNC: 0.1 MG/DL — SIGNIFICANT CHANGE UP (ref 0–0.2)
BILIRUB INDIRECT FLD-MCNC: 0.2 MG/DL — SIGNIFICANT CHANGE UP (ref 0.2–1)
BILIRUB SERPL-MCNC: 0.3 MG/DL — SIGNIFICANT CHANGE UP (ref 0.2–1.2)
CHOLEST SERPL-MCNC: 204 MG/DL — HIGH (ref 10–199)
EOSINOPHIL # BLD AUTO: 0.11 K/UL — SIGNIFICANT CHANGE UP (ref 0–0.5)
EOSINOPHIL NFR BLD AUTO: 1 % — SIGNIFICANT CHANGE UP (ref 0–6)
HCT VFR BLD CALC: 39.1 % — SIGNIFICANT CHANGE UP (ref 34.5–45)
HDLC SERPL-MCNC: 46 MG/DL — LOW
HGB BLD-MCNC: 13.4 G/DL — SIGNIFICANT CHANGE UP (ref 11.5–15.5)
IMM GRANULOCYTES NFR BLD AUTO: 0.3 % — SIGNIFICANT CHANGE UP (ref 0–1.5)
LIPID PNL WITH DIRECT LDL SERPL: 119 MG/DL — HIGH
LYMPHOCYTES # BLD AUTO: 28.1 % — SIGNIFICANT CHANGE UP (ref 13–44)
LYMPHOCYTES # BLD AUTO: 3.22 K/UL — SIGNIFICANT CHANGE UP (ref 1–3.3)
MCHC RBC-ENTMCNC: 27.6 PG — SIGNIFICANT CHANGE UP (ref 27–34)
MCHC RBC-ENTMCNC: 34.3 GM/DL — SIGNIFICANT CHANGE UP (ref 32–36)
MCV RBC AUTO: 80.6 FL — SIGNIFICANT CHANGE UP (ref 80–100)
MONOCYTES # BLD AUTO: 0.75 K/UL — SIGNIFICANT CHANGE UP (ref 0–0.9)
MONOCYTES NFR BLD AUTO: 6.6 % — SIGNIFICANT CHANGE UP (ref 2–14)
NEUTROPHILS # BLD AUTO: 7.25 K/UL — SIGNIFICANT CHANGE UP (ref 1.8–7.4)
NEUTROPHILS NFR BLD AUTO: 63.4 % — SIGNIFICANT CHANGE UP (ref 43–77)
NRBC # BLD: 0 /100 WBCS — SIGNIFICANT CHANGE UP (ref 0–0)
PLATELET # BLD AUTO: 384 K/UL — SIGNIFICANT CHANGE UP (ref 150–400)
PROT SERPL-MCNC: 8.2 GM/DL — SIGNIFICANT CHANGE UP (ref 6–8.3)
RBC # BLD: 4.85 M/UL — SIGNIFICANT CHANGE UP (ref 3.8–5.2)
RBC # FLD: 13.6 % — SIGNIFICANT CHANGE UP (ref 10.3–14.5)
TOTAL CHOLESTEROL/HDL RATIO MEASUREMENT: 4.4 RATIO — SIGNIFICANT CHANGE UP (ref 3.3–7.1)
TRIGL SERPL-MCNC: 194 MG/DL — HIGH (ref 10–149)
WBC # BLD: 11.44 K/UL — HIGH (ref 3.8–10.5)
WBC # FLD AUTO: 11.44 K/UL — HIGH (ref 3.8–10.5)

## 2019-04-30 PROCEDURE — 99233 SBSQ HOSP IP/OBS HIGH 50: CPT

## 2019-04-30 RX ORDER — CEPHALEXIN 500 MG
500 CAPSULE ORAL EVERY 12 HOURS
Qty: 0 | Refills: 0 | Status: COMPLETED | OUTPATIENT
Start: 2019-04-30 | End: 2019-05-03

## 2019-04-30 RX ORDER — CLONAZEPAM 1 MG
0.5 TABLET ORAL
Qty: 0 | Refills: 0 | Status: DISCONTINUED | OUTPATIENT
Start: 2019-04-30 | End: 2019-05-03

## 2019-04-30 RX ADMIN — Medication 600 MILLIGRAM(S): at 17:47

## 2019-04-30 RX ADMIN — Medication 100 MILLIGRAM(S): at 21:09

## 2019-04-30 RX ADMIN — LAMOTRIGINE 200 MILLIGRAM(S): 25 TABLET, ORALLY DISINTEGRATING ORAL at 21:09

## 2019-04-30 RX ADMIN — Medication 5 MILLIGRAM(S): at 21:09

## 2019-04-30 RX ADMIN — Medication 0.5 MILLIGRAM(S): at 15:09

## 2019-04-30 RX ADMIN — OXYCODONE AND ACETAMINOPHEN 1 TABLET(S): 5; 325 TABLET ORAL at 21:08

## 2019-04-30 RX ADMIN — Medication 650 MILLIGRAM(S): at 14:07

## 2019-04-30 RX ADMIN — GABAPENTIN 600 MILLIGRAM(S): 400 CAPSULE ORAL at 09:23

## 2019-04-30 RX ADMIN — Medication 50 MILLIGRAM(S): at 14:06

## 2019-04-30 RX ADMIN — MENTHOL AND METHYL SALICYLATE 1 APPLICATION(S): 10; 30 STICK TOPICAL at 09:24

## 2019-04-30 RX ADMIN — Medication 0.5 MILLIGRAM(S): at 21:08

## 2019-04-30 RX ADMIN — Medication 650 MILLIGRAM(S): at 14:59

## 2019-04-30 RX ADMIN — Medication 600 MILLIGRAM(S): at 18:56

## 2019-04-30 RX ADMIN — Medication 650 MILLIGRAM(S): at 03:04

## 2019-04-30 RX ADMIN — Medication 600 MILLIGRAM(S): at 05:18

## 2019-04-30 RX ADMIN — OXYCODONE AND ACETAMINOPHEN 1 TABLET(S): 5; 325 TABLET ORAL at 10:20

## 2019-04-30 RX ADMIN — DULOXETINE HYDROCHLORIDE 30 MILLIGRAM(S): 30 CAPSULE, DELAYED RELEASE ORAL at 21:08

## 2019-04-30 RX ADMIN — Medication 500 MILLIGRAM(S): at 21:09

## 2019-04-30 RX ADMIN — LISINOPRIL 20 MILLIGRAM(S): 2.5 TABLET ORAL at 21:09

## 2019-04-30 RX ADMIN — MENTHOL AND METHYL SALICYLATE 1 APPLICATION(S): 10; 30 STICK TOPICAL at 22:20

## 2019-04-30 RX ADMIN — Medication 500 MILLIGRAM(S): at 13:00

## 2019-04-30 RX ADMIN — Medication 100 MILLIGRAM(S): at 09:24

## 2019-04-30 RX ADMIN — LISINOPRIL 20 MILLIGRAM(S): 2.5 TABLET ORAL at 09:25

## 2019-04-30 RX ADMIN — Medication 600 MILLIGRAM(S): at 07:38

## 2019-04-30 RX ADMIN — Medication 650 MILLIGRAM(S): at 02:04

## 2019-04-30 RX ADMIN — Medication 30 MILLILITER(S): at 11:53

## 2019-04-30 RX ADMIN — OXYCODONE AND ACETAMINOPHEN 1 TABLET(S): 5; 325 TABLET ORAL at 09:23

## 2019-04-30 RX ADMIN — GABAPENTIN 600 MILLIGRAM(S): 400 CAPSULE ORAL at 21:08

## 2019-04-30 RX ADMIN — DULOXETINE HYDROCHLORIDE 60 MILLIGRAM(S): 30 CAPSULE, DELAYED RELEASE ORAL at 09:22

## 2019-04-30 NOTE — PROGRESS NOTE BEHAVIORAL HEALTH - AXIS III
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

## 2019-04-30 NOTE — PROGRESS NOTE BEHAVIORAL HEALTH - NSBHCHARTREVIEWLAB_PSY_A_CORE FT
Thyroid Stimulating Hormone, Serum: 1.21 uU/mL (04.28.19 @ 07:41) Thyroid Stimulating Hormone, Serum: 1.21 uU/mL (04.28.19 @ 07:41)  WBC 11.29: trending down.  UA indicative of UTI

## 2019-04-30 NOTE — PROGRESS NOTE BEHAVIORAL HEALTH - NSBHFUPINTERVALHXFT_PSY_A_CORE
Patient presenting reactive with less anxiety, good emotional regulation, remains complaining of depressed mood intermittent hopelessness although no suicidal ideation; reports feeling less irritable and labile stating wanting see how medication management changes made will affect her today. Reports last 48 - 72 hours have been "bad" with mood dysregulation, agitation, irritability, however yesterday did not resort to verbal and physical aggression to staff; with no need for PRN medication and restraints. Reports however being motivated for change and treatment. Denies suicidal ideation at this time, stating "sometimes it comes and I want to die" denying plan or intent. Denies active suicidal ideation. Denies other manic / psychotic symptoms. Denies inner restlessness. Reports sleep being better with Trazodone: willing to switch from Remeron. Denies issues with appetite. Denies side effects and none observed.

## 2019-04-30 NOTE — PROGRESS NOTE BEHAVIORAL HEALTH - PROBLEM SELECTOR PLAN 1
Cymbalta 60 mg day time and 30 mg at bedtime  Trazadone 100 mg at bedtime  Lamictal 200 mg at bedtime.  Discontinue Abilify as per patient request.

## 2019-05-01 PROCEDURE — 99233 SBSQ HOSP IP/OBS HIGH 50: CPT

## 2019-05-01 RX ORDER — OXYCODONE AND ACETAMINOPHEN 5; 325 MG/1; MG/1
1 TABLET ORAL
Qty: 0 | Refills: 0 | Status: DISCONTINUED | OUTPATIENT
Start: 2019-05-01 | End: 2019-05-03

## 2019-05-01 RX ADMIN — LAMOTRIGINE 200 MILLIGRAM(S): 25 TABLET, ORALLY DISINTEGRATING ORAL at 21:06

## 2019-05-01 RX ADMIN — GABAPENTIN 600 MILLIGRAM(S): 400 CAPSULE ORAL at 09:19

## 2019-05-01 RX ADMIN — OXYCODONE AND ACETAMINOPHEN 1 TABLET(S): 5; 325 TABLET ORAL at 15:19

## 2019-05-01 RX ADMIN — Medication 0.5 MILLIGRAM(S): at 21:18

## 2019-05-01 RX ADMIN — Medication 650 MILLIGRAM(S): at 03:41

## 2019-05-01 RX ADMIN — MENTHOL AND METHYL SALICYLATE 1 APPLICATION(S): 10; 30 STICK TOPICAL at 09:25

## 2019-05-01 RX ADMIN — OXYCODONE AND ACETAMINOPHEN 1 TABLET(S): 5; 325 TABLET ORAL at 09:20

## 2019-05-01 RX ADMIN — Medication 30 MILLILITER(S): at 06:39

## 2019-05-01 RX ADMIN — OXYCODONE AND ACETAMINOPHEN 1 TABLET(S): 5; 325 TABLET ORAL at 10:04

## 2019-05-01 RX ADMIN — DULOXETINE HYDROCHLORIDE 30 MILLIGRAM(S): 30 CAPSULE, DELAYED RELEASE ORAL at 21:06

## 2019-05-01 RX ADMIN — Medication 600 MILLIGRAM(S): at 12:15

## 2019-05-01 RX ADMIN — Medication 100 MILLIGRAM(S): at 09:20

## 2019-05-01 RX ADMIN — LISINOPRIL 20 MILLIGRAM(S): 2.5 TABLET ORAL at 09:19

## 2019-05-01 RX ADMIN — Medication 600 MILLIGRAM(S): at 18:10

## 2019-05-01 RX ADMIN — MENTHOL AND METHYL SALICYLATE 1 APPLICATION(S): 10; 30 STICK TOPICAL at 21:12

## 2019-05-01 RX ADMIN — OXYCODONE AND ACETAMINOPHEN 1 TABLET(S): 5; 325 TABLET ORAL at 21:11

## 2019-05-01 RX ADMIN — Medication 0.5 MILLIGRAM(S): at 14:54

## 2019-05-01 RX ADMIN — GABAPENTIN 600 MILLIGRAM(S): 400 CAPSULE ORAL at 21:06

## 2019-05-01 RX ADMIN — Medication 600 MILLIGRAM(S): at 11:34

## 2019-05-01 RX ADMIN — Medication 500 MILLIGRAM(S): at 21:05

## 2019-05-01 RX ADMIN — Medication 100 MILLIGRAM(S): at 21:06

## 2019-05-01 RX ADMIN — Medication 600 MILLIGRAM(S): at 19:51

## 2019-05-01 RX ADMIN — Medication 500 MILLIGRAM(S): at 09:18

## 2019-05-01 RX ADMIN — LISINOPRIL 20 MILLIGRAM(S): 2.5 TABLET ORAL at 21:05

## 2019-05-01 RX ADMIN — Medication 650 MILLIGRAM(S): at 02:41

## 2019-05-01 RX ADMIN — Medication 5 MILLIGRAM(S): at 21:05

## 2019-05-01 RX ADMIN — Medication 50 MILLIGRAM(S): at 02:41

## 2019-05-01 RX ADMIN — Medication 100 MILLIGRAM(S): at 18:07

## 2019-05-01 RX ADMIN — Medication 50 MILLIGRAM(S): at 15:18

## 2019-05-01 RX ADMIN — DULOXETINE HYDROCHLORIDE 60 MILLIGRAM(S): 30 CAPSULE, DELAYED RELEASE ORAL at 09:18

## 2019-05-01 NOTE — PROGRESS NOTE BEHAVIORAL HEALTH - OTHER
Chronically poor to fair in the setting of BPD and mood dysregulation; good during latter part of hospital stay.

## 2019-05-01 NOTE — PROGRESS NOTE BEHAVIORAL HEALTH - NSBHCHARTREVIEWLAB_PSY_A_CORE FT
Thyroid Stimulating Hormone, Serum: 1.21 uU/mL (04.28.19 @ 07:41)  WBC 11.29: trending down.  UA indicative of UTI

## 2019-05-01 NOTE — PROGRESS NOTE BEHAVIORAL HEALTH - PROBLEM SELECTOR PLAN 1
Cymbalta 60 mg day time and 30 mg at bedtime  Trazadone 150 mg at bedtime  Lamictal 200 mg at bedtime.  Discontinue Abilify as per patient request.  Discontinue Remeron 7.5 mg

## 2019-05-01 NOTE — PROGRESS NOTE BEHAVIORAL HEALTH - NSBHFUPINTERVALHXFT_PSY_A_CORE
Patient presenting reactive, euthymic, denying depressed mood or hopelessness or suicidal ideation. Reports anxiety about discharge plan: wanting to go to [facility] or [facility]. Reports needy. Denies manic / psychotic symptoms. Patient in good impulse and behavioral control. Reports sleep and appetite being stable.

## 2019-05-02 DIAGNOSIS — F33.0 MAJOR DEPRESSIVE DISORDER, RECURRENT, MILD: ICD-10-CM

## 2019-05-02 PROCEDURE — 99233 SBSQ HOSP IP/OBS HIGH 50: CPT

## 2019-05-02 RX ADMIN — GABAPENTIN 600 MILLIGRAM(S): 400 CAPSULE ORAL at 20:59

## 2019-05-02 RX ADMIN — OXYCODONE AND ACETAMINOPHEN 1 TABLET(S): 5; 325 TABLET ORAL at 09:24

## 2019-05-02 RX ADMIN — DULOXETINE HYDROCHLORIDE 30 MILLIGRAM(S): 30 CAPSULE, DELAYED RELEASE ORAL at 20:59

## 2019-05-02 RX ADMIN — Medication 100 MILLIGRAM(S): at 18:05

## 2019-05-02 RX ADMIN — Medication 50 MILLIGRAM(S): at 22:25

## 2019-05-02 RX ADMIN — Medication 100 MILLIGRAM(S): at 20:59

## 2019-05-02 RX ADMIN — Medication 500 MILLIGRAM(S): at 09:25

## 2019-05-02 RX ADMIN — Medication 100 MILLIGRAM(S): at 09:24

## 2019-05-02 RX ADMIN — Medication 500 MILLIGRAM(S): at 20:59

## 2019-05-02 RX ADMIN — Medication 0.5 MILLIGRAM(S): at 21:04

## 2019-05-02 RX ADMIN — Medication 100 MILLIGRAM(S): at 02:24

## 2019-05-02 RX ADMIN — OXYCODONE AND ACETAMINOPHEN 1 TABLET(S): 5; 325 TABLET ORAL at 21:04

## 2019-05-02 RX ADMIN — OXYCODONE AND ACETAMINOPHEN 1 TABLET(S): 5; 325 TABLET ORAL at 10:56

## 2019-05-02 RX ADMIN — LISINOPRIL 20 MILLIGRAM(S): 2.5 TABLET ORAL at 21:00

## 2019-05-02 RX ADMIN — Medication 0.5 MILLIGRAM(S): at 14:50

## 2019-05-02 RX ADMIN — Medication 5 MILLIGRAM(S): at 21:00

## 2019-05-02 RX ADMIN — LISINOPRIL 20 MILLIGRAM(S): 2.5 TABLET ORAL at 09:25

## 2019-05-02 RX ADMIN — OXYCODONE AND ACETAMINOPHEN 1 TABLET(S): 5; 325 TABLET ORAL at 14:50

## 2019-05-02 RX ADMIN — MENTHOL AND METHYL SALICYLATE 1 APPLICATION(S): 10; 30 STICK TOPICAL at 09:25

## 2019-05-02 RX ADMIN — DULOXETINE HYDROCHLORIDE 60 MILLIGRAM(S): 30 CAPSULE, DELAYED RELEASE ORAL at 09:24

## 2019-05-02 RX ADMIN — Medication 650 MILLIGRAM(S): at 02:23

## 2019-05-02 RX ADMIN — LAMOTRIGINE 200 MILLIGRAM(S): 25 TABLET, ORALLY DISINTEGRATING ORAL at 20:59

## 2019-05-02 RX ADMIN — GABAPENTIN 600 MILLIGRAM(S): 400 CAPSULE ORAL at 09:24

## 2019-05-02 RX ADMIN — MENTHOL AND METHYL SALICYLATE 1 APPLICATION(S): 10; 30 STICK TOPICAL at 20:59

## 2019-05-02 NOTE — PROGRESS NOTE BEHAVIORAL HEALTH - NSBHFUPINTERVALHXFT_PSY_A_CORE
Patient presenting reactive, euthymic, denying depressed mood or hopelessness or suicidal ideation. Patient stating being satisfied with her treatment in the hospital stating wanting to continue treatment with out-patient partial hospitalization program on Monday. Reports being excited about being discharged tomorrow. Denies manic / psychotic symptoms. Patient in good impulse and behavioral control. Reports sleep and appetite being stable.

## 2019-05-02 NOTE — CHART NOTE - NSCHARTNOTEFT_GEN_A_CORE
KRISTEL met with pt. to review DC plan. Pt. advised that she has pain management appt. on Mon 5/6 at 8:45am with Dr. Scott. Pt. states that she cannot miss this appointment and had made efforts to contact [facility] and discuss being able to do half a day on 5/6 as start date for partial hosp. program or if should start on Tues. 5/7. KRISTEL LM for Mago Alexander to FU. Pt. also discussed outpt. providers for after appointment and stated she is agreeable to [facility] referral if unable to return to Dr. Ha. KRISTEL discussed [facility] D[facility] program for future crises and provided flyer with program info.; pt. expressed plan to utilize hotline for future mental health crises as needed. Pt. advised that her mother plans to transport pt. home tomorrow between 1-2pm. KRISTEL to FU.

## 2019-05-03 VITALS — OXYGEN SATURATION: 98 % | RESPIRATION RATE: 14 BRPM | TEMPERATURE: 98 F

## 2019-05-03 PROCEDURE — 99233 SBSQ HOSP IP/OBS HIGH 50: CPT

## 2019-05-03 RX ORDER — IBUPROFEN 200 MG
1 TABLET ORAL
Qty: 0 | Refills: 0 | DISCHARGE
Start: 2019-05-03

## 2019-05-03 RX ORDER — CLONAZEPAM 1 MG
1 TABLET ORAL
Qty: 30 | Refills: 0
Start: 2019-05-03 | End: 2019-05-17

## 2019-05-03 RX ORDER — HYDROXYZINE HCL 10 MG
1 TABLET ORAL
Qty: 45 | Refills: 1
Start: 2019-05-03 | End: 2019-06-01

## 2019-05-03 RX ORDER — GABAPENTIN 400 MG/1
2 CAPSULE ORAL
Qty: 60 | Refills: 0
Start: 2019-05-03 | End: 2019-05-17

## 2019-05-03 RX ORDER — LAMOTRIGINE 25 MG/1
1 TABLET, ORALLY DISINTEGRATING ORAL
Qty: 15 | Refills: 1
Start: 2019-05-03 | End: 2019-06-01

## 2019-05-03 RX ORDER — LISINOPRIL 2.5 MG/1
1 TABLET ORAL
Qty: 0 | Refills: 0 | DISCHARGE
Start: 2019-05-03

## 2019-05-03 RX ORDER — ACETAMINOPHEN 500 MG
2 TABLET ORAL
Qty: 0 | Refills: 0 | DISCHARGE
Start: 2019-05-03

## 2019-05-03 RX ORDER — DULOXETINE HYDROCHLORIDE 30 MG/1
1 CAPSULE, DELAYED RELEASE ORAL
Qty: 15 | Refills: 1
Start: 2019-05-03 | End: 2019-06-01

## 2019-05-03 RX ORDER — LANOLIN ALCOHOL/MO/W.PET/CERES
1 CREAM (GRAM) TOPICAL
Qty: 0 | Refills: 0 | DISCHARGE
Start: 2019-05-03

## 2019-05-03 RX ORDER — DULOXETINE HYDROCHLORIDE 30 MG/1
1 CAPSULE, DELAYED RELEASE ORAL
Qty: 0 | Refills: 0 | COMMUNITY
Start: 2019-05-03

## 2019-05-03 RX ADMIN — OXYCODONE AND ACETAMINOPHEN 1 TABLET(S): 5; 325 TABLET ORAL at 08:52

## 2019-05-03 RX ADMIN — Medication 650 MILLIGRAM(S): at 05:51

## 2019-05-03 RX ADMIN — Medication 600 MILLIGRAM(S): at 12:43

## 2019-05-03 RX ADMIN — Medication 100 MILLIGRAM(S): at 11:02

## 2019-05-03 RX ADMIN — Medication 600 MILLIGRAM(S): at 02:28

## 2019-05-03 RX ADMIN — MENTHOL AND METHYL SALICYLATE 1 APPLICATION(S): 10; 30 STICK TOPICAL at 08:53

## 2019-05-03 RX ADMIN — OXYCODONE AND ACETAMINOPHEN 1 TABLET(S): 5; 325 TABLET ORAL at 09:33

## 2019-05-03 RX ADMIN — DULOXETINE HYDROCHLORIDE 60 MILLIGRAM(S): 30 CAPSULE, DELAYED RELEASE ORAL at 08:52

## 2019-05-03 RX ADMIN — Medication 100 MILLIGRAM(S): at 02:28

## 2019-05-03 RX ADMIN — LISINOPRIL 20 MILLIGRAM(S): 2.5 TABLET ORAL at 08:52

## 2019-05-03 RX ADMIN — GABAPENTIN 600 MILLIGRAM(S): 400 CAPSULE ORAL at 08:52

## 2019-05-03 RX ADMIN — Medication 500 MILLIGRAM(S): at 08:52

## 2019-05-03 NOTE — DISCHARGE NOTE BEHAVIORAL HEALTH - NSBHDCSUICPROTECTFT_PSY_A_CORE
Young, medically stable, future orientation, intact family supports,  compliance with psychiatric treatment and medications, positive therapeutic relationships, residential stability, no access to firearms, no suicidal ideation/intent/plan, no assault ideation/intent/plan, no homicidal ideation/intent/plan

## 2019-05-03 NOTE — DISCHARGE NOTE BEHAVIORAL HEALTH - NSBHDCREFEROTHERFT_PSY_A_CORE
Patient provided information on the [facility] DASH program for urgent mental health crisis needs:  Rachel [phone]  Address: [place] [address]

## 2019-05-03 NOTE — PROGRESS NOTE BEHAVIORAL HEALTH - THOUGHT PROCESS
Linear
Linear
Overinclusive/Circumstantial/Linear
Linear/Circumstantial
Linear/Overinclusive/Circumstantial
Linear
Linear/Overinclusive
Linear/Circumstantial/Overinclusive
Overinclusive/Circumstantial/Linear

## 2019-05-03 NOTE — PROGRESS NOTE BEHAVIORAL HEALTH - PROBLEM SELECTOR PROBLEM 1
Severe episode of recurrent major depressive disorder, with psychotic features
Severe episode of recurrent major depressive disorder, without psychotic features
Severe episode of recurrent major depressive disorder, with psychotic features
Severe episode of recurrent major depressive disorder, with psychotic features

## 2019-05-03 NOTE — PROGRESS NOTE BEHAVIORAL HEALTH - PROBLEM SELECTOR PLAN 3
DBT - patient to do intake with [facility]  Impulse control strategies: coloring, writing, TV, music, internet browsing, walking, shopping
DBT
Klonopin 0.5 mg twice daily: PLAN TO TAPER AND DISCONTINUE  START: Gabapentin 300 mg TID
DBT - patient to do intake with [facility]  Impulse control strategies: coloring, writing, TV, music, internet browsing, walking, shopping
DBT - patient to do intake with [facility]  Impulse control strategies: coloring, writing, TV, music, internet browsing, walking, shopping
DBT - patient to do intake with [facility]  Impulse control strategies: coloring, writing, TV, music, internet browsing, walking, shopping
limit setting

## 2019-05-03 NOTE — CHART NOTE - NSCHARTNOTEFT_GEN_A_CORE
KRISTEL spoke to Mago Alexander (ph. [phone]) of [facility] partial hospitalization program regarding pt.'s DC plan based on pt.'s anticipated today, 5/3. KRISTEL relayed that pt. has pain management MD appt. on Mon. 5/6 at 8:45am and wanted to inquire about being able to attend 5/6 for half day. Per Mago, pt. cannot attend for half day on the first day of program and would have to start on 5/7. KRISTEL also discussed need pre-cert for insurance with Mago and agreed to provide updates when available on authorization. KRISTEL spoke to Elo of [employer] (ph. [phone]) insurance who provided pending auth. (#[id number]) for pt. to attend program but would need final approval after SW faxed in clinical info. to insurance (f. [phone]). KRISTEL was advised by Elo that insurance auth. decision may not come in until 5/6. KRISTEL met with pt. to updated on DC plan with new [facility] start date. Pt. agreeable to plan. KRISTEL discussed alternative DC plan in the event that pt. not authorized by insurance. Pt. agreeable to [facility] referral and provided verbal and written consent to make as needed. KRISTEL called pt.'s mother as discussed with pt. to review DC plan. Pt.'s mother aware of pending DC today and plan for her to attend [facility]. KRISTEL updated on start date for 5/7. Pt.'s mother advised again pt. can return home and that mother plans to transport today. Pt. mother stated that she planned to administer pt.'s meds. at home and requested SW to also discuss with pt. Pt. mother also advised she planned to obtain her own mental health services for support. KRISTEL discussed case with psych NP. KRISTEL updated on outcomes of call with [facility] and mother. NP advised that discussion with pt. around mother administering meds. at home was already held by pt. and by pt.'s mother. KRISTEL LM for Mago Alexander of [facility] to FU on insurance. KRISTEL to FU. KRISTEL spoke to Mago Alexander (ph. [phone]) of [place] partial hospitalization program regarding pt.'s DC plan based on pt.'s anticipated today, 5/3. KRISTEL relayed that pt. has pain management MD appt. on Mon. 5/6 at 8:45am and wanted to inquire about being able to attend 5/6 for half day. Per Mago, pt. cannot attend for half day on the first day of program and would have to start on 5/7. KRISTEL also discussed need pre-cert for insurance with Mago and agreed to provide updates when available on authorization. KRISTEL spoke to Elo of [email or web address] (ph. [phone]) insurance who provided pending auth. (#[id number]) for pt. to attend program but would need final approval after SW faxed in clinical info. to insurance (f. [phone]). SW was advised by Elo that insurance auth. decision may not come in until 5/6. KRISTEL met with pt. to updated on DC plan with new [place] start date. Pt. agreeable to plan. KRISTEL discussed alternative DC plan in the event that pt. not authorized by insurance. Pt. agreeable to [facility] referral and provided verbal and written consent to make as needed. KRISTEL called pt.'s mother as discussed with pt. to review DC plan. Pt.'s mother aware of pending DC today and plan for her to attend [place]. KRISTEL updated on start date for 5/7. Pt.'s mother advised again pt. can return home and that mother plans to transport today. Pt. mother stated that she planned to administer pt.'s meds. at home and requested SW to also discuss with pt. Pt. mother also advised she planned to obtain her own mental health services for support. KRISTEL discussed case with psych NP. KRISTEL updated on outcomes of call with [place] and mother. NP advised that discussion with pt. around mother administering meds. at home was already held by pt. and by pt.'s mother. KRISTEL spoke to  Mago Alexander of [place] to FU on insurance and advised of pending auth. and provided number. Mago confirmed start date with planned SW FU when auth. became available. KRISTEL met with pt. again to review DC plans and also review plans for mother to administer meds. at home; pt. aware and agreeable. SW to MARY LOU w/ pt. to confirm DC plan for [place] based on insurance auth. DC note faxed to pt.'s insurance and [place].

## 2019-05-03 NOTE — PROGRESS NOTE BEHAVIORAL HEALTH - NSBHADMITDANGERSELF_PSY_A_CORE
suicidal ideation with plan and means
suicidal behavior/suicidal ideation with plan and means

## 2019-05-03 NOTE — DISCHARGE NOTE BEHAVIORAL HEALTH - NSBHDCADMRISKRSNFT_PSY_A_CORE
Borderline personality disorder: chronic history of low frustration tolerance, extreme mood swings, extreme tantrums / emotional reactivity that is out of proportion to stress, fear of abandonment, tumultuous relationships with extreme fluctuations between idealization and devaluation of family, friends and peers, distorted /unstable self-image, impulsivity and engaging in dangerous / promiscious behaviors, chronic and recurrent suicidality including self-injurious behaviors, chronic feelings of emptiness, stress-related paranoia, dissociative symptoms (losing touch with self / reality).

## 2019-05-03 NOTE — PROGRESS NOTE BEHAVIORAL HEALTH - PROBLEM SELECTOR PLAN 4
Keflex 500 mg Q12 for 3 days

## 2019-05-03 NOTE — PROGRESS NOTE BEHAVIORAL HEALTH - THOUGHT CONTENT
Unremarkable
Unremarkable
Preoccupations/Ruminations/Hopelessness/Suicidality
Preoccupations/Hopelessness/Ruminations
Preoccupations/Hopelessness/Suicidality/Ruminations
Unremarkable
Ruminations/Preoccupations/Hopelessness/Suicidality
Ruminations/Preoccupations/Hopelessness/Suicidality
Preoccupations/Suicidality/Hopelessness/Ruminations

## 2019-05-03 NOTE — DISCHARGE NOTE BEHAVIORAL HEALTH - NSBHDCCRISISPLAN2FT_PSY_A_CORE
Call my [facility]  or staff from [address] [place] [address]: [phone]

## 2019-05-03 NOTE — DISCHARGE NOTE BEHAVIORAL HEALTH - FAMILY HISTORY OF PSYCHIATRIC ILLNESS
Patient currently living in private residence with mother. Patient identifies mother as supportive and able to assist in her care. No known family history of mental health.

## 2019-05-03 NOTE — DISCHARGE NOTE BEHAVIORAL HEALTH - NSBHDCADDR1FT_A_CORE
[address] [facility]   [address]   [facility] [address]  Program is Monday through Friday, 9am-3pm. You are scheduled to start on Tuesday, May 7th at 8:30am.

## 2019-05-03 NOTE — DISCHARGE NOTE BEHAVIORAL HEALTH - NSBHDCSWCOMMENTSFT_PSY_A_CORE
Patient was education about the importance of attending mental health treatment, about taking medications as prescribed, and about the discharge plan. Patient was advised to not stop taking medication unless told to do so by a physician.

## 2019-05-03 NOTE — DISCHARGE NOTE BEHAVIORAL HEALTH - NSBHDCALCOHOLREFERFT_PSY_A_CORE
Patient will receive substance abuse counseling at the [facility].

## 2019-05-03 NOTE — PROGRESS NOTE BEHAVIORAL HEALTH - PRN MEDS
Ativan 2 mg IM secondary to agitation, aggression

## 2019-05-03 NOTE — PROGRESS NOTE BEHAVIORAL HEALTH - PROBLEM SELECTOR PROBLEM 3
Borderline personality disorder
Borderline personality disorder
Generalized anxiety disorder
Borderline personality disorder

## 2019-05-03 NOTE — PROGRESS NOTE BEHAVIORAL HEALTH - PRIMARY DX
Severe episode of recurrent major depressive disorder, without psychotic features
MDD (major depressive disorder), recurrent episode, moderate
Severe episode of recurrent major depressive disorder, without psychotic features
Severe episode of recurrent major depressive disorder, without psychotic features
MDD (major depressive disorder), recurrent episode, moderate
MDD (major depressive disorder), recurrent episode, mild
Severe episode of recurrent major depressive disorder, without psychotic features
MDD (major depressive disorder), recurrent episode, mild

## 2019-05-03 NOTE — PROGRESS NOTE BEHAVIORAL HEALTH - RISK ASSESSMENT
Risk factors: Chronic mental illness, mood episode, current SI/I/P, likely abusing prescribed substances, irritability/severe anxiety, chronic pain/acute medical problems, access to means, history of trauma, prior history of suicide attempts, recent inpatient hospitalization, impulsivity, cluster B personality traits, hopelessness/despair, and unable to engage in safety planning.     Protective factors: Young, medically stable, future orientation, intact familiy supports,  compliance with psychiatric treatment and medications, positive therapeutic relationships, residential stability, no access to firearms.     Acute Suicide Risk  (X) High   (  ) Moderate   (  ) Low   (  ) Unable to determine   Rationale - See Above Risk/Protective Factors    Elevated Chronic Risk   (  ) No    (X) Yes  Details - See Above Risk/Protective Factors
MODERATE TO HIGH RISK FOR HARM TO SELF     Risk factors: Chronic mental illness, mood episode, current SI/I/P, likely abusing prescribed substances, irritability/severe anxiety, chronic pain/acute medical problems, access to means, history of trauma, prior history of suicide attempts, recent inpatient hospitalization, impulsivity, cluster B personality traits, hopelessness/despair, and unable to engage in safety planning.     Protective factors: Young, medically stable, future orientation, intact family supports,  compliance with psychiatric treatment and medications, positive therapeutic relationships, residential stability, no access to firearms.
MODERATE TO HIGH RISK FOR HARM TO SELF     Risk factors: Chronic mental illness, mood episode, current SI/I/P, likely abusing prescribed substances, irritability/severe anxiety, chronic pain/acute medical problems, access to means, history of trauma, prior history of suicide attempts, recent inpatient hospitalization, impulsivity, cluster B personality traits, hopelessness/despair, and unable to engage in safety planning.     Protective factors: Young, medically stable, future orientation, intact family supports,  compliance with psychiatric treatment and medications, positive therapeutic relationships, residential stability, no access to firearms.
MODERATE TO HIGH RISK FOR HARM TO SELF     Risk factors: Chronic mental illness, mood episode, likely abusing prescribed substances, irritability/severe anxiety, chronic pain/acute medical problems, access to means, history of trauma, prior history of suicide attempts, recent inpatient hospitalization, impulsivity, cluster B personality traits, hopelessness/despair, history of mood dysregulation.     Protective factors: Young, medically stable, future orientation, intact family supports,  compliance with psychiatric treatment and medications, positive therapeutic relationships, residential stability, no access to firearms, no suicidal ideation/intent/plan, no assault ideation/intent/plan, no homicidal ideation/intent/plan.
HIGH Risk factors: Chronic mental illness, mood episode, current SI/I/P, likely abusing prescribed substances, irritability/severe anxiety, chronic pain/acute medical problems, access to means, history of trauma, prior history of suicide attempts, recent inpatient hospitalization, impulsivity, cluster B personality traits, hopelessness/despair, and unable to engage in safety planning.     Protective factors: Young, medically stable, future orientation, intact familiy supports,  compliance with psychiatric treatment and medications, positive therapeutic relationships, residential stability, no access to firearms.     Acute Suicide Risk  (X) High   (  ) Moderate   (  ) Low   (  ) Unable to determine   Rationale - See Above Risk/Protective Factors    Elevated Chronic Risk   (  ) No    (X) Yes  Details - See Above Risk/Protective Factors
LOW RISK FOR HARM TO SELF WITH CHRONICALLY ELEVATING RISK FACTORS    CHRONIC RISK FACTORS: Chronic mental illness, chronic mood lability, likely abusing prescribed substances,chronic episodes of irritability/severe anxiety in the setting of poor coping, chronic pain/acute medical problems, history of trauma, prior history of suicide attempts, recent inpatient hospitalization, chronic impulsivity, borderline personality disorder, hopelessness/despair, history of mood dysregulation.     Protective factors: Young, medically stable, future orientation, intact family supports,  compliance with psychiatric treatment and medications, positive therapeutic relationships, residential stability, no access to firearms, no suicidal ideation/intent/plan, no assault ideation/intent/plan, no homicidal ideation/intent/plan.
Risk factors: Chronic mental illness, mood episode, current SI/I/P, likely abusing prescribed substances, irritability/severe anxiety, chronic pain/acute medical problems, access to means, history of trauma, prior history of suicide attempts, recent inpatient hospitalization, impulsivity, cluster B personality traits, hopelessness/despair, and unable to engage in safety planning.     Protective factors: Young, medically stable, future orientation, intact familiy supports,  compliance with psychiatric treatment and medications, positive therapeutic relationships, residential stability, no access to firearms.

## 2019-05-03 NOTE — PROGRESS NOTE BEHAVIORAL HEALTH - NSBHCHARTREVIEWINVESTIGATE_PSY_A_CORE FT
Ventricular Rate 129 BPM    Atrial Rate 129 BPM    P-R Interval 134 ms    QRS Duration 80 ms    Q-T Interval 306 ms    QTC Calculation(Bezet) 448 ms    P Axis 21 degrees    R Axis 37 degrees    T Axis -7 degrees
Ventricular Rate 129 BPM    Atrial Rate 129 BPM    P-R Interval 134 ms    QRS Duration 80 ms    Q-T Interval 306 ms    QTC Calculation(Bezet) 448 ms    P Axis 21 degrees    R Axis 37 degrees    T Axis -7 degrees    Diagnosis Line Sinus tachycardia  Otherwise normal ECG

## 2019-05-03 NOTE — PROGRESS NOTE BEHAVIORAL HEALTH - BEHAVIOR
Other/Cooperative
Cooperative/Other
Cooperative
Cooperative
Other/Cooperative
Cooperative
Other/Cooperative
Cooperative/Other
Cooperative
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
## 2019-05-03 NOTE — PROGRESS NOTE BEHAVIORAL HEALTH - PROBLEM SELECTOR PROBLEM 2
Generalized anxiety disorder
Borderline personality disorder
Generalized anxiety disorder
Anxiety disorder
Generalized anxiety disorder

## 2019-05-03 NOTE — PROGRESS NOTE BEHAVIORAL HEALTH - SECONDARY DX1
Anxiety disorder

## 2019-05-03 NOTE — DISCHARGE NOTE BEHAVIORAL HEALTH - NSBHDCSUICSAFETYFT_PSY_A_CORE
Patient instructed to return to the ED or call 911 if patient experiences SI, HI, hopelessness, worsening of symptoms or has any other concerns. Patient to call out-patient therapist or psychiatrist when symptoms worsen. Patient to communicate with mother regarding emotional stressors. Patient to remain consistent with medication management.

## 2019-05-03 NOTE — DISCHARGE NOTE BEHAVIORAL HEALTH - NSBHDCCONDITIONFT_PSY_A_CORE
Patient presentation indicative of Major Depressive Disorder, of which symptoms have improved from severe to mild. Patient has chronic episodes of mood dysregulation in the setting of Borderline Personality Disorder that influence depressive symptoms. Patient has shown significant improvement in her mood regulation, with no depressed mood, no hopelessness or suicidal ideation; patient has no manic / psychotic symptoms. Patient symptoms not indicated imminent risk for harm to self; not warranting involuntary in-patient hospitalization

## 2019-05-03 NOTE — DISCHARGE NOTE BEHAVIORAL HEALTH - NSBHDCMEDSFT_PSY_A_CORE
MEDICATIONS  (STANDING):  clonazePAM Tablet 0.5 milliGRAM(s) Oral <User Schedule>  DULoxetine 60 milliGRAM(s) Oral daily  DULoxetine 30 milliGRAM(s) Oral at bedtime  gabapentin 600 milliGRAM(s) Oral two times a day  lamoTRIgine 200 milliGRAM(s) Oral at bedtime  Trazodone 100 mg at bedtime  Melatonin 3 mg at bedtime  GOOD RESPONSE: PATIENT TO CONTINUE THIS MEDICATION REGIMEN AND TO NOT STOP OR CHANGE IT UNLESS INSTRUCTED BY OUTPATIENT PROVIDER.

## 2019-05-03 NOTE — PROGRESS NOTE BEHAVIORAL HEALTH - NSBHADMITMEDEDUDETAILS_A_CORE FT
pt is aware of the use of medication and of potential side effects
Patient made aware of risk and benefits of Abilify and Remeron
pt is aware of the use of medication and of potential side effects

## 2019-05-03 NOTE — DISCHARGE NOTE BEHAVIORAL HEALTH - NSBHDCADMRISKMITFT_PSY_A_CORE
DBT, medication management, supportive mother, appointment with out psychiatrist, partial hospitalization program

## 2019-05-03 NOTE — PROGRESS NOTE BEHAVIORAL HEALTH - NSBHADMITIPOBSFT_PSY_A_CORE
Not at acute risk on inpatient unit.
Pt denies any suicidal plan
no acute risk for harm to self / others on unit
Pt denies any suicidal plan

## 2019-05-03 NOTE — DISCHARGE NOTE BEHAVIORAL HEALTH - MEDICATION SUMMARY - MEDICATIONS TO TAKE
I will START or STAY ON the medications listed below when I get home from the hospital:    acetaminophen 325 mg oral tablet  -- 2 tab(s) by mouth every 6 hours, As needed, mild pain  -- Indication: For pain    ibuprofen 600 mg oral tablet  -- 1 tab(s) by mouth every 6 hours, As needed, Moderate Pain (4 - 6)  -- Indication: For pain    oxycodone-acetaminophen 5 mg-325 mg oral tablet  -- 1 tab(s) by mouth   -- Indication: For pain    lisinopril 20 mg oral tablet  -- 1 tab(s) by mouth 2 times a day  -- Indication: For hypertension    aluminum hydroxide-magnesium hydroxide 200 mg-200 mg/5 mL oral suspension  -- 30 milliliter(s) by mouth every 6 hours, As needed, Dyspepsia  -- Indication: For Acid / upset stomach    gabapentin 300 mg oral capsule  -- 2 cap(s) by mouth 2 times a day  -- Indication: For Anxiety disorder    LaMICtal 200 mg oral tablet  -- 1 tab(s) by mouth once a day (at bedtime)  -- Indication: For depression    clonazePAM 0.5 mg oral tablet  -- 1 tab(s) by mouth 2 times a day as needed for anxiety MDD:1 mg  -- Indication: For Anxiety disorder    DULoxetine 30 mg oral delayed release capsule  -- 1 cap(s) by mouth once a day (at bedtime)  -- Indication: For depression    DULoxetine 60 mg oral delayed release capsule  -- 1 cap(s) by mouth once a day  -- Indication: For depression    melatonin 5 mg oral tablet  -- 1 tab(s) by mouth once a day (at bedtime)  -- Indication: For insomnia

## 2019-05-03 NOTE — PROGRESS NOTE BEHAVIORAL HEALTH - NSBHLEGALSTATUS_PSY_A_CORE
9.13 (Voluntary)
9.27 (2PC)
9.13 (Voluntary)

## 2019-05-03 NOTE — DISCHARGE NOTE BEHAVIORAL HEALTH - NSBHDCSUICFCTRMIT_PSY_A_CORE
DBT program, partial hospitalization program, medication management, mother regulating patients medications

## 2019-05-03 NOTE — PROGRESS NOTE BEHAVIORAL HEALTH - NSBHCHARTREVIEWVS_PSY_A_CORE FT
Vital Signs Last 24 Hrs  T(C): 36.8 (27 Apr 2019 08:31), Max: 36.8 (27 Apr 2019 08:31)  T(F): 98.2 (27 Apr 2019 08:31), Max: 98.2 (27 Apr 2019 08:31)  HR: --  BP: --  BP(mean): --  RR: 17 (27 Apr 2019 08:31) (17 - 17)  SpO2: 100% (27 Apr 2019 08:31) (100% - 100%)
Vital Signs Last 24 Hrs  T(C): 36.9 (26 Apr 2019 07:23), Max: 36.9 (26 Apr 2019 07:23)  T(F): 98.4 (26 Apr 2019 07:23), Max: 98.4 (26 Apr 2019 07:23)  HR: 100 (25 Apr 2019 20:43) (100 - 100)  BP: 145/88 (25 Apr 2019 20:43) (145/88 - 145/88)  RR: 14 (26 Apr 2019 07:23) (14 - 16)  SpO2: 99% (26 Apr 2019 07:23) (99% - 100%)
Vital Signs Last 24 Hrs  T(C): 36.9 (29 Apr 2019 07:30), Max: 36.9 (29 Apr 2019 07:30)  T(F): 98.4 (29 Apr 2019 07:30), Max: 98.4 (29 Apr 2019 07:30)  HR: 100 (28 Apr 2019 20:20) (100 - 100)  BP: 146/86 (28 Apr 2019 20:20) (146/86 - 146/86)  BP(mean): 100 (28 Apr 2019 20:20) (100 - 100)  RR: 16 (29 Apr 2019 07:30) (16 - 16)  SpO2: 99% (29 Apr 2019 07:30) (99% - 100%)
Vital Signs Last 24 Hrs  T(C): 37.1 (30 Apr 2019 07:23), Max: 37.1 (30 Apr 2019 07:23)  T(F): 98.8 (30 Apr 2019 07:23), Max: 98.8 (30 Apr 2019 07:23)  RR: 16 (30 Apr 2019 07:23) (16 - 16)  SpO2: 96% (30 Apr 2019 07:23) (96% - 96%)
Vital Signs Last 24 Hrs  T(C): 36.4 (02 May 2019 07:32), Max: 36.4 (02 May 2019 07:32)  T(F): 97.5 (02 May 2019 07:32), Max: 97.5 (02 May 2019 07:32)  RR: 16 (02 May 2019 07:32) (16 - 16)  SpO2: 99% (02 May 2019 07:32) (99% - 99%)
Vital Signs Last 24 Hrs  T(C): 37 (25 Apr 2019 07:41), Max: 37.1 (25 Apr 2019 00:51)  T(F): 98.6 (25 Apr 2019 07:41), Max: 98.7 (25 Apr 2019 00:51)  HR: 111 (25 Apr 2019 03:29) (111 - 130)  BP: 131/89 (25 Apr 2019 03:29) (131/89 - 152/99)  BP(mean): --  RR: 16 (25 Apr 2019 07:41) (14 - 20)  SpO2: 100% (25 Apr 2019 07:41) (98% - 100%)
Vital Signs Last 24 Hrs  T(C): 37.1 (01 May 2019 07:43), Max: 37.1 (01 May 2019 07:43)  T(F): 98.8 (01 May 2019 07:43), Max: 98.8 (01 May 2019 07:43)  RR: 16 (01 May 2019 07:43) (16 - 16)  SpO2: 100% (01 May 2019 07:43) (100% - 100%)
Vital Signs Last 24 Hrs  T(C): 37.1 (28 Apr 2019 08:12), Max: 37.1 (28 Apr 2019 08:12)  T(F): 98.8 (28 Apr 2019 08:12), Max: 98.8 (28 Apr 2019 08:12)  HR: 100 (27 Apr 2019 21:29) (100 - 100)  BP: 142/74 (27 Apr 2019 21:29) (142/74 - 142/74)  BP(mean): 89 (27 Apr 2019 21:29) (89 - 89)  RR: 16 (28 Apr 2019 08:12) (16 - 18)  SpO2: 98% (28 Apr 2019 08:12) (98% - 100%)
Vital Signs Last 24 Hrs  T(C): 36.8 (03 May 2019 08:28), Max: 36.8 (02 May 2019 20:01)  T(F): 98.2 (03 May 2019 08:28), Max: 98.3 (02 May 2019 20:01)  HR: 115 (02 May 2019 20:01) (115 - 115)  BP: 138/84 (02 May 2019 20:01) (138/84 - 138/84)  BP(mean): 97 (02 May 2019 20:01) (97 - 97)  RR: 14 (03 May 2019 08:28) (14 - 16)  SpO2: 98% (03 May 2019 08:28) (98% - 100%)

## 2019-05-03 NOTE — PROGRESS NOTE BEHAVIORAL HEALTH - PROBLEM SELECTOR PLAN 2
Cymbalta 60 mg day time and 30 mg at bedtime  Klonopin 0.5 mg twice daily  Gabapentin 600 mg twice daily
Abilify 5 mg at bedtime  Lamictal 200 mg at bedtime  DBT
Cymbalta 60 mg day time and 30 mg at bedtime  Klonopin 0.5 mg twice daily: AT 13h00 and 21h00 (bedtime)  Gabapentin 600 mg twice daily
Klonopin, Per iSTOP a lot of dr dover, possible benzo abuse, will start tapering Klonopin.
Cymbalta 60 mg day time and 30 mg at bedtime  Klonopin 0.5 mg twice daily: AT 13h00 and 21h00 (bedtime)  Gabapentin 600 mg twice daily

## 2019-05-03 NOTE — PROGRESS NOTE BEHAVIORAL HEALTH - SECONDARY DX2
Borderline personality disorder

## 2019-05-03 NOTE — DISCHARGE NOTE BEHAVIORAL HEALTH - NSBHDCCRISISPLAN1FT_PSY_A_CORE
Tell family, tell mental health staff, call crisis hotline ([facility] DASH [phone]), visit the nearest emergency room. You may also call 9-1-1 for assistance.

## 2019-05-03 NOTE — PROGRESS NOTE BEHAVIORAL HEALTH - NSBHPTASSESSDT_PSY_A_CORE
02-May-2019 10:40
26-Apr-2019 09:53
27-Apr-2019 11:05
29-Apr-2019 12:17
30-Apr-2019 09:38
25-Apr-2019 10:22
01-May-2019 11:04
28-Apr-2019 13:29
03-May-2019 09:49

## 2019-05-03 NOTE — DISCHARGE NOTE BEHAVIORAL HEALTH - CONDITIONS AT DISCHARGE
Patient alert and oriented x3, pleasant, cooperative. Patient denies thoughts to harm herself or others. Nurse and  reviewed discharge plan with patient who accepted and is in agreement with plan. Patient received a copy of his discharge paperwork. All belongings returned to patient.

## 2019-05-03 NOTE — DISCHARGE NOTE BEHAVIORAL HEALTH - NSBHDCSUBSTHXFT_PSY_A_CORE
history of opiate / prescription medication abuse history of opiate / prescription medication abuse. Patient denies having any substance abuse need but will receive counseling at the [facility].

## 2019-05-03 NOTE — DISCHARGE NOTE BEHAVIORAL HEALTH - NSBHDCSUICFCTRSFT_PSY_A_CORE
impulsivity, depression, suicidal ideation, hopelessness, problem solving, mood regulation, coping, frustration tolerance.

## 2019-05-03 NOTE — DISCHARGE NOTE BEHAVIORAL HEALTH - NSBHDCSIGEVENTSFT_PSY_A_CORE
pt irritable, angry and agitated when needs are not met immediatedly. frustration tolerance has improved during this admission.

## 2019-05-03 NOTE — PROGRESS NOTE BEHAVIORAL HEALTH - PROBLEM SELECTOR PLAN 1
Cymbalta 60 mg day time and 30 mg at bedtime  Lamictal 200 mg at bedtime.  Discontinue Abilify as per patient request.  Discontinue Remeron 7.5 mg

## 2019-05-03 NOTE — PROGRESS NOTE BEHAVIORAL HEALTH - AFFECT QUALITY
Anxious/Euthymic
Anxious/Euthymic
Depressed/Anxious/Irritable/Euthymic
Anxious/Depressed/Euthymic
Irritable/Depressed/Anxious/Euthymic
Anxious/Euthymic
Other/Irritable/Depressed/Anxious/Euthymic
Irritable/Depressed/Anxious/Euthymic
Anxious/Euthymic/Depressed/Irritable

## 2019-05-03 NOTE — PROGRESS NOTE BEHAVIORAL HEALTH - SUMMARY
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 Pt is s/p suicide attempt via overdose. She is superficial and labile on exam most consistent with Borderline PD. Pt is highly focused on the negative aspects of her prior hospitalizations, appears attention seeking at times and impulsive. She is not a reliable historian per her medications (was observed editing a list of hand written meds while in the ED) and would therefore recommend that her medications be verified in the AM, especially given her polypharmacy and high amounts of controlled substances and muscle relaxers.
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 Pt is s/p suicide attempt via overdose. She is superficial and labile on exam most consistent with Borderline PD. Pt is highly focused on the negative aspects of her prior hospitalizations, appears attention seeking at times and impulsive. She is not a reliable historian per her medications (was observed editing a list of hand written meds while in the ED) and would therefore recommend that her medications be verified in the AM, especially given her polypharmacy and high amounts of controlled substances and muscle relaxers.    Patient presentation indicative of Major Depressive Disorder, severe with no psychosis, with the mood dysregulation being a presentation of her Borderline Personality Disorder. Patient remains dysregulated, with poor insight and impulse control; intermittent suicidal ideation. Patient currently presenting as a significant risk to harm to self / others, warranting an inpatient psychiatric admission for safety and stabilization.
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 Pt is s/p suicide attempt via overdose. She is superficial and labile on exam most consistent with Borderline PD. Pt is highly focused on the negative aspects of her prior hospitalizations, appears attention seeking at times and impulsive. She is not a reliable historian per her medications (was observed editing a list of hand written meds while in the ED) and would therefore recommend that her medications be verified in the AM, especially given her polypharmacy and high amounts of controlled substances and muscle relaxers.
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 Pt is s/p suicide attempt via overdose. She is superficial and labile on exam most consistent with Borderline PD. Pt is highly focused on the negative aspects of her prior hospitalizations, appears attention seeking at times and impulsive. She is not a reliable historian per her medications (was observed editing a list of hand written meds while in the ED) and would therefore recommend that her medications be verified in the AM, especially given her polypharmacy and high amounts of controlled substances and muscle relaxers.    Patient presentation indicative of Major Depressive Disorder, severe with no psychosis, with the mood dysregulation being a presentation of her Borderline Personality Disorder. Patient remains dysregulated, with poor insight and impulse control; intermittent suicidal ideation. Although improved, patient remains presenting as a significant risk to harm to self / others, warranting an inpatient psychiatric admission for safety and stabilization.
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 Pt is s/p suicide attempt via overdose. She is superficial and labile on exam most consistent with Borderline PD. Pt is highly focused on the negative aspects of her prior hospitalizations, appears attention seeking at times and impulsive. She is not a reliable historian per her medications (was observed editing a list of hand written meds while in the ED) and would therefore recommend that her medications be verified in the AM, especially given her polypharmacy and high amounts of controlled substances and muscle relaxers.    Patient presentation indicative of Major Depressive Disorder, of which symptoms have improved from severe to mild. Patient has chronic episodes of mood dysregulation in the setting of Borderline Personality Disorder that influence depressive symptoms. Patient has shown significant improvement in her mood regulation, with no depressed mood, no hopelessness or suicidal ideation; patient has no manic / psychotic symptoms. Discussing discharge planning of which mother supports: partial hospitalization referral made and awaiting feedback.
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 Pt is s/p suicide attempt via overdose. She is superficial and labile on exam most consistent with Borderline PD. Pt is highly focused on the negative aspects of her prior hospitalizations, appears attention seeking at times and impulsive. She is not a reliable historian per her medications (was observed editing a list of hand written meds while in the ED) and would therefore recommend that her medications be verified in the AM, especially given her polypharmacy and high amounts of controlled substances and muscle relaxers.    Patient presentation indicative of Major Depressive Disorder, severe with no psychosis, with the mood dysregulation being a presentation of her Borderline Personality Disorder. Patient has shown significant improvement in her mood regulation, with no depressed mood, no hopelessness or suicidal ideation; patient has no manic / psychotic symptoms. Discussing discharge planning of which mother supports: partial hospitalization referral made and awaiting feedback.
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 Pt is s/p suicide attempt via overdose. She is superficial and labile on exam most consistent with Borderline PD. Pt is highly focused on the negative aspects of her prior hospitalizations, appears attention seeking at times and impulsive.   Per iSTOP review, pt seem to be engaging allan shopping and obtaining pain killers and Klonopin from different providers, r/o pain killers and benzos abuse.   Pt is willing to augment with abilify. angelina start 2mg PO QD. Also, will start lowering klonopin to 0.25mg PO BID with plan, PT exercises disinhibited behavior , impulsives, and self-curing behavior.
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 Pt is s/p suicide attempt via overdose.    Patient presenting with symptoms indicative of depression superimposed on Borderline Personality Disorder which gives a Bipolar - like presentation with severe mood dysregulation, mood lability, and erratic behaviors. Patient has significant history of suicide attempt via overdose and hanging; patient attempted suicide again via overdose leading to this admission. Patient remains hopeless and suicidal, indicating imminent risk for harm to self, requiring in-patient hospitalization for safety and stabilization.
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 Pt is s/p suicide attempt via overdose. She is superficial and labile on exam most consistent with Borderline PD. Pt is highly focused on the negative aspects of her prior hospitalizations, appears attention seeking at times and impulsive. She is not a reliable historian per her medications (was observed editing a list of hand written meds while in the ED) and would therefore recommend that her medications be verified in the AM, especially given her polypharmacy and high amounts of controlled substances and muscle relaxers.    Patient presentation indicative of Major Depressive Disorder, of which symptoms have improved from severe to mild. Patient has chronic episodes of mood dysregulation in the setting of Borderline Personality Disorder that influence depressive symptoms. Patient has shown significant improvement in her mood regulation, with no depressed mood, no hopelessness or suicidal ideation; patient has no manic / psychotic symptoms. Patient symptoms not indicated imminent risk for harm to self; not warranting involuntary in-patient hospitalization.

## 2019-05-07 DIAGNOSIS — F41.9 ANXIETY DISORDER, UNSPECIFIED: ICD-10-CM

## 2019-05-07 DIAGNOSIS — F33.2 MAJOR DEPRESSIVE DISORDER, RECURRENT SEVERE WITHOUT PSYCHOTIC FEATURES: ICD-10-CM

## 2019-05-07 DIAGNOSIS — N05.1 UNSPECIFIED NEPHRITIC SYNDROME WITH FOCAL AND SEGMENTAL GLOMERULAR LESIONS: ICD-10-CM

## 2019-05-07 DIAGNOSIS — K21.9 GASTRO-ESOPHAGEAL REFLUX DISEASE WITHOUT ESOPHAGITIS: ICD-10-CM

## 2019-05-07 DIAGNOSIS — T42.6X2A POISONING BY OTHER ANTIEPILEPTIC AND SEDATIVE-HYPNOTIC DRUGS, INTENTIONAL SELF-HARM, INITIAL ENCOUNTER: ICD-10-CM

## 2019-05-07 DIAGNOSIS — M54.9 DORSALGIA, UNSPECIFIED: ICD-10-CM
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
## 2019-05-07 DIAGNOSIS — I12.9 HYPERTENSIVE CHRONIC KIDNEY DISEASE WITH STAGE 1 THROUGH STAGE 4 CHRONIC KIDNEY DISEASE, OR UNSPECIFIED CHRONIC KIDNEY DISEASE: ICD-10-CM

## 2019-05-07 DIAGNOSIS — E28.2 POLYCYSTIC OVARIAN SYNDROME: ICD-10-CM

## 2019-05-07 DIAGNOSIS — M79.89 OTHER SPECIFIED SOFT TISSUE DISORDERS: ICD-10-CM

## 2019-05-07 DIAGNOSIS — E66.9 OBESITY, UNSPECIFIED: ICD-10-CM

## 2019-05-07 DIAGNOSIS — G89.29 OTHER CHRONIC PAIN: ICD-10-CM

## 2019-05-07 DIAGNOSIS — F60.3 BORDERLINE PERSONALITY DISORDER: ICD-10-CM

## 2019-05-07 DIAGNOSIS — N39.0 URINARY TRACT INFECTION, SITE NOT SPECIFIED: ICD-10-CM

## 2019-05-07 DIAGNOSIS — N18.9 CHRONIC KIDNEY DISEASE, UNSPECIFIED: ICD-10-CM

## 2020-02-11 NOTE — PROGRESS NOTE BEHAVIORAL HEALTH - NSBHFUPINTERVALCCFT_PSY_A_CORE
February 11, 2020      Miriam Prieto MD  [address]  [facility] [address]           [facility]  [address]  [facility] [address]  Phone: [phone]          Patient: Deborah Navas   MR Number: [id number]   YOB: 1969   Date of Visit: 2/11/2020       Dear Dr. Miriam Prieto:    Thank you for referring Deborah Navas to me for evaluation. Attached you will find relevant portions of my assessment and plan of care.    If you have questions, please do not hesitate to call me. I look forward to following Deborah Navas along with you.    Sincerely,    Bebeto Capellan MD    Enclosure  CC:  No Recipients    If you would like to receive this communication electronically, please contact [email or web address] or [phone] to request more information on [facility] access.    For providers and/or their staff who would like to refer a patient to [facility], please contact us through our one-stop-shop provider referral line, [facility], at [phone].    If you feel you have received this communication in error or would no longer like to receive these types of communications, please e-mail [email or web address]          Patient complains of pain but is feeling in better control than yesterday  Still feeling agitated., especially late in the afternoon.

## 2020-04-23 ENCOUNTER — TRANSCRIPTION ENCOUNTER (OUTPATIENT)
Age: 30
End: 2020-04-23

## 2020-04-24 ENCOUNTER — TRANSCRIPTION ENCOUNTER (OUTPATIENT)
Age: 30
End: 2020-04-24

## 2020-05-10 NOTE — ED ADULT NURSE NOTE - FALLEN IN THE PAST
OFFICE VISIT      Patient: Ann-Marie Armenta Date of Service: 5/10/2020   : 1955 MRN: [id number]     SUBJECTIVE:     Chief Complaint   Patient presents with   • Finger     was cleaning wood cabinets today and has a small piece of wood (splinter) in finger; attempted to remove with tweezers and was unsuccessful       HISTORY OF PRESENT ILLNESS:  Ann-Marie Armenta is a 65 year old female who presents today for c/o foreign body in the right 4th digit happened while cleaning wood cabinets today.  UTD on Tdap.  Pt denies any other symptoms, alleviating, or aggravating factors.    Patient's medications, allergies, past medical, surgical, social and family histories were reviewed and updated as appropriate.  PAST MEDICAL HISTORY:  Past Medical History:   Diagnosis Date   • Lower abdominal pain 2015       MEDICATIONS:  Current Outpatient Medications   Medication Sig   • calcium carbonate-cholecalciferol (OYSCO 500 + D) 500-200 MG-UNIT tablet    • Omega-3 Fatty Acids (FISH OIL) 1000 MG capsule Take 2 g by mouth daily.     No current facility-administered medications for this visit.        ALLERGIES:  ALLERGIES:  No Known Allergies    PAST SURGICAL HISTORY:  Past Surgical History:   Procedure Laterality Date   • Shoulder arthroscopy w/ rotator cuff repair         FAMILY HISTORY:  Family History   Problem Relation Age of Onset   • Cancer Mother    • Emphysema Father    • Cancer Sister    • Stomach Cancer Sister    • Cancer, Ovarian Maternal Aunt    • Cancer, Endometrial Maternal Aunt        SOCIAL HISTORY:  Social History     Tobacco Use   • Smoking status: Never Smoker   • Smokeless tobacco: Never Used   Substance Use Topics   • Alcohol use: No     Frequency: Never   • Drug use: No       Review of Systems   Constitutional: Negative for chills, fatigue and fever.   HENT: Negative for congestion, ear discharge, ear pain, hearing loss, postnasal drip, rhinorrhea, sinus pressure, sinus pain, sneezing, sore throat, trouble  swallowing and voice change.    Eyes: Negative for photophobia, pain, discharge, redness and itching.   Respiratory: Negative for cough, choking, chest tightness, shortness of breath and wheezing.    Cardiovascular: Negative for chest pain and palpitations.   Gastrointestinal: Negative for abdominal pain, blood in stool, constipation, diarrhea, nausea and vomiting.   Genitourinary: Negative for dysuria, flank pain, frequency, hematuria and urgency.   Musculoskeletal: Negative for arthralgias, back pain, joint swelling and myalgias.   Skin: Positive for wound (foreign body in right 4th digit). Negative for rash.   Neurological: Negative for dizziness, weakness, light-headedness, numbness and headaches.   Psychiatric/Behavioral: Negative.          OBJECTIVE:     Vitals:    05/10/20 1301   BP: 119/77   Pulse: 88   Resp: 18   Temp: 98.6 °F (37 °C)   TempSrc: Oral   SpO2: 98%   Weight: 47.2 kg (104 lb)   Height: 4' 11\" (1.499 m)       Physical Exam  Vitals signs and nursing note reviewed.   Constitutional:       General: She is not in acute distress.     Appearance: She is well-developed.   HENT:      Head: Normocephalic and atraumatic.      Right Ear: External ear normal.      Left Ear: External ear normal.      Mouth/Throat:      Pharynx: No oropharyngeal exudate.   Eyes:      Conjunctiva/sclera: Conjunctivae normal.      Pupils: Pupils are equal, round, and reactive to light.   Neck:      Musculoskeletal: Normal range of motion and neck supple.   Cardiovascular:      Rate and Rhythm: Normal rate and regular rhythm.      Heart sounds: Normal heart sounds.   Pulmonary:      Effort: Pulmonary effort is normal.      Breath sounds: Normal breath sounds.   Abdominal:      General: Bowel sounds are normal.      Palpations: Abdomen is soft.   Musculoskeletal: Normal range of motion.   Lymphadenopathy:      Cervical: No cervical adenopathy.   Skin:     General: Skin is warm and dry.      Comments: Right hand 4th digit tip  thick 0.5 inch wood splinter palpated.  2 puncture sites noted.  Digital block with 1 cc of 1%lido no epi.  Needle and tweezer used.  Splinter extracted intact.     Neurological:      Mental Status: She is alert and oriented to person, place, and time.   Psychiatric:         Behavior: Behavior normal.         Thought Content: Thought content normal.       Foreign Body Removal  Date/Time: 5/10/2020 1:00 PM  Performed by: Anahy Nolasco CNP  Authorized by: Anahy Nolasco CNP   Body area: skin  Anesthesia: digital block    Anesthesia:  Local Anesthetic: lidocaine 1% without epinephrine  Anesthetic total: 1 mL    Sedation:  Patient sedated: no    Patient restrained: no  Patient cooperative: yes  Removal mechanism: forceps  Dressing: antibiotic ointment and dressing applied  Tendon involvement: none  Depth: subcutaneous  Complexity: simple  1 objects recovered.  Objects recovered: Wood splinter  Post-procedure assessment: foreign body removed  Patient tolerance: Patient tolerated the procedure well with no immediate complications        DIAGNOSTIC STUDIES:   LAB RESULTS:    No results found for this visit on 05/10/20.    IMAGING RESULTS:  No xray done at this visit.    ASSESSMENT AND PLAN:     1. Embedded wood splinter      Orders Placed This Encounter   • Foreign Body Removal         Take medications as prescribed.  Wash hands frequently.  Drink plenty of water.  You may take Tylenol and Motrin as needed for pain and fever.  You may use a humidifier near your bed.  If symptoms worsen, go to the ER for further evaluation and treatment.  Follow up with primary care physician in 3-5 days or sooner as needed.    The patient indicated understanding of the diagnosis and agreed with the plan of care.      DISCUSSION:       ANNABELLE Pederson, TRAVIS-BC   no

## 2020-05-12 NOTE — ED ADULT NURSE NOTE - CARDIO ASSESSMENT

## 2020-05-23 ENCOUNTER — EMERGENCY (EMERGENCY)
Facility: HOSPITAL | Age: 30
LOS: 0 days | Discharge: LEFT BEFORE DISCHARGE | End: 2020-05-23
Attending: FAMILY MEDICINE
Payer: COMMERCIAL

## 2020-05-23 VITALS
WEIGHT: 251.99 LBS | SYSTOLIC BLOOD PRESSURE: 99 MMHG | RESPIRATION RATE: 18 BRPM | TEMPERATURE: 100 F | HEIGHT: 67 IN | OXYGEN SATURATION: 95 % | HEART RATE: 97 BPM | DIASTOLIC BLOOD PRESSURE: 60 MMHG

## 2020-05-23 DIAGNOSIS — R07.9 CHEST PAIN, UNSPECIFIED: ICD-10-CM

## 2020-05-23 DIAGNOSIS — Z98.890 OTHER SPECIFIED POSTPROCEDURAL STATES: Chronic | ICD-10-CM

## 2020-05-23 DIAGNOSIS — Z53.21 PROCEDURE AND TREATMENT NOT CARRIED OUT DUE TO PATIENT LEAVING PRIOR TO BEING SEEN BY HEALTH CARE PROVIDER: ICD-10-CM

## 2020-05-23 DIAGNOSIS — Z90.49 ACQUIRED ABSENCE OF OTHER SPECIFIED PARTS OF DIGESTIVE TRACT: Chronic | ICD-10-CM

## 2020-05-23 PROCEDURE — 93010 ELECTROCARDIOGRAM REPORT: CPT

## 2020-05-23 PROCEDURE — L9991: CPT

## 2020-05-23 PROCEDURE — 93005 ELECTROCARDIOGRAM TRACING: CPT

## 2020-05-23 NOTE — ED PROVIDER NOTE - PMH
ADHD (attention deficit hyperactivity disorder)    Anxiety    Borderline personality disorder    Cholecystitis  S/P cholycystectomy  Chronic back pain    CKD (chronic kidney disease)    Depression    DM2 (diabetes mellitus, type 2)    GERD (gastroesophageal reflux disease)    HLD (hyperlipidemia)    HTN (hypertension)    Mixed connective tissue disease    Nephrosclerosis    Obesity    Polycystic ovarian syndrome    Suicide attempt by drug overdose  2019  UTI (urinary tract infection)

## 2020-05-23 NOTE — ED PROVIDER NOTE - OBJECTIVE STATEMENT
28 y/o female with PMHx of MDD, anxiety, borderline personality disorder, suicide attempt via overdose (in 2019), ADHD, CKD, DM2, MCTD, GERD, HTN, HLD, PCOS, UTI, s/p R salpingectomy, and s/p cholecystectomy presents to the ED BIBEMS from home. NKDA. PCP: Dr. Kristal Barba.

## 2020-05-23 NOTE — ED ADULT NURSE NOTE - EXPLANATION OF PATIENT'S REASON FOR LEAVING
Pt was not seen by dr or nurse. at [address] went in to see patient and pt was not in room or bathroom. Marcelino with security checked pt before walking out and pt did not want to be seen or evaluated by a dr.  at [address] spoke with mother and mother is going to drive and find pt. pt originally did not say she was suicidal or homicidal therefore no 1to1 was in place

## 2020-05-23 NOTE — ED PROVIDER NOTE - PROGRESS NOTE DETAILS
Radha KENYON for ED attending, Dr. Wang: Pt noted to have run out of the ED by RN, security called and went outside after pt, but pt refused to come back into ED. Pt noted at triage to not be homicidal or suicidal. Radha KENYON for ED attending, Dr. Wang: Pt noted to have run out of the ED by RN, security called and went outside after pt, but pt refused to come back into ED. Pt noted at triage to not be homicidal or suicidal. Rn spoke to pt's mother via telephone.

## 2020-05-23 NOTE — ED ADULT TRIAGE NOTE - CHIEF COMPLAINT QUOTE
Pt BIBEMS c/o agitation, disorientation, and chest pain. Pts mother called EMS because pt was agitated and disoriented at home. pt now c/o chest pain and dizziness. In ED triage pt alert and oriented. States she has not slept in 2-3 days. Pt has hx of depression, SI, HTN. Denies SI/HI at present.

## 2020-05-23 NOTE — ED PROVIDER NOTE - PSH
H/O knee surgery    H/O ovarian cystectomy    H/O unilateral salpingectomy  right  History of cholecystectomy

## 2020-05-25 ENCOUNTER — EMERGENCY (EMERGENCY)
Facility: HOSPITAL | Age: 30
LOS: 0 days | Discharge: HOME | End: 2020-05-25
Attending: EMERGENCY MEDICINE
Payer: COMMERCIAL

## 2020-05-25 VITALS
HEART RATE: 81 BPM | RESPIRATION RATE: 16 BRPM | DIASTOLIC BLOOD PRESSURE: 54 MMHG | TEMPERATURE: 99 F | OXYGEN SATURATION: 96 % | SYSTOLIC BLOOD PRESSURE: 110 MMHG

## 2020-05-25 VITALS — HEIGHT: 67 IN | WEIGHT: 229.94 LBS

## 2020-05-25 DIAGNOSIS — E78.5 HYPERLIPIDEMIA, UNSPECIFIED: ICD-10-CM

## 2020-05-25 DIAGNOSIS — R11.0 NAUSEA: ICD-10-CM

## 2020-05-25 DIAGNOSIS — Z90.79 ACQUIRED ABSENCE OF OTHER GENITAL ORGAN(S): Chronic | ICD-10-CM

## 2020-05-25 DIAGNOSIS — Z98.890 OTHER SPECIFIED POSTPROCEDURAL STATES: Chronic | ICD-10-CM

## 2020-05-25 DIAGNOSIS — F32.9 MAJOR DEPRESSIVE DISORDER, SINGLE EPISODE, UNSPECIFIED: ICD-10-CM

## 2020-05-25 DIAGNOSIS — I12.9 HYPERTENSIVE CHRONIC KIDNEY DISEASE WITH STAGE 1 THROUGH STAGE 4 CHRONIC KIDNEY DISEASE, OR UNSPECIFIED CHRONIC KIDNEY DISEASE: ICD-10-CM

## 2020-05-25 DIAGNOSIS — F90.9 ATTENTION-DEFICIT HYPERACTIVITY DISORDER, UNSPECIFIED TYPE: ICD-10-CM

## 2020-05-25 DIAGNOSIS — Z91.010 ALLERGY TO PEANUTS: ICD-10-CM

## 2020-05-25 DIAGNOSIS — R07.0 PAIN IN THROAT: ICD-10-CM

## 2020-05-25 DIAGNOSIS — F41.9 ANXIETY DISORDER, UNSPECIFIED: ICD-10-CM

## 2020-05-25 DIAGNOSIS — R19.7 DIARRHEA, UNSPECIFIED: ICD-10-CM

## 2020-05-25 DIAGNOSIS — E28.2 POLYCYSTIC OVARIAN SYNDROME: ICD-10-CM

## 2020-05-25 DIAGNOSIS — R50.9 FEVER, UNSPECIFIED: ICD-10-CM

## 2020-05-25 DIAGNOSIS — Z91.5 PERSONAL HISTORY OF SELF-HARM: ICD-10-CM

## 2020-05-25 DIAGNOSIS — N18.9 CHRONIC KIDNEY DISEASE, UNSPECIFIED: ICD-10-CM

## 2020-05-25 DIAGNOSIS — F60.3 BORDERLINE PERSONALITY DISORDER: ICD-10-CM

## 2020-05-25 DIAGNOSIS — K21.9 GASTRO-ESOPHAGEAL REFLUX DISEASE WITHOUT ESOPHAGITIS: ICD-10-CM

## 2020-05-25 DIAGNOSIS — Z90.49 ACQUIRED ABSENCE OF OTHER SPECIFIED PARTS OF DIGESTIVE TRACT: Chronic | ICD-10-CM

## 2020-05-25 DIAGNOSIS — E11.22 TYPE 2 DIABETES MELLITUS WITH DIABETIC CHRONIC KIDNEY DISEASE: ICD-10-CM

## 2020-05-25 DIAGNOSIS — R10.9 UNSPECIFIED ABDOMINAL PAIN: ICD-10-CM

## 2020-05-25 DIAGNOSIS — N12 TUBULO-INTERSTITIAL NEPHRITIS, NOT SPECIFIED AS ACUTE OR CHRONIC: ICD-10-CM

## 2020-05-25 DIAGNOSIS — G89.29 OTHER CHRONIC PAIN: ICD-10-CM

## 2020-05-25 DIAGNOSIS — Z79.84 LONG TERM (CURRENT) USE OF ORAL HYPOGLYCEMIC DRUGS: ICD-10-CM

## 2020-05-25 DIAGNOSIS — M54.5 LOW BACK PAIN: ICD-10-CM

## 2020-05-25 LAB
ADD ON TEST-SPECIMEN IN LAB: SIGNIFICANT CHANGE UP
ALBUMIN SERPL ELPH-MCNC: 3.7 G/DL — SIGNIFICANT CHANGE UP (ref 3.3–5)
ALP SERPL-CCNC: 111 U/L — SIGNIFICANT CHANGE UP (ref 40–120)
ALT FLD-CCNC: 125 U/L — HIGH (ref 12–78)
ANION GAP SERPL CALC-SCNC: 7 MMOL/L — SIGNIFICANT CHANGE UP (ref 5–17)
APPEARANCE UR: CLEAR — SIGNIFICANT CHANGE UP
APTT BLD: 40.7 SEC — HIGH (ref 27.5–36.3)
AST SERPL-CCNC: 182 U/L — HIGH (ref 15–37)
BASOPHILS # BLD AUTO: 0.08 K/UL — SIGNIFICANT CHANGE UP (ref 0–0.2)
BASOPHILS NFR BLD AUTO: 0.6 % — SIGNIFICANT CHANGE UP (ref 0–2)
BILIRUB SERPL-MCNC: 0.4 MG/DL — SIGNIFICANT CHANGE UP (ref 0.2–1.2)
BILIRUB UR-MCNC: NEGATIVE — SIGNIFICANT CHANGE UP
BUN SERPL-MCNC: 22 MG/DL — SIGNIFICANT CHANGE UP (ref 7–23)
CALCIUM SERPL-MCNC: 9.1 MG/DL — SIGNIFICANT CHANGE UP (ref 8.5–10.1)
CHLORIDE SERPL-SCNC: 102 MMOL/L — SIGNIFICANT CHANGE UP (ref 96–108)
CO2 SERPL-SCNC: 25 MMOL/L — SIGNIFICANT CHANGE UP (ref 22–31)
COLOR SPEC: YELLOW — SIGNIFICANT CHANGE UP
CREAT SERPL-MCNC: 1.8 MG/DL — HIGH (ref 0.5–1.3)
DIFF PNL FLD: ABNORMAL
EOSINOPHIL # BLD AUTO: 0.17 K/UL — SIGNIFICANT CHANGE UP (ref 0–0.5)
EOSINOPHIL NFR BLD AUTO: 1.2 % — SIGNIFICANT CHANGE UP (ref 0–6)
GLUCOSE SERPL-MCNC: 106 MG/DL — HIGH (ref 70–99)
GLUCOSE UR QL: NEGATIVE MG/DL — SIGNIFICANT CHANGE UP
HCG SERPL-ACNC: <1 MIU/ML — SIGNIFICANT CHANGE UP
HCT VFR BLD CALC: 39 % — SIGNIFICANT CHANGE UP (ref 34.5–45)
HGB BLD-MCNC: 13.5 G/DL — SIGNIFICANT CHANGE UP (ref 11.5–15.5)
IMM GRANULOCYTES NFR BLD AUTO: 0.5 % — SIGNIFICANT CHANGE UP (ref 0–1.5)
INR BLD: 1.13 RATIO — SIGNIFICANT CHANGE UP (ref 0.88–1.16)
KETONES UR-MCNC: NEGATIVE — SIGNIFICANT CHANGE UP
LEUKOCYTE ESTERASE UR-ACNC: ABNORMAL
LIDOCAIN IGE QN: 389 U/L — SIGNIFICANT CHANGE UP (ref 73–393)
LYMPHOCYTES # BLD AUTO: 15.6 % — SIGNIFICANT CHANGE UP (ref 13–44)
LYMPHOCYTES # BLD AUTO: 2.25 K/UL — SIGNIFICANT CHANGE UP (ref 1–3.3)
MCHC RBC-ENTMCNC: 28.5 PG — SIGNIFICANT CHANGE UP (ref 27–34)
MCHC RBC-ENTMCNC: 34.6 GM/DL — SIGNIFICANT CHANGE UP (ref 32–36)
MCV RBC AUTO: 82.5 FL — SIGNIFICANT CHANGE UP (ref 80–100)
MONOCYTES # BLD AUTO: 1.33 K/UL — HIGH (ref 0–0.9)
MONOCYTES NFR BLD AUTO: 9.2 % — SIGNIFICANT CHANGE UP (ref 2–14)
NEUTROPHILS # BLD AUTO: 10.53 K/UL — HIGH (ref 1.8–7.4)
NEUTROPHILS NFR BLD AUTO: 72.9 % — SIGNIFICANT CHANGE UP (ref 43–77)
NITRITE UR-MCNC: NEGATIVE — SIGNIFICANT CHANGE UP
PH UR: 5 — SIGNIFICANT CHANGE UP (ref 5–8)
PLATELET # BLD AUTO: 302 K/UL — SIGNIFICANT CHANGE UP (ref 150–400)
POTASSIUM SERPL-MCNC: 4.3 MMOL/L — SIGNIFICANT CHANGE UP (ref 3.5–5.3)
POTASSIUM SERPL-SCNC: 4.3 MMOL/L — SIGNIFICANT CHANGE UP (ref 3.5–5.3)
PROT SERPL-MCNC: 8.8 GM/DL — HIGH (ref 6–8.3)
PROT UR-MCNC: 100 MG/DL
PROTHROM AB SERPL-ACNC: 12.6 SEC — SIGNIFICANT CHANGE UP (ref 10–12.9)
RBC # BLD: 4.73 M/UL — SIGNIFICANT CHANGE UP (ref 3.8–5.2)
RBC # FLD: 14.6 % — HIGH (ref 10.3–14.5)
SARS-COV-2 RNA SPEC QL NAA+PROBE: SIGNIFICANT CHANGE UP
SODIUM SERPL-SCNC: 134 MMOL/L — LOW (ref 135–145)
SP GR SPEC: 1.01 — SIGNIFICANT CHANGE UP (ref 1.01–1.02)
UROBILINOGEN FLD QL: NEGATIVE MG/DL — SIGNIFICANT CHANGE UP
WBC # BLD: 14.43 K/UL — HIGH (ref 3.8–10.5)
WBC # FLD AUTO: 14.43 K/UL — HIGH (ref 3.8–10.5)

## 2020-05-25 PROCEDURE — 76830 TRANSVAGINAL US NON-OB: CPT

## 2020-05-25 PROCEDURE — 71045 X-RAY EXAM CHEST 1 VIEW: CPT

## 2020-05-25 PROCEDURE — 96374 THER/PROPH/DIAG INJ IV PUSH: CPT

## 2020-05-25 PROCEDURE — 85610 PROTHROMBIN TIME: CPT

## 2020-05-25 PROCEDURE — 36415 COLL VENOUS BLD VENIPUNCTURE: CPT

## 2020-05-25 PROCEDURE — 87635 SARS-COV-2 COVID-19 AMP PRB: CPT

## 2020-05-25 PROCEDURE — 83690 ASSAY OF LIPASE: CPT

## 2020-05-25 PROCEDURE — 81001 URINALYSIS AUTO W/SCOPE: CPT

## 2020-05-25 PROCEDURE — 74176 CT ABD & PELVIS W/O CONTRAST: CPT

## 2020-05-25 PROCEDURE — 85025 COMPLETE CBC W/AUTO DIFF WBC: CPT

## 2020-05-25 PROCEDURE — 84702 CHORIONIC GONADOTROPIN TEST: CPT

## 2020-05-25 PROCEDURE — 71045 X-RAY EXAM CHEST 1 VIEW: CPT | Mod: 26

## 2020-05-25 PROCEDURE — 85730 THROMBOPLASTIN TIME PARTIAL: CPT

## 2020-05-25 PROCEDURE — 96375 TX/PRO/DX INJ NEW DRUG ADDON: CPT

## 2020-05-25 PROCEDURE — 99285 EMERGENCY DEPT VISIT HI MDM: CPT

## 2020-05-25 PROCEDURE — 74177 CT ABD & PELVIS W/CONTRAST: CPT

## 2020-05-25 PROCEDURE — 80053 COMPREHEN METABOLIC PANEL: CPT

## 2020-05-25 PROCEDURE — 99284 EMERGENCY DEPT VISIT MOD MDM: CPT | Mod: 25

## 2020-05-25 PROCEDURE — 87086 URINE CULTURE/COLONY COUNT: CPT

## 2020-05-25 PROCEDURE — 74176 CT ABD & PELVIS W/O CONTRAST: CPT | Mod: 26

## 2020-05-25 PROCEDURE — 76830 TRANSVAGINAL US NON-OB: CPT | Mod: 26

## 2020-05-25 RX ORDER — CYCLOBENZAPRINE HYDROCHLORIDE 10 MG/1
10 TABLET, FILM COATED ORAL ONCE
Refills: 0 | Status: COMPLETED | OUTPATIENT
Start: 2020-05-25 | End: 2020-05-25

## 2020-05-25 RX ORDER — ACETAMINOPHEN 500 MG
1000 TABLET ORAL ONCE
Refills: 0 | Status: COMPLETED | OUTPATIENT
Start: 2020-05-25 | End: 2020-05-25

## 2020-05-25 RX ORDER — CEFTRIAXONE 500 MG/1
1000 INJECTION, POWDER, FOR SOLUTION INTRAMUSCULAR; INTRAVENOUS ONCE
Refills: 0 | Status: COMPLETED | OUTPATIENT
Start: 2020-05-25 | End: 2020-05-25

## 2020-05-25 RX ORDER — CEPHALEXIN 500 MG
1 CAPSULE ORAL
Qty: 20 | Refills: 0
Start: 2020-05-25 | End: 2020-06-03

## 2020-05-25 RX ORDER — SODIUM CHLORIDE 9 MG/ML
1000 INJECTION INTRAMUSCULAR; INTRAVENOUS; SUBCUTANEOUS ONCE
Refills: 0 | Status: COMPLETED | OUTPATIENT
Start: 2020-05-25 | End: 2020-05-25

## 2020-05-25 RX ORDER — CEFTRIAXONE 500 MG/1
1000 INJECTION, POWDER, FOR SOLUTION INTRAMUSCULAR; INTRAVENOUS ONCE
Refills: 0 | Status: DISCONTINUED | OUTPATIENT
Start: 2020-05-25 | End: 2020-05-25

## 2020-05-25 RX ORDER — LIDOCAINE 4 G/100G
1 CREAM TOPICAL ONCE
Refills: 0 | Status: COMPLETED | OUTPATIENT
Start: 2020-05-25 | End: 2020-05-25

## 2020-05-25 RX ORDER — MORPHINE SULFATE 50 MG/1
4 CAPSULE, EXTENDED RELEASE ORAL ONCE
Refills: 0 | Status: DISCONTINUED | OUTPATIENT
Start: 2020-05-25 | End: 2020-05-25

## 2020-05-25 RX ORDER — CLONAZEPAM 1 MG
0.5 TABLET ORAL ONCE
Refills: 0 | Status: DISCONTINUED | OUTPATIENT
Start: 2020-05-25 | End: 2020-05-25

## 2020-05-25 RX ADMIN — CEFTRIAXONE 1000 MILLIGRAM(S): 500 INJECTION, POWDER, FOR SOLUTION INTRAMUSCULAR; INTRAVENOUS at 12:20

## 2020-05-25 RX ADMIN — MORPHINE SULFATE 4 MILLIGRAM(S): 50 CAPSULE, EXTENDED RELEASE ORAL at 10:22

## 2020-05-25 RX ADMIN — CYCLOBENZAPRINE HYDROCHLORIDE 10 MILLIGRAM(S): 10 TABLET, FILM COATED ORAL at 12:28

## 2020-05-25 RX ADMIN — SODIUM CHLORIDE 1000 MILLILITER(S): 9 INJECTION INTRAMUSCULAR; INTRAVENOUS; SUBCUTANEOUS at 09:58

## 2020-05-25 RX ADMIN — Medication 1000 MILLIGRAM(S): at 10:22

## 2020-05-25 RX ADMIN — LIDOCAINE 1 PATCH: 4 CREAM TOPICAL at 12:28

## 2020-05-25 RX ADMIN — Medication 0.5 MILLIGRAM(S): at 12:28

## 2020-05-25 NOTE — ED PROVIDER NOTE - NSFOLLOWUPINSTRUCTIONS_ED_ALL_ED_FT
Kidney Infection    WHAT YOU NEED TO KNOW:    A kidney infection, or pyelonephritis, is a bacterial infection. The infection usually starts in your bladder or urethra and moves into your kidney. One or both kidneys may be infected. Kidney, Ureters, Bladder         DISCHARGE INSTRUCTIONS:    Return to the emergency department if:     You have a fever and chills.       You cannot stop vomiting.      You have severe pain in your abdomen, lower back, or sides.    Contact your healthcare provider if:     You continue to have a fever after you take antibiotics for 3 days.      You have pain when you urinate, even after treatment.      Your signs and symptoms return.      You have questions or concerns about your condition or care.    Medicines: You may need any of the following:     Antibiotics treat your bacterial infection.       Acetaminophen decreases pain and fever. It is available without a doctor's order. Ask how much to take and how often to take it. Follow directions. Read the labels of all other medicines you are using to see if they also contain acetaminophen, or ask your doctor or pharmacist. Acetaminophen can cause liver damage if not taken correctly. Do not use more than 4 grams (4,000 milligrams) total of acetaminophen in one day.       NSAIDs, such as ibuprofen, help decrease swelling, pain, and fever. This medicine is available with or without a doctor's order. NSAIDs can cause stomach bleeding or kidney problems in certain people. If you take blood thinner medicine, always ask if NSAIDs are safe for you. Always read the medicine label and follow directions. Do not give these medicines to children under 6 months of age without direction from your child's healthcare provider.      Prescription pain medicine may be given. Ask how to take this medicine safely.      Take your medicine as directed. Contact your healthcare provider if you think your medicine is not helping or if you have side effects. Tell him of her if you are allergic to any medicine. Keep a list of the medicines, vitamins, and herbs you take. Include the amounts, and when and why you take them. Bring the list or the pill bottles to follow-up visits. Carry your medicine list with you in case of an emergency.    Drink liquids as directed: You may need to drink extra liquids to help flush your kidneys and urinary system. Water is the best liquid to drink. Ask your healthcare provider how much liquid to drink each day and which liquids are best for you.    Urinate as soon as you feel the urge: This will help flush bacteria from your urinary system. Do not wait or hold your urine for too long.     Clean your genital area every day with soap and water: Wipe from front to back after you urinate or have a bowel movement. Wear cotton underwear. Fabrics such as nylon and polyester can stay damp. This can increase your risk for infection. Urinate within 15 minutes after you have sex.    Follow up with your healthcare provider as directed: Write down your questions so you remember to ask them during your visits.     FOLLOW UP WITH YOUR PRIMARY DOCTOR THIS WEEK. RETURN TO THE ER FOR ANY WORSENING SYMPTOMS OR NEW CONCERNS.

## 2020-05-25 NOTE — ED PROVIDER NOTE - CARE PLAN
Principal Discharge DX:	Pyelonephritis  Secondary Diagnosis:	Abdominal pain  Secondary Diagnosis:	Back pain

## 2020-05-25 NOTE — ED PROVIDER NOTE - PHYSICAL EXAMINATION
*GEN: No acute distress, well appearing   *HEAD: Normocephalic, Atraumatic  *EYES/NOSE: b/l Pupils symmetric & Reactive to light, EOMI b/l  *THROAT: airway patent, moist mucous membranes  *NECK: Neck supple  *PULMONARY: No Respiratory distress, symmetric b/l chest rise  *CARDIAC: s1s2, regular rhythm   *ABDOMEN:  Non Tender, Non Distended, soft, no guarding, no rebound, no masses   *BACK: no CVA tenderness, No midline vertebral tenderness to palpation   *EXTREMITIES: symmetric pulses, 2+ DP & radial pulses, no cyanosis, no edema   *SKIN: no rash, no bruising   *NEUROLOGIC: alert,  full active & passive ROM in all 4 extremities,   *PSYCH: appropriate concern about symptoms, pleasant *GEN: No acute distress, well appearing   *HEAD: Normocephalic, Atraumatic  *EYES/NOSE: b/l Pupils symmetric & Reactive to light, EOMI b/l  *THROAT: airway patent, moist mucous membranes  *NECK: Neck supple  *PULMONARY: No Respiratory distress, symmetric b/l chest rise  *CARDIAC: s1s2, regular rhythm   *ABDOMEN:  +right mid abd TTP, Non Distended, soft, no guarding, no rebound, no masses   *BACK: +right CVA tenderness, No midline vertebral tenderness to palpation   *EXTREMITIES: symmetric pulses, 2+ DP & radial pulses, no cyanosis, no edema   *SKIN: no rash, no bruising   *NEUROLOGIC: alert,  full active & passive ROM in all 4 extremities,   *PSYCH: appropriate concern about symptoms, pleasant

## 2020-05-25 NOTE — ED ADULT NURSE NOTE - OBJECTIVE STATEMENT
right flank pain radiating to RLQ, x a few days, described as pulsating, stabbing pain.  +nausea, no vomiting, +diarrhea yesterday 6 episodes.  States she was recently dx with low Fe and is f/b heme/onc.  She has a h/o PCOS, LMP 6 months ago.  Temp has been 99.2 over the past 3 days.  patient has been taking advil for pain management.  she states she has "low kidney function"  and was told to limit her advil intake.  Her liver enzymes are elevated so she is also limited on tylenol.  She takes MS Contin BID for chronic lower back pain and percocet for break through pain. Last dose of MS Contin was last night.  Patient did not take any of her daily medications today

## 2020-05-25 NOTE — ED PROVIDER NOTE - PMH
ADHD (attention deficit hyperactivity disorder)    Anxiety    Borderline personality disorder    Cholecystitis  S/P cholycystectomy  Chronic back pain    CKD (chronic kidney disease)    Depression    DM2 (diabetes mellitus, type 2)    GERD (gastroesophageal reflux disease)    HLD (hyperlipidemia)    HTN (hypertension)    Mixed connective tissue disease    Nephrosclerosis    Obesity    Polycystic ovarian syndrome    Suicide attempt by drug overdose  2019  UTI (urinary tract infection) ADHD (attention deficit hyperactivity disorder)    Anemia    Anxiety    Borderline personality disorder    Cholecystitis  S/P cholycystectomy  Chronic back pain    CKD (chronic kidney disease)    Depression    DM2 (diabetes mellitus, type 2)    GERD (gastroesophageal reflux disease)    HLD (hyperlipidemia)    HTN (hypertension)    Mixed connective tissue disease    Nephrosclerosis    Obesity    Polycystic ovarian syndrome    Suicide attempt by drug overdose  2019  UTI (urinary tract infection)

## 2020-05-25 NOTE — ED PROVIDER NOTE - NS ED ROS FT
Review of Systems:  	•	CONSTITUTIONAL: no fever  	•	SKIN: no rash  	•	RESPIRATORY: no shortness of breath  	•	CARDIAC: no chest pain  	•	GI:  no abd pain, no nausea, no vomiting, no diarrhea  	•	GENITO-URINARY:  no dysuria  	•	MUSCULOSKELETAL:  no back pain  	•	NEUROLOGIC: no weakness  	•	ALLERGY: no rhinorrhea  	•	PSYSCHIATRIC: appropriate concern about symptoms Review of Systems:  	•	CONSTITUTIONAL: +fever  	•	SKIN: no rash  	•	RESPIRATORY: no shortness of breath  	•	CARDIAC: no chest pain  	•	GI:  +abd pain, +nausea, no vomiting, +diarrhea  	•	GENITO-URINARY:  +dysuria  	•	MUSCULOSKELETAL:  +flank pain   	•	NEUROLOGIC: no weakness  	•	ALLERGY: no rhinorrhea +sore throat   	•	PSYSCHIATRIC: appropriate concern about symptoms

## 2020-05-25 NOTE — ED PROVIDER NOTE - PATIENT PORTAL LINK FT
You can access the FollowMyHealth Patient Portal offered by [facility] by registering at the following website: [email or web address]. By joining "[email or web address]’s FollowMyHealth portal, you will also be able to view your health information using other applications (apps) compatible with our system.

## 2020-05-25 NOTE — ED ADULT TRIAGE NOTE - CHIEF COMPLAINT QUOTE
Pt reports worsening Right sided abdomen radiating to back x 2 days, pt is having ongoing cancer work up. Pt reports worsening Right lower abdomen radiating to back x 2 days, hx ovarian cyst and pcos

## 2020-05-25 NOTE — ED PROVIDER NOTE - PROGRESS NOTE DETAILS
signed Sarah Barrios PA-C   29F c/o right lower back pain radiating to RLQ x 1 day. Pt notes she is having difficulty urinating as well. Pt has hx of PCOS on metformin and she is worried about cysts. Pt very anxious and tearful, limiting exam. Pt states she is seeing hematologist Dr Toledo for outpt anemia workup, also says she had CKD and elevated liver enzymes. PMD Aydin. Plan labs, sono, CT, UA, COVID-19. Pt agrees with plan of  care. signed Sarah Barrios PA-C   Labs notable for slightly elevated WBC, renal function, and LFTs. pt with low grade temp in ED, some WBC on UA, will treat as pyelo. Pts pain improved, offered her muscle relaxer but she says she has these at home. Recommend OTC lidoderm patch. Rx keflex. return precautions given. f/u PMD this week. I spoke to rad Dr Dehspande regarding CT as there was no comment on reproductive organs initally in body of CT report. He reports that no acute findings on ovaries (nonvisualized on sono) or uterus on CT, dictation had not made it into original report and he will addend. Pt feeling well at DC, agrees with DC and plan of care.

## 2020-05-25 NOTE — ED PROVIDER NOTE - ATTENDING CONTRIBUTION TO CARE
Attending Attestation:  Alec Alvarez MD.  I have personally performed a history and physical examination of the patient . I have discussed management with the ACP & the patient.  I reviewed the ACP's note and agree with the documented findings and plan of care.  I have authored and modified critical sections of the Provider Note, including but not limited to HPI, ROS, Physical Exam and MDM.

## 2020-05-25 NOTE — ED ADULT NURSE NOTE - CHIEF COMPLAINT QUOTE
Pt reports worsening Right sided abdomen radiating to back x 2 days, pt is having ongoing cancer work up.

## 2020-05-25 NOTE — ED PROVIDER NOTE - CLINICAL SUMMARY MEDICAL DECISION MAKING FREE TEXT BOX
Intermittent right flank pain to right mid abd pain. Will eval for UTI, pyelonephritis, appendicitis, ruptured ovarian cyst, COVID-19.

## 2020-05-25 NOTE — ED ADULT NURSE NOTE - NSIMPLEMENTINTERV_GEN_ALL_ED
Reason For Visit:   Chief Complaint   Patient presents with     RECHECK     1 wk POP left thumb CMC arthroplasty, left carpal tunnel release DOS 10/11/19       Primary MD: Emerson Spear    Age: 64 year old    ?  No      There were no vitals taken for this visit.      Pain Assessment  Patient Currently in Pain: Yes  0-10 Pain Scale: 3  Primary Pain Location: Hand(Left)               QuickDASH Assessment  No flowsheet data found.       Current Outpatient Medications   Medication Sig Dispense Refill     acetaminophen (TYLENOL) 325 MG tablet Take 3 tablets (975 mg) by mouth every 8 hours 100 tablet 0     cetirizine (ZYRTEC) 5 MG tablet Take 5 mg by mouth daily       esomeprazole (NEXIUM) 40 MG DR capsule Take 40 mg by mouth every morning (before breakfast) Take 30-60 minutes before eating.       fluticasone (FLONASE) 50 MCG/ACT nasal spray Spray 1 spray into both nostrils 2 times daily       HYDROcodone-acetaminophen (NORCO) 5-325 MG tablet Take 1-2 tablets by mouth every 4 hours as needed for moderate to severe pain 20 tablet 0     ibuprofen (ADVIL/MOTRIN) 100 MG tablet Take 100 mg by mouth every 4 hours as needed       Pseudoephedrine HCl (SUDAFED PO) Take  by mouth at onset of headache.       zolpidem ER (AMBIEN CR) 6.25 MG CR tablet Take 6.25 mg by mouth nightly as needed for sleep         Allergies   Allergen Reactions     Latex Itching     burning     Latex      PN: Converted from LW Latex Sensitivity Flag       Day Cota, ATC    
Implemented All Universal Safety Interventions:  [place] to call system. Call bell, personal items and telephone within reach. Instruct patient to call for assistance. Room bathroom lighting operational. Non-slip footwear when patient is off stretcher. Physically safe environment: no spills, clutter or unnecessary equipment. Stretcher in lowest position, wheels locked, appropriate side rails in place.

## 2020-05-25 NOTE — ED PROVIDER NOTE - OBJECTIVE STATEMENT
Pertinent HPI/PMH/PSH/FHx/SHx and Review of Systems contained within  HPI: 28 y/o female p/w CC right flank pain.   PMH/PSH relevant for: PCOS, HTN, HLD, DM2, CKD, GERD, s/p cholecystectomy, anxiety, borderline personality disorder  ROS negative for: fever, Chest pain, SOB, Nausea, vomiting, diarrhea, abdominal pain, dysuria    FamilyHx and SocialHx not otherwise contributory Pertinent HPI/PMH/PSH/FHx/SHx and Review of Systems contained within  HPI: 28 y/o female p/w CC right flank pain radiating to mid abdomen, new onset, associated with nausea, fever, diarrhea, sore throat, dysuria. Does not feel like chronic back pain.   PMH/PSH relevant for: PCOS on metformin for diabetes prophylaxis, HTN, HLD, CKD, GERD, s/p cholecystectomy, anxiety, borderline personality disorder  ROS negative for: Chest pain, SOB, vomiting  FamilyHx and SocialHx not otherwise contributory Pertinent HPI/PMH/PSH/FHx/SHx and Review of Systems contained within  HPI: 28 y/o female p/w CC right flank pain radiating to mid abdomen, new onset, associated with nausea, fever, diarrhea, sore throat, dysuria. Does not feel like chronic back pain. Pt with chronic anemia being followed by hematology for possible bone marrow biopsy.  PMH/PSH relevant for: anemia, PCOS on metformin for diabetes prophylaxis, HTN, HLD, CKD, GERD, s/p cholecystectomy, anxiety, borderline personality disorder  ROS negative for: Chest pain, SOB, vomiting  FamilyHx and SocialHx not otherwise contributory Pertinent HPI/PMH/PSH/FHx/SHx and Review of Systems contained within  HPI: 28 y/o female p/w CC right flank pain radiating to mid abdomen, new onset, associated with nausea, fever, diarrhea, sore throat, dysuria. Does not feel like chronic back pain. Pt with chronic anemia being followed by hematology for possible bone marrow biopsy.  PMH/PSH relevant for: anemia, PCOS on metformin for diabetes prophylaxis, HTN, HLD, CKD, GERD, s/p cholecystectomy, anxiety, borderline personality disorder  ROS negative for: Chest pain, SOB, vomiting  FamilyHx not otherwise contributory  SocialHx : works as school RN

## 2020-05-26 ENCOUNTER — EMERGENCY (EMERGENCY)
Facility: HOSPITAL | Age: 30
LOS: 0 days | Discharge: HOME | End: 2020-05-26
Attending: EMERGENCY MEDICINE
Payer: COMMERCIAL

## 2020-05-26 VITALS
HEART RATE: 94 BPM | TEMPERATURE: 99 F | RESPIRATION RATE: 20 BRPM | SYSTOLIC BLOOD PRESSURE: 124 MMHG | OXYGEN SATURATION: 98 % | DIASTOLIC BLOOD PRESSURE: 95 MMHG

## 2020-05-26 VITALS — WEIGHT: 259.93 LBS | HEIGHT: 67 IN

## 2020-05-26 DIAGNOSIS — R10.9 UNSPECIFIED ABDOMINAL PAIN: ICD-10-CM

## 2020-05-26 DIAGNOSIS — E11.22 TYPE 2 DIABETES MELLITUS WITH DIABETIC CHRONIC KIDNEY DISEASE: ICD-10-CM

## 2020-05-26 DIAGNOSIS — G89.29 OTHER CHRONIC PAIN: ICD-10-CM

## 2020-05-26 DIAGNOSIS — Z90.79 ACQUIRED ABSENCE OF OTHER GENITAL ORGAN(S): Chronic | ICD-10-CM

## 2020-05-26 DIAGNOSIS — F90.9 ATTENTION-DEFICIT HYPERACTIVITY DISORDER, UNSPECIFIED TYPE: ICD-10-CM

## 2020-05-26 DIAGNOSIS — R50.9 FEVER, UNSPECIFIED: ICD-10-CM

## 2020-05-26 DIAGNOSIS — F60.3 BORDERLINE PERSONALITY DISORDER: ICD-10-CM

## 2020-05-26 DIAGNOSIS — M54.9 DORSALGIA, UNSPECIFIED: ICD-10-CM

## 2020-05-26 DIAGNOSIS — E28.2 POLYCYSTIC OVARIAN SYNDROME: ICD-10-CM

## 2020-05-26 DIAGNOSIS — F41.9 ANXIETY DISORDER, UNSPECIFIED: ICD-10-CM

## 2020-05-26 DIAGNOSIS — Z91.5 PERSONAL HISTORY OF SELF-HARM: ICD-10-CM

## 2020-05-26 DIAGNOSIS — E66.9 OBESITY, UNSPECIFIED: ICD-10-CM

## 2020-05-26 DIAGNOSIS — Z90.49 ACQUIRED ABSENCE OF OTHER SPECIFIED PARTS OF DIGESTIVE TRACT: Chronic | ICD-10-CM

## 2020-05-26 DIAGNOSIS — F32.9 MAJOR DEPRESSIVE DISORDER, SINGLE EPISODE, UNSPECIFIED: ICD-10-CM

## 2020-05-26 DIAGNOSIS — M35.1 OTHER OVERLAP SYNDROMES: ICD-10-CM

## 2020-05-26 DIAGNOSIS — Z98.890 OTHER SPECIFIED POSTPROCEDURAL STATES: Chronic | ICD-10-CM

## 2020-05-26 DIAGNOSIS — N39.0 URINARY TRACT INFECTION, SITE NOT SPECIFIED: ICD-10-CM

## 2020-05-26 DIAGNOSIS — K21.9 GASTRO-ESOPHAGEAL REFLUX DISEASE WITHOUT ESOPHAGITIS: ICD-10-CM

## 2020-05-26 DIAGNOSIS — N18.9 CHRONIC KIDNEY DISEASE, UNSPECIFIED: ICD-10-CM

## 2020-05-26 DIAGNOSIS — Z87.440 PERSONAL HISTORY OF URINARY (TRACT) INFECTIONS: ICD-10-CM

## 2020-05-26 DIAGNOSIS — I12.9 HYPERTENSIVE CHRONIC KIDNEY DISEASE WITH STAGE 1 THROUGH STAGE 4 CHRONIC KIDNEY DISEASE, OR UNSPECIFIED CHRONIC KIDNEY DISEASE: ICD-10-CM

## 2020-05-26 DIAGNOSIS — Z91.010 ALLERGY TO PEANUTS: ICD-10-CM

## 2020-05-26 LAB
ALBUMIN SERPL ELPH-MCNC: 3.4 G/DL — SIGNIFICANT CHANGE UP (ref 3.3–5)
ALP SERPL-CCNC: 116 U/L — SIGNIFICANT CHANGE UP (ref 40–120)
ALT FLD-CCNC: 113 U/L — HIGH (ref 12–78)
ANION GAP SERPL CALC-SCNC: 6 MMOL/L — SIGNIFICANT CHANGE UP (ref 5–17)
APPEARANCE UR: CLEAR — SIGNIFICANT CHANGE UP
AST SERPL-CCNC: 252 U/L — HIGH (ref 15–37)
BASOPHILS # BLD AUTO: 0.06 K/UL — SIGNIFICANT CHANGE UP (ref 0–0.2)
BASOPHILS NFR BLD AUTO: 0.6 % — SIGNIFICANT CHANGE UP (ref 0–2)
BILIRUB SERPL-MCNC: 0.2 MG/DL — SIGNIFICANT CHANGE UP (ref 0.2–1.2)
BILIRUB UR-MCNC: NEGATIVE — SIGNIFICANT CHANGE UP
BUN SERPL-MCNC: 27 MG/DL — HIGH (ref 7–23)
CALCIUM SERPL-MCNC: 9.5 MG/DL — SIGNIFICANT CHANGE UP (ref 8.5–10.1)
CHLORIDE SERPL-SCNC: 106 MMOL/L — SIGNIFICANT CHANGE UP (ref 96–108)
CO2 SERPL-SCNC: 25 MMOL/L — SIGNIFICANT CHANGE UP (ref 22–31)
COLOR SPEC: YELLOW — SIGNIFICANT CHANGE UP
CREAT SERPL-MCNC: 1.36 MG/DL — HIGH (ref 0.5–1.3)
CULTURE RESULTS: SIGNIFICANT CHANGE UP
DIFF PNL FLD: ABNORMAL
EOSINOPHIL # BLD AUTO: 0.19 K/UL — SIGNIFICANT CHANGE UP (ref 0–0.5)
EOSINOPHIL NFR BLD AUTO: 1.9 % — SIGNIFICANT CHANGE UP (ref 0–6)
GLUCOSE SERPL-MCNC: 109 MG/DL — HIGH (ref 70–99)
GLUCOSE UR QL: NEGATIVE MG/DL — SIGNIFICANT CHANGE UP
HCT VFR BLD CALC: 37.2 % — SIGNIFICANT CHANGE UP (ref 34.5–45)
HGB BLD-MCNC: 12.8 G/DL — SIGNIFICANT CHANGE UP (ref 11.5–15.5)
IMM GRANULOCYTES NFR BLD AUTO: 0.4 % — SIGNIFICANT CHANGE UP (ref 0–1.5)
KETONES UR-MCNC: NEGATIVE — SIGNIFICANT CHANGE UP
LACTATE SERPL-SCNC: 2 MMOL/L — SIGNIFICANT CHANGE UP (ref 0.7–2)
LEUKOCYTE ESTERASE UR-ACNC: NEGATIVE — SIGNIFICANT CHANGE UP
LIDOCAIN IGE QN: 240 U/L — SIGNIFICANT CHANGE UP (ref 73–393)
LYMPHOCYTES # BLD AUTO: 2.58 K/UL — SIGNIFICANT CHANGE UP (ref 1–3.3)
LYMPHOCYTES # BLD AUTO: 25.5 % — SIGNIFICANT CHANGE UP (ref 13–44)
MCHC RBC-ENTMCNC: 28.6 PG — SIGNIFICANT CHANGE UP (ref 27–34)
MCHC RBC-ENTMCNC: 34.4 GM/DL — SIGNIFICANT CHANGE UP (ref 32–36)
MCV RBC AUTO: 83.2 FL — SIGNIFICANT CHANGE UP (ref 80–100)
MONOCYTES # BLD AUTO: 0.91 K/UL — HIGH (ref 0–0.9)
MONOCYTES NFR BLD AUTO: 9 % — SIGNIFICANT CHANGE UP (ref 2–14)
NEUTROPHILS # BLD AUTO: 6.32 K/UL — SIGNIFICANT CHANGE UP (ref 1.8–7.4)
NEUTROPHILS NFR BLD AUTO: 62.6 % — SIGNIFICANT CHANGE UP (ref 43–77)
NITRITE UR-MCNC: NEGATIVE — SIGNIFICANT CHANGE UP
PH UR: 5 — SIGNIFICANT CHANGE UP (ref 5–8)
PLATELET # BLD AUTO: 299 K/UL — SIGNIFICANT CHANGE UP (ref 150–400)
POTASSIUM SERPL-MCNC: 4.5 MMOL/L — SIGNIFICANT CHANGE UP (ref 3.5–5.3)
POTASSIUM SERPL-SCNC: 4.5 MMOL/L — SIGNIFICANT CHANGE UP (ref 3.5–5.3)
PROT SERPL-MCNC: 8.6 GM/DL — HIGH (ref 6–8.3)
PROT UR-MCNC: 15 MG/DL
RBC # BLD: 4.47 M/UL — SIGNIFICANT CHANGE UP (ref 3.8–5.2)
RBC # FLD: 14.6 % — HIGH (ref 10.3–14.5)
SODIUM SERPL-SCNC: 137 MMOL/L — SIGNIFICANT CHANGE UP (ref 135–145)
SP GR SPEC: 1.01 — SIGNIFICANT CHANGE UP (ref 1.01–1.02)
SPECIMEN SOURCE: SIGNIFICANT CHANGE UP
UROBILINOGEN FLD QL: NEGATIVE MG/DL — SIGNIFICANT CHANGE UP
WBC # BLD: 10.1 K/UL — SIGNIFICANT CHANGE UP (ref 3.8–10.5)
WBC # FLD AUTO: 10.1 K/UL — SIGNIFICANT CHANGE UP (ref 3.8–10.5)

## 2020-05-26 PROCEDURE — 96374 THER/PROPH/DIAG INJ IV PUSH: CPT

## 2020-05-26 PROCEDURE — 96375 TX/PRO/DX INJ NEW DRUG ADDON: CPT

## 2020-05-26 PROCEDURE — 83690 ASSAY OF LIPASE: CPT

## 2020-05-26 PROCEDURE — 81025 URINE PREGNANCY TEST: CPT

## 2020-05-26 PROCEDURE — 99284 EMERGENCY DEPT VISIT MOD MDM: CPT

## 2020-05-26 PROCEDURE — 81001 URINALYSIS AUTO W/SCOPE: CPT

## 2020-05-26 PROCEDURE — 83605 ASSAY OF LACTIC ACID: CPT

## 2020-05-26 PROCEDURE — 87086 URINE CULTURE/COLONY COUNT: CPT

## 2020-05-26 PROCEDURE — 80053 COMPREHEN METABOLIC PANEL: CPT

## 2020-05-26 PROCEDURE — 85025 COMPLETE CBC W/AUTO DIFF WBC: CPT

## 2020-05-26 PROCEDURE — 87040 BLOOD CULTURE FOR BACTERIA: CPT

## 2020-05-26 PROCEDURE — 36415 COLL VENOUS BLD VENIPUNCTURE: CPT

## 2020-05-26 PROCEDURE — 99284 EMERGENCY DEPT VISIT MOD MDM: CPT | Mod: 25

## 2020-05-26 RX ORDER — MIRTAZAPINE 45 MG/1
7.5 TABLET, ORALLY DISINTEGRATING ORAL
Qty: 0 | Refills: 0 | DISCHARGE

## 2020-05-26 RX ORDER — HYDROMORPHONE HYDROCHLORIDE 2 MG/ML
1 INJECTION INTRAMUSCULAR; INTRAVENOUS; SUBCUTANEOUS ONCE
Refills: 0 | Status: DISCONTINUED | OUTPATIENT
Start: 2020-05-26 | End: 2020-05-26

## 2020-05-26 RX ORDER — OXYCODONE HYDROCHLORIDE 5 MG/1
1 TABLET ORAL
Qty: 6 | Refills: 0
Start: 2020-05-26

## 2020-05-26 RX ORDER — CEFTRIAXONE 500 MG/1
1000 INJECTION, POWDER, FOR SOLUTION INTRAMUSCULAR; INTRAVENOUS ONCE
Refills: 0 | Status: COMPLETED | OUTPATIENT
Start: 2020-05-26 | End: 2020-05-26

## 2020-05-26 RX ORDER — SODIUM CHLORIDE 9 MG/ML
2000 INJECTION INTRAMUSCULAR; INTRAVENOUS; SUBCUTANEOUS ONCE
Refills: 0 | Status: COMPLETED | OUTPATIENT
Start: 2020-05-26 | End: 2020-05-26

## 2020-05-26 RX ADMIN — SODIUM CHLORIDE 4000 MILLILITER(S): 9 INJECTION INTRAMUSCULAR; INTRAVENOUS; SUBCUTANEOUS at 14:19

## 2020-05-26 RX ADMIN — CEFTRIAXONE 1000 MILLIGRAM(S): 500 INJECTION, POWDER, FOR SOLUTION INTRAMUSCULAR; INTRAVENOUS at 14:19

## 2020-05-26 RX ADMIN — HYDROMORPHONE HYDROCHLORIDE 1 MILLIGRAM(S): 2 INJECTION INTRAMUSCULAR; INTRAVENOUS; SUBCUTANEOUS at 14:28

## 2020-05-26 RX ADMIN — HYDROMORPHONE HYDROCHLORIDE 1 MILLIGRAM(S): 2 INJECTION INTRAMUSCULAR; INTRAVENOUS; SUBCUTANEOUS at 14:19

## 2020-05-26 NOTE — ED ADULT NURSE NOTE - OBJECTIVE STATEMENT
pt here for RLQ pain radiating to right lower back 2 days, was seen in the er yesterday for work up states kidney infection and sent home

## 2020-05-26 NOTE — ED STATDOCS - PMH
ADHD (attention deficit hyperactivity disorder)    Anemia    Anxiety    Borderline personality disorder    Cholecystitis  S/P cholycystectomy  Chronic back pain    CKD (chronic kidney disease)    Depression    DM2 (diabetes mellitus, type 2)    GERD (gastroesophageal reflux disease)    HLD (hyperlipidemia)    HTN (hypertension)    Mixed connective tissue disease    Nephrosclerosis    Obesity    Polycystic ovarian syndrome    Suicide attempt by drug overdose  2019  UTI (urinary tract infection)

## 2020-05-26 NOTE — ED STATDOCS - OBJECTIVE STATEMENT
30 y/o female with a PMHx of ADHD, anemia, anxiety, borderline personality disorder, cholecystitis, chronic back pain, CKD, depression, DM2, GERD, HLD, HTN, mixed connective tissue disease, nephrosclerosis, obesity, polycystic ovarian syndrome, suicide attempt, UTI presents to the ED c/o continued right flank pain. Pt has had fever at home, Tmax 101F. Pt seen in ED yesterday for same, was diagnosed with possible pyelonephritis and was d/c on Keflex. Denies cough, SOB. No recent travel. Nonsmoker. No EtOH use. No drug use. No other complaints at this time.

## 2020-05-26 NOTE — ED STATDOCS - CLINICAL SUMMARY MEDICAL DECISION MAKING FREE TEXT BOX
Pt presents with right flank pain, possible pyelonephritis. Pt's pain not improving. Plan: labs, symptomatic control, possible admission.

## 2020-05-26 NOTE — PHARMACOTHERAPY INTERVENTION NOTE - COMMENTS
medrec complete. patient interviewed. medhx utilized. patient noted she has been advised by PCP to avoid apap/motrin due to hepatic/renal impairment. added to intolerance. removed morphine as pruiritis reported was from 2008 and has not experienced it since then and is currently on morphine ER for pain management

## 2020-05-26 NOTE — ED STATDOCS - PROGRESS NOTE DETAILS
Patient seen and evaluated.  Bloodwork improved today despite patient's worsening pain.  Suspect residual inflammation from UTI/early pyelo.  Urine culture resulted, will switch to augmentin.  Will give short acting pain medication to add to MS contin that she is on, she will follow up with her pain management and PMD -Mode Diaz PA-C

## 2020-05-26 NOTE — ED STATDOCS - PATIENT PORTAL LINK FT
You can access the FollowMyHealth Patient Portal offered by [facility] by registering at the following website: [email or web address]. By joining [employer]’s FollowMyHealth portal, you will also be able to view your health information using other applications (apps) compatible with our system.

## 2020-05-26 NOTE — ED STATDOCS - ATTENDING CONTRIBUTION TO CARE
I, Kyle Sena MD,  performed the initial face to face bedside interview with this patient regarding history of present illness, review of symptoms and relevant past medical, social and family history.  I completed an independent physical examination.  I was the initial provider who evaluated this patient. I have signed out the follow up of any pending tests (i.e. labs, radiological studies) to the ACP.  I have communicated the patient’s plan of care and disposition with the ACP.  The history, relevant review of systems, past medical and surgical history, medical decision making, and physical examination was documented by the scribe in my presence and I attest to the accuracy of the documentation.

## 2020-05-27 LAB
CULTURE RESULTS: NO GROWTH — SIGNIFICANT CHANGE UP
SPECIMEN SOURCE: SIGNIFICANT CHANGE UP

## 2020-07-06 PROBLEM — E11.9 TYPE 2 DIABETES MELLITUS WITHOUT COMPLICATIONS: Chronic | Status: ACTIVE | Noted: 2020-05-25

## 2020-07-06 PROBLEM — E78.5 HYPERLIPIDEMIA, UNSPECIFIED: Chronic | Status: ACTIVE | Noted: 2020-05-25

## 2020-07-06 PROBLEM — N18.9 CHRONIC KIDNEY DISEASE, UNSPECIFIED: Chronic | Status: ACTIVE | Noted: 2020-05-25

## 2020-07-06 PROBLEM — F90.9 ATTENTION-DEFICIT HYPERACTIVITY DISORDER, UNSPECIFIED TYPE: Chronic | Status: ACTIVE | Noted: 2020-05-25

## 2020-07-06 PROBLEM — D64.9 ANEMIA, UNSPECIFIED: Chronic | Status: ACTIVE | Noted: 2020-05-25

## 2020-07-06 PROBLEM — F60.3 BORDERLINE PERSONALITY DISORDER: Chronic | Status: ACTIVE | Noted: 2020-05-25

## 2020-07-06 PROBLEM — N39.0 URINARY TRACT INFECTION, SITE NOT SPECIFIED: Chronic | Status: ACTIVE | Noted: 2020-05-25

## 2020-07-09 DIAGNOSIS — Z01.818 ENCOUNTER FOR OTHER PREPROCEDURAL EXAMINATION: ICD-10-CM

## 2020-07-11 ENCOUNTER — APPOINTMENT (OUTPATIENT)
Dept: DISASTER EMERGENCY | Facility: CLINIC | Age: 30
End: 2020-07-11

## 2020-07-11 LAB — SARS-COV-2 N GENE NPH QL NAA+PROBE: NOT DETECTED

## 2020-07-14 ENCOUNTER — RESULT REVIEW (OUTPATIENT)
Age: 30
End: 2020-07-14

## 2020-07-14 ENCOUNTER — OUTPATIENT (OUTPATIENT)
Dept: OUTPATIENT SERVICES | Facility: HOSPITAL | Age: 30
LOS: 1 days | Discharge: HOME | End: 2020-07-14
Payer: COMMERCIAL

## 2020-07-14 VITALS
OXYGEN SATURATION: 95 % | WEIGHT: 259.93 LBS | RESPIRATION RATE: 27 BRPM | DIASTOLIC BLOOD PRESSURE: 80 MMHG | SYSTOLIC BLOOD PRESSURE: 140 MMHG | HEART RATE: 90 BPM | HEIGHT: 67 IN | TEMPERATURE: 98 F

## 2020-07-14 DIAGNOSIS — Z98.890 OTHER SPECIFIED POSTPROCEDURAL STATES: Chronic | ICD-10-CM

## 2020-07-14 DIAGNOSIS — Z90.79 ACQUIRED ABSENCE OF OTHER GENITAL ORGAN(S): Chronic | ICD-10-CM

## 2020-07-14 DIAGNOSIS — D50.9 IRON DEFICIENCY ANEMIA, UNSPECIFIED: ICD-10-CM

## 2020-07-14 DIAGNOSIS — Z90.49 ACQUIRED ABSENCE OF OTHER SPECIFIED PARTS OF DIGESTIVE TRACT: Chronic | ICD-10-CM

## 2020-07-14 LAB — HCG UR QL: NEGATIVE — SIGNIFICANT CHANGE UP

## 2020-07-14 PROCEDURE — 81025 URINE PREGNANCY TEST: CPT

## 2020-07-14 PROCEDURE — 88305 TISSUE EXAM BY PATHOLOGIST: CPT

## 2020-07-14 PROCEDURE — 88305 TISSUE EXAM BY PATHOLOGIST: CPT | Mod: 26

## 2020-07-14 PROCEDURE — 88312 SPECIAL STAINS GROUP 1: CPT | Mod: 26

## 2020-07-14 PROCEDURE — 88312 SPECIAL STAINS GROUP 1: CPT

## 2020-07-14 RX ORDER — MORPHINE SULFATE 50 MG/1
1 CAPSULE, EXTENDED RELEASE ORAL
Qty: 0 | Refills: 0 | DISCHARGE

## 2020-07-14 RX ORDER — DEXTROAMPHETAMINE SACCHARATE, AMPHETAMINE ASPARTATE, DEXTROAMPHETAMINE SULFATE AND AMPHETAMINE SULFATE 1.875; 1.875; 1.875; 1.875 MG/1; MG/1; MG/1; MG/1
1 TABLET ORAL
Qty: 0 | Refills: 0 | DISCHARGE

## 2020-07-14 RX ORDER — OXYCODONE HYDROCHLORIDE 5 MG/1
1 TABLET ORAL
Qty: 0 | Refills: 0 | DISCHARGE

## 2020-07-14 RX ORDER — AMLODIPINE BESYLATE 2.5 MG/1
1 TABLET ORAL
Qty: 0 | Refills: 0 | DISCHARGE

## 2020-07-14 RX ORDER — ASCORBIC ACID 60 MG
1 TABLET,CHEWABLE ORAL
Qty: 0 | Refills: 0 | DISCHARGE

## 2020-07-20 DIAGNOSIS — Z88.0 ALLERGY STATUS TO PENICILLIN: ICD-10-CM

## 2020-07-20 DIAGNOSIS — D50.9 IRON DEFICIENCY ANEMIA, UNSPECIFIED: ICD-10-CM

## 2020-07-20 DIAGNOSIS — Z88.8 ALLERGY STATUS TO OTHER DRUGS, MEDICAMENTS AND BIOLOGICAL SUBSTANCES: ICD-10-CM

## 2020-07-20 DIAGNOSIS — I10 ESSENTIAL (PRIMARY) HYPERTENSION: ICD-10-CM

## 2020-07-20 DIAGNOSIS — K44.9 DIAPHRAGMATIC HERNIA WITHOUT OBSTRUCTION OR GANGRENE: ICD-10-CM

## 2020-07-20 DIAGNOSIS — D12.3 BENIGN NEOPLASM OF TRANSVERSE COLON: ICD-10-CM

## 2020-07-20 DIAGNOSIS — K31.89 OTHER DISEASES OF STOMACH AND DUODENUM: ICD-10-CM

## 2020-07-21 ENCOUNTER — TRANSCRIPTION ENCOUNTER (OUTPATIENT)
Age: 30
End: 2020-07-21

## 2020-07-25 ENCOUNTER — OUTPATIENT (OUTPATIENT)
Dept: OUTPATIENT SERVICES | Facility: HOSPITAL | Age: 30
LOS: 1 days | End: 2020-07-25
Payer: COMMERCIAL

## 2020-07-25 DIAGNOSIS — Z98.890 OTHER SPECIFIED POSTPROCEDURAL STATES: Chronic | ICD-10-CM

## 2020-07-25 DIAGNOSIS — Z11.59 ENCOUNTER FOR SCREENING FOR OTHER VIRAL DISEASES: ICD-10-CM

## 2020-07-25 DIAGNOSIS — Z90.49 ACQUIRED ABSENCE OF OTHER SPECIFIED PARTS OF DIGESTIVE TRACT: Chronic | ICD-10-CM

## 2020-07-25 DIAGNOSIS — Z90.79 ACQUIRED ABSENCE OF OTHER GENITAL ORGAN(S): Chronic | ICD-10-CM

## 2020-07-25 PROCEDURE — U0003: CPT

## 2020-07-26 DIAGNOSIS — Z11.59 ENCOUNTER FOR SCREENING FOR OTHER VIRAL DISEASES: ICD-10-CM

## 2020-07-26 LAB — SARS-COV-2 RNA SPEC QL NAA+PROBE: SIGNIFICANT CHANGE UP

## 2020-07-28 ENCOUNTER — RESULT REVIEW (OUTPATIENT)
Age: 30
End: 2020-07-28

## 2020-07-28 ENCOUNTER — OUTPATIENT (OUTPATIENT)
Dept: INPATIENT UNIT | Facility: HOSPITAL | Age: 30
LOS: 1 days | Discharge: HOME | End: 2020-07-28
Payer: COMMERCIAL

## 2020-07-28 ENCOUNTER — EMERGENCY (EMERGENCY)
Facility: HOSPITAL | Age: 30
LOS: 0 days | Discharge: HOME | End: 2020-07-29
Attending: EMERGENCY MEDICINE
Payer: COMMERCIAL

## 2020-07-28 VITALS
TEMPERATURE: 99 F | HEIGHT: 67 IN | SYSTOLIC BLOOD PRESSURE: 124 MMHG | RESPIRATION RATE: 16 BRPM | OXYGEN SATURATION: 99 % | WEIGHT: 259.93 LBS | DIASTOLIC BLOOD PRESSURE: 76 MMHG | HEART RATE: 93 BPM

## 2020-07-28 VITALS
DIASTOLIC BLOOD PRESSURE: 87 MMHG | TEMPERATURE: 99 F | RESPIRATION RATE: 18 BRPM | SYSTOLIC BLOOD PRESSURE: 123 MMHG | HEART RATE: 90 BPM | OXYGEN SATURATION: 98 % | HEIGHT: 67 IN | WEIGHT: 259.93 LBS

## 2020-07-28 VITALS
OXYGEN SATURATION: 98 % | HEART RATE: 81 BPM | SYSTOLIC BLOOD PRESSURE: 117 MMHG | DIASTOLIC BLOOD PRESSURE: 66 MMHG | TEMPERATURE: 98 F | RESPIRATION RATE: 16 BRPM

## 2020-07-28 DIAGNOSIS — E11.22 TYPE 2 DIABETES MELLITUS WITH DIABETIC CHRONIC KIDNEY DISEASE: ICD-10-CM

## 2020-07-28 DIAGNOSIS — Z98.890 OTHER SPECIFIED POSTPROCEDURAL STATES: Chronic | ICD-10-CM

## 2020-07-28 DIAGNOSIS — Z91.5 PERSONAL HISTORY OF SELF-HARM: ICD-10-CM

## 2020-07-28 DIAGNOSIS — R10.11 RIGHT UPPER QUADRANT PAIN: ICD-10-CM

## 2020-07-28 DIAGNOSIS — R10.9 UNSPECIFIED ABDOMINAL PAIN: ICD-10-CM

## 2020-07-28 DIAGNOSIS — M54.9 DORSALGIA, UNSPECIFIED: ICD-10-CM

## 2020-07-28 DIAGNOSIS — Z91.010 ALLERGY TO PEANUTS: ICD-10-CM

## 2020-07-28 DIAGNOSIS — F32.9 MAJOR DEPRESSIVE DISORDER, SINGLE EPISODE, UNSPECIFIED: ICD-10-CM

## 2020-07-28 DIAGNOSIS — F60.3 BORDERLINE PERSONALITY DISORDER: ICD-10-CM
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
## 2020-07-28 DIAGNOSIS — F41.9 ANXIETY DISORDER, UNSPECIFIED: ICD-10-CM

## 2020-07-28 DIAGNOSIS — D72.820 LYMPHOCYTOSIS (SYMPTOMATIC): ICD-10-CM

## 2020-07-28 DIAGNOSIS — K21.9 GASTRO-ESOPHAGEAL REFLUX DISEASE WITHOUT ESOPHAGITIS: ICD-10-CM

## 2020-07-28 DIAGNOSIS — K91.89 OTHER POSTPROCEDURAL COMPLICATIONS AND DISORDERS OF DIGESTIVE SYSTEM: ICD-10-CM

## 2020-07-28 DIAGNOSIS — M35.1 OTHER OVERLAP SYNDROMES: ICD-10-CM

## 2020-07-28 DIAGNOSIS — Z90.49 ACQUIRED ABSENCE OF OTHER SPECIFIED PARTS OF DIGESTIVE TRACT: Chronic | ICD-10-CM

## 2020-07-28 DIAGNOSIS — Z91.018 ALLERGY TO OTHER FOODS: ICD-10-CM

## 2020-07-28 DIAGNOSIS — I12.9 HYPERTENSIVE CHRONIC KIDNEY DISEASE WITH STAGE 1 THROUGH STAGE 4 CHRONIC KIDNEY DISEASE, OR UNSPECIFIED CHRONIC KIDNEY DISEASE: ICD-10-CM

## 2020-07-28 DIAGNOSIS — Z87.440 PERSONAL HISTORY OF URINARY (TRACT) INFECTIONS: ICD-10-CM

## 2020-07-28 DIAGNOSIS — Z90.79 ACQUIRED ABSENCE OF OTHER GENITAL ORGAN(S): Chronic | ICD-10-CM

## 2020-07-28 DIAGNOSIS — D64.9 ANEMIA, UNSPECIFIED: ICD-10-CM

## 2020-07-28 DIAGNOSIS — N18.9 CHRONIC KIDNEY DISEASE, UNSPECIFIED: ICD-10-CM

## 2020-07-28 DIAGNOSIS — F90.9 ATTENTION-DEFICIT HYPERACTIVITY DISORDER, UNSPECIFIED TYPE: ICD-10-CM

## 2020-07-28 DIAGNOSIS — E28.2 POLYCYSTIC OVARIAN SYNDROME: ICD-10-CM

## 2020-07-28 DIAGNOSIS — Z88.0 ALLERGY STATUS TO PENICILLIN: ICD-10-CM

## 2020-07-28 DIAGNOSIS — G89.29 OTHER CHRONIC PAIN: ICD-10-CM

## 2020-07-28 DIAGNOSIS — E78.5 HYPERLIPIDEMIA, UNSPECIFIED: ICD-10-CM
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
## 2020-07-28 DIAGNOSIS — R94.5 ABNORMAL RESULTS OF LIVER FUNCTION STUDIES: ICD-10-CM

## 2020-07-28 DIAGNOSIS — Z88.6 ALLERGY STATUS TO ANALGESIC AGENT: ICD-10-CM

## 2020-07-28 LAB
ALBUMIN SERPL ELPH-MCNC: 3.2 G/DL — LOW (ref 3.3–5)
ALP SERPL-CCNC: 108 U/L — SIGNIFICANT CHANGE UP (ref 40–120)
ALT FLD-CCNC: 77 U/L — SIGNIFICANT CHANGE UP (ref 12–78)
ANION GAP SERPL CALC-SCNC: 5 MMOL/L — SIGNIFICANT CHANGE UP (ref 5–17)
ANION GAP SERPL CALC-SCNC: 7 MMOL/L — SIGNIFICANT CHANGE UP (ref 5–17)
APTT BLD: 43.2 SEC — HIGH (ref 27.5–35.5)
AST SERPL-CCNC: 69 U/L — HIGH (ref 15–37)
BASOPHILS # BLD AUTO: 0.05 K/UL — SIGNIFICANT CHANGE UP (ref 0–0.2)
BASOPHILS NFR BLD AUTO: 0.4 % — SIGNIFICANT CHANGE UP (ref 0–2)
BILIRUB SERPL-MCNC: 0.2 MG/DL — SIGNIFICANT CHANGE UP (ref 0.2–1.2)
BUN SERPL-MCNC: 17 MG/DL — SIGNIFICANT CHANGE UP (ref 7–23)
BUN SERPL-MCNC: 21 MG/DL — SIGNIFICANT CHANGE UP (ref 7–23)
CALCIUM SERPL-MCNC: 8.5 MG/DL — SIGNIFICANT CHANGE UP (ref 8.5–10.1)
CALCIUM SERPL-MCNC: 9.4 MG/DL — SIGNIFICANT CHANGE UP (ref 8.5–10.1)
CHLORIDE SERPL-SCNC: 108 MMOL/L — SIGNIFICANT CHANGE UP (ref 96–108)
CHLORIDE SERPL-SCNC: 109 MMOL/L — HIGH (ref 96–108)
CO2 SERPL-SCNC: 25 MMOL/L — SIGNIFICANT CHANGE UP (ref 22–31)
CO2 SERPL-SCNC: 25 MMOL/L — SIGNIFICANT CHANGE UP (ref 22–31)
CREAT SERPL-MCNC: 1.21 MG/DL — SIGNIFICANT CHANGE UP (ref 0.5–1.3)
CREAT SERPL-MCNC: 1.6 MG/DL — HIGH (ref 0.5–1.3)
EOSINOPHIL # BLD AUTO: 0.11 K/UL — SIGNIFICANT CHANGE UP (ref 0–0.5)
EOSINOPHIL NFR BLD AUTO: 0.9 % — SIGNIFICANT CHANGE UP (ref 0–6)
GLUCOSE SERPL-MCNC: 107 MG/DL — HIGH (ref 70–99)
GLUCOSE SERPL-MCNC: 190 MG/DL — HIGH (ref 70–99)
HCG SERPL-ACNC: <1 MIU/ML — SIGNIFICANT CHANGE UP
HCG UR QL: NEGATIVE — SIGNIFICANT CHANGE UP
HCT VFR BLD CALC: 37.5 % — SIGNIFICANT CHANGE UP (ref 34.5–45)
HCT VFR BLD CALC: 42.7 % — SIGNIFICANT CHANGE UP (ref 34.5–45)
HGB BLD-MCNC: 13 G/DL — SIGNIFICANT CHANGE UP (ref 11.5–15.5)
HGB BLD-MCNC: 14 G/DL — SIGNIFICANT CHANGE UP (ref 11.5–15.5)
IMM GRANULOCYTES NFR BLD AUTO: 0.2 % — SIGNIFICANT CHANGE UP (ref 0–1.5)
INR BLD: 1.12 RATIO — SIGNIFICANT CHANGE UP (ref 0.88–1.16)
INR BLD: 1.16 RATIO — SIGNIFICANT CHANGE UP (ref 0.88–1.16)
LYMPHOCYTES # BLD AUTO: 19.7 % — SIGNIFICANT CHANGE UP (ref 13–44)
LYMPHOCYTES # BLD AUTO: 2.38 K/UL — SIGNIFICANT CHANGE UP (ref 1–3.3)
MCHC RBC-ENTMCNC: 28.9 PG — SIGNIFICANT CHANGE UP (ref 27–34)
MCHC RBC-ENTMCNC: 29.5 PG — SIGNIFICANT CHANGE UP (ref 27–34)
MCHC RBC-ENTMCNC: 32.8 GM/DL — SIGNIFICANT CHANGE UP (ref 32–36)
MCHC RBC-ENTMCNC: 34.7 GM/DL — SIGNIFICANT CHANGE UP (ref 32–36)
MCV RBC AUTO: 85 FL — SIGNIFICANT CHANGE UP (ref 80–100)
MCV RBC AUTO: 88 FL — SIGNIFICANT CHANGE UP (ref 80–100)
MONOCYTES # BLD AUTO: 1.03 K/UL — HIGH (ref 0–0.9)
MONOCYTES NFR BLD AUTO: 8.5 % — SIGNIFICANT CHANGE UP (ref 2–14)
NEUTROPHILS # BLD AUTO: 8.48 K/UL — HIGH (ref 1.8–7.4)
NEUTROPHILS NFR BLD AUTO: 70.3 % — SIGNIFICANT CHANGE UP (ref 43–77)
PLATELET # BLD AUTO: 260 K/UL — SIGNIFICANT CHANGE UP (ref 150–400)
PLATELET # BLD AUTO: 315 K/UL — SIGNIFICANT CHANGE UP (ref 150–400)
POTASSIUM SERPL-MCNC: 3.9 MMOL/L — SIGNIFICANT CHANGE UP (ref 3.5–5.3)
POTASSIUM SERPL-MCNC: 4.4 MMOL/L — SIGNIFICANT CHANGE UP (ref 3.5–5.3)
POTASSIUM SERPL-SCNC: 3.9 MMOL/L — SIGNIFICANT CHANGE UP (ref 3.5–5.3)
POTASSIUM SERPL-SCNC: 4.4 MMOL/L — SIGNIFICANT CHANGE UP (ref 3.5–5.3)
PROT SERPL-MCNC: 8.2 GM/DL — SIGNIFICANT CHANGE UP (ref 6–8.3)
PROTHROM AB SERPL-ACNC: 12.9 SEC — SIGNIFICANT CHANGE UP (ref 10.6–13.6)
PROTHROM AB SERPL-ACNC: 13.4 SEC — SIGNIFICANT CHANGE UP (ref 10.6–13.6)
RBC # BLD: 4.41 M/UL — SIGNIFICANT CHANGE UP (ref 3.8–5.2)
RBC # BLD: 4.85 M/UL — SIGNIFICANT CHANGE UP (ref 3.8–5.2)
RBC # FLD: 13.9 % — SIGNIFICANT CHANGE UP (ref 10.3–14.5)
RBC # FLD: 14 % — SIGNIFICANT CHANGE UP (ref 10.3–14.5)
SODIUM SERPL-SCNC: 138 MMOL/L — SIGNIFICANT CHANGE UP (ref 135–145)
SODIUM SERPL-SCNC: 141 MMOL/L — SIGNIFICANT CHANGE UP (ref 135–145)
WBC # BLD: 11.41 K/UL — HIGH (ref 3.8–10.5)
WBC # BLD: 12.08 K/UL — HIGH (ref 3.8–10.5)
WBC # FLD AUTO: 11.41 K/UL — HIGH (ref 3.8–10.5)
WBC # FLD AUTO: 12.08 K/UL — HIGH (ref 3.8–10.5)

## 2020-07-28 PROCEDURE — 85730 THROMBOPLASTIN TIME PARTIAL: CPT

## 2020-07-28 PROCEDURE — 74177 CT ABD & PELVIS W/CONTRAST: CPT

## 2020-07-28 PROCEDURE — 81025 URINE PREGNANCY TEST: CPT

## 2020-07-28 PROCEDURE — 88313 SPECIAL STAINS GROUP 2: CPT | Mod: 26

## 2020-07-28 PROCEDURE — 84702 CHORIONIC GONADOTROPIN TEST: CPT

## 2020-07-28 PROCEDURE — 47000 NEEDLE BIOPSY OF LIVER PERQ: CPT

## 2020-07-28 PROCEDURE — 88307 TISSUE EXAM BY PATHOLOGIST: CPT

## 2020-07-28 PROCEDURE — 96374 THER/PROPH/DIAG INJ IV PUSH: CPT | Mod: XU

## 2020-07-28 PROCEDURE — 96376 TX/PRO/DX INJ SAME DRUG ADON: CPT

## 2020-07-28 PROCEDURE — 36415 COLL VENOUS BLD VENIPUNCTURE: CPT

## 2020-07-28 PROCEDURE — 80048 BASIC METABOLIC PNL TOTAL CA: CPT

## 2020-07-28 PROCEDURE — 85610 PROTHROMBIN TIME: CPT

## 2020-07-28 PROCEDURE — 76942 ECHO GUIDE FOR BIOPSY: CPT | Mod: 26

## 2020-07-28 PROCEDURE — 74177 CT ABD & PELVIS W/CONTRAST: CPT | Mod: 26

## 2020-07-28 PROCEDURE — 85025 COMPLETE CBC W/AUTO DIFF WBC: CPT

## 2020-07-28 PROCEDURE — 88313 SPECIAL STAINS GROUP 2: CPT

## 2020-07-28 PROCEDURE — 85027 COMPLETE CBC AUTOMATED: CPT

## 2020-07-28 PROCEDURE — 88307 TISSUE EXAM BY PATHOLOGIST: CPT | Mod: 26

## 2020-07-28 PROCEDURE — 76942 ECHO GUIDE FOR BIOPSY: CPT

## 2020-07-28 PROCEDURE — 80053 COMPREHEN METABOLIC PANEL: CPT

## 2020-07-28 PROCEDURE — 99284 EMERGENCY DEPT VISIT MOD MDM: CPT | Mod: 25

## 2020-07-28 PROCEDURE — 99284 EMERGENCY DEPT VISIT MOD MDM: CPT

## 2020-07-28 RX ORDER — ONDANSETRON 8 MG/1
4 TABLET, FILM COATED ORAL ONCE
Refills: 0 | Status: DISCONTINUED | OUTPATIENT
Start: 2020-07-28 | End: 2020-07-28

## 2020-07-28 RX ORDER — SODIUM CHLORIDE 9 MG/ML
1000 INJECTION INTRAMUSCULAR; INTRAVENOUS; SUBCUTANEOUS
Refills: 0 | Status: DISCONTINUED | OUTPATIENT
Start: 2020-07-28 | End: 2020-07-28

## 2020-07-28 RX ORDER — MORPHINE SULFATE 50 MG/1
4 CAPSULE, EXTENDED RELEASE ORAL ONCE
Refills: 0 | Status: DISCONTINUED | OUTPATIENT
Start: 2020-07-28 | End: 2020-07-28

## 2020-07-28 RX ORDER — SODIUM CHLORIDE 9 MG/ML
1000 INJECTION INTRAMUSCULAR; INTRAVENOUS; SUBCUTANEOUS ONCE
Refills: 0 | Status: COMPLETED | OUTPATIENT
Start: 2020-07-28 | End: 2020-07-28

## 2020-07-28 RX ORDER — HYDROMORPHONE HYDROCHLORIDE 2 MG/ML
0.5 INJECTION INTRAMUSCULAR; INTRAVENOUS; SUBCUTANEOUS
Refills: 0 | Status: DISCONTINUED | OUTPATIENT
Start: 2020-07-28 | End: 2020-07-28

## 2020-07-28 RX ORDER — OXYCODONE HYDROCHLORIDE 5 MG/1
1 TABLET ORAL
Qty: 6 | Refills: 0
Start: 2020-07-28

## 2020-07-28 RX ORDER — FENTANYL CITRATE 50 UG/ML
50 INJECTION INTRAVENOUS
Refills: 0 | Status: DISCONTINUED | OUTPATIENT
Start: 2020-07-28 | End: 2020-07-28

## 2020-07-28 RX ORDER — OXYCODONE HYDROCHLORIDE 5 MG/1
5 TABLET ORAL ONCE
Refills: 0 | Status: DISCONTINUED | OUTPATIENT
Start: 2020-07-28 | End: 2020-07-28

## 2020-07-28 RX ADMIN — SODIUM CHLORIDE 75 MILLILITER(S): 9 INJECTION INTRAMUSCULAR; INTRAVENOUS; SUBCUTANEOUS at 09:57

## 2020-07-28 RX ADMIN — OXYCODONE HYDROCHLORIDE 5 MILLIGRAM(S): 5 TABLET ORAL at 11:14

## 2020-07-28 RX ADMIN — MORPHINE SULFATE 4 MILLIGRAM(S): 50 CAPSULE, EXTENDED RELEASE ORAL at 22:46

## 2020-07-28 RX ADMIN — MORPHINE SULFATE 4 MILLIGRAM(S): 50 CAPSULE, EXTENDED RELEASE ORAL at 23:32

## 2020-07-28 RX ADMIN — SODIUM CHLORIDE 1000 MILLILITER(S): 9 INJECTION INTRAMUSCULAR; INTRAVENOUS; SUBCUTANEOUS at 23:32

## 2020-07-28 NOTE — ED STATDOCS - PMH
ADHD (attention deficit hyperactivity disorder)    Anemia    Anxiety    Borderline personality disorder    Cholecystitis  S/P cholycystectomy  Chronic back pain    CKD (chronic kidney disease)    Depression    DM2 (diabetes mellitus, type 2)    GERD (gastroesophageal reflux disease)    HLD (hyperlipidemia)    HTN (hypertension)    Lymphocytosis    Mixed connective tissue disease    Nephrosclerosis    Obesity    PCOS (polycystic ovarian syndrome)    Polycystic ovarian syndrome    Suicide attempt by drug overdose  2019  UTI (urinary tract infection)

## 2020-07-28 NOTE — ED STATDOCS - NS ED ROS FT
Constitutional: NAD AAOx3  Eyes: PERRLA EOMI  Head: Normocephalic atraumatic  Mouth: MMM  Cardiac: regular rate   Resp: Lungs CTAB  GI: +diffuse abdominal pain.   Neuro: CN2-12 intact  Skin: No rashes

## 2020-07-28 NOTE — ED STATDOCS - PATIENT PORTAL LINK FT
You can access the FollowMyHealth Patient Portal offered by [facility] by registering at the following website: [email or web address]. By joining [employer]’s FollowMyHealth portal, you will also be able to view your health information using other applications (apps) compatible with our system.

## 2020-07-28 NOTE — ED STATDOCS - PROGRESS NOTE DETAILS
Patient seen and evaluated s/p CT.  She was worried about kidney function, requested some IVF.  Reviewed normal lab values with patient and ordered second dose of morphine.  CT resulted shortly after showing no acute abnormalities, suspect post procedural pain, she does not have pain medication at home and has been instructed to avoid motrin and tylenol as she has kidney and liver issues.  Will send rx for oxy for pain control.  REviewed strict return precautions and prompt follow up with her doctors -Mode Diaz PA-C

## 2020-07-28 NOTE — ED STATDOCS - OBJECTIVE STATEMENT
30 y/o F with PMHx of PCOS, kidney disease, anemia, depression, anxiety presents to ED for RUQ pain. Pt reports she had a liver biopsy today for elevated lfts with Dr. Waters and had an onset of RUQ after going home. States she is unable to get into comfortable position. Associates pain with a severity of 10/10. Nonsmoker, nondrinker. Allergies: haldol, amoxicillin.

## 2020-07-28 NOTE — ED ADULT TRIAGE NOTE - CHIEF COMPLAINT QUOTE
PT comes in for RUQ pain after liver biopsy that was performed today with Dr. Waters. Has not taken anything for pain prior to coming in. Denies n/v/d

## 2020-07-28 NOTE — ED ADULT TRIAGE NOTE - STATUS:
Quality 130: Documentation Of Current Medications In The Medical Record: Current Medications Documented
Quality 402: Tobacco Use And Help With Quitting Among Adolescents: Patient screened for tobacco and never smoked
Detail Level: Detailed
Applied

## 2020-07-28 NOTE — ED STATDOCS - NSFOLLOWUPINSTRUCTIONS_ED_ALL_ED_FT
Chronic Pain    WHAT YOU NEED TO KNOW:    Chronic pain is pain that does not get better for 3 months or longer. Chronic pain may hurt all the time, or come and go.     DISCHARGE INSTRUCTIONS:    Call 911 or have someone call 911 for any of the following:     You are breathing slower than normal, or you have trouble breathing.      You cannot be awakened.      You have a seizure.    Return to the emergency department if:     Your heart is beating slower than normal.      Your heart feels like it is jumping or fluttering.       You cannot think clearly.    Contact your healthcare provider if:     You have side effects from prescription pain medicine, such as itching, nausea, or vomiting.      You have trouble sleeping.      Your pain gets worse, even after you take medicine.      You don't think the medicine is working.      You have questions or concerns about your condition or care.    Medicines: You may need any of the following:    Acetaminophen decreases pain and fever. It is available without a doctor's order. Ask how much to take and how often to take it. Follow directions. Read the labels of all other medicines you are using to see if they also contain acetaminophen, or ask your doctor or pharmacist. Acetaminophen can cause liver damage if not taken correctly. Do not use more than 4 grams (4,000 milligrams) total of acetaminophen in one day.       NSAIDs, such as ibuprofen, help decrease swelling, pain, and fever. This medicine is available with or without a doctor's order. NSAIDs can cause stomach bleeding or kidney problems in certain people. If you take blood thinner medicine, always ask your healthcare provider if NSAIDs are safe for you. Always read the medicine label and follow directions.      Prescription pain medicine called narcotics or opioids may be given. Ask your healthcare provider how to take this medicine safely.       Anesthetics can be rubbed on your skin or injected into a nerve or muscle to numb an area.      Other medicines may reduce pain, anxiety, muscle tension, or swelling.      Take your medicine as directed. Contact your healthcare provider if you think your medicine is not helping or if you have side effects. Tell him of her if you are allergic to any medicine. Keep a list of the medicines, vitamins, and herbs you take. Include the amounts, and when and why you take them. Bring the list or the pill bottles to follow-up visits. Carry your medicine list with you in case of an emergency.    Manage your chronic pain:     Apply heat on the area in pain for 20 to 30 minutes every 2 hours for as many days as directed. Heat helps decrease pain and muscle spasms.      Apply ice on the part of your body that hurts for 15 to 20 minutes every hour or as directed. Use an ice pack, or put crushed ice in a plastic bag. Cover it with a towel. Ice decreases pain and swelling, and helps prevent tissue damage.      Go to physical therapy as directed. A physical therapist teaches you exercises to help improve movement and strength, and to decrease pain.      Exercise for 30 minutes, 3 times a week. Regular physical activity can help decrease pain and improve your quality of life. Ask your healthcare provider about the best exercise plan for your type of pain.      Get enough sleep. Create a relaxing bedtime routine. Go to sleep and wake up at the same time every day. Avoid caffeine in the afternoon.       Talk with a counselor or therapist. A type of counseling called cognitive behavioral therapy (CBT) can help your chronic pain by changing the way you think about it. CBT can also improve your mood, sleep, and ability to move.    What you must know if you take narcotic pain medicine:     You may need to take a bowel movement softener. The most common side effect of prescription pain medicine is constipation. Bowel movement softeners are available over the counter.      Do not mix prescription pain medicines. This can cause an overdose of medicine, which can become life-threatening. Read labels. Make sure you know the ingredients in all of your medicines.      Do not drink alcohol when you take prescription pain medicine. It is not safe to mix narcotics or opioids with alcohol or illegal drugs.      Prescription pain medicine may impair your ability to drive or work safely. They may also cause dizziness and increase your risk for falling.       Store prescription pain medicine in a safe location at home. Keep your medicine away from children and other people. Never share your medicine with anyone.     Follow up with your healthcare provider as directed: You may be referred to a pain specialist. Write down your questions so you remember to ask them during your visits.

## 2020-07-28 NOTE — ASU PATIENT PROFILE, ADULT - PAIN, CHRONIC: FACTORS THAT AGGRAVATE, PROFILE
ANESTHESIA POSTOP CHECK    54y Female POSTOP DAY 1 S/P L part nephrectomy    Vital Signs Last 24 Hrs  T(C): 36.8 (22 Dec 2018 05:28), Max: 37 (22 Dec 2018 01:33)  T(F): 98.3 (22 Dec 2018 05:28), Max: 98.6 (22 Dec 2018 01:33)  HR: 82 (22 Dec 2018 05:28) (70 - 86)  BP: 109/64 (22 Dec 2018 05:28) (109/64 - 158/103)  BP(mean): 90 (21 Dec 2018 17:00) (85 - 114)  RR: 17 (22 Dec 2018 05:28) (12 - 17)  SpO2: 98% (22 Dec 2018 05:28) (95% - 98%)  I&O's Summary    21 Dec 2018 07:01  -  22 Dec 2018 07:00  --------------------------------------------------------  IN: 745 mL / OUT: 1960 mL / NET: -1215 mL    22 Dec 2018 07:01  -  22 Dec 2018 09:37  --------------------------------------------------------  IN: 0 mL / OUT: 350 mL / NET: -350 mL        [x ] NO APPARENT ANESTHESIA COMPLICATIONS standing

## 2020-07-28 NOTE — ED STATDOCS - CLINICAL SUMMARY MEDICAL DECISION MAKING FREE TEXT BOX
29F p/w severe abdominal pain after liver biopsy today for elevated biopsy exam with diffuse TTP. will obtain CT to r/o perforation, pain control and reassess.

## 2020-07-28 NOTE — ED STATDOCS - PHYSICAL EXAMINATION
Constitutional: NAD AAOx3  Eyes: PERRLA EOMI  Head: Normocephalic atraumatic  Mouth: MMM  Cardiac: regular rate   Resp: Lungs CTAB  GI: +Biopsy site is c/d/i and diffuse abdominal tenderness.   Neuro: CN2-12 intact  Skin: No rashes Constitutional: mild distress AAOx3  Eyes: PERRLA EOMI  Head: Normocephalic atraumatic  Mouth: MMM  Cardiac: regular rate   Resp: Lungs CTAB  GI: +Biopsy site is c/d/i and diffuse abdominal tenderness.   Neuro: CN2-12 intact  Skin: No rashes

## 2020-07-28 NOTE — ASU PATIENT PROFILE, ADULT - PMH
ADHD (attention deficit hyperactivity disorder)    Anemia    Anxiety    Borderline personality disorder    Cholecystitis  S/P cholycystectomy  Chronic back pain    CKD (chronic kidney disease)    Depression    DM2 (diabetes mellitus, type 2)    GERD (gastroesophageal reflux disease)    HLD (hyperlipidemia)    HTN (hypertension)    Mixed connective tissue disease    Nephrosclerosis    Obesity    Polycystic ovarian syndrome    Suicide attempt by drug overdose  2019  UTI (urinary tract infection) ADHD (attention deficit hyperactivity disorder)    Anemia    Anxiety    Borderline personality disorder    Cholecystitis  S/P cholycystectomy  Chronic back pain    CKD (chronic kidney disease)    Depression    DM2 (diabetes mellitus, type 2)    GERD (gastroesophageal reflux disease)    HLD (hyperlipidemia)    HTN (hypertension)    Lymphocytosis    Mixed connective tissue disease    Nephrosclerosis    Obesity    PCOS (polycystic ovarian syndrome)    Polycystic ovarian syndrome    Suicide attempt by drug overdose  2019  UTI (urinary tract infection)

## 2020-07-28 NOTE — ED ADULT TRIAGE NOTE - WEIGHT IN LBS
Hakeem 64.  Patient Status:  Surgery Admit - Inpt   Age/Gender 64year old male MRN [id number]   [facility] SURGERY Attending Dea Coffman MD   Hosp Day # 0 PCP Donita Blizzard, MD       Anesthesia Post-op Note    Pro
259.9

## 2020-07-29 VITALS
DIASTOLIC BLOOD PRESSURE: 83 MMHG | HEART RATE: 85 BPM | RESPIRATION RATE: 18 BRPM | OXYGEN SATURATION: 99 % | SYSTOLIC BLOOD PRESSURE: 123 MMHG

## 2020-07-29 PROBLEM — D72.820 LYMPHOCYTOSIS (SYMPTOMATIC): Chronic | Status: ACTIVE | Noted: 2020-07-28

## 2020-07-29 PROBLEM — E28.2 POLYCYSTIC OVARIAN SYNDROME: Chronic | Status: ACTIVE | Noted: 2020-07-28

## 2020-07-30 ENCOUNTER — EMERGENCY (EMERGENCY)
Facility: HOSPITAL | Age: 30
LOS: 0 days | Discharge: HOME | End: 2020-07-30
Attending: EMERGENCY MEDICINE
Payer: COMMERCIAL

## 2020-07-30 VITALS — HEIGHT: 67 IN | WEIGHT: 259.93 LBS

## 2020-07-30 VITALS
TEMPERATURE: 98 F | HEART RATE: 92 BPM | OXYGEN SATURATION: 97 % | RESPIRATION RATE: 16 BRPM | DIASTOLIC BLOOD PRESSURE: 82 MMHG | SYSTOLIC BLOOD PRESSURE: 118 MMHG

## 2020-07-30 DIAGNOSIS — E78.5 HYPERLIPIDEMIA, UNSPECIFIED: ICD-10-CM

## 2020-07-30 DIAGNOSIS — Z98.890 OTHER SPECIFIED POSTPROCEDURAL STATES: Chronic | ICD-10-CM

## 2020-07-30 DIAGNOSIS — F41.9 ANXIETY DISORDER, UNSPECIFIED: ICD-10-CM

## 2020-07-30 DIAGNOSIS — F90.9 ATTENTION-DEFICIT HYPERACTIVITY DISORDER, UNSPECIFIED TYPE: ICD-10-CM

## 2020-07-30 DIAGNOSIS — E11.22 TYPE 2 DIABETES MELLITUS WITH DIABETIC CHRONIC KIDNEY DISEASE: ICD-10-CM

## 2020-07-30 DIAGNOSIS — J90 PLEURAL EFFUSION, NOT ELSEWHERE CLASSIFIED: ICD-10-CM

## 2020-07-30 DIAGNOSIS — M35.1 OTHER OVERLAP SYNDROMES: ICD-10-CM

## 2020-07-30 DIAGNOSIS — D72.820 LYMPHOCYTOSIS (SYMPTOMATIC): ICD-10-CM

## 2020-07-30 DIAGNOSIS — Z88.6 ALLERGY STATUS TO ANALGESIC AGENT: ICD-10-CM

## 2020-07-30 DIAGNOSIS — Z88.0 ALLERGY STATUS TO PENICILLIN: ICD-10-CM

## 2020-07-30 DIAGNOSIS — Z90.79 ACQUIRED ABSENCE OF OTHER GENITAL ORGAN(S): Chronic | ICD-10-CM

## 2020-07-30 DIAGNOSIS — I12.9 HYPERTENSIVE CHRONIC KIDNEY DISEASE WITH STAGE 1 THROUGH STAGE 4 CHRONIC KIDNEY DISEASE, OR UNSPECIFIED CHRONIC KIDNEY DISEASE: ICD-10-CM

## 2020-07-30 DIAGNOSIS — F32.9 MAJOR DEPRESSIVE DISORDER, SINGLE EPISODE, UNSPECIFIED: ICD-10-CM

## 2020-07-30 DIAGNOSIS — E66.9 OBESITY, UNSPECIFIED: ICD-10-CM

## 2020-07-30 DIAGNOSIS — K21.9 GASTRO-ESOPHAGEAL REFLUX DISEASE WITHOUT ESOPHAGITIS: ICD-10-CM

## 2020-07-30 DIAGNOSIS — Z90.49 ACQUIRED ABSENCE OF OTHER SPECIFIED PARTS OF DIGESTIVE TRACT: Chronic | ICD-10-CM

## 2020-07-30 DIAGNOSIS — D64.9 ANEMIA, UNSPECIFIED: ICD-10-CM

## 2020-07-30 DIAGNOSIS — Z91.5 PERSONAL HISTORY OF SELF-HARM: ICD-10-CM

## 2020-07-30 DIAGNOSIS — F60.3 BORDERLINE PERSONALITY DISORDER: ICD-10-CM

## 2020-07-30 DIAGNOSIS — E28.2 POLYCYSTIC OVARIAN SYNDROME: ICD-10-CM

## 2020-07-30 DIAGNOSIS — N18.9 CHRONIC KIDNEY DISEASE, UNSPECIFIED: ICD-10-CM

## 2020-07-30 DIAGNOSIS — R07.89 OTHER CHEST PAIN: ICD-10-CM

## 2020-07-30 DIAGNOSIS — Z91.010 ALLERGY TO PEANUTS: ICD-10-CM

## 2020-07-30 DIAGNOSIS — Z87.440 PERSONAL HISTORY OF URINARY (TRACT) INFECTIONS: ICD-10-CM

## 2020-07-30 DIAGNOSIS — J98.11 ATELECTASIS: ICD-10-CM

## 2020-07-30 DIAGNOSIS — Z91.018 ALLERGY TO OTHER FOODS: ICD-10-CM

## 2020-07-30 LAB
ALBUMIN SERPL ELPH-MCNC: 3.3 G/DL — SIGNIFICANT CHANGE UP (ref 3.3–5)
ALP SERPL-CCNC: 97 U/L — SIGNIFICANT CHANGE UP (ref 40–120)
ALT FLD-CCNC: 67 U/L — SIGNIFICANT CHANGE UP (ref 12–78)
ANION GAP SERPL CALC-SCNC: 7 MMOL/L — SIGNIFICANT CHANGE UP (ref 5–17)
AST SERPL-CCNC: 53 U/L — HIGH (ref 15–37)
BASOPHILS # BLD AUTO: 0.05 K/UL — SIGNIFICANT CHANGE UP (ref 0–0.2)
BASOPHILS NFR BLD AUTO: 0.4 % — SIGNIFICANT CHANGE UP (ref 0–2)
BILIRUB SERPL-MCNC: 0.2 MG/DL — SIGNIFICANT CHANGE UP (ref 0.2–1.2)
BUN SERPL-MCNC: 13 MG/DL — SIGNIFICANT CHANGE UP (ref 7–23)
CALCIUM SERPL-MCNC: 9.3 MG/DL — SIGNIFICANT CHANGE UP (ref 8.5–10.1)
CHLORIDE SERPL-SCNC: 105 MMOL/L — SIGNIFICANT CHANGE UP (ref 96–108)
CO2 SERPL-SCNC: 28 MMOL/L — SIGNIFICANT CHANGE UP (ref 22–31)
CREAT SERPL-MCNC: 0.95 MG/DL — SIGNIFICANT CHANGE UP (ref 0.5–1.3)
EOSINOPHIL # BLD AUTO: 0.19 K/UL — SIGNIFICANT CHANGE UP (ref 0–0.5)
EOSINOPHIL NFR BLD AUTO: 1.5 % — SIGNIFICANT CHANGE UP (ref 0–6)
GLUCOSE SERPL-MCNC: 125 MG/DL — HIGH (ref 70–99)
HCT VFR BLD CALC: 37.7 % — SIGNIFICANT CHANGE UP (ref 34.5–45)
HGB BLD-MCNC: 13 G/DL — SIGNIFICANT CHANGE UP (ref 11.5–15.5)
IMM GRANULOCYTES NFR BLD AUTO: 0.4 % — SIGNIFICANT CHANGE UP (ref 0–1.5)
LYMPHOCYTES # BLD AUTO: 25.6 % — SIGNIFICANT CHANGE UP (ref 13–44)
LYMPHOCYTES # BLD AUTO: 3.16 K/UL — SIGNIFICANT CHANGE UP (ref 1–3.3)
MCHC RBC-ENTMCNC: 29.5 PG — SIGNIFICANT CHANGE UP (ref 27–34)
MCHC RBC-ENTMCNC: 34.5 GM/DL — SIGNIFICANT CHANGE UP (ref 32–36)
MCV RBC AUTO: 85.7 FL — SIGNIFICANT CHANGE UP (ref 80–100)
MONOCYTES # BLD AUTO: 1.06 K/UL — HIGH (ref 0–0.9)
MONOCYTES NFR BLD AUTO: 8.6 % — SIGNIFICANT CHANGE UP (ref 2–14)
NEUTROPHILS # BLD AUTO: 7.85 K/UL — HIGH (ref 1.8–7.4)
NEUTROPHILS NFR BLD AUTO: 63.5 % — SIGNIFICANT CHANGE UP (ref 43–77)
PLATELET # BLD AUTO: 253 K/UL — SIGNIFICANT CHANGE UP (ref 150–400)
POTASSIUM SERPL-MCNC: 4.3 MMOL/L — SIGNIFICANT CHANGE UP (ref 3.5–5.3)
POTASSIUM SERPL-SCNC: 4.3 MMOL/L — SIGNIFICANT CHANGE UP (ref 3.5–5.3)
PROT SERPL-MCNC: 8.3 GM/DL — SIGNIFICANT CHANGE UP (ref 6–8.3)
RBC # BLD: 4.4 M/UL — SIGNIFICANT CHANGE UP (ref 3.8–5.2)
RBC # FLD: 13.7 % — SIGNIFICANT CHANGE UP (ref 10.3–14.5)
SODIUM SERPL-SCNC: 140 MMOL/L — SIGNIFICANT CHANGE UP (ref 135–145)
WBC # BLD: 12.36 K/UL — HIGH (ref 3.8–10.5)
WBC # FLD AUTO: 12.36 K/UL — HIGH (ref 3.8–10.5)

## 2020-07-30 PROCEDURE — 85025 COMPLETE CBC W/AUTO DIFF WBC: CPT

## 2020-07-30 PROCEDURE — 99284 EMERGENCY DEPT VISIT MOD MDM: CPT | Mod: 25

## 2020-07-30 PROCEDURE — 93010 ELECTROCARDIOGRAM REPORT: CPT

## 2020-07-30 PROCEDURE — 71275 CT ANGIOGRAPHY CHEST: CPT

## 2020-07-30 PROCEDURE — 93005 ELECTROCARDIOGRAM TRACING: CPT

## 2020-07-30 PROCEDURE — 71275 CT ANGIOGRAPHY CHEST: CPT | Mod: 26

## 2020-07-30 PROCEDURE — 74177 CT ABD & PELVIS W/CONTRAST: CPT | Mod: 26

## 2020-07-30 PROCEDURE — 96374 THER/PROPH/DIAG INJ IV PUSH: CPT | Mod: XU

## 2020-07-30 PROCEDURE — 80053 COMPREHEN METABOLIC PANEL: CPT

## 2020-07-30 PROCEDURE — 36415 COLL VENOUS BLD VENIPUNCTURE: CPT

## 2020-07-30 PROCEDURE — 99285 EMERGENCY DEPT VISIT HI MDM: CPT

## 2020-07-30 PROCEDURE — 74177 CT ABD & PELVIS W/CONTRAST: CPT

## 2020-07-30 RX ORDER — DIAZEPAM 5 MG
5 TABLET ORAL ONCE
Refills: 0 | Status: DISCONTINUED | OUTPATIENT
Start: 2020-07-30 | End: 2020-07-30

## 2020-07-30 RX ORDER — OXYCODONE HYDROCHLORIDE 5 MG/1
10 TABLET ORAL ONCE
Refills: 0 | Status: DISCONTINUED | OUTPATIENT
Start: 2020-07-30 | End: 2020-07-30

## 2020-07-30 RX ORDER — OXYCODONE HYDROCHLORIDE 5 MG/1
1 TABLET ORAL
Qty: 9 | Refills: 0
Start: 2020-07-30

## 2020-07-30 RX ORDER — MORPHINE SULFATE 50 MG/1
4 CAPSULE, EXTENDED RELEASE ORAL ONCE
Refills: 0 | Status: DISCONTINUED | OUTPATIENT
Start: 2020-07-30 | End: 2020-07-30

## 2020-07-30 RX ADMIN — MORPHINE SULFATE 4 MILLIGRAM(S): 50 CAPSULE, EXTENDED RELEASE ORAL at 04:30

## 2020-07-30 RX ADMIN — Medication 5 MILLIGRAM(S): at 05:15

## 2020-07-30 RX ADMIN — MORPHINE SULFATE 4 MILLIGRAM(S): 50 CAPSULE, EXTENDED RELEASE ORAL at 05:00

## 2020-07-30 RX ADMIN — OXYCODONE HYDROCHLORIDE 10 MILLIGRAM(S): 5 TABLET ORAL at 07:02

## 2020-07-30 NOTE — ED PROVIDER NOTE - MUSCULOSKELETAL, MLM
Spine appears normal, range of motion is not limited, from to right UE with pain to fully abduct right shoulder.   5/5.

## 2020-07-30 NOTE — ED ADULT NURSE NOTE - CHIEF COMPLAINT QUOTE
Pt presents to er with 10/10 chest pain and SOB, had a liver biopsy last Tuesday with , Pt with history of fatty liver and elevated liver enzymes, being seen by gastroenterologist  pt was brought back Tuesday night for intense pain and had a CT for R/O bleeding which was neg. Pain increasing this evening and cannot take deep breaths with increased chest pain at this time.    Emergency contact Ibrlacwv-[phone]

## 2020-07-30 NOTE — ED PROVIDER NOTE - PATIENT PORTAL LINK FT
You can access the FollowMyHealth Patient Portal offered by  by registering at the following website: [email or web address]. By joining [employer]’s FollowMyHealth portal, you will also be able to view your health information using other applications (apps) compatible with our system.

## 2020-07-30 NOTE — ED ADULT TRIAGE NOTE - CHIEF COMPLAINT QUOTE
Pt presents to er with 10/10 chest pain and SOB, had a liver biopsy last Tuesday with , Pt with history of fatty liver and elevated liver enzymes, being seen by gastroenterologist  pt was brought back Tuesday night for intense pain and had a CT for R/O bleeding which was neg. Pain increasing this evening and cannot take deep breaths with increased chest pain at this time.    Emergency contact Rkdhjroa-[phone]

## 2020-07-30 NOTE — ED PROVIDER NOTE - PROGRESS NOTE DETAILS
results d/w pt.  pt with atelectasis and pleural effusion on ct but no PE and no intra-abdominal pathology.  pt will be d/c'd home with pmd and GI f/u.  Pt will f/u with Dr Galarza about taking an NSAID for the pleurisy.  Will increase the oxy to 10s and pt will go home with an incentive spirometer.  return and f/u d/w pt and she expresses agreement and understanding with plan. Will give a dose of pain medication prior to d/c home so she can rest and  the rx in  a few hours

## 2020-07-30 NOTE — ED ADULT NURSE NOTE - OBJECTIVE STATEMENT
Patient A&Ox4 with complicated PMH including ADHD, anemia, anxiety, borderline personality disorder, chronic pain, depression, DM, GERD, HLD, HTN, mixed connective tissue disease, obesity, PCOS, c/o right sided pain. Pt states a h/o elevated LFTs for which she has had previously with previous liver biopsies for evaluation. On tuesday she had a liver biopsy for elevated LFTs.  She went home feeling ok, but came back Tuesday evening for right sided pain. Describes pain as right upper quadrant at that time.  Had labs and a CT which were unremarkable and was d/c'd home with oxycodone 5mg tabs. She has been taking the oxy without relief. Complains the pain is increasing, from ruq to right shoulder and right back, pleuritic in nature. No fever or cough.  Pt also c/o right shoulder pain.  She is not sure if it is referred pain from the pain or shoulder pain from the position her arm was in during the procedure.  No n/v/d

## 2020-07-30 NOTE — ED PROVIDER NOTE - NSFOLLOWUPINSTRUCTIONS_ED_ALL_ED_FT
Follow up with your PMD and with GI today.  Speak with Dr Galarza about taking an anti-inflammatory to help with the pain  Return to the ER for any further concerns or worsening symptoms  Use the incentive spirometer ten times and hour  Oxycodone 10mg every 8 hours as needed for pain    Atelectasis, Adult     Atelectasis is a collapse of air sacs in the lungs (alveoli). The condition causes all or part of a lung to collapse. Atelectasis is a common problem after surgery. Its severity depends on the size of lung tissue area involved and the underlying cause. When severe, it can lead to shortness of breath and heart problems.  Atelectasis can develop suddenly or over a long period of time. Atelectasis that develops over a long period of time (chronic atelectasis) often leads to infection, scarring, and other problems.  What are the causes?  This condition may be caused by:  Shallow breathing.Medicines that make breathing more shallow.A blockage in an airway. Blockages can result from:  A buildup of mucus.A tumor.An inhaled object (foreign body).Enlarged lymph nodes.Fluid in the lungs (pleural effusion).A blood clot in the lungs.Outside pressure on the lung. Pressure can be due to:  A tumor.Fluid in the lungs (pleural effusion).Air leaking between the lung and rib cage (pneumothorax).Enlarged lymph nodes.Improper expansion of the lungs. This may occur in newborns because of:  Prematurity.Low oxygen levels.Secretions at birth that block the airway.Amniotic fluid that goes into the lungs (aspiration).What increases the risk?  This condition is more likely to develop in people who:  Have an injury or health problem that makes taking deep breaths difficult or painful.Have certain infections or diseases, such as pneumonia or cystic fibrosis.Have had surgery on the chest or abdomen.Have broken ribs.Have a tight bandage around their chest.Have a collapsed lung due to pneumothorax.Take medicines that decrease the rate of their breathing or how deeply they breathe, like sedatives.Lie flat for long periods of time.What are the signs or symptoms?  Often, there are no symptoms for this condition. When symptoms do appear, they may include:  Shortness of breath.Bluish color to the nails, lips, or mouth (cyanosis).A cough.How is this diagnosed?  This condition may be diagnosed based on:  Symptoms.A physical exam.A chest X-ray.Sometimes specialized imaging tests are needed to diagnose the condition.  How is this treated?  Treatment for this condition depends on what caused the condition. Treatment may involve:  Coughing. Coughing helps loosen mucus in the airway.Chest physiotherapy. This is a treatment to help loosen and clear mucus from the airways. It is done by clapping the chest.Postural drainage techniques. This treatment involves positioning your body so your head is lower than your chest. It helps mucus drain from your airways.An incentive spirometer. This is a device that is used to help with taking deeper breaths.Positive pressure breathing. This is a form of breathing assistance in which air is forced into the lungs when you breathe in (inhale). You may have this treatment if your condition is severe.Treatment of the underlying condition.Follow these instructions at home:  Take over-the-counter and prescription medicines only as told by your health care provider.Practice taking relaxed and deep breaths when you are sitting. A good time to practice is when you are watching TV. Take a few deep breaths during each commercial break.Make sure to lie on your unaffected side when you are lying down. For example, if you have atelectasis in your left lung, lie on your right side. This will help mucus drain from your airway.Cough several times a day as told by your health care provider.Perform chest physiotherapy or postural drainage techniques as told by your health care provider. If necessary, have someone help you.If you were given a device to help with breathing, use it as told by your health care provider.Stay as active as possible.Get help right away if:  Your breathing problems get worse.You have severe chest pain.You develop severe coughing.You cough up blood.You have a fever.You have persistent symptoms for more than 2–3 days.Your symptoms suddenly get worse.This information is not intended to replace advice given to you by your health care provider. Make sure you discuss any questions you have with your health care provider.      How To Use an Incentive Spirometer  An incentive spirometer is a tool that measures how well you are filling your lungs with each breath. Learning to take long, deep breaths using this tool can help you keep your lungs clear and active. This may help to reverse or lessen your chance of developing breathing (pulmonary) problems, especially infection. You may be asked to use a spirometer:  After a surgery.If you have a lung problem or a history of smoking.After a long period of time when you have been unable to move or be active.If the spirometer includes an indicator to show the highest number that you have reached, your health care provider or respiratory therapist will help you set a goal. Keep a list (log) of your progress as told by your health care provider.  What are the risks?  Breathing too quickly may cause dizziness or cause you to pass out. Take your time so you do not get dizzy or light-headed.If you are in pain, you may need to take pain medicine before doing incentive spirometry. It is harder to take a deep breath if you are having pain.How to use your incentive spirometer     Sit up on the edge of your bed or on a chair.  Hold the incentive spirometer so that it is in an upright position.  Before you use the spirometer, breathe out normally.  Place the mouthpiece in your mouth. Make sure your lips are closed tightly around it.  Breathe in slowly and as deeply as you can through your mouth, causing the piston or the ball to rise toward the top of the chamber.  Hold your breath for 3–5 seconds, or for as long as possible.  If the spirometer includes a  indicator, use this to guide you in breathing. Slow down your breathing if the indicator goes above the marked areas.Remove the mouthpiece from your mouth and breathe out normally. The piston or ball will return to the bottom of the chamber.  Rest for a few seconds, then repeat the steps 10 or more times.  Take your time and take a few normal breaths between deep breaths so that you do not get dizzy or light-headed.Do this every 1–2 hours when you are awake.If the spirometer includes a goal marker to show the highest number you have reached (best effort), use this as a goal to work toward during each repetition.  After each set of 10 deep breaths, cough a few times. This will help to make sure that your lungs are clear.  If you have an incision on your chest or abdomen from surgery, place a pillow or a rolled-up towel firmly against the incision when you cough. This can help to reduce pain from coughing.General tips  When you become able to get out of bed, walk around often and continue to cough to help clear your lungs.Keep using the incentive spirometer until your health care provider says it is okay to stop using it. If you have been in the hospital, you may be told to keep using the spirometer at home.Contact a health care provider if:  You are having difficulty using the spirometer.You have trouble using the spirometer as often as instructed.Your pain medicine is not giving enough relief for you to use the spirometer as told.You have a fever.You develop shortness of breath.Get help right away if:  You develop a cough with bloody mucus from the lungs (bloody sputum).You have fluid or blood coming from an incision site after you cough.Summary  An incentive spirometer is a tool that can help you learn to take long, deep breaths to keep your lungs clear and active.You may be asked to use a spirometer after a surgery, if you have a lung problem or a history of smoking, or if you have been inactive for a long period of time.Use your incentive spirometer as instructed every 1–2 hours while you are awake.If you have an incision on your chest or abdomen, place a pillow or a rolled-up towel firmly against your incision when you cough. This will help to reduce pain.This information is not intended to replace advice given to you by your health care provider. Make sure you discuss any questions you have with your health care provider.

## 2020-07-30 NOTE — ED ADULT NURSE NOTE - NSIMPLEMENTINTERV_GEN_ALL_ED
Implemented All Universal Safety Interventions:  [place] to call system. Call bell, personal items and telephone within reach. Instruct patient to call for assistance. Room bathroom lighting operational. Non-slip footwear when patient is off stretcher. Physically safe environment: no spills, clutter or unnecessary equipment. Stretcher in lowest position, wheels locked, appropriate side rails in place.

## 2020-07-30 NOTE — ED PROVIDER NOTE - OBJECTIVE STATEMENT
30 yo female with complicated PMH including ADHD, anemia, anxiety, borderline personality disorder, chronic pain, depression, DM, GERD, HLD, HTN, mixed connective tissue disease, obesity, PCOS, c/o right sided pain. Pt states a h/o elevated LFTs for which she has had previously with previous liver biopsies for evaluation. On tuesday she had a liver biopsy for elevated LFTs.  She went home feeling ok, but came back Tuesday evening for right sided pain. Describes pain as right upper quadrant at that time.  Had labs and a CT which were unremarkable and was d/c'd home with oxycodone 5mg tabs. She has been taking the oxy without relief. Complains the pain is increasing, from ruq to right shoulder and right back, pleuritic in nature. No fever or cough.  Pt also c/o right shoulder pain.  She is not sure if it is referred pain from the pain or shoulder pain from the position her arm was in during the procedure.  No n/v/d

## 2020-08-04 DIAGNOSIS — R94.5 ABNORMAL RESULTS OF LIVER FUNCTION STUDIES: ICD-10-CM

## 2020-08-04 DIAGNOSIS — Z88.0 ALLERGY STATUS TO PENICILLIN: ICD-10-CM

## 2020-08-04 DIAGNOSIS — I12.9 HYPERTENSIVE CHRONIC KIDNEY DISEASE WITH STAGE 1 THROUGH STAGE 4 CHRONIC KIDNEY DISEASE, OR UNSPECIFIED CHRONIC KIDNEY DISEASE: ICD-10-CM

## 2020-08-04 DIAGNOSIS — K74.0 HEPATIC FIBROSIS: ICD-10-CM

## 2020-08-04 DIAGNOSIS — Z90.49 ACQUIRED ABSENCE OF OTHER SPECIFIED PARTS OF DIGESTIVE TRACT: ICD-10-CM

## 2020-08-04 DIAGNOSIS — M35.1 OTHER OVERLAP SYNDROMES: ICD-10-CM

## 2020-08-04 DIAGNOSIS — K75.81 NONALCOHOLIC STEATOHEPATITIS (NASH): ICD-10-CM

## 2020-08-04 DIAGNOSIS — K21.9 GASTRO-ESOPHAGEAL REFLUX DISEASE WITHOUT ESOPHAGITIS: ICD-10-CM

## 2020-08-04 DIAGNOSIS — N18.9 CHRONIC KIDNEY DISEASE, UNSPECIFIED: ICD-10-CM

## 2020-08-04 DIAGNOSIS — G89.29 OTHER CHRONIC PAIN: ICD-10-CM

## 2020-08-04 DIAGNOSIS — E11.9 TYPE 2 DIABETES MELLITUS WITHOUT COMPLICATIONS: ICD-10-CM

## 2020-08-04 DIAGNOSIS — F90.9 ATTENTION-DEFICIT HYPERACTIVITY DISORDER, UNSPECIFIED TYPE: ICD-10-CM

## 2020-08-04 DIAGNOSIS — F60.3 BORDERLINE PERSONALITY DISORDER: ICD-10-CM

## 2020-08-04 DIAGNOSIS — E28.2 POLYCYSTIC OVARIAN SYNDROME: ICD-10-CM

## 2020-08-04 DIAGNOSIS — E78.5 HYPERLIPIDEMIA, UNSPECIFIED: ICD-10-CM

## 2020-08-13 ENCOUNTER — INPATIENT (INPATIENT)
Facility: HOSPITAL | Age: 30
LOS: 4 days | Discharge: HOME | DRG: 885 | End: 2020-08-18
Attending: PSYCHIATRY & NEUROLOGY | Admitting: PSYCHIATRY & NEUROLOGY
Payer: COMMERCIAL

## 2020-08-13 VITALS
HEART RATE: 80 BPM | OXYGEN SATURATION: 96 % | SYSTOLIC BLOOD PRESSURE: 105 MMHG | DIASTOLIC BLOOD PRESSURE: 60 MMHG | TEMPERATURE: 99 F | RESPIRATION RATE: 16 BRPM

## 2020-08-13 DIAGNOSIS — T42.4X1A POISONING BY BENZODIAZEPINES, ACCIDENTAL (UNINTENTIONAL), INITIAL ENCOUNTER: ICD-10-CM

## 2020-08-13 DIAGNOSIS — F60.3 BORDERLINE PERSONALITY DISORDER: ICD-10-CM

## 2020-08-13 DIAGNOSIS — Z98.890 OTHER SPECIFIED POSTPROCEDURAL STATES: Chronic | ICD-10-CM

## 2020-08-13 DIAGNOSIS — F32.9 MAJOR DEPRESSIVE DISORDER, SINGLE EPISODE, UNSPECIFIED: ICD-10-CM

## 2020-08-13 DIAGNOSIS — Z90.49 ACQUIRED ABSENCE OF OTHER SPECIFIED PARTS OF DIGESTIVE TRACT: Chronic | ICD-10-CM

## 2020-08-13 DIAGNOSIS — Z90.79 ACQUIRED ABSENCE OF OTHER GENITAL ORGAN(S): Chronic | ICD-10-CM

## 2020-08-13 DIAGNOSIS — Z88.1 ALLERGY STATUS TO OTHER ANTIBIOTIC AGENTS STATUS: ICD-10-CM

## 2020-08-13 DIAGNOSIS — M79.2 NEURALGIA AND NEURITIS, UNSPECIFIED: ICD-10-CM

## 2020-08-13 LAB
ALBUMIN SERPL ELPH-MCNC: 3.1 G/DL — LOW (ref 3.3–5)
ALP SERPL-CCNC: 80 U/L — SIGNIFICANT CHANGE UP (ref 40–120)
ALT FLD-CCNC: 65 U/L — SIGNIFICANT CHANGE UP (ref 12–78)
ANION GAP SERPL CALC-SCNC: 8 MMOL/L — SIGNIFICANT CHANGE UP (ref 5–17)
APAP SERPL-MCNC: <2 UG/ML — LOW (ref 10–30)
APPEARANCE UR: CLEAR — SIGNIFICANT CHANGE UP
AST SERPL-CCNC: 70 U/L — HIGH (ref 15–37)
BASOPHILS # BLD AUTO: 0.09 K/UL — SIGNIFICANT CHANGE UP (ref 0–0.2)
BASOPHILS NFR BLD AUTO: 0.7 % — SIGNIFICANT CHANGE UP (ref 0–2)
BILIRUB SERPL-MCNC: 0.3 MG/DL — SIGNIFICANT CHANGE UP (ref 0.2–1.2)
BILIRUB UR-MCNC: NEGATIVE — SIGNIFICANT CHANGE UP
BUN SERPL-MCNC: 15 MG/DL — SIGNIFICANT CHANGE UP (ref 7–23)
CALCIUM SERPL-MCNC: 9.3 MG/DL — SIGNIFICANT CHANGE UP (ref 8.5–10.1)
CHLORIDE SERPL-SCNC: 105 MMOL/L — SIGNIFICANT CHANGE UP (ref 96–108)
CO2 SERPL-SCNC: 25 MMOL/L — SIGNIFICANT CHANGE UP (ref 22–31)
COLOR SPEC: YELLOW — SIGNIFICANT CHANGE UP
CREAT SERPL-MCNC: 1.14 MG/DL — SIGNIFICANT CHANGE UP (ref 0.5–1.3)
DIFF PNL FLD: NEGATIVE — SIGNIFICANT CHANGE UP
EOSINOPHIL # BLD AUTO: 0.22 K/UL — SIGNIFICANT CHANGE UP (ref 0–0.5)
EOSINOPHIL NFR BLD AUTO: 1.7 % — SIGNIFICANT CHANGE UP (ref 0–6)
ETHANOL SERPL-MCNC: <10 MG/DL — SIGNIFICANT CHANGE UP (ref 0–10)
GLUCOSE SERPL-MCNC: 157 MG/DL — HIGH (ref 70–99)
GLUCOSE UR QL: NEGATIVE MG/DL — SIGNIFICANT CHANGE UP
HCG SERPL-ACNC: <1 MIU/ML — SIGNIFICANT CHANGE UP
HCT VFR BLD CALC: 35.4 % — SIGNIFICANT CHANGE UP (ref 34.5–45)
HGB BLD-MCNC: 12.3 G/DL — SIGNIFICANT CHANGE UP (ref 11.5–15.5)
IMM GRANULOCYTES NFR BLD AUTO: 0.5 % — SIGNIFICANT CHANGE UP (ref 0–1.5)
KETONES UR-MCNC: ABNORMAL
LEUKOCYTE ESTERASE UR-ACNC: ABNORMAL
LYMPHOCYTES # BLD AUTO: 2.79 K/UL — SIGNIFICANT CHANGE UP (ref 1–3.3)
LYMPHOCYTES # BLD AUTO: 21.8 % — SIGNIFICANT CHANGE UP (ref 13–44)
MCHC RBC-ENTMCNC: 29.8 PG — SIGNIFICANT CHANGE UP (ref 27–34)
MCHC RBC-ENTMCNC: 34.7 GM/DL — SIGNIFICANT CHANGE UP (ref 32–36)
MCV RBC AUTO: 85.7 FL — SIGNIFICANT CHANGE UP (ref 80–100)
MONOCYTES # BLD AUTO: 0.85 K/UL — SIGNIFICANT CHANGE UP (ref 0–0.9)
MONOCYTES NFR BLD AUTO: 6.7 % — SIGNIFICANT CHANGE UP (ref 2–14)
NEUTROPHILS # BLD AUTO: 8.76 K/UL — HIGH (ref 1.8–7.4)
NEUTROPHILS NFR BLD AUTO: 68.6 % — SIGNIFICANT CHANGE UP (ref 43–77)
NITRITE UR-MCNC: NEGATIVE — SIGNIFICANT CHANGE UP
PCP SPEC-MCNC: SIGNIFICANT CHANGE UP
PH UR: 7 — SIGNIFICANT CHANGE UP (ref 5–8)
PLATELET # BLD AUTO: 313 K/UL — SIGNIFICANT CHANGE UP (ref 150–400)
POTASSIUM SERPL-MCNC: 4.4 MMOL/L — SIGNIFICANT CHANGE UP (ref 3.5–5.3)
POTASSIUM SERPL-SCNC: 4.4 MMOL/L — SIGNIFICANT CHANGE UP (ref 3.5–5.3)
PROT SERPL-MCNC: 8 GM/DL — SIGNIFICANT CHANGE UP (ref 6–8.3)
PROT UR-MCNC: 100 MG/DL
RBC # BLD: 4.13 M/UL — SIGNIFICANT CHANGE UP (ref 3.8–5.2)
RBC # FLD: 14.4 % — SIGNIFICANT CHANGE UP (ref 10.3–14.5)
SALICYLATES SERPL-MCNC: <1.7 MG/DL — LOW (ref 2.8–20)
SARS-COV-2 IGG SERPL QL IA: NEGATIVE — SIGNIFICANT CHANGE UP
SARS-COV-2 IGM SERPL IA-ACNC: 4.73 AU/ML — SIGNIFICANT CHANGE UP
SARS-COV-2 RNA SPEC QL NAA+PROBE: SIGNIFICANT CHANGE UP
SODIUM SERPL-SCNC: 138 MMOL/L — SIGNIFICANT CHANGE UP (ref 135–145)
SP GR SPEC: 1.01 — SIGNIFICANT CHANGE UP (ref 1.01–1.02)
TSH SERPL-MCNC: 0.9 UU/ML — SIGNIFICANT CHANGE UP (ref 0.34–4.82)
UROBILINOGEN FLD QL: NEGATIVE MG/DL — SIGNIFICANT CHANGE UP
WBC # BLD: 12.77 K/UL — HIGH (ref 3.8–10.5)
WBC # FLD AUTO: 12.77 K/UL — HIGH (ref 3.8–10.5)

## 2020-08-13 PROCEDURE — 74176 CT ABD & PELVIS W/O CONTRAST: CPT | Mod: 26

## 2020-08-13 PROCEDURE — 80061 LIPID PANEL: CPT

## 2020-08-13 PROCEDURE — 36415 COLL VENOUS BLD VENIPUNCTURE: CPT

## 2020-08-13 PROCEDURE — 87635 SARS-COV-2 COVID-19 AMP PRB: CPT

## 2020-08-13 PROCEDURE — 72125 CT NECK SPINE W/O DYE: CPT | Mod: 26

## 2020-08-13 PROCEDURE — 71250 CT THORAX DX C-: CPT | Mod: 26

## 2020-08-13 PROCEDURE — 82962 GLUCOSE BLOOD TEST: CPT

## 2020-08-13 PROCEDURE — 93010 ELECTROCARDIOGRAM REPORT: CPT

## 2020-08-13 PROCEDURE — 85025 COMPLETE CBC W/AUTO DIFF WBC: CPT

## 2020-08-13 PROCEDURE — 84443 ASSAY THYROID STIM HORMONE: CPT

## 2020-08-13 PROCEDURE — 70450 CT HEAD/BRAIN W/O DYE: CPT | Mod: 26

## 2020-08-13 PROCEDURE — 83036 HEMOGLOBIN GLYCOSYLATED A1C: CPT

## 2020-08-13 RX ORDER — SPIRONOLACTONE 25 MG/1
100 TABLET, FILM COATED ORAL DAILY
Refills: 0 | Status: DISCONTINUED | OUTPATIENT
Start: 2020-08-13 | End: 2020-08-18

## 2020-08-13 RX ORDER — CLONAZEPAM 1 MG
0.5 TABLET ORAL
Refills: 0 | Status: DISCONTINUED | OUTPATIENT
Start: 2020-08-13 | End: 2020-08-13

## 2020-08-13 RX ORDER — METFORMIN HYDROCHLORIDE 850 MG/1
500 TABLET ORAL DAILY
Refills: 0 | Status: DISCONTINUED | OUTPATIENT
Start: 2020-08-13 | End: 2020-08-18

## 2020-08-13 RX ORDER — CLONAZEPAM 1 MG
0.25 TABLET ORAL THREE TIMES A DAY
Refills: 0 | Status: DISCONTINUED | OUTPATIENT
Start: 2020-08-13 | End: 2020-08-14

## 2020-08-13 RX ORDER — ACETAMINOPHEN 500 MG
650 TABLET ORAL ONCE
Refills: 0 | Status: COMPLETED | OUTPATIENT
Start: 2020-08-13 | End: 2020-08-13

## 2020-08-13 RX ORDER — GABAPENTIN 400 MG/1
400 CAPSULE ORAL THREE TIMES A DAY
Refills: 0 | Status: DISCONTINUED | OUTPATIENT
Start: 2020-08-13 | End: 2020-08-14

## 2020-08-13 RX ORDER — OXYCODONE HYDROCHLORIDE 5 MG/1
5 TABLET ORAL EVERY 6 HOURS
Refills: 0 | Status: DISCONTINUED | OUTPATIENT
Start: 2020-08-13 | End: 2020-08-14

## 2020-08-13 RX ORDER — AMITRIPTYLINE HCL 25 MG
10 TABLET ORAL AT BEDTIME
Refills: 0 | Status: DISCONTINUED | OUTPATIENT
Start: 2020-08-13 | End: 2020-08-14

## 2020-08-13 RX ORDER — IBUPROFEN 200 MG
400 TABLET ORAL ONCE
Refills: 0 | Status: COMPLETED | OUTPATIENT
Start: 2020-08-13 | End: 2020-08-13

## 2020-08-13 RX ORDER — OXYCODONE HYDROCHLORIDE 5 MG/1
5 TABLET ORAL ONCE
Refills: 0 | Status: DISCONTINUED | OUTPATIENT
Start: 2020-08-13 | End: 2020-08-13

## 2020-08-13 RX ORDER — LANOLIN ALCOHOL/MO/W.PET/CERES
5 CREAM (GRAM) TOPICAL AT BEDTIME
Refills: 0 | Status: DISCONTINUED | OUTPATIENT
Start: 2020-08-13 | End: 2020-08-14

## 2020-08-13 RX ORDER — QUETIAPINE FUMARATE 200 MG/1
100 TABLET, FILM COATED ORAL AT BEDTIME
Refills: 0 | Status: DISCONTINUED | OUTPATIENT
Start: 2020-08-13 | End: 2020-08-13

## 2020-08-13 RX ORDER — GABAPENTIN 400 MG/1
600 CAPSULE ORAL THREE TIMES A DAY
Refills: 0 | Status: DISCONTINUED | OUTPATIENT
Start: 2020-08-13 | End: 2020-08-13

## 2020-08-13 RX ADMIN — Medication 400 MILLIGRAM(S): at 18:52

## 2020-08-13 RX ADMIN — OXYCODONE HYDROCHLORIDE 5 MILLIGRAM(S): 5 TABLET ORAL at 11:28

## 2020-08-13 RX ADMIN — OXYCODONE HYDROCHLORIDE 5 MILLIGRAM(S): 5 TABLET ORAL at 21:45

## 2020-08-13 RX ADMIN — Medication 10 MILLIGRAM(S): at 21:43

## 2020-08-13 RX ADMIN — Medication 5 MILLIGRAM(S): at 22:32

## 2020-08-13 RX ADMIN — Medication 650 MILLIGRAM(S): at 06:42

## 2020-08-13 RX ADMIN — Medication 650 MILLIGRAM(S): at 05:40

## 2020-08-13 RX ADMIN — GABAPENTIN 600 MILLIGRAM(S): 400 CAPSULE ORAL at 12:31

## 2020-08-13 RX ADMIN — Medication 0.25 MILLIGRAM(S): at 21:43

## 2020-08-13 RX ADMIN — GABAPENTIN 400 MILLIGRAM(S): 400 CAPSULE ORAL at 21:43

## 2020-08-13 RX ADMIN — Medication 400 MILLIGRAM(S): at 17:52

## 2020-08-13 RX ADMIN — GABAPENTIN 600 MILLIGRAM(S): 400 CAPSULE ORAL at 17:52

## 2020-08-13 NOTE — PATIENT PROFILE BEHAVIORAL HEALTH - PRO ARRIVE FROM
home Bactrim Counseling:  I discussed with the patient the risks of sulfa antibiotics including but not limited to GI upset, allergic reaction, drug rash, diarrhea, dizziness, photosensitivity, and yeast infections.  Rarely, more serious reactions can occur including but not limited to aplastic anemia, agranulocytosis, methemoglobinemia, blood dyscrasias, liver or kidney failure, lung infiltrates or desquamative/blistering drug rashes. Spironolactone Pregnancy And Lactation Text: This medication can cause feminization of the male fetus and should be avoided during pregnancy. The active metabolite is also found in breast milk. High Dose Vitamin A Counseling: Side effects reviewed, pt to contact office should one occur. Azithromycin Pregnancy And Lactation Text: This medication is considered safe during pregnancy and is also secreted in breast milk. Tetracycline Pregnancy And Lactation Text: This medication is Pregnancy Category D and not consider safe during pregnancy. It is also excreted in breast milk. Birth Control Pills Counseling: Birth Control Pill Counseling: I discussed with the patient the potential side effects of OCPs including but not limited to increased risk of stroke, heart attack, thrombophlebitis, deep venous thrombosis, hepatic adenomas, breast changes, GI upset, headaches, and depression.  The patient verbalized understanding of the proper use and possible adverse effects of OCPs. All of the patient's questions and concerns were addressed. Dapsone Pregnancy And Lactation Text: This medication is Pregnancy Category C and is not considered safe during pregnancy or breast feeding. Erythromycin Counseling:  I discussed with the patient the risks of erythromycin including but not limited to GI upset, allergic reaction, drug rash, diarrhea, increase in liver enzymes, and yeast infections. Tazorac Pregnancy And Lactation Text: This medication is not safe during pregnancy. It is unknown if this medication is excreted in breast milk. Topical Sulfur Applications Pregnancy And Lactation Text: This medication is Pregnancy Category C and has an unknown safety profile during pregnancy. It is unknown if this topical medication is excreted in breast milk. Azithromycin Counseling:  I discussed with the patient the risks of azithromycin including but not limited to GI upset, allergic reaction, drug rash, diarrhea, and yeast infections. Include Pregnancy/Lactation Warning?: No Topical Clindamycin Counseling: Patient counseled that this medication may cause skin irritation or allergic reactions.  In the event of skin irritation, the patient was advised to reduce the amount of the drug applied or use it less frequently.   The patient verbalized understanding of the proper use and possible adverse effects of clindamycin.  All of the patient's questions and concerns were addressed. High Dose Vitamin A Pregnancy And Lactation Text: High dose vitamin A therapy is contraindicated during pregnancy and breast feeding. Topical Retinoid counseling:  Patient advised to apply a pea-sized amount only at bedtime and wait 30 minutes after washing their face before applying.  If too drying, patient may add a non-comedogenic moisturizer. The patient verbalized understanding of the proper use and possible adverse effects of retinoids.  All of the patient's questions and concerns were addressed. Benzoyl Peroxide Pregnancy And Lactation Text: This medication is Pregnancy Category C. It is unknown if benzoyl peroxide is excreted in breast milk. Dapsone Counseling: I discussed with the patient the risks of dapsone including but not limited to hemolytic anemia, agranulocytosis, rashes, methemoglobinemia, kidney failure, peripheral neuropathy, headaches, GI upset, and liver toxicity.  Patients who start dapsone require monitoring including baseline LFTs and weekly CBCs for the first month, then every month thereafter.  The patient verbalized understanding of the proper use and possible adverse effects of dapsone.  All of the patient's questions and concerns were addressed. Spironolactone Counseling: Patient advised regarding risks of diarrhea, abdominal pain, hyperkalemia, birth defects (for female patients), liver toxicity and renal toxicity. The patient may need blood work to monitor liver and kidney function and potassium levels while on therapy. The patient verbalized understanding of the proper use and possible adverse effects of spironolactone.  All of the patient's questions and concerns were addressed. Erythromycin Pregnancy And Lactation Text: This medication is Pregnancy Category B and is considered safe during pregnancy. It is also excreted in breast milk. Minocycline Counseling: Patient advised regarding possible photosensitivity and discoloration of the teeth, skin, lips, tongue and gums.  Patient instructed to avoid sunlight, if possible.  When exposed to sunlight, patients should wear protective clothing, sunglasses, and sunscreen.  The patient was instructed to call the office immediately if the following severe adverse effects occur:  hearing changes, easy bruising/bleeding, severe headache, or vision changes.  The patient verbalized understanding of the proper use and possible adverse effects of minocycline.  All of the patient's questions and concerns were addressed. Topical Clindamycin Pregnancy And Lactation Text: This medication is Pregnancy Category B and is considered safe during pregnancy. It is unknown if it is excreted in breast milk. Doxycycline Counseling:  Patient counseled regarding possible photosensitivity and increased risk for sunburn.  Patient instructed to avoid sunlight, if possible.  When exposed to sunlight, patients should wear protective clothing, sunglasses, and sunscreen.  The patient was instructed to call the office immediately if the following severe adverse effects occur:  hearing changes, easy bruising/bleeding, severe headache, or vision changes.  The patient verbalized understanding of the proper use and possible adverse effects of doxycycline.  All of the patient's questions and concerns were addressed. Birth Control Pills Pregnancy And Lactation Text: This medication should be avoided if pregnant and for the first 30 days post-partum. Isotretinoin Counseling: Patient should get monthly blood tests, not donate blood, not drive at night if vision affected, not share medication, and not undergo elective surgery for 6 months after tx completed. Side effects reviewed, pt to contact office should one occur. Doxycycline Pregnancy And Lactation Text: This medication is Pregnancy Category D and not consider safe during pregnancy. It is also excreted in breast milk but is considered safe for shorter treatment courses. Topical Retinoid Pregnancy And Lactation Text: This medication is Pregnancy Category C. It is unknown if this medication is excreted in breast milk. Isotretinoin Pregnancy And Lactation Text: This medication is Pregnancy Category X and is considered extremely dangerous during pregnancy. It is unknown if it is excreted in breast milk. Tetracycline Counseling: Patient counseled regarding possible photosensitivity and increased risk for sunburn.  Patient instructed to avoid sunlight, if possible.  When exposed to sunlight, patients should wear protective clothing, sunglasses, and sunscreen.  The patient was instructed to call the office immediately if the following severe adverse effects occur:  hearing changes, easy bruising/bleeding, severe headache, or vision changes.  The patient verbalized understanding of the proper use and possible adverse effects of tetracycline.  All of the patient's questions and concerns were addressed. Patient understands to avoid pregnancy while on therapy due to potential birth defects. Topical Sulfur Applications Counseling: Topical Sulfur Counseling: Patient counseled that this medication may cause skin irritation or allergic reactions.  In the event of skin irritation, the patient was advised to reduce the amount of the drug applied or use it less frequently.   The patient verbalized understanding of the proper use and possible adverse effects of topical sulfur application.  All of the patient's questions and concerns were addressed. Detail Level: Zone Tazorac Counseling:  Patient advised that medication is irritating and drying.  Patient may need to apply sparingly and wash off after an hour before eventually leaving it on overnight.  The patient verbalized understanding of the proper use and possible adverse effects of tazorac.  All of the patient's questions and concerns were addressed. Benzoyl Peroxide Counseling: Patient counseled that medicine may cause skin irritation and bleach clothing.  In the event of skin irritation, the patient was advised to reduce the amount of the drug applied or use it less frequently.   The patient verbalized understanding of the proper use and possible adverse effects of benzoyl peroxide.  All of the patient's questions and concerns were addressed. Bactrim Pregnancy And Lactation Text: This medication is Pregnancy Category D and is known to cause fetal risk.  It is also excreted in breast milk. Detail Level: Detailed

## 2020-08-13 NOTE — ED PROVIDER NOTE - PROGRESS NOTE DETAILS
d/w telepsych, will add pt to list for evaluation. Pt states she lives with her parents (same phone number) who can be used for collateral.  pt requesting tylenol for her headache and back pain

## 2020-08-13 NOTE — ED BEHAVIORAL HEALTH ASSESSMENT NOTE - PSYCHIATRIC ISSUES AND PLAN (INCLUDE STANDING AND PRN MEDICATION)
Gabapentin 600 mg three times daily; Seroquel 100 mg at bedtime; Adderall XR 25 mg every morning; Klonopin 0.5 mg three times daily as needed

## 2020-08-13 NOTE — ED BEHAVIORAL HEALTH ASSESSMENT NOTE - SUICIDE RISK FACTORS
Alcohol/Substance abuse disorders/Insomnia/Hopelessness or despair/Agitation/Severe Anxiety/Panic/Anhedonia/Other/Chronic pain/Access to lethal methods (pills, firearm, etc.: Ask specifically about presence or absence of a firearm in the home or ease of accessing/Family history of suicide/Current mood episode
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
## 2020-08-13 NOTE — PATIENT PROFILE BEHAVIORAL HEALTH - NSBHHOMTHTS_PSY_A_CORE
Left message offering to change 5/28/2020 appt to phone or video, asked him to call back to make the change.  But is welcome to keep in clinic visit.    
absent

## 2020-08-13 NOTE — PATIENT PROFILE BEHAVIORAL HEALTH - NSBHSCDSCRN_PSY_A_CORE
psychiatric illness/patient lost her grandfather recently/ages [age]/recent loss/chronic medical illness

## 2020-08-13 NOTE — ED BEHAVIORAL HEALTH ASSESSMENT NOTE - CURRENT MEDICATION
Gabapentin 600 mg three times daily; Seroquel 100 mg at bedtime; Adderall XR 25 mg every morning; Klonopin 0.5 mg three times daily as needed;     Oxycodone 5 mg every 6 hours as needed for pain; Spironolactone 100 mg daily; Metformin 500 mg daily

## 2020-08-13 NOTE — ED BEHAVIORAL HEALTH ASSESSMENT NOTE - OTHER
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

## 2020-08-13 NOTE — PROGRESS NOTE BEHAVIORAL HEALTH - RISK ASSESSMENT
moderate risk:  Acute factors" depression, suicide attempt , suicide ideation   mitigating Pt denies any suicide intent or plan ,denies any substance use  protective:   Chronic: medical problems, multiple psych hospitalization misuse of medication

## 2020-08-13 NOTE — PROGRESS NOTE BEHAVIORAL HEALTH - NSBHFUPINTERVALHXFT_PSY_A_CORE
Pt is disheveled, irritable  with low frustration tolerance.  Claims she is still feeling depressed and feeling hopeless.  Pt claiming that she is still with passive suicidal ideation but no intention or plan .  Pt claiming that she is tired and wanting to take a nap. Pt denies any hallucinations  29 yr old  Patient took Klonopin and Zanaflex around 10pm claims in a SA . She can't clearly specify the amount she took .

## 2020-08-13 NOTE — ED ADULT NURSE REASSESSMENT NOTE - NS ED NURSE REASSESS COMMENT FT1
attempt to urinate on bedside commode, pt not successful at this time. Pending urine. Constant observation maintained
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
## 2020-08-13 NOTE — ED BEHAVIORAL HEALTH ASSESSMENT NOTE - ADDITIONAL DETAILS ALL
history of SA via OD on 16 tabs of Ambien 2019. Has multiple prior OD attempts 12/2017, 03/2018, 10/2018, and 03/2019 ([facility] admission, pt also attempted to hang self during this attempt); 60 - 80 Klonopin 0.5 mg and #20 Zanaflex.

## 2020-08-13 NOTE — ED ADULT NURSE REASSESSMENT NOTE - NS ED NURSE REASSESS COMMENT FT1
pt now awake and alert. Pt brought to behavioral health part of ED. Dr Garcia made aware. Constant observation maintained

## 2020-08-13 NOTE — ED ADULT NURSE REASSESSMENT NOTE - NS ED NURSE REASSESS COMMENT FT1
constant observation maintained. Pt with end title co2 in place, currently 38. Oxygen saturtion 97% on room air. dr andrade made aware
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
## 2020-08-13 NOTE — ED BEHAVIORAL HEALTH ASSESSMENT NOTE - RISK ASSESSMENT
HIGH RISK     ACUTE RISK FACTORS: depressed mood, hopelessness, anhedonia, suicidal ideation, recent suicide attempt of high lethality    CHRONIC RISK FACTORS: mental illness, mood episode, current SI/I/P, likely abusing prescribed substances, irritability/severe anxiety, chronic pain/acute medical problems, access to means, history of trauma, prior history of suicide attempts, impulsivity, cluster B personality traits, hopelessness/despair    PROTECTIVE FACTORS: supportive family, motivation for psychiatric treatment High Acute Suicide Risk
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
## 2020-08-13 NOTE — ED BEHAVIORAL HEALTH ASSESSMENT NOTE - DESCRIPTION
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
## 2020-08-13 NOTE — PATIENT PROFILE BEHAVIORAL HEALTH - VISION (WITH CORRECTIVE LENSES IF THE PATIENT USUALLY WEARS THEM):
wears glasses and they are with patient/Normal vision: sees adequately in most situations; can see medication labels, newsprint

## 2020-08-14 LAB
A1C WITH ESTIMATED AVERAGE GLUCOSE RESULT: 6.4 % — HIGH (ref 4–5.6)
CHOLEST SERPL-MCNC: 269 MG/DL — HIGH (ref 10–199)
ESTIMATED AVERAGE GLUCOSE: 137 MG/DL — HIGH (ref 68–114)
HDLC SERPL-MCNC: 34 MG/DL — LOW
LIPID PNL WITH DIRECT LDL SERPL: 192 MG/DL — HIGH
TOTAL CHOLESTEROL/HDL RATIO MEASUREMENT: 8 RATIO — HIGH (ref 3.3–7.1)
TRIGL SERPL-MCNC: 214 MG/DL — HIGH (ref 10–149)
TSH SERPL-MCNC: 1.1 UU/ML — SIGNIFICANT CHANGE UP (ref 0.34–4.82)

## 2020-08-14 PROCEDURE — 99232 SBSQ HOSP IP/OBS MODERATE 35: CPT

## 2020-08-14 PROCEDURE — 99222 1ST HOSP IP/OBS MODERATE 55: CPT

## 2020-08-14 RX ORDER — CLONAZEPAM 1 MG
0.5 TABLET ORAL
Refills: 0 | Status: DISCONTINUED | OUTPATIENT
Start: 2020-08-14 | End: 2020-08-18

## 2020-08-14 RX ORDER — HALOPERIDOL DECANOATE 100 MG/ML
5 INJECTION INTRAMUSCULAR ONCE
Refills: 0 | Status: COMPLETED | OUTPATIENT
Start: 2020-08-14 | End: 2020-08-14

## 2020-08-14 RX ORDER — LIDOCAINE 4 G/100G
1 CREAM TOPICAL EVERY 24 HOURS
Refills: 0 | Status: DISCONTINUED | OUTPATIENT
Start: 2020-08-14 | End: 2020-08-14

## 2020-08-14 RX ORDER — DIPHENHYDRAMINE HCL 50 MG
50 CAPSULE ORAL ONCE
Refills: 0 | Status: COMPLETED | OUTPATIENT
Start: 2020-08-14 | End: 2020-08-14

## 2020-08-14 RX ORDER — ZOLPIDEM TARTRATE 10 MG/1
5 TABLET ORAL AT BEDTIME
Refills: 0 | Status: DISCONTINUED | OUTPATIENT
Start: 2020-08-14 | End: 2020-08-15

## 2020-08-14 RX ORDER — CLONAZEPAM 1 MG
0.5 TABLET ORAL
Refills: 0 | Status: DISCONTINUED | OUTPATIENT
Start: 2020-08-14 | End: 2020-08-14

## 2020-08-14 RX ORDER — OXYCODONE HYDROCHLORIDE 5 MG/1
5 TABLET ORAL
Refills: 0 | Status: DISCONTINUED | OUTPATIENT
Start: 2020-08-14 | End: 2020-08-18

## 2020-08-14 RX ORDER — GABAPENTIN 400 MG/1
800 CAPSULE ORAL
Refills: 0 | Status: DISCONTINUED | OUTPATIENT
Start: 2020-08-14 | End: 2020-08-18

## 2020-08-14 RX ORDER — DULOXETINE HYDROCHLORIDE 30 MG/1
30 CAPSULE, DELAYED RELEASE ORAL AT BEDTIME
Refills: 0 | Status: DISCONTINUED | OUTPATIENT
Start: 2020-08-14 | End: 2020-08-16

## 2020-08-14 RX ORDER — IBUPROFEN 200 MG
600 TABLET ORAL ONCE
Refills: 0 | Status: COMPLETED | OUTPATIENT
Start: 2020-08-14 | End: 2020-08-14

## 2020-08-14 RX ORDER — LAMOTRIGINE 25 MG/1
200 TABLET, ORALLY DISINTEGRATING ORAL AT BEDTIME
Refills: 0 | Status: DISCONTINUED | OUTPATIENT
Start: 2020-08-14 | End: 2020-08-18

## 2020-08-14 RX ORDER — LISINOPRIL 2.5 MG/1
20 TABLET ORAL DAILY
Refills: 0 | Status: DISCONTINUED | OUTPATIENT
Start: 2020-08-14 | End: 2020-08-18

## 2020-08-14 RX ORDER — HYDROXYZINE HCL 10 MG
50 TABLET ORAL EVERY 6 HOURS
Refills: 0 | Status: DISCONTINUED | OUTPATIENT
Start: 2020-08-14 | End: 2020-08-18

## 2020-08-14 RX ORDER — HYDROXYZINE HCL 10 MG
75 TABLET ORAL AT BEDTIME
Refills: 0 | Status: DISCONTINUED | OUTPATIENT
Start: 2020-08-14 | End: 2020-08-14

## 2020-08-14 RX ORDER — DULOXETINE HYDROCHLORIDE 30 MG/1
30 CAPSULE, DELAYED RELEASE ORAL DAILY
Refills: 0 | Status: DISCONTINUED | OUTPATIENT
Start: 2020-08-14 | End: 2020-08-14

## 2020-08-14 RX ADMIN — DULOXETINE HYDROCHLORIDE 30 MILLIGRAM(S): 30 CAPSULE, DELAYED RELEASE ORAL at 21:13

## 2020-08-14 RX ADMIN — GABAPENTIN 800 MILLIGRAM(S): 400 CAPSULE ORAL at 21:13

## 2020-08-14 RX ADMIN — OXYCODONE HYDROCHLORIDE 5 MILLIGRAM(S): 5 TABLET ORAL at 10:56

## 2020-08-14 RX ADMIN — GABAPENTIN 800 MILLIGRAM(S): 400 CAPSULE ORAL at 12:04

## 2020-08-14 RX ADMIN — Medication 0.5 MILLIGRAM(S): at 09:35

## 2020-08-14 RX ADMIN — OXYCODONE HYDROCHLORIDE 5 MILLIGRAM(S): 5 TABLET ORAL at 04:23

## 2020-08-14 RX ADMIN — OXYCODONE HYDROCHLORIDE 5 MILLIGRAM(S): 5 TABLET ORAL at 17:53

## 2020-08-14 RX ADMIN — Medication 0.1 MILLIGRAM(S): at 09:35

## 2020-08-14 RX ADMIN — OXYCODONE HYDROCHLORIDE 5 MILLIGRAM(S): 5 TABLET ORAL at 16:04

## 2020-08-14 RX ADMIN — ZOLPIDEM TARTRATE 5 MILLIGRAM(S): 10 TABLET ORAL at 21:15

## 2020-08-14 RX ADMIN — OXYCODONE HYDROCHLORIDE 5 MILLIGRAM(S): 5 TABLET ORAL at 03:00

## 2020-08-14 RX ADMIN — DULOXETINE HYDROCHLORIDE 30 MILLIGRAM(S): 30 CAPSULE, DELAYED RELEASE ORAL at 09:35

## 2020-08-14 RX ADMIN — Medication 600 MILLIGRAM(S): at 04:23

## 2020-08-14 RX ADMIN — Medication 600 MILLIGRAM(S): at 02:34

## 2020-08-14 RX ADMIN — LAMOTRIGINE 200 MILLIGRAM(S): 25 TABLET, ORALLY DISINTEGRATING ORAL at 21:13

## 2020-08-14 RX ADMIN — Medication 50 MILLIGRAM(S): at 21:14

## 2020-08-14 RX ADMIN — LISINOPRIL 20 MILLIGRAM(S): 2.5 TABLET ORAL at 10:26

## 2020-08-14 RX ADMIN — Medication 0.5 MILLIGRAM(S): at 21:12

## 2020-08-14 RX ADMIN — Medication 50 MILLIGRAM(S): at 15:15

## 2020-08-14 RX ADMIN — HALOPERIDOL DECANOATE 5 MILLIGRAM(S): 100 INJECTION INTRAMUSCULAR at 15:14
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
## 2020-08-14 NOTE — PROGRESS NOTE BEHAVIORAL HEALTH - NSBHADDHXMEDFT_PSY_A_CORE
Nephrosclerosis    Obesity    PCOS (polycystic ovarian syndrome)   Allergies:  	Tree Nuts: Food, Anaphylaxis, Originally Entered as [Unknown] reaction to [tree nut]  	peanuts: Food, Anaphylaxis  	amoxicillin: Drug, Rash     Polycystic ovarian syndrome

## 2020-08-14 NOTE — CONSULT NOTE ADULT - ATTENDING COMMENTS
Pt seen and examined with ALICJA Reyes. Agree with assessment and plan as outlined above. Pt presented with suicide attempt via medication overdose. She is stable for 5N admission. Management of depression per primary psych team. Continue other home meds for chronic medical conditions.

## 2020-08-14 NOTE — PROGRESS NOTE BEHAVIORAL HEALTH - NSBHFUPINTERVALHXFT_PSY_A_CORE
Spoke at length with the pt and then also with meeting with her mother  Pt due to multiple medical Spoke at length with the pt and then also with meeting with her mother  Pt due to multiple medical there are medication limitations.  Pt still not able to sleep , preoccupied with depression and of chronic back pain .  Pt initially willing to allow changes in medication and willing to try elavil .
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
## 2020-08-14 NOTE — CONSULT NOTE ADULT - ASSESSMENT
28 y/o female with history of Obesity, PCOS, HTN, self reported kidney disease on Lisinopril, MDD, Borderline PD, and Anxiety d/o and chronic back/knee pain admitted to  for treatment s/p SA by OD.    # Major depression  # S/p suicidal attempt by OD  -Management per primary psych team    # HTN  -C/w Lisinopril    # h/o enlarged periportal lymph nodes  -Outpatient follow up with GI and hem/onc after discharge     # Chronic knee/back pain   -Pain control      # PCOS  -C/w Metformin 500 mg daily  -Monitor BG 30 y/o female with history of Obesity, PCOS, HTN, self reported kidney disease on Lisinopril, MDD, Borderline PD, and Anxiety d/o and chronic back/knee pain admitted to  for treatment s/p SA by OD.    # Major depression  # S/p suicidal attempt by OD  -Management per primary psych team    # HTN  -C/w Lisinopril 20 mg daily     # h/o enlarged periportal lymph nodes  -Outpatient follow up with GI and hem/onc after discharge     # Chronic knee/back pain   -Pain control    # PCOS  -C/w Metformin 500 mg daily  -Monitor BG    DVT ppx encourage ambulation

## 2020-08-14 NOTE — PROGRESS NOTE BEHAVIORAL HEALTH - NSBHFUPADDHPIFT_PSY_A_CORE
oxyCODONE 10 mg oral tablet: 1 tab(s) orally every 8 hours MDD:3  · 	oxyCODONE 5 mg oral tablet: 1 tab(s) orally 3 times a day as needed for severe pain MDD:3  · 	DULoxetine 60 mg oral delayed release capsule: 1 cap(s) orally once a day IN THE MORNING  · 	melatonin 5 mg oral tablet: 1 tab(s) orally once a day (at bedtime)  · 	clonazePAM 0.5 mg oral tablet: 1 tab(s) orally 2 times a day as needed for anxiety MDD:1 mg  · 	DULoxetine 30 mg oral delayed release capsule: 1 cap(s) orally once a day (at bedtime)  · 	LaMICtal 200 mg oral tablet: 1 tab(s) orally once a day (at bedtime)  · 	lisinopril 20 mg oral tablet: 1 tab(s) orally 2 times a day  · 	Benadryl 50 mg oral capsule: 1 cap(s) orally once  · 	promethazine 25 mg oral tablet: 1 tab(s) orally every 6 hours  · 	Vicodin ES: 10/325  · 	Remeron 15 mg oral tablet: 0.5 tab(s) orally once a day (at bedtime)  · 	gabapentin 800 mg oral tablet: 1 tab(s) orally 3 times a day  · 	Florastor 250 mg oral capsule: 1 cap(s) orally once a day  · 	tiZANidine 4 mg oral capsule: 1 cap(s) orally 3 times a day  · 	QUEtiapine 50 mg oral tablet: 1 tab(s) orally once a day (at bedtime
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
## 2020-08-15 PROCEDURE — 99232 SBSQ HOSP IP/OBS MODERATE 35: CPT

## 2020-08-15 RX ORDER — DIPHENHYDRAMINE HCL 50 MG
50 CAPSULE ORAL
Refills: 0 | Status: DISCONTINUED | OUTPATIENT
Start: 2020-08-15 | End: 2020-08-15

## 2020-08-15 RX ORDER — CLONAZEPAM 1 MG
1 TABLET ORAL AT BEDTIME
Refills: 0 | Status: DISCONTINUED | OUTPATIENT
Start: 2020-08-15 | End: 2020-08-16

## 2020-08-15 RX ORDER — CYCLOBENZAPRINE HYDROCHLORIDE 10 MG/1
5 TABLET, FILM COATED ORAL THREE TIMES A DAY
Refills: 0 | Status: DISCONTINUED | OUTPATIENT
Start: 2020-08-15 | End: 2020-08-15

## 2020-08-15 RX ORDER — CYCLOBENZAPRINE HYDROCHLORIDE 10 MG/1
5 TABLET, FILM COATED ORAL
Refills: 0 | Status: DISCONTINUED | OUTPATIENT
Start: 2020-08-15 | End: 2020-08-18

## 2020-08-15 RX ORDER — BENZTROPINE MESYLATE 1 MG
1 TABLET ORAL ONCE
Refills: 0 | Status: COMPLETED | OUTPATIENT
Start: 2020-08-15 | End: 2020-08-15

## 2020-08-15 RX ORDER — TRAZODONE HCL 50 MG
50 TABLET ORAL AT BEDTIME
Refills: 0 | Status: DISCONTINUED | OUTPATIENT
Start: 2020-08-15 | End: 2020-08-16

## 2020-08-15 RX ADMIN — OXYCODONE HYDROCHLORIDE 5 MILLIGRAM(S): 5 TABLET ORAL at 06:00

## 2020-08-15 RX ADMIN — CYCLOBENZAPRINE HYDROCHLORIDE 5 MILLIGRAM(S): 10 TABLET, FILM COATED ORAL at 13:25

## 2020-08-15 RX ADMIN — OXYCODONE HYDROCHLORIDE 5 MILLIGRAM(S): 5 TABLET ORAL at 00:05

## 2020-08-15 RX ADMIN — Medication 50 MILLIGRAM(S): at 11:37

## 2020-08-15 RX ADMIN — Medication 0.5 MILLIGRAM(S): at 14:09

## 2020-08-15 RX ADMIN — Medication 0.5 MILLIGRAM(S): at 08:57

## 2020-08-15 RX ADMIN — Medication 0.1 MILLIGRAM(S): at 09:15

## 2020-08-15 RX ADMIN — CYCLOBENZAPRINE HYDROCHLORIDE 5 MILLIGRAM(S): 10 TABLET, FILM COATED ORAL at 21:16

## 2020-08-15 RX ADMIN — OXYCODONE HYDROCHLORIDE 5 MILLIGRAM(S): 5 TABLET ORAL at 04:58

## 2020-08-15 RX ADMIN — Medication 1 MILLIGRAM(S): at 21:17

## 2020-08-15 RX ADMIN — OXYCODONE HYDROCHLORIDE 5 MILLIGRAM(S): 5 TABLET ORAL at 01:05

## 2020-08-15 RX ADMIN — Medication 50 MILLIGRAM(S): at 02:43

## 2020-08-15 RX ADMIN — Medication 1 MILLIGRAM(S): at 17:21

## 2020-08-15 RX ADMIN — LISINOPRIL 20 MILLIGRAM(S): 2.5 TABLET ORAL at 09:14

## 2020-08-15 RX ADMIN — Medication 50 MILLIGRAM(S): at 22:41

## 2020-08-15 RX ADMIN — DULOXETINE HYDROCHLORIDE 30 MILLIGRAM(S): 30 CAPSULE, DELAYED RELEASE ORAL at 21:16

## 2020-08-15 RX ADMIN — METFORMIN HYDROCHLORIDE 500 MILLIGRAM(S): 850 TABLET ORAL at 09:15

## 2020-08-15 RX ADMIN — Medication 50 MILLIGRAM(S): at 21:16

## 2020-08-15 RX ADMIN — Medication 50 MILLIGRAM(S): at 09:15

## 2020-08-15 RX ADMIN — LAMOTRIGINE 200 MILLIGRAM(S): 25 TABLET, ORALLY DISINTEGRATING ORAL at 21:17

## 2020-08-15 RX ADMIN — GABAPENTIN 800 MILLIGRAM(S): 400 CAPSULE ORAL at 21:17

## 2020-08-15 RX ADMIN — GABAPENTIN 800 MILLIGRAM(S): 400 CAPSULE ORAL at 09:14
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
## 2020-08-15 NOTE — PROGRESS NOTE BEHAVIORAL HEALTH - NSBHFUPINTERVALHXFT_PSY_A_CORE
lPt with cc of chronic pain in the lower back and nurse verified that she was not able to sleep yesterday night,   Pt tried on the ambien with no effect.  Pt appearing sleepy and appears to be in pain. with her back. Pt usually on xanaflex which is not available in the formulary. Pt in the past with some relief with flexeril and will give on prn basis as standing would be an added medication of many medication for this pt .needing to educate pt about the problems of increased potential drug to drug effects with multiple medications. lPt with cc of chronic pain in the lower back and nurse verified that she was not able to sleep yesterday night,   Pt tried on the ambien with no effect.  Pt appearing sleepy and appears to be in pain. with her back. Pt usually on xanaflex which is not available in the formulary. Pt in the past with some relief with flexeril and will give on prn basis as standing would be an added medication of many medication for this pt .needing to educate pt about the problems of increased potential drug to drug effects with multiple medications.  Pt is very focused on asking for more oxycodone.

## 2020-08-16 DIAGNOSIS — F11.20 OPIOID DEPENDENCE, UNCOMPLICATED: ICD-10-CM

## 2020-08-16 PROCEDURE — 99232 SBSQ HOSP IP/OBS MODERATE 35: CPT

## 2020-08-16 RX ORDER — QUETIAPINE FUMARATE 200 MG/1
50 TABLET, FILM COATED ORAL AT BEDTIME
Refills: 0 | Status: DISCONTINUED | OUTPATIENT
Start: 2020-08-16 | End: 2020-08-16

## 2020-08-16 RX ORDER — QUETIAPINE FUMARATE 200 MG/1
100 TABLET, FILM COATED ORAL AT BEDTIME
Refills: 0 | Status: DISCONTINUED | OUTPATIENT
Start: 2020-08-16 | End: 2020-08-16

## 2020-08-16 RX ORDER — TRAZODONE HCL 50 MG
100 TABLET ORAL AT BEDTIME
Refills: 0 | Status: DISCONTINUED | OUTPATIENT
Start: 2020-08-16 | End: 2020-08-16

## 2020-08-16 RX ORDER — DULOXETINE HYDROCHLORIDE 30 MG/1
60 CAPSULE, DELAYED RELEASE ORAL DAILY
Refills: 0 | Status: DISCONTINUED | OUTPATIENT
Start: 2020-08-16 | End: 2020-08-16

## 2020-08-16 RX ORDER — DULOXETINE HYDROCHLORIDE 30 MG/1
60 CAPSULE, DELAYED RELEASE ORAL DAILY
Refills: 0 | Status: DISCONTINUED | OUTPATIENT
Start: 2020-08-16 | End: 2020-08-18

## 2020-08-16 RX ORDER — CLONAZEPAM 1 MG
0.5 TABLET ORAL AT BEDTIME
Refills: 0 | Status: DISCONTINUED | OUTPATIENT
Start: 2020-08-16 | End: 2020-08-18

## 2020-08-16 RX ORDER — TRAZODONE HCL 50 MG
100 TABLET ORAL AT BEDTIME
Refills: 0 | Status: DISCONTINUED | OUTPATIENT
Start: 2020-08-16 | End: 2020-08-18

## 2020-08-16 RX ADMIN — Medication 50 MILLIGRAM(S): at 03:47

## 2020-08-16 RX ADMIN — Medication 0.5 MILLIGRAM(S): at 21:32

## 2020-08-16 RX ADMIN — Medication 50 MILLIGRAM(S): at 13:41

## 2020-08-16 RX ADMIN — OXYCODONE HYDROCHLORIDE 5 MILLIGRAM(S): 5 TABLET ORAL at 08:42

## 2020-08-16 RX ADMIN — CYCLOBENZAPRINE HYDROCHLORIDE 5 MILLIGRAM(S): 10 TABLET, FILM COATED ORAL at 08:41

## 2020-08-16 RX ADMIN — Medication 0.1 MILLIGRAM(S): at 09:12

## 2020-08-16 RX ADMIN — Medication 100 MILLIGRAM(S): at 21:32

## 2020-08-16 RX ADMIN — LISINOPRIL 20 MILLIGRAM(S): 2.5 TABLET ORAL at 08:42

## 2020-08-16 RX ADMIN — Medication 0.5 MILLIGRAM(S): at 14:57

## 2020-08-16 RX ADMIN — LAMOTRIGINE 200 MILLIGRAM(S): 25 TABLET, ORALLY DISINTEGRATING ORAL at 21:32

## 2020-08-16 RX ADMIN — CYCLOBENZAPRINE HYDROCHLORIDE 5 MILLIGRAM(S): 10 TABLET, FILM COATED ORAL at 21:32

## 2020-08-16 RX ADMIN — Medication 50 MILLIGRAM(S): at 22:43

## 2020-08-16 RX ADMIN — OXYCODONE HYDROCHLORIDE 5 MILLIGRAM(S): 5 TABLET ORAL at 00:03

## 2020-08-16 RX ADMIN — SPIRONOLACTONE 100 MILLIGRAM(S): 25 TABLET, FILM COATED ORAL at 09:12

## 2020-08-16 RX ADMIN — GABAPENTIN 800 MILLIGRAM(S): 400 CAPSULE ORAL at 09:12

## 2020-08-16 RX ADMIN — GABAPENTIN 800 MILLIGRAM(S): 400 CAPSULE ORAL at 21:32

## 2020-08-16 RX ADMIN — Medication 0.5 MILLIGRAM(S): at 08:41

## 2020-08-16 NOTE — PROGRESS NOTE BEHAVIORAL HEALTH - NSBHFUPINTERVALHXFT_PSY_A_CORE
Pt claiming no sleep . will d/c the klonopin 1mg as it was no helpful. Will restart the seroquel for augmentation for depression with the increased cymbalta to 60mg daily .  Pt with repeated request for opiates and klonopin.  Pt often claiming that she is concerned about her health yet refusing the metformin .  Also pt refusing to be on even dash diet and wanting to remain on regular diet. . Will get consult with dietary. Pt claiming no sleep . will d/c the klonopin 1mg as it was no helpful. Will restart the seroquel for augmentation for depression with the increased cymbalta to 60mg daily .  Pt with repeated request for opiates and klonopin.  Pt often claiming that she is concerned about her health yet refusing the metformin .  Also pt refusing to be on even dash diet and wanting to remain on regular diet. . Will get consult with dietary.    Now pt is again changing the story and claiming that the trazodone was "helpful for some sleep " and wanting it added back as prn for insomnia. Pt claiming still with anxiety at night and wanting restart of the klonopin but will take 0.5mg rather than 1mg .  at night.  Pt with constant demand for more medication . Pt refusing the seroquel for antidepressant augmentation and pt now claiming "not depressed or suicidal anymore"
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
## 2020-08-17 LAB
BASOPHILS # BLD AUTO: 0.07 K/UL — SIGNIFICANT CHANGE UP (ref 0–0.2)
BASOPHILS NFR BLD AUTO: 0.6 % — SIGNIFICANT CHANGE UP (ref 0–2)
EOSINOPHIL # BLD AUTO: 0.08 K/UL — SIGNIFICANT CHANGE UP (ref 0–0.5)
EOSINOPHIL NFR BLD AUTO: 0.6 % — SIGNIFICANT CHANGE UP (ref 0–6)
HCT VFR BLD CALC: 40.1 % — SIGNIFICANT CHANGE UP (ref 34.5–45)
HGB BLD-MCNC: 13.8 G/DL — SIGNIFICANT CHANGE UP (ref 11.5–15.5)
IMM GRANULOCYTES NFR BLD AUTO: 0.4 % — SIGNIFICANT CHANGE UP (ref 0–1.5)
LYMPHOCYTES # BLD AUTO: 19.8 % — SIGNIFICANT CHANGE UP (ref 13–44)
LYMPHOCYTES # BLD AUTO: 2.52 K/UL — SIGNIFICANT CHANGE UP (ref 1–3.3)
MCHC RBC-ENTMCNC: 29.6 PG — SIGNIFICANT CHANGE UP (ref 27–34)
MCHC RBC-ENTMCNC: 34.4 GM/DL — SIGNIFICANT CHANGE UP (ref 32–36)
MCV RBC AUTO: 85.9 FL — SIGNIFICANT CHANGE UP (ref 80–100)
MONOCYTES # BLD AUTO: 0.88 K/UL — SIGNIFICANT CHANGE UP (ref 0–0.9)
MONOCYTES NFR BLD AUTO: 6.9 % — SIGNIFICANT CHANGE UP (ref 2–14)
NEUTROPHILS # BLD AUTO: 9.12 K/UL — HIGH (ref 1.8–7.4)
NEUTROPHILS NFR BLD AUTO: 71.7 % — SIGNIFICANT CHANGE UP (ref 43–77)
PLATELET # BLD AUTO: 357 K/UL — SIGNIFICANT CHANGE UP (ref 150–400)
RBC # BLD: 4.67 M/UL — SIGNIFICANT CHANGE UP (ref 3.8–5.2)
RBC # FLD: 14 % — SIGNIFICANT CHANGE UP (ref 10.3–14.5)
WBC # BLD: 12.72 K/UL — HIGH (ref 3.8–10.5)
WBC # FLD AUTO: 12.72 K/UL — HIGH (ref 3.8–10.5)

## 2020-08-17 PROCEDURE — 99232 SBSQ HOSP IP/OBS MODERATE 35: CPT

## 2020-08-17 RX ORDER — QUETIAPINE FUMARATE 200 MG/1
100 TABLET, FILM COATED ORAL AT BEDTIME
Refills: 0 | Status: DISCONTINUED | OUTPATIENT
Start: 2020-08-17 | End: 2020-08-18

## 2020-08-17 RX ADMIN — CYCLOBENZAPRINE HYDROCHLORIDE 5 MILLIGRAM(S): 10 TABLET, FILM COATED ORAL at 21:28

## 2020-08-17 RX ADMIN — Medication 50 MILLIGRAM(S): at 03:36

## 2020-08-17 RX ADMIN — Medication 0.5 MILLIGRAM(S): at 15:19

## 2020-08-17 RX ADMIN — OXYCODONE HYDROCHLORIDE 5 MILLIGRAM(S): 5 TABLET ORAL at 21:27

## 2020-08-17 RX ADMIN — OXYCODONE HYDROCHLORIDE 5 MILLIGRAM(S): 5 TABLET ORAL at 00:16

## 2020-08-17 RX ADMIN — QUETIAPINE FUMARATE 100 MILLIGRAM(S): 200 TABLET, FILM COATED ORAL at 21:26

## 2020-08-17 RX ADMIN — SPIRONOLACTONE 100 MILLIGRAM(S): 25 TABLET, FILM COATED ORAL at 09:43

## 2020-08-17 RX ADMIN — LAMOTRIGINE 200 MILLIGRAM(S): 25 TABLET, ORALLY DISINTEGRATING ORAL at 21:27

## 2020-08-17 RX ADMIN — Medication 0.5 MILLIGRAM(S): at 09:21

## 2020-08-17 RX ADMIN — GABAPENTIN 800 MILLIGRAM(S): 400 CAPSULE ORAL at 09:23

## 2020-08-17 RX ADMIN — CYCLOBENZAPRINE HYDROCHLORIDE 5 MILLIGRAM(S): 10 TABLET, FILM COATED ORAL at 09:48

## 2020-08-17 RX ADMIN — DULOXETINE HYDROCHLORIDE 60 MILLIGRAM(S): 30 CAPSULE, DELAYED RELEASE ORAL at 09:43

## 2020-08-17 RX ADMIN — LISINOPRIL 20 MILLIGRAM(S): 2.5 TABLET ORAL at 09:22

## 2020-08-17 RX ADMIN — OXYCODONE HYDROCHLORIDE 5 MILLIGRAM(S): 5 TABLET ORAL at 00:42

## 2020-08-17 RX ADMIN — GABAPENTIN 800 MILLIGRAM(S): 400 CAPSULE ORAL at 21:27

## 2020-08-17 RX ADMIN — Medication 50 MILLIGRAM(S): at 12:19

## 2020-08-17 RX ADMIN — Medication 100 MILLIGRAM(S): at 21:28

## 2020-08-17 RX ADMIN — Medication 0.1 MILLIGRAM(S): at 09:22

## 2020-08-17 RX ADMIN — Medication 0.5 MILLIGRAM(S): at 21:26
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
## 2020-08-17 NOTE — PROGRESS NOTE BEHAVIORAL HEALTH - PROBLEM SELECTOR PLAN 1
cymbalta increased to 60mg   seroquel 50mg po hs cymbalta increased to 60mg   increasedseroquel 100mg po hs

## 2020-08-17 NOTE — PROGRESS NOTE BEHAVIORAL HEALTH - NSBHFUPINTERVALHXFT_PSY_A_CORE
Pt still not able to sleep except for  two hours. Pt wanting to return to using seroquel as augment for antidepressant medication .  Pt still wanting to retry the trazodone.  Pt with frequent asking for more medication .  Pt now claiming to be less anxious except for the insomnia.   Pt denies any suicidal ideation intent or plan

## 2020-08-18 VITALS — RESPIRATION RATE: 18 BRPM | OXYGEN SATURATION: 99 % | TEMPERATURE: 99 F

## 2020-08-18 PROCEDURE — 99238 HOSP IP/OBS DSCHRG MGMT 30/<: CPT

## 2020-08-18 RX ORDER — LISINOPRIL 2.5 MG/1
1 TABLET ORAL
Qty: 0 | Refills: 0 | DISCHARGE
Start: 2020-08-18

## 2020-08-18 RX ORDER — OXYCODONE HYDROCHLORIDE 5 MG/1
2 TABLET ORAL
Qty: 0 | Refills: 0 | DISCHARGE
Start: 2020-08-18

## 2020-08-18 RX ORDER — OXYCODONE HYDROCHLORIDE 5 MG/1
1 TABLET ORAL
Qty: 0 | Refills: 0 | DISCHARGE
Start: 2020-08-18

## 2020-08-18 RX ORDER — GABAPENTIN 400 MG/1
2 CAPSULE ORAL
Qty: 0 | Refills: 0 | DISCHARGE
Start: 2020-08-18

## 2020-08-18 RX ORDER — METFORMIN HYDROCHLORIDE 850 MG/1
1 TABLET ORAL
Qty: 0 | Refills: 0 | DISCHARGE
Start: 2020-08-18

## 2020-08-18 RX ORDER — SPIRONOLACTONE 25 MG/1
4 TABLET, FILM COATED ORAL
Qty: 0 | Refills: 0 | DISCHARGE
Start: 2020-08-18

## 2020-08-18 RX ORDER — IBUPROFEN 200 MG
400 TABLET ORAL EVERY 6 HOURS
Refills: 0 | Status: DISCONTINUED | OUTPATIENT
Start: 2020-08-18 | End: 2020-08-18

## 2020-08-18 RX ORDER — LAMOTRIGINE 25 MG/1
1 TABLET, ORALLY DISINTEGRATING ORAL
Qty: 0 | Refills: 0 | DISCHARGE
Start: 2020-08-18

## 2020-08-18 RX ORDER — TRAZODONE HCL 50 MG
1 TABLET ORAL
Qty: 14 | Refills: 1
Start: 2020-08-18 | End: 2020-09-14

## 2020-08-18 RX ORDER — DIPHENHYDRAMINE HCL 50 MG
1 CAPSULE ORAL
Qty: 0 | Refills: 0 | DISCHARGE

## 2020-08-18 RX ORDER — QUETIAPINE FUMARATE 200 MG/1
1 TABLET, FILM COATED ORAL
Qty: 0 | Refills: 0 | DISCHARGE

## 2020-08-18 RX ORDER — TIZANIDINE 4 MG/1
1 TABLET ORAL
Qty: 0 | Refills: 0 | DISCHARGE

## 2020-08-18 RX ORDER — MIRTAZAPINE 45 MG/1
0.5 TABLET, ORALLY DISINTEGRATING ORAL
Qty: 0 | Refills: 0 | DISCHARGE

## 2020-08-18 RX ORDER — CLONAZEPAM 1 MG
1 TABLET ORAL
Qty: 6 | Refills: 0
Start: 2020-08-18 | End: 2020-08-19

## 2020-08-18 RX ORDER — SACCHAROMYCES BOULARDII 250 MG
1 POWDER IN PACKET (EA) ORAL
Qty: 0 | Refills: 0 | DISCHARGE

## 2020-08-18 RX ORDER — QUETIAPINE FUMARATE 200 MG/1
1 TABLET, FILM COATED ORAL
Qty: 14 | Refills: 1
Start: 2020-08-18 | End: 2020-09-14

## 2020-08-18 RX ORDER — CYCLOBENZAPRINE HYDROCHLORIDE 10 MG/1
1 TABLET, FILM COATED ORAL
Qty: 28 | Refills: 1
Start: 2020-08-18 | End: 2020-09-14

## 2020-08-18 RX ORDER — CLONAZEPAM 1 MG
1 TABLET ORAL
Qty: 30 | Refills: 0
Start: 2020-08-18 | End: 2020-08-27

## 2020-08-18 RX ORDER — GABAPENTIN 400 MG/1
1 CAPSULE ORAL
Qty: 0 | Refills: 0 | DISCHARGE

## 2020-08-18 RX ORDER — DULOXETINE HYDROCHLORIDE 30 MG/1
1 CAPSULE, DELAYED RELEASE ORAL
Qty: 0 | Refills: 0 | DISCHARGE
Start: 2020-08-18

## 2020-08-18 RX ADMIN — CYCLOBENZAPRINE HYDROCHLORIDE 5 MILLIGRAM(S): 10 TABLET, FILM COATED ORAL at 10:16

## 2020-08-18 RX ADMIN — OXYCODONE HYDROCHLORIDE 5 MILLIGRAM(S): 5 TABLET ORAL at 10:15

## 2020-08-18 RX ADMIN — Medication 0.5 MILLIGRAM(S): at 10:10

## 2020-08-18 RX ADMIN — LISINOPRIL 20 MILLIGRAM(S): 2.5 TABLET ORAL at 10:11

## 2020-08-18 RX ADMIN — DULOXETINE HYDROCHLORIDE 60 MILLIGRAM(S): 30 CAPSULE, DELAYED RELEASE ORAL at 10:11

## 2020-08-18 RX ADMIN — OXYCODONE HYDROCHLORIDE 5 MILLIGRAM(S): 5 TABLET ORAL at 11:15

## 2020-08-18 RX ADMIN — GABAPENTIN 800 MILLIGRAM(S): 400 CAPSULE ORAL at 10:12

## 2020-08-18 RX ADMIN — Medication 0.1 MILLIGRAM(S): at 10:11

## 2020-08-18 NOTE — PROGRESS NOTE BEHAVIORAL HEALTH - PRIMARY DX
Depression
Severe episode of recurrent major depressive disorder, without psychotic features
Depression
Severe episode of recurrent major depressive disorder, without psychotic features

## 2020-08-18 NOTE — PROGRESS NOTE BEHAVIORAL HEALTH - SECONDARY DX2
Benzodiazepine (tranquilizer) overdose
Opiate addiction
Benzodiazepine (tranquilizer) overdose
Benzodiazepine (tranquilizer) overdose

## 2020-08-18 NOTE — PROGRESS NOTE BEHAVIORAL HEALTH - PROBLEM SELECTOR PLAN 3
encouraged pt to attend groups

## 2020-08-18 NOTE — PROGRESS NOTE BEHAVIORAL HEALTH - AXIS III
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

## 2020-08-18 NOTE — PROGRESS NOTE BEHAVIORAL HEALTH - PROBLEM SELECTOR PROBLEM 4
Neuropathic pain

## 2020-08-18 NOTE — CHART NOTE - NSCHARTNOTEFT_GEN_A_CORE
Pt called as she was not able to fill her prescription for the klonopin 0.5mg po tid for 10 days.  Pt had misused her medication prior to coming to the hospital.  Pharmacy would not fill the prescription as she is not due for the next prescription for a month. Pharmacy would only fill for 2 days until she gets to see her outpatient provider , pt with an appt in a day.  Sent new prescription for 2 days of the klonopin  0.5mg po tid #6

## 2020-08-18 NOTE — PROGRESS NOTE BEHAVIORAL HEALTH - RISK ASSESSMENT
moderate risk:  Acute factors" depression, suicide attempt , suicide ideation   mitigating Pt denies any suicide intent or plan ,denies any substance use  protective:   Chronic: medical problems, multiple psych hospitalization misuse of medication low risk:  Acute factors: pt denies any suicidal ideation , able to sleep,   mitigating Pt denies any suicide intent or plan ,pt denies any added pain , has good pain control  protective: family support, professional support of therapist  Chronic: medical problems, multiple psych hospitalization misuse of medication

## 2020-08-18 NOTE — PROGRESS NOTE BEHAVIORAL HEALTH - SECONDARY DX1
Opiate addiction
Benzodiazepine (tranquilizer) overdose
Anxiety disorder
Opiate addiction
Opiate addiction
Anxiety disorder

## 2020-08-18 NOTE — DISCHARGE NOTE BEHAVIORAL HEALTH - MEDICATION SUMMARY - MEDICATIONS TO STOP TAKING
I will STOP taking the medications listed below when I get home from the hospital:    melatonin 5 mg oral tablet  -- 1 tab(s) by mouth once a day (at bedtime)    Remeron 15 mg oral tablet  -- 0.5 tab(s) by mouth once a day (at bedtime)

## 2020-08-18 NOTE — PROGRESS NOTE BEHAVIORAL HEALTH - NSBHCHARTREVIEWVS_PSY_A_CORE FT
Vital Signs Last 24 Hrs  T(C): 36.9 (17 Aug 2020 07:24), Max: 36.9 (17 Aug 2020 07:24)  T(F): 98.5 (17 Aug 2020 07:24), Max: 98.5 (17 Aug 2020 07:24)  HR: --  BP: --  BP(mean): --  RR: 18 (17 Aug 2020 07:24) (18 - 18)  SpO2: 99% (17 Aug 2020 07:24) (99% - 99%)
Vital Signs Last 24 Hrs  T(C): 37.1 (14 Aug 2020 07:18), Max: 37.1 (13 Aug 2020 13:10)  T(F): 98.7 (14 Aug 2020 07:18), Max: 98.8 (13 Aug 2020 13:10)  HR: --78  BP: --141/91  BP(mean): --  RR: 16 (14 Aug 2020 07:18) (16 - 18)  SpO2: 100% (14 Aug 2020 07:18) (100% - 100%)
Vital Signs Last 24 Hrs  T(C): 37.1 (18 Aug 2020 07:23), Max: 37.1 (18 Aug 2020 07:23)  T(F): 98.7 (18 Aug 2020 07:23), Max: 98.7 (18 Aug 2020 07:23)  HR: --  BP: --  BP(mean): --  RR: 18 (18 Aug 2020 07:23) (18 - 18)  SpO2: 99% (18 Aug 2020 07:23) (99% - 99%)
Vital Signs Last 24 Hrs  T(C): 36.8 (15 Aug 2020 07:26), Max: 36.8 (15 Aug 2020 07:26)  T(F): 98.2 (15 Aug 2020 07:26), Max: 98.2 (15 Aug 2020 07:26)  HR: --  BP: --138/89  BP(mean): --  RR: 14 (15 Aug 2020 07:26) (14 - 14)  SpO2: 100% (15 Aug 2020 07:26) (100% - 100%)
Vital Signs Last 24 Hrs  T(C): 37.1 (13 Aug 2020 13:10), Max: 37.4 (13 Aug 2020 00:19)  T(F): 98.8 (13 Aug 2020 13:10), Max: 99.4 (13 Aug 2020 00:19)  HR: 56 (13 Aug 2020 07:31) (56 - 80)  BP: 113/91 (13 Aug 2020 07:31) (105/60 - 113/91)  BP(mean): 99 (13 Aug 2020 07:31) (99 - 99)  RR: 18 (13 Aug 2020 13:10) (15 - 20)  SpO2: 100% (13 Aug 2020 13:10) (96% - 100%)
Vital Signs Last 24 Hrs  T(C): 37.1 (16 Aug 2020 07:26), Max: 37.1 (16 Aug 2020 07:26)  T(F): 98.7 (16 Aug 2020 07:26), Max: 98.7 (16 Aug 2020 07:26)  HR: --  BP: --  BP(mean): --  RR: 14 (16 Aug 2020 07:26) (14 - 14)  SpO2: 100% (16 Aug 2020 07:26) (100% - 100%)

## 2020-08-18 NOTE — PROGRESS NOTE BEHAVIORAL HEALTH - PROBLEM SELECTOR PROBLEM 2
Benzodiazepine (tranquilizer) overdose

## 2020-08-18 NOTE — DISCHARGE NOTE BEHAVIORAL HEALTH - NSBHDCADDFT_PSY_A_CORE
Ventricular Rate 65 BPM    Atrial Rate 65 BPM    P-R Interval 138 ms    QRS Duration 84 ms    Q-T Interval 390 ms    QTC Calculation(Bezet) 405 ms    P Axis 18 degrees    R Axis 32 degrees    T Axis 11 degrees    Diagnosis Line Normal sinus rhythm with sinus arrhythmia  Possible Left atrial enlargement  Borderline ECG  When compared with ECG of 30-JUL-2020 03:13,  No significant change was found  Confirmed by SOMMER LASSITER MD [id number]) on 8/13/2020 12:34:39 PM    HA1C: 6.4

## 2020-08-18 NOTE — PROGRESS NOTE BEHAVIORAL HEALTH - NSBHFUPINTERVALCCFT_PSY_A_CORE
"I did not sleep yesterday night. "
"I'm tired, do I have to talk"
"I just need more help for the pain"
"I feel ready to go home, feel good after the sleep."
"I just need to get some sleep on a regular basis."
"I'm in chronic pain and depression

## 2020-08-18 NOTE — DISCHARGE NOTE BEHAVIORAL HEALTH - NSBHDCSUICSAFETYFT_PSY_A_CORE
Advised to return to hospital or go to nearest ED or call 911 or [phone] or [phone] hotlines for any severe, worsening or persistent symptoms including suicidal/homicidal ideations, intent or plans. Patient verbalized understanding of instructions.

## 2020-08-18 NOTE — PROGRESS NOTE BEHAVIORAL HEALTH - NSBHCHARTREVIEWLAB_PSY_A_CORE FT
Urine Microscopic-Add On (NC) (08.13.20 @ 05:07)    Epithelial Cells: Few    Bacteria: Few    White Blood Cell - Urine: 11-25    Red Blood Cell - Urine: 3-5 /HPF
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
## 2020-08-18 NOTE — DISCHARGE NOTE BEHAVIORAL HEALTH - NSBHDCADDR2FT_A_CORE
[address] [place] [address] [address] [place] [address]  Appointment with therapist Samanta Villa  *Pts current therapist no longer with the practice

## 2020-08-18 NOTE — PROGRESS NOTE BEHAVIORAL HEALTH - PROBLEM SELECTOR PLAN 1
cymbalta increased to 60mg   increasedseroquel 100mg po hs cymbalta increased to 60mg   increased seroquel 100mg po hs

## 2020-08-18 NOTE — DISCHARGE NOTE BEHAVIORAL HEALTH - MEDICATION SUMMARY - MEDICATIONS TO CHANGE
I will SWITCH the dose or number of times a day I take the medications listed below when I get home from the hospital:    DULoxetine 30 mg oral delayed release capsule  -- 1 cap(s) by mouth once a day (at bedtime)    clonazePAM 0.5 mg oral tablet  -- 1 tab(s) by mouth 2 times a day as needed for anxiety MDD:1 mg    gabapentin 800 mg oral tablet  -- 1 tab(s) by mouth 3 times a day

## 2020-08-18 NOTE — DISCHARGE NOTE BEHAVIORAL HEALTH - NSBHDCSUICFCTRMIT_PSY_A_CORE
1. pt denies any suicidal ideation intent or plan   2. pt with support of her mother  3. pt with the support of psychotherapist and psychiatrist

## 2020-08-18 NOTE — PROGRESS NOTE BEHAVIORAL HEALTH - DETAILS
chronic back/knee pain

## 2020-08-18 NOTE — PROGRESS NOTE BEHAVIORAL HEALTH - NSBHCHARTREVIEWINVESTIGATE_PSY_A_CORE FT
Ventricular Rate 65 BPM    Atrial Rate 65 BPM    P-R Interval 138 ms    QRS Duration 84 ms    Q-T Interval 390 ms    QTC Calculation(Bezet) 405 ms    P Axis 18 degrees    R Axis 32 degrees    T Axis 11 degrees    Diagnosis Line Normal sinus rhythm with sinus arrhythmia  Possible Left atrial enlargement  Borderline ECG  When compared with ECG of 30-JUL-2020 03:13,  No significant change was found  Confirmed by SOMMER LASSITER MD [id number]) on 8/13/2020 12:34:3
ventricular Rate 65 BPM    Atrial Rate 65 BPM    P-R Interval 138 ms    QRS Duration 84 ms    Q-T Interval 390 ms    QTC Calculation(Bezet) 405 ms    P Axis 18 degrees    R Axis 32 degrees    T Axis 11 degrees    Diagnosis Line Normal sinus rhythm with sinus arrhythmia  Possible Left atrial enlargement  Borderline ECG  When compared with ECG of 30-JUL-2020 03:13,  No significant change was found
Ventricular Rate 65 BPM    Atrial Rate 65 BPM    P-R Interval 138 ms    QRS Duration 84 ms    Q-T Interval 390 ms    QTC Calculation(Bezet) 405 ms    P Axis 18 degrees    R Axis 32 degrees    T Axis 11 degrees    Diagnosis Line Normal sinus rhythm with sinus arrhythmia  Possible Left atrial enlargement  Borderline ECG  When compared with ECG of 30-JUL-2020 03:13,  No significant change was found  Confirmed by SOMMER LASSITER MD ([id number]) on 8/13/2020 12:34:39 PM
Ventricular Rate 65 BPM    Atrial Rate 65 BPM    P-R Interval 138 ms    QRS Duration 84 ms    Q-T Interval 390 ms    QTC Calculation(Bezet) 405 ms    P Axis 18 degrees    R Axis 32 degrees    T Axis 11 degrees    Diagnosis Line Normal sinus rhythm with sinus arrhythmia  Possible Left atrial enlargement  Borderline ECG  When compared with ECG of 30-JUL-2020 03:13,  No significant change was found  Confirmed by SOMMER LASSITER MD ([id number]) on 8/13/2020 12:34:3
ventricular Rate 65 BPM    Atrial Rate 65 BPM    P-R Interval 138 ms    QRS Duration 84 ms    Q-T Interval 390 ms    QTC Calculation(Bezet) 405 ms    P Axis 18 degrees    R Axis 32 degrees    T Axis 11 degrees    Diagnosis Line Normal sinus rhythm with sinus arrhythmia  Possible Left atrial enlargement  Borderline ECG  When compared with ECG of 30-JUL-2020 03:13,  No significant change was found
Ventricular Rate 65 BPM    Atrial Rate 65 BPM    P-R Interval 138 ms    QRS Duration 84 ms    Q-T Interval 390 ms    QTC Calculation(Bezet) 405 ms    P Axis 18 degrees    R Axis 32 degrees    T Axis 11 degrees    Diagnosis Line Normal sinus rhythm with sinus arrhythmia  Possible Left atrial enlargement  Borderline ECG  When compared with ECG of 30-JUL-2020 03:13,  No significant change was found  Confirmed by SOMMER LASSITER MD ([id number]) on 8/13/2020 12:34:3

## 2020-08-18 NOTE — PROGRESS NOTE BEHAVIORAL HEALTH - NSBHADMITDANGERSELF_PSY_A_CORE
suicidal behavior/suicidal ideation with plan and means
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
## 2020-08-18 NOTE — PROGRESS NOTE BEHAVIORAL HEALTH - PRN MEDS
pt with much medication seeking behavior

## 2020-08-18 NOTE — PROGRESS NOTE BEHAVIORAL HEALTH - NSBHPTASSESSDT_PSY_A_CORE
14-Aug-2020 10:02
15-Aug-2020 12:34
17-Aug-2020 09:44
18-Aug-2020 10:29
13-Aug-2020 15:14
16-Aug-2020 12:58

## 2020-08-18 NOTE — PROGRESS NOTE BEHAVIORAL HEALTH - NSBHADMITIPOBSFT_PSY_A_CORE
denies any intent or plan

## 2020-08-18 NOTE — DISCHARGE NOTE BEHAVIORAL HEALTH - NSBHDCDXVALIDYESFT_PSY_A_CORE
Psychiatric Diagnosis (Corresponds to DSM-IV Axis I, II):  Primary Dx Depression F32.9. Secondary Dx 1 Opiate addiction F11.20. Secondary Dx 2 Benzodiazepine (tranquilizer) overdose T42.4X1A. Secondary Dx 3 Borderline personality disorder F60.3.

## 2020-08-18 NOTE — DISCHARGE NOTE BEHAVIORAL HEALTH - NSBHDCSUICFCTRSFT_PSY_A_CORE
1.depression -Pt is directed to continue with all medications until directed by her MD to change or discontinued with medications, pt on Cymbalta and Seroquel for augmentation  2. suicidal ideation - denies any suicidal ideation intent or plan  3. misuse of medication - pt continuing with psychotherapy for coping skills and insight

## 2020-08-18 NOTE — DISCHARGE NOTE BEHAVIORAL HEALTH - NSBHDCADMRISKMITFT_PSY_A_CORE
1. pt willing to attend aftercare treatment   2. pt denies any suicidal ideation, intent or plan   3. pt has outpatient pain specialist, renal MD  4. support of family

## 2020-08-18 NOTE — DISCHARGE NOTE BEHAVIORAL HEALTH - NSBHDCCRISISPLAN1FT_PSY_A_CORE
Advised to return to hospital or go to nearest ED or call 911 or [phone] [phone] or [phone] hotlines for any severe, worsening or persistent symptoms including suicidal/homicidal ideations, intent or plans. Patient verbalized understanding of instructions.

## 2020-08-18 NOTE — DISCHARGE NOTE BEHAVIORAL HEALTH - MEDICATION SUMMARY - MEDICATIONS TO TAKE
I will START or STAY ON the medications listed below when I get home from the hospital:    spironolactone 25 mg oral tablet  -- 4 tab(s) by mouth once a day  -- Indication: For HTN    oxyCODONE 5 mg oral tablet  -- 1 tab(s) by mouth 2 times a day, As needed, pain  -- Indication: For Pain    lisinopril 20 mg oral tablet  -- 1 tab(s) by mouth once a day  -- Indication: For HTN    cloNIDine 0.1 mg oral tablet  -- 1 tab(s) by mouth once a day  -- Indication: For HTN    gabapentin 400 mg oral capsule  -- 2 cap(s) by mouth 2 times a day  -- Indication: For Pain    lamoTRIgine 200 mg oral tablet  -- 1 tab(s) by mouth once a day (at bedtime)  -- Indication: For Depression    clonazePAM 0.5 mg oral tablet  -- 1 tab(s) by mouth 3 times a day MDD:1.5mg  -- Indication: For anxiety    DULoxetine 60 mg oral delayed release capsule  -- 1 cap(s) by mouth once a day  -- Indication: For Depression    traZODone 100 mg oral tablet  -- 1 tab(s) by mouth once a day (at bedtime), As needed, insomnia  -- Indication: For insomnia    metFORMIN 500 mg oral tablet  -- 1 tab(s) by mouth once a day  -- Indication: For PCOS    QUEtiapine 100 mg oral tablet  -- 1 tab(s) by mouth once a day (at bedtime)  -- Indication: For Depression augmentation    cyclobenzaprine 5 mg oral tablet  -- 1 tab(s) by mouth 2 times a day, As needed, Muscle Spasm  -- Indication: For Pain

## 2020-08-18 NOTE — DISCHARGE NOTE BEHAVIORAL HEALTH - NSBHDCCONDITIONFT_PSY_A_CORE
alert, oriented, pleasant, cooperative.  Makes good eye contact. Denies thoughts to hurt herself or others

## 2020-08-18 NOTE — DISCHARGE NOTE BEHAVIORAL HEALTH - NSBHDCTESTSFT_PSY_A_CORE
Thyroid Stimulating Hormone, Serum: 1.10 uU/mL (08.14.20 @ 07:49)  Lipid Profile (08.14.20 @ 07:49)    Total Cholesterol/HDL Ratio Measurement: 8.0 RATIO    Cholesterol, Serum: 269 mg/dL    Triglycerides, Serum: 214 mg/dL    HDL Cholesterol, Serum: 34    Direct LDL: 192  HA1C: Thyroid Stimulating Hormone, Serum: 1.10 uU/mL (08.14.20 @ 07:49)  Lipid Profile (08.14.20 @ 07:49)    Total Cholesterol/HDL Ratio Measurement: 8.0 RATIO    Cholesterol, Serum: 269 mg/dL    Triglycerides, Serum: 214 mg/dL    HDL Cholesterol, Serum: 34    Direct LDL: 192  HA1C: 6.4

## 2020-08-18 NOTE — PROGRESS NOTE BEHAVIORAL HEALTH - NSBHFUPINTERVALHXFT_PSY_A_CORE
Pt was able to sleep yesterday night and is reporting that she is in less pain.  Pt denies any suicidal ideation intent or plan Pt was able to sleep yesterday night and is reporting that she is in less pain.  Pt denies any suicidal ideation intent or plan. Pt reports euthymic mood and affect is full and mood congruent . Pt denies any suicidal ideation intent or plan Pt is calm and not in distress. She denies any side effects from medication , no tremors, involuntary movements. Pt is calm and not in distress.   Pt with good appetite and asked for more food for lunch.

## 2020-08-19 ENCOUNTER — APPOINTMENT (OUTPATIENT)
Dept: HUMAN REPRODUCTION | Facility: CLINIC | Age: 30
End: 2020-08-19

## 2020-08-20 NOTE — CHART NOTE - NSCHARTNOTEFT_GEN_A_CORE
Left voice message x2. Awaiting return call.  Letter to be sent to address on file if no return call received.

## 2020-08-21 DIAGNOSIS — E28.2 POLYCYSTIC OVARIAN SYNDROME: ICD-10-CM

## 2020-08-21 DIAGNOSIS — I12.9 HYPERTENSIVE CHRONIC KIDNEY DISEASE WITH STAGE 1 THROUGH STAGE 4 CHRONIC KIDNEY DISEASE, OR UNSPECIFIED CHRONIC KIDNEY DISEASE: ICD-10-CM

## 2020-08-21 DIAGNOSIS — F33.3 MAJOR DEPRESSIVE DISORDER, RECURRENT, SEVERE WITH PSYCHOTIC SYMPTOMS: ICD-10-CM
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
## 2020-08-21 DIAGNOSIS — F60.3 BORDERLINE PERSONALITY DISORDER: ICD-10-CM

## 2020-08-21 DIAGNOSIS — F11.20 OPIOID DEPENDENCE, UNCOMPLICATED: ICD-10-CM

## 2020-08-21 DIAGNOSIS — T42.4X2A POISONING BY BENZODIAZEPINES, INTENTIONAL SELF-HARM, INITIAL ENCOUNTER: ICD-10-CM

## 2020-08-21 DIAGNOSIS — G89.29 OTHER CHRONIC PAIN: ICD-10-CM
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
## 2020-08-21 DIAGNOSIS — F41.9 ANXIETY DISORDER, UNSPECIFIED: ICD-10-CM

## 2020-08-21 DIAGNOSIS — K21.9 GASTRO-ESOPHAGEAL REFLUX DISEASE WITHOUT ESOPHAGITIS: ICD-10-CM

## 2020-08-21 DIAGNOSIS — E66.9 OBESITY, UNSPECIFIED: ICD-10-CM

## 2020-08-21 DIAGNOSIS — Z91.010 ALLERGY TO PEANUTS: ICD-10-CM

## 2020-08-21 DIAGNOSIS — E11.22 TYPE 2 DIABETES MELLITUS WITH DIABETIC CHRONIC KIDNEY DISEASE: ICD-10-CM
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
## 2020-08-21 DIAGNOSIS — D64.9 ANEMIA, UNSPECIFIED: ICD-10-CM

## 2020-08-21 DIAGNOSIS — N18.9 CHRONIC KIDNEY DISEASE, UNSPECIFIED: ICD-10-CM

## 2020-08-21 DIAGNOSIS — Z91.018 ALLERGY TO OTHER FOODS: ICD-10-CM

## 2020-08-21 DIAGNOSIS — Z91.5 PERSONAL HISTORY OF SELF-HARM: ICD-10-CM

## 2020-08-21 DIAGNOSIS — Z88.1 ALLERGY STATUS TO OTHER ANTIBIOTIC AGENTS STATUS: ICD-10-CM

## 2020-08-21 DIAGNOSIS — M54.9 DORSALGIA, UNSPECIFIED: ICD-10-CM

## 2020-08-21 DIAGNOSIS — E11.40 TYPE 2 DIABETES MELLITUS WITH DIABETIC NEUROPATHY, UNSPECIFIED: ICD-10-CM

## 2020-08-21 DIAGNOSIS — E78.5 HYPERLIPIDEMIA, UNSPECIFIED: ICD-10-CM

## 2020-08-21 DIAGNOSIS — F90.9 ATTENTION-DEFICIT HYPERACTIVITY DISORDER, UNSPECIFIED TYPE: ICD-10-CM

## 2020-08-26 ENCOUNTER — EMERGENCY (EMERGENCY)
Facility: HOSPITAL | Age: 30
LOS: 0 days | Discharge: HOME | End: 2020-08-26
Attending: STUDENT IN AN ORGANIZED HEALTH CARE EDUCATION/TRAINING PROGRAM
Payer: COMMERCIAL

## 2020-08-26 VITALS
TEMPERATURE: 100 F | OXYGEN SATURATION: 98 % | HEART RATE: 104 BPM | RESPIRATION RATE: 19 BRPM | DIASTOLIC BLOOD PRESSURE: 76 MMHG | SYSTOLIC BLOOD PRESSURE: 131 MMHG

## 2020-08-26 VITALS — HEIGHT: 67 IN | WEIGHT: 259.93 LBS

## 2020-08-26 DIAGNOSIS — Z90.79 ACQUIRED ABSENCE OF OTHER GENITAL ORGAN(S): Chronic | ICD-10-CM

## 2020-08-26 DIAGNOSIS — K21.9 GASTRO-ESOPHAGEAL REFLUX DISEASE WITHOUT ESOPHAGITIS: ICD-10-CM

## 2020-08-26 DIAGNOSIS — E78.5 HYPERLIPIDEMIA, UNSPECIFIED: ICD-10-CM

## 2020-08-26 DIAGNOSIS — Z88.0 ALLERGY STATUS TO PENICILLIN: ICD-10-CM

## 2020-08-26 DIAGNOSIS — Z98.890 OTHER SPECIFIED POSTPROCEDURAL STATES: Chronic | ICD-10-CM

## 2020-08-26 DIAGNOSIS — E28.2 POLYCYSTIC OVARIAN SYNDROME: ICD-10-CM

## 2020-08-26 DIAGNOSIS — Z90.49 ACQUIRED ABSENCE OF OTHER SPECIFIED PARTS OF DIGESTIVE TRACT: Chronic | ICD-10-CM

## 2020-08-26 DIAGNOSIS — Z79.84 LONG TERM (CURRENT) USE OF ORAL HYPOGLYCEMIC DRUGS: ICD-10-CM

## 2020-08-26 DIAGNOSIS — I12.9 HYPERTENSIVE CHRONIC KIDNEY DISEASE WITH STAGE 1 THROUGH STAGE 4 CHRONIC KIDNEY DISEASE, OR UNSPECIFIED CHRONIC KIDNEY DISEASE: ICD-10-CM

## 2020-08-26 DIAGNOSIS — Z76.5 MALINGERER [CONSCIOUS SIMULATION]: ICD-10-CM

## 2020-08-26 DIAGNOSIS — E11.22 TYPE 2 DIABETES MELLITUS WITH DIABETIC CHRONIC KIDNEY DISEASE: ICD-10-CM

## 2020-08-26 DIAGNOSIS — Z91.010 ALLERGY TO PEANUTS: ICD-10-CM

## 2020-08-26 DIAGNOSIS — M54.40 LUMBAGO WITH SCIATICA, UNSPECIFIED SIDE: ICD-10-CM

## 2020-08-26 DIAGNOSIS — Z91.018 ALLERGY TO OTHER FOODS: ICD-10-CM

## 2020-08-26 DIAGNOSIS — M54.5 LOW BACK PAIN: ICD-10-CM

## 2020-08-26 DIAGNOSIS — N18.9 CHRONIC KIDNEY DISEASE, UNSPECIFIED: ICD-10-CM

## 2020-08-26 PROCEDURE — 99283 EMERGENCY DEPT VISIT LOW MDM: CPT

## 2020-08-26 RX ORDER — LIDOCAINE 4 G/100G
1 CREAM TOPICAL
Qty: 7 | Refills: 0
Start: 2020-08-26 | End: 2020-09-01

## 2020-08-26 NOTE — ED STATDOCS - CARE PLAN
Principal Discharge DX:	Chronic bilateral low back pain with sciatica, sciatica laterality unspecified  Secondary Diagnosis:	Drug-seeking behavior

## 2020-08-26 NOTE — ED STATDOCS - ATTENDING CONTRIBUTION TO CARE
I, Zahraa Phelps DO,  performed the initial face to face bedside interview with this patient regarding history of present illness, review of symptoms and relevant past medical, social and family history.  I completed an independent physical examination.  I was the initial provider who evaluated this patient. I have signed out the follow up of any pending tests (i.e. labs, radiological studies) to the ACP.  I have communicated the patient’s plan of care and disposition with the ACP.  The history, relevant review of systems, past medical and surgical history, medical decision making, and physical examination was documented by the scribe in my presence and I attest to the accuracy of the documentation.

## 2020-08-26 NOTE — ED STATDOCS - PATIENT PORTAL LINK FT
You can access the FollowMyHealth Patient Portal offered by [facility] by registering at the following website: [email or web address]. By joining [employer]’s FollowMyHealth portal, you will also be able to view your health information using other applications (apps) compatible with our system.

## 2020-08-26 NOTE — ED STATDOCS - CARE PROVIDER_API CALL
Oz Sena  NEUROSURGERY  [address], [address]nd [place] [address]  Phone: [phone]  Fax: [phone]  Follow Up Time:     Emerson Rosales  NEUROSURGERY  [address] [place] [address]  Phone: [phone]  Fax: [phone]  Follow Up Time:     Jun Ha  Orthopedic Surgery  [address] [place] [address]  Phone: [phone]  Fax: [phone]  Follow Up Time:

## 2020-08-26 NOTE — ED STATDOCS - CARE PROVIDERS DIRECT ADDRESSES
[email or web address]

## 2020-08-26 NOTE — ED STATDOCS - CLINICAL SUMMARY MEDICAL DECISION MAKING FREE TEXT BOX
Pt with chronic back and LE pain. No red flags. Will pain control, reeval. Pt with chronic back and RLE pain. No red flags. pain control, reeval.
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
## 2020-08-26 NOTE — ED STATDOCS - PROGRESS NOTE DETAILS
Radha Gauthier for attending Dr. Phelps: Pt now requesting to leave. Pt reports she took all meds that we would give her. Records reviewed. Pt had recent prescription 1 week ago for Flexeril, Klonopin, Seroquel and Trazadone. Lenin DO: Patient ambulatory in ED with steady gait.
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
## 2020-08-26 NOTE — ED STATDOCS - PROVIDER TOKENS
PROVIDER:[TOKEN:[[id number]]],PROVIDER:[TOKEN:[[id number]]],PROVIDER:[TOKEN:[[id number]]]

## 2021-01-07 ENCOUNTER — TRANSCRIPTION ENCOUNTER (OUTPATIENT)
Age: 31
End: 2021-01-07

## 2021-01-07 ENCOUNTER — EMERGENCY (EMERGENCY)
Facility: HOSPITAL | Age: 31
LOS: 0 days | Discharge: HOME | End: 2021-01-08
Attending: EMERGENCY MEDICINE
Payer: COMMERCIAL

## 2021-01-07 VITALS — WEIGHT: 240.08 LBS | HEIGHT: 67 IN

## 2021-01-07 DIAGNOSIS — K44.9 DIAPHRAGMATIC HERNIA WITHOUT OBSTRUCTION OR GANGRENE: ICD-10-CM

## 2021-01-07 DIAGNOSIS — Z98.890 OTHER SPECIFIED POSTPROCEDURAL STATES: Chronic | ICD-10-CM

## 2021-01-07 DIAGNOSIS — K29.70 GASTRITIS, UNSPECIFIED, WITHOUT BLEEDING: ICD-10-CM

## 2021-01-07 DIAGNOSIS — F60.3 BORDERLINE PERSONALITY DISORDER: ICD-10-CM

## 2021-01-07 DIAGNOSIS — M35.1 OTHER OVERLAP SYNDROMES: ICD-10-CM

## 2021-01-07 DIAGNOSIS — F90.9 ATTENTION-DEFICIT HYPERACTIVITY DISORDER, UNSPECIFIED TYPE: ICD-10-CM

## 2021-01-07 DIAGNOSIS — N18.9 CHRONIC KIDNEY DISEASE, UNSPECIFIED: ICD-10-CM

## 2021-01-07 DIAGNOSIS — Z91.010 ALLERGY TO PEANUTS: ICD-10-CM

## 2021-01-07 DIAGNOSIS — Z90.49 ACQUIRED ABSENCE OF OTHER SPECIFIED PARTS OF DIGESTIVE TRACT: Chronic | ICD-10-CM

## 2021-01-07 DIAGNOSIS — K21.9 GASTRO-ESOPHAGEAL REFLUX DISEASE WITHOUT ESOPHAGITIS: ICD-10-CM

## 2021-01-07 DIAGNOSIS — E28.2 POLYCYSTIC OVARIAN SYNDROME: ICD-10-CM

## 2021-01-07 DIAGNOSIS — K75.81 NONALCOHOLIC STEATOHEPATITIS (NASH): ICD-10-CM

## 2021-01-07 DIAGNOSIS — R10.9 UNSPECIFIED ABDOMINAL PAIN: ICD-10-CM

## 2021-01-07 DIAGNOSIS — Z82.49 FAMILY HISTORY OF ISCHEMIC HEART DISEASE AND OTHER DISEASES OF THE CIRCULATORY SYSTEM: ICD-10-CM

## 2021-01-07 DIAGNOSIS — Z90.79 ACQUIRED ABSENCE OF OTHER GENITAL ORGAN(S): Chronic | ICD-10-CM

## 2021-01-07 DIAGNOSIS — F41.9 ANXIETY DISORDER, UNSPECIFIED: ICD-10-CM

## 2021-01-07 DIAGNOSIS — Z88.0 ALLERGY STATUS TO PENICILLIN: ICD-10-CM

## 2021-01-07 DIAGNOSIS — E11.22 TYPE 2 DIABETES MELLITUS WITH DIABETIC CHRONIC KIDNEY DISEASE: ICD-10-CM

## 2021-01-07 DIAGNOSIS — Z90.49 ACQUIRED ABSENCE OF OTHER SPECIFIED PARTS OF DIGESTIVE TRACT: ICD-10-CM

## 2021-01-07 DIAGNOSIS — E66.9 OBESITY, UNSPECIFIED: ICD-10-CM

## 2021-01-07 DIAGNOSIS — Z79.84 LONG TERM (CURRENT) USE OF ORAL HYPOGLYCEMIC DRUGS: ICD-10-CM

## 2021-01-07 LAB
BASOPHILS # BLD AUTO: 0.08 K/UL — SIGNIFICANT CHANGE UP (ref 0–0.2)
BASOPHILS NFR BLD AUTO: 0.6 % — SIGNIFICANT CHANGE UP (ref 0–2)
EOSINOPHIL # BLD AUTO: 0.08 K/UL — SIGNIFICANT CHANGE UP (ref 0–0.5)
EOSINOPHIL NFR BLD AUTO: 0.6 % — SIGNIFICANT CHANGE UP (ref 0–6)
HCT VFR BLD CALC: 43.3 % — SIGNIFICANT CHANGE UP (ref 34.5–45)
HGB BLD-MCNC: 15.1 G/DL — SIGNIFICANT CHANGE UP (ref 11.5–15.5)
IMM GRANULOCYTES NFR BLD AUTO: 0.4 % — SIGNIFICANT CHANGE UP (ref 0–1.5)
LYMPHOCYTES # BLD AUTO: 26.8 % — SIGNIFICANT CHANGE UP (ref 13–44)
LYMPHOCYTES # BLD AUTO: 3.54 K/UL — HIGH (ref 1–3.3)
MCHC RBC-ENTMCNC: 29.4 PG — SIGNIFICANT CHANGE UP (ref 27–34)
MCHC RBC-ENTMCNC: 34.9 GM/DL — SIGNIFICANT CHANGE UP (ref 32–36)
MCV RBC AUTO: 84.2 FL — SIGNIFICANT CHANGE UP (ref 80–100)
MONOCYTES # BLD AUTO: 1.01 K/UL — HIGH (ref 0–0.9)
MONOCYTES NFR BLD AUTO: 7.6 % — SIGNIFICANT CHANGE UP (ref 2–14)
NEUTROPHILS # BLD AUTO: 8.47 K/UL — HIGH (ref 1.8–7.4)
NEUTROPHILS NFR BLD AUTO: 64 % — SIGNIFICANT CHANGE UP (ref 43–77)
PLATELET # BLD AUTO: 373 K/UL — SIGNIFICANT CHANGE UP (ref 150–400)
RBC # BLD: 5.14 M/UL — SIGNIFICANT CHANGE UP (ref 3.8–5.2)
RBC # FLD: 13.7 % — SIGNIFICANT CHANGE UP (ref 10.3–14.5)
WBC # BLD: 13.23 K/UL — HIGH (ref 3.8–10.5)
WBC # FLD AUTO: 13.23 K/UL — HIGH (ref 3.8–10.5)

## 2021-01-07 PROCEDURE — 76705 ECHO EXAM OF ABDOMEN: CPT | Mod: 26

## 2021-01-07 PROCEDURE — 96374 THER/PROPH/DIAG INJ IV PUSH: CPT | Mod: XU

## 2021-01-07 PROCEDURE — 96376 TX/PRO/DX INJ SAME DRUG ADON: CPT

## 2021-01-07 PROCEDURE — 81001 URINALYSIS AUTO W/SCOPE: CPT

## 2021-01-07 PROCEDURE — 99284 EMERGENCY DEPT VISIT MOD MDM: CPT | Mod: 25

## 2021-01-07 PROCEDURE — 80053 COMPREHEN METABOLIC PANEL: CPT

## 2021-01-07 PROCEDURE — 74177 CT ABD & PELVIS W/CONTRAST: CPT

## 2021-01-07 PROCEDURE — 96361 HYDRATE IV INFUSION ADD-ON: CPT

## 2021-01-07 PROCEDURE — 96375 TX/PRO/DX INJ NEW DRUG ADDON: CPT

## 2021-01-07 PROCEDURE — 76705 ECHO EXAM OF ABDOMEN: CPT

## 2021-01-07 PROCEDURE — C9113: CPT

## 2021-01-07 PROCEDURE — 81025 URINE PREGNANCY TEST: CPT

## 2021-01-07 PROCEDURE — 85025 COMPLETE CBC W/AUTO DIFF WBC: CPT

## 2021-01-07 PROCEDURE — 99284 EMERGENCY DEPT VISIT MOD MDM: CPT

## 2021-01-07 PROCEDURE — 36415 COLL VENOUS BLD VENIPUNCTURE: CPT

## 2021-01-07 PROCEDURE — 83690 ASSAY OF LIPASE: CPT

## 2021-01-07 RX ORDER — PANTOPRAZOLE SODIUM 20 MG/1
40 TABLET, DELAYED RELEASE ORAL ONCE
Refills: 0 | Status: COMPLETED | OUTPATIENT
Start: 2021-01-07 | End: 2021-01-07

## 2021-01-07 RX ORDER — ONDANSETRON 8 MG/1
4 TABLET, FILM COATED ORAL ONCE
Refills: 0 | Status: COMPLETED | OUTPATIENT
Start: 2021-01-07 | End: 2021-01-07

## 2021-01-07 RX ORDER — MORPHINE SULFATE 50 MG/1
4 CAPSULE, EXTENDED RELEASE ORAL ONCE
Refills: 0 | Status: DISCONTINUED | OUTPATIENT
Start: 2021-01-07 | End: 2021-01-07

## 2021-01-07 RX ORDER — SODIUM CHLORIDE 9 MG/ML
1000 INJECTION INTRAMUSCULAR; INTRAVENOUS; SUBCUTANEOUS ONCE
Refills: 0 | Status: COMPLETED | OUTPATIENT
Start: 2021-01-07 | End: 2021-01-07

## 2021-01-07 RX ADMIN — ONDANSETRON 4 MILLIGRAM(S): 8 TABLET, FILM COATED ORAL at 22:23

## 2021-01-07 RX ADMIN — SODIUM CHLORIDE 1000 MILLILITER(S): 9 INJECTION INTRAMUSCULAR; INTRAVENOUS; SUBCUTANEOUS at 22:23

## 2021-01-07 RX ADMIN — MORPHINE SULFATE 4 MILLIGRAM(S): 50 CAPSULE, EXTENDED RELEASE ORAL at 22:38

## 2021-01-07 RX ADMIN — PANTOPRAZOLE SODIUM 40 MILLIGRAM(S): 20 TABLET, DELAYED RELEASE ORAL at 22:23

## 2021-01-07 NOTE — ED STATDOCS - CLINICAL SUMMARY MEDICAL DECISION MAKING FREE TEXT BOX
Upper abdominal pain; hx of gastritis and LARA; had endoscopy, colonoscopy, biopsy of liver diagnosing LARA, fatty liver now with nausea, vomiting.  Will hydrate and give IV zofran and send off abdominal labs and order US.  Local sees Dr. Alcira Valdez; otherwise sees GI Dr. Lobato at [facility]. Upper abdominal pain; hx of gastritis and LARA; had endoscopy, colonoscopy, biopsy of liver diagnosing LARA, fatty liver now with nausea, vomiting.  Will hydrate and give IV zofran and send off abdominal labs and order US.  Local sees Dr. Alcira Valdez; otherwise sees GI Dr. Lobato at [facility].    PA: ABD SONO shows s/p cholecystectomy. No significant biliary dilatation. Common bile duct measures 7 mm, stable compared to prior study. SONO results reviewed with patient. Labwork pending. Care transitioned to Dr. Smith. ~Juan Pablo Anderson PA-C

## 2021-01-07 NOTE — ED STATDOCS - OBJECTIVE STATEMENT
Pt. is a 31 yo F with a hx of anemia, iron infusions, gastritis, hiatal hernia, cholecystectomy, ADHD, borderline personality disorder, anxiety, PCOS, mixed connective tissue disease, nonspecific lymphadenopathy, liver biopsy in August 2020 with complication of small hematoma near diaphragm, hx of LARA; DM type 2, CKD, obesity presents with upper abdominal pain X 4 days.  Patient states 4 days ago she started to have body aches, chills, nausea and dry heaving.  She has had bilious vomiting intermittently X 4 days.  She called her Hem/Onc specialist Dr. Toledo who ordered Flu/Covid/strep tests which were all negative.  She usually takes compazine but it is not working.  Dr. Toledo called in zofran but she is unable to keep it down as well.  She states abdominal pain is dull, achy in epigastrium and right upper quadrant of abdomen and nonradiating.  No pain meds taken prior to arrival.

## 2021-01-07 NOTE — ED STATDOCS - CARE PROVIDER_API CALL
Segundo Toledo  INTERNAL MEDICINE  [address]  [place] [address]  Phone: [phone]  Fax: [phone]  Follow Up Time:     Alcira Valdez)  Gastroenterology; Internal Medicine  [address] [place] [address]  Phone: [phone]  Fax: [phone]  Follow Up Time:

## 2021-01-07 NOTE — ED STATDOCS - PHYSICAL EXAMINATION
PA NOTE: GEN: AOX3, NAD. HEENT: Throat clear. Airway is patent. EYES: PERRLA. EOMI. Head: NC/AT. NECK: Supple, No JVD. FROM. C-spine non-tender. CV:S1S2, RRR, LUNGS: Non-labored breathing, no tachypnea. O2sat 100% RA. CTA b/l. No w/r/r. CHEST: Equal chest expansion and rise. No deformity. ABD: Soft, +Mild epigastric and RUQ tenderness. No true Gee sign. NEG McBurney's point tenderness. NEG Rovsing. NEG Psoas. NEG Obturator. No rebound, no guarding. No CVAT. EXT: No e/c/c. 2+ distal pulses. SKIN: No rashes. NEURO: No focal deficits. CN II-XII intact. FROM. 5/5 motor and sensory. ~Juan Pablo Anderson PA-C

## 2021-01-07 NOTE — ED STATDOCS - PATIENT PORTAL LINK FT
You can access the FollowMyHealth Patient Portal offered by [facility] by registering at the following website: [email or web address]. By joining [employer]’s FollowMyHealth portal, you will also be able to view your health information using other applications (apps) compatible with our system.

## 2021-01-07 NOTE — ED STATDOCS - NSFOLLOWUPINSTRUCTIONS_ED_ALL_ED_FT
Take zofran as previously prescribed for nausea.  Re-start your prilosec as directed by your gastroenterologist previously.  Reglan and benadryl sent to pharmacy for severe nausea.     Acute Nausea and Vomiting    WHAT YOU NEED TO KNOW:    Acute nausea and vomiting start suddenly, worsen quickly, and last a short time.    DISCHARGE INSTRUCTIONS:    Return to the emergency department if:     You see blood in your vomit or your bowel movements.      You have sudden, severe pain in your chest and upper abdomen after hard vomiting or retching.      You have swelling in your neck and chest.       You are dizzy, cold, and thirsty and your eyes and mouth are dry.      You are urinating very little or not at all.      You have muscle weakness, leg cramps, and trouble breathing.       Your heart is beating much faster than normal.       You continue to vomit for more than 48 hours.     Contact your healthcare provider if:     You have frequent dry heaves (vomiting but nothing comes out).      Your nausea and vomiting does not get better or go away after you use medicine.      You have questions or concerns about your condition or treatment.    Medicines: You may need any of the following:     Medicines may be given to calm your stomach and stop your vomiting. You may also need medicines to help you feel more relaxed or to stop nausea and vomiting caused by motion sickness.      Gastrointestinal stimulants are used to help empty your stomach and bowels. This may help decrease nausea and vomiting.      Take your medicine as directed. Contact your healthcare provider if you think your medicine is not helping or if you have side effects. Tell him or her if you are allergic to any medicine. Keep a list of the medicines, vitamins, and herbs you take. Include the amounts, and when and why you take them. Bring the list or the pill bottles to follow-up visits. Carry your medicine list with you in case of an emergency.    Prevent or manage acute nausea and vomiting:     Do not drink alcohol. Alcohol may upset or irritate your stomach. Too much alcohol can also cause acute nausea and vomiting.      Control stress. Headaches due to stress may cause nausea and vomiting. Find ways to relax and manage your stress. Get more rest and sleep.      Drink more liquids as directed. Vomiting can lead to dehydration. It is important to drink more liquids to help replace lost body fluids. Ask your healthcare provider how much liquid to drink each day and which liquids are best for you. Your provider may recommend that you drink an oral rehydration solution (ORS). ORS contains water, salts, and sugar that are needed to replace the lost body fluids. Ask what kind of ORS to use, how much to drink, and where to get it.      Eat smaller meals, more often. Eat small amounts of food every 2 to 3 hours, even if you are not hungry. Food in your stomach may decrease your nausea.      Talk to your healthcare provider before you take over-the-counter (OTC) medicines. These medicines can cause serious problems if you use certain other medicines, or you have a medical condition. You may have problems if you use too much or use them for longer than the label says. Follow directions on the label carefully.     Follow up with your healthcare provider as directed: Write down your questions so you remember to ask them during your follow-up visits.

## 2021-01-07 NOTE — ED STATDOCS - SECONDARY DIAGNOSIS.
Nausea and vomiting, intractability of vomiting not specified, unspecified vomiting type Gastritis, presence of bleeding unspecified, unspecified chronicity, unspecified gastritis type Hiatal hernia with GERD

## 2021-01-07 NOTE — ED STATDOCS - FAMILY HISTORY
Grandparent  Still living? Unknown  Family history of heart disease, Age at diagnosis: Age Unknown

## 2021-01-07 NOTE — ED STATDOCS - PROGRESS NOTE DETAILS
PA: ABD SONO shows s/p cholecystectomy. No significant biliary dilatation. Common bile duct measures 7 mm, stable compared to prior study. SONO results reviewed with patient. Labwork pending. Care transitioned to Dr. Smith. ~Juan Pablo Anderson PA-C PA: Patient is a 31 yo female with PMHx of Anemia with iron transfusions, GERD, Gastritis, gall stones s/p cholecystectomy, ADHD, borderline personality disorder, anxiety, PCOS, mixed connective tissue disease, nonspecific lymphadenopathy, liver biopsy in August 2020 with complication of small hematoma near diaphragm, hx of LARA, DM type 2, CKD, morbid obesity who presents to [facility] c/o RUQ pain with nausea x 4 days, associated with intermittent bilious vomiting intermittently X 4 days.  She called her Hem/Onc specialist Dr. Toledo who ordered Flu/Covid/strep tests which were all negative.  She usually takes compazine but it is not working.  Dr. Toledo called in zofran but she is unable to keep it down as well. ~Juan Pablo Anderson PA-C   Patient seen and evaluated in . Will get labs, SONO, pain control, IVF. Reassess. ~Juan Pablo Anderson PA-C JG:  Patient eating saltines and drinking liquids.  States reglan helped her the most.  Understands reglan can give shakiness and extrapyramidal symptoms with her psych meds and she should take Dr. Toledo' zofran for nausea first.  Only use reglan for severe nausea.  Must take with benadryl.    Labs and US and CT discussed with patient. Patient to f/u with Dr. Toledo.

## 2021-01-07 NOTE — ED ADULT TRIAGE NOTE - CHIEF COMPLAINT QUOTE
Patient presents to the ED ambulatory co abdominal pain, nausea, vomiting, diarrhea. Pt with a hx of fatty liver disease, liver fibrosis, recent elevated ASTs. Pt seen at [facility], Dr. Lobato for her liver diagnosis. Pt [id number] she is unable to hold food down. Pt anxious and upset in triage.

## 2021-01-08 VITALS
OXYGEN SATURATION: 100 % | TEMPERATURE: 98 F | SYSTOLIC BLOOD PRESSURE: 128 MMHG | DIASTOLIC BLOOD PRESSURE: 64 MMHG | HEART RATE: 68 BPM

## 2021-01-08 LAB
ALBUMIN SERPL ELPH-MCNC: 3.7 G/DL — SIGNIFICANT CHANGE UP (ref 3.3–5)
ALP SERPL-CCNC: 120 U/L — SIGNIFICANT CHANGE UP (ref 40–120)
ALT FLD-CCNC: 125 U/L — HIGH (ref 12–78)
ANION GAP SERPL CALC-SCNC: 5 MMOL/L — SIGNIFICANT CHANGE UP (ref 5–17)
APPEARANCE UR: ABNORMAL
AST SERPL-CCNC: 70 U/L — HIGH (ref 15–37)
BILIRUB SERPL-MCNC: 0.4 MG/DL — SIGNIFICANT CHANGE UP (ref 0.2–1.2)
BILIRUB UR-MCNC: NEGATIVE — SIGNIFICANT CHANGE UP
BUN SERPL-MCNC: 22 MG/DL — SIGNIFICANT CHANGE UP (ref 7–23)
CALCIUM SERPL-MCNC: 9.5 MG/DL — SIGNIFICANT CHANGE UP (ref 8.5–10.1)
CHLORIDE SERPL-SCNC: 105 MMOL/L — SIGNIFICANT CHANGE UP (ref 96–108)
CO2 SERPL-SCNC: 26 MMOL/L — SIGNIFICANT CHANGE UP (ref 22–31)
COLOR SPEC: YELLOW — SIGNIFICANT CHANGE UP
CREAT SERPL-MCNC: 0.98 MG/DL — SIGNIFICANT CHANGE UP (ref 0.5–1.3)
DIFF PNL FLD: ABNORMAL
GLUCOSE SERPL-MCNC: 83 MG/DL — SIGNIFICANT CHANGE UP (ref 70–99)
GLUCOSE UR QL: NEGATIVE MG/DL — SIGNIFICANT CHANGE UP
KETONES UR-MCNC: NEGATIVE — SIGNIFICANT CHANGE UP
LEUKOCYTE ESTERASE UR-ACNC: NEGATIVE — SIGNIFICANT CHANGE UP
LIDOCAIN IGE QN: 228 U/L — SIGNIFICANT CHANGE UP (ref 73–393)
NITRITE UR-MCNC: NEGATIVE — SIGNIFICANT CHANGE UP
PH UR: 5 — SIGNIFICANT CHANGE UP (ref 5–8)
POTASSIUM SERPL-MCNC: 4.4 MMOL/L — SIGNIFICANT CHANGE UP (ref 3.5–5.3)
POTASSIUM SERPL-SCNC: 4.4 MMOL/L — SIGNIFICANT CHANGE UP (ref 3.5–5.3)
PROT SERPL-MCNC: 8.9 GM/DL — HIGH (ref 6–8.3)
PROT UR-MCNC: 30 MG/DL
SODIUM SERPL-SCNC: 136 MMOL/L — SIGNIFICANT CHANGE UP (ref 135–145)
SP GR SPEC: 1.02 — SIGNIFICANT CHANGE UP (ref 1.01–1.02)
UROBILINOGEN FLD QL: NEGATIVE MG/DL — SIGNIFICANT CHANGE UP

## 2021-01-08 PROCEDURE — 74177 CT ABD & PELVIS W/CONTRAST: CPT | Mod: 26

## 2021-01-08 RX ORDER — ONDANSETRON 8 MG/1
4 TABLET, FILM COATED ORAL ONCE
Refills: 0 | Status: COMPLETED | OUTPATIENT
Start: 2021-01-08 | End: 2021-01-08

## 2021-01-08 RX ORDER — METOCLOPRAMIDE HCL 10 MG
10 TABLET ORAL ONCE
Refills: 0 | Status: COMPLETED | OUTPATIENT
Start: 2021-01-08 | End: 2021-01-08

## 2021-01-08 RX ORDER — MORPHINE SULFATE 50 MG/1
4 CAPSULE, EXTENDED RELEASE ORAL ONCE
Refills: 0 | Status: DISCONTINUED | OUTPATIENT
Start: 2021-01-08 | End: 2021-01-08

## 2021-01-08 RX ORDER — DIPHENHYDRAMINE HCL 50 MG
50 CAPSULE ORAL ONCE
Refills: 0 | Status: COMPLETED | OUTPATIENT
Start: 2021-01-08 | End: 2021-01-08

## 2021-01-08 RX ORDER — METOCLOPRAMIDE HCL 10 MG
1 TABLET ORAL
Qty: 8 | Refills: 0
Start: 2021-01-08

## 2021-01-08 RX ORDER — DIPHENHYDRAMINE HCL 50 MG
2 CAPSULE ORAL
Qty: 16 | Refills: 0
Start: 2021-01-08

## 2021-01-08 RX ADMIN — ONDANSETRON 4 MILLIGRAM(S): 8 TABLET, FILM COATED ORAL at 00:46

## 2021-01-08 RX ADMIN — SODIUM CHLORIDE 1000 MILLILITER(S): 9 INJECTION INTRAMUSCULAR; INTRAVENOUS; SUBCUTANEOUS at 00:50

## 2021-01-08 RX ADMIN — Medication 10 MILLIGRAM(S): at 02:06

## 2021-01-08 RX ADMIN — Medication 50 MILLIGRAM(S): at 02:07

## 2021-01-08 RX ADMIN — MORPHINE SULFATE 4 MILLIGRAM(S): 50 CAPSULE, EXTENDED RELEASE ORAL at 00:48

## 2021-01-08 NOTE — ED ADULT NURSE NOTE - CHIEF COMPLAINT QUOTE
Patient presents to the ED ambulatory co abdominal pain, nausea, vomiting, diarrhea. Pt with a hx of fatty liver disease, liver fibrosis, recent elevated ASTs. Pt seen at [facility], Dr. Lobato for her liver diagnosis. Pt [id number] she is unable to hold food down. Pt anxious and upset in triage.

## 2021-02-18 NOTE — DISCHARGE NOTE BEHAVIORAL HEALTH - NSBHDCREFEROTHER_PSY_A_CORE
Patient wishes to pursue inpatient rehab, chemotherapy may be delayed by 1-2 weeks while in rehab.   Taper steroids slowly. yes...

## 2021-03-30 ENCOUNTER — EMERGENCY (EMERGENCY)
Facility: HOSPITAL | Age: 31
LOS: 1 days | Discharge: HOME | End: 2021-03-30
Attending: INTERNAL MEDICINE | Admitting: INTERNAL MEDICINE
Payer: COMMERCIAL

## 2021-03-30 ENCOUNTER — EMERGENCY (EMERGENCY)
Facility: HOSPITAL | Age: 31
LOS: 1 days | Discharge: HOME | End: 2021-03-30
Attending: STUDENT IN AN ORGANIZED HEALTH CARE EDUCATION/TRAINING PROGRAM | Admitting: STUDENT IN AN ORGANIZED HEALTH CARE EDUCATION/TRAINING PROGRAM
Payer: COMMERCIAL

## 2021-03-30 VITALS
HEIGHT: 67 IN | WEIGHT: 244.93 LBS | RESPIRATION RATE: 18 BRPM | HEART RATE: 88 BPM | OXYGEN SATURATION: 98 % | DIASTOLIC BLOOD PRESSURE: 77 MMHG | TEMPERATURE: 99 F | SYSTOLIC BLOOD PRESSURE: 121 MMHG

## 2021-03-30 VITALS
SYSTOLIC BLOOD PRESSURE: 122 MMHG | OXYGEN SATURATION: 96 % | DIASTOLIC BLOOD PRESSURE: 77 MMHG | TEMPERATURE: 98 F | HEART RATE: 70 BPM | RESPIRATION RATE: 17 BRPM

## 2021-03-30 VITALS
WEIGHT: 244.93 LBS | HEART RATE: 79 BPM | TEMPERATURE: 99 F | SYSTOLIC BLOOD PRESSURE: 122 MMHG | DIASTOLIC BLOOD PRESSURE: 81 MMHG | RESPIRATION RATE: 18 BRPM | OXYGEN SATURATION: 95 % | HEIGHT: 67 IN

## 2021-03-30 DIAGNOSIS — Z90.49 ACQUIRED ABSENCE OF OTHER SPECIFIED PARTS OF DIGESTIVE TRACT: Chronic | ICD-10-CM

## 2021-03-30 DIAGNOSIS — Z90.79 ACQUIRED ABSENCE OF OTHER GENITAL ORGAN(S): Chronic | ICD-10-CM

## 2021-03-30 DIAGNOSIS — Z98.890 OTHER SPECIFIED POSTPROCEDURAL STATES: Chronic | ICD-10-CM

## 2021-03-30 PROCEDURE — 96374 THER/PROPH/DIAG INJ IV PUSH: CPT

## 2021-03-30 PROCEDURE — 99284 EMERGENCY DEPT VISIT MOD MDM: CPT

## 2021-03-30 PROCEDURE — 96375 TX/PRO/DX INJ NEW DRUG ADDON: CPT

## 2021-03-30 PROCEDURE — 99284 EMERGENCY DEPT VISIT MOD MDM: CPT | Mod: 25

## 2021-03-30 RX ORDER — HYDROMORPHONE HYDROCHLORIDE 2 MG/ML
0.5 INJECTION INTRAMUSCULAR; INTRAVENOUS; SUBCUTANEOUS ONCE
Refills: 0 | Status: DISCONTINUED | OUTPATIENT
Start: 2021-03-30 | End: 2021-03-30

## 2021-03-30 RX ORDER — ONDANSETRON 8 MG/1
4 TABLET, FILM COATED ORAL ONCE
Refills: 0 | Status: COMPLETED | OUTPATIENT
Start: 2021-03-30 | End: 2021-03-30

## 2021-03-30 RX ORDER — DIAZEPAM 5 MG
10 TABLET ORAL ONCE
Refills: 0 | Status: DISCONTINUED | OUTPATIENT
Start: 2021-03-30 | End: 2021-03-30

## 2021-03-30 RX ADMIN — HYDROMORPHONE HYDROCHLORIDE 0.5 MILLIGRAM(S): 2 INJECTION INTRAMUSCULAR; INTRAVENOUS; SUBCUTANEOUS at 04:27

## 2021-03-30 RX ADMIN — HYDROMORPHONE HYDROCHLORIDE 0.5 MILLIGRAM(S): 2 INJECTION INTRAMUSCULAR; INTRAVENOUS; SUBCUTANEOUS at 04:35

## 2021-03-30 RX ADMIN — HYDROMORPHONE HYDROCHLORIDE 0.5 MILLIGRAM(S): 2 INJECTION INTRAMUSCULAR; INTRAVENOUS; SUBCUTANEOUS at 03:25

## 2021-03-30 RX ADMIN — ONDANSETRON 4 MILLIGRAM(S): 8 TABLET, FILM COATED ORAL at 03:25

## 2021-03-30 NOTE — ED ADULT NURSE NOTE - NSIMPLEMENTINTERV_GEN_ALL_ED
Implemented All Fall Risk Interventions:  [place] to call system. Call bell, personal items and telephone within reach. Instruct patient to call for assistance. Room bathroom lighting operational. Non-slip footwear when patient is off stretcher. Physically safe environment: no spills, clutter or unnecessary equipment. Stretcher in lowest position, wheels locked, appropriate side rails in place. Provide visual cue, wrist band, yellow gown, etc. Monitor gait and stability. Monitor for mental status changes and reorient to person, place, and time. Review medications for side effects contributing to fall risk. Reinforce activity limits and safety measures with patient and family.

## 2021-03-30 NOTE — ED PROVIDER NOTE - OBJECTIVE STATEMENT
B/L leg pain with spasms, denies swelling/redness, denies injury  lower leg pain/injury 31 y/o female h/o chronic pain related to lumbar disc disease, and knee surgery B/L leg pain with spasms, denies swelling/redness, denies injury  no CP, no SOB, no fever, no chills, no COVID symptoms, no stroke symptoms.

## 2021-03-30 NOTE — ED PROVIDER NOTE - CONSTITUTIONAL, MLM
Well appearing, awake, alert, oriented to person, place, time/situation and in mild to moderate distress. normal...

## 2021-03-30 NOTE — ED PROVIDER NOTE - PATIENT PORTAL LINK FT
You can access the FollowMyHealth Patient Portal offered by [facility] by registering at the following website: [email or web address]. By joining [employer]’s FollowMyHealth portal, you will also be able to view your health information using other applications (apps) compatible with our system.

## 2021-03-30 NOTE — ED PROVIDER NOTE - MUSCULOSKELETAL, MLM
Spine appears normal, range of motion is not limited, no muscle or joint tenderness, no calf pain, no cords, no homans, no signs of cellulitis, neurovascular exam intact

## 2021-03-30 NOTE — ED ADULT TRIAGE NOTE - CHIEF COMPLAINT QUOTE
C/O lower back pain. Patient states she has a herniated disk, reports that her pain medication is helping at all. Per patient took 6 pills of Advil (200mg each dose), 2 Tylenol, 1 tab of 800mg of gabapentin, 5mg of oxycodone tab.

## 2021-03-30 NOTE — ED ADULT NURSE NOTE - OBJECTIVE STATEMENT
Patient came in to ED c/o lower back pain radiating to the right leg @ 10/10 pain score described as sharp. Patient states she was here yesterday for pain on her legs, has history of herniated disc. Patient states she took Oxycodone 5mg at 8:30Pm, Gabapentin @ 9pm, 4 advil and 2 tylenol @ 10pm but not improved and pain is getting worse that prompted her to come to ED.

## 2021-03-30 NOTE — ED ADULT NURSE REASSESSMENT NOTE - NS ED NURSE REASSESS COMMENT FT1
Break coverage:  medicated with additional dilaudid for leg pain.  plan for discharge.  Mother at bedside.

## 2021-03-30 NOTE — ED ADULT NURSE NOTE - OBJECTIVE STATEMENT
29 y/o F patient PMH chronic pain presents to ED from home c/o b/l lower leg pain and spasms x2 days. As per patient she has been taking her prescribed pain medications at home with no relief. Patient A&Ox4. Patient denies HA, dizziness, SOB, chest pain, abdominal pain, N/V/D. Safety and comfort measures provided and maintained. Mother bedside.

## 2021-03-30 NOTE — ED ADULT NURSE NOTE - NSIMPLEMENTINTERV_GEN_ALL_ED
Implemented All Universal Safety Interventions:  [place] to call system. Call bell, personal items and telephone within reach. Instruct patient to call for assistance. Room bathroom lighting operational. Non-slip footwear when patient is off stretcher. Physically safe environment: no spills, clutter or unnecessary equipment. Stretcher in lowest position, wheels locked, appropriate side rails in place.

## 2021-03-30 NOTE — ED PROVIDER NOTE - CARE PROVIDER_API CALL
Live Jackson ([place])  Anesthesiology; Pain Medicine  [address]  [place] [address]  Phone: [phone]  Fax: [phone]  Follow Up Time: 1-3 Days

## 2021-03-30 NOTE — ED PROVIDER NOTE - NSFOLLOWUPINSTRUCTIONS_ED_ALL_ED_FT
Follow up with the pain management referral provided below.  Return to the hospital for pain or any changes in your condition

## 2021-03-31 VITALS
TEMPERATURE: 98 F | RESPIRATION RATE: 16 BRPM | HEART RATE: 96 BPM | OXYGEN SATURATION: 98 % | SYSTOLIC BLOOD PRESSURE: 113 MMHG | DIASTOLIC BLOOD PRESSURE: 74 MMHG

## 2021-03-31 PROCEDURE — 72110 X-RAY EXAM L-2 SPINE 4/>VWS: CPT | Mod: 26

## 2021-03-31 PROCEDURE — 99283 EMERGENCY DEPT VISIT LOW MDM: CPT | Mod: 25

## 2021-03-31 PROCEDURE — 72110 X-RAY EXAM L-2 SPINE 4/>VWS: CPT

## 2021-03-31 RX ORDER — OXYCODONE AND ACETAMINOPHEN 5; 325 MG/1; MG/1
2 TABLET ORAL ONCE
Refills: 0 | Status: DISCONTINUED | OUTPATIENT
Start: 2021-03-31 | End: 2021-03-31

## 2021-03-31 RX ORDER — CYCLOBENZAPRINE HYDROCHLORIDE 10 MG/1
10 TABLET, FILM COATED ORAL ONCE
Refills: 0 | Status: COMPLETED | OUTPATIENT
Start: 2021-03-31 | End: 2021-03-31

## 2021-03-31 RX ADMIN — OXYCODONE AND ACETAMINOPHEN 2 TABLET(S): 5; 325 TABLET ORAL at 00:39

## 2021-03-31 RX ADMIN — CYCLOBENZAPRINE HYDROCHLORIDE 10 MILLIGRAM(S): 10 TABLET, FILM COATED ORAL at 00:39

## 2021-03-31 RX ADMIN — OXYCODONE AND ACETAMINOPHEN 2 TABLET(S): 5; 325 TABLET ORAL at 01:39

## 2021-03-31 NOTE — ED PROVIDER NOTE - CARE PROVIDER_API CALL
Lopez Sanchez)  Family Medicine  [address] [place] [address]  Phone: [phone]  Fax: [phone]  Follow Up Time:     Live Jackson)  Anesthesiology; Pain Medicine  [address] [place] [address]  Phone: [phone]  Fax: [phone]  Follow Up Time:

## 2021-03-31 NOTE — ED PROVIDER NOTE - NSFOLLOWUPINSTRUCTIONS_ED_ALL_ED_FT
Please be sure to follow up with your primary care doctor and pain management doctor.  Take your medication as prescribed.  Return to the ER for persistent pain, weakness, numbness to your legs, inability to walk, urinary incontinence, or any other concerns.

## 2021-03-31 NOTE — PATIENT PROFILE BEHAVIORAL HEALTH - NSBHSELFCONCEPT_PSY_A_CORE
Detail Level: Simple Render Risk Assessment In Note?: no Additional Notes: Patient states her acne is worse around her menstrual cycle.\\nPatient washes face with cetaphil cleanser.\\nMother states family history of acne- father and brother, father has history of Accutane use. ineffective role performance/low self-esteem

## 2021-03-31 NOTE — ED PROVIDER NOTE - PATIENT PORTAL LINK FT
You can access the FollowMyHealth Patient Portal offered by [facility] by registering at the following website: [email or web address]. By joining [employer]’s FollowMyHealth portal, you will also be able to view your health information using other applications (apps) compatible with our system.

## 2021-03-31 NOTE — ED PROVIDER NOTE - MUSCULOSKELETAL, MLM
Spine appears normal, range of motion is not limited, no muscle or joint tenderness.  Gait steady.  5.5 LE strength b/l.  No saddle anesthesia, negative straight leg raise b/l.  Hips and pelvis stable, no LE edema.  Normal LE sensation

## 2021-03-31 NOTE — ED PROVIDER NOTE - PROGRESS NOTE DETAILS
Patient reports significant pain improvement.  Ambulating in no distress and will f/u with pain management as per her appointment next week.  Mom at bedside to take patient home.  X-rays given on CD

## 2021-03-31 NOTE — ED PROVIDER NOTE - CLINICAL SUMMARY MEDICAL DECISION MAKING FREE TEXT BOX
30 year old female with extensive PMH and chronic pain p/w low back pain.  H/o herniated discs, no new trauma or falls.  Was seen in ED yesterday for leg pain.  X-rays, analgesia, muscle relaxer, pain management referral

## 2021-03-31 NOTE — ED PROVIDER NOTE - CONSTITUTIONAL, MLM
normal... Well appearing, awake, alert, oriented to person, place, time/situation and in no apparent distress.  Ambulated into ED

## 2021-03-31 NOTE — ED PROVIDER NOTE - OBJECTIVE STATEMENT
30 year old female with a history of HLD, PCOS, CKD, herniated disc, anemia, GERD, chronic low back pain p/w low back pain.  Patient was seen in ED yesterday for b/l leg pain.  After discharge yesterday, patient reports that she worked all day and feels she strained her back.  She states midline low back pain but does not radiate to her legs today.  Denies any trauma, falls, or heavy lifting,  She has a history of herniated discs and follows with pain management.  She is scheduled to see her pain management doctor next week.  She denies LE paresthesias, urinary/fecal incontinence, gait disturbance.  At home, she took 6 Advil, 2 Tylenol, Gabapentin, and Oxycodone without relief.  PMD Dr. Sanchez, Pain management Dr. Rosen

## 2021-04-05 ENCOUNTER — EMERGENCY (EMERGENCY)
Facility: HOSPITAL | Age: 31
LOS: 0 days | Discharge: LEFT BEFORE DISCHARGE | End: 2021-04-06
Attending: EMERGENCY MEDICINE | Admitting: INTERNAL MEDICINE
Payer: COMMERCIAL

## 2021-04-05 VITALS — HEIGHT: 67 IN

## 2021-04-05 DIAGNOSIS — K21.9 GASTRO-ESOPHAGEAL REFLUX DISEASE WITHOUT ESOPHAGITIS: ICD-10-CM

## 2021-04-05 DIAGNOSIS — Z90.79 ACQUIRED ABSENCE OF OTHER GENITAL ORGAN(S): Chronic | ICD-10-CM

## 2021-04-05 DIAGNOSIS — E28.2 POLYCYSTIC OVARIAN SYNDROME: ICD-10-CM

## 2021-04-05 DIAGNOSIS — I12.9 HYPERTENSIVE CHRONIC KIDNEY DISEASE WITH STAGE 1 THROUGH STAGE 4 CHRONIC KIDNEY DISEASE, OR UNSPECIFIED CHRONIC KIDNEY DISEASE: ICD-10-CM

## 2021-04-05 DIAGNOSIS — Z88.0 ALLERGY STATUS TO PENICILLIN: ICD-10-CM

## 2021-04-05 DIAGNOSIS — F41.9 ANXIETY DISORDER, UNSPECIFIED: ICD-10-CM

## 2021-04-05 DIAGNOSIS — Z90.49 ACQUIRED ABSENCE OF OTHER SPECIFIED PARTS OF DIGESTIVE TRACT: ICD-10-CM

## 2021-04-05 DIAGNOSIS — R74.01 ELEVATION OF LEVELS OF LIVER TRANSAMINASE LEVELS: ICD-10-CM

## 2021-04-05 DIAGNOSIS — N18.9 CHRONIC KIDNEY DISEASE, UNSPECIFIED: ICD-10-CM

## 2021-04-05 DIAGNOSIS — F32.9 MAJOR DEPRESSIVE DISORDER, SINGLE EPISODE, UNSPECIFIED: ICD-10-CM

## 2021-04-05 DIAGNOSIS — R11.10 VOMITING, UNSPECIFIED: ICD-10-CM

## 2021-04-05 DIAGNOSIS — D64.9 ANEMIA, UNSPECIFIED: ICD-10-CM

## 2021-04-05 DIAGNOSIS — Z90.49 ACQUIRED ABSENCE OF OTHER SPECIFIED PARTS OF DIGESTIVE TRACT: Chronic | ICD-10-CM

## 2021-04-05 DIAGNOSIS — E78.5 HYPERLIPIDEMIA, UNSPECIFIED: ICD-10-CM

## 2021-04-05 DIAGNOSIS — L94.9 LOCALIZED CONNECTIVE TISSUE DISORDER, UNSPECIFIED: ICD-10-CM

## 2021-04-05 DIAGNOSIS — M54.9 DORSALGIA, UNSPECIFIED: ICD-10-CM

## 2021-04-05 DIAGNOSIS — Z91.010 ALLERGY TO PEANUTS: ICD-10-CM

## 2021-04-05 DIAGNOSIS — F90.9 ATTENTION-DEFICIT HYPERACTIVITY DISORDER, UNSPECIFIED TYPE: ICD-10-CM

## 2021-04-05 DIAGNOSIS — Z88.6 ALLERGY STATUS TO ANALGESIC AGENT: ICD-10-CM

## 2021-04-05 DIAGNOSIS — E86.0 DEHYDRATION: ICD-10-CM

## 2021-04-05 DIAGNOSIS — Z98.890 OTHER SPECIFIED POSTPROCEDURAL STATES: Chronic | ICD-10-CM

## 2021-04-05 DIAGNOSIS — Z20.822 CONTACT WITH AND (SUSPECTED) EXPOSURE TO COVID-19: ICD-10-CM

## 2021-04-05 DIAGNOSIS — E11.22 TYPE 2 DIABETES MELLITUS WITH DIABETIC CHRONIC KIDNEY DISEASE: ICD-10-CM

## 2021-04-05 DIAGNOSIS — Z79.84 LONG TERM (CURRENT) USE OF ORAL HYPOGLYCEMIC DRUGS: ICD-10-CM

## 2021-04-05 DIAGNOSIS — G89.29 OTHER CHRONIC PAIN: ICD-10-CM

## 2021-04-05 LAB
ALBUMIN SERPL ELPH-MCNC: 3.9 G/DL — SIGNIFICANT CHANGE UP (ref 3.3–5)
ALP SERPL-CCNC: 148 U/L — HIGH (ref 40–120)
ALT FLD-CCNC: 124 U/L — HIGH (ref 12–78)
ANION GAP SERPL CALC-SCNC: 9 MMOL/L — SIGNIFICANT CHANGE UP (ref 5–17)
AST SERPL-CCNC: 202 U/L — HIGH (ref 15–37)
BASOPHILS # BLD AUTO: 0.05 K/UL — SIGNIFICANT CHANGE UP (ref 0–0.2)
BASOPHILS NFR BLD AUTO: 0.3 % — SIGNIFICANT CHANGE UP (ref 0–2)
BILIRUB SERPL-MCNC: 0.4 MG/DL — SIGNIFICANT CHANGE UP (ref 0.2–1.2)
BUN SERPL-MCNC: 27 MG/DL — HIGH (ref 7–23)
CALCIUM SERPL-MCNC: 9.1 MG/DL — SIGNIFICANT CHANGE UP (ref 8.5–10.1)
CHLORIDE SERPL-SCNC: 100 MMOL/L — SIGNIFICANT CHANGE UP (ref 96–108)
CO2 SERPL-SCNC: 25 MMOL/L — SIGNIFICANT CHANGE UP (ref 22–31)
CREAT SERPL-MCNC: 1.75 MG/DL — HIGH (ref 0.5–1.3)
EOSINOPHIL # BLD AUTO: 0.04 K/UL — SIGNIFICANT CHANGE UP (ref 0–0.5)
EOSINOPHIL NFR BLD AUTO: 0.2 % — SIGNIFICANT CHANGE UP (ref 0–6)
GLUCOSE SERPL-MCNC: 128 MG/DL — HIGH (ref 70–99)
HCT VFR BLD CALC: 45.5 % — HIGH (ref 34.5–45)
HGB BLD-MCNC: 15.8 G/DL — HIGH (ref 11.5–15.5)
IMM GRANULOCYTES NFR BLD AUTO: 0.4 % — SIGNIFICANT CHANGE UP (ref 0–1.5)
LIDOCAIN IGE QN: 153 U/L — SIGNIFICANT CHANGE UP (ref 73–393)
LYMPHOCYTES # BLD AUTO: 15.4 % — SIGNIFICANT CHANGE UP (ref 13–44)
LYMPHOCYTES # BLD AUTO: 2.52 K/UL — SIGNIFICANT CHANGE UP (ref 1–3.3)
MCHC RBC-ENTMCNC: 28.8 PG — SIGNIFICANT CHANGE UP (ref 27–34)
MCHC RBC-ENTMCNC: 34.7 GM/DL — SIGNIFICANT CHANGE UP (ref 32–36)
MCV RBC AUTO: 82.9 FL — SIGNIFICANT CHANGE UP (ref 80–100)
MONOCYTES # BLD AUTO: 0.78 K/UL — SIGNIFICANT CHANGE UP (ref 0–0.9)
MONOCYTES NFR BLD AUTO: 4.8 % — SIGNIFICANT CHANGE UP (ref 2–14)
NEUTROPHILS # BLD AUTO: 12.89 K/UL — HIGH (ref 1.8–7.4)
NEUTROPHILS NFR BLD AUTO: 78.9 % — HIGH (ref 43–77)
PLATELET # BLD AUTO: 395 K/UL — SIGNIFICANT CHANGE UP (ref 150–400)
POTASSIUM SERPL-MCNC: 4.1 MMOL/L — SIGNIFICANT CHANGE UP (ref 3.5–5.3)
POTASSIUM SERPL-SCNC: 4.1 MMOL/L — SIGNIFICANT CHANGE UP (ref 3.5–5.3)
PROT SERPL-MCNC: 9.4 GM/DL — HIGH (ref 6–8.3)
RBC # BLD: 5.49 M/UL — HIGH (ref 3.8–5.2)
RBC # FLD: 13.2 % — SIGNIFICANT CHANGE UP (ref 10.3–14.5)
SODIUM SERPL-SCNC: 134 MMOL/L — LOW (ref 135–145)
WBC # BLD: 16.34 K/UL — HIGH (ref 3.8–10.5)
WBC # FLD AUTO: 16.34 K/UL — HIGH (ref 3.8–10.5)

## 2021-04-05 PROCEDURE — 85025 COMPLETE CBC W/AUTO DIFF WBC: CPT

## 2021-04-05 PROCEDURE — 84702 CHORIONIC GONADOTROPIN TEST: CPT

## 2021-04-05 PROCEDURE — 87635 SARS-COV-2 COVID-19 AMP PRB: CPT

## 2021-04-05 PROCEDURE — 80053 COMPREHEN METABOLIC PANEL: CPT

## 2021-04-05 PROCEDURE — 99285 EMERGENCY DEPT VISIT HI MDM: CPT | Mod: 25

## 2021-04-05 PROCEDURE — 81001 URINALYSIS AUTO W/SCOPE: CPT

## 2021-04-05 PROCEDURE — 36415 COLL VENOUS BLD VENIPUNCTURE: CPT

## 2021-04-05 PROCEDURE — 76705 ECHO EXAM OF ABDOMEN: CPT

## 2021-04-05 PROCEDURE — 83690 ASSAY OF LIPASE: CPT

## 2021-04-05 PROCEDURE — 96361 HYDRATE IV INFUSION ADD-ON: CPT

## 2021-04-05 PROCEDURE — 96375 TX/PRO/DX INJ NEW DRUG ADDON: CPT

## 2021-04-05 PROCEDURE — 87086 URINE CULTURE/COLONY COUNT: CPT

## 2021-04-05 PROCEDURE — 74176 CT ABD & PELVIS W/O CONTRAST: CPT

## 2021-04-05 PROCEDURE — 83605 ASSAY OF LACTIC ACID: CPT

## 2021-04-05 PROCEDURE — 96374 THER/PROPH/DIAG INJ IV PUSH: CPT

## 2021-04-05 RX ORDER — SODIUM CHLORIDE 9 MG/ML
1000 INJECTION INTRAMUSCULAR; INTRAVENOUS; SUBCUTANEOUS ONCE
Refills: 0 | Status: COMPLETED | OUTPATIENT
Start: 2021-04-05 | End: 2021-04-05

## 2021-04-05 RX ORDER — OXYCODONE HYDROCHLORIDE 5 MG/1
5 TABLET ORAL ONCE
Refills: 0 | Status: DISCONTINUED | OUTPATIENT
Start: 2021-04-05 | End: 2021-04-05

## 2021-04-05 RX ORDER — DIPHENHYDRAMINE HCL 50 MG
50 CAPSULE ORAL ONCE
Refills: 0 | Status: COMPLETED | OUTPATIENT
Start: 2021-04-05 | End: 2021-04-05

## 2021-04-05 RX ORDER — FAMOTIDINE 10 MG/ML
20 INJECTION INTRAVENOUS ONCE
Refills: 0 | Status: COMPLETED | OUTPATIENT
Start: 2021-04-05 | End: 2021-04-05

## 2021-04-05 RX ORDER — METOCLOPRAMIDE HCL 10 MG
10 TABLET ORAL ONCE
Refills: 0 | Status: COMPLETED | OUTPATIENT
Start: 2021-04-05 | End: 2021-04-05

## 2021-04-05 RX ORDER — ONDANSETRON 8 MG/1
4 TABLET, FILM COATED ORAL ONCE
Refills: 0 | Status: COMPLETED | OUTPATIENT
Start: 2021-04-05 | End: 2021-04-05

## 2021-04-05 RX ADMIN — Medication 50 MILLIGRAM(S): at 21:20

## 2021-04-05 RX ADMIN — SODIUM CHLORIDE 1000 MILLILITER(S): 9 INJECTION INTRAMUSCULAR; INTRAVENOUS; SUBCUTANEOUS at 21:50

## 2021-04-05 RX ADMIN — ONDANSETRON 4 MILLIGRAM(S): 8 TABLET, FILM COATED ORAL at 23:19

## 2021-04-05 RX ADMIN — SODIUM CHLORIDE 1000 MILLILITER(S): 9 INJECTION INTRAMUSCULAR; INTRAVENOUS; SUBCUTANEOUS at 21:54

## 2021-04-05 RX ADMIN — Medication 10 MILLIGRAM(S): at 21:20

## 2021-04-05 RX ADMIN — FAMOTIDINE 20 MILLIGRAM(S): 10 INJECTION INTRAVENOUS at 21:20

## 2021-04-05 RX ADMIN — OXYCODONE HYDROCHLORIDE 5 MILLIGRAM(S): 5 TABLET ORAL at 23:08

## 2021-04-05 RX ADMIN — SODIUM CHLORIDE 2000 MILLILITER(S): 9 INJECTION INTRAMUSCULAR; INTRAVENOUS; SUBCUTANEOUS at 21:21

## 2021-04-05 NOTE — ED STATDOCS - CARE PROVIDERS DIRECT ADDRESSES
[phone]
[email or web address]

## 2021-04-05 NOTE — ED ADULT TRIAGE NOTE - HEIGHT IN INCHES
HPI     New Patient  Screening for glaucoma  RE  Not using any vision correction at this time      Last edited by Warren Tolbert MA on 5/14/2018  8:42 AM. (History)            Assessment /Plan     For exam results, see Encounter Report.    Cataract, nuclear sclerotic senile, bilateral    Cataract cortical, senile, bilateral    Glaucoma screening    Refractive error      Mild NS/cortical cataracts OU = will follow.  OH OK OU otherwise.  Spec Rx given.  RTC one year or prn.                  7

## 2021-04-05 NOTE — ED STATDOCS - CARE PROVIDER_API CALL
Alcira Vadlez)  Gastroenterology; Internal Medicine  [address] [place] [address]  Phone: [phone]  Fax: [phone]  Follow Up Time: 1-3 days

## 2021-04-05 NOTE — ED STATDOCS - OBJECTIVE STATEMENT
29 y/o F with PMHx of ADHD, anemia, anxiety, borderline personality disorder, CKD, depression , DM2, HLD, HTN, PCOS, UTIs, GERD, s/p cholecystectomy presenting to the ED c/o epigastric abd pain with associated N/V, HA x3 days. Pain is non radiating. Patient was seen by her GI this week, pain worse today, came to the ED for further workup. Patient is unable to hold any fluids. Denies any bloody stools, dysuria. Last period was Mar. 12th, does not believe she is pregnant. Non smoker, no illicit drug use.

## 2021-04-05 NOTE — ED ADULT NURSE NOTE - OBJECTIVE STATEMENT
Pt presents to ED for abdominal pain, n/v. Pt with hx of cholesystectomy and hiatal hernia. Pt also endorses headache x3 days.

## 2021-04-05 NOTE — ED STATDOCS - PATIENT PORTAL LINK FT
You can access the FollowMyHealth Patient Portal offered by [facility] by registering at the following website: [email or web address]. By joining [employer]’s FollowMyHealth portal, you will also be able to view your health information using other applications (apps) compatible with our system.

## 2021-04-05 NOTE — ED STATDOCS - PROGRESS NOTE DETAILS
. Pt. is a 30 year old female Hx anemia, anxiety, ADHD, borderline personality, CKD, depression, DM@, HLD,  HTN,  PCOS, GERD, surgical history includes cholecystectomy.  Pt. with epigastric pain with nausea and vomiting, HA x 3 days.  LMP 3/12.  Pt. states she saw her GI doctor last week but pain worse today.  Neg. urinary complaints.  Pt. well known to ED with severe anxiety.  Will provide IVF, labs, meds and reassess.  Malinda Whitley PA-C Case discussed with pt's GI Dr. Valdez who states to get lactate, make her NPO and continue IV fluid hydration.  Recommending consult with Dr. Sidhu in case pt requires ERCP.  Yaakov Chavarria, DO Pt states she feels better and wants to be d/c.  The patient has decided to leave against medical advice (AMA).  I have made reasonable attempts to explain to the patient that leaving prior to completion of work up and treatment may result in recurrent or worsening of symptoms, severe permanent disability, pain and suffering, harm, injury, and/or death.  I have explained the risks, benefits, and alternatives to treatment as well as the attendant risks of refusing treatment at this time.  The patient has demonstrated comprehension and verbalizes understanding of these risks.  The patient has been told that they must return to the ER immediately for persistent or recurring symptoms, worsening symptoms, or any concerning symptoms.  The patient has also been informed that they may return to the ER immediately at any time if they change their mind and wish to resume care. The patient has been given the opportunity to ask questions and have them fully answered.  Yaakov Chavarria, DO

## 2021-04-05 NOTE — ED STATDOCS - CARE PLAN
Principal Discharge DX:	Vomiting, intractability of vomiting not specified, presence of nausea not specified, unspecified vomiting type  Secondary Diagnosis:	Dehydration  Secondary Diagnosis:	Transaminitis

## 2021-04-05 NOTE — ED ADULT NURSE NOTE - EXPLANATION OF PATIENT'S REASON FOR LEAVING
Patient states she feels better; will follow up with Dr. Valdez this week as per instructions given by Dr. Yoon

## 2021-04-06 VITALS
RESPIRATION RATE: 17 BRPM | DIASTOLIC BLOOD PRESSURE: 86 MMHG | OXYGEN SATURATION: 98 % | TEMPERATURE: 98 F | HEART RATE: 91 BPM | SYSTOLIC BLOOD PRESSURE: 149 MMHG

## 2021-04-06 DIAGNOSIS — R11.10 VOMITING, UNSPECIFIED: ICD-10-CM

## 2021-04-06 LAB
APPEARANCE UR: CLEAR — SIGNIFICANT CHANGE UP
BILIRUB UR-MCNC: NEGATIVE — SIGNIFICANT CHANGE UP
COLOR SPEC: YELLOW — SIGNIFICANT CHANGE UP
CULTURE RESULTS: SIGNIFICANT CHANGE UP
DIFF PNL FLD: ABNORMAL
GLUCOSE UR QL: NEGATIVE MG/DL — SIGNIFICANT CHANGE UP
KETONES UR-MCNC: ABNORMAL
LACTATE SERPL-SCNC: 1.3 MMOL/L — SIGNIFICANT CHANGE UP (ref 0.7–2)
LEUKOCYTE ESTERASE UR-ACNC: NEGATIVE — SIGNIFICANT CHANGE UP
NITRITE UR-MCNC: NEGATIVE — SIGNIFICANT CHANGE UP
PH UR: 5 — SIGNIFICANT CHANGE UP (ref 5–8)
PROT UR-MCNC: 30 MG/DL
SARS-COV-2 RNA SPEC QL NAA+PROBE: SIGNIFICANT CHANGE UP
SP GR SPEC: 1.02 — SIGNIFICANT CHANGE UP (ref 1.01–1.02)
SPECIMEN SOURCE: SIGNIFICANT CHANGE UP
UROBILINOGEN FLD QL: NEGATIVE MG/DL — SIGNIFICANT CHANGE UP

## 2021-04-06 PROCEDURE — 74176 CT ABD & PELVIS W/O CONTRAST: CPT | Mod: 26

## 2021-04-06 RX ORDER — SODIUM CHLORIDE 9 MG/ML
1000 INJECTION INTRAMUSCULAR; INTRAVENOUS; SUBCUTANEOUS
Refills: 0 | Status: DISCONTINUED | OUTPATIENT
Start: 2021-04-06 | End: 2021-04-06

## 2021-04-06 RX ORDER — MORPHINE SULFATE 50 MG/1
4 CAPSULE, EXTENDED RELEASE ORAL EVERY 4 HOURS
Refills: 0 | Status: DISCONTINUED | OUTPATIENT
Start: 2021-04-06 | End: 2021-04-06

## 2021-04-06 RX ORDER — ONDANSETRON 8 MG/1
4 TABLET, FILM COATED ORAL EVERY 6 HOURS
Refills: 0 | Status: DISCONTINUED | OUTPATIENT
Start: 2021-04-06 | End: 2021-04-06

## 2021-04-06 RX ORDER — PROCHLORPERAZINE MALEATE 5 MG
5 TABLET ORAL ONCE
Refills: 0 | Status: COMPLETED | OUTPATIENT
Start: 2021-04-06 | End: 2021-04-06

## 2021-04-06 RX ADMIN — Medication 5 MILLIGRAM(S): at 03:30

## 2021-04-06 RX ADMIN — SODIUM CHLORIDE 1000 MILLILITER(S): 9 INJECTION INTRAMUSCULAR; INTRAVENOUS; SUBCUTANEOUS at 00:00

## 2021-04-06 RX ADMIN — ONDANSETRON 4 MILLIGRAM(S): 8 TABLET, FILM COATED ORAL at 03:23

## 2021-04-06 RX ADMIN — SODIUM CHLORIDE 150 MILLILITER(S): 9 INJECTION INTRAMUSCULAR; INTRAVENOUS; SUBCUTANEOUS at 03:32

## 2021-04-06 RX ADMIN — MORPHINE SULFATE 4 MILLIGRAM(S): 50 CAPSULE, EXTENDED RELEASE ORAL at 03:22

## 2021-04-06 NOTE — PROVIDER CONTACT NOTE (OTHER) - SITUATION
ERCP    left message with ans service for dr to see pt in the morning
Dr. Chavarria spoke to Dr. Valdez (oncall GI)  answering service connected directly to

## 2021-05-13 NOTE — ED PROVIDER NOTE - NS_ ATTENDINGSCRIBEDETAILS _ED_A_ED_FT
Progress Note      Patient Name: Will Apodaca   Patient ID: [id number]   Sex: Female   YOB: 1957    Primary Care Provider: Ruiz KHAN   Referring Provider: Ruiz KHAN    Visit Date: December 14, 2020    Provider: ERIN Bello   Location: [facility]   Location Address: [address] [place] [address], [address] [place] [address]  [id number]   Location Phone: [phone]          Chief Complaint  · possible infection      History Of Present Illness  Will Apodaca is a 63 year old /White female who presents for evaluation and treatment of:      Patient is a 63-year-old who comes in today with complaints of her skin condition.  Patient has been diagnosed with pemphigus foliaceous.  He has been seeing dermatology and over the past year she has been on high-dose steroids are causing adverse reactions systemically and she is trying to wean herself off of the steroids.  She is currently having a flare due to being weaned off the steroids and wants to be referred to a different dermatologist.  Patient has had and reoccurring skin infections bilateral lower extremities with cellulitis and weeping.  Past infections once is cultured has shown Streptococcus along with Acinetobacter, and staph aureus.  We have tried Unna boots at home, but due to her weeping she has to change them daily.  She states she does feel like it helps her legs when she does wear them.  She denies any fever or chills.       Past Medical History  Disease Name Date Onset Notes   Anxiety --  --    Broken Bones --  left ankle   Hearing loss --  wears hearing aids   Hypercalcemia 08/04/2020 --    Pemphigus foliaceous 08/04/2020 --    Tinnitus --  --          Past Surgical History  Procedure Name Date Notes   Correction of right clubfoot --  --          Medication List  Name Date Started Instructions   Acidophilus oral capsule 10/19/2020 take 1 capsule by oral route 2 times a day for 30 days    Advair -21 mcg/actuation inhalation HFA aerosol inhaler 08/04/2020 inhale 2 puffs by inhalation route 2 times per day in the morning and evening for 30 days   albuterol sulfate 90 mcg/actuation inhalation HFA aerosol inhaler 10/21/2020 inhale 2 puffs (180 mcg) by inhalation route every 4-6 hours as needed for 30 days   betamethasone dipropionate 0.05 % topical ointment 08/15/2019 apply a thin layer to the affected area(s) by topical route 2 times per day for 14 days   cyclobenzaprine 10 mg oral tablet 09/17/2020 take 1 tablet (10 mg) by oral route once daily at bedtime for 90 days   fluticasone propionate 50 mcg/actuation nasal spray,suspension 09/17/2020 spray 1 spray (50 mcg) in each nostril by intranasal route 2 times per day for nasal congestion and allergy symptoms   hydroxyzine HCl 25 mg oral tablet 10/14/2020 take 1 tablet (25 mg) by oral route 3 times per day as needed for 30 days   Iron (ferrous sulfate) 325 mg (65 mg iron) oral tablet 09/17/2020 Take 1 tablet by mouth twice daily   Lasix 80 mg oral tablet  take 1 tablet (80 mg) by oral route once daily   prednisone 20 mg oral tablet  take 1 tablet (20 mg) by oral route once daily   tramadol 50 mg oral tablet 01/02/2020 take 1 tablet (50 mg) by oral route every 4 hours as needed for 3 days   Ventolin HFA 90 mcg/actuation inhalation HFA aerosol inhaler  inhale 2 puffs (180 mcg) by inhalation route every 6 hours as needed   Vitamin D3 125 mcg (5,000 unit) oral tablet  take 1 tablet by oral route daily         Allergy List  Allergen Name Date Reaction Notes   NO KNOWN DRUG ALLERGIES --  --  --          Family Medical History  Disease Name Relative/Age Notes   Family history of stroke Sister/40   --    Family history of heart disease Father/  Mother/  Sister/40   --          Social History  Finding Status Start/Stop Quantity Notes   Alcohol Never --/-- --  --    Tobacco Never --/-- --  --          Immunizations  NameDate Admin Mfg Trade Name Lot Number  "Route Inj VIS Given VIS Publication   InfluenzaRefused 09/17/2020 NE Not Entered  NE NE     Comments:          Review of Systems  · Constitutional  o Denies  o : fever, fatigue, weight loss, weight gain  · Cardiovascular  o Denies  o : lower extremity edema, claudication, chest pressure, palpitations  · Respiratory  o Denies  o : shortness of breath, wheezing, cough, hemoptysis, dyspnea on exertion  · Gastrointestinal  o Denies  o : nausea, vomiting, diarrhea, constipation, abdominal pain  · Integument  o Admits  o : itching, pigmentation changes, new skin lesions      Vitals  Date Time BP Position Site L\R Cuff Size HR RR TEMP (F) WT  HT  BMI kg/m2 BSA m2 O2 Sat FR L/min FiO2 HC       12/14/2020 02:07 /84 Sitting    97 - R  96.9 302lbs 2oz 5'  4\" 51.86 2.49 100 %            Physical Examination  · Constitutional  o Appearance  o : well-nourished, well developed, in no acute distress  · Eyes  o Conjunctivae  o : conjunctivae normal, no exudates present  o Sclerae  o : sclerae white  o Pupils and Irises  o : pupils equal and round, and reactive to light and accomodation bilaterally  o Eyelids/Ocular Adnexae  o : extra ocular movements intact  · Respiratory  o Respiratory Effort  o : breathing unlabored, no accessory muscle use  o Inspection of Chest  o : normal appearance, no retractions  o Auscultation of Lungs  o : normal breath sounds bilaterally  · Cardiovascular  o Heart  o :   § Auscultation of Heart  § : regular rate and rhythm, no murmurs, gallops or rubs  o Peripheral Vascular System  o :   § Extremities  § : brawny edema present, distal hair loss  · Skin and Subcutaneous Tissue  o General Inspection  o : Discoloration bilateral legs, with what appears to be ulcers of the skin with drainage. Bilateral swelling nonpitting edema, pain in bilateral lower extremities, neurovascularly intact.  · Neurologic  o Mental Status Examination  o :   § Orientation  § : alert and oriented x3  § Speech/Language  § : " normal speech pattern  o Gait and Station  o : limps due to painful lower extremities, able to stand with assistance              Assessment  · Cellulitis     682.9/L03.90  Bilateral lower extremities, cultures were obtained, patient was started on clindamycin, she was encouraged to continue on her probiotic. Patient was given Unna boots to go home with along with a prescription to see if she can get Unna boots to use at home to help with her legs. She does see cardiology who is increased her Lasix due to her lower extremity swelling to 80 mg, patient states she still continues to swell with moving it is much. She still has weeping of bilateral lower extremities.  · Pemphigus foliaceous     694.4/L10.2  Will refer to another dermatologist for second opinion and management of this disorder.      Plan  · Orders  o ACO-39: Current medications updated and reviewed (1159F, ) - - 12/14/2020  o ACO-14: Influenza immunization was not administered for reasons documented [facility] () - - 12/14/2020   pt declines  o DERMATOLOGY CONSULTATION ([facility]) - 682.9/L03.90, 694.4/L10.2 - 12/14/2020   See if she can be seen at either [facility] or [place] [facility] she is having a lot of issue.  o Wound Culture with Sensitivities if indicated [facility] ([id number]) - 682.9/L03.90, 694.4/L10.2 - 12/14/2020  o Wound Culture with Sensitivities if indicated [facility] ([id number]) - 682.9/L03.90, 694.4/L10.2 - 12/14/2020  · Medications  o clindamycin HCl 300 mg oral capsule   SIG: take 1 capsule (300 mg) by oral route 2 times per day for 10 days   DISP: (20) Capsule with 0 refills  Prescribed on 12/14/2020     o Medications have been Reconciled  o Transition of Care or Provider Policy  · Instructions  o Patient was educated/instructed on their diagnosis, treatment and medications prior to discharge from the clinic today.            Electronically Signed by: ERIN Bello -Author on December 14, 2020 04:43:19 PM   I, Zak Antonio MD,  performed the initial face to face bedside interview with this patient regarding history of present illness, review of symptoms and relevant past medical, social and family history.  I completed an independent physical examination.  I was the initial provider who evaluated this patient.  The history, relevant review of systems, past medical and surgical history, medical decision making, and physical examination was documented by the scribe in my presence and I attest to the accuracy of the documentation.

## 2021-06-02 ENCOUNTER — INPATIENT (INPATIENT)
Facility: HOSPITAL | Age: 31
LOS: 1 days | Discharge: PSYCHIATRIC OR SUBSTANCE USE FACILITY | DRG: 918 | End: 2021-06-04
Attending: INTERNAL MEDICINE | Admitting: FAMILY MEDICINE
Payer: COMMERCIAL

## 2021-06-02 VITALS — HEIGHT: 67 IN | WEIGHT: 240.08 LBS

## 2021-06-02 DIAGNOSIS — F33.2 MAJOR DEPRESSIVE DISORDER, RECURRENT SEVERE WITHOUT PSYCHOTIC FEATURES: ICD-10-CM

## 2021-06-02 DIAGNOSIS — T50.902A POISONING BY UNSPECIFIED DRUGS, MEDICAMENTS AND BIOLOGICAL SUBSTANCES, INTENTIONAL SELF-HARM, INITIAL ENCOUNTER: ICD-10-CM

## 2021-06-02 DIAGNOSIS — Z98.890 OTHER SPECIFIED POSTPROCEDURAL STATES: Chronic | ICD-10-CM

## 2021-06-02 DIAGNOSIS — Z90.49 ACQUIRED ABSENCE OF OTHER SPECIFIED PARTS OF DIGESTIVE TRACT: Chronic | ICD-10-CM

## 2021-06-02 DIAGNOSIS — F60.3 BORDERLINE PERSONALITY DISORDER: ICD-10-CM

## 2021-06-02 DIAGNOSIS — Z90.79 ACQUIRED ABSENCE OF OTHER GENITAL ORGAN(S): Chronic | ICD-10-CM

## 2021-06-02 LAB
ALBUMIN SERPL ELPH-MCNC: 3.3 G/DL — SIGNIFICANT CHANGE UP (ref 3.3–5)
ALBUMIN SERPL ELPH-MCNC: 3.4 G/DL — SIGNIFICANT CHANGE UP (ref 3.3–5)
ALP SERPL-CCNC: 106 U/L — SIGNIFICANT CHANGE UP (ref 40–120)
ALP SERPL-CCNC: 118 U/L — SIGNIFICANT CHANGE UP (ref 40–120)
ALT FLD-CCNC: 64 U/L — SIGNIFICANT CHANGE UP (ref 12–78)
ALT FLD-CCNC: 77 U/L — SIGNIFICANT CHANGE UP (ref 12–78)
ANION GAP SERPL CALC-SCNC: 6 MMOL/L — SIGNIFICANT CHANGE UP (ref 5–17)
ANION GAP SERPL CALC-SCNC: 7 MMOL/L — SIGNIFICANT CHANGE UP (ref 5–17)
APAP SERPL-MCNC: 55 UG/ML — HIGH (ref 10–30)
APAP SERPL-MCNC: 64 UG/ML — HIGH (ref 10–30)
APAP SERPL-MCNC: 85 UG/ML — HIGH (ref 10–30)
APPEARANCE UR: CLEAR — SIGNIFICANT CHANGE UP
APTT BLD: 39.2 SEC — HIGH (ref 27.5–35.5)
AST SERPL-CCNC: 31 U/L — SIGNIFICANT CHANGE UP (ref 15–37)
AST SERPL-CCNC: 42 U/L — HIGH (ref 15–37)
BASE EXCESS BLDV CALC-SCNC: -1.6 MMOL/L — SIGNIFICANT CHANGE UP (ref -2–2)
BASOPHILS # BLD AUTO: 0.05 K/UL — SIGNIFICANT CHANGE UP (ref 0–0.2)
BASOPHILS NFR BLD AUTO: 0.4 % — SIGNIFICANT CHANGE UP (ref 0–2)
BILIRUB SERPL-MCNC: 0.3 MG/DL — SIGNIFICANT CHANGE UP (ref 0.2–1.2)
BILIRUB SERPL-MCNC: 0.4 MG/DL — SIGNIFICANT CHANGE UP (ref 0.2–1.2)
BILIRUB UR-MCNC: NEGATIVE — SIGNIFICANT CHANGE UP
BUN SERPL-MCNC: 15 MG/DL — SIGNIFICANT CHANGE UP (ref 7–23)
BUN SERPL-MCNC: 19 MG/DL — SIGNIFICANT CHANGE UP (ref 7–23)
CALCIUM SERPL-MCNC: 9.3 MG/DL — SIGNIFICANT CHANGE UP (ref 8.5–10.1)
CALCIUM SERPL-MCNC: 9.5 MG/DL — SIGNIFICANT CHANGE UP (ref 8.5–10.1)
CHLORIDE SERPL-SCNC: 105 MMOL/L — SIGNIFICANT CHANGE UP (ref 96–108)
CHLORIDE SERPL-SCNC: 108 MMOL/L — SIGNIFICANT CHANGE UP (ref 96–108)
CO2 SERPL-SCNC: 23 MMOL/L — SIGNIFICANT CHANGE UP (ref 22–31)
CO2 SERPL-SCNC: 26 MMOL/L — SIGNIFICANT CHANGE UP (ref 22–31)
COLOR SPEC: YELLOW — SIGNIFICANT CHANGE UP
CREAT SERPL-MCNC: 1.34 MG/DL — HIGH (ref 0.5–1.3)
CREAT SERPL-MCNC: 1.5 MG/DL — HIGH (ref 0.5–1.3)
DIFF PNL FLD: NEGATIVE — SIGNIFICANT CHANGE UP
EOSINOPHIL # BLD AUTO: 0.11 K/UL — SIGNIFICANT CHANGE UP (ref 0–0.5)
EOSINOPHIL NFR BLD AUTO: 1 % — SIGNIFICANT CHANGE UP (ref 0–6)
ETHANOL SERPL-MCNC: <10 MG/DL — SIGNIFICANT CHANGE UP (ref 0–10)
GLUCOSE SERPL-MCNC: 210 MG/DL — HIGH (ref 70–99)
GLUCOSE SERPL-MCNC: 226 MG/DL — HIGH (ref 70–99)
GLUCOSE UR QL: NEGATIVE MG/DL — SIGNIFICANT CHANGE UP
HCG SERPL-ACNC: <1 MIU/ML — SIGNIFICANT CHANGE UP
HCO3 BLDV-SCNC: 23 MMOL/L — SIGNIFICANT CHANGE UP (ref 21–29)
HCT VFR BLD CALC: 39.6 % — SIGNIFICANT CHANGE UP (ref 34.5–45)
HGB BLD-MCNC: 13.6 G/DL — SIGNIFICANT CHANGE UP (ref 11.5–15.5)
IMM GRANULOCYTES NFR BLD AUTO: 0.4 % — SIGNIFICANT CHANGE UP (ref 0–1.5)
INR BLD: 1.14 RATIO — SIGNIFICANT CHANGE UP (ref 0.88–1.16)
KETONES UR-MCNC: ABNORMAL
LEUKOCYTE ESTERASE UR-ACNC: ABNORMAL
LYMPHOCYTES # BLD AUTO: 3.52 K/UL — HIGH (ref 1–3.3)
LYMPHOCYTES # BLD AUTO: 30.7 % — SIGNIFICANT CHANGE UP (ref 13–44)
MCHC RBC-ENTMCNC: 28.4 PG — SIGNIFICANT CHANGE UP (ref 27–34)
MCHC RBC-ENTMCNC: 34.3 GM/DL — SIGNIFICANT CHANGE UP (ref 32–36)
MCV RBC AUTO: 82.7 FL — SIGNIFICANT CHANGE UP (ref 80–100)
MONOCYTES # BLD AUTO: 0.95 K/UL — HIGH (ref 0–0.9)
MONOCYTES NFR BLD AUTO: 8.3 % — SIGNIFICANT CHANGE UP (ref 2–14)
NEUTROPHILS # BLD AUTO: 6.8 K/UL — SIGNIFICANT CHANGE UP (ref 1.8–7.4)
NEUTROPHILS NFR BLD AUTO: 59.2 % — SIGNIFICANT CHANGE UP (ref 43–77)
NITRITE UR-MCNC: NEGATIVE — SIGNIFICANT CHANGE UP
PCO2 BLDV: 40 MMHG — SIGNIFICANT CHANGE UP (ref 35–50)
PCP SPEC-MCNC: SIGNIFICANT CHANGE UP
PH BLDV: 7.38 — SIGNIFICANT CHANGE UP (ref 7.35–7.45)
PH UR: 5 — SIGNIFICANT CHANGE UP (ref 5–8)
PLATELET # BLD AUTO: 387 K/UL — SIGNIFICANT CHANGE UP (ref 150–400)
PO2 BLDV: 60 MMHG — HIGH (ref 25–45)
POTASSIUM SERPL-MCNC: 4.2 MMOL/L — SIGNIFICANT CHANGE UP (ref 3.5–5.3)
POTASSIUM SERPL-MCNC: 4.5 MMOL/L — SIGNIFICANT CHANGE UP (ref 3.5–5.3)
POTASSIUM SERPL-SCNC: 4.2 MMOL/L — SIGNIFICANT CHANGE UP (ref 3.5–5.3)
POTASSIUM SERPL-SCNC: 4.5 MMOL/L — SIGNIFICANT CHANGE UP (ref 3.5–5.3)
PROT SERPL-MCNC: 7.5 GM/DL — SIGNIFICANT CHANGE UP (ref 6–8.3)
PROT SERPL-MCNC: 7.9 GM/DL — SIGNIFICANT CHANGE UP (ref 6–8.3)
PROT UR-MCNC: 30 MG/DL
PROTHROM AB SERPL-ACNC: 13.2 SEC — SIGNIFICANT CHANGE UP (ref 10.6–13.6)
RBC # BLD: 4.79 M/UL — SIGNIFICANT CHANGE UP (ref 3.8–5.2)
RBC # FLD: 14.2 % — SIGNIFICANT CHANGE UP (ref 10.3–14.5)
SALICYLATES SERPL-MCNC: <1.7 MG/DL — LOW (ref 2.8–20)
SAO2 % BLDV: 89 % — HIGH (ref 67–88)
SARS-COV-2 RNA SPEC QL NAA+PROBE: SIGNIFICANT CHANGE UP
SODIUM SERPL-SCNC: 137 MMOL/L — SIGNIFICANT CHANGE UP (ref 135–145)
SODIUM SERPL-SCNC: 138 MMOL/L — SIGNIFICANT CHANGE UP (ref 135–145)
SP GR SPEC: 1.02 — SIGNIFICANT CHANGE UP (ref 1.01–1.02)
UROBILINOGEN FLD QL: NEGATIVE MG/DL — SIGNIFICANT CHANGE UP
WBC # BLD: 11.48 K/UL — HIGH (ref 3.8–10.5)
WBC # FLD AUTO: 11.48 K/UL — HIGH (ref 3.8–10.5)

## 2021-06-02 PROCEDURE — 84443 ASSAY THYROID STIM HORMONE: CPT

## 2021-06-02 PROCEDURE — 83036 HEMOGLOBIN GLYCOSYLATED A1C: CPT

## 2021-06-02 PROCEDURE — 85027 COMPLETE CBC AUTOMATED: CPT

## 2021-06-02 PROCEDURE — 80053 COMPREHEN METABOLIC PANEL: CPT

## 2021-06-02 PROCEDURE — 85610 PROTHROMBIN TIME: CPT

## 2021-06-02 PROCEDURE — 80061 LIPID PANEL: CPT

## 2021-06-02 PROCEDURE — 99285 EMERGENCY DEPT VISIT HI MDM: CPT

## 2021-06-02 PROCEDURE — 36415 COLL VENOUS BLD VENIPUNCTURE: CPT

## 2021-06-02 PROCEDURE — 85730 THROMBOPLASTIN TIME PARTIAL: CPT

## 2021-06-02 PROCEDURE — 86769 SARS-COV-2 COVID-19 ANTIBODY: CPT

## 2021-06-02 PROCEDURE — 82803 BLOOD GASES ANY COMBINATION: CPT

## 2021-06-02 PROCEDURE — 80307 DRUG TEST PRSMV CHEM ANLYZR: CPT

## 2021-06-02 PROCEDURE — 80048 BASIC METABOLIC PNL TOTAL CA: CPT

## 2021-06-02 PROCEDURE — 80076 HEPATIC FUNCTION PANEL: CPT

## 2021-06-02 PROCEDURE — 99223 1ST HOSP IP/OBS HIGH 75: CPT

## 2021-06-02 RX ORDER — GABAPENTIN 400 MG/1
800 CAPSULE ORAL THREE TIMES A DAY
Refills: 0 | Status: DISCONTINUED | OUTPATIENT
Start: 2021-06-02 | End: 2021-06-04

## 2021-06-02 RX ORDER — HALOPERIDOL DECANOATE 100 MG/ML
5 INJECTION INTRAMUSCULAR ONCE
Refills: 0 | Status: DISCONTINUED | OUTPATIENT
Start: 2021-06-02 | End: 2021-06-04

## 2021-06-02 RX ORDER — ACETYLCYSTEINE 200 MG/ML
11 VIAL (ML) MISCELLANEOUS ONCE
Refills: 0 | Status: COMPLETED | OUTPATIENT
Start: 2021-06-02 | End: 2021-06-02

## 2021-06-02 RX ORDER — HYDROXYZINE HCL 10 MG
25 TABLET ORAL
Refills: 0 | Status: DISCONTINUED | OUTPATIENT
Start: 2021-06-02 | End: 2021-06-04

## 2021-06-02 RX ORDER — PANTOPRAZOLE SODIUM 20 MG/1
40 TABLET, DELAYED RELEASE ORAL
Refills: 0 | Status: DISCONTINUED | OUTPATIENT
Start: 2021-06-02 | End: 2021-06-04

## 2021-06-02 RX ORDER — DEXTROSE 50 % IN WATER 50 %
25 SYRINGE (ML) INTRAVENOUS ONCE
Refills: 0 | Status: DISCONTINUED | OUTPATIENT
Start: 2021-06-02 | End: 2021-06-04

## 2021-06-02 RX ORDER — OXYCODONE HYDROCHLORIDE 5 MG/1
10 TABLET ORAL
Refills: 0 | Status: DISCONTINUED | OUTPATIENT
Start: 2021-06-02 | End: 2021-06-03

## 2021-06-02 RX ORDER — SODIUM CHLORIDE 9 MG/ML
1000 INJECTION INTRAMUSCULAR; INTRAVENOUS; SUBCUTANEOUS ONCE
Refills: 0 | Status: COMPLETED | OUTPATIENT
Start: 2021-06-02 | End: 2021-06-02

## 2021-06-02 RX ORDER — FAMOTIDINE 10 MG/ML
20 INJECTION INTRAVENOUS DAILY
Refills: 0 | Status: DISCONTINUED | OUTPATIENT
Start: 2021-06-02 | End: 2021-06-04

## 2021-06-02 RX ORDER — INSULIN LISPRO 100/ML
VIAL (ML) SUBCUTANEOUS
Refills: 0 | Status: DISCONTINUED | OUTPATIENT
Start: 2021-06-02 | End: 2021-06-04

## 2021-06-02 RX ORDER — SODIUM CHLORIDE 9 MG/ML
1000 INJECTION, SOLUTION INTRAVENOUS
Refills: 0 | Status: DISCONTINUED | OUTPATIENT
Start: 2021-06-02 | End: 2021-06-04

## 2021-06-02 RX ORDER — ENOXAPARIN SODIUM 100 MG/ML
40 INJECTION SUBCUTANEOUS EVERY 12 HOURS
Refills: 0 | Status: DISCONTINUED | OUTPATIENT
Start: 2021-06-02 | End: 2021-06-04

## 2021-06-02 RX ORDER — DEXTROSE 50 % IN WATER 50 %
15 SYRINGE (ML) INTRAVENOUS ONCE
Refills: 0 | Status: DISCONTINUED | OUTPATIENT
Start: 2021-06-02 | End: 2021-06-04

## 2021-06-02 RX ORDER — GLUCAGON INJECTION, SOLUTION 0.5 MG/.1ML
1 INJECTION, SOLUTION SUBCUTANEOUS ONCE
Refills: 0 | Status: DISCONTINUED | OUTPATIENT
Start: 2021-06-02 | End: 2021-06-04

## 2021-06-02 RX ORDER — ACETYLCYSTEINE 200 MG/ML
16 VIAL (ML) MISCELLANEOUS ONCE
Refills: 0 | Status: COMPLETED | OUTPATIENT
Start: 2021-06-02 | End: 2021-06-02

## 2021-06-02 RX ORDER — TRAZODONE HCL 50 MG
100 TABLET ORAL AT BEDTIME
Refills: 0 | Status: DISCONTINUED | OUTPATIENT
Start: 2021-06-02 | End: 2021-06-04

## 2021-06-02 RX ORDER — DIPHENHYDRAMINE HCL 50 MG
25 CAPSULE ORAL ONCE
Refills: 0 | Status: DISCONTINUED | OUTPATIENT
Start: 2021-06-02 | End: 2021-06-02

## 2021-06-02 RX ORDER — DULOXETINE HYDROCHLORIDE 30 MG/1
30 CAPSULE, DELAYED RELEASE ORAL AT BEDTIME
Refills: 0 | Status: DISCONTINUED | OUTPATIENT
Start: 2021-06-02 | End: 2021-06-03

## 2021-06-02 RX ORDER — DIPHENHYDRAMINE HCL 50 MG
25 CAPSULE ORAL ONCE
Refills: 0 | Status: COMPLETED | OUTPATIENT
Start: 2021-06-02 | End: 2021-06-02

## 2021-06-02 RX ORDER — LISINOPRIL 2.5 MG/1
20 TABLET ORAL DAILY
Refills: 0 | Status: DISCONTINUED | OUTPATIENT
Start: 2021-06-02 | End: 2021-06-04

## 2021-06-02 RX ORDER — OXYCODONE HYDROCHLORIDE 5 MG/1
5 TABLET ORAL ONCE
Refills: 0 | Status: DISCONTINUED | OUTPATIENT
Start: 2021-06-02 | End: 2021-06-02

## 2021-06-02 RX ORDER — ACETYLCYSTEINE 200 MG/ML
5 VIAL (ML) MISCELLANEOUS ONCE
Refills: 0 | Status: COMPLETED | OUTPATIENT
Start: 2021-06-02 | End: 2021-06-02

## 2021-06-02 RX ORDER — INSULIN LISPRO 100/ML
VIAL (ML) SUBCUTANEOUS AT BEDTIME
Refills: 0 | Status: DISCONTINUED | OUTPATIENT
Start: 2021-06-02 | End: 2021-06-04

## 2021-06-02 RX ORDER — LAMOTRIGINE 25 MG/1
200 TABLET, ORALLY DISINTEGRATING ORAL AT BEDTIME
Refills: 0 | Status: DISCONTINUED | OUTPATIENT
Start: 2021-06-02 | End: 2021-06-04

## 2021-06-02 RX ORDER — DULOXETINE HYDROCHLORIDE 30 MG/1
60 CAPSULE, DELAYED RELEASE ORAL DAILY
Refills: 0 | Status: DISCONTINUED | OUTPATIENT
Start: 2021-06-02 | End: 2021-06-03

## 2021-06-02 RX ORDER — DIPHENHYDRAMINE HCL 50 MG
50 CAPSULE ORAL ONCE
Refills: 0 | Status: COMPLETED | OUTPATIENT
Start: 2021-06-02 | End: 2021-06-02

## 2021-06-02 RX ORDER — DEXTROSE 50 % IN WATER 50 %
12.5 SYRINGE (ML) INTRAVENOUS ONCE
Refills: 0 | Status: DISCONTINUED | OUTPATIENT
Start: 2021-06-02 | End: 2021-06-04

## 2021-06-02 RX ADMIN — Medication 100 MILLIGRAM(S): at 23:22

## 2021-06-02 RX ADMIN — Medication 25 MILLIGRAM(S): at 22:29

## 2021-06-02 RX ADMIN — OXYCODONE HYDROCHLORIDE 10 MILLIGRAM(S): 5 TABLET ORAL at 22:33

## 2021-06-02 RX ADMIN — DULOXETINE HYDROCHLORIDE 30 MILLIGRAM(S): 30 CAPSULE, DELAYED RELEASE ORAL at 23:02

## 2021-06-02 RX ADMIN — FAMOTIDINE 20 MILLIGRAM(S): 10 INJECTION INTRAVENOUS at 16:46

## 2021-06-02 RX ADMIN — LAMOTRIGINE 200 MILLIGRAM(S): 25 TABLET, ORALLY DISINTEGRATING ORAL at 22:59

## 2021-06-02 RX ADMIN — Medication 0.1 MILLIGRAM(S): at 22:57

## 2021-06-02 RX ADMIN — SODIUM CHLORIDE 1000 MILLILITER(S): 9 INJECTION INTRAMUSCULAR; INTRAVENOUS; SUBCUTANEOUS at 03:00

## 2021-06-02 RX ADMIN — Medication 131.25 GRAM(S): at 16:28

## 2021-06-02 RX ADMIN — Medication 65.94 GRAM(S): at 22:29

## 2021-06-02 RX ADMIN — OXYCODONE HYDROCHLORIDE 5 MILLIGRAM(S): 5 TABLET ORAL at 12:13

## 2021-06-02 RX ADMIN — Medication 330 GRAM(S): at 14:39

## 2021-06-02 RX ADMIN — GABAPENTIN 800 MILLIGRAM(S): 400 CAPSULE ORAL at 22:58

## 2021-06-02 RX ADMIN — OXYCODONE HYDROCHLORIDE 10 MILLIGRAM(S): 5 TABLET ORAL at 22:13

## 2021-06-02 RX ADMIN — Medication 2 MILLIGRAM(S): at 12:14

## 2021-06-02 RX ADMIN — Medication 50 MILLIGRAM(S): at 17:05

## 2021-06-02 RX ADMIN — DULOXETINE HYDROCHLORIDE 60 MILLIGRAM(S): 30 CAPSULE, DELAYED RELEASE ORAL at 18:29

## 2021-06-02 RX ADMIN — SODIUM CHLORIDE 1000 MILLILITER(S): 9 INJECTION INTRAMUSCULAR; INTRAVENOUS; SUBCUTANEOUS at 12:00

## 2021-06-02 RX ADMIN — GABAPENTIN 800 MILLIGRAM(S): 400 CAPSULE ORAL at 14:39

## 2021-06-02 RX ADMIN — OXYCODONE HYDROCHLORIDE 5 MILLIGRAM(S): 5 TABLET ORAL at 12:35

## 2021-06-02 NOTE — ED BEHAVIORAL HEALTH NOTE - BEHAVIORAL HEALTH NOTE
===================  PRE-HOSPITAL COURSE  ===================  SOURCE:  JODEE Ndiaye and secondhand ED documentation.   DETAILS:  Patient arrived to ED as a walk-in escorted by mother due to overdosing on extra strength Tylenol and Clonipine due to recent work-related stressors and hx/o anxiety & depression.     ============  ED COURSE   ============  SOURCE:  RN Zelda and secondhand ED documentation.  ARRIVAL:  Patient arrived as a walk-in escorted by mother, presented as drowsy upon arrival, was cooperative and compliant with triage process.   BELONGINGS:  RN stated no belongings of note, all belongings were provided to hospital security and patient is currently in a hospital gown.   BEHAVIOR: RN described patient to currently be calm and cooperative, presenting with tangential thought process and thought content relating to recent OD, is forthcoming about recent OD to ED staff however is reporting different quantities that she took (reported to ED attending that she took 40-50 tylenol, reported 30 to triage nurse, then 25 to JODEE Ndiaye). RN states patient was initially drowsy upon arrival however is now ambulating independently with steady gait. RN stated patient is currently denying SI/HI/A/VH, states there were no physical marks/bruises present on patient’s body at time of physical exam, and reports experiencing back pain. RN states patient is casually groomed, appears to maintain decent hygiene, and reports good ADLs.   TREATMENT:  None  VISITORS:  RN stated patient was originally accompanied by mother however mother had left the ED. Per RN, there were no major conflicts noted in the ED between mother and the patient.     ========================  FOR EACH COLLATERAL  ========================  NAME: Althea Walsh	  NUMBER: [phone]  RELATIONSHIP: Listed as Emergency Contact  RELIABILITY: High  COMMENTS:     ========================  PATIENT DEMOGRAPHICS: Patient is a 30F domiciled with her mother and father, employed full-time as a Registered Nurse, single, without children, non-caregiver.   ========================  HPI  BASELINE FUNCTIONING: Collateral stated patient has been struggling with anxiety and depression for several years, mainly exacerbated by her chronic physical pain which patient experiences from her back down to her legs which she receives regular pain management for. Collateral also reports patient experiences constant fatigue accompanied with the physical pain she experiences, which triggers patient’s depressive symptoms and poor sleep. Collateral stated patient speaks with a psychiatrist monthly and therapist weekly, reports patient has been compliant with outpatient treatment. Patient currently works full-time as an RN for a middle school, and attends work regularly in a timely manner. Collateral stated that patient’s mood at baseline is stable and happy, often likes to joke around, and socializes well with others. Collateral states patient has a good relationship with mother and father at home, and denies the presence of frequent conflicts/altercations between patient and parents.   DATE HPI STARTED: 6/2/2021  DECOMPENSATION: Collateral stated that around 1AM, she was woken up by the patient who reported she overdosed on a large quantity of Tylenol and Clonipine, however was unable to provide the exact amount in which patient took. Collateral stated patient’s reason for OD was due to feeling overwhelmed from a work-related stressor; specifying that the patient felt that she was being blamed for a situation at work unjustly. Collateral stated that patient has also been self-isolating more recently, and has not been taking her medications as regularly as prescribed.   SUICIDALITY: Collateral reported patient overdosed on a large quantity of Tylenol and Clonipine, collateral was unable to provide exact amount of medication patient took.   VIOLENCE: None   SUBSTANCE: Per ED chart, pt tested positive for THC.   ?    ========================  PAST PSYCHIATRIC HISTORY  ========================  DATE PAST PSYCHIATRIC HISTORY STARTED: 2 yrs ago.  MAIN PSYCHIATRIC DIAGNOSIS: Anxiety & Depression  PSYCHIATRIC HOSPITALIZATIONS:  Collateral reported patient had been hospitalized psychiatrically twice in the past, first hospitalization took place at [facility] two years ago where patient presented similarly, and one year ago at  5.   PRIOR ILLNESS: Collateral reported that the patient frequently has depressive episodes which are often triggered by issues at work and when she experiences increased physical pain. Patient does not experience HI/A/VH.  ?  SUICIDALITY:  Collateral reported patient has a hx/o OD on medication, collateral was unable to specify what she took or when it took place.   VIOLENCE:  None  SUBSTANCE USE: Collateral denied.  ?    ==============  OTHER HISTORY  ==============  CURRENT MEDICATION: Lamictal and Clonipine. Collateral was unable to recall frequencies and dosages of medication. Collateral reports patient has been medically compliant up until the last week, where she started noticing patient becoming partially compliant.    MEDICAL HISTORY:  Collateral reported multiple medical issues relating to her liver, kidney, GI tract, thyroid, and chronic pain.   ALLERGIES: Per chart, patient is allergic to Amoxicillin, peanuts, tree nuts, and is intolerant to Acetaminophin.  LEGAL ISSUES: None  FIREARM ACCESS: None  SOCIAL HISTORY: Collateral stated patient has been increasingly self-isolated recently.   FAMILY HISTORY: None    COVID Exposure Screen- collateral (i.e. third-party, chart review, belongings, etc; include EMS and ED staff)  1.	*Has the patient had a COVID-19 test in the last 90 days?  (  ) Yes   ( x ) No   (  ) Unknown- Reason: _____  IF YES PROCEED TO QUESTION #2. IF NO OR UNKNOWN, PLEASE SKIP TO QUESTION #3.  2.	Date of test(s) and result(s): ________  3.	*Has the patient tested positive for COVID-19 antibodies? (  ) Yes   ( x ) No   (  ) Unknown- Reason: _____  IF YES PROCEED TO QUESTION #4. IF NO or UNKNOWN, PLEASE SKIP TO QUESTION #5.  4.	Date of positive antibody test: ________  5.	*Has the patient received 2 doses of the COVID-19 vaccine? (x  ) Yes   (  ) No   (  ) Unknown- Reason: _____  IF YES PROCEED TO QUESTION #6. IF NO or UNKNOWN, PLEASE SKIP TO QUESTION #7.  6.	 Date of second dose: January 2021  7.	*In the past 10 days, has the patient been around anyone with a positive COVID-19 test?* (  ) Yes   ( x ) No   (  ) Unknown- Reason: __  IF YES PROCEED TO QUESTION #8. IF NO or UNKNOWN, PLEASE SKIP TO QUESTION #13.  8.	Was the patient within 6 feet of them for at least 15 minutes? (  ) Yes   (  ) No   (  ) Unknown- Reason: _____  9.	Did the patient provide care for them? (  ) Yes   (  ) No   (  ) Unknown- Reason: ______  10.	Did the patient have direct physical contact with them (touched, hugged, or kissed them)? (  ) Yes   (  ) No    (  ) Unknown- Reason: __  11.	Did the patient share eating or drinking utensils with them? (  ) Yes   (  ) No    (  ) Unknown- Reason: ____  12.	Did they sneeze, cough, or somehow get respiratory droplets on the patient? (  ) Yes   (  ) No    (  ) Unknown- Reason: ______  13.	*Has the patient been out of [place] within the past 10 days?* (  ) Yes   (x  ) No   (  ) Unknown- Reason: _____  IF YES PLEASE ANSWER THE FOLLOWING QUESTIONS:  14.	Which state/country did they go to? ______  15.	Were they there over 24 hours? (  ) Yes   (  ) No    (  ) Unknown- Reason: ______  16.	Date of return to [facility]: ______    Collateral expressed concern of patient being admitted to [facility] due to experiences with past inpatient psych programs patient has been admitted to. BTCM explained to mother that at this time, patient will continue to be monitored in the ED and will be informed if admission is being considered.

## 2021-06-02 NOTE — ED ADULT NURSE NOTE - OBJECTIVE STATEMENT
Pt ambulatory to ED with mom s/p taking about 40 extra strength Tylenol and clonidine at around 0130. Pt tried to vomit but was unable to. Pt in ER now drowsy, but arousable and states she regrets taking the Tylenol.

## 2021-06-02 NOTE — SBIRT NOTE ADULT - NSSBIRTUNABLESCROTHER_GEN_A_CORE
Meeting with patient attempted however Full Screen Not Performed due to  ED Physician's Order: Consult - Psychiatry, Adult; Health  will wait for clearance from [facility] Psychiatry Team

## 2021-06-02 NOTE — CONSULT NOTE ADULT - PROBLEM SELECTOR RECOMMENDATION 9
- Order as acetadote 150 mg/kg over 1 hour for the loading dose, 50 mg/kg over 4 hours, and followed by 100 mg/kg over 16 hours. Please order all bags at one time if necessary.   -2 hours prior to completion of the protocol, check lft and APAP, if APAP negative AND lft's normal or downtrending to 50% of peak, would stop NAC at the end of the 21 hour protocol. However, if those criteria are not met, continue the NAC at 100 mg/kg/16 hrs and recheck labs again 2 hours prior.   -continue supportive care  -thank you for the consult, we will continue to follow.  -case discussed with Luke Frank.

## 2021-06-02 NOTE — H&P ADULT - NSHPPHYSICALEXAM_GEN_ALL_CORE
PHYSICAL EXAM:    General: obese female in no acute distress  Eyes: PERRLA, EOMI; conjunctiva and sclera clear  Head: Normocephalic; atraumatic  ENMT: No nasal discharge; airway clear  Neck: Supple; non tender; no masses  Respiratory: No wheezes, rales or rhonchi  Cardiovascular: Regular rate and rhythm. S1 and S2 bradycardic  Gastrointestinal: Soft non-tender non-distended; Normal bowel sounds  Genitourinary: No costovertebral angle tenderness  Extremities:  No clubbing, cyanosis or edema  Vascular: Peripheral pulses palpable 2+ bilaterally  Neurological: Alert and oriented x4, no gross deficits  Skin: Warm and dry.   Musculoskeletal: Normal tone  Psychiatric: Cooperative

## 2021-06-02 NOTE — ED ADULT NURSE REASSESSMENT NOTE - NS ED NURSE REASSESS COMMENT FT1
pt verbally escalating, crying and yelling at staff s/p being informed of involuntary admission order by ABA Pradhan. pt verbally deescalated. requested pain medication for chronic back pain and antianxiety medication. ABA Pradhan and Dr. Antonio sarmiento RN administered medication as per MD order. pt on 1:1, pt and staff safety maintained.
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
## 2021-06-02 NOTE — H&P ADULT - HISTORY OF PRESENT ILLNESS
30 y.o. female with extensive psychiatric Hx p/w unobserved tylenol OD in suicidal attempts. Pt denies SI at the moment - says did it at the moment of being upset.  C/o chronic back pains, anxiety
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
## 2021-06-02 NOTE — PATIENT PROFILE ADULT - TRANSPORTATION
Is This A New Presentation, Or A Follow-Up?: Skin Lesion What Type Of Note Output Would You Prefer (Optional)?: Standard Output How Severe Is Your Skin Lesion?: mild Has Your Skin Lesion Been Treated?: not been treated no

## 2021-06-02 NOTE — CONSULT NOTE ADULT - SUBJECTIVE AND OBJECTIVE BOX
This is a remote toxicology consult note. Information was obtained from the chart and via telecommunication with physicians and/or other staff at the treating facility. If a physical exam is documented, the results of the exam was relayed to the consulting toxicology service and subsequently relevant to a toxicologic assessment and treatment recommendations.  Furthermore, the toxicologic assessment and recommendations were given in real time at the initial time of consult.     HPI:  The patient is a 30 -year-old female PMH of CKD  who presented to the emergency department with a reported ingestion of an unknown amount of acetaminophen in an apparent suicide attempt. She stated she took the tabs around 0100. A 2 hour level was performed and resulted at 88. A 4.5 hour was performed and resulted at 55. A 10 hour level was performed which resulted at 64. The patient has remained asymptomatic other than emotional distress.   GIACOMO: 0100  Wt: 106.2  VS: P43 /55 RR18 T97.4 O2-Sat-100    PE (as per hospital staff):  EKG: intervals wnl  LABS:     CBC: WBC11.48>HGb13.6/Hct39.6<VXL932     BMP:  Na+138/K+4.2/Cl-108/HCO3-23/Bun19/Cr1.5<Ehx720 AG=7 Ca++=9.3 Mg++=     GI: Ast=31 Alt=64 TBili=0.3      Tox: APAP; at 2 hr=85; at 4.5 hr=55; at 10 hr=64 | ASA/EtOH=ND

## 2021-06-02 NOTE — ED PROVIDER NOTE - OBJECTIVE STATEMENT
29 y/o female in ED c/o "having a bad day" and took 20-30 tylenol pills at approx 0130.   pt states that she regrets taking the tylenol.   pt denies any fever, HA, cp, sob, n/v/d/abd pain.   tolerating PO.   no sick contacts or recent travel.

## 2021-06-02 NOTE — ED BEHAVIORAL HEALTH ASSESSMENT NOTE - RISK ASSESSMENT
af recent stressors  cf borderline pd, hx of multiple SA, medical problems   pf help seeking Low Acute Suicide Risk

## 2021-06-02 NOTE — ED BEHAVIORAL HEALTH ASSESSMENT NOTE - SUMMARY
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 Recommend formal tox consult/clearance considering pt's inconsistent report of quantity of overdose and patient and mother minimizing overdose considering potential for lethality. Recommend reassessment by local team for possible admission.

## 2021-06-02 NOTE — CONSULT NOTE ADULT - ATTENDING COMMENTS
MD Frank phone consultation:  patient encounter discussed at-length with the fellow, and I agree with the impression & plan.  Patient with intentional APAP ingestion, who subsequently crossed the [facility] treatment line.  As such, agree with 21 course NAC

## 2021-06-02 NOTE — ED ADULT NURSE NOTE - NSIMPLEMENTINTERV_GEN_ALL_ED
Implemented All Fall Risk Interventions:  [place] to call system. Call bell, personal items and telephone within reach. Instruct patient to call for assistance. Room bathroom lighting operational. Non-slip footwear when patient is off stretcher. Physically safe environment: no spills, clutter or unnecessary equipment. Stretcher in lowest position, wheels locked, appropriate side rails in place. Provide visual cue, wrist band, yellow gown, etc. Monitor gait and stability. Monitor for mental status changes and reorient to person, place, and time. Review medications for side effects contributing to fall risk. Reinforce activity limits and safety measures with patient and family.

## 2021-06-02 NOTE — CONSULT NOTE ADULT - TIME BILLING
Medical complexity.  Review of H&P, laboratory & objective data review, discussion with the fellow, and primary team.

## 2021-06-02 NOTE — H&P ADULT - NSICDXPASTMEDICALHX_GEN_ALL_CORE_FT
PAST MEDICAL HISTORY:  ADHD (attention deficit hyperactivity disorder)     Anemia     Anxiety     Borderline personality disorder     Cholecystitis S/P cholycystectomy    Chronic back pain     CKD (chronic kidney disease)     Depression     DM2 (diabetes mellitus, type 2)     GERD (gastroesophageal reflux disease)     HLD (hyperlipidemia)     HTN (hypertension)     Lymphocytosis     Mixed connective tissue disease     Nephrosclerosis     Obesity     PCOS (polycystic ovarian syndrome)     Polycystic ovarian syndrome     Suicide attempt by drug overdose 2019    UTI (urinary tract infection)

## 2021-06-02 NOTE — PATIENT PROFILE ADULT - NSPROPTRIGHTREPPHONE_GEN_A_NUR
Final Anesthesia Post-op Assessment    Patient: Curt Crow  Procedure(s) Performed: EXAM UNDER ANESTHESIA LATERAL INTERNAL SPHINCTEROTOMY AND EXCISION ANGELO ANAL MASS  Anesthesia type: General    Vitals Value Taken Time   Temp 36.1 11/25/20 1420   Pulse 91 11/25/20 1419   Resp 18 11/25/20 1419   SpO2 100 % 11/25/20 1419   /74 11/25/20 1411   Vitals shown include unvalidated device data.      Patient Location: PACU Phase 1  Post-op Vital Signs:stable  Level of Consciousness: awake  Respiratory Status: spontaneous ventilation  Cardiovascular stable  Hydration: euvolemic  Pain Management: well controlled  Handoff: Handoff to receiving nurse was performed and questions were answered Nausea: None  Airway Patency:patent  Post-op Assessment: no complications and patient tolerated procedure well with no complications      No complications documented.   
[phone] cell; [phone] home

## 2021-06-02 NOTE — PATIENT PROFILE ADULT - VISION (WITH CORRECTIVE LENSES IF THE PATIENT USUALLY WEARS THEM):
wears glasses and they are with patient/Normal vision: sees adequately in most situations; can see medication labels, newsprint wears glasses and they are with patient/Partially impaired: cannot see medication labels or newsprint, but can see obstacles in path, and the surrounding layout; can count fingers at arm's length

## 2021-06-02 NOTE — ED PROVIDER NOTE - PROGRESS NOTE DETAILS
labs noted.   pt states that she took tylenol at 0130.   will repeat APAP level at 0530 case d/w telepsych and will evaluate pt prince: uptrending tylenol level, spkoe to dr rodríguez of tox, rec's NAC & med admit. dr cope will admit

## 2021-06-02 NOTE — H&P ADULT - ASSESSMENT
30 y.o. female with extensive psychiatric Hx p/w unobserved tylenol OD in suicidal attempts. Pt denies SI at the moment - says did it at the moment of being upset.    1. Tylenol OD in SA - NAC as per protocol repeat LFTs and APAP level 2 hours prior completion of protocol - if APAP not negative and LFTs are worsening will need to continue NAC at 100/mg/kg for 16h and repeat labs 2 hours prior end again, otherwise may stop    2. SA with psychiatric Hx - further management as per psychiatry    3. DMII with last HgbA1C 6.4 - HISS, diet    4. CKD st III - monitor    5. VTE proph - LMWH    Time spent 74 min

## 2021-06-02 NOTE — H&P ADULT - NSICDXFAMILYHX_GEN_ALL_CORE_FT
FAMILY HISTORY:  Grandparent  Still living? Unknown  Family history of heart disease, Age at diagnosis: Age Unknown

## 2021-06-02 NOTE — ED ADULT TRIAGE NOTE - CHIEF COMPLAINT QUOTE
Pt. reports to ED c/o suicide attempt. Pt. states she was "having a bad day" and took 40-50 extra strength tylenol and 8-9 clonidine. Pt. reports taking medications appx. 1 hour ago. denies vomiting. pt. drowsy in triage

## 2021-06-02 NOTE — CONSULT NOTE ADULT - ASSESSMENT
Assessmen: The patient does not have any evidence of APAP toxicity; furthermore, the patient's 4.5 hour [facility]. A 3rd level was checked which is uptrending from previous and now at the borderline for treatment. Therefore, in an abundance of caution we would recommmend treatment.

## 2021-06-02 NOTE — H&P ADULT - NSICDXPASTSURGICALHX_GEN_ALL_CORE_FT
PAST SURGICAL HISTORY:  H/O knee surgery     H/O ovarian cystectomy     H/O unilateral salpingectomy right    History of cholecystectomy

## 2021-06-03 LAB
A1C WITH ESTIMATED AVERAGE GLUCOSE RESULT: 5.2 % — SIGNIFICANT CHANGE UP (ref 4–5.6)
ALBUMIN SERPL ELPH-MCNC: 3.3 G/DL — SIGNIFICANT CHANGE UP (ref 3.3–5)
ALBUMIN SERPL ELPH-MCNC: 3.7 G/DL — SIGNIFICANT CHANGE UP (ref 3.3–5)
ALP SERPL-CCNC: 111 U/L — SIGNIFICANT CHANGE UP (ref 40–120)
ALP SERPL-CCNC: 94 U/L — SIGNIFICANT CHANGE UP (ref 40–120)
ALT FLD-CCNC: 57 U/L — SIGNIFICANT CHANGE UP (ref 12–78)
ALT FLD-CCNC: 59 U/L — SIGNIFICANT CHANGE UP (ref 12–78)
ANION GAP SERPL CALC-SCNC: 11 MMOL/L — SIGNIFICANT CHANGE UP (ref 5–17)
APAP SERPL-MCNC: < 2 UG/ML (ref 10–30)
AST SERPL-CCNC: 24 U/L — SIGNIFICANT CHANGE UP (ref 15–37)
AST SERPL-CCNC: 24 U/L — SIGNIFICANT CHANGE UP (ref 15–37)
BILIRUB DIRECT SERPL-MCNC: <0.1 MG/DL — SIGNIFICANT CHANGE UP (ref 0–0.2)
BILIRUB DIRECT SERPL-MCNC: <0.1 MG/DL — SIGNIFICANT CHANGE UP (ref 0–0.2)
BILIRUB INDIRECT FLD-MCNC: >0.2 MG/DL — SIGNIFICANT CHANGE UP (ref 0.2–1)
BILIRUB INDIRECT FLD-MCNC: >0.3 MG/DL — SIGNIFICANT CHANGE UP (ref 0.2–1)
BILIRUB SERPL-MCNC: 0.3 MG/DL — SIGNIFICANT CHANGE UP (ref 0.2–1.2)
BILIRUB SERPL-MCNC: 0.4 MG/DL — SIGNIFICANT CHANGE UP (ref 0.2–1.2)
BUN SERPL-MCNC: 13 MG/DL — SIGNIFICANT CHANGE UP (ref 7–23)
CALCIUM SERPL-MCNC: 9.5 MG/DL — SIGNIFICANT CHANGE UP (ref 8.5–10.1)
CHLORIDE SERPL-SCNC: 106 MMOL/L — SIGNIFICANT CHANGE UP (ref 96–108)
CHOLEST SERPL-MCNC: 242 MG/DL — HIGH
CO2 SERPL-SCNC: 22 MMOL/L — SIGNIFICANT CHANGE UP (ref 22–31)
COVID-19 SPIKE DOMAIN AB INTERP: POSITIVE
COVID-19 SPIKE DOMAIN ANTIBODY RESULT: >250 U/ML — HIGH
CREAT SERPL-MCNC: 0.8 MG/DL — SIGNIFICANT CHANGE UP (ref 0.5–1.3)
ESTIMATED AVERAGE GLUCOSE: 103 MG/DL — SIGNIFICANT CHANGE UP (ref 68–114)
GLUCOSE SERPL-MCNC: 105 MG/DL — HIGH (ref 70–99)
HCT VFR BLD CALC: 39.8 % — SIGNIFICANT CHANGE UP (ref 34.5–45)
HDLC SERPL-MCNC: 46 MG/DL — LOW
HGB BLD-MCNC: 13.9 G/DL — SIGNIFICANT CHANGE UP (ref 11.5–15.5)
LIPID PNL WITH DIRECT LDL SERPL: 164 MG/DL — HIGH
MCHC RBC-ENTMCNC: 28.6 PG — SIGNIFICANT CHANGE UP (ref 27–34)
MCHC RBC-ENTMCNC: 34.9 GM/DL — SIGNIFICANT CHANGE UP (ref 32–36)
MCV RBC AUTO: 81.9 FL — SIGNIFICANT CHANGE UP (ref 80–100)
NON HDL CHOLESTEROL: 196 MG/DL — HIGH
PLATELET # BLD AUTO: 331 K/UL — SIGNIFICANT CHANGE UP (ref 150–400)
POTASSIUM SERPL-MCNC: 3.8 MMOL/L — SIGNIFICANT CHANGE UP (ref 3.5–5.3)
POTASSIUM SERPL-SCNC: 3.8 MMOL/L — SIGNIFICANT CHANGE UP (ref 3.5–5.3)
PROT SERPL-MCNC: 8 GM/DL — SIGNIFICANT CHANGE UP (ref 6–8.3)
PROT SERPL-MCNC: 8.3 GM/DL — SIGNIFICANT CHANGE UP (ref 6–8.3)
RBC # BLD: 4.86 M/UL — SIGNIFICANT CHANGE UP (ref 3.8–5.2)
RBC # FLD: 14 % — SIGNIFICANT CHANGE UP (ref 10.3–14.5)
SARS-COV-2 IGG+IGM SERPL QL IA: >250 U/ML — HIGH
SARS-COV-2 IGG+IGM SERPL QL IA: POSITIVE
SODIUM SERPL-SCNC: 139 MMOL/L — SIGNIFICANT CHANGE UP (ref 135–145)
TRIGL SERPL-MCNC: 162 MG/DL — HIGH
WBC # BLD: 12.54 K/UL — HIGH (ref 3.8–10.5)
WBC # FLD AUTO: 12.54 K/UL — HIGH (ref 3.8–10.5)

## 2021-06-03 PROCEDURE — 99233 SBSQ HOSP IP/OBS HIGH 50: CPT

## 2021-06-03 PROCEDURE — 99232 SBSQ HOSP IP/OBS MODERATE 35: CPT

## 2021-06-03 RX ORDER — CYCLOBENZAPRINE HYDROCHLORIDE 10 MG/1
5 TABLET, FILM COATED ORAL
Refills: 0 | Status: DISCONTINUED | OUTPATIENT
Start: 2021-06-03 | End: 2021-06-04

## 2021-06-03 RX ORDER — ALPRAZOLAM 0.25 MG
0.25 TABLET ORAL EVERY 8 HOURS
Refills: 0 | Status: DISCONTINUED | OUTPATIENT
Start: 2021-06-03 | End: 2021-06-04

## 2021-06-03 RX ORDER — KETOROLAC TROMETHAMINE 30 MG/ML
15 SYRINGE (ML) INJECTION ONCE
Refills: 0 | Status: DISCONTINUED | OUTPATIENT
Start: 2021-06-03 | End: 2021-06-03

## 2021-06-03 RX ORDER — OXYCODONE HYDROCHLORIDE 5 MG/1
10 TABLET ORAL
Refills: 0 | Status: DISCONTINUED | OUTPATIENT
Start: 2021-06-03 | End: 2021-06-04

## 2021-06-03 RX ORDER — IBUPROFEN 200 MG
600 TABLET ORAL ONCE
Refills: 0 | Status: COMPLETED | OUTPATIENT
Start: 2021-06-03 | End: 2021-06-03

## 2021-06-03 RX ORDER — LANOLIN ALCOHOL/MO/W.PET/CERES
3 CREAM (GRAM) TOPICAL ONCE
Refills: 0 | Status: COMPLETED | OUTPATIENT
Start: 2021-06-03 | End: 2021-06-03

## 2021-06-03 RX ORDER — IBUPROFEN 200 MG
600 TABLET ORAL EVERY 6 HOURS
Refills: 0 | Status: DISCONTINUED | OUTPATIENT
Start: 2021-06-03 | End: 2021-06-03

## 2021-06-03 RX ADMIN — Medication 0.1 MILLIGRAM(S): at 21:33

## 2021-06-03 RX ADMIN — GABAPENTIN 800 MILLIGRAM(S): 400 CAPSULE ORAL at 13:57

## 2021-06-03 RX ADMIN — CYCLOBENZAPRINE HYDROCHLORIDE 5 MILLIGRAM(S): 10 TABLET, FILM COATED ORAL at 21:33

## 2021-06-03 RX ADMIN — Medication 15 MILLIGRAM(S): at 04:09

## 2021-06-03 RX ADMIN — LISINOPRIL 20 MILLIGRAM(S): 2.5 TABLET ORAL at 09:50

## 2021-06-03 RX ADMIN — CYCLOBENZAPRINE HYDROCHLORIDE 5 MILLIGRAM(S): 10 TABLET, FILM COATED ORAL at 16:09

## 2021-06-03 RX ADMIN — Medication 600 MILLIGRAM(S): at 21:34

## 2021-06-03 RX ADMIN — PANTOPRAZOLE SODIUM 40 MILLIGRAM(S): 20 TABLET, DELAYED RELEASE ORAL at 06:45

## 2021-06-03 RX ADMIN — LAMOTRIGINE 200 MILLIGRAM(S): 25 TABLET, ORALLY DISINTEGRATING ORAL at 21:33

## 2021-06-03 RX ADMIN — Medication 15 MILLIGRAM(S): at 04:44

## 2021-06-03 RX ADMIN — DULOXETINE HYDROCHLORIDE 60 MILLIGRAM(S): 30 CAPSULE, DELAYED RELEASE ORAL at 09:48

## 2021-06-03 RX ADMIN — Medication 25 MILLIGRAM(S): at 01:24

## 2021-06-03 RX ADMIN — GABAPENTIN 800 MILLIGRAM(S): 400 CAPSULE ORAL at 21:34

## 2021-06-03 RX ADMIN — OXYCODONE HYDROCHLORIDE 10 MILLIGRAM(S): 5 TABLET ORAL at 21:33

## 2021-06-03 RX ADMIN — Medication 25 MILLIGRAM(S): at 12:23

## 2021-06-03 RX ADMIN — OXYCODONE HYDROCHLORIDE 10 MILLIGRAM(S): 5 TABLET ORAL at 11:28

## 2021-06-03 RX ADMIN — Medication 600 MILLIGRAM(S): at 09:47

## 2021-06-03 RX ADMIN — Medication 0.1 MILLIGRAM(S): at 09:48

## 2021-06-03 RX ADMIN — GABAPENTIN 800 MILLIGRAM(S): 400 CAPSULE ORAL at 06:45

## 2021-06-03 RX ADMIN — Medication 0.25 MILLIGRAM(S): at 17:51

## 2021-06-03 RX ADMIN — Medication 3 MILLIGRAM(S): at 04:10

## 2021-06-03 RX ADMIN — Medication 15 MILLIGRAM(S): at 16:09

## 2021-06-03 RX ADMIN — FAMOTIDINE 20 MILLIGRAM(S): 10 INJECTION INTRAVENOUS at 09:50

## 2021-06-03 NOTE — PROGRESS NOTE ADULT - SUBJECTIVE AND OBJECTIVE BOX
Patient is a 30y old  Female who presents with a chief complaint of tylenol OD (02 Jun 2021 13:31)      SUBJECTIVE:   HPI:  30 y.o. female with extensive psychiatric Hx p/w unobserved tylenol OD in suicidal attempts. Pt denies SI at the moment - says did it at the moment of being upset.  C/o chronic back pains, anxiety (02 Jun 2021 13:31)    6/3/21: sitting up in bed. anxious and tearful, reports she does not want to be admitted to inpatient psych. admits she had a "moment of weakness". encouraged patient to not focus on things that have no happened yet. reviewed coping mechanisms with patient with improvement.     REVIEW OF SYSTEMS:    CONSTITUTIONAL: No weakness, fevers or chills  EYES/ENT: No visual changes;  No vertigo or throat pain   NECK: No pain or stiffness  RESPIRATORY: No cough, wheezing, hemoptysis; No shortness of breath  CARDIOVASCULAR: No chest pain or palpitations  GASTROINTESTINAL: No abdominal or epigastric pain. No nausea, vomiting, or hematemesis; No diarrhea or constipation. No melena or hematochezia.  GENITOURINARY: No dysuria, frequency or hematuria  NEUROLOGICAL: No numbness or weakness  SKIN: No itching, burning, rashes, or lesions   All other review of systems is negative unless indicated above    Vital Signs Last 24 Hrs  T(C): 36.1 (03 Jun 2021 08:01), Max: 37 (02 Jun 2021 23:08)  T(F): 97 (03 Jun 2021 08:01), Max: 98.6 (02 Jun 2021 23:08)  HR: 77 (03 Jun 2021 08:01) (54 - 81)  BP: 142/75 (03 Jun 2021 08:01) (128/63 - 142/75)  BP(mean): --  RR: 16 (03 Jun 2021 08:01) (16 - 16)  SpO2: 97% (03 Jun 2021 08:01) (97% - 100%)      PHYSICAL EXAM:    Constitutional: NAD, awake and alert, well-developed  HEENT: PERR, EOMI, Normal Hearing, MMM, corrected vision   Neck: Soft and supple, No LAD, No JVD  Respiratory: Breath sounds are clear bilaterally, No wheezing, rales or rhonchi  Cardiovascular: S1 and S2, regular rate and rhythm, no Murmurs, gallops or rubs  Gastrointestinal: Bowel Sounds present, soft, nontender, nondistended, no guarding, no rebound  Extremities: No peripheral edema  Vascular: 2+ peripheral pulses  Neurological: A/O x 3, no focal deficits  Musculoskeletal: 5/5 strength b/l upper and lower extremities  Skin: No rashes  psych: anxious, tearful and expressive.     MEDICATIONS:  MEDICATIONS  (STANDING):  cloNIDine 0.1 milliGRAM(s) Oral two times a day  dextrose 40% Gel 15 Gram(s) Oral once  dextrose 5%. 1000 milliLiter(s) (50 mL/Hr) IV Continuous <Continuous>  dextrose 5%. 1000 milliLiter(s) (100 mL/Hr) IV Continuous <Continuous>  dextrose 50% Injectable 25 Gram(s) IV Push once  dextrose 50% Injectable 12.5 Gram(s) IV Push once  dextrose 50% Injectable 25 Gram(s) IV Push once  enoxaparin Injectable 40 milliGRAM(s) SubCutaneous every 12 hours  famotidine    Tablet 20 milliGRAM(s) Oral daily  gabapentin 800 milliGRAM(s) Oral three times a day  glucagon  Injectable 1 milliGRAM(s) IntraMuscular once  insulin lispro (ADMELOG) corrective regimen sliding scale   SubCutaneous three times a day before meals  insulin lispro (ADMELOG) corrective regimen sliding scale   SubCutaneous at bedtime  ketorolac   Injectable 15 milliGRAM(s) IV Push once  lamoTRIgine 200 milliGRAM(s) Oral at bedtime  lisinopril 20 milliGRAM(s) Oral daily  pantoprazole    Tablet 40 milliGRAM(s) Oral before breakfast  traZODone 100 milliGRAM(s) Oral at bedtime      LABS: All Labs Reviewed:                        13.9   12.54 )-----------( 331      ( 03 Jun 2021 06:59 )             39.8     06-03    139  |  106  |  13  ----------------------------<  105<H>  3.8   |  22  |  0.80    Ca    9.5      03 Jun 2021 06:59    TPro  8.0  /  Alb  3.3  /  TBili  0.4  /  DBili  <0.1  /  AST  24  /  ALT  59  /  AlkPhos  94  06-03    PT/INR - ( 02 Jun 2021 13:17 )   PT: 13.2 sec;   INR: 1.14 ratio    PTT - ( 02 Jun 2021 13:17 )  PTT:39.2 sec

## 2021-06-03 NOTE — PROGRESS NOTE ADULT - ASSESSMENT
30 y.o. female with extensive psychiatric Hx p/w unobserved tylenol OD in suicidal attempts. Pt denies SI at the moment - says did it at the moment of being upset.    1. Tylenol OD in SA   - cont w/ NAC protocol   - recheck APAP level tomorrow   - LFTs WNL at present (pt reports extensive work up out pt for elevated LFTs)   - cont w/ 1:1 cont observation     2. SA with psychiatric Hx   - further management as per psychiatry  - cont 1:1   - will likely require in patient psych admission when medically cleared     3. DMII   a1c 5.2   refusing BGM     4. CKD st III  - monitor    5. VTE proph - LMWH

## 2021-06-03 NOTE — PROGRESS NOTE BEHAVIORAL HEALTH - NSBHCHARTREVIEWLAB_PSY_A_CORE FT
13.6   11.48 )-----------( 387      ( 2021 02:46 )             39.6       06-02    137  |  105  |  15  ----------------------------<  210<H>  4.5   |  26  |  1.34<H>    Ca    9.5      2021 02:46    TPro  7.9  /  Alb  3.4  /  TBili  0.4  /  DBili  x   /  AST  42<H>  /  ALT  77  /  AlkPhos  118  06-02              Urinalysis Basic - ( 2021 02:38 )    Color: Yellow / Appearance: Clear / S.020 / pH: x  Gluc: x / Ketone: Trace  / Bili: Negative / Urobili: Negative mg/dL   Blood: x / Protein: 30 mg/dL / Nitrite: Negative   Leuk Esterase: Moderate / RBC: 0-2 /HPF / WBC 6-10   Sq Epi: x / Non Sq Epi: Many / Bacteria: Few            Lactate Trend            CAPILLARY BLOOD GLUCOSE
06-03    139  |  106  |  13  ----------------------------<  105<H>  3.8   |  22  |  0.80    Ca    9.5      03 Jun 2021 06:59    TPro  8.0  /  Alb  3.3  /  TBili  0.4  /  DBili  <0.1  /  AST  24  /  ALT  59  /  AlkPhos  94  06-03                            13.9   12.54 )-----------( 331      ( 03 Jun 2021 06:59 )             39.8

## 2021-06-04 ENCOUNTER — INPATIENT (INPATIENT)
Facility: HOSPITAL | Age: 31
LOS: 3 days | Discharge: HOME | DRG: 885 | End: 2021-06-08
Attending: PSYCHIATRY & NEUROLOGY | Admitting: PSYCHIATRY & NEUROLOGY
Payer: COMMERCIAL

## 2021-06-04 ENCOUNTER — TRANSCRIPTION ENCOUNTER (OUTPATIENT)
Age: 31
End: 2021-06-04

## 2021-06-04 VITALS — WEIGHT: 231.93 LBS | HEIGHT: 67 IN

## 2021-06-04 VITALS
RESPIRATION RATE: 17 BRPM | DIASTOLIC BLOOD PRESSURE: 67 MMHG | OXYGEN SATURATION: 96 % | SYSTOLIC BLOOD PRESSURE: 127 MMHG | TEMPERATURE: 98 F | HEART RATE: 77 BPM

## 2021-06-04 DIAGNOSIS — Z98.890 OTHER SPECIFIED POSTPROCEDURAL STATES: Chronic | ICD-10-CM

## 2021-06-04 DIAGNOSIS — Z90.49 ACQUIRED ABSENCE OF OTHER SPECIFIED PARTS OF DIGESTIVE TRACT: Chronic | ICD-10-CM

## 2021-06-04 DIAGNOSIS — Z90.79 ACQUIRED ABSENCE OF OTHER GENITAL ORGAN(S): Chronic | ICD-10-CM

## 2021-06-04 DIAGNOSIS — F33.2 MAJOR DEPRESSIVE DISORDER, RECURRENT SEVERE WITHOUT PSYCHOTIC FEATURES: ICD-10-CM

## 2021-06-04 LAB
ALBUMIN SERPL ELPH-MCNC: 3.5 G/DL — SIGNIFICANT CHANGE UP (ref 3.3–5)
ALP SERPL-CCNC: 111 U/L — SIGNIFICANT CHANGE UP (ref 40–120)
ALT FLD-CCNC: 51 U/L — SIGNIFICANT CHANGE UP (ref 12–78)
ANION GAP SERPL CALC-SCNC: 8 MMOL/L — SIGNIFICANT CHANGE UP (ref 5–17)
APAP SERPL-MCNC: < 2 UG/ML (ref 10–30)
AST SERPL-CCNC: 25 U/L — SIGNIFICANT CHANGE UP (ref 15–37)
BILIRUB SERPL-MCNC: 0.4 MG/DL — SIGNIFICANT CHANGE UP (ref 0.2–1.2)
BUN SERPL-MCNC: 15 MG/DL — SIGNIFICANT CHANGE UP (ref 7–23)
CALCIUM SERPL-MCNC: 9.5 MG/DL — SIGNIFICANT CHANGE UP (ref 8.5–10.1)
CHLORIDE SERPL-SCNC: 109 MMOL/L — HIGH (ref 96–108)
CO2 SERPL-SCNC: 23 MMOL/L — SIGNIFICANT CHANGE UP (ref 22–31)
CREAT SERPL-MCNC: 0.8 MG/DL — SIGNIFICANT CHANGE UP (ref 0.5–1.3)
GLUCOSE SERPL-MCNC: 98 MG/DL — SIGNIFICANT CHANGE UP (ref 70–99)
POTASSIUM SERPL-MCNC: 3.9 MMOL/L — SIGNIFICANT CHANGE UP (ref 3.5–5.3)
POTASSIUM SERPL-SCNC: 3.9 MMOL/L — SIGNIFICANT CHANGE UP (ref 3.5–5.3)
PROT SERPL-MCNC: 8.1 GM/DL — SIGNIFICANT CHANGE UP (ref 6–8.3)
SODIUM SERPL-SCNC: 140 MMOL/L — SIGNIFICANT CHANGE UP (ref 135–145)

## 2021-06-04 PROCEDURE — 80076 HEPATIC FUNCTION PANEL: CPT

## 2021-06-04 PROCEDURE — 93005 ELECTROCARDIOGRAM TRACING: CPT

## 2021-06-04 PROCEDURE — 99221 1ST HOSP IP/OBS SF/LOW 40: CPT

## 2021-06-04 PROCEDURE — 86769 SARS-COV-2 COVID-19 ANTIBODY: CPT

## 2021-06-04 PROCEDURE — 85025 COMPLETE CBC W/AUTO DIFF WBC: CPT

## 2021-06-04 PROCEDURE — 99239 HOSP IP/OBS DSCHRG MGMT >30: CPT

## 2021-06-04 PROCEDURE — 36415 COLL VENOUS BLD VENIPUNCTURE: CPT

## 2021-06-04 RX ORDER — GABAPENTIN 400 MG/1
800 CAPSULE ORAL THREE TIMES A DAY
Refills: 0 | Status: DISCONTINUED | OUTPATIENT
Start: 2021-06-04 | End: 2021-06-08

## 2021-06-04 RX ORDER — CHOLECALCIFEROL (VITAMIN D3) 125 MCG
2000 CAPSULE ORAL ONCE
Refills: 0 | Status: COMPLETED | OUTPATIENT
Start: 2021-06-04 | End: 2021-06-04

## 2021-06-04 RX ORDER — DULOXETINE HYDROCHLORIDE 30 MG/1
60 CAPSULE, DELAYED RELEASE ORAL DAILY
Refills: 0 | Status: DISCONTINUED | OUTPATIENT
Start: 2021-06-04 | End: 2021-06-04

## 2021-06-04 RX ORDER — KETOROLAC TROMETHAMINE 30 MG/ML
0.5 SYRINGE (ML) INJECTION
Qty: 0 | Refills: 0 | DISCHARGE
Start: 2021-06-04

## 2021-06-04 RX ORDER — TRAZODONE HCL 50 MG
100 TABLET ORAL AT BEDTIME
Refills: 0 | Status: DISCONTINUED | OUTPATIENT
Start: 2021-06-04 | End: 2021-06-05

## 2021-06-04 RX ORDER — LISINOPRIL 2.5 MG/1
20 TABLET ORAL DAILY
Refills: 0 | Status: DISCONTINUED | OUTPATIENT
Start: 2021-06-04 | End: 2021-06-04

## 2021-06-04 RX ORDER — DIPHENHYDRAMINE HCL 50 MG
50 CAPSULE ORAL ONCE
Refills: 0 | Status: COMPLETED | OUTPATIENT
Start: 2021-06-04 | End: 2021-06-06

## 2021-06-04 RX ORDER — DULOXETINE HYDROCHLORIDE 30 MG/1
30 CAPSULE, DELAYED RELEASE ORAL AT BEDTIME
Refills: 0 | Status: DISCONTINUED | OUTPATIENT
Start: 2021-06-04 | End: 2021-06-04

## 2021-06-04 RX ORDER — LANOLIN ALCOHOL/MO/W.PET/CERES
5 CREAM (GRAM) TOPICAL AT BEDTIME
Refills: 0 | Status: DISCONTINUED | OUTPATIENT
Start: 2021-06-04 | End: 2021-06-08

## 2021-06-04 RX ORDER — DIPHENHYDRAMINE HCL 50 MG
50 CAPSULE ORAL ONCE
Refills: 0 | Status: COMPLETED | OUTPATIENT
Start: 2021-06-04 | End: 2021-06-04

## 2021-06-04 RX ORDER — LISINOPRIL 2.5 MG/1
20 TABLET ORAL
Refills: 0 | Status: DISCONTINUED | OUTPATIENT
Start: 2021-06-04 | End: 2021-06-08

## 2021-06-04 RX ORDER — DIPHENHYDRAMINE HCL 50 MG
50 CAPSULE ORAL EVERY 6 HOURS
Refills: 0 | Status: DISCONTINUED | OUTPATIENT
Start: 2021-06-04 | End: 2021-06-08

## 2021-06-04 RX ORDER — HALOPERIDOL DECANOATE 100 MG/ML
5 INJECTION INTRAMUSCULAR ONCE
Refills: 0 | Status: COMPLETED | OUTPATIENT
Start: 2021-06-04 | End: 2021-06-04

## 2021-06-04 RX ORDER — HALOPERIDOL DECANOATE 100 MG/ML
5 INJECTION INTRAMUSCULAR ONCE
Refills: 0 | Status: DISCONTINUED | OUTPATIENT
Start: 2021-06-04 | End: 2021-06-08

## 2021-06-04 RX ORDER — DULOXETINE HYDROCHLORIDE 30 MG/1
60 CAPSULE, DELAYED RELEASE ORAL DAILY
Refills: 0 | Status: DISCONTINUED | OUTPATIENT
Start: 2021-06-04 | End: 2021-06-08

## 2021-06-04 RX ORDER — KETOROLAC TROMETHAMINE 30 MG/ML
15 SYRINGE (ML) INJECTION EVERY 12 HOURS
Refills: 0 | Status: DISCONTINUED | OUTPATIENT
Start: 2021-06-04 | End: 2021-06-04

## 2021-06-04 RX ORDER — IBUPROFEN 200 MG
600 TABLET ORAL EVERY 8 HOURS
Refills: 0 | Status: DISCONTINUED | OUTPATIENT
Start: 2021-06-04 | End: 2021-06-08

## 2021-06-04 RX ORDER — OXYCODONE HYDROCHLORIDE 5 MG/1
5 TABLET ORAL
Refills: 0 | Status: DISCONTINUED | OUTPATIENT
Start: 2021-06-04 | End: 2021-06-05

## 2021-06-04 RX ORDER — GABAPENTIN 400 MG/1
800 CAPSULE ORAL THREE TIMES A DAY
Refills: 0 | Status: DISCONTINUED | OUTPATIENT
Start: 2021-06-04 | End: 2021-06-04

## 2021-06-04 RX ORDER — OXYCODONE HYDROCHLORIDE 5 MG/1
10 TABLET ORAL
Refills: 0 | Status: DISCONTINUED | OUTPATIENT
Start: 2021-06-04 | End: 2021-06-04

## 2021-06-04 RX ORDER — DULOXETINE HYDROCHLORIDE 30 MG/1
30 CAPSULE, DELAYED RELEASE ORAL AT BEDTIME
Refills: 0 | Status: DISCONTINUED | OUTPATIENT
Start: 2021-06-04 | End: 2021-06-08

## 2021-06-04 RX ADMIN — Medication 0.1 MILLIGRAM(S): at 10:45

## 2021-06-04 RX ADMIN — LISINOPRIL 20 MILLIGRAM(S): 2.5 TABLET ORAL at 23:29

## 2021-06-04 RX ADMIN — DULOXETINE HYDROCHLORIDE 60 MILLIGRAM(S): 30 CAPSULE, DELAYED RELEASE ORAL at 10:49

## 2021-06-04 RX ADMIN — Medication 2000 UNIT(S): at 23:19

## 2021-06-04 RX ADMIN — Medication 100 MILLIGRAM(S): at 00:53

## 2021-06-04 RX ADMIN — Medication 100 MILLIGRAM(S): at 23:19

## 2021-06-04 RX ADMIN — OXYCODONE HYDROCHLORIDE 10 MILLIGRAM(S): 5 TABLET ORAL at 02:01

## 2021-06-04 RX ADMIN — Medication 1 MILLIGRAM(S): at 17:15

## 2021-06-04 RX ADMIN — CYCLOBENZAPRINE HYDROCHLORIDE 5 MILLIGRAM(S): 10 TABLET, FILM COATED ORAL at 06:26

## 2021-06-04 RX ADMIN — OXYCODONE HYDROCHLORIDE 10 MILLIGRAM(S): 5 TABLET ORAL at 10:49

## 2021-06-04 RX ADMIN — Medication 0.1 MILLIGRAM(S): at 22:31

## 2021-06-04 RX ADMIN — Medication 25 MILLIGRAM(S): at 00:53

## 2021-06-04 RX ADMIN — Medication 5 MILLIGRAM(S): at 22:33

## 2021-06-04 RX ADMIN — FAMOTIDINE 20 MILLIGRAM(S): 10 INJECTION INTRAVENOUS at 10:45

## 2021-06-04 RX ADMIN — OXYCODONE HYDROCHLORIDE 5 MILLIGRAM(S): 5 TABLET ORAL at 23:03

## 2021-06-04 RX ADMIN — LISINOPRIL 20 MILLIGRAM(S): 2.5 TABLET ORAL at 10:48

## 2021-06-04 RX ADMIN — Medication 50 MILLIGRAM(S): at 22:33

## 2021-06-04 RX ADMIN — GABAPENTIN 800 MILLIGRAM(S): 400 CAPSULE ORAL at 06:25

## 2021-06-04 RX ADMIN — DULOXETINE HYDROCHLORIDE 30 MILLIGRAM(S): 30 CAPSULE, DELAYED RELEASE ORAL at 23:19

## 2021-06-04 RX ADMIN — Medication 50 MILLIGRAM(S): at 17:15

## 2021-06-04 RX ADMIN — Medication 600 MILLIGRAM(S): at 23:19

## 2021-06-04 RX ADMIN — OXYCODONE HYDROCHLORIDE 10 MILLIGRAM(S): 5 TABLET ORAL at 02:38

## 2021-06-04 RX ADMIN — Medication 0.25 MILLIGRAM(S): at 02:01

## 2021-06-04 RX ADMIN — PANTOPRAZOLE SODIUM 40 MILLIGRAM(S): 20 TABLET, DELAYED RELEASE ORAL at 06:26

## 2021-06-04 RX ADMIN — HALOPERIDOL DECANOATE 5 MILLIGRAM(S): 100 INJECTION INTRAMUSCULAR at 17:15

## 2021-06-04 RX ADMIN — Medication 0.25 MILLIGRAM(S): at 10:49

## 2021-06-04 RX ADMIN — Medication 600 MILLIGRAM(S): at 00:54

## 2021-06-04 RX ADMIN — OXYCODONE HYDROCHLORIDE 10 MILLIGRAM(S): 5 TABLET ORAL at 00:40

## 2021-06-04 RX ADMIN — OXYCODONE HYDROCHLORIDE 5 MILLIGRAM(S): 5 TABLET ORAL at 22:33

## 2021-06-04 RX ADMIN — Medication 3 MILLIGRAM(S): at 00:53

## 2021-06-04 RX ADMIN — GABAPENTIN 800 MILLIGRAM(S): 400 CAPSULE ORAL at 22:31

## 2021-06-04 RX ADMIN — GABAPENTIN 800 MILLIGRAM(S): 400 CAPSULE ORAL at 13:54

## 2021-06-04 NOTE — PROGRESS NOTE BEHAVIORAL HEALTH - AXIS III
Obesity, PCOS, HTN, kidney and liver problems
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
## 2021-06-04 NOTE — BH SAFETY PLAN - STEP 6 SAFE ENVIRONMENT
To keep my emotions in control and learn coping skills.  I want to go to [facility] program when discharged.

## 2021-06-04 NOTE — PATIENT PROFILE BEHAVIORAL HEALTH - TEACHING/LEARNING EDUCATIONAL LEVEL
Pt. Adequately sedated.  Final time-out done and agreed with all staff.  See ANESTHESIA  For all medications and vital signs.    [facility]

## 2021-06-04 NOTE — PROGRESS NOTE BEHAVIORAL HEALTH - RISK ASSESSMENT
HIGH ACUTE RISK  S/P  suicide attempt via overdose, affect dysregulation, impulsive, aggressive, can not engage safety planing.   INCREASED CHRONIC RISK FACTORS: Depression, Borderline personality disorder, history of self-injurious behaviors and suicide attempt, history of in-patient hospitalization, impulsivity   PROTECTIVE FACTORS: supportive family,   MITIGATION STRATEGIES: in-patient hospitalization, medication management, Safety plan
HIGH RISK     ACUTE RISK FACTORS: severe depressed mood, anhedonia, hopelessness and suicidal ideation, suicide attempt via overdose    CHRONIC RISK FACTORS: Depression, Borderline personality disorder, history of self-injurious behaviors and suicide attempt, history of in-patient hospitalization, impulsivity     PROTECTIVE FACTORS: supportive family, motivation for psychiatric treatment    MITIGATION STRATEGIES: in-patient hospitalization, medication management
HIGH ACUTE RISK  S/P  suicide attempt via overdose, affect dysregulation, impulsive, aggressive, can not engage safety planing.   INCREASED CHRONIC RISK FACTORS: Depression, Borderline personality disorder, history of self-injurious behaviors and suicide attempt, history of in-patient hospitalization, impulsivity   PROTECTIVE FACTORS: supportive family,   MITIGATION STRATEGIES: in-patient hospitalization, medication management, Safety plan

## 2021-06-04 NOTE — PROGRESS NOTE BEHAVIORAL HEALTH - NSBHFUPIPCHARTREVFT_PSY_A_CORE
MDD, Borderline PD, and Anxiety d/o, 30 yr old single female with dxn of MDD, Borderline PD, and Anxiety d/o ,She presented to the ED and reported overdosing on Tylenol and Clonidine, amount is  inconsistent

## 2021-06-04 NOTE — DISCHARGE NOTE PROVIDER - NSDCMRMEDTOKEN_GEN_ALL_CORE_FT
cloNIDine 0.1 mg oral tablet: 1 tab(s) orally 2 times a day  DULoxetine 30 mg oral delayed release capsule: 1 cap(s) orally once a day (in the evening)  DULoxetine 60 mg oral delayed release capsule: 1 cap(s) orally once a day  famotidine 20 mg oral tablet: 1 tab(s) orally once a day before a meal as needed  Florastor 250 mg oral capsule: 1 cap(s) orally once a day  gabapentin 400 mg oral capsule: 2 cap(s) orally 3 times a day  hydrOXYzine pamoate 25 mg oral capsule: 2 cap(s) orally 2 times a day, As Needed - for anxiety  lamoTRIgine 200 mg oral tablet: 1 tab(s) orally once a day (at bedtime)  lisinopril 20 mg oral tablet: 1 tab(s) orally 2 times a day  Medical Marijuana: use as directed  oxyCODONE 5 mg oral tablet: 2 tab(s) orally 2 times a day at 10am and 9:30pm  pantoprazole 40 mg oral delayed release tablet: 1 tab(s) orally once a day  tiZANidine 4 mg oral tablet: 1 tab(s) orally 3 times a day as needed  traZODone 100 mg oral tablet: 1 tab(s) orally once a day (at bedtime)  Vitamin D3 2000 intl units (50 mcg) oral tablet: 2 tab(s) orally once a day

## 2021-06-04 NOTE — PROGRESS NOTE BEHAVIORAL HEALTH - NSBHCHARTREVIEWVS_PSY_A_CORE FT
Vital Signs Last 24 Hrs  T(C): 36.5 (04 Jun 2021 08:20), Max: 37 (03 Jun 2021 21:00)  T(F): 97.7 (04 Jun 2021 08:20), Max: 98.6 (03 Jun 2021 21:00)  HR: 77 (04 Jun 2021 08:20) (67 - 109)  BP: 127/67 (04 Jun 2021 08:20) (124/59 - 132/89)  BP(mean): --  RR: 17 (04 Jun 2021 08:20) (17 - 18)  SpO2: 96% (04 Jun 2021 08:20) (96% - 99%)
Vital Signs Last 24 Hrs  T(C): 37.1 (02 Jun 2021 06:59), Max: 37.1 (02 Jun 2021 06:59)  T(F): 98.7 (02 Jun 2021 06:59), Max: 98.7 (02 Jun 2021 06:59)  HR: 50 (02 Jun 2021 06:59) (49 - 55)  BP: 113/74 (02 Jun 2021 06:59) (102/67 - 121/66)  BP(mean): 89 (02 Jun 2021 06:59) (83 - 89)  RR: 17 (02 Jun 2021 06:59) (17 - 19)  SpO2: 100% (02 Jun 2021 06:59) (99% - 100%)
Vital Signs Last 24 Hrs  T(C): 36.1 (03 Jun 2021 08:01), Max: 37 (02 Jun 2021 23:08)  T(F): 97 (03 Jun 2021 08:01), Max: 98.6 (02 Jun 2021 23:08)  HR: 77 (03 Jun 2021 08:01) (54 - 81)  BP: 142/75 (03 Jun 2021 08:01) (128/63 - 142/75)  BP(mean): --  RR: 16 (03 Jun 2021 08:01) (16 - 16)  SpO2: 97% (03 Jun 2021 08:01) (97% - 100%)

## 2021-06-04 NOTE — PROGRESS NOTE BEHAVIORAL HEALTH - NSBHCONSULTFOLLOW_PSY_A_CORE
N/A - patient requires inpatient psychiatric admission

## 2021-06-04 NOTE — PROGRESS NOTE BEHAVIORAL HEALTH - NSBHADDHXMEDFT_PSY_A_CORE
Obesity, PCOS, HTN, chronic back/knee pain Obesity, PCOS, HTN, chronic back/knee painchronic pain (L/R knee surgeries, lower back pain)   Kidney disease Right ovarian dominant follicle or cyst measuring up to 2.6 cm  Two indeterminate part solid nodules in the left lung base measuring up to 9 mm.

## 2021-06-04 NOTE — DISCHARGE NOTE NURSING/CASE MANAGEMENT/SOCIAL WORK - PATIENT PORTAL LINK FT
You can access the FollowMyHealth Patient Portal offered by [facility] by registering at the following website: [email or web address]. By joining [employer]’s FollowMyHealth portal, you will also be able to view your health information using other applications (apps) compatible with our system.

## 2021-06-04 NOTE — DISCHARGE NOTE PROVIDER - CARE PROVIDER_API CALL
Lopez Sanchez)  Family Medicine  [address] [place] [address]  Phone: [phone]  Fax: [phone]  Follow Up Time: 2 weeks

## 2021-06-04 NOTE — DISCHARGE NOTE PROVIDER - NSDCCPCAREPLAN_GEN_ALL_CORE_FT
PRINCIPAL DISCHARGE DIAGNOSIS  Diagnosis: Intentional drug overdose, initial encounter  Assessment and Plan of Treatment: do not take tylenol.   If you feel unhappy, depressed, anxious, fearful, sunshine, or need emotional help, call your PMD, Psychiatrist.  Follow up your psychiatrists’ recommendations and management.  Continue current medications as prescribed. If you are having any suicidal thoughts or emotional distress, call the National Suicide Prevention Lifeline at 136-348-9128.

## 2021-06-04 NOTE — PATIENT PROFILE BEHAVIORAL HEALTH - HEALTHCARE QUESTIONS, PROFILE
In an event that Umesh Ash needs to be admitted to the emergency department due to persistent cyclic vomiting he should receive a GI cocktail consisting of IV fluids with administration of Toradol, Reglan, and ondansetron  Mother and he are aware that they will be receiving this if we send them to the emergency room  Then monitor for resolution of the nausea and vomiting- if it persists he should be admitted to the pediatric floor for further monitoring and medication 
patient wanted to know about her clothes

## 2021-06-04 NOTE — PROGRESS NOTE BEHAVIORAL HEALTH - NSBHCONSULTPSYCHPLAN_PSY_A_CORE FT
Hold Cymbalta since it can worsen aggressive behavior and it is hepatotoxics, Pt has fatty liver and is s/p O/D on Tylenol.   haldol 5mg IM and ativan 2 mg IM PRN agitation.
Hold Cymbalta since it can worsen aggressive behavior and it is hepatotoxics, Pt has fatty liver and is s/p O/D on Tylenol.   haldol 5mg IM and ativan 2 mg IM PRN agitation.
HOLD

## 2021-06-04 NOTE — DISCHARGE NOTE PROVIDER - NSDCQMAMI_CARD_ALL_CORE
"Chief Complaint   Patient presents with     Physical       Initial /78  Pulse 76  Temp 96.9  F (36.1  C) (Tympanic)  Resp 16  Ht 5' 1.9\" (1.572 m)  Wt 155 lb (70.3 kg)  BMI 28.44 kg/m2 Estimated body mass index is 28.44 kg/(m^2) as calculated from the following:    Height as of this encounter: 5' 1.9\" (1.572 m).    Weight as of this encounter: 155 lb (70.3 kg).  Medication Reconciliation: complete   Margot Almendarez CMA (AAMA)   "
No

## 2021-06-04 NOTE — DISCHARGE NOTE PROVIDER - HOSPITAL COURSE
HPI:  30 y.o. female with extensive psychiatric Hx p/w unobserved tylenol OD in suicidal attempts. Pt denies SI at the moment - says did it at the moment of being upset.  C/o chronic back pains, anxiety (02 Jun 2021 13:31)    Hospital course: pt was found to have elevated APAP levels, given n-acetylcysteine IVPB protocol. no elevations in LFTs noted. maintained on constant observation while admitted to /S.   pt is clear for in patient psychiatric admission.     Vital Signs Last 24 Hrs  T(C): 36.5 (04 Jun 2021 08:20), Max: 37 (03 Jun 2021 21:00)  T(F): 97.7 (04 Jun 2021 08:20), Max: 98.6 (03 Jun 2021 21:00)  HR: 77 (04 Jun 2021 08:20) (67 - 109)  BP: 127/67 (04 Jun 2021 08:20) (124/59 - 132/89)  BP(mean): --  RR: 17 (04 Jun 2021 08:20) (17 - 18)  SpO2: 96% (04 Jun 2021 08:20) (96% - 99%)      LABS                         13.9   12.54 )-----------( 331      ( 03 Jun 2021 06:59 )             39.8     06-04    140  |  109<H>  |  15  ----------------------------<  98  3.9   |  23  |  0.80    Ca    9.5      04 Jun 2021 07:39    TPro  8.1  /  Alb  3.5  /  TBili  0.4  /  DBili  x   /  AST  25  /  ALT  51  /  AlkPhos  111  06-04    LIVER FUNCTIONS - ( 04 Jun 2021 07:39 )  Alb: 3.5 g/dL / Pro: 8.1 gm/dL / ALK PHOS: 111 U/L / ALT: 51 U/L / AST: 25 U/L / GGT: x           PT/INR - ( 02 Jun 2021 13:17 )   PT: 13.2 sec;   INR: 1.14 ratio    PTT - ( 02 Jun 2021 13:17 )  PTT:39.2 sec    PE  Constitutional: NAD, awake and alert, well-developed  HEENT: PERR, EOMI, Normal Hearing, MMM, corrected vision   Neck: Soft and supple, No LAD, No JVD  Respiratory: Breath sounds are clear bilaterally, No wheezing, rales or rhonchi  Cardiovascular: S1 and S2, regular rate and rhythm, no Murmurs, gallops or rubs  Gastrointestinal: Bowel Sounds present, soft, nontender, nondistended, no guarding, no rebound  Extremities: No peripheral edema  Vascular: 2+ peripheral pulses  Neurological: A/O x 3, no focal deficits  Musculoskeletal: 5/5 strength b/l upper and lower extremities  Skin: No rashes  psych: anxious, tearful and expressive.     1. Tylenol OD in SA   - s/p NAC protocol   - APAP level now <2   - LFTs WNL at present (pt reports extensive work up out pt for elevated LFTs)   - admit to in patient [address] [place] [address].     2. SA with psychiatric Hx   - further management as per psychiatry  - cont 1:1   - medically clear for in patient psych     3. DMII   a1c 5.2   refusing BGM     4. CKD st III  - monitor    5. VTE proph - s/p LMWH                           CC: Tylenol OD  HPI:  30 y.o. female with extensive psychiatric Hx p/w unobserved tylenol OD in suicidal attempts. Pt denies SI at the moment - says did it at the moment of being upset.  C/o chronic back pains, anxiety (02 Jun 2021 13:31)    Hospital course: pt was found to have elevated APAP levels, given n-acetylcysteine IVPB protocol. no elevations in LFTs noted. maintained on constant observation while admitted to /S.   pt is clear for in patient psychiatric admission.     Vital Signs Last 24 Hrs  T(C): 36.5 (04 Jun 2021 08:20), Max: 37 (03 Jun 2021 21:00)  T(F): 97.7 (04 Jun 2021 08:20), Max: 98.6 (03 Jun 2021 21:00)  HR: 77 (04 Jun 2021 08:20) (67 - 109)  BP: 127/67 (04 Jun 2021 08:20) (124/59 - 132/89)  BP(mean): --  RR: 17 (04 Jun 2021 08:20) (17 - 18)  SpO2: 96% (04 Jun 2021 08:20) (96% - 99%)      LABS                         13.9   12.54 )-----------( 331      ( 03 Jun 2021 06:59 )             39.8     06-04    140  |  109<H>  |  15  ----------------------------<  98  3.9   |  23  |  0.80    Ca    9.5      04 Jun 2021 07:39    TPro  8.1  /  Alb  3.5  /  TBili  0.4  /  DBili  x   /  AST  25  /  ALT  51  /  AlkPhos  111  06-04    LIVER FUNCTIONS - ( 04 Jun 2021 07:39 )  Alb: 3.5 g/dL / Pro: 8.1 gm/dL / ALK PHOS: 111 U/L / ALT: 51 U/L / AST: 25 U/L / GGT: x           PT/INR - ( 02 Jun 2021 13:17 )   PT: 13.2 sec;   INR: 1.14 ratio    PTT - ( 02 Jun 2021 13:17 )  PTT:39.2 sec    PE  Constitutional: NAD, awake and alert, well-developed  HEENT: PERR, EOMI, Normal Hearing, MMM, corrected vision   Neck: Soft and supple, No LAD, No JVD  Respiratory: Breath sounds are clear bilaterally, No wheezing, rales or rhonchi  Cardiovascular: S1 and S2, regular rate and rhythm, no Murmurs, gallops or rubs  Gastrointestinal: Bowel Sounds present, soft, nontender, nondistended, no guarding, no rebound  Extremities: No peripheral edema  Vascular: 2+ peripheral pulses  Neurological: A/O x 3, no focal deficits  Musculoskeletal: 5/5 strength b/l upper and lower extremities  Skin: No rashes  psych: anxious, tearful and expressive.     1. Tylenol OD in SA   - s/p NAC protocol   - APAP level now <2   - LFTs WNL at present (pt reports extensive work up out pt for elevated LFTs)   - admit to in patient [address] [place] [address].     2. SA with psychiatric Hx   - further management as per psychiatry  - cont 1:1   - medically clear for in patient psych     3. DMII   a1c 5.2   refusing BGM     4. CKD st III  - monitor    5. VTE proph - s/p LMWH    Attending Attestation:  I agree with the assessment and plan of ABA Guy as stated and discussed.

## 2021-06-04 NOTE — PROGRESS NOTE BEHAVIORAL HEALTH - NSBHFUPVIOLFT_PSY_A_CORE
pt is trowing objects . has hx of being aggressive towards others.
pt is trowing objects . has hx of being aggressive towards others.

## 2021-06-04 NOTE — PATIENT PROFILE BEHAVIORAL HEALTH - REASON FOR ADMISSION
Patient is a 30 year-old White female, employed as a middle school nurse, domiciled with family, with a history of multiple prior hospitalizations, last at  about 1 year ago, with a history of self-harm behaviors, multiple prior suicide attempts, but no history of violence and arrests, and no history of substance use/abuse.  She presented to the ED and reported overdosing on Tylenol and Clonidine, amount inconsistent.  Patient was sent to the medical floor and has been cleared medically for admission to .  She is admitted on a [facility] Status.  Her admitting diagnosis is Borderline Personality Disorder.  Her admitting physician is Dr. Lemons.  Her medical history includes Obesity, PCOS, HTN, Kidney and Liver issues.  During admission interview, patient explained the reason she took the overdose is because she started having issues with her job and felt depressed about it.  Currently, she denies suicidal/homicidal ideation and contracted for safety.  She also denies auditory/visual hallucinations.  Continue supportive care and safety; continue monitor patient closely.

## 2021-06-04 NOTE — PROGRESS NOTE BEHAVIORAL HEALTH - SUMMARY
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   Pt attempted suicide by o/d on Tylenol.    Patient presents agitated,  angry and hostile.  Patient is s/p  suicide attempt via overdose of high lethality. These symptoms represent a change from baseline from which the patient cannot be reasonably expected to improve with current level of care. The patient presents with risk requiring inpatient psychiatric hospitalization for safety and stabilization.  Pt is a danger to herself and others and requires inpatient psychiatric hospitalization for safety and stabilization. She meets criteria for 2PC 9.17 status due to high risk to harm self and others.     Would hold Cymbalta due to hepatotoxicity and s/p hepatotoxic drug Tylenol o/d.  Continue Lamictal 200MG and PRN Haldol 5 mg and ativan 2 mg IM for severe agitation.     defer other meds mngmt to primary psych team.    Awaiting medical clearance for transfer to .
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   Pt attempted suicide by o/d on Tylenol.    Patient presents agitated,  angry and hostile.  Patient is s/p  suicide attempt via overdose of high lethality. These symptoms represent a change from baseline from which the patient cannot be reasonably expected to improve with current level of care. The patient presents with risk requiring inpatient psychiatric hospitalization for safety and stabilization.  Pt is a danger to herself and others and requires inpatient psychiatric hospitalization for safety and stabilization. She meets criteria for 2PC 9.17 status due to high risk to harm self and others.     Would hold Cymbalta due to hepatotoxicity and s/p hepatotoxic drug Tylenol o/d.  Continue Lamictal 200MG and PRN Haldol 5 mg and ativan 2 mg IM for severe agitation.     defer other meds mngmt to primary psych team.    Awaiting medical clearance for transfer to .
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 Recommend formal tox consult/clearance considering pt's inconsistent report of quantity of overdose and patient and mother minimizing overdose considering potential for lethality. Recommend reassessment by local team for possible admission.    6.2.2021 - Patient remains with severe depressive symptoms including by borderline personality disorder. Patient has recent suicide attempt via overdose of high lethality. These symptoms represent a change from baseline from which the patient cannot be reasonably expected to improve with current level of care. The patient presents with risk requiring inpatient psychiatric hospitalization for safety and stabilization.

## 2021-06-04 NOTE — PROGRESS NOTE BEHAVIORAL HEALTH - NSBHFUPINTERVALHXFT_PSY_A_CORE
Patient is alert and oriented to person, time, place and situation. Patient perseverative on ensuring her pain medications are continued during inpatient setting. Patient admitting to suicide attempt via overdose however stating "it was impulsive and stupid." Patient reports being amendable to psychiatric admission and stabilization. Otherwise has no irritability, mood lability, agitation or aggression; no mood dysregulation. Patient is in good behavioral and impulse control.
Patient is alert and oriented to person, time, place and situation. Patient endorsing severe depressed mood secondary to being overwhelmed lately, secondary to psychosocial stressors, and endorsing suicide attempt via overdose Tylenol last night: initial acetaminophen level 85. Denies manic / psychotic symptoms. No delusions elicited.
Pt is in her room, on CO, reports suicide attempt by o/d on Tylenol. Pt states she already was told that she fatty  liver and her NP wanted to change meds and give her less hepatotoxic (assuming it is about Cymbalta) but pt refused that.   PT knew she had fatty liver before she decided to overdose on hepatotoxic med: Tylenol.   PT denies having SI today, Denies HI. Denies depression but became  agitated once she  started to c/o her underwear being taken away from her as a part our hospital policy and threw a plastic bottle on the floor.    PT also started yelling in the light of discussion  of transfer to [facility] (inpatient psychiatry) and said "there is nothing they can do" and "I can stray there 5 days but that still does not mean I will not do anything after they d/c me".

## 2021-06-04 NOTE — PROGRESS NOTE BEHAVIORAL HEALTH - NSBHCONSULTIPREASON_PSY_A_CORE
danger to self; mental illness expected to respond to inpatient care
danger to others; mental illness expected to respond to inpatient care
danger to others; mental illness expected to respond to inpatient care

## 2021-06-05 LAB
ALBUMIN SERPL ELPH-MCNC: 3.7 G/DL — SIGNIFICANT CHANGE UP (ref 3.3–5)
ALP SERPL-CCNC: 104 U/L — SIGNIFICANT CHANGE UP (ref 40–120)
ALT FLD-CCNC: 47 U/L — SIGNIFICANT CHANGE UP (ref 12–78)
AST SERPL-CCNC: 24 U/L — SIGNIFICANT CHANGE UP (ref 15–37)
BASOPHILS # BLD AUTO: 0.09 K/UL — SIGNIFICANT CHANGE UP (ref 0–0.2)
BASOPHILS NFR BLD AUTO: 0.6 % — SIGNIFICANT CHANGE UP (ref 0–2)
BILIRUB DIRECT SERPL-MCNC: <0.1 MG/DL — SIGNIFICANT CHANGE UP (ref 0–0.2)
BILIRUB INDIRECT FLD-MCNC: >0.5 MG/DL — SIGNIFICANT CHANGE UP (ref 0.2–1)
BILIRUB SERPL-MCNC: 0.6 MG/DL — SIGNIFICANT CHANGE UP (ref 0.2–1.2)
COVID-19 SPIKE DOMAIN AB INTERP: POSITIVE
COVID-19 SPIKE DOMAIN ANTIBODY RESULT: >250 U/ML — HIGH
EOSINOPHIL # BLD AUTO: 0.18 K/UL — SIGNIFICANT CHANGE UP (ref 0–0.5)
EOSINOPHIL NFR BLD AUTO: 1.2 % — SIGNIFICANT CHANGE UP (ref 0–6)
HCT VFR BLD CALC: 40.8 % — SIGNIFICANT CHANGE UP (ref 34.5–45)
HGB BLD-MCNC: 14.3 G/DL — SIGNIFICANT CHANGE UP (ref 11.5–15.5)
IMM GRANULOCYTES NFR BLD AUTO: 0.3 % — SIGNIFICANT CHANGE UP (ref 0–1.5)
LYMPHOCYTES # BLD AUTO: 28.2 % — SIGNIFICANT CHANGE UP (ref 13–44)
LYMPHOCYTES # BLD AUTO: 4.3 K/UL — HIGH (ref 1–3.3)
MCHC RBC-ENTMCNC: 28.5 PG — SIGNIFICANT CHANGE UP (ref 27–34)
MCHC RBC-ENTMCNC: 35 GM/DL — SIGNIFICANT CHANGE UP (ref 32–36)
MCV RBC AUTO: 81.4 FL — SIGNIFICANT CHANGE UP (ref 80–100)
MONOCYTES # BLD AUTO: 0.85 K/UL — SIGNIFICANT CHANGE UP (ref 0–0.9)
MONOCYTES NFR BLD AUTO: 5.6 % — SIGNIFICANT CHANGE UP (ref 2–14)
NEUTROPHILS # BLD AUTO: 9.8 K/UL — HIGH (ref 1.8–7.4)
NEUTROPHILS NFR BLD AUTO: 64.1 % — SIGNIFICANT CHANGE UP (ref 43–77)
PLATELET # BLD AUTO: 383 K/UL — SIGNIFICANT CHANGE UP (ref 150–400)
PROT SERPL-MCNC: 8.4 GM/DL — HIGH (ref 6–8.3)
RBC # BLD: 5.01 M/UL — SIGNIFICANT CHANGE UP (ref 3.8–5.2)
RBC # FLD: 13.8 % — SIGNIFICANT CHANGE UP (ref 10.3–14.5)
SARS-COV-2 IGG+IGM SERPL QL IA: >250 U/ML — HIGH
SARS-COV-2 IGG+IGM SERPL QL IA: POSITIVE
WBC # BLD: 15.27 K/UL — HIGH (ref 3.8–10.5)
WBC # FLD AUTO: 15.27 K/UL — HIGH (ref 3.8–10.5)

## 2021-06-05 PROCEDURE — 93010 ELECTROCARDIOGRAM REPORT: CPT

## 2021-06-05 PROCEDURE — 99232 SBSQ HOSP IP/OBS MODERATE 35: CPT

## 2021-06-05 RX ORDER — HYDROXYZINE HCL 10 MG
50 TABLET ORAL DAILY
Refills: 0 | Status: DISCONTINUED | OUTPATIENT
Start: 2021-06-05 | End: 2021-06-08

## 2021-06-05 RX ORDER — TRAZODONE HCL 50 MG
150 TABLET ORAL AT BEDTIME
Refills: 0 | Status: DISCONTINUED | OUTPATIENT
Start: 2021-06-05 | End: 2021-06-08

## 2021-06-05 RX ORDER — HYDROXYZINE HCL 10 MG
50 TABLET ORAL ONCE
Refills: 0 | Status: COMPLETED | OUTPATIENT
Start: 2021-06-05 | End: 2021-06-05

## 2021-06-05 RX ORDER — PANTOPRAZOLE SODIUM 20 MG/1
40 TABLET, DELAYED RELEASE ORAL
Refills: 0 | Status: DISCONTINUED | OUTPATIENT
Start: 2021-06-05 | End: 2021-06-08

## 2021-06-05 RX ORDER — OXYCODONE HYDROCHLORIDE 5 MG/1
5 TABLET ORAL ONCE
Refills: 0 | Status: DISCONTINUED | OUTPATIENT
Start: 2021-06-05 | End: 2021-06-05

## 2021-06-05 RX ORDER — OXYCODONE HYDROCHLORIDE 5 MG/1
10 TABLET ORAL ONCE
Refills: 0 | Status: DISCONTINUED | OUTPATIENT
Start: 2021-06-05 | End: 2021-06-05

## 2021-06-05 RX ORDER — LIDOCAINE 4 G/100G
1 CREAM TOPICAL DAILY
Refills: 0 | Status: DISCONTINUED | OUTPATIENT
Start: 2021-06-05 | End: 2021-06-08

## 2021-06-05 RX ORDER — OXYCODONE HYDROCHLORIDE 5 MG/1
10 TABLET ORAL EVERY 12 HOURS
Refills: 0 | Status: DISCONTINUED | OUTPATIENT
Start: 2021-06-06 | End: 2021-06-08

## 2021-06-05 RX ADMIN — Medication 600 MILLIGRAM(S): at 10:16

## 2021-06-05 RX ADMIN — Medication 0.1 MILLIGRAM(S): at 20:39

## 2021-06-05 RX ADMIN — OXYCODONE HYDROCHLORIDE 5 MILLIGRAM(S): 5 TABLET ORAL at 14:24

## 2021-06-05 RX ADMIN — GABAPENTIN 800 MILLIGRAM(S): 400 CAPSULE ORAL at 09:25

## 2021-06-05 RX ADMIN — Medication 0.1 MILLIGRAM(S): at 09:25

## 2021-06-05 RX ADMIN — OXYCODONE HYDROCHLORIDE 5 MILLIGRAM(S): 5 TABLET ORAL at 11:49

## 2021-06-05 RX ADMIN — GABAPENTIN 800 MILLIGRAM(S): 400 CAPSULE ORAL at 21:29

## 2021-06-05 RX ADMIN — GABAPENTIN 800 MILLIGRAM(S): 400 CAPSULE ORAL at 13:52

## 2021-06-05 RX ADMIN — Medication 1 MILLIGRAM(S): at 22:42

## 2021-06-05 RX ADMIN — LISINOPRIL 20 MILLIGRAM(S): 2.5 TABLET ORAL at 20:39

## 2021-06-05 RX ADMIN — Medication 150 MILLIGRAM(S): at 21:29

## 2021-06-05 RX ADMIN — OXYCODONE HYDROCHLORIDE 10 MILLIGRAM(S): 5 TABLET ORAL at 21:28

## 2021-06-05 RX ADMIN — Medication 600 MILLIGRAM(S): at 00:02

## 2021-06-05 RX ADMIN — Medication 600 MILLIGRAM(S): at 09:25

## 2021-06-05 RX ADMIN — Medication 50 MILLIGRAM(S): at 10:05

## 2021-06-05 RX ADMIN — Medication 600 MILLIGRAM(S): at 18:33

## 2021-06-05 RX ADMIN — Medication 50 MILLIGRAM(S): at 21:28

## 2021-06-05 RX ADMIN — DULOXETINE HYDROCHLORIDE 60 MILLIGRAM(S): 30 CAPSULE, DELAYED RELEASE ORAL at 09:25

## 2021-06-05 RX ADMIN — Medication 50 MILLIGRAM(S): at 13:52

## 2021-06-05 RX ADMIN — OXYCODONE HYDROCHLORIDE 5 MILLIGRAM(S): 5 TABLET ORAL at 15:24

## 2021-06-05 RX ADMIN — OXYCODONE HYDROCHLORIDE 10 MILLIGRAM(S): 5 TABLET ORAL at 21:57

## 2021-06-05 RX ADMIN — LISINOPRIL 20 MILLIGRAM(S): 2.5 TABLET ORAL at 09:25

## 2021-06-05 RX ADMIN — DULOXETINE HYDROCHLORIDE 30 MILLIGRAM(S): 30 CAPSULE, DELAYED RELEASE ORAL at 21:29

## 2021-06-05 RX ADMIN — OXYCODONE HYDROCHLORIDE 5 MILLIGRAM(S): 5 TABLET ORAL at 10:05

## 2021-06-05 RX ADMIN — Medication 5 MILLIGRAM(S): at 21:29

## 2021-06-05 RX ADMIN — Medication 600 MILLIGRAM(S): at 19:14

## 2021-06-06 PROCEDURE — 99232 SBSQ HOSP IP/OBS MODERATE 35: CPT

## 2021-06-06 RX ADMIN — Medication 0.1 MILLIGRAM(S): at 22:06

## 2021-06-06 RX ADMIN — LIDOCAINE 1 PATCH: 4 CREAM TOPICAL at 09:52

## 2021-06-06 RX ADMIN — DULOXETINE HYDROCHLORIDE 60 MILLIGRAM(S): 30 CAPSULE, DELAYED RELEASE ORAL at 09:51

## 2021-06-06 RX ADMIN — Medication 600 MILLIGRAM(S): at 17:44

## 2021-06-06 RX ADMIN — OXYCODONE HYDROCHLORIDE 10 MILLIGRAM(S): 5 TABLET ORAL at 22:05

## 2021-06-06 RX ADMIN — OXYCODONE HYDROCHLORIDE 10 MILLIGRAM(S): 5 TABLET ORAL at 09:50

## 2021-06-06 RX ADMIN — Medication 50 MILLIGRAM(S): at 12:16

## 2021-06-06 RX ADMIN — GABAPENTIN 800 MILLIGRAM(S): 400 CAPSULE ORAL at 09:50

## 2021-06-06 RX ADMIN — Medication 50 MILLIGRAM(S): at 22:07

## 2021-06-06 RX ADMIN — Medication 0.1 MILLIGRAM(S): at 09:51

## 2021-06-06 RX ADMIN — Medication 600 MILLIGRAM(S): at 08:15

## 2021-06-06 RX ADMIN — DULOXETINE HYDROCHLORIDE 30 MILLIGRAM(S): 30 CAPSULE, DELAYED RELEASE ORAL at 22:05

## 2021-06-06 RX ADMIN — Medication 50 MILLIGRAM(S): at 09:51

## 2021-06-06 RX ADMIN — Medication 50 MILLIGRAM(S): at 23:20

## 2021-06-06 RX ADMIN — Medication 600 MILLIGRAM(S): at 09:55

## 2021-06-06 RX ADMIN — LISINOPRIL 20 MILLIGRAM(S): 2.5 TABLET ORAL at 09:51

## 2021-06-06 RX ADMIN — LISINOPRIL 20 MILLIGRAM(S): 2.5 TABLET ORAL at 22:07

## 2021-06-06 RX ADMIN — OXYCODONE HYDROCHLORIDE 10 MILLIGRAM(S): 5 TABLET ORAL at 10:47

## 2021-06-06 RX ADMIN — GABAPENTIN 800 MILLIGRAM(S): 400 CAPSULE ORAL at 22:05

## 2021-06-06 RX ADMIN — Medication 600 MILLIGRAM(S): at 17:50

## 2021-06-06 RX ADMIN — PANTOPRAZOLE SODIUM 40 MILLIGRAM(S): 20 TABLET, DELAYED RELEASE ORAL at 07:15

## 2021-06-06 RX ADMIN — Medication 5 MILLIGRAM(S): at 22:06

## 2021-06-06 RX ADMIN — Medication 150 MILLIGRAM(S): at 22:05

## 2021-06-06 RX ADMIN — LIDOCAINE 1 PATCH: 4 CREAM TOPICAL at 19:15

## 2021-06-06 RX ADMIN — GABAPENTIN 800 MILLIGRAM(S): 400 CAPSULE ORAL at 13:33

## 2021-06-07 PROCEDURE — 99232 SBSQ HOSP IP/OBS MODERATE 35: CPT

## 2021-06-07 RX ORDER — CLONAZEPAM 1 MG
0.5 TABLET ORAL ONCE
Refills: 0 | Status: DISCONTINUED | OUTPATIENT
Start: 2021-06-07 | End: 2021-06-07

## 2021-06-07 RX ADMIN — Medication 600 MILLIGRAM(S): at 09:50

## 2021-06-07 RX ADMIN — LISINOPRIL 20 MILLIGRAM(S): 2.5 TABLET ORAL at 09:32

## 2021-06-07 RX ADMIN — GABAPENTIN 800 MILLIGRAM(S): 400 CAPSULE ORAL at 09:31

## 2021-06-07 RX ADMIN — OXYCODONE HYDROCHLORIDE 10 MILLIGRAM(S): 5 TABLET ORAL at 10:36

## 2021-06-07 RX ADMIN — Medication 600 MILLIGRAM(S): at 11:38

## 2021-06-07 RX ADMIN — OXYCODONE HYDROCHLORIDE 10 MILLIGRAM(S): 5 TABLET ORAL at 11:47

## 2021-06-07 RX ADMIN — Medication 50 MILLIGRAM(S): at 14:43

## 2021-06-07 RX ADMIN — Medication 0.1 MILLIGRAM(S): at 09:32

## 2021-06-07 RX ADMIN — Medication 50 MILLIGRAM(S): at 22:13

## 2021-06-07 RX ADMIN — DULOXETINE HYDROCHLORIDE 30 MILLIGRAM(S): 30 CAPSULE, DELAYED RELEASE ORAL at 21:37

## 2021-06-07 RX ADMIN — DULOXETINE HYDROCHLORIDE 60 MILLIGRAM(S): 30 CAPSULE, DELAYED RELEASE ORAL at 09:31

## 2021-06-07 RX ADMIN — PANTOPRAZOLE SODIUM 40 MILLIGRAM(S): 20 TABLET, DELAYED RELEASE ORAL at 08:10

## 2021-06-07 RX ADMIN — Medication 150 MILLIGRAM(S): at 21:37

## 2021-06-07 RX ADMIN — Medication 50 MILLIGRAM(S): at 09:32

## 2021-06-07 RX ADMIN — OXYCODONE HYDROCHLORIDE 10 MILLIGRAM(S): 5 TABLET ORAL at 23:14

## 2021-06-07 RX ADMIN — Medication 600 MILLIGRAM(S): at 22:13

## 2021-06-07 RX ADMIN — Medication 0.1 MILLIGRAM(S): at 21:37

## 2021-06-07 RX ADMIN — GABAPENTIN 800 MILLIGRAM(S): 400 CAPSULE ORAL at 14:32

## 2021-06-07 RX ADMIN — GABAPENTIN 800 MILLIGRAM(S): 400 CAPSULE ORAL at 21:37

## 2021-06-07 RX ADMIN — OXYCODONE HYDROCHLORIDE 10 MILLIGRAM(S): 5 TABLET ORAL at 22:14

## 2021-06-07 RX ADMIN — LIDOCAINE 1 PATCH: 4 CREAM TOPICAL at 09:31

## 2021-06-07 RX ADMIN — Medication 600 MILLIGRAM(S): at 23:13

## 2021-06-07 RX ADMIN — LISINOPRIL 20 MILLIGRAM(S): 2.5 TABLET ORAL at 21:39

## 2021-06-07 RX ADMIN — Medication 5 MILLIGRAM(S): at 21:37

## 2021-06-07 RX ADMIN — Medication 0.5 MILLIGRAM(S): at 21:38

## 2021-06-08 VITALS — TEMPERATURE: 98 F | OXYGEN SATURATION: 100 % | RESPIRATION RATE: 18 BRPM

## 2021-06-08 DIAGNOSIS — F41.9 ANXIETY DISORDER, UNSPECIFIED: ICD-10-CM

## 2021-06-08 DIAGNOSIS — F60.3 BORDERLINE PERSONALITY DISORDER: ICD-10-CM

## 2021-06-08 DIAGNOSIS — F33.2 MAJOR DEPRESSIVE DISORDER, RECURRENT SEVERE WITHOUT PSYCHOTIC FEATURES: ICD-10-CM

## 2021-06-08 PROCEDURE — 99239 HOSP IP/OBS DSCHRG MGMT >30: CPT

## 2021-06-08 RX ORDER — LANOLIN ALCOHOL/MO/W.PET/CERES
1 CREAM (GRAM) TOPICAL
Qty: 0 | Refills: 0 | DISCHARGE
Start: 2021-06-08

## 2021-06-08 RX ORDER — DULOXETINE HYDROCHLORIDE 30 MG/1
1 CAPSULE, DELAYED RELEASE ORAL
Qty: 0 | Refills: 0 | DISCHARGE
Start: 2021-06-08

## 2021-06-08 RX ORDER — GABAPENTIN 400 MG/1
2 CAPSULE ORAL
Qty: 0 | Refills: 0 | DISCHARGE
Start: 2021-06-08

## 2021-06-08 RX ORDER — PANTOPRAZOLE SODIUM 20 MG/1
1 TABLET, DELAYED RELEASE ORAL
Qty: 0 | Refills: 0 | DISCHARGE
Start: 2021-06-08

## 2021-06-08 RX ORDER — OXYCODONE HYDROCHLORIDE 5 MG/1
1 TABLET ORAL
Qty: 0 | Refills: 0 | DISCHARGE
Start: 2021-06-08

## 2021-06-08 RX ORDER — PANTOPRAZOLE SODIUM 20 MG/1
1 TABLET, DELAYED RELEASE ORAL
Qty: 0 | Refills: 0 | DISCHARGE

## 2021-06-08 RX ORDER — HYDROXYZINE HCL 10 MG
2 TABLET ORAL
Qty: 0 | Refills: 0 | DISCHARGE

## 2021-06-08 RX ORDER — TRAZODONE HCL 50 MG
1 TABLET ORAL
Qty: 0 | Refills: 0 | DISCHARGE
Start: 2021-06-08

## 2021-06-08 RX ORDER — TIZANIDINE 4 MG/1
1 TABLET ORAL
Qty: 0 | Refills: 0 | DISCHARGE

## 2021-06-08 RX ORDER — LIDOCAINE 4 G/100G
1 CREAM TOPICAL
Qty: 0 | Refills: 0 | DISCHARGE
Start: 2021-06-08

## 2021-06-08 RX ORDER — LISINOPRIL 2.5 MG/1
1 TABLET ORAL
Qty: 0 | Refills: 0 | DISCHARGE
Start: 2021-06-08

## 2021-06-08 RX ORDER — DULOXETINE HYDROCHLORIDE 30 MG/1
1 CAPSULE, DELAYED RELEASE ORAL
Qty: 0 | Refills: 0 | DISCHARGE

## 2021-06-08 RX ORDER — SACCHAROMYCES BOULARDII 250 MG
1 POWDER IN PACKET (EA) ORAL
Qty: 0 | Refills: 0 | DISCHARGE

## 2021-06-08 RX ORDER — HYDROXYZINE HCL 10 MG
1 TABLET ORAL
Qty: 0 | Refills: 0 | DISCHARGE
Start: 2021-06-08

## 2021-06-08 RX ORDER — FAMOTIDINE 10 MG/ML
1 INJECTION INTRAVENOUS
Qty: 0 | Refills: 0 | DISCHARGE

## 2021-06-08 RX ORDER — CHOLECALCIFEROL (VITAMIN D3) 125 MCG
2 CAPSULE ORAL
Qty: 0 | Refills: 0 | DISCHARGE

## 2021-06-08 RX ORDER — IBUPROFEN 200 MG
1 TABLET ORAL
Qty: 0 | Refills: 0 | DISCHARGE
Start: 2021-06-08

## 2021-06-08 RX ADMIN — Medication 0.1 MILLIGRAM(S): at 10:27

## 2021-06-08 RX ADMIN — GABAPENTIN 800 MILLIGRAM(S): 400 CAPSULE ORAL at 10:30

## 2021-06-08 RX ADMIN — LIDOCAINE 1 PATCH: 4 CREAM TOPICAL at 11:23

## 2021-06-08 RX ADMIN — DULOXETINE HYDROCHLORIDE 60 MILLIGRAM(S): 30 CAPSULE, DELAYED RELEASE ORAL at 10:30

## 2021-06-08 RX ADMIN — Medication 50 MILLIGRAM(S): at 10:27

## 2021-06-08 RX ADMIN — Medication 600 MILLIGRAM(S): at 11:25

## 2021-06-08 RX ADMIN — Medication 600 MILLIGRAM(S): at 11:26

## 2021-06-08 RX ADMIN — GABAPENTIN 800 MILLIGRAM(S): 400 CAPSULE ORAL at 13:22

## 2021-06-08 RX ADMIN — LISINOPRIL 20 MILLIGRAM(S): 2.5 TABLET ORAL at 10:28

## 2021-06-08 RX ADMIN — PANTOPRAZOLE SODIUM 40 MILLIGRAM(S): 20 TABLET, DELAYED RELEASE ORAL at 10:28

## 2021-06-08 RX ADMIN — OXYCODONE HYDROCHLORIDE 10 MILLIGRAM(S): 5 TABLET ORAL at 10:28

## 2021-06-08 NOTE — DISCHARGE NOTE BEHAVIORAL HEALTH - NSBHDCPURPOSE2FT_PSY_A_CORE
Patient has an appointment for intake for DBT treatment at [facility] on Wednesday, 6/16/21 at 11:00AM.

## 2021-06-08 NOTE — PROGRESS NOTE BEHAVIORAL HEALTH - NSBHFUPINTERVALCCFT_PSY_A_CORE
"I need more pain medication "

## 2021-06-08 NOTE — DISCHARGE NOTE BEHAVIORAL HEALTH - NSBHDCPURPOSE1FT_PSY_A_CORE
Patient has an appointment with her therapist, Rodriguez Akers LCSW, for continuing psychotherapy on Wednesday, 6/9/2021 at 3:15PM.

## 2021-06-08 NOTE — DISCHARGE NOTE BEHAVIORAL HEALTH - NSBHDCTESTSFT_PSY_A_CORE
Lipid Profile (06.03.21 @ 06:59)   Cholesterol, Serum: 242 mg/dL   Triglycerides, Serum: 162 mg/dL   HDL Cholesterol, Serum: 46 mg/dL   Non HDL Cholesterol: 196: Patients Atherosclerotic Cardiovascular Disease (ASCVD) Risk   Thyroid Stimulating Hormone, Serum: 0.78 uU/mL (06.03.21 @ 06:59)   A1C with Estimated Average Glucose Result: 5.2: Method: Immunoassay

## 2021-06-08 NOTE — DISCHARGE NOTE BEHAVIORAL HEALTH - HPI (INCLUDE ILLNESS QUALITY, SEVERITY, DURATION, TIMING, CONTEXT, MODIFYING FACTORS, ASSOCIATED SIGNS AND SYMPTOMS)
As per ED Assessment:  "I took a bunch of pills..about 15-20 Advil".  26 yowf, employed as RN at [employer], lives with mother and step father, PPH Anxiety, ADHD, chronic Insomnia, no prior inpt psych admissions or SA denies h/o cutting alcohol or drug abuse, on psych meds by Neurologist Adderall, Xanax, Celexa, at [facility], [facility] Obesity, Chronic Pain, bulging discs, Patella alignment surgery left leg, poly cystic ovaries, "Hardening  of Kidneys", bib mother after taking an OD of Advil, 15-20,  200 mg pills after phone call from work telling her she is off schedule, perceived as "fired".    Pt reports taking OD but could not give intention or say why she took the pills.  Admits to feeling upset, but denies this was SA.  Pt reports she was called from work and was told not to come in by NM who she has had issues with in past.  Pt mother reports Pt written up in past due to complaints by patients x 4, which Pt grieved, and won.  Pt was told to report to nursing office tomorrow and feels she will be fired and states she will not go in.  Mother reports no known h/o self harm but is worried about leaving her home alone tomorrow.  Mother called Pt therapist who also recommends admission, per mother.   Pt did not answer when asked if depressed and stated, "Its really anxiety".  Pt admits to "No coping skills, but would not elaborate.  Pt was guarded and withholding and gave minimal responses to questions asked.  Pt reports chronic Insomnia and takes Xanax to sleep.  She has been on ADHD meds since Middle school.  Pt denies SI /plan or intent, however reliability is in question due to evasive and brief responses.    12/6/17:  Pt. demonstrating poor frustration tolerance, difficulty waiting for disposition information and upset that "my pain isn't helped and my meds are all messed up; don't you care?"  Mother brought clothes to pt, pt. found in clothing yelling that she would not take them off. Pt. perceives she has not been taken care of despite multiple interventions by ED RN's and CO maintained.   Tearfully yelling that she does not want to be with "them" (psychiatric pts) "they better not talk to me, I won't be in a room with one of them".   Pt. states wants to go home. Denies current A/V/O/T hallucinations. "Impulsive" OD yesterday was reportedly precipitated by call from work with pt. anticipating being fired. Mother called job([place] [employer]). Mother voicing concern that pain management be addressed "that is the base of her problems with others issues". As per ED Assessment:  "I took a bunch of pills..about 15-20 Advil".  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 Obesity, Chronic Pain, bulging discs, Patella alignment surgery left leg, poly cystic ovaries, "Hardening  of Kidneys", bib mother after taking an OD of Advil, 15-20,  200 mg pills after phone call from work telling her she is off schedule, perceived as "fired".    Pt reports taking OD but could not give intention or say why she took the pills.  Admits to feeling upset, but denies this was SA.  Pt reports she was called from work and was told not to come in by NM who she has had issues with in past.  Pt mother reports Pt written up in past due to complaints by patients x 4, which Pt grieved, and won.  Pt was told to report to nursing office tomorrow and feels she will be fired and states she will not go in.  Mother reports no known h/o self harm but is worried about leaving her home alone tomorrow.  Mother called Pt therapist who also recommends admission, per mother.   Pt did not answer when asked if depressed and stated, "Its really anxiety".  Pt admits to "No coping skills, but would not elaborate.  Pt was guarded and withholding and gave minimal responses to questions asked.  Pt reports chronic Insomnia and takes Xanax to sleep.  She has been on ADHD meds since Middle school.  Pt denies SI /plan or intent, however reliability is in question due to evasive and brief responses.    12/6/17:  Pt. demonstrating poor frustration tolerance, difficulty waiting for disposition information and upset that "my pain isn't helped and my meds are all messed up; don't you care?"  Mother brought clothes to pt, pt. found in clothing yelling that she would not take them off. Pt. perceives she has not been taken care of despite multiple interventions by ED RN's and CO maintained.   Tearfully yelling that she does not want to be with "them" (psychiatric pts) "they better not talk to me, I won't be in a room with one of them".   Pt. states wants to go home. Denies current A/V/O/T hallucinations. "Impulsive" OD yesterday was reportedly precipitated by call from work with pt. anticipating being fired. Mother called job([employer]). Mother voicing concern that pain management be addressed "that is the base of her problems with others issues".    Interval Hx: Patient was admitted involuntarily following medical clearance. Mood seems OK, concerned with pain meds as ordered and was given the way she wants, stressed due to her job, family etc, but wanting to go back to reality and denied current S/H/I/P. Patient has borderline features, unstable mood with poor relations with her step-father and mother with whom she lives. She has done multiple OD attempts in the past all in the context of impulsivity and social stresses. She gets easily frustrated if meds are not given the way she wants so she was given all her pain meds as previously given. She stabilized on the 2nd say endorsing that she was sorry at what she did and played on the same tune subsequently. She was no longer suicidal, contracted safety and was also not on 1:1 for her impulsivity. No meds were changes as previously given except the Trazodone which was increased to Trazodone 150 mg HS. No meds were needed as requested as she has enough supplies.

## 2021-06-08 NOTE — DISCHARGE NOTE BEHAVIORAL HEALTH - NSBHDCSUICSAFETYFT_PSY_A_CORE
Advised to return to hospital or go to nearest ED or call 911 or [phone] LIFENET or [phone] hotlines for any severe, worsening or persistent symptoms including suicidal/homicidal ideations, intent or plans. Patient verbalized understanding of instructions.

## 2021-06-08 NOTE — DISCHARGE NOTE BEHAVIORAL HEALTH - NSBHDCDXVALIDYESFT_PSY_A_CORE
Primary Dx Severe episode of recurrent major depressive disorder, without psychotic features F33.2. Secondary Dx 1 Anxiety disorder F41.9. Secondary Dx 2 Borderline personality disorder F60.3.

## 2021-06-08 NOTE — PROGRESS NOTE BEHAVIORAL HEALTH - THOUGHT CONTENT
Unremarkable/Suicidality

## 2021-06-08 NOTE — PROGRESS NOTE BEHAVIORAL HEALTH - SUMMARY
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 Recommend formal tox consult/clearance considering pt's inconsistent report of quantity of overdose and patient and mother minimizing overdose considering potential for lethality.

## 2021-06-08 NOTE — DISCHARGE NOTE BEHAVIORAL HEALTH - MEDICATION SUMMARY - MEDICATIONS TO TAKE
I will START or STAY ON the medications listed below when I get home from the hospital:    oxyCODONE 10 mg oral tablet  -- 1 tab(s) by mouth every 12 hours, As needed, Mod Pain  -- Indication: For Pain    ibuprofen 600 mg oral tablet  -- 1 tab(s) by mouth every 8 hours, As needed, Moderate Pain (4 - 6)  -- Indication: For Pain    lisinopril 20 mg oral tablet  -- 1 tab(s) by mouth 2 times a day  -- Indication: For HTN (hypertension)    cloNIDine 0.1 mg oral tablet  -- 1 tab(s) by mouth 2 times a day  -- Indication: For HTN (hypertension)    gabapentin 400 mg oral capsule  -- 2 cap(s) by mouth 3 times a day  -- Indication: For Mood    traZODone 150 mg oral tablet  -- 1 tab(s) by mouth once a day (at bedtime)  -- Indication: For Sleep    DULoxetine 30 mg oral delayed release capsule  -- 1 cap(s) by mouth once a day (at bedtime)  -- Indication: For Mood    DULoxetine 60 mg oral delayed release capsule  -- 1 cap(s) by mouth once a day  -- Indication: For Mood    hydrOXYzine hydrochloride 50 mg oral tablet  -- 1 tab(s) by mouth once a day  -- Indication: For Anxiety    lidocaine 5% topical film  -- Apply on skin to affected area once a day  -- Indication: For Pain    melatonin 5 mg oral tablet  -- 1 tab(s) by mouth once a day (at bedtime), As needed, Insomnia  -- Indication: For Sleep    pantoprazole 40 mg oral delayed release tablet  -- 1 tab(s) by mouth once a day (before a meal)  -- Indication: For GERD

## 2021-06-08 NOTE — DISCHARGE NOTE BEHAVIORAL HEALTH - NSBHDCCRISISPLAN1FT_PSY_A_CORE
Advised to return to hospital or go to nearest ED or call 911 or [phone] LIFENET or [phone] TALK hotlines for any severe, worsening or persistent symptoms including suicidal/homicidal ideations, intent or plans. Patient verbalized understanding of instructions.

## 2021-06-08 NOTE — PROGRESS NOTE BEHAVIORAL HEALTH - NSBHCHARTREVIEWLAB_PSY_A_CORE FT
13.9   12.54 )-----------( 331      ( 03 Jun 2021 06:59 )             39.8     CBC Full  -  ( 03 Jun 2021 06:59 )  WBC Count : 12.54 K/uL  RBC Count : 4.86 M/uL  Hemoglobin : 13.9 g/dL  Hematocrit : 39.8 %  Platelet Count - Automated : 331 K/uL  Mean Cell Volume : 81.9 fl  Mean Cell Hemoglobin : 28.6 pg  Mean Cell Hemoglobin Concentration : 34.9 gm/dL  Auto Neutrophil # : x  Auto Lymphocyte # : x  Auto Monocyte # : x  Auto Eosinophil # : x  Auto Basophil # : x  Auto Neutrophil % : x  Auto Lymphocyte % : x  Auto Monocyte % : x  Auto Eosinophil % : x  Auto Basophil % : x

## 2021-06-08 NOTE — DISCHARGE NOTE BEHAVIORAL HEALTH - CARE PROVIDER_API CALL
[facility],   See SW Note below for continued after care F/U  Phone: (   )    -  Fax: (   )    -  Follow Up Time:

## 2021-06-08 NOTE — DISCHARGE NOTE BEHAVIORAL HEALTH - MEDICATION SUMMARY - MEDICATIONS TO STOP TAKING
I will STOP taking the medications listed below when I get home from the hospital:    lamoTRIgine 200 mg oral tablet  -- 1 tab(s) by mouth once a day (at bedtime)    traZODone 100 mg oral tablet  -- 1 tab(s) by mouth once a day (at bedtime)    Florastor 250 mg oral capsule  -- 1 cap(s) by mouth once a day    Vitamin D3 2000 intl units (50 mcg) oral tablet  -- 2 tab(s) by mouth once a day

## 2021-06-08 NOTE — DISCHARGE NOTE BEHAVIORAL HEALTH - NSBHDCSUICFCTROTHERFT_PSY_A_CORE
To remain optimistic, and more affirmative of her deeds so she be able to convey her stresses to her therapist/Psychiatrist, or to come to ED for help

## 2021-06-08 NOTE — PROGRESS NOTE BEHAVIORAL HEALTH - NSBHCHARTREVIEWVS_PSY_A_CORE FT
Vital Signs Last 24 Hrs  T(C): 37.1 (07 Jun 2021 07:40), Max: 37.1 (07 Jun 2021 07:40)  T(F): 98.7 (07 Jun 2021 07:40), Max: 98.7 (07 Jun 2021 07:40)  HR: 96 (06 Jun 2021 20:14) (96 - 96)  BP: 121/76 (06 Jun 2021 20:14) (121/76 - 121/76)  BP(mean): --  RR: 12 (07 Jun 2021 07:40) (12 - 12)  SpO2: 97% (07 Jun 2021 07:40) (97% - 97%)
Vital Signs Last 24 Hrs  T(C): 36.8 (08 Jun 2021 08:15), Max: 36.8 (08 Jun 2021 08:15)  T(F): 98.2 (08 Jun 2021 08:15), Max: 98.2 (08 Jun 2021 08:15)  HR: --  BP: --  BP(mean): --  RR: 18 (08 Jun 2021 08:15) (18 - 18)  SpO2: 100% (08 Jun 2021 08:15) (100% - 100%)
Vital Signs Last 24 Hrs  T(C): 36.5 (04 Jun 2021 08:20), Max: 37 (03 Jun 2021 21:00)  T(F): 97.7 (04 Jun 2021 08:20), Max: 98.6 (03 Jun 2021 21:00)  HR: 77 (04 Jun 2021 08:20) (67 - 109)  BP: 127/67 (04 Jun 2021 08:20) (124/59 - 132/89)  BP(mean): --  RR: 17 (04 Jun 2021 08:20) (17 - 18)  SpO2: 96% (04 Jun 2021 08:20) (96% - 99%)
Vital Signs Last 24 Hrs  T(C): 36.8 (06 Jun 2021 07:45), Max: 36.9 (05 Jun 2021 21:11)  T(F): 98.3 (06 Jun 2021 07:45), Max: 98.5 (05 Jun 2021 21:11)  HR: 113 (05 Jun 2021 21:11) (113 - 113)  BP: 148/85 (05 Jun 2021 21:11) (148/85 - 148/85)  BP(mean): --  RR: 14 (06 Jun 2021 07:45) (14 - 18)  SpO2: 97% (06 Jun 2021 07:45) (97% - 98%)
Vital Signs Last 24 Hrs  T(C): 36.5 (04 Jun 2021 08:20), Max: 37 (03 Jun 2021 21:00)  T(F): 97.7 (04 Jun 2021 08:20), Max: 98.6 (03 Jun 2021 21:00)  HR: 77 (04 Jun 2021 08:20) (67 - 109)  BP: 127/67 (04 Jun 2021 08:20) (124/59 - 132/89)  BP(mean): --  RR: 17 (04 Jun 2021 08:20) (17 - 18)  SpO2: 96% (04 Jun 2021 08:20) (96% - 99%)

## 2021-06-08 NOTE — PROGRESS NOTE BEHAVIORAL HEALTH - THOUGHT PROCESS
Linear/Impaired reasoning

## 2021-06-08 NOTE — DISCHARGE NOTE BEHAVIORAL HEALTH - NSBHDCSUICFCTRSFT_PSY_A_CORE
Hospitalized, and endorses that it was an impulsive event and was remorseful of her actions, was advised to be open to her stresses so effective action be taken before an episode. F/U with after care as suggested. Educated

## 2021-06-08 NOTE — PROGRESS NOTE BEHAVIORAL HEALTH - NSBHFUPINTERVALHXFT_PSY_A_CORE
Pt claiming to be remorseful of the overdose "it was impulsive" but now asking for opiate based pain medication . Urine positive for THC.    06/05/2021: Patient was seen today AM, chart reviewed and discussed in team. Mood seems OK, concerned with pain meds as ordered and was given the way she wants,. stressed due to her job, family etc, but wanting to go back to reality and denied current S/H/I/P, has some sleep issues and thus Trazodone was increased to Trazodone 150 mg HS. To continue with other meds as ordered.
Pt claiming to be remorseful of the overdose "it was impulsive" but now asking for opiate based pain medication . Urine positive for THC.    06/05/2021: Patient was seen today AM, chart reviewed and discussed in team. Mood seems OK, concerned with pain meds as ordered and was given the way she wants,. stressed due to her job, family etc, but wanting to go back to reality and denied current S/H/I/P, has some sleep issues and thus Trazodone was increased to Trazodone 150 mg HS. To continue with other meds as ordered.    06/06/2021: Patient was seen today AM, chart reviewed and discussed in team. She has limited residual anxiety  and was advised to cope with coping skills and as she is ion too much pain meds, it's not feasible to add more meds and she is okayed with it, fair to good sleep/appetite, remorseful of her actions and not S/H anymore, she is planning to go to IOP at [facility] to explore for probability to be accepted. No meds changes for now.
Pt claiming to be remorseful of the overdose "it was impulsive" but now asking for opiate based pain medication . Urine positive for THC.
Pt claiming to be remorseful of the overdose "it was impulsive" but now asking for opiate based pain medication . Urine positive for THC.    06/05/2021: Patient was seen today AM, chart reviewed and discussed in team. Mood seems OK, concerned with pain meds as ordered and was given the way she wants,. stressed due to her job, family etc, but wanting to go back to reality and denied current S/H/I/P, has some sleep issues and thus Trazodone was increased to Trazodone 150 mg HS. To continue with other meds as ordered.    06/06/2021: Patient was seen today AM, chart reviewed and discussed in team. She has limited residual anxiety  and was advised to cope with coping skills and as she is ion too much pain meds, it's not feasible to add more meds and she is okayed with it, fair to good sleep/appetite, remorseful of her actions and not S/H anymore, she is planning to go to IOP at [facility]  to explore for probability to be accepted. No meds changes for now.    06/07/2021: Patient was seen today AM, chart reviewed and discussed in team. She is somewhat unhappy her seeing the commotion, so she feels that she should be discharged no complaints, but wants her to go to IOP at [facility] whatever possible ASA for continued therapy. Also spoke with her therapist and he also suggests that she needs IOP or DBT like care for her symptoms. SW to work for her therapy/ongoing care.    06/08/2021: Patient was seen today AM, chart reviewed and discussed in team. She was upset and later started to cry that she is not being discharged. She was explained that she has to get an appointment for [facility] Partial/IOP, but to date no activity seen by KRISTEL Prasad. Once she has an appointment for IOP/Partial she would be able to go and F/u with her therapist. Not S/h and to continue meds as ordered for stability.
Pt claiming to be remorseful of the overdose "it was impulsive" but now asking for opiate based pain medication . Urine positive for THC.    06/05/2021: Patient was seen today AM, chart reviewed and discussed in team. Mood seems OK, concerned with pain meds as ordered and was given the way she wants,. stressed due to her job, family etc, but wanting to go back to reality and denied current S/H/I/P, has some sleep issues and thus Trazodone was increased to Trazodone 150 mg HS. To continue with other meds as ordered.    06/06/2021: Patient was seen today AM, chart reviewed and discussed in team. She has limited residual anxiety  and was advised to cope with coping skills and as she is ion too much pain meds, it's not feasible to add more meds and she is okayed with it, fair to good sleep/appetite, remorseful of her actions and not S/H anymore, she is planning to go to [facility] to explore for probability to be accepted. No meds changes for now.    06/07/2021: Patient was seen today AM, chart reviewed and discussed in team. She is somewhat unhappy her seeing the commotion, so she feels that she should be discharged no complaints, but wants her to go to [facility] [place] whatever possible ASA for continued therapy. Also spoke with her therapist and he also suggests that she needs IOP or DBT like care for her symptoms. SW to work for her therapy/ongoing care.

## 2021-06-08 NOTE — DISCHARGE NOTE BEHAVIORAL HEALTH - NSBHDCSUICPROTECTFT_PSY_A_CORE
Supportive mother  Compliant with treatment and medications  Stable housing  intelligent   financially stable

## 2021-06-08 NOTE — PROGRESS NOTE BEHAVIORAL HEALTH - NSBHADMITMEDEDUDETAILS_A_CORE FT
Discussed risks; benefits; s-e

## 2021-06-08 NOTE — PROGRESS NOTE BEHAVIORAL HEALTH - RISK ASSESSMENT
High Risk--Endorses that she is remorseful , and not S/h now, was concerned of her Job/family issues leading to SA by OD. Will try to cope with stresses rather than OD on pills.    Protective Factors--Hospitalized, domiciled and also employed and meds compliant.    Mitigating Factors--Hospitalization, with appropriate after care.

## 2021-06-08 NOTE — DISCHARGE NOTE BEHAVIORAL HEALTH - PROVIDER TOKENS
FREE:[LAST:[[facility]],PHONE:[(   )    -],FAX:[(   )    -],ADDRESS:[See SW Note below for continued after care F/U]]

## 2021-06-08 NOTE — DISCHARGE NOTE BEHAVIORAL HEALTH - CONDITIONS AT DISCHARGE
Patient alert, oriented x3, pleasant and cooperative.  Pt denies S/H IIP currently.  Nurse and  reviewed discharge plan with patient who agrees and accepts plan.  Pt provided with a copy of discharge paperwork.  Pt appropriately dressed for discharge and is transported home by family  to ensure safe arrival home.

## 2021-06-08 NOTE — PROGRESS NOTE BEHAVIORAL HEALTH - AXIS III
two lung nodules in base of left lung  enlarged fatty liver

## 2021-06-08 NOTE — DISCHARGE NOTE BEHAVIORAL HEALTH - NSBHDCSUICFCTRMIT_PSY_A_CORE
Hospitalized now, with no S/H/I/P now on discharge and  has good sleep/appetite; family To take care fo her on discharge

## 2021-06-09 RX ORDER — TRAZODONE HCL 50 MG
1 TABLET ORAL
Qty: 30 | Refills: 0
Start: 2021-06-09 | End: 2021-07-08

## 2021-06-09 NOTE — CHART NOTE - NSCHARTNOTEFT_GEN_A_CORE
Pt is verbally aggressive, threatening then repeatedly punching the plexiglas of the nursing station demanding opiate pain medications. Pt then pulled the protective cover off the  fire alarm box attempting to set off the firm alarm. Pt yelling her demand to get cogentin IM .  Pt unable to regain behavioral control and at risk for injury to self and others. . Pt with hx of fatty liver and recent OD on unknown amount of tylenol.  Checked pt's labs and noted LFTs are wnl . Pt received haldol 5mg IM, ativan 1mg IM, and benadryl 50mg IM.
Spoke to patient and she arrived home safely.  Needs 2 medications sent to pharmacy.  Informed her she needed to call the  and speak to her psychiatrist.
HOSPITALIST PA CHART NOTE     Notified by staff regarding this patient with potential for aggression was demanding pain medications. Patient has hx of opioid abuse.     Spoke with patient, and she is asking for melatonin to help her sleep and Motrin for her chronic back pain.  Both medications reordered. Received call from RN that patient came from medicine service and upon transfer to N, not all medications were resumed.   Patient was missing Trazadone, Lisinopril,  Cymbalta.   Medications were rectified and resumed as originally ordered.     Vital Signs Last 24 Hrs  T(C): 36.7 (04 Jun 2021 23:21), Max: 36.7 (04 Jun 2021 23:21)  T(F): 98 (04 Jun 2021 23:21), Max: 98 (04 Jun 2021 23:21)  HR: 113 (04 Jun 2021 23:21) (77 - 113)  BP: 134/88 (04 Jun 2021 23:21) (127/67 - 134/88)  BP(mean): --  RR: 18 (04 Jun 2021 23:21) (17 - 18)  SpO2: 99% (04 Jun 2021 23:21) (96% - 99%)    Further medication adjustment as per primary team.

## 2021-06-11 DIAGNOSIS — M54.9 DORSALGIA, UNSPECIFIED: ICD-10-CM

## 2021-06-11 DIAGNOSIS — F33.2 MAJOR DEPRESSIVE DISORDER, RECURRENT SEVERE WITHOUT PSYCHOTIC FEATURES: ICD-10-CM

## 2021-06-11 DIAGNOSIS — K76.0 FATTY (CHANGE OF) LIVER, NOT ELSEWHERE CLASSIFIED: ICD-10-CM

## 2021-06-11 DIAGNOSIS — I12.9 HYPERTENSIVE CHRONIC KIDNEY DISEASE WITH STAGE 1 THROUGH STAGE 4 CHRONIC KIDNEY DISEASE, OR UNSPECIFIED CHRONIC KIDNEY DISEASE: ICD-10-CM

## 2021-06-11 DIAGNOSIS — N18.9 CHRONIC KIDNEY DISEASE, UNSPECIFIED: ICD-10-CM

## 2021-06-11 DIAGNOSIS — F60.3 BORDERLINE PERSONALITY DISORDER: ICD-10-CM

## 2021-06-11 DIAGNOSIS — F51.04 PSYCHOPHYSIOLOGIC INSOMNIA: ICD-10-CM

## 2021-06-11 DIAGNOSIS — G89.29 OTHER CHRONIC PAIN: ICD-10-CM

## 2021-06-11 DIAGNOSIS — Z91.5 PERSONAL HISTORY OF SELF-HARM: ICD-10-CM

## 2021-06-11 DIAGNOSIS — E11.22 TYPE 2 DIABETES MELLITUS WITH DIABETIC CHRONIC KIDNEY DISEASE: ICD-10-CM

## 2021-06-11 DIAGNOSIS — M25.569 PAIN IN UNSPECIFIED KNEE: ICD-10-CM

## 2021-06-11 DIAGNOSIS — E28.2 POLYCYSTIC OVARIAN SYNDROME: ICD-10-CM

## 2021-06-11 DIAGNOSIS — R91.8 OTHER NONSPECIFIC ABNORMAL FINDING OF LUNG FIELD: ICD-10-CM

## 2021-06-11 DIAGNOSIS — F11.10 OPIOID ABUSE, UNCOMPLICATED: ICD-10-CM

## 2021-06-11 DIAGNOSIS — E66.9 OBESITY, UNSPECIFIED: ICD-10-CM

## 2021-06-11 DIAGNOSIS — F41.9 ANXIETY DISORDER, UNSPECIFIED: ICD-10-CM

## 2021-06-12 DIAGNOSIS — F33.2 MAJOR DEPRESSIVE DISORDER, RECURRENT SEVERE WITHOUT PSYCHOTIC FEATURES: ICD-10-CM

## 2021-06-12 DIAGNOSIS — Z91.5 PERSONAL HISTORY OF SELF-HARM: ICD-10-CM

## 2021-06-12 DIAGNOSIS — F60.3 BORDERLINE PERSONALITY DISORDER: ICD-10-CM

## 2021-06-12 DIAGNOSIS — Z88.1 ALLERGY STATUS TO OTHER ANTIBIOTIC AGENTS STATUS: ICD-10-CM

## 2021-06-12 DIAGNOSIS — Z79.84 LONG TERM (CURRENT) USE OF ORAL HYPOGLYCEMIC DRUGS: ICD-10-CM

## 2021-06-12 DIAGNOSIS — T39.1X2A POISONING BY 4-AMINOPHENOL DERIVATIVES, INTENTIONAL SELF-HARM, INITIAL ENCOUNTER: ICD-10-CM

## 2021-06-12 DIAGNOSIS — K21.9 GASTRO-ESOPHAGEAL REFLUX DISEASE WITHOUT ESOPHAGITIS: ICD-10-CM

## 2021-06-12 DIAGNOSIS — Z91.010 ALLERGY TO PEANUTS: ICD-10-CM

## 2021-06-12 DIAGNOSIS — E11.22 TYPE 2 DIABETES MELLITUS WITH DIABETIC CHRONIC KIDNEY DISEASE: ICD-10-CM

## 2021-06-12 DIAGNOSIS — M54.9 DORSALGIA, UNSPECIFIED: ICD-10-CM

## 2021-06-12 DIAGNOSIS — G89.29 OTHER CHRONIC PAIN: ICD-10-CM

## 2021-06-12 DIAGNOSIS — E28.2 POLYCYSTIC OVARIAN SYNDROME: ICD-10-CM

## 2021-06-12 DIAGNOSIS — F41.9 ANXIETY DISORDER, UNSPECIFIED: ICD-10-CM

## 2021-06-12 DIAGNOSIS — Z82.49 FAMILY HISTORY OF ISCHEMIC HEART DISEASE AND OTHER DISEASES OF THE CIRCULATORY SYSTEM: ICD-10-CM

## 2021-06-12 DIAGNOSIS — N18.30 CHRONIC KIDNEY DISEASE, STAGE 3 UNSPECIFIED: ICD-10-CM

## 2021-06-12 DIAGNOSIS — I12.9 HYPERTENSIVE CHRONIC KIDNEY DISEASE WITH STAGE 1 THROUGH STAGE 4 CHRONIC KIDNEY DISEASE, OR UNSPECIFIED CHRONIC KIDNEY DISEASE: ICD-10-CM

## 2021-09-02 NOTE — DISCHARGE NOTE ADULT - PATIENT PORTAL LINK FT
Addended by: Julio Dia on: 9/2/2021 10:25 AM     Modules accepted: Orders
You can access the [facility] Patient Portal, offered by [facility], by registering with the following website: [email or web address]

## 2021-10-15 NOTE — DISCHARGE NOTE BEHAVIORAL HEALTH - NSTOBACCOREFERRAL_GEN_A_CS
D/C recommendations: home with 24/7 assist; pt states he has a 24/7 aide name Anil who is at his house right now.
NON SMOKER/Patient declined information

## 2021-10-16 NOTE — ED ADULT TRIAGE NOTE - HEIGHT IN CM
170.18 Intermediate Repair Preamble Text (Leave Blank If You Do Not Want): Undermining was performed with blunt dissection.

## 2021-11-02 ENCOUNTER — APPOINTMENT (OUTPATIENT)
Dept: DISASTER EMERGENCY | Facility: CLINIC | Age: 31
End: 2021-11-02

## 2021-11-03 LAB — SARS-COV-2 N GENE NPH QL NAA+PROBE: NOT DETECTED

## 2021-11-05 ENCOUNTER — OUTPATIENT (OUTPATIENT)
Dept: OUTPATIENT SERVICES | Facility: HOSPITAL | Age: 31
LOS: 1 days | Discharge: HOME | End: 2021-11-05
Payer: COMMERCIAL

## 2021-11-05 ENCOUNTER — RESULT REVIEW (OUTPATIENT)
Age: 31
End: 2021-11-05

## 2021-11-05 VITALS
RESPIRATION RATE: 21 BRPM | HEART RATE: 78 BPM | DIASTOLIC BLOOD PRESSURE: 61 MMHG | OXYGEN SATURATION: 98 % | HEIGHT: 67 IN | WEIGHT: 250 LBS | SYSTOLIC BLOOD PRESSURE: 111 MMHG | TEMPERATURE: 97 F

## 2021-11-05 DIAGNOSIS — E66.01 MORBID (SEVERE) OBESITY DUE TO EXCESS CALORIES: ICD-10-CM

## 2021-11-05 DIAGNOSIS — K21.9 GASTRO-ESOPHAGEAL REFLUX DISEASE WITHOUT ESOPHAGITIS: ICD-10-CM

## 2021-11-05 DIAGNOSIS — Z90.79 ACQUIRED ABSENCE OF OTHER GENITAL ORGAN(S): Chronic | ICD-10-CM

## 2021-11-05 DIAGNOSIS — Z90.49 ACQUIRED ABSENCE OF OTHER SPECIFIED PARTS OF DIGESTIVE TRACT: Chronic | ICD-10-CM

## 2021-11-05 DIAGNOSIS — Z98.890 OTHER SPECIFIED POSTPROCEDURAL STATES: Chronic | ICD-10-CM

## 2021-11-05 LAB — HCG UR QL: NEGATIVE — SIGNIFICANT CHANGE UP

## 2021-11-05 PROCEDURE — 88305 TISSUE EXAM BY PATHOLOGIST: CPT | Mod: 26

## 2021-11-05 PROCEDURE — 88305 TISSUE EXAM BY PATHOLOGIST: CPT

## 2021-11-05 PROCEDURE — 81025 URINE PREGNANCY TEST: CPT

## 2021-11-05 PROCEDURE — 88312 SPECIAL STAINS GROUP 1: CPT

## 2021-11-05 PROCEDURE — 88313 SPECIAL STAINS GROUP 2: CPT

## 2021-11-05 PROCEDURE — 88313 SPECIAL STAINS GROUP 2: CPT | Mod: 26

## 2021-11-05 PROCEDURE — 88312 SPECIAL STAINS GROUP 1: CPT | Mod: 26

## 2021-11-05 NOTE — ASU PATIENT PROFILE, ADULT - FALL HARM RISK TYPE OF ASSESSMENT
Admission Chonodrocutaneous Helical Advancement Flap Text: The defect edges were debeveled with a #15 scalpel blade.  Given the location of the defect and the proximity to free margins a chondrocutaneous helical advancement flap was deemed most appropriate.  Using a sterile surgical marker, the appropriate advancement flap was drawn incorporating the defect and placing the expected incisions within the relaxed skin tension lines where possible.    The area thus outlined was incised deep to adipose tissue with a #15 scalpel blade.  The skin margins were undermined to an appropriate distance in all directions utilizing iris scissors.

## 2021-12-16 ENCOUNTER — OUTPATIENT (OUTPATIENT)
Dept: OUTPATIENT SERVICES | Facility: HOSPITAL | Age: 31
LOS: 1 days | End: 2021-12-16
Payer: COMMERCIAL

## 2021-12-16 VITALS
RESPIRATION RATE: 16 BRPM | HEART RATE: 88 BPM | DIASTOLIC BLOOD PRESSURE: 53 MMHG | SYSTOLIC BLOOD PRESSURE: 101 MMHG | HEIGHT: 67 IN | WEIGHT: 261.03 LBS | OXYGEN SATURATION: 97 % | TEMPERATURE: 99 F

## 2021-12-16 DIAGNOSIS — Z98.890 OTHER SPECIFIED POSTPROCEDURAL STATES: Chronic | ICD-10-CM

## 2021-12-16 DIAGNOSIS — K21.9 GASTRO-ESOPHAGEAL REFLUX DISEASE WITHOUT ESOPHAGITIS: ICD-10-CM

## 2021-12-16 DIAGNOSIS — Z90.49 ACQUIRED ABSENCE OF OTHER SPECIFIED PARTS OF DIGESTIVE TRACT: Chronic | ICD-10-CM

## 2021-12-16 DIAGNOSIS — Z90.79 ACQUIRED ABSENCE OF OTHER GENITAL ORGAN(S): Chronic | ICD-10-CM

## 2021-12-16 DIAGNOSIS — I10 ESSENTIAL (PRIMARY) HYPERTENSION: ICD-10-CM

## 2021-12-16 DIAGNOSIS — Z01.818 ENCOUNTER FOR OTHER PREPROCEDURAL EXAMINATION: ICD-10-CM

## 2021-12-16 LAB
ALBUMIN SERPL ELPH-MCNC: 3.2 G/DL — LOW (ref 3.3–5)
ANION GAP SERPL CALC-SCNC: 3 MMOL/L — LOW (ref 5–17)
APPEARANCE UR: CLEAR — SIGNIFICANT CHANGE UP
APTT BLD: 39.2 SEC — HIGH (ref 27.5–35.5)
BASOPHILS # BLD AUTO: 0.04 K/UL — SIGNIFICANT CHANGE UP (ref 0–0.2)
BASOPHILS NFR BLD AUTO: 0.5 % — SIGNIFICANT CHANGE UP (ref 0–2)
BILIRUB UR-MCNC: NEGATIVE — SIGNIFICANT CHANGE UP
BLOOD GAS SOURCE: SIGNIFICANT CHANGE UP
BUN SERPL-MCNC: 26 MG/DL — HIGH (ref 7–23)
CALCIUM SERPL-MCNC: 9.9 MG/DL — SIGNIFICANT CHANGE UP (ref 8.5–10.1)
CHLORIDE SERPL-SCNC: 107 MMOL/L — SIGNIFICANT CHANGE UP (ref 96–108)
CO2 SERPL-SCNC: 28 MMOL/L — SIGNIFICANT CHANGE UP (ref 22–31)
COHGB MFR BLDV: 1.2 % — SIGNIFICANT CHANGE UP
COLOR SPEC: YELLOW — SIGNIFICANT CHANGE UP
CREAT SERPL-MCNC: 1.24 MG/DL — SIGNIFICANT CHANGE UP (ref 0.5–1.3)
DIFF PNL FLD: NEGATIVE — SIGNIFICANT CHANGE UP
EOSINOPHIL # BLD AUTO: 0.18 K/UL — SIGNIFICANT CHANGE UP (ref 0–0.5)
EOSINOPHIL NFR BLD AUTO: 2.3 % — SIGNIFICANT CHANGE UP (ref 0–6)
GLUCOSE SERPL-MCNC: 88 MG/DL — SIGNIFICANT CHANGE UP (ref 70–99)
GLUCOSE UR QL: NEGATIVE MG/DL — SIGNIFICANT CHANGE UP
HCT VFR BLD CALC: 37.8 % — SIGNIFICANT CHANGE UP (ref 34.5–45)
HGB BLD-MCNC: 12.8 G/DL — SIGNIFICANT CHANGE UP (ref 11.5–15.5)
IMM GRANULOCYTES NFR BLD AUTO: 0.3 % — SIGNIFICANT CHANGE UP (ref 0–1.5)
INR BLD: 1.13 RATIO — SIGNIFICANT CHANGE UP (ref 0.88–1.16)
KETONES UR-MCNC: NEGATIVE — SIGNIFICANT CHANGE UP
LEUKOCYTE ESTERASE UR-ACNC: ABNORMAL
LYMPHOCYTES # BLD AUTO: 2.95 K/UL — SIGNIFICANT CHANGE UP (ref 1–3.3)
LYMPHOCYTES # BLD AUTO: 38 % — SIGNIFICANT CHANGE UP (ref 13–44)
MCHC RBC-ENTMCNC: 28.2 PG — SIGNIFICANT CHANGE UP (ref 27–34)
MCHC RBC-ENTMCNC: 33.9 GM/DL — SIGNIFICANT CHANGE UP (ref 32–36)
MCV RBC AUTO: 83.3 FL — SIGNIFICANT CHANGE UP (ref 80–100)
MONOCYTES # BLD AUTO: 0.53 K/UL — SIGNIFICANT CHANGE UP (ref 0–0.9)
MONOCYTES NFR BLD AUTO: 6.8 % — SIGNIFICANT CHANGE UP (ref 2–14)
NEUTROPHILS # BLD AUTO: 4.05 K/UL — SIGNIFICANT CHANGE UP (ref 1.8–7.4)
NEUTROPHILS NFR BLD AUTO: 52.1 % — SIGNIFICANT CHANGE UP (ref 43–77)
NITRITE UR-MCNC: NEGATIVE — SIGNIFICANT CHANGE UP
PH UR: 6 — SIGNIFICANT CHANGE UP (ref 5–8)
PLATELET # BLD AUTO: 286 K/UL — SIGNIFICANT CHANGE UP (ref 150–400)
POTASSIUM SERPL-MCNC: 4.7 MMOL/L — SIGNIFICANT CHANGE UP (ref 3.5–5.3)
POTASSIUM SERPL-SCNC: 4.7 MMOL/L — SIGNIFICANT CHANGE UP (ref 3.5–5.3)
PROT UR-MCNC: NEGATIVE MG/DL — SIGNIFICANT CHANGE UP
PROTHROM AB SERPL-ACNC: 13 SEC — SIGNIFICANT CHANGE UP (ref 10.6–13.6)
RBC # BLD: 4.54 M/UL — SIGNIFICANT CHANGE UP (ref 3.8–5.2)
RBC # FLD: 14 % — SIGNIFICANT CHANGE UP (ref 10.3–14.5)
SODIUM SERPL-SCNC: 138 MMOL/L — SIGNIFICANT CHANGE UP (ref 135–145)
SP GR SPEC: 1.01 — SIGNIFICANT CHANGE UP (ref 1.01–1.02)
UROBILINOGEN FLD QL: NEGATIVE MG/DL — SIGNIFICANT CHANGE UP
WBC # BLD: 7.77 K/UL — SIGNIFICANT CHANGE UP (ref 3.8–10.5)
WBC # FLD AUTO: 7.77 K/UL — SIGNIFICANT CHANGE UP (ref 3.8–10.5)

## 2021-12-16 PROCEDURE — 86901 BLOOD TYPING SEROLOGIC RH(D): CPT

## 2021-12-16 PROCEDURE — 71046 X-RAY EXAM CHEST 2 VIEWS: CPT

## 2021-12-16 PROCEDURE — 93010 ELECTROCARDIOGRAM REPORT: CPT

## 2021-12-16 PROCEDURE — 93005 ELECTROCARDIOGRAM TRACING: CPT

## 2021-12-16 PROCEDURE — 85730 THROMBOPLASTIN TIME PARTIAL: CPT

## 2021-12-16 PROCEDURE — 82040 ASSAY OF SERUM ALBUMIN: CPT

## 2021-12-16 PROCEDURE — 86850 RBC ANTIBODY SCREEN: CPT

## 2021-12-16 PROCEDURE — 71046 X-RAY EXAM CHEST 2 VIEWS: CPT | Mod: 26

## 2021-12-16 PROCEDURE — 80048 BASIC METABOLIC PNL TOTAL CA: CPT

## 2021-12-16 PROCEDURE — 86900 BLOOD TYPING SEROLOGIC ABO: CPT

## 2021-12-16 PROCEDURE — G0463: CPT | Mod: 25

## 2021-12-16 PROCEDURE — 36415 COLL VENOUS BLD VENIPUNCTURE: CPT

## 2021-12-16 PROCEDURE — 81001 URINALYSIS AUTO W/SCOPE: CPT

## 2021-12-16 PROCEDURE — 85025 COMPLETE CBC W/AUTO DIFF WBC: CPT

## 2021-12-16 PROCEDURE — 85610 PROTHROMBIN TIME: CPT

## 2021-12-16 PROCEDURE — 82375 ASSAY CARBOXYHB QUANT: CPT

## 2021-12-16 NOTE — H&P PST ADULT - BREASTS
Guard Assistance, with increased time for completion, cues for hand placement, with verbal cues  Stand to [address], with increased time for completion, cues for hand placement, with verbal cues    Ambulation:  Contact Guard Assistance, w/c follow  Distance: ~40ft x2  Surface: Level Tile  Device:Rolling Walker  Gait Deviations: Forward Flexed Posture, Slow Nahed, Decreased Step Length Bilaterally, Decreased Weight Shift Bilaterally, Decreased Trunk Rotation, Decreased Gait Speed, Decreased Heel Strike Bilaterally, Wide Base of Support, Mild Path Deviations and decreased hip/knee flexion    Stairs:  Contact Guard Assistance, Minimal Assistance, X 1  Number of Steps: 1 (platform step)  Height: 4\" step with Parallel Bars\    Balance:  Dynamic Standing Balance: Contact Guard Assistance, varied single to no UE support at times, waist height activity, stood ~2.5min x1 and 1.5min x1    Exercise:  Patient was guided in 1 set(s) 15 reps of exercise to both lower extremities. Seated marches, Seated hamstring curls, Seated heel/toe raises and Long arc quads. Exercises were completed for increased independence with functional mobility. Functional Outcome Measures: Not completed       ASSESSMENT:  Assessment: Patient progressing toward established goals. Activity Tolerance:  Patient tolerance of  treatment: fair. Equipment Recommendations: Other: monitor for needs  Discharge Recommendations:  Continue to assess pending progress    Plan: Times per week: 6x  Current Treatment Recommendations: Strengthening, Balance Training, Endurance Training, Functional Mobility Training, Transfer Training, Gait Training, Stair training, Patient/Caregiver Education & Training, Safety Education & Training, Home Exercise Program    Patient Education  Patient Education: Plan of Care, Transfers, Gait, Stairs, Up in Chair for Loan Anaya, Verbal Exercise Instruction    Goals:  Patient goals : go home  Short term goals  Time Frame for Short term goals: 1 week  Short term goal 1: Pt to be Mod I for supine <> sit to get in/out of bed  Short term goal 2: Pt to be Mod I for sit <> stand to get up to ambulate  Short term goal 3: Pt to ambulate > 150 ft with RW with Supervision for household distances  Short term goal 4: Pt to negotiate 1 step with 1-2 rails or RW with SBA for home access  Long term goals  Time Frame for Long term goals : not set due to short ELOS    Following session, patient left in safe position with all fall risk precautions in place. detailed exam

## 2021-12-16 NOTE — H&P PST ADULT - NSICDXPASTMEDICALHX_GEN_ALL_CORE_FT
PAST MEDICAL HISTORY:  ADHD (attention deficit hyperactivity disorder)     Anemia     Anxiety     Borderline personality disorder     Cholecystitis S/P cholycystectomy    Chronic back pain     CKD (chronic kidney disease)     Depression     DM2 (diabetes mellitus, type 2)     GERD (gastroesophageal reflux disease)     HLD (hyperlipidemia)     HTN (hypertension)     Lymphocytosis     Mixed connective tissue disease     Nephrosclerosis     Obesity     PCOS (polycystic ovarian syndrome)     Polycystic ovarian syndrome     Suicide attempt by drug overdose 2019 Tylenol OD    UTI (urinary tract infection)      PAST MEDICAL HISTORY:  ADHD (attention deficit hyperactivity disorder)     Anemia     Anxiety     Borderline personality disorder     Cholecystitis S/P cholycystectomy    Chronic back pain     CKD (chronic kidney disease)     Depression     DM2 (diabetes mellitus, type 2)     GERD (gastroesophageal reflux disease)     HLD (hyperlipidemia)     HTN (hypertension)     Lymphocytosis     Mixed connective tissue disease     Nephrosclerosis     Obesity     Opiate dependence     PCOS (polycystic ovarian syndrome)     Polycystic ovarian syndrome     Suicide attempt by drug overdose 2019 Tylenol OD    UTI (urinary tract infection)

## 2021-12-16 NOTE — H&P PST ADULT - ASSESSMENT
30 year old female with morbid obesity with morbid obesity c/o difficulty with weight loss he presents to PST  for laparoscopic gastric bypass  intraoperative endoscopy     Plan:  1. PST instructions given ; NPO status instructions to be given by ASU   2. Pt instructed to take following meds with sip of water : lisinopirl clonidine klonopin   3. Pt instructed to take routine evening medications unless indicated   4. Stop NSAIDS ( Aspirin Alev Motrin Mobic Diclofenac), herbal supplements , MVI , Vitamin fish oil 7 days prior to surgery  unless   directed by surgeon or cardiologist;   5. Medical Optimization  with    6. EZ wash instructions given & mupirocin instructions given  7. Labs EKG CXR as per surgeon request   8. Pt instructed to self quarantine after Covid test   9. Covid Testing scheduled Pt notified and aware  10. Pt denies covid symptoms shortness of breath fever cough   11. Urine for pregnancy on day of surgery       CAPRINI SCORE [CLOT]    AGE RELATED RISK FACTORS                                                       MOBILITY RELATED FACTORS  [ ] Age 41-60 years                                            (1 Point)                  [ ] Bed rest                                                        (1 Point)  [ ] Age: 61-74 years                                           (2 Points)                 [ ] Plaster cast                                                   (2 Points)  [ ] Age= 75 years                                              (3 Points)                 [ ] Bed bound for more than 72 hours                 (2 Points)    DISEASE RELATED RISK FACTORS                                               GENDER SPECIFIC FACTORS  [ ] Edema in the lower extremities                       (1 Point)                  [ ] Pregnancy                                                     (1 Point)  [ ] Varicose veins                                               (1 Point)                  [ ] Post-partum < 6 weeks                                   (1 Point)             [ ] BMI > 25 Kg/m2                                            (1 Point)                  [ ] Hormonal therapy  or oral contraception          (1 Point)                 [ ] Sepsis (in the previous month)                        (1 Point)                  [ ] History of pregnancy complications                 (1 point)  [ ] Pneumonia or serious lung disease                                               [ ] Unexplained or recurrent                     (1 Point)           (in the previous month)                               (1 Point)  [ ] Abnormal pulmonary function test                     (1 Point)                 SURGERY RELATED RISK FACTORS  [ ] Acute myocardial infarction                              (1 Point)                 [ ]  Section                                             (1 Point)  [ ] Congestive heart failure (in the previous month)  (1 Point)               [ ] Minor surgery                                                  (1 Point)   [ ] Inflammatory bowel disease                             (1 Point)                 [ ] Arthroscopic surgery                                        (2 Points)  [ ] Central venous access                                      (2 Points)                [ ] General surgery lasting more than 45 minutes   (2 Points)       [ ] Stroke (in the previous month)                          (5 Points)               [ ] Elective arthroplasty                                         (5 Points)            ( ) past and present malignancy                             ( 2 points)                                                                                                                                    HEMATOLOGY RELATED FACTORS                                                 TRAUMA RELATED RISK FACTORS  [ ] Prior episodes of VTE                                     (3 Points)                 [ ] Fracture of the hip, pelvis, or leg                       (5 Points)  [ ] Positive family history for VTE                         (3 Points)                 [ ] Acute spinal cord injury (in the previous month)  (5 Points)  [ ] Prothrombin 07563 A                                     (3 Points)                 [ ] Paralysis  (less than 1 month)                             (5 Points)  [ ] Factor V Leiden                                             (3 Points)                  [ ] Multiple Trauma within 1 month                        (5 Points)  [ ] Lupus anticoagulants                                     (3 Points)                                                           [ ] Anticardiolipin antibodies                               (3 Points)                                                       [ ] High homocysteine in the blood                      (3 Points)                                             [ ] Other congenital or acquired thrombophilia      (3 Points)                                                [ ] Heparin induced thrombocytopenia                  (3 Points)                                          Total Score [          ]    The Caprini score indicates this patient is at risk for a VTE event (score 3-5).  Most surgical patients in this group would benefit from pharmacologic prophylaxis.  The surgical team will determine the balance between VTE risk and bleeding risk   30 year old female with morbid obesity with morbid obesity c/o difficulty with weight loss he presents to PST  for laparoscopic gastric bypass  intraoperative endoscopy     Plan:  1. PST instructions given ; NPO status instructions to be given by ASU   2. Pt instructed to take following meds with sip of water : lisinopril clonidine klonopin   3. Pt instructed to take routine evening medications unless indicated   4. Stop NSAIDS ( Aspirin Alev Motrin Mobic Diclofenac), herbal supplements , MVI , Vitamin fish oil 7 days prior to surgery  unless   directed by surgeon or cardiologist;   5. Medical Optimization  with    6. EZ wash instructions given & mupirocin instructions given  7. Labs EKG CXR as per surgeon request   8. Pt instructed to self quarantine after Covid test   9. Covid Testing scheduled Pt notified and aware  10. Pt denies covid symptoms shortness of breath fever cough   11. Urine for pregnancy on day of surgery       CAPRINI SCORE [CLOT]    AGE RELATED RISK FACTORS                                                       MOBILITY RELATED FACTORS  [ ] Age 41-60 years                                            (1 Point)                  [ ] Bed rest                                                        (1 Point)  [ ] Age: 61-74 years                                           (2 Points)                 [ ] Plaster cast                                                   (2 Points)  [ ] Age= 75 years                                              (3 Points)                 [ ] Bed bound for more than 72 hours                 (2 Points)    DISEASE RELATED RISK FACTORS                                               GENDER SPECIFIC FACTORS  [ ] Edema in the lower extremities                       (1 Point)                  [ ] Pregnancy                                                     (1 Point)  [ ] Varicose veins                                               (1 Point)                  [ ] Post-partum < 6 weeks                                   (1 Point)             [ x] BMI > 25 Kg/m2                                            (1 Point)                  [ ] Hormonal therapy  or oral contraception          (1 Point)                 [ ] Sepsis (in the previous month)                        (1 Point)                  [ ] History of pregnancy complications                 (1 point)  [ ] Pneumonia or serious lung disease                                               [ ] Unexplained or recurrent                     (1 Point)           (in the previous month)                               (1 Point)  [ ] Abnormal pulmonary function test                     (1 Point)                 SURGERY RELATED RISK FACTORS  [ ] Acute myocardial infarction                              (1 Point)                 [ ]  Section                                             (1 Point)  [ ] Congestive heart failure (in the previous month)  (1 Point)               [ ] Minor surgery                                                  (1 Point)   [ ] Inflammatory bowel disease                             (1 Point)                 [ ] Arthroscopic surgery                                        (2 Points)  [ ] Central venous access                                      (2 Points)                [ x] General surgery lasting more than 45 minutes   (2 Points)       [ ] Stroke (in the previous month)                          (5 Points)               [ ] Elective arthroplasty                                         (5 Points)            ( ) past and present malignancy                             ( 2 points)                                                                                                                                    HEMATOLOGY RELATED FACTORS                                                 TRAUMA RELATED RISK FACTORS  [ ] Prior episodes of VTE                                     (3 Points)                 [ ] Fracture of the hip, pelvis, or leg                       (5 Points)  [ ] Positive family history for VTE                         (3 Points)                 [ ] Acute spinal cord injury (in the previous month)  (5 Points)  [ ] Prothrombin 93834 A                                     (3 Points)                 [ ] Paralysis  (less than 1 month)                             (5 Points)  [ ] Factor V Leiden                                             (3 Points)                  [ ] Multiple Trauma within 1 month                        (5 Points)  [ ] Lupus anticoagulants                                     (3 Points)                                                           [ ] Anticardiolipin antibodies                               (3 Points)                                                       [ ] High homocysteine in the blood                      (3 Points)                                             [ ] Other congenital or acquired thrombophilia      (3 Points)                                                [ ] Heparin induced thrombocytopenia                  (3 Points)                                          Total Score [   3       ]    The Caprini score indicates this patient is at risk for a VTE event (score 3-5).  Most surgical patients in this group would benefit from pharmacologic prophylaxis.  The surgical team will determine the balance between VTE risk and bleeding risk

## 2021-12-16 NOTE — H&P PST ADULT - HISTORY OF PRESENT ILLNESS
30 year old female with morbid obesity with morbid obesity c/o difficulty with weight loss he presents to [facility]  for laparoscopic gastric bypass  intraoperative endoscopy

## 2021-12-16 NOTE — H&P PST ADULT - NSICDXPASTSURGICALHX_GEN_ALL_CORE_FT
PAST SURGICAL HISTORY:  H/O knee surgery 2008 2009 2015    H/O ovarian cystectomy 2003 2017    H/O unilateral salpingectomy right    History of cholecystectomy 2016    History of lumbar laminectomy 6/2021

## 2021-12-16 NOTE — H&P PST ADULT - PAIN RATING AT ACTIVITY
Care Transition Team Assessment  In the case of an emergency, pt's legal NOK is sonZhou [phone]    RNCM met with pt at bedside and obtained the information used in this assessment. Pt verified accuracy of facesheet. Pt lives in a 2 story home with sister and niece.  Pt uses [facility]. Prior to current hospitalization, pt was completely independent in ADLS/IADLS. Pt drives and is able to attend necessary MD appointments.  Pt has no financial concerns. Pt has a good support system. Pt denies any hx of substance use and denies any dx of mh. Discharge plans currently undetermined, will likely need SNF.    Information Source pt  Orientation : Oriented x 4  Information Given By: Patient  Informant's Name:  (Sonya)  Who is responsible for making decisions for patient? : Patient         Elopement Risk  Legal Hold: No  Ambulatory or Self Mobile in Wheelchair: No-Not an Elopement Risk  Elopement Risk: Not at Risk for Elopement    Interdisciplinary Discharge Planning  Does Admitting Nurse Feel This Could be a Complex Discharge?: No  Primary Care Physician:  (Dr. Mclean)  Lives with - Patient's Self Care Capacity: Other (Comments)  Patient or legal guardian wants to designate a caregiver (see row info):  (sister and neice)  Support Systems: Family Member(s)  Housing / Facility: 2 Story House  Do You Take your Prescribed Medications Regularly: Yes  Able to Return to Previous ADL's: Yes  Mobility Issues: No  Prior Services: None  Assistance Needed: No  Durable Medical Equipment: Not Applicable    Discharge Preparedness  What is your plan after discharge?: Uncertain - pending medical team collaboration  What are your discharge supports?: Sibling  Prior Functional Level: Ambulatory, Drives Self, Independent with Activities of Daily Living, Independent with Medication Management  Difficulity with ADLs: None  Difficulity with IADLs: None    Functional Assesment  Prior Functional Level: Ambulatory,  Drives Self, Independent with Activities of Daily Living, Independent with Medication Management    Finances  Financial Barriers to Discharge: No  Prescription Coverage: Yes    Vision / Hearing Impairment  Vision Impairment : Yes  Right Eye Vision: Impaired, Wears Glasses  Left Eye Vision: Impaired, Wears Glasses  Hearing Impairment : No         Advance Directive  Advance Directive?: None  Advance Directive offered?: AD Booklet given    Domestic Abuse  Have you ever been the victim of abuse or violence?: No  Physical Abuse or Sexual Abuse: No  Verbal Abuse or Emotional Abuse: No  Possible Abuse Reported to:: Not Applicable    Psychological Assessment  History of Substance Abuse: None  History of Psychiatric Problems: No         Anticipated Discharge Information  Anticipated discharge disposition: Discharge needs currently unknown         10

## 2021-12-16 NOTE — H&P PST ADULT - FALL HARM RISK - UNIVERSAL INTERVENTIONS
Bed in lowest position, wheels locked, appropriate side rails in place/Call bell, personal items and telephone in reach/Instruct patient to call for assistance before getting out of bed or chair/Non-slip footwear when patient is out of bed/[place] to call system/Physically safe environment - no spills, clutter or unnecessary equipment/Purposeful Proactive Rounding/Room/bathroom lighting operational, light cord in reach

## 2021-12-17 DIAGNOSIS — I10 ESSENTIAL (PRIMARY) HYPERTENSION: ICD-10-CM

## 2021-12-17 DIAGNOSIS — Z01.818 ENCOUNTER FOR OTHER PREPROCEDURAL EXAMINATION: ICD-10-CM

## 2021-12-17 DIAGNOSIS — K21.9 GASTRO-ESOPHAGEAL REFLUX DISEASE WITHOUT ESOPHAGITIS: ICD-10-CM

## 2021-12-27 ENCOUNTER — INPATIENT (INPATIENT)
Facility: HOSPITAL | Age: 31
LOS: 3 days | Discharge: HOME | DRG: 327 | End: 2021-12-31
Attending: STUDENT IN AN ORGANIZED HEALTH CARE EDUCATION/TRAINING PROGRAM | Admitting: STUDENT IN AN ORGANIZED HEALTH CARE EDUCATION/TRAINING PROGRAM
Payer: COMMERCIAL

## 2021-12-27 VITALS — WEIGHT: 250 LBS | HEIGHT: 67 IN

## 2021-12-27 DIAGNOSIS — Z98.890 OTHER SPECIFIED POSTPROCEDURAL STATES: Chronic | ICD-10-CM

## 2021-12-27 DIAGNOSIS — Z90.79 ACQUIRED ABSENCE OF OTHER GENITAL ORGAN(S): Chronic | ICD-10-CM

## 2021-12-27 DIAGNOSIS — Z90.49 ACQUIRED ABSENCE OF OTHER SPECIFIED PARTS OF DIGESTIVE TRACT: Chronic | ICD-10-CM

## 2021-12-27 LAB
BASOPHILS # BLD AUTO: 0.08 K/UL — SIGNIFICANT CHANGE UP (ref 0–0.2)
BASOPHILS NFR BLD AUTO: 0.6 % — SIGNIFICANT CHANGE UP (ref 0–2)
EOSINOPHIL # BLD AUTO: 0.01 K/UL — SIGNIFICANT CHANGE UP (ref 0–0.5)
EOSINOPHIL NFR BLD AUTO: 0.1 % — SIGNIFICANT CHANGE UP (ref 0–6)
HCT VFR BLD CALC: 44.1 % — SIGNIFICANT CHANGE UP (ref 34.5–45)
HGB BLD-MCNC: 15.4 G/DL — SIGNIFICANT CHANGE UP (ref 11.5–15.5)
IMM GRANULOCYTES NFR BLD AUTO: 0.5 % — SIGNIFICANT CHANGE UP (ref 0–1.5)
LYMPHOCYTES # BLD AUTO: 1.57 K/UL — SIGNIFICANT CHANGE UP (ref 1–3.3)
LYMPHOCYTES # BLD AUTO: 12.2 % — LOW (ref 13–44)
MCHC RBC-ENTMCNC: 28.1 PG — SIGNIFICANT CHANGE UP (ref 27–34)
MCHC RBC-ENTMCNC: 34.9 GM/DL — SIGNIFICANT CHANGE UP (ref 32–36)
MCV RBC AUTO: 80.5 FL — SIGNIFICANT CHANGE UP (ref 80–100)
MONOCYTES # BLD AUTO: 0.42 K/UL — SIGNIFICANT CHANGE UP (ref 0–0.9)
MONOCYTES NFR BLD AUTO: 3.3 % — SIGNIFICANT CHANGE UP (ref 2–14)
NEUTROPHILS # BLD AUTO: 10.7 K/UL — HIGH (ref 1.8–7.4)
NEUTROPHILS NFR BLD AUTO: 83.3 % — HIGH (ref 43–77)
PLATELET # BLD AUTO: 650 K/UL — HIGH (ref 150–400)
RBC # BLD: 5.48 M/UL — HIGH (ref 3.8–5.2)
RBC # FLD: 13.7 % — SIGNIFICANT CHANGE UP (ref 10.3–14.5)
WBC # BLD: 12.84 K/UL — HIGH (ref 3.8–10.5)
WBC # FLD AUTO: 12.84 K/UL — HIGH (ref 3.8–10.5)

## 2021-12-27 PROCEDURE — 99291 CRITICAL CARE FIRST HOUR: CPT

## 2021-12-27 RX ORDER — ONDANSETRON 8 MG/1
4 TABLET, FILM COATED ORAL ONCE
Refills: 0 | Status: COMPLETED | OUTPATIENT
Start: 2021-12-27 | End: 2021-12-27

## 2021-12-27 RX ORDER — MORPHINE SULFATE 50 MG/1
4 CAPSULE, EXTENDED RELEASE ORAL ONCE
Refills: 0 | Status: DISCONTINUED | OUTPATIENT
Start: 2021-12-27 | End: 2021-12-27

## 2021-12-27 RX ORDER — SODIUM CHLORIDE 9 MG/ML
1000 INJECTION INTRAMUSCULAR; INTRAVENOUS; SUBCUTANEOUS ONCE
Refills: 0 | Status: COMPLETED | OUTPATIENT
Start: 2021-12-27 | End: 2021-12-27

## 2021-12-27 RX ORDER — FAMOTIDINE 10 MG/ML
20 INJECTION INTRAVENOUS ONCE
Refills: 0 | Status: COMPLETED | OUTPATIENT
Start: 2021-12-27 | End: 2021-12-27

## 2021-12-27 RX ADMIN — ONDANSETRON 4 MILLIGRAM(S): 8 TABLET, FILM COATED ORAL at 23:10

## 2021-12-27 RX ADMIN — ONDANSETRON 4 MILLIGRAM(S): 8 TABLET, FILM COATED ORAL at 21:51

## 2021-12-27 RX ADMIN — MORPHINE SULFATE 4 MILLIGRAM(S): 50 CAPSULE, EXTENDED RELEASE ORAL at 21:51

## 2021-12-27 RX ADMIN — SODIUM CHLORIDE 1000 MILLILITER(S): 9 INJECTION INTRAMUSCULAR; INTRAVENOUS; SUBCUTANEOUS at 22:51

## 2021-12-27 RX ADMIN — FAMOTIDINE 20 MILLIGRAM(S): 10 INJECTION INTRAVENOUS at 21:50

## 2021-12-27 RX ADMIN — SODIUM CHLORIDE 1000 MILLILITER(S): 9 INJECTION INTRAMUSCULAR; INTRAVENOUS; SUBCUTANEOUS at 21:51

## 2021-12-27 NOTE — ED STATDOCS - CRITICAL CARE ATTENDING CONTRIBUTION TO CARE
direct patient care (not related to procedure), additional history taking, interpretation of diagnostic studies, documentation, consultation with other physicians, NAVEEN Ramirez DO

## 2021-12-27 NOTE — ED STATDOCS - OBJECTIVE STATEMENT
31 y/o F with PMHx of DM2, HTN, HLD, GERD, CKD, PCOS, opiate dependence, anemia, borderline personality disorder, anxiety, s/p cholecystectomy  presents to the ED c/o +abd pain with associated +N/V x2 days. No fever. Allergic to amoxicillin and adhesives. PCP: Dr. Lopez Sanchez.

## 2021-12-27 NOTE — ED STATDOCS - ATTENDING CONTRIBUTION TO CARE
direct patient care (not related to procedure), additional history taking, interpretation of diagnostic studies, documentation, consultation with other physicians, consult w/ pt's family directly relating to pts condition    Dr. Ramirez: I performed a face to face bedside interview with patient regarding history of present illness, review of symptoms and past medical history. I completed an independent physical exam.  I have discussed patient's plan of care with PA.   I agree with note as stated above, having amended the EMR as needed to reflect my findings.   This includes HISTORY OF PRESENT ILLNESS, HIV, PAST MEDICAL/SURGICAL/FAMILY/SOCIAL HISTORY, ALLERGIES AND HOME MEDICATIONS, REVIEW OF SYSTEMS, PHYSICAL EXAM, and any PROGRESS NOTES during the time I functioned as the attending physician for this patient.

## 2021-12-27 NOTE — ED ADULT TRIAGE NOTE - CHIEF COMPLAINT QUOTE
patient c/o abdominal pain, N/V/D since noon today.  patient is scheduled for gastric bypass with Dr. Silverio 12/29.  has been drinking protein shakes 3x/day and thinks she might be having a reaction to the whey in the shakes

## 2021-12-27 NOTE — ED STATDOCS - PROGRESS NOTE DETAILS
Radha KENYON for ED attending, Dr. Morrison: Called surgical resident and was unable to leave a message. Requesting pain medication Morphine ordered. MTangredi NP called to cancel ct, ct was completed with iv contrast. dw with pt. will admit and treat for marleny, hyperkalemia NAVEEN Ramirez DO call placed to nephrology, spoke with pt updated with plan and results, ordered night time medications and aspoke with hospitalist dr jonathan Ramirez DO ct resulted updated medicine and spoke with surgical resident Dr. kim for consult B James FONTENOT spoke with surgical resident pt tba to christelle, updated medicine Dr. jonathan Ramirez DO

## 2021-12-27 NOTE — ED STATDOCS - NSICDXPASTMEDICALHX_GEN_ALL_CORE_FT
PAST MEDICAL HISTORY:  ADHD (attention deficit hyperactivity disorder)     Anemia     Anxiety     Borderline personality disorder     Cholecystitis S/P cholycystectomy    Chronic back pain     CKD (chronic kidney disease)     Depression     DM2 (diabetes mellitus, type 2)     GERD (gastroesophageal reflux disease)     HLD (hyperlipidemia)     HTN (hypertension)     Lymphocytosis     Mixed connective tissue disease     Nephrosclerosis     Obesity     Opiate dependence     PCOS (polycystic ovarian syndrome)     Polycystic ovarian syndrome     Suicide attempt by drug overdose 2019 Tylenol OD    UTI (urinary tract infection)

## 2021-12-27 NOTE — ED STATDOCS - CARE PLAN
1 Principal Discharge DX:	Acute renal failure (ARF)  Secondary Diagnosis:	Abdominal pain with vomiting

## 2021-12-27 NOTE — ED STATDOCS - CLINICAL SUMMARY MEDICAL DECISION MAKING FREE TEXT BOX
Labs, anti nausea, pain medication, Famotidine, CTAP with IV and PO contrast. Consult with Sanjay Adrian/Nusrat.

## 2021-12-28 DIAGNOSIS — N17.9 ACUTE KIDNEY FAILURE, UNSPECIFIED: ICD-10-CM

## 2021-12-28 DIAGNOSIS — K52.9 NONINFECTIVE GASTROENTERITIS AND COLITIS, UNSPECIFIED: ICD-10-CM

## 2021-12-28 PROBLEM — T50.902A POISONING BY UNSPECIFIED DRUGS, MEDICAMENTS AND BIOLOGICAL SUBSTANCES, INTENTIONAL SELF-HARM, INITIAL ENCOUNTER: Chronic | Status: ACTIVE | Noted: 2020-05-25

## 2021-12-28 PROBLEM — F11.20 OPIOID DEPENDENCE, UNCOMPLICATED: Chronic | Status: ACTIVE | Noted: 2021-12-16

## 2021-12-28 LAB
ALBUMIN SERPL ELPH-MCNC: 3 G/DL — LOW (ref 3.3–5)
ALP SERPL-CCNC: 83 U/L — SIGNIFICANT CHANGE UP (ref 40–120)
ALT FLD-CCNC: 42 U/L — SIGNIFICANT CHANGE UP (ref 12–78)
ANION GAP SERPL CALC-SCNC: 5 MMOL/L — SIGNIFICANT CHANGE UP (ref 5–17)
APPEARANCE UR: CLEAR — SIGNIFICANT CHANGE UP
AST SERPL-CCNC: 54 U/L — HIGH (ref 15–37)
BILIRUB SERPL-MCNC: 0.3 MG/DL — SIGNIFICANT CHANGE UP (ref 0.2–1.2)
BILIRUB UR-MCNC: NEGATIVE — SIGNIFICANT CHANGE UP
BUN SERPL-MCNC: 42 MG/DL — HIGH (ref 7–23)
CALCIUM SERPL-MCNC: 10.5 MG/DL — HIGH (ref 8.5–10.1)
CHLORIDE SERPL-SCNC: 109 MMOL/L — HIGH (ref 96–108)
CO2 SERPL-SCNC: 23 MMOL/L — SIGNIFICANT CHANGE UP (ref 22–31)
COLOR SPEC: YELLOW — SIGNIFICANT CHANGE UP
CREAT ?TM UR-MCNC: 49 MG/DL — SIGNIFICANT CHANGE UP
CREAT SERPL-MCNC: 2.49 MG/DL — HIGH (ref 0.5–1.3)
DIFF PNL FLD: NEGATIVE — SIGNIFICANT CHANGE UP
GLUCOSE SERPL-MCNC: 88 MG/DL — SIGNIFICANT CHANGE UP (ref 70–99)
GLUCOSE UR QL: 1000 MG/DL
HCT VFR BLD CALC: 38 % — SIGNIFICANT CHANGE UP (ref 34.5–45)
HGB BLD-MCNC: 12.9 G/DL — SIGNIFICANT CHANGE UP (ref 11.5–15.5)
KETONES UR-MCNC: NEGATIVE — SIGNIFICANT CHANGE UP
LEUKOCYTE ESTERASE UR-ACNC: ABNORMAL
MAGNESIUM SERPL-MCNC: 1.6 MG/DL — SIGNIFICANT CHANGE UP (ref 1.6–2.6)
MCHC RBC-ENTMCNC: 27.9 PG — SIGNIFICANT CHANGE UP (ref 27–34)
MCHC RBC-ENTMCNC: 33.9 GM/DL — SIGNIFICANT CHANGE UP (ref 32–36)
MCV RBC AUTO: 82.1 FL — SIGNIFICANT CHANGE UP (ref 80–100)
NITRITE UR-MCNC: NEGATIVE — SIGNIFICANT CHANGE UP
PH UR: 7 — SIGNIFICANT CHANGE UP (ref 5–8)
PHOSPHATE SERPL-MCNC: 3.4 MG/DL — SIGNIFICANT CHANGE UP (ref 2.5–4.5)
PLATELET # BLD AUTO: 454 K/UL — HIGH (ref 150–400)
POTASSIUM SERPL-MCNC: 4.8 MMOL/L — SIGNIFICANT CHANGE UP (ref 3.5–5.3)
POTASSIUM SERPL-SCNC: 4.8 MMOL/L — SIGNIFICANT CHANGE UP (ref 3.5–5.3)
PROT ?TM UR-MCNC: 12 MG/DL — SIGNIFICANT CHANGE UP (ref 0–12)
PROT SERPL-MCNC: 8.4 GM/DL — HIGH (ref 6–8.3)
PROT UR-MCNC: 30 MG/DL
PROT/CREAT UR-RTO: 0.2 RATIO — SIGNIFICANT CHANGE UP (ref 0–0.2)
RBC # BLD: 4.63 M/UL — SIGNIFICANT CHANGE UP (ref 3.8–5.2)
RBC # FLD: 13.9 % — SIGNIFICANT CHANGE UP (ref 10.3–14.5)
SARS-COV-2 RNA SPEC QL NAA+PROBE: SIGNIFICANT CHANGE UP
SODIUM SERPL-SCNC: 137 MMOL/L — SIGNIFICANT CHANGE UP (ref 135–145)
SP GR SPEC: 1 — LOW (ref 1.01–1.02)
UROBILINOGEN FLD QL: NEGATIVE MG/DL — SIGNIFICANT CHANGE UP
WBC # BLD: 11.08 K/UL — HIGH (ref 3.8–10.5)
WBC # FLD AUTO: 11.08 K/UL — HIGH (ref 3.8–10.5)

## 2021-12-28 PROCEDURE — U0005: CPT

## 2021-12-28 PROCEDURE — 83036 HEMOGLOBIN GLYCOSYLATED A1C: CPT

## 2021-12-28 PROCEDURE — 74177 CT ABD & PELVIS W/CONTRAST: CPT | Mod: 26,MA

## 2021-12-28 PROCEDURE — 86901 BLOOD TYPING SEROLOGIC RH(D): CPT

## 2021-12-28 PROCEDURE — U0003: CPT

## 2021-12-28 PROCEDURE — 81001 URINALYSIS AUTO W/SCOPE: CPT

## 2021-12-28 PROCEDURE — C9399: CPT

## 2021-12-28 PROCEDURE — 85730 THROMBOPLASTIN TIME PARTIAL: CPT

## 2021-12-28 PROCEDURE — 85610 PROTHROMBIN TIME: CPT

## 2021-12-28 PROCEDURE — 88305 TISSUE EXAM BY PATHOLOGIST: CPT

## 2021-12-28 PROCEDURE — 84156 ASSAY OF PROTEIN URINE: CPT

## 2021-12-28 PROCEDURE — 85025 COMPLETE CBC W/AUTO DIFF WBC: CPT

## 2021-12-28 PROCEDURE — 36415 COLL VENOUS BLD VENIPUNCTURE: CPT

## 2021-12-28 PROCEDURE — 86850 RBC ANTIBODY SCREEN: CPT

## 2021-12-28 PROCEDURE — C1889: CPT

## 2021-12-28 PROCEDURE — 80048 BASIC METABOLIC PNL TOTAL CA: CPT

## 2021-12-28 PROCEDURE — 93010 ELECTROCARDIOGRAM REPORT: CPT

## 2021-12-28 PROCEDURE — 88302 TISSUE EXAM BY PATHOLOGIST: CPT

## 2021-12-28 PROCEDURE — 80053 COMPREHEN METABOLIC PANEL: CPT

## 2021-12-28 PROCEDURE — 82570 ASSAY OF URINE CREATININE: CPT

## 2021-12-28 PROCEDURE — C9113: CPT

## 2021-12-28 PROCEDURE — 82962 GLUCOSE BLOOD TEST: CPT

## 2021-12-28 PROCEDURE — 86900 BLOOD TYPING SEROLOGIC ABO: CPT

## 2021-12-28 PROCEDURE — 85027 COMPLETE CBC AUTOMATED: CPT

## 2021-12-28 PROCEDURE — 84100 ASSAY OF PHOSPHORUS: CPT

## 2021-12-28 PROCEDURE — 83735 ASSAY OF MAGNESIUM: CPT

## 2021-12-28 RX ORDER — PANTOPRAZOLE SODIUM 20 MG/1
40 TABLET, DELAYED RELEASE ORAL DAILY
Refills: 0 | Status: DISCONTINUED | OUTPATIENT
Start: 2021-12-28 | End: 2021-12-31

## 2021-12-28 RX ORDER — LISINOPRIL 2.5 MG/1
20 TABLET ORAL DAILY
Refills: 0 | Status: DISCONTINUED | OUTPATIENT
Start: 2021-12-28 | End: 2021-12-28

## 2021-12-28 RX ORDER — DEXTROSE 50 % IN WATER 50 %
25 SYRINGE (ML) INTRAVENOUS ONCE
Refills: 0 | Status: COMPLETED | OUTPATIENT
Start: 2021-12-28 | End: 2021-12-28

## 2021-12-28 RX ORDER — ACETAMINOPHEN 500 MG
1000 TABLET ORAL ONCE
Refills: 0 | Status: DISCONTINUED | OUTPATIENT
Start: 2021-12-28 | End: 2021-12-31

## 2021-12-28 RX ORDER — CLONAZEPAM 1 MG
1 TABLET ORAL ONCE
Refills: 0 | Status: DISCONTINUED | OUTPATIENT
Start: 2021-12-28 | End: 2021-12-28

## 2021-12-28 RX ORDER — SODIUM CHLORIDE 9 MG/ML
1000 INJECTION, SOLUTION INTRAVENOUS
Refills: 0 | Status: DISCONTINUED | OUTPATIENT
Start: 2021-12-28 | End: 2021-12-30

## 2021-12-28 RX ORDER — SODIUM CHLORIDE 9 MG/ML
1000 INJECTION, SOLUTION INTRAVENOUS ONCE
Refills: 0 | Status: COMPLETED | OUTPATIENT
Start: 2021-12-28 | End: 2021-12-28

## 2021-12-28 RX ORDER — OXYCODONE HYDROCHLORIDE 5 MG/1
5 TABLET ORAL EVERY 6 HOURS
Refills: 0 | Status: DISCONTINUED | OUTPATIENT
Start: 2021-12-28 | End: 2021-12-31

## 2021-12-28 RX ORDER — HEPARIN SODIUM 5000 [USP'U]/ML
5000 INJECTION INTRAVENOUS; SUBCUTANEOUS EVERY 8 HOURS
Refills: 0 | Status: DISCONTINUED | OUTPATIENT
Start: 2021-12-28 | End: 2021-12-29

## 2021-12-28 RX ORDER — TRAZODONE HCL 50 MG
150 TABLET ORAL AT BEDTIME
Refills: 0 | Status: DISCONTINUED | OUTPATIENT
Start: 2021-12-28 | End: 2021-12-31

## 2021-12-28 RX ORDER — SODIUM CHLORIDE 9 MG/ML
1000 INJECTION, SOLUTION INTRAVENOUS
Refills: 0 | Status: DISCONTINUED | OUTPATIENT
Start: 2021-12-28 | End: 2021-12-28

## 2021-12-28 RX ORDER — INSULIN HUMAN 100 [IU]/ML
10 INJECTION, SOLUTION SUBCUTANEOUS ONCE
Refills: 0 | Status: COMPLETED | OUTPATIENT
Start: 2021-12-28 | End: 2021-12-28

## 2021-12-28 RX ORDER — LAMOTRIGINE 25 MG/1
200 TABLET, ORALLY DISINTEGRATING ORAL DAILY
Refills: 0 | Status: DISCONTINUED | OUTPATIENT
Start: 2021-12-28 | End: 2021-12-31

## 2021-12-28 RX ORDER — SODIUM BICARBONATE 1 MEQ/ML
50 SYRINGE (ML) INTRAVENOUS ONCE
Refills: 0 | Status: COMPLETED | OUTPATIENT
Start: 2021-12-28 | End: 2021-12-28

## 2021-12-28 RX ORDER — CLONAZEPAM 1 MG
1 TABLET ORAL
Refills: 0 | Status: DISCONTINUED | OUTPATIENT
Start: 2021-12-28 | End: 2021-12-31

## 2021-12-28 RX ORDER — DULOXETINE HYDROCHLORIDE 30 MG/1
60 CAPSULE, DELAYED RELEASE ORAL DAILY
Refills: 0 | Status: DISCONTINUED | OUTPATIENT
Start: 2021-12-28 | End: 2021-12-31

## 2021-12-28 RX ORDER — LAMOTRIGINE 25 MG/1
200 TABLET, ORALLY DISINTEGRATING ORAL ONCE
Refills: 0 | Status: COMPLETED | OUTPATIENT
Start: 2021-12-28 | End: 2021-12-28

## 2021-12-28 RX ORDER — ONDANSETRON 8 MG/1
4 TABLET, FILM COATED ORAL EVERY 6 HOURS
Refills: 0 | Status: DISCONTINUED | OUTPATIENT
Start: 2021-12-28 | End: 2021-12-29

## 2021-12-28 RX ORDER — ACETAMINOPHEN 500 MG
650 TABLET ORAL ONCE
Refills: 0 | Status: COMPLETED | OUTPATIENT
Start: 2021-12-28 | End: 2021-12-28

## 2021-12-28 RX ORDER — OXYCODONE AND ACETAMINOPHEN 5; 325 MG/1; MG/1
1 TABLET ORAL ONCE
Refills: 0 | Status: DISCONTINUED | OUTPATIENT
Start: 2021-12-28 | End: 2021-12-28

## 2021-12-28 RX ORDER — GABAPENTIN 400 MG/1
800 CAPSULE ORAL ONCE
Refills: 0 | Status: COMPLETED | OUTPATIENT
Start: 2021-12-28 | End: 2021-12-28

## 2021-12-28 RX ORDER — TRAZODONE HCL 50 MG
150 TABLET ORAL ONCE
Refills: 0 | Status: COMPLETED | OUTPATIENT
Start: 2021-12-28 | End: 2021-12-28

## 2021-12-28 RX ORDER — CALCIUM GLUCONATE 100 MG/ML
1 VIAL (ML) INTRAVENOUS ONCE
Refills: 0 | Status: COMPLETED | OUTPATIENT
Start: 2021-12-28 | End: 2021-12-28

## 2021-12-28 RX ORDER — DULOXETINE HYDROCHLORIDE 30 MG/1
30 CAPSULE, DELAYED RELEASE ORAL AT BEDTIME
Refills: 0 | Status: DISCONTINUED | OUTPATIENT
Start: 2021-12-28 | End: 2021-12-31

## 2021-12-28 RX ORDER — SODIUM CHLORIDE 9 MG/ML
2000 INJECTION INTRAMUSCULAR; INTRAVENOUS; SUBCUTANEOUS ONCE
Refills: 0 | Status: COMPLETED | OUTPATIENT
Start: 2021-12-28 | End: 2021-12-28

## 2021-12-28 RX ORDER — SODIUM CHLORIDE 9 MG/ML
1000 INJECTION INTRAMUSCULAR; INTRAVENOUS; SUBCUTANEOUS
Refills: 0 | Status: DISCONTINUED | OUTPATIENT
Start: 2021-12-28 | End: 2021-12-28

## 2021-12-28 RX ORDER — HYDROMORPHONE HYDROCHLORIDE 2 MG/ML
1 INJECTION INTRAMUSCULAR; INTRAVENOUS; SUBCUTANEOUS EVERY 4 HOURS
Refills: 0 | Status: DISCONTINUED | OUTPATIENT
Start: 2021-12-28 | End: 2021-12-28

## 2021-12-28 RX ORDER — OXYCODONE HYDROCHLORIDE 5 MG/1
10 TABLET ORAL ONCE
Refills: 0 | Status: DISCONTINUED | OUTPATIENT
Start: 2021-12-28 | End: 2021-12-28

## 2021-12-28 RX ORDER — GABAPENTIN 400 MG/1
400 CAPSULE ORAL THREE TIMES A DAY
Refills: 0 | Status: DISCONTINUED | OUTPATIENT
Start: 2021-12-28 | End: 2021-12-31

## 2021-12-28 RX ADMIN — HYDROMORPHONE HYDROCHLORIDE 1 MILLIGRAM(S): 2 INJECTION INTRAMUSCULAR; INTRAVENOUS; SUBCUTANEOUS at 05:56

## 2021-12-28 RX ADMIN — Medication 1 MILLIGRAM(S): at 22:19

## 2021-12-28 RX ADMIN — ONDANSETRON 4 MILLIGRAM(S): 8 TABLET, FILM COATED ORAL at 09:45

## 2021-12-28 RX ADMIN — Medication 1 MILLIGRAM(S): at 09:48

## 2021-12-28 RX ADMIN — Medication 150 MILLIGRAM(S): at 05:51

## 2021-12-28 RX ADMIN — SODIUM CHLORIDE 2000 MILLILITER(S): 9 INJECTION, SOLUTION INTRAVENOUS at 12:43

## 2021-12-28 RX ADMIN — PANTOPRAZOLE SODIUM 40 MILLIGRAM(S): 20 TABLET, DELAYED RELEASE ORAL at 09:45

## 2021-12-28 RX ADMIN — MORPHINE SULFATE 4 MILLIGRAM(S): 50 CAPSULE, EXTENDED RELEASE ORAL at 05:52

## 2021-12-28 RX ADMIN — HEPARIN SODIUM 5000 UNIT(S): 5000 INJECTION INTRAVENOUS; SUBCUTANEOUS at 05:52

## 2021-12-28 RX ADMIN — Medication 0.1 MILLIGRAM(S): at 01:56

## 2021-12-28 RX ADMIN — MORPHINE SULFATE 4 MILLIGRAM(S): 50 CAPSULE, EXTENDED RELEASE ORAL at 00:06

## 2021-12-28 RX ADMIN — SODIUM CHLORIDE 120 MILLILITER(S): 9 INJECTION INTRAMUSCULAR; INTRAVENOUS; SUBCUTANEOUS at 05:56

## 2021-12-28 RX ADMIN — HYDROMORPHONE HYDROCHLORIDE 1 MILLIGRAM(S): 2 INJECTION INTRAMUSCULAR; INTRAVENOUS; SUBCUTANEOUS at 09:41

## 2021-12-28 RX ADMIN — SODIUM CHLORIDE 150 MILLILITER(S): 9 INJECTION, SOLUTION INTRAVENOUS at 12:43

## 2021-12-28 RX ADMIN — OXYCODONE HYDROCHLORIDE 5 MILLIGRAM(S): 5 TABLET ORAL at 22:19

## 2021-12-28 RX ADMIN — LAMOTRIGINE 200 MILLIGRAM(S): 25 TABLET, ORALLY DISINTEGRATING ORAL at 01:56

## 2021-12-28 RX ADMIN — OXYCODONE HYDROCHLORIDE 5 MILLIGRAM(S): 5 TABLET ORAL at 14:40

## 2021-12-28 RX ADMIN — Medication 650 MILLIGRAM(S): at 01:56

## 2021-12-28 RX ADMIN — HYDROMORPHONE HYDROCHLORIDE 1 MILLIGRAM(S): 2 INJECTION INTRAMUSCULAR; INTRAVENOUS; SUBCUTANEOUS at 10:00

## 2021-12-28 RX ADMIN — LISINOPRIL 20 MILLIGRAM(S): 2.5 TABLET ORAL at 09:45

## 2021-12-28 RX ADMIN — LAMOTRIGINE 200 MILLIGRAM(S): 25 TABLET, ORALLY DISINTEGRATING ORAL at 09:44

## 2021-12-28 RX ADMIN — HYDROMORPHONE HYDROCHLORIDE 1 MILLIGRAM(S): 2 INJECTION INTRAMUSCULAR; INTRAVENOUS; SUBCUTANEOUS at 05:52

## 2021-12-28 RX ADMIN — Medication 0.1 MILLIGRAM(S): at 05:51

## 2021-12-28 RX ADMIN — OXYCODONE AND ACETAMINOPHEN 1 TABLET(S): 5; 325 TABLET ORAL at 23:39

## 2021-12-28 RX ADMIN — OXYCODONE HYDROCHLORIDE 10 MILLIGRAM(S): 5 TABLET ORAL at 01:56

## 2021-12-28 RX ADMIN — Medication 50 MILLIEQUIVALENT(S): at 01:55

## 2021-12-28 RX ADMIN — Medication 150 MILLIGRAM(S): at 22:19

## 2021-12-28 RX ADMIN — Medication 0.1 MILLIGRAM(S): at 15:17

## 2021-12-28 RX ADMIN — OXYCODONE HYDROCHLORIDE 5 MILLIGRAM(S): 5 TABLET ORAL at 22:49

## 2021-12-28 RX ADMIN — GABAPENTIN 800 MILLIGRAM(S): 400 CAPSULE ORAL at 01:56

## 2021-12-28 RX ADMIN — DULOXETINE HYDROCHLORIDE 30 MILLIGRAM(S): 30 CAPSULE, DELAYED RELEASE ORAL at 22:19

## 2021-12-28 RX ADMIN — INSULIN HUMAN 10 UNIT(S): 100 INJECTION, SOLUTION SUBCUTANEOUS at 01:55

## 2021-12-28 RX ADMIN — OXYCODONE HYDROCHLORIDE 5 MILLIGRAM(S): 5 TABLET ORAL at 15:40

## 2021-12-28 RX ADMIN — GABAPENTIN 400 MILLIGRAM(S): 400 CAPSULE ORAL at 05:51

## 2021-12-28 RX ADMIN — GABAPENTIN 400 MILLIGRAM(S): 400 CAPSULE ORAL at 15:16

## 2021-12-28 RX ADMIN — SODIUM CHLORIDE 2000 MILLILITER(S): 9 INJECTION INTRAMUSCULAR; INTRAVENOUS; SUBCUTANEOUS at 01:55

## 2021-12-28 RX ADMIN — Medication 650 MILLIGRAM(S): at 05:52

## 2021-12-28 RX ADMIN — SODIUM CHLORIDE 150 MILLILITER(S): 9 INJECTION INTRAMUSCULAR; INTRAVENOUS; SUBCUTANEOUS at 09:48

## 2021-12-28 RX ADMIN — Medication 25 GRAM(S): at 01:56

## 2021-12-28 RX ADMIN — OXYCODONE HYDROCHLORIDE 10 MILLIGRAM(S): 5 TABLET ORAL at 05:52

## 2021-12-28 RX ADMIN — Medication 1 GRAM(S): at 05:52

## 2021-12-28 RX ADMIN — DULOXETINE HYDROCHLORIDE 60 MILLIGRAM(S): 30 CAPSULE, DELAYED RELEASE ORAL at 09:44

## 2021-12-28 RX ADMIN — Medication 100 GRAM(S): at 02:13

## 2021-12-28 RX ADMIN — GABAPENTIN 400 MILLIGRAM(S): 400 CAPSULE ORAL at 22:19

## 2021-12-28 RX ADMIN — Medication 1 MILLIGRAM(S): at 01:56

## 2021-12-28 NOTE — H&P ADULT - HISTORY OF PRESENT ILLNESS
29yo F w/ multiple comorbidities presents w/ abdominal pain, nausea/vomiting and diarrhea since last night. Patient endorses that she started vomiting all the sushi she ate, and had associated abdominal pain with diarrhea. Patient points to umbilicus for pain that radiates to right side. Patient always had a reflux issue and initially thought this was gastritis/reflux type pain, but did not improve. Patient was also scheduled for gastric bypass this Wednesday with Dr. Silverio. Denies fever/chills, shortness of breath, chest pain. VS reviewed

## 2021-12-28 NOTE — CONSULT NOTE ADULT - ASSESSMENT
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, was scheduled to have gastric bypass surgery this week w Dr Silverio, Presenting with nausea, vomiting and diarrhea after eating sushi w TOBIAS and Hyperkalemia    TOBIAS - prerenal v ATN ( hypotension ) while on ACE and taking NSAID's at home    hold ACE - already given this AM    IVF continue   trend Cr levels  - would hope for improved renal function prior to planned GI sx   monitor Cr post IV CT contrast load in ED - if Cr rises => YONG then may delay renal recovery    no further NSAID 's advised   keep sbp > 120 w now hypotension - BP remains borderline - holding parameters on Clonidine     trend labs     CT negative hydro    urine protein     ** pt seen earlier today and d/w RN     Thank you for the courtesy of this consult. We will follow this patient with you.   Management is subject to change if new information becomes available or patient condition changes.

## 2021-12-28 NOTE — H&P ADULT - NSHPLABSRESULTS_GEN_ALL_CORE
< from: CT Abdomen and Pelvis w/ Oral Cont and w/ IV Cont (12.28.21 @ 00:41) >    IMPRESSION:    There is mild infiltration of the fat adjacent to the descending colon   consistent with epiploic appendigitis .    The appendix is dilated at 12 mm and centrally has low density. This   could represent an appendix mucocele. The appearance is unchanged from   the previous exam. Recommend GI evaluation.    < end of copied text >

## 2021-12-28 NOTE — PATIENT PROFILE ADULT - FALL HARM RISK - UNIVERSAL INTERVENTIONS
Bed in lowest position, wheels locked, appropriate side rails in place/Call bell, personal items and telephone in reach/Instruct patient to call for assistance before getting out of bed or chair/Non-slip footwear when patient is out of bed/[place] to call system/Physically safe environment - no spills, clutter or unnecessary equipment/Purposeful Proactive Rounding/Room/bathroom lighting operational, light cord in reach

## 2021-12-28 NOTE — ED ADULT NURSE REASSESSMENT NOTE - NS ED NURSE REASSESS COMMENT FT1
Pt resting comfortably in bed. VSS. Monitors in p0lace. No acute distress noted at this time. Pt is clear for admission.

## 2021-12-28 NOTE — H&P ADULT - NSHPPHYSICALEXAM_GEN_ALL_CORE
PHYSICAL EXAM:  GENERAL: NAD, AOx3, well developed, morbidly obese  HEAD:  Atraumatic, Normocephalic  EYES: EOMI, conjunctiva and sclera clear  NECK: Supple, No JVD  CHEST/LUNG: Unlabored respirations b/l  HEART: S1 and S2 normal  ABDOMEN: soft, nondistended, tender at McBurney's point  NERVOUS SYSTEM:  AO X3, speech clear. No deficits   MSK: full ROM, no deformities  SKIN: warm to touch. No rashes or lesions

## 2021-12-28 NOTE — H&P ADULT - ASSESSMENT
29yo F presents w/ abdominal pain, tenderness at McBurney's point with leukocytosis suspicious for appendicitis  CT demonstrated dilated 12mm appendix, however is unchanged appearance from previous CT scan back in April 2021  Patient also presents w/ TOBIAS on CKD (has glomerulonephritis) with hyperkalemia, hyperkalemic regimen given    Plan:  - admit to Dr. Adrian  - pain control prn  - monitor VS  - diet: NPO  - antiemetic control prn  - continue IVF  - continue IV ABx  - GI/DVT PPX  - repeat labs for electrolyte abnormality and TOBIAS  - encourage IS/OOB/ambulation    Plan discussed with surgery team and attending, Dr. Adrian

## 2021-12-28 NOTE — CONSULT NOTE ADULT - SUBJECTIVE AND OBJECTIVE BOX
31yo F hx of HTN, FSGS obesity related ( follows with Dr Peterson ) on clonidine and ACE   presents w/ abdominal pain, nausea/vomiting and diarrhea since last night. Patient endorses that she started vomiting all the sushi she ate, and had associated abdominal pain with diarrhea. . Patient was also scheduled for gastric bypass this Wednesday with Dr. Silverio. Denies fever/chills, shortness of breath, chest pain.   Renal eval called for TOBIAS     today    pt feeling better   no nauusea or vomiting   less diarrhea   on IVF   pt admits to taking Motrin at home        PAST MEDICAL & SURGICAL HISTORY:  Cholecystitis  S/P cholycystectomy    Mixed connective tissue disease    Anxiety    Depression    Polycystic ovarian syndrome    HTN (hypertension)    Chronic back pain    Obesity    GERD (gastroesophageal reflux disease)    Nephrosclerosis    Suicide attempt by drug overdose   Tylenol OD    Borderline personality disorder    ADHD (attention deficit hyperactivity disorder)    CKD (chronic kidney disease)    DM2 (diabetes mellitus, type 2)    HLD (hyperlipidemia)    UTI (urinary tract infection)    Anemia    PCOS (polycystic ovarian syndrome)    Lymphocytosis    Opiate dependence    History of cholecystectomy      H/O knee surgery  2008    H/O ovarian cystectomy  2003    H/O unilateral salpingectomy  right    History of lumbar laminectomy  2021    Home Medications:  cloNIDine 0.1 mg oral tablet: 1 tab(s) orally 3 times a day (16 Dec 2021 14:08)  DULoxetine 30 mg oral delayed release capsule: 1 cap(s) orally once a day (at bedtime) (16 Dec 2021 14:08)  DULoxetine 60 mg oral delayed release capsule: 1 cap(s) orally once a day (16 Dec 2021 14:08)  gabapentin 400 mg oral capsule: 2 cap(s) orally 3 times a day (16 Dec 2021 14:08)  KlonoPIN 1 mg oral tablet: 1 tab(s) orally 2 times a day (16 Dec 2021 14:08)  LaMICtal 200 mg oral tablet: 1 tab(s) orally PM (16 Dec 2021 14:08)  lisinopril 20 mg oral tablet: 1 tab(s) orally 2 times a day (16 Dec 2021 14:08)  Melatonin 10 mg oral capsule: 1 cap(s) orally once a day (at bedtime), As Needed (16 Dec 2021 14:08)  Multiple Vitamins oral tablet: 1 tab(s) orally once a day (16 Dec 2021 14:08)  oxyCODONE 5 mg oral tablet: 1 tab(s) orally every 6 hours (16 Dec 2021 14:08)  pantoprazole 40 mg oral delayed release tablet: 1 tab(s) orally once a day (before a meal) (16 Dec 2021 14:08)  tiZANidine 4 mg oral tablet: 1 tab(s) orally every 8 hours (16 Dec 2021 14:08)  traZODone 150 mg oral tablet: 1 tab(s) orally once a day (at bedtime) (16 Dec 2021 14:08)      MEDICATIONS  (STANDING):  clonazePAM  Tablet 1 milliGRAM(s) Oral two times a day  cloNIDine 0.1 milliGRAM(s) Oral three times a day  DULoxetine 30 milliGRAM(s) Oral at bedtime  DULoxetine 60 milliGRAM(s) Oral daily  gabapentin 400 milliGRAM(s) Oral three times a day  heparin   Injectable 5000 Unit(s) SubCutaneous every 8 hours  lactated ringers. 1000 milliLiter(s) (150 mL/Hr) IV Continuous <Continuous>  lamoTRIgine 200 milliGRAM(s) Oral daily  pantoprazole  Injectable 40 milliGRAM(s) IV Push daily  traZODone 150 milliGRAM(s) Oral at bedtime      Allergies    adhesives (Blisters; Rash)  amoxicillin (Rash)  peanuts (Anaphylaxis)  Tree Nuts (Anaphylaxis)    Intolerances        SOCIAL HISTORY:  Denies ETOh,Smoking,     FAMILY HISTORY:  Family history of heart disease (Grandparent)        REVIEW OF SYSTEMS:    CONSTITUTIONAL: No weakness, fevers or chills  EYES/ENT: No visual changes;  No vertigo or throat pain   NECK: No pain or stiffness  RESPIRATORY: No cough, wheezing, hemoptysis; No shortness of breath  CARDIOVASCULAR: No chest pain or palpitations  GASTROINTESTINAL: No abdominal or epigastric pain. ematemesis; No diarrhea or constipation. No melena or hematochezia.  GENITOURINARY: No dysuria, frequency or hematuria  NEUROLOGICAL: No numbness or weakness  SKIN: No itching, burning, rashes, or lesions   All other review of systems is negative unless indicated above.    VITAL:  Vital Signs Last 24 Hrs  T(C): 36.7 (28 Dec 2021 16:01), Max: 37.1 (28 Dec 2021 06:12)  T(F): 98.1 (28 Dec 2021 16:01), Max: 98.7 (28 Dec 2021 06:12)  HR: 78 (28 Dec 2021 16:01) (78 - 116)  BP: 97/56 (28 Dec 2021 16:01) (97/56 - 142/94)  BP(mean): 75 (28 Dec 2021 06:12) (71 - 101)  RR: 18 (28 Dec 2021 16:01) (17 - 20)  SpO2: 98% (28 Dec 2021 16:01) (95% - 100%)        PHYSICAL EXAM:    Constitutional: obese  HEENT: , EOMI,  MMM  Neck: No LAD, No JVD  Respiratory: CTAB  Cardiovascular: S1 and S2  Gastrointestinal: BS+, soft, NT/ND  Extremities: No peripheral edema  Neurological: A/O x 3, no focal deficits  : No Alvares  Skin: No rashes  Access: Not applicable    LABS:      137    |  109    |  42     ----------------------------<  88        28 Dec 2021 07:37  4.8     |  23     |  2.49     135    |  109    |  48     ----------------------------<  116       27 Dec 2021 23:22  6.4     |  23     |  2.73     Ca    10.5       28 Dec 2021 07:37  Ca    11.1       27 Dec 2021 23:22    Phos  3.4       28 Dec 2021 07:37  Phos  2.0       27 Dec 2021 23:22    Mg     1.6       28 Dec 2021 07:37  Mg     1.6       27 Dec 2021 23:22    TPro  8.4    /  Alb  3.0    /  TBili  0.3    /        28 Dec 2021 07:37  DBili  x      /  AST  54     /  ALT  42     /  AlkPhos  83       TPro  9.7    /  Alb  3.5    /  TBili  0.4    /        27 Dec 2021 23:22  DBili  x      /  AST  60     /  ALT  51     /  AlkPhos  93                                 12.9   11.08 )-----------( 454      ( 28 Dec 2021 07:37 )             38.0       Ca    10.5<H>      28 Dec 2021 07:37  Phos  3.4     -  Mg     1.6     -    TPro  8.4<H>  /  Alb  3.0<L>  /  TBili  0.3  /  DBili  x   /  AST  54<H>  /  ALT  42  /  AlkPhos  83  12-28      Urine Studies:  Urinalysis Basic - ( 28 Dec 2021 04:07 )    Color: Yellow / Appearance: Clear / S.005 / pH: x  Gluc: x / Ketone: Negative  / Bili: Negative / Urobili: Negative mg/dL   Blood: x / Protein: 30 mg/dL / Nitrite: Negative   Leuk Esterase: Trace / RBC: 0-2 /HPF / WBC 6-10   Sq Epi: x / Non Sq Epi: Few / Bacteria: Moderate            RADIOLOGY & ADDITIONAL STUDIES:    EN AND PELVIS OC IC                          PROCEDURE DATE:  2021          INTERPRETATION:  CLINICAL INFORMATION: Vomiting    COMPARISON: CT abdomen pelvis 2021.    CONTRAST/COMPLICATIONS:  IV Contrast: Omnipaque 350  90 cc administered   10 cc discarded  Oral Contrast: Omnipaque 300 + Fruit 2o  Complications: None reported at time of study completion    PROCEDURE:  CT of the Abdomen and Pelvis was performed.  Sagittal and coronal reformats were performed.    FINDINGS:  LOWER CHEST: Previously seen left lower lobe lung nodules are are not   seen on this examination.    LIVER: Within normal limits.  BILE DUCTS: Within normal limits.  GALLBLADDER: Cholecystectomy.  SPLEEN: Within normal limits.  PANCREAS: Within normal limits.  ADRENALS: Within normal limits.  KIDNEYS/URETERS: Within normal limits.    BLADDER: Within normal limits.  REPRODUCTIVE ORGANS: Uterus and adnexa within normal limits.    BOWEL: No bowel obstruction. The appendix is dilated at 12 mm and   centrally has low density. This could represent an appendix mucocele. The   appearance is unchanged from the previous exam. Recommend GI evaluation.   There is mild infiltration of the fat adjacent to the descending colon   consistent with epiploic appendigitis ([id number])  PERITONEUM: No ascites.  VESSELS: Within normal limits.  RETROPERITONEUM/LYMPH NODES: Mildly prominent scattered abdominal lymph   nodes are unchanged.  ABDOMINAL WALL: Within normal limits.  BONES: Within normal limits.    IMPRESSION:    There is mild infiltration of the fat adjacent to the descending colon   consistent with epiploic appendigitis .    The appendix is dilated at 12 mm and centrally has low density. This   could represent an appendix mucocele. The appearance is unchanged from   the previous exam. Recommend GI evaluation.

## 2021-12-29 ENCOUNTER — RESULT REVIEW (OUTPATIENT)
Age: 31
End: 2021-12-29

## 2021-12-29 DIAGNOSIS — E66.01 MORBID (SEVERE) OBESITY DUE TO EXCESS CALORIES: ICD-10-CM

## 2021-12-29 DIAGNOSIS — K35.80 UNSPECIFIED ACUTE APPENDICITIS: ICD-10-CM

## 2021-12-29 LAB
ADD ON TEST-SPECIMEN IN LAB: SIGNIFICANT CHANGE UP
ALBUMIN SERPL ELPH-MCNC: 2.7 G/DL — LOW (ref 3.3–5)
ALP SERPL-CCNC: 74 U/L — SIGNIFICANT CHANGE UP (ref 40–120)
ALT FLD-CCNC: 45 U/L — SIGNIFICANT CHANGE UP (ref 12–78)
ANION GAP SERPL CALC-SCNC: 5 MMOL/L — SIGNIFICANT CHANGE UP (ref 5–17)
ANION GAP SERPL CALC-SCNC: 6 MMOL/L — SIGNIFICANT CHANGE UP (ref 5–17)
APTT BLD: 37.6 SEC — HIGH (ref 27.5–35.5)
AST SERPL-CCNC: 64 U/L — HIGH (ref 15–37)
BASOPHILS # BLD AUTO: 0.07 K/UL — SIGNIFICANT CHANGE UP (ref 0–0.2)
BASOPHILS NFR BLD AUTO: 0.8 % — SIGNIFICANT CHANGE UP (ref 0–2)
BILIRUB SERPL-MCNC: 0.4 MG/DL — SIGNIFICANT CHANGE UP (ref 0.2–1.2)
BUN SERPL-MCNC: 20 MG/DL — SIGNIFICANT CHANGE UP (ref 7–23)
BUN SERPL-MCNC: 24 MG/DL — HIGH (ref 7–23)
CALCIUM SERPL-MCNC: 10.1 MG/DL — SIGNIFICANT CHANGE UP (ref 8.5–10.1)
CALCIUM SERPL-MCNC: 9.8 MG/DL — SIGNIFICANT CHANGE UP (ref 8.5–10.1)
CHLORIDE SERPL-SCNC: 107 MMOL/L — SIGNIFICANT CHANGE UP (ref 96–108)
CHLORIDE SERPL-SCNC: 109 MMOL/L — HIGH (ref 96–108)
CO2 SERPL-SCNC: 26 MMOL/L — SIGNIFICANT CHANGE UP (ref 22–31)
CO2 SERPL-SCNC: 26 MMOL/L — SIGNIFICANT CHANGE UP (ref 22–31)
CREAT SERPL-MCNC: 1.67 MG/DL — HIGH (ref 0.5–1.3)
CREAT SERPL-MCNC: 1.76 MG/DL — HIGH (ref 0.5–1.3)
EOSINOPHIL # BLD AUTO: 0.11 K/UL — SIGNIFICANT CHANGE UP (ref 0–0.5)
EOSINOPHIL NFR BLD AUTO: 1.3 % — SIGNIFICANT CHANGE UP (ref 0–6)
GLUCOSE BLDC GLUCOMTR-MCNC: 131 MG/DL — HIGH (ref 70–99)
GLUCOSE BLDC GLUCOMTR-MCNC: 142 MG/DL — HIGH (ref 70–99)
GLUCOSE SERPL-MCNC: 79 MG/DL — SIGNIFICANT CHANGE UP (ref 70–99)
GLUCOSE SERPL-MCNC: 85 MG/DL — SIGNIFICANT CHANGE UP (ref 70–99)
HCT VFR BLD CALC: 33.5 % — LOW (ref 34.5–45)
HGB BLD-MCNC: 11.3 G/DL — LOW (ref 11.5–15.5)
IMM GRANULOCYTES NFR BLD AUTO: 0.6 % — SIGNIFICANT CHANGE UP (ref 0–1.5)
INR BLD: 1.28 RATIO — HIGH (ref 0.88–1.16)
LYMPHOCYTES # BLD AUTO: 4.02 K/UL — HIGH (ref 1–3.3)
LYMPHOCYTES # BLD AUTO: 46.3 % — HIGH (ref 13–44)
MAGNESIUM SERPL-MCNC: 1.5 MG/DL — LOW (ref 1.6–2.6)
MCHC RBC-ENTMCNC: 28 PG — SIGNIFICANT CHANGE UP (ref 27–34)
MCHC RBC-ENTMCNC: 33.7 GM/DL — SIGNIFICANT CHANGE UP (ref 32–36)
MCV RBC AUTO: 83.1 FL — SIGNIFICANT CHANGE UP (ref 80–100)
MONOCYTES # BLD AUTO: 0.64 K/UL — SIGNIFICANT CHANGE UP (ref 0–0.9)
MONOCYTES NFR BLD AUTO: 7.4 % — SIGNIFICANT CHANGE UP (ref 2–14)
NEUTROPHILS # BLD AUTO: 3.79 K/UL — SIGNIFICANT CHANGE UP (ref 1.8–7.4)
NEUTROPHILS NFR BLD AUTO: 43.6 % — SIGNIFICANT CHANGE UP (ref 43–77)
PHOSPHATE SERPL-MCNC: 3.9 MG/DL — SIGNIFICANT CHANGE UP (ref 2.5–4.5)
PLATELET # BLD AUTO: 363 K/UL — SIGNIFICANT CHANGE UP (ref 150–400)
POTASSIUM SERPL-MCNC: 4.4 MMOL/L — SIGNIFICANT CHANGE UP (ref 3.5–5.3)
POTASSIUM SERPL-MCNC: 4.5 MMOL/L — SIGNIFICANT CHANGE UP (ref 3.5–5.3)
POTASSIUM SERPL-SCNC: 4.4 MMOL/L — SIGNIFICANT CHANGE UP (ref 3.5–5.3)
POTASSIUM SERPL-SCNC: 4.5 MMOL/L — SIGNIFICANT CHANGE UP (ref 3.5–5.3)
PROT SERPL-MCNC: 7.4 GM/DL — SIGNIFICANT CHANGE UP (ref 6–8.3)
PROTHROM AB SERPL-ACNC: 14.8 SEC — HIGH (ref 10.6–13.6)
RBC # BLD: 4.03 M/UL — SIGNIFICANT CHANGE UP (ref 3.8–5.2)
RBC # FLD: 13.7 % — SIGNIFICANT CHANGE UP (ref 10.3–14.5)
SODIUM SERPL-SCNC: 139 MMOL/L — SIGNIFICANT CHANGE UP (ref 135–145)
SODIUM SERPL-SCNC: 140 MMOL/L — SIGNIFICANT CHANGE UP (ref 135–145)
WBC # BLD: 8.68 K/UL — SIGNIFICANT CHANGE UP (ref 3.8–10.5)
WBC # FLD AUTO: 8.68 K/UL — SIGNIFICANT CHANGE UP (ref 3.8–10.5)

## 2021-12-29 PROCEDURE — 88302 TISSUE EXAM BY PATHOLOGIST: CPT | Mod: 26

## 2021-12-29 PROCEDURE — 88305 TISSUE EXAM BY PATHOLOGIST: CPT | Mod: 26

## 2021-12-29 PROCEDURE — 99253 IP/OBS CNSLTJ NEW/EST LOW 45: CPT

## 2021-12-29 RX ORDER — OXYCODONE HYDROCHLORIDE 5 MG/1
10 TABLET ORAL EVERY 4 HOURS
Refills: 0 | Status: DISCONTINUED | OUTPATIENT
Start: 2021-12-29 | End: 2021-12-31

## 2021-12-29 RX ORDER — OXYCODONE HYDROCHLORIDE 5 MG/1
10 TABLET ORAL ONCE
Refills: 0 | Status: DISCONTINUED | OUTPATIENT
Start: 2021-12-29 | End: 2021-12-29

## 2021-12-29 RX ORDER — OXYCODONE HYDROCHLORIDE 5 MG/1
5 TABLET ORAL EVERY 4 HOURS
Refills: 0 | Status: DISCONTINUED | OUTPATIENT
Start: 2021-12-29 | End: 2021-12-31

## 2021-12-29 RX ORDER — ACETAMINOPHEN 500 MG
1000 TABLET ORAL ONCE
Refills: 0 | Status: COMPLETED | OUTPATIENT
Start: 2021-12-29 | End: 2021-12-29

## 2021-12-29 RX ORDER — SODIUM CHLORIDE 9 MG/ML
1000 INJECTION, SOLUTION INTRAVENOUS ONCE
Refills: 0 | Status: COMPLETED | OUTPATIENT
Start: 2021-12-29 | End: 2021-12-29

## 2021-12-29 RX ORDER — HEPARIN SODIUM 5000 [USP'U]/ML
5000 INJECTION INTRAVENOUS; SUBCUTANEOUS EVERY 8 HOURS
Refills: 0 | Status: DISCONTINUED | OUTPATIENT
Start: 2021-12-29 | End: 2021-12-29

## 2021-12-29 RX ORDER — HYDROMORPHONE HYDROCHLORIDE 2 MG/ML
1 INJECTION INTRAMUSCULAR; INTRAVENOUS; SUBCUTANEOUS
Refills: 0 | Status: DISCONTINUED | OUTPATIENT
Start: 2021-12-29 | End: 2021-12-29

## 2021-12-29 RX ORDER — ACETAMINOPHEN 500 MG
1000 TABLET ORAL ONCE
Refills: 0 | Status: DISCONTINUED | OUTPATIENT
Start: 2021-12-29 | End: 2021-12-31

## 2021-12-29 RX ORDER — SODIUM CHLORIDE 9 MG/ML
1000 INJECTION, SOLUTION INTRAVENOUS
Refills: 0 | Status: DISCONTINUED | OUTPATIENT
Start: 2021-12-29 | End: 2021-12-31

## 2021-12-29 RX ORDER — PANTOPRAZOLE SODIUM 20 MG/1
40 TABLET, DELAYED RELEASE ORAL EVERY 24 HOURS
Refills: 0 | Status: DISCONTINUED | OUTPATIENT
Start: 2021-12-29 | End: 2021-12-31

## 2021-12-29 RX ORDER — ONDANSETRON 8 MG/1
4 TABLET, FILM COATED ORAL EVERY 6 HOURS
Refills: 0 | Status: DISCONTINUED | OUTPATIENT
Start: 2021-12-29 | End: 2021-12-29

## 2021-12-29 RX ORDER — HYDROMORPHONE HYDROCHLORIDE 2 MG/ML
1 INJECTION INTRAMUSCULAR; INTRAVENOUS; SUBCUTANEOUS EVERY 4 HOURS
Refills: 0 | Status: DISCONTINUED | OUTPATIENT
Start: 2021-12-29 | End: 2021-12-31

## 2021-12-29 RX ORDER — ONDANSETRON 8 MG/1
4 TABLET, FILM COATED ORAL ONCE
Refills: 0 | Status: COMPLETED | OUTPATIENT
Start: 2021-12-29 | End: 2021-12-29

## 2021-12-29 RX ORDER — SODIUM CHLORIDE 9 MG/ML
1000 INJECTION, SOLUTION INTRAVENOUS
Refills: 0 | Status: DISCONTINUED | OUTPATIENT
Start: 2021-12-29 | End: 2021-12-29

## 2021-12-29 RX ORDER — GABAPENTIN 400 MG/1
400 CAPSULE ORAL THREE TIMES A DAY
Refills: 0 | Status: DISCONTINUED | OUTPATIENT
Start: 2021-12-29 | End: 2021-12-29

## 2021-12-29 RX ADMIN — PANTOPRAZOLE SODIUM 40 MILLIGRAM(S): 20 TABLET, DELAYED RELEASE ORAL at 09:53

## 2021-12-29 RX ADMIN — OXYCODONE HYDROCHLORIDE 10 MILLIGRAM(S): 5 TABLET ORAL at 20:24

## 2021-12-29 RX ADMIN — ONDANSETRON 4 MILLIGRAM(S): 8 TABLET, FILM COATED ORAL at 22:27

## 2021-12-29 RX ADMIN — HYDROMORPHONE HYDROCHLORIDE 1 MILLIGRAM(S): 2 INJECTION INTRAMUSCULAR; INTRAVENOUS; SUBCUTANEOUS at 19:29

## 2021-12-29 RX ADMIN — DULOXETINE HYDROCHLORIDE 30 MILLIGRAM(S): 30 CAPSULE, DELAYED RELEASE ORAL at 22:44

## 2021-12-29 RX ADMIN — Medication 0.5 MILLIGRAM(S): at 19:16

## 2021-12-29 RX ADMIN — Medication 1 MILLIGRAM(S): at 22:26

## 2021-12-29 RX ADMIN — GABAPENTIN 400 MILLIGRAM(S): 400 CAPSULE ORAL at 22:27

## 2021-12-29 RX ADMIN — HYDROMORPHONE HYDROCHLORIDE 1 MILLIGRAM(S): 2 INJECTION INTRAMUSCULAR; INTRAVENOUS; SUBCUTANEOUS at 23:40

## 2021-12-29 RX ADMIN — OXYCODONE HYDROCHLORIDE 5 MILLIGRAM(S): 5 TABLET ORAL at 10:45

## 2021-12-29 RX ADMIN — HYDROMORPHONE HYDROCHLORIDE 1 MILLIGRAM(S): 2 INJECTION INTRAMUSCULAR; INTRAVENOUS; SUBCUTANEOUS at 22:58

## 2021-12-29 RX ADMIN — OXYCODONE HYDROCHLORIDE 5 MILLIGRAM(S): 5 TABLET ORAL at 09:51

## 2021-12-29 RX ADMIN — OXYCODONE HYDROCHLORIDE 5 MILLIGRAM(S): 5 TABLET ORAL at 15:02

## 2021-12-29 RX ADMIN — PANTOPRAZOLE SODIUM 40 MILLIGRAM(S): 20 TABLET, DELAYED RELEASE ORAL at 19:15

## 2021-12-29 RX ADMIN — Medication 150 MILLIGRAM(S): at 22:27

## 2021-12-29 RX ADMIN — SODIUM CHLORIDE 1000 MILLILITER(S): 9 INJECTION, SOLUTION INTRAVENOUS at 10:25

## 2021-12-29 RX ADMIN — HYDROMORPHONE HYDROCHLORIDE 1 MILLIGRAM(S): 2 INJECTION INTRAMUSCULAR; INTRAVENOUS; SUBCUTANEOUS at 19:41

## 2021-12-29 RX ADMIN — Medication 0.1 MILLIGRAM(S): at 22:43

## 2021-12-29 RX ADMIN — GABAPENTIN 400 MILLIGRAM(S): 400 CAPSULE ORAL at 15:02

## 2021-12-29 RX ADMIN — OXYCODONE AND ACETAMINOPHEN 1 TABLET(S): 5; 325 TABLET ORAL at 00:09

## 2021-12-29 RX ADMIN — SODIUM CHLORIDE 200 MILLILITER(S): 9 INJECTION, SOLUTION INTRAVENOUS at 19:14

## 2021-12-29 RX ADMIN — HYDROMORPHONE HYDROCHLORIDE 1 MILLIGRAM(S): 2 INJECTION INTRAMUSCULAR; INTRAVENOUS; SUBCUTANEOUS at 19:49

## 2021-12-29 RX ADMIN — HYDROMORPHONE HYDROCHLORIDE 1 MILLIGRAM(S): 2 INJECTION INTRAMUSCULAR; INTRAVENOUS; SUBCUTANEOUS at 19:19

## 2021-12-29 RX ADMIN — Medication 1000 MILLIGRAM(S): at 23:38

## 2021-12-29 RX ADMIN — DULOXETINE HYDROCHLORIDE 60 MILLIGRAM(S): 30 CAPSULE, DELAYED RELEASE ORAL at 09:47

## 2021-12-29 RX ADMIN — SODIUM CHLORIDE 200 MILLILITER(S): 9 INJECTION, SOLUTION INTRAVENOUS at 10:24

## 2021-12-29 RX ADMIN — OXYCODONE HYDROCHLORIDE 5 MILLIGRAM(S): 5 TABLET ORAL at 04:27

## 2021-12-29 RX ADMIN — Medication 400 MILLIGRAM(S): at 23:37

## 2021-12-29 RX ADMIN — OXYCODONE HYDROCHLORIDE 5 MILLIGRAM(S): 5 TABLET ORAL at 15:45

## 2021-12-29 RX ADMIN — HYDROMORPHONE HYDROCHLORIDE 1 MILLIGRAM(S): 2 INJECTION INTRAMUSCULAR; INTRAVENOUS; SUBCUTANEOUS at 19:09

## 2021-12-29 RX ADMIN — LAMOTRIGINE 200 MILLIGRAM(S): 25 TABLET, ORALLY DISINTEGRATING ORAL at 09:47

## 2021-12-29 RX ADMIN — Medication 1 MILLIGRAM(S): at 07:02

## 2021-12-29 RX ADMIN — Medication 1 MILLIGRAM(S): at 09:53

## 2021-12-29 RX ADMIN — GABAPENTIN 400 MILLIGRAM(S): 400 CAPSULE ORAL at 07:02

## 2021-12-29 NOTE — BRIEF OPERATIVE NOTE - NSICDXBRIEFPREOP_GEN_ALL_CORE_FT
PRE-OP DIAGNOSIS:  Morbid obesity 29-Dec-2021 18:58:55  Wilfredo Howe  Acute appendicitis 29-Dec-2021 18:59:05  Wilfredo Howe

## 2021-12-29 NOTE — BRIEF OPERATIVE NOTE - NSICDXBRIEFPROCEDURE_GEN_ALL_CORE_FT
PROCEDURES:  Laparoscopic gastric bypass 29-Dec-2021 18:57:41  Wilfredo Howe  Lap appendectomy 29-Dec-2021 18:57:49  Wilfredo Howe  Omental biopsy 29-Dec-2021 18:57:58  Wilfredo Howe  Mesenteric biopsy 29-Dec-2021 18:58:08  Wilfredo Howe  Gastroscopy 29-Dec-2021 18:58:42  Wilfredo Howe

## 2021-12-29 NOTE — CHART NOTE - NSCHARTNOTEFT_GEN_A_CORE
Patient was seen in the office for PFTs, for preop risk stratification  When I called the patient to review the results a week ago she had slurred speech and back pain. Her back pain has resolved.  I encouraged her to go to the ED last week for her back pain  She is now admitted, with possible plans for bariatric surgery  Spoke to Dr. Silverio, can consult pulmonary as needed

## 2021-12-29 NOTE — BRIEF OPERATIVE NOTE - NSICDXBRIEFPOSTOP_GEN_ALL_CORE_FT
POST-OP DIAGNOSIS:  Morbid obesity 29-Dec-2021 18:59:22  Wilfredo Howe  Acute appendicitis 29-Dec-2021 18:59:27  Wilfredo Howe

## 2021-12-29 NOTE — BRIEF OPERATIVE NOTE - OPERATION/FINDINGS
150 cm thuy limb, 150 cm biliary limb, acute appendicitis, mucinous appearing biopsies sent to pathology

## 2021-12-29 NOTE — CONSULT NOTE ADULT - SUBJECTIVE AND OBJECTIVE BOX
C/C: renal failure    HPI: 30F, pmh of HTN, HLD, DMII, Chronic back pain, Borderline personality d/o, anxiety, Depression,  MCTD, FSGS, PCOS, Obesity, scheduled for gastric bypass today who presented to the ER  with n/v/abd pain/diarrhea after eating sushi. She had been on a liquid diet in prep for bariatric surgery scheduled for . She had a few pieces of sushi from her mom which "didn't agree" with her and she started vomiting and having abd pain. Symptoms worsened, so she came to the ER  had a CT with iv contrast and was subsequently found be in acute renal failure with hyperkalemia. She was admitted to surgical service. Given ivf, calcium gluconate, bicarb with correction of potassium.   Lisinopril held after being seen by nephrology. Was also noted to be taking motrin pta.  She now feels back to baseline. No further n/v/d or abd pain since admission. Had clears yesterday which she tolerated. has been on ivf with some improvement in renal function. Good urine outpt. no f/c/r. no chest pain/sob. Had preop evaluations done with pcp/pulm/psychiatry  and cardio prior to admission in prep for bariatric surgery.     ROS: all 10 systems reviewed and is as above otherwise negative.     PMH: as above  PSH: cholecystectomy, knee surgery, ovarian cystectomy, salpingectomy, lumbar lami  F/H: heart disease-grandparent.  S/H: no tobacco, no etoh  Allergies: amoxicillin  Home meds: see med rec    Vital Signs Last 24 Hrs  T(C): 36.6 (28 Dec 2021 22:05), Max: 36.7 (28 Dec 2021 16:01)  T(F): 97.9 (28 Dec 2021 22:05), Max: 98.1 (28 Dec 2021 16:01)  HR: 63 (29 Dec 2021 07:06) (63 - 78)  BP: 99/54 (29 Dec 2021 07:06) (97/56 - 110/59)  RR: 18 (28 Dec 2021 22:05) (18 - 18)  SpO2: 96% (28 Dec 2021 22:05) (96% - 98%)    PHYSICAL EXAM:    GENERAL: Comfortable, obese, no acute distress   HEAD:  Normocephalic, atraumatic  EYES: EOMI, PERRLA  HEENT: Moist mucous membranes  NECK: Supple, No JVD  NERVOUS SYSTEM:  Alert & Oriented X3, Motor Strength 5/5 B/L upper and lower extremities  CHEST/LUNG: Clear to auscultation bilaterally  HEART: Regular rate and rhythm  ABDOMEN: Soft, Nontender, Nondistended, Bowel sounds present  GENITOURINARY: Voiding, no palpable bladder  EXTREMITIES:   No clubbing, cyanosis, or edema  MUSCULOSKELETAL- No muscle tenderness, no joint tenderness  SKIN-no rash    LABS:                        11.3   8.68  )-----------( 363      ( 29 Dec 2021 04:26 )             33.5         140  |  109<H>  |  24<H>  ----------------------------<  79  4.5   |  26  |  1.76<H>    Ca    9.8      29 Dec 2021 04:26  Phos  3.9       Mg     1.5         TPro  7.4  /  Alb  2.7<L>  /  TBili  0.4  /  DBili  x   /  AST  64<H>  /  ALT  45  /  AlkPhos  74      PT/INR - ( 29 Dec 2021 04:26 )   PT: 14.8 sec;   INR: 1.28 ratio         PTT - ( 29 Dec 2021 04:26 )  PTT:37.6 sec  Urinalysis Basic - ( 28 Dec 2021 04:07 )    Color: Yellow / Appearance: Clear / S.005 / pH: x  Gluc: x / Ketone: Negative  / Bili: Negative / Urobili: Negative mg/dL   Blood: x / Protein: 30 mg/dL / Nitrite: Negative   Leuk Esterase: Trace / RBC: 0-2 /HPF / WBC 6-10   Sq Epi: x / Non Sq Epi: Few / Bacteria: Moderate    EKG: sinus tach, no acute ischemic changes.     CT Abdomen and Pelvis w/ Oral Cont and w/ IV Cont (21 @ 00:41) >  IMPRESSION:  There is mild infiltration of the fat adjacent to the descending colon   consistent with epiploic appendigitis .  The appendix is dilated at 12 mm and centrally has low density. This   could represent an appendix mucocele. The appearance is unchanged from   the previous exam. Recommend GI evaluation.    MEDICATIONS  (STANDING):  clonazePAM  Tablet 1 milliGRAM(s) Oral two times a day  cloNIDine 0.1 milliGRAM(s) Oral three times a day  DULoxetine 30 milliGRAM(s) Oral at bedtime  DULoxetine 60 milliGRAM(s) Oral daily  gabapentin 400 milliGRAM(s) Oral three times a day  heparin   Injectable 5000 Unit(s) SubCutaneous every 8 hours  lactated ringers. 1000 milliLiter(s) (200 mL/Hr) IV Continuous <Continuous>  lamoTRIgine 200 milliGRAM(s) Oral daily  pantoprazole  Injectable 40 milliGRAM(s) IV Push daily  traZODone 150 milliGRAM(s) Oral at bedtime    MEDICATIONS  (PRN):  acetaminophen   IVPB .. 1000 milliGRAM(s) IV Intermittent once PRN Mild Pain (1 - 3)  ondansetron Injectable 4 milliGRAM(s) IV Push every 6 hours PRN Nausea  oxyCODONE    IR 5 milliGRAM(s) Oral every 6 hours PRN Moderate Pain (4 - 6)    ASSESSMENT AND PLAN:  30f, PMH as above a/w:    1. TOBIAS/Hyperkalemia:  -likely Prerenal +/- ATN  -hyperkalemia resolved.  -nephrology following.   -renal function improving.   -continue to hold ace-i/nsaids.   -f/u repeat bmp @ 12noon.     2. Morbid obesity:  -for OR today   -if renal function continues to improve, no medical contraindications for OR at this time.   -npo  -ivf  -IV PPI    3. h/o HTN now with borderline bp:  -ace-i on hold for #1.  -clonidine with hold parameters for [id number].    4. Anxiety/depression/borderline personality d/o:  -mood appears stable.   -continue lamictal, duloxetine, klonipin, trazodone.    5. DMII:  -?borderline.   -on metformin but likely takes it for pcos.  -sugars on bmp acceptable  -check hgba1c.    6. DVT px:  -hep sq.     thank you, will follow.   d/w patient/nephrology/surgery/ RN

## 2021-12-30 ENCOUNTER — TRANSCRIPTION ENCOUNTER (OUTPATIENT)
Age: 31
End: 2021-12-30

## 2021-12-30 LAB
ANION GAP SERPL CALC-SCNC: 4 MMOL/L — LOW (ref 5–17)
BASOPHILS # BLD AUTO: 0.01 K/UL — SIGNIFICANT CHANGE UP (ref 0–0.2)
BASOPHILS NFR BLD AUTO: 0.1 % — SIGNIFICANT CHANGE UP (ref 0–2)
BUN SERPL-MCNC: 24 MG/DL — HIGH (ref 7–23)
CALCIUM SERPL-MCNC: 9.2 MG/DL — SIGNIFICANT CHANGE UP (ref 8.5–10.1)
CHLORIDE SERPL-SCNC: 106 MMOL/L — SIGNIFICANT CHANGE UP (ref 96–108)
CO2 SERPL-SCNC: 27 MMOL/L — SIGNIFICANT CHANGE UP (ref 22–31)
CREAT SERPL-MCNC: 1.69 MG/DL — HIGH (ref 0.5–1.3)
EOSINOPHIL # BLD AUTO: 0 K/UL — SIGNIFICANT CHANGE UP (ref 0–0.5)
EOSINOPHIL NFR BLD AUTO: 0 % — SIGNIFICANT CHANGE UP (ref 0–6)
GLUCOSE BLDC GLUCOMTR-MCNC: 114 MG/DL — HIGH (ref 70–99)
GLUCOSE SERPL-MCNC: 107 MG/DL — HIGH (ref 70–99)
HCT VFR BLD CALC: 32.3 % — LOW (ref 34.5–45)
HGB BLD-MCNC: 11 G/DL — LOW (ref 11.5–15.5)
IMM GRANULOCYTES NFR BLD AUTO: 0.3 % — SIGNIFICANT CHANGE UP (ref 0–1.5)
LYMPHOCYTES # BLD AUTO: 1.31 K/UL — SIGNIFICANT CHANGE UP (ref 1–3.3)
LYMPHOCYTES # BLD AUTO: 14.5 % — SIGNIFICANT CHANGE UP (ref 13–44)
MCHC RBC-ENTMCNC: 28 PG — SIGNIFICANT CHANGE UP (ref 27–34)
MCHC RBC-ENTMCNC: 34.1 GM/DL — SIGNIFICANT CHANGE UP (ref 32–36)
MCV RBC AUTO: 82.2 FL — SIGNIFICANT CHANGE UP (ref 80–100)
MONOCYTES # BLD AUTO: 0.73 K/UL — SIGNIFICANT CHANGE UP (ref 0–0.9)
MONOCYTES NFR BLD AUTO: 8.1 % — SIGNIFICANT CHANGE UP (ref 2–14)
NEUTROPHILS # BLD AUTO: 6.95 K/UL — SIGNIFICANT CHANGE UP (ref 1.8–7.4)
NEUTROPHILS NFR BLD AUTO: 77 % — SIGNIFICANT CHANGE UP (ref 43–77)
PLATELET # BLD AUTO: 326 K/UL — SIGNIFICANT CHANGE UP (ref 150–400)
POTASSIUM SERPL-MCNC: 4.3 MMOL/L — SIGNIFICANT CHANGE UP (ref 3.5–5.3)
POTASSIUM SERPL-SCNC: 4.3 MMOL/L — SIGNIFICANT CHANGE UP (ref 3.5–5.3)
RBC # BLD: 3.93 M/UL — SIGNIFICANT CHANGE UP (ref 3.8–5.2)
RBC # FLD: 13.2 % — SIGNIFICANT CHANGE UP (ref 10.3–14.5)
SODIUM SERPL-SCNC: 137 MMOL/L — SIGNIFICANT CHANGE UP (ref 135–145)
WBC # BLD: 9.03 K/UL — SIGNIFICANT CHANGE UP (ref 3.8–10.5)
WBC # FLD AUTO: 9.03 K/UL — SIGNIFICANT CHANGE UP (ref 3.8–10.5)

## 2021-12-30 PROCEDURE — 99232 SBSQ HOSP IP/OBS MODERATE 35: CPT

## 2021-12-30 RX ORDER — HYDROMORPHONE HYDROCHLORIDE 2 MG/ML
0.5 INJECTION INTRAMUSCULAR; INTRAVENOUS; SUBCUTANEOUS ONCE
Refills: 0 | Status: DISCONTINUED | OUTPATIENT
Start: 2021-12-30 | End: 2021-12-30

## 2021-12-30 RX ORDER — ACETAMINOPHEN 500 MG
1000 TABLET ORAL ONCE
Refills: 0 | Status: COMPLETED | OUTPATIENT
Start: 2021-12-30 | End: 2021-12-30

## 2021-12-30 RX ORDER — ONDANSETRON 8 MG/1
4 TABLET, FILM COATED ORAL EVERY 6 HOURS
Refills: 0 | Status: DISCONTINUED | OUTPATIENT
Start: 2021-12-30 | End: 2021-12-31

## 2021-12-30 RX ORDER — DEXAMETHASONE 0.5 MG/5ML
10 ELIXIR ORAL ONCE
Refills: 0 | Status: COMPLETED | OUTPATIENT
Start: 2021-12-30 | End: 2021-12-30

## 2021-12-30 RX ORDER — HEPARIN SODIUM 5000 [USP'U]/ML
5000 INJECTION INTRAVENOUS; SUBCUTANEOUS EVERY 8 HOURS
Refills: 0 | Status: DISCONTINUED | OUTPATIENT
Start: 2021-12-30 | End: 2021-12-31

## 2021-12-30 RX ADMIN — OXYCODONE HYDROCHLORIDE 10 MILLIGRAM(S): 5 TABLET ORAL at 06:55

## 2021-12-30 RX ADMIN — HYDROMORPHONE HYDROCHLORIDE 1 MILLIGRAM(S): 2 INJECTION INTRAMUSCULAR; INTRAVENOUS; SUBCUTANEOUS at 17:51

## 2021-12-30 RX ADMIN — Medication 150 MILLIGRAM(S): at 21:27

## 2021-12-30 RX ADMIN — Medication 0.1 MILLIGRAM(S): at 13:13

## 2021-12-30 RX ADMIN — Medication 1 MILLIGRAM(S): at 10:17

## 2021-12-30 RX ADMIN — OXYCODONE HYDROCHLORIDE 10 MILLIGRAM(S): 5 TABLET ORAL at 03:00

## 2021-12-30 RX ADMIN — OXYCODONE HYDROCHLORIDE 10 MILLIGRAM(S): 5 TABLET ORAL at 16:54

## 2021-12-30 RX ADMIN — Medication 1000 MILLIGRAM(S): at 23:25

## 2021-12-30 RX ADMIN — Medication 0.5 MILLIGRAM(S): at 05:46

## 2021-12-30 RX ADMIN — ONDANSETRON 4 MILLIGRAM(S): 8 TABLET, FILM COATED ORAL at 17:36

## 2021-12-30 RX ADMIN — DULOXETINE HYDROCHLORIDE 30 MILLIGRAM(S): 30 CAPSULE, DELAYED RELEASE ORAL at 21:30

## 2021-12-30 RX ADMIN — OXYCODONE HYDROCHLORIDE 10 MILLIGRAM(S): 5 TABLET ORAL at 10:16

## 2021-12-30 RX ADMIN — HYDROMORPHONE HYDROCHLORIDE 1 MILLIGRAM(S): 2 INJECTION INTRAMUSCULAR; INTRAVENOUS; SUBCUTANEOUS at 07:47

## 2021-12-30 RX ADMIN — ONDANSETRON 4 MILLIGRAM(S): 8 TABLET, FILM COATED ORAL at 04:16

## 2021-12-30 RX ADMIN — Medication 0.1 MILLIGRAM(S): at 21:29

## 2021-12-30 RX ADMIN — GABAPENTIN 400 MILLIGRAM(S): 400 CAPSULE ORAL at 21:26

## 2021-12-30 RX ADMIN — Medication 1 MILLIGRAM(S): at 21:30

## 2021-12-30 RX ADMIN — OXYCODONE HYDROCHLORIDE 10 MILLIGRAM(S): 5 TABLET ORAL at 01:33

## 2021-12-30 RX ADMIN — HYDROMORPHONE HYDROCHLORIDE 1 MILLIGRAM(S): 2 INJECTION INTRAMUSCULAR; INTRAVENOUS; SUBCUTANEOUS at 18:06

## 2021-12-30 RX ADMIN — OXYCODONE HYDROCHLORIDE 10 MILLIGRAM(S): 5 TABLET ORAL at 15:54

## 2021-12-30 RX ADMIN — PANTOPRAZOLE SODIUM 40 MILLIGRAM(S): 20 TABLET, DELAYED RELEASE ORAL at 08:11

## 2021-12-30 RX ADMIN — HYDROMORPHONE HYDROCHLORIDE 0.5 MILLIGRAM(S): 2 INJECTION INTRAMUSCULAR; INTRAVENOUS; SUBCUTANEOUS at 01:53

## 2021-12-30 RX ADMIN — OXYCODONE HYDROCHLORIDE 10 MILLIGRAM(S): 5 TABLET ORAL at 06:15

## 2021-12-30 RX ADMIN — HYDROMORPHONE HYDROCHLORIDE 1 MILLIGRAM(S): 2 INJECTION INTRAMUSCULAR; INTRAVENOUS; SUBCUTANEOUS at 03:56

## 2021-12-30 RX ADMIN — GABAPENTIN 400 MILLIGRAM(S): 400 CAPSULE ORAL at 13:13

## 2021-12-30 RX ADMIN — Medication 1000 MILLIGRAM(S): at 05:46

## 2021-12-30 RX ADMIN — HYDROMORPHONE HYDROCHLORIDE 1 MILLIGRAM(S): 2 INJECTION INTRAMUSCULAR; INTRAVENOUS; SUBCUTANEOUS at 13:12

## 2021-12-30 RX ADMIN — HYDROMORPHONE HYDROCHLORIDE 1 MILLIGRAM(S): 2 INJECTION INTRAMUSCULAR; INTRAVENOUS; SUBCUTANEOUS at 04:54

## 2021-12-30 RX ADMIN — HYDROMORPHONE HYDROCHLORIDE 1 MILLIGRAM(S): 2 INJECTION INTRAMUSCULAR; INTRAVENOUS; SUBCUTANEOUS at 21:23

## 2021-12-30 RX ADMIN — ONDANSETRON 4 MILLIGRAM(S): 8 TABLET, FILM COATED ORAL at 09:46

## 2021-12-30 RX ADMIN — HYDROMORPHONE HYDROCHLORIDE 0.5 MILLIGRAM(S): 2 INJECTION INTRAMUSCULAR; INTRAVENOUS; SUBCUTANEOUS at 03:00

## 2021-12-30 RX ADMIN — Medication 400 MILLIGRAM(S): at 05:45

## 2021-12-30 RX ADMIN — LAMOTRIGINE 200 MILLIGRAM(S): 25 TABLET, ORALLY DISINTEGRATING ORAL at 10:17

## 2021-12-30 RX ADMIN — HYDROMORPHONE HYDROCHLORIDE 1 MILLIGRAM(S): 2 INJECTION INTRAMUSCULAR; INTRAVENOUS; SUBCUTANEOUS at 13:27

## 2021-12-30 RX ADMIN — OXYCODONE HYDROCHLORIDE 10 MILLIGRAM(S): 5 TABLET ORAL at 19:41

## 2021-12-30 RX ADMIN — Medication 0.1 MILLIGRAM(S): at 05:46

## 2021-12-30 RX ADMIN — HYDROMORPHONE HYDROCHLORIDE 1 MILLIGRAM(S): 2 INJECTION INTRAMUSCULAR; INTRAVENOUS; SUBCUTANEOUS at 08:02

## 2021-12-30 RX ADMIN — OXYCODONE HYDROCHLORIDE 10 MILLIGRAM(S): 5 TABLET ORAL at 20:38

## 2021-12-30 RX ADMIN — OXYCODONE HYDROCHLORIDE 10 MILLIGRAM(S): 5 TABLET ORAL at 11:16

## 2021-12-30 RX ADMIN — GABAPENTIN 400 MILLIGRAM(S): 400 CAPSULE ORAL at 05:46

## 2021-12-30 RX ADMIN — Medication 0.5 MILLIGRAM(S): at 21:47

## 2021-12-30 RX ADMIN — Medication 0.5 MILLIGRAM(S): at 00:08

## 2021-12-30 RX ADMIN — DULOXETINE HYDROCHLORIDE 60 MILLIGRAM(S): 30 CAPSULE, DELAYED RELEASE ORAL at 10:17

## 2021-12-30 RX ADMIN — OXYCODONE HYDROCHLORIDE 10 MILLIGRAM(S): 5 TABLET ORAL at 23:20

## 2021-12-30 RX ADMIN — Medication 0.5 MILLIGRAM(S): at 11:42

## 2021-12-30 RX ADMIN — Medication 400 MILLIGRAM(S): at 23:20

## 2021-12-30 NOTE — DISCHARGE NOTE PROVIDER - NSDCMRMEDTOKEN_GEN_ALL_CORE_FT
cloNIDine 0.1 mg oral tablet: 1 tab(s) orally 3 times a day  DULoxetine 30 mg oral delayed release capsule: 1 cap(s) orally once a day (at bedtime)  DULoxetine 60 mg oral delayed release capsule: 1 cap(s) orally once a day  gabapentin 400 mg oral capsule: 2 cap(s) orally 3 times a day  KlonoPIN 1 mg oral tablet: 1 tab(s) orally 2 times a day  LaMICtal 200 mg oral tablet: 1 tab(s) orally PM  lisinopril 20 mg oral tablet: 1 tab(s) orally 2 times a day  Melatonin 10 mg oral capsule: 1 cap(s) orally once a day (at bedtime), As Needed  Multiple Vitamins oral tablet: 1 tab(s) orally once a day  oxyCODONE 5 mg oral tablet: 1 tab(s) orally every 6 hours  pantoprazole 40 mg oral delayed release tablet: 1 tab(s) orally once a day (before a meal)  tiZANidine 4 mg oral tablet: 1 tab(s) orally every 8 hours  traZODone 150 mg oral tablet: 1 tab(s) orally once a day (at bedtime)   cloNIDine 0.1 mg oral tablet: 1 tab(s) orally 3 times a day  DULoxetine 30 mg oral delayed release capsule: 1 cap(s) orally once a day (at bedtime)  DULoxetine 60 mg oral delayed release capsule: 1 cap(s) orally once a day  gabapentin 400 mg oral capsule: 2 cap(s) orally 3 times a day  KlonoPIN 1 mg oral tablet: 1 tab(s) orally 2 times a day  LaMICtal 200 mg oral tablet: 1 tab(s) orally PM  lisinopril 20 mg oral tablet: 1 tab(s) orally 2 times a day  Melatonin 10 mg oral capsule: 1 cap(s) orally once a day (at bedtime), As Needed  Multiple Vitamins oral tablet: 1 tab(s) orally once a day  oxyCODONE 5 mg oral tablet: 1 tab(s) orally every 6 hours  pantoprazole 40 mg oral delayed release tablet: 1 tab(s) orally once a day (before a meal)  Protonix 40 mg oral delayed release tablet: 1 tab(s) orally once a day   tiZANidine 4 mg oral tablet: 1 tab(s) orally every 8 hours  traZODone 150 mg oral tablet: 1 tab(s) orally once a day (at bedtime)

## 2021-12-30 NOTE — DISCHARGE NOTE PROVIDER - HOSPITAL COURSE
Patient is a 29 yo F that presented to ER with abdominal pain. CT demonstrated dilated appendix. Patient underwent laparoscopic gastric bypass and appendectomy without any complications. Patient is currently doing very well, pain controlled, and is tolerating phase I bariatric diet. Patient has had uncomplicated hospital course and is stable for discharge home with follow-up in the office in 2 weeks.

## 2021-12-30 NOTE — DISCHARGE NOTE PROVIDER - CARE PROVIDER_API CALL
Zak Silverio)  Surgery  [address], [address]  [place] [address]  Phone: [phone]  Fax: [phone]  Follow Up Time: 2 weeks

## 2021-12-30 NOTE — DISCHARGE NOTE PROVIDER - NSDCCPCAREPLAN_GEN_ALL_CORE_FT
PRINCIPAL DISCHARGE DIAGNOSIS  Diagnosis: Acute appendicitis  Assessment and Plan of Treatment:       SECONDARY DISCHARGE DIAGNOSES  Diagnosis: Morbid obesity  Assessment and Plan of Treatment:     Diagnosis: Abdominal pain with vomiting  Assessment and Plan of Treatment:

## 2021-12-31 ENCOUNTER — TRANSCRIPTION ENCOUNTER (OUTPATIENT)
Age: 31
End: 2021-12-31

## 2021-12-31 VITALS
SYSTOLIC BLOOD PRESSURE: 141 MMHG | DIASTOLIC BLOOD PRESSURE: 76 MMHG | HEART RATE: 76 BPM | RESPIRATION RATE: 18 BRPM | OXYGEN SATURATION: 97 % | TEMPERATURE: 100 F

## 2021-12-31 LAB
ANION GAP SERPL CALC-SCNC: 7 MMOL/L — SIGNIFICANT CHANGE UP (ref 5–17)
BUN SERPL-MCNC: 17 MG/DL — SIGNIFICANT CHANGE UP (ref 7–23)
CALCIUM SERPL-MCNC: 9.3 MG/DL — SIGNIFICANT CHANGE UP (ref 8.5–10.1)
CHLORIDE SERPL-SCNC: 106 MMOL/L — SIGNIFICANT CHANGE UP (ref 96–108)
CO2 SERPL-SCNC: 26 MMOL/L — SIGNIFICANT CHANGE UP (ref 22–31)
CREAT SERPL-MCNC: 1.43 MG/DL — HIGH (ref 0.5–1.3)
GLUCOSE SERPL-MCNC: 85 MG/DL — SIGNIFICANT CHANGE UP (ref 70–99)
HCT VFR BLD CALC: 32.1 % — LOW (ref 34.5–45)
HGB BLD-MCNC: 11 G/DL — LOW (ref 11.5–15.5)
MCHC RBC-ENTMCNC: 28.1 PG — SIGNIFICANT CHANGE UP (ref 27–34)
MCHC RBC-ENTMCNC: 34.3 GM/DL — SIGNIFICANT CHANGE UP (ref 32–36)
MCV RBC AUTO: 82.1 FL — SIGNIFICANT CHANGE UP (ref 80–100)
PLATELET # BLD AUTO: 306 K/UL — SIGNIFICANT CHANGE UP (ref 150–400)
POTASSIUM SERPL-MCNC: 3.7 MMOL/L — SIGNIFICANT CHANGE UP (ref 3.5–5.3)
POTASSIUM SERPL-SCNC: 3.7 MMOL/L — SIGNIFICANT CHANGE UP (ref 3.5–5.3)
RBC # BLD: 3.91 M/UL — SIGNIFICANT CHANGE UP (ref 3.8–5.2)
RBC # FLD: 13.2 % — SIGNIFICANT CHANGE UP (ref 10.3–14.5)
SODIUM SERPL-SCNC: 139 MMOL/L — SIGNIFICANT CHANGE UP (ref 135–145)
WBC # BLD: 10.31 K/UL — SIGNIFICANT CHANGE UP (ref 3.8–10.5)
WBC # FLD AUTO: 10.31 K/UL — SIGNIFICANT CHANGE UP (ref 3.8–10.5)

## 2021-12-31 PROCEDURE — 99232 SBSQ HOSP IP/OBS MODERATE 35: CPT

## 2021-12-31 RX ORDER — PANTOPRAZOLE SODIUM 20 MG/1
1 TABLET, DELAYED RELEASE ORAL
Qty: 30 | Refills: 2
Start: 2021-12-31 | End: 2022-03-30

## 2021-12-31 RX ORDER — ACETAMINOPHEN 500 MG
1000 TABLET ORAL ONCE
Refills: 0 | Status: COMPLETED | OUTPATIENT
Start: 2021-12-31 | End: 2021-12-31

## 2021-12-31 RX ORDER — ACETAMINOPHEN 500 MG
1000 TABLET ORAL ONCE
Refills: 0 | Status: DISCONTINUED | OUTPATIENT
Start: 2021-12-31 | End: 2021-12-31

## 2021-12-31 RX ADMIN — PANTOPRAZOLE SODIUM 40 MILLIGRAM(S): 20 TABLET, DELAYED RELEASE ORAL at 09:41

## 2021-12-31 RX ADMIN — HYDROMORPHONE HYDROCHLORIDE 1 MILLIGRAM(S): 2 INJECTION INTRAMUSCULAR; INTRAVENOUS; SUBCUTANEOUS at 04:00

## 2021-12-31 RX ADMIN — HYDROMORPHONE HYDROCHLORIDE 1 MILLIGRAM(S): 2 INJECTION INTRAMUSCULAR; INTRAVENOUS; SUBCUTANEOUS at 03:08

## 2021-12-31 RX ADMIN — Medication 0.5 MILLIGRAM(S): at 11:40

## 2021-12-31 RX ADMIN — Medication 400 MILLIGRAM(S): at 06:44

## 2021-12-31 RX ADMIN — HYDROMORPHONE HYDROCHLORIDE 1 MILLIGRAM(S): 2 INJECTION INTRAMUSCULAR; INTRAVENOUS; SUBCUTANEOUS at 07:40

## 2021-12-31 RX ADMIN — OXYCODONE HYDROCHLORIDE 10 MILLIGRAM(S): 5 TABLET ORAL at 05:00

## 2021-12-31 RX ADMIN — OXYCODONE HYDROCHLORIDE 10 MILLIGRAM(S): 5 TABLET ORAL at 10:01

## 2021-12-31 RX ADMIN — OXYCODONE HYDROCHLORIDE 5 MILLIGRAM(S): 5 TABLET ORAL at 06:00

## 2021-12-31 RX ADMIN — Medication 1 MILLIGRAM(S): at 09:41

## 2021-12-31 RX ADMIN — OXYCODONE HYDROCHLORIDE 10 MILLIGRAM(S): 5 TABLET ORAL at 00:20

## 2021-12-31 RX ADMIN — OXYCODONE HYDROCHLORIDE 10 MILLIGRAM(S): 5 TABLET ORAL at 10:50

## 2021-12-31 RX ADMIN — Medication 0.1 MILLIGRAM(S): at 05:56

## 2021-12-31 RX ADMIN — OXYCODONE HYDROCHLORIDE 5 MILLIGRAM(S): 5 TABLET ORAL at 05:56

## 2021-12-31 RX ADMIN — GABAPENTIN 400 MILLIGRAM(S): 400 CAPSULE ORAL at 05:56

## 2021-12-31 RX ADMIN — Medication 0.5 MILLIGRAM(S): at 04:42

## 2021-12-31 RX ADMIN — HYDROMORPHONE HYDROCHLORIDE 1 MILLIGRAM(S): 2 INJECTION INTRAMUSCULAR; INTRAVENOUS; SUBCUTANEOUS at 07:27

## 2021-12-31 RX ADMIN — OXYCODONE HYDROCHLORIDE 10 MILLIGRAM(S): 5 TABLET ORAL at 04:41

## 2021-12-31 RX ADMIN — ONDANSETRON 4 MILLIGRAM(S): 8 TABLET, FILM COATED ORAL at 05:56

## 2021-12-31 NOTE — PROGRESS NOTE ADULT - PROBLEM SELECTOR PLAN 1
- Will review imaging with radiology  - Increase IVFs to 150cc/he and trend Cr  - Serial abdominal exams  - GI ppx  - restart home medications  - DVT ppx
The patient is s/p lap gastric bypass & appendectomy and doing very well.  All discharge instructions were given to the patient, as well as potential signs of complications.  The patient will follow up in 2 weeks.  [facility]

## 2021-12-31 NOTE — PROGRESS NOTE ADULT - ASSESSMENT
29yo F, day 2 s/p lap RYGB and appendectomy  Pain better controlled    Plan:  Pain control  Ambulation  Incentive spirometry  Bariatric clear liquid diet as tolerated  Plan to discharge today    Plan discussed with surgery team and attending, Dr Silverio
Patient is an 30 F with improving gastroenteritis 
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, was scheduled to have gastric bypass surgery this week w Dr Silverio, Presenting with nausea, vomiting and diarrhea after eating sushi w TOBIAS and Hyperkalemia    TOBIAS - prerenal v ATN ( hypotension ) while on ACE and taking NSAID's at home    cr stable    post op gastric bypass   trend Cr levels    avoid hypotension    NO NSAID 's   trend labs     continue clonidine   if DC - followup with Dr Peterson outpatient    
31 yo female with hx of HTN, Obesity related FSGS on ACE, was scheduled to have gastric bypass surgery this week w Dr Silverio, Presenting with nausea, vomiting and diarrhea after eating sushi w TOBIAS and Hyperkalemia    TOBIAS - prerenal v ATN ( hypotension ) while on ACE and taking NSAID's at home    Cr improving   Prerenal likely    IVF continue   trend Cr levels  post op    avoid hypotension    NO NSAID 's   keep sbp > 120 w now hypotension - BP remains borderline    - holding parameters on Clonidine    ace on hold     trend labs      OK to proceed for GI surgery from renal viewpoint   ** pt seen earlier today and d/w RN and Dr Lobato   
A 30 year old female, day 1 s/p lap RYGB and appendectomy  Pain not well controlled    Plan:  Pain control  Ambulation  Incentive spirometry  Bariatric clear liquid diet as tolerated  Possible discharge later today vs tomorrow if pain controlled    Plan discussed with Dr Silverio
A 30 year old female with morbid obesity and abdominal pain, CT showing dilated appendix  Planned for bypass today    Plan:  Keep NPO   IV fluids  Will go for lap [id number] gastric bypass and appendectomy today    Plan discussed with Dr Silverio
Patient is an 32 yo F s/p Laparoscopic gastric bypass & appendectomy

## 2021-12-31 NOTE — PROGRESS NOTE ADULT - SUBJECTIVE AND OBJECTIVE BOX
29yo F hx of HTN, FSGS obesity related ( follows with Dr Peterson ) on clonidine and ACE   presents w/ abdominal pain, nausea/vomiting and diarrhea since last night. Patient endorses that she started vomiting all the sushi she ate, and had associated abdominal pain with diarrhea. . Patient was also scheduled for gastric bypass this Wednesday with Dr. Silverio. Denies fever/chills, shortness of breath, chest pain.   Renal eval called for TOBIAS     today    abd pains  s/p gastri bypass       PAST MEDICAL & SURGICAL HISTORY:  Cholecystitis  S/P cholycystectomy    Mixed connective tissue disease    Anxiety    Depression    Polycystic ovarian syndrome    HTN (hypertension)    Chronic back pain    Obesity    GERD (gastroesophageal reflux disease)    Nephrosclerosis    Suicide attempt by drug overdose   Tylenol OD    Borderline personality disorder    ADHD (attention deficit hyperactivity disorder)    CKD (chronic kidney disease)    DM2 (diabetes mellitus, type 2)    HLD (hyperlipidemia)    UTI (urinary tract infection)    Anemia    PCOS (polycystic ovarian syndrome)    Lymphocytosis    Opiate dependence    History of cholecystectomy      H/O knee surgery  2008    H/O ovarian cystectomy  2003    H/O unilateral salpingectomy  right    History of lumbar laminectomy  2021    MEDICATIONS  (STANDING):  acetaminophen   IVPB .. 1000 milliGRAM(s) IV Intermittent once  clonazePAM  Tablet 1 milliGRAM(s) Oral two times a day  cloNIDine 0.1 milliGRAM(s) Oral three times a day  DULoxetine 30 milliGRAM(s) Oral at bedtime  DULoxetine 60 milliGRAM(s) Oral daily  gabapentin 400 milliGRAM(s) Oral three times a day  heparin   Injectable 5000 Unit(s) SubCutaneous every 8 hours  lactated ringers. 1000 milliLiter(s) (150 mL/Hr) IV Continuous <Continuous>  lamoTRIgine 200 milliGRAM(s) Oral daily  pantoprazole  Injectable 40 milliGRAM(s) IV Push every 24 hours  pantoprazole  Injectable 40 milliGRAM(s) IV Push daily  traZODone 150 milliGRAM(s) Oral at bedtime      Allergies    adhesives (Blisters; Rash)  amoxicillin (Rash)  peanuts (Anaphylaxis)  Tree Nuts (Anaphylaxis)    Intolerances        SOCIAL HISTORY:  Denies ETOh,Smoking,     FAMILY HISTORY:  Family history of heart disease (Grandparent)        REVIEW OF SYSTEMS:    CONSTITUTIONAL: No weakness, fevers or chills  EYES/ENT: No visual changes;  No vertigo or throat pain   NECK: No pain or stiffness  RESPIRATORY: No cough, wheezing, hemoptysis; No shortness of breath  CARDIOVASCULAR: No chest pain or palpitations  GASTROINTESTINAL: No abdominal or epigastric pain. ematemesis; No diarrhea or constipation. No melena or hematochezia.  GENITOURINARY: No dysuria, frequency or hematuria  NEUROLOGICAL: No numbness or weakness  SKIN: No itching, burning, rashes, or lesions   All other review of systems is negative unless indicated above.    Vital Signs Last 24 Hrs  T(C): 36.9 (30 Dec 2021 20:28), Max: 37.3 (30 Dec 2021 09:02)  T(F): 98.4 (30 Dec 2021 20:28), Max: 99.1 (30 Dec 2021 09:02)  HR: 68 (30 Dec 2021 20:) (64 - 82)  BP: 142/72 (30 Dec 2021 20:28) (142/60 - 166/97)  BP(mean): --  RR: 18 (30 Dec 2021 20:) (17 - 20)  SpO2: 97% (30 Dec 2021 20:28) (94% - 98%)      PHYSICAL EXAM:    Constitutional: obese  HEENT: , EOMI,  MMM  Neck: No LAD, No JVD  Respiratory: CTAB  Cardiovascular: S1 and S2  Gastrointestinal: BS+, soft, NT/ND  Extremities: No peripheral edema  Neurological: A/O x 3, no focal deficits  : No Alvares  Skin: No rashes  Access: Not applicable    LABS:        137    |  106    |  24     ----------------------------<  107       30 Dec 2021 08:21  4.3     |  27     |  1.69     139    |  107    |  20     ----------------------------<  85        29 Dec 2021 13:19  4.4     |  26     |  1.67     140    |  109    |  24     ----------------------------<  79        29 Dec 2021 04:26  4.5     |  26     |  1.76     Ca    9.2        30 Dec 2021 08:21  Ca    10.1       29 Dec 2021 13:19    Phos  3.9       29 Dec 2021 04:26  Phos  3.4       28 Dec 2021 07:37    Mg     1.5       29 Dec 2021 04:26  Mg     1.6       28 Dec 2021 07:37    TPro  7.4    /  Alb  2.7    /  TBili  0.4    /        29 Dec 2021 04:26  DBili  x      /  AST  64     /  ALT  45     /  AlkPhos  74       TPro  8.4    /  Alb  3.0    /  TBili  0.3    /        28 Dec 2021 07:37  DBili  x      /  AST  54     /  ALT  42     /  AlkPhos  83           TPro  8.4    /  Alb  3.0    /  TBili  0.3    /        28 Dec 2021 07:37  DBili  x      /  AST  54     /  ALT  42     /  AlkPhos  83           Urine Studies:  Urinalysis Basic - ( 28 Dec 2021 04:07 )    Color: Yellow / Appearance: Clear / S.005 / pH: x  Gluc: x / Ketone: Negative  / Bili: Negative / Urobili: Negative mg/dL   Blood: x / Protein: 30 mg/dL / Nitrite: Negative   Leuk Esterase: Trace / RBC: 0-2 /HPF / WBC 6-10   Sq Epi: x / Non Sq Epi: Few / Bacteria: Moderate            RADIOLOGY & ADDITIONAL STUDIES:    EN AND PELVIS OC IC                          PROCEDURE DATE:  2021          INTERPRETATION:  CLINICAL INFORMATION: Vomiting    COMPARISON: CT abdomen pelvis 2021.    CONTRAST/COMPLICATIONS:  IV Contrast: Omnipaque 350  90 cc administered   10 cc discarded  Oral Contrast: Omnipaque 300 + Fruit 2o  Complications: None reported at time of study completion    PROCEDURE:  CT of the Abdomen and Pelvis was performed.  Sagittal and coronal reformats were performed.    FINDINGS:  LOWER CHEST: Previously seen left lower lobe lung nodules are are not   seen on this examination.    LIVER: Within normal limits.  BILE DUCTS: Within normal limits.  GALLBLADDER: Cholecystectomy.  SPLEEN: Within normal limits.  PANCREAS: Within normal limits.  ADRENALS: Within normal limits.  KIDNEYS/URETERS: Within normal limits.    BLADDER: Within normal limits.  REPRODUCTIVE ORGANS: Uterus and adnexa within normal limits.    BOWEL: No bowel obstruction. The appendix is dilated at 12 mm and   centrally has low density. This could represent an appendix mucocele. The   appearance is unchanged from the previous exam. Recommend GI evaluation.   There is mild infiltration of the fat adjacent to the descending colon   consistent with epiploic appendigitis ([id number])  PERITONEUM: No ascites.  VESSELS: Within normal limits.  RETROPERITONEUM/LYMPH NODES: Mildly prominent scattered abdominal lymph   nodes are unchanged.  ABDOMINAL WALL: Within normal limits.  BONES: Within normal limits.    IMPRESSION:    There is mild infiltration of the fat adjacent to the descending colon   consistent with epiploic appendigitis .    The appendix is dilated at 12 mm and centrally has low density. This   could represent an appendix mucocele. The appearance is unchanged from   the previous exam. Recommend GI evaluation.                
31yo F hx of HTN, FSGS obesity related ( follows with Dr Peterson ) on clonidine and ACE   presents w/ abdominal pain, nausea/vomiting and diarrhea since last night. Patient endorses that she started vomiting all the sushi she ate, and had associated abdominal pain with diarrhea. . Patient was also scheduled for gastric bypass this Wednesday with Dr. Silverio. Denies fever/chills, shortness of breath, chest pain.   Renal eval called for TOBIAS     today    pt feeling better   no nauusea or vomiting   less diarrhea   on IVF   awaiting poss GI surgery today       PAST MEDICAL & SURGICAL HISTORY:  Cholecystitis  S/P cholycystectomy    Mixed connective tissue disease    Anxiety    Depression    Polycystic ovarian syndrome    HTN (hypertension)    Chronic back pain    Obesity    GERD (gastroesophageal reflux disease)    Nephrosclerosis    Suicide attempt by drug overdose   Tylenol OD    Borderline personality disorder    ADHD (attention deficit hyperactivity disorder)    CKD (chronic kidney disease)    DM2 (diabetes mellitus, type 2)    HLD (hyperlipidemia)    UTI (urinary tract infection)    Anemia    PCOS (polycystic ovarian syndrome)    Lymphocytosis    Opiate dependence    History of cholecystectomy      H/O knee surgery  2008    H/O ovarian cystectomy  2003    H/O unilateral salpingectomy  right    History of lumbar laminectomy  2021    MEDICATIONS  (STANDING):  clonazePAM  Tablet 1 milliGRAM(s) Oral two times a day  cloNIDine 0.1 milliGRAM(s) Oral three times a day  DULoxetine 30 milliGRAM(s) Oral at bedtime  DULoxetine 60 milliGRAM(s) Oral daily  gabapentin 400 milliGRAM(s) Oral three times a day  heparin   Injectable 5000 Unit(s) SubCutaneous every 8 hours  lactated ringers. 1000 milliLiter(s) (200 mL/Hr) IV Continuous <Continuous>  lamoTRIgine 200 milliGRAM(s) Oral daily  pantoprazole  Injectable 40 milliGRAM(s) IV Push daily  traZODone 150 milliGRAM(s) Oral at bedtime    Allergies    adhesives (Blisters; Rash)  amoxicillin (Rash)  peanuts (Anaphylaxis)  Tree Nuts (Anaphylaxis)    Intolerances        SOCIAL HISTORY:  Denies ETOh,Smoking,     FAMILY HISTORY:  Family history of heart disease (Grandparent)        REVIEW OF SYSTEMS:    CONSTITUTIONAL: No weakness, fevers or chills  EYES/ENT: No visual changes;  No vertigo or throat pain   NECK: No pain or stiffness  RESPIRATORY: No cough, wheezing, hemoptysis; No shortness of breath  CARDIOVASCULAR: No chest pain or palpitations  GASTROINTESTINAL: No abdominal or epigastric pain. ematemesis; No diarrhea or constipation. No melena or hematochezia.  GENITOURINARY: No dysuria, frequency or hematuria  NEUROLOGICAL: No numbness or weakness  SKIN: No itching, burning, rashes, or lesions   All other review of systems is negative unless indicated above.    Vital Signs Last 24 Hrs  T(C): 37.1 (29 Dec 2021 16:26), Max: 37.1 (29 Dec 2021 16:26)  T(F): 98.7 (29 Dec 2021 16:26), Max: 98.7 (29 Dec 2021 16:26)  HR: 70 (29 Dec 2021 16:) (63 - 70)  BP: 124/62 (29 Dec 2021 16:26) (99/54 - 124/62)  BP(mean): --  RR: 18 (29 Dec 2021 16:26) (18 - 18)  SpO2: 95% (29 Dec 2021 16:) (95% - 96%)      PHYSICAL EXAM:    Constitutional: obese  HEENT: , EOMI,  MMM  Neck: No LAD, No JVD  Respiratory: CTAB  Cardiovascular: S1 and S2  Gastrointestinal: BS+, soft, NT/ND  Extremities: No peripheral edema  Neurological: A/O x 3, no focal deficits  : No Alvares  Skin: No rashes  Access: Not applicable    LABS:                 139    |  107    |  20     ----------------------------<  85        29 Dec 2021 13:19  4.4     |  26     |  1.67     140    |  109    |  24     ----------------------------<  79        29 Dec 2021 04:26  4.5     |  26     |  1.76     137    |  109    |  42     ----------------------------<  88        28 Dec 2021 07:37  4.8     |  23     |  2.49     Ca    10.1       29 Dec 2021 13:19  Ca    9.8        29 Dec 2021 04:26    Phos  3.9       29 Dec 2021 04:26  Phos  3.4       28 Dec 2021 07:37    Mg     1.5       29 Dec 2021 04:26  Mg     1.6       28 Dec 2021 07:37    TPro  7.4    /  Alb  2.7    /  TBili  0.4    /        29 Dec 2021 04:26  DBili  x      /  AST  64     /  ALT  45     /  AlkPhos  74       TPro  8.4    /  Alb  3.0    /  TBili  0.3    /        28 Dec 2021 07:37  DBili  x      /  AST  54     /  ALT  42     /  AlkPhos  83           Urine Studies:  Urinalysis Basic - ( 28 Dec 2021 04:07 )    Color: Yellow / Appearance: Clear / S.005 / pH: x  Gluc: x / Ketone: Negative  / Bili: Negative / Urobili: Negative mg/dL   Blood: x / Protein: 30 mg/dL / Nitrite: Negative   Leuk Esterase: Trace / RBC: 0-2 /HPF / WBC 6-10   Sq Epi: x / Non Sq Epi: Few / Bacteria: Moderate            RADIOLOGY & ADDITIONAL STUDIES:    EN AND PELVIS OC IC                          PROCEDURE DATE:  2021          INTERPRETATION:  CLINICAL INFORMATION: Vomiting    COMPARISON: CT abdomen pelvis 2021.    CONTRAST/COMPLICATIONS:  IV Contrast: Omnipaque 350  90 cc administered   10 cc discarded  Oral Contrast: Omnipaque 300 + Fruit 2o  Complications: None reported at time of study completion    PROCEDURE:  CT of the Abdomen and Pelvis was performed.  Sagittal and coronal reformats were performed.    FINDINGS:  LOWER CHEST: Previously seen left lower lobe lung nodules are are not   seen on this examination.    LIVER: Within normal limits.  BILE DUCTS: Within normal limits.  GALLBLADDER: Cholecystectomy.  SPLEEN: Within normal limits.  PANCREAS: Within normal limits.  ADRENALS: Within normal limits.  KIDNEYS/URETERS: Within normal limits.    BLADDER: Within normal limits.  REPRODUCTIVE ORGANS: Uterus and adnexa within normal limits.    BOWEL: No bowel obstruction. The appendix is dilated at 12 mm and   centrally has low density. This could represent an appendix mucocele. The   appearance is unchanged from the previous exam. Recommend GI evaluation.   There is mild infiltration of the fat adjacent to the descending colon   consistent with epiploic appendigitis ([id number])  PERITONEUM: No ascites.  VESSELS: Within normal limits.  RETROPERITONEUM/LYMPH NODES: Mildly prominent scattered abdominal lymph   nodes are unchanged.  ABDOMINAL WALL: Within normal limits.  BONES: Within normal limits.    IMPRESSION:    There is mild infiltration of the fat adjacent to the descending colon   consistent with epiploic appendigitis .    The appendix is dilated at 12 mm and centrally has low density. This   could represent an appendix mucocele. The appearance is unchanged from   the previous exam. Recommend GI evaluation.                
C/C: renal failure    HPI: 30F, pmh of HTN, HLD, DMII, Chronic back pain, Borderline personality d/o, anxiety, Depression,  MCTD, FSGS, PCOS, Obesity, scheduled for gastric bypass 12/29 who presented to the ER 12/27 with n/v/abd pain/diarrhea after eating sushi. She had been on a liquid diet in prep for bariatric surgery scheduled for 12/29. She had a few pieces of sushi from her mom which "didn't agree" with her and she started vomiting and having abd pain. Symptoms worsened, so she came to the ER  had a CT with iv contrast and was subsequently found be in acute renal failure with hyperkalemia. She was admitted to surgical service. Given ivf, calcium gluconate, bicarb with correction of potassium.   Lisinopril held after being seen by nephrology. Was also noted to be taking motrin pta.  Now s/p lap appendectomy/gastric bypass.     pt seen and examined this am. Felt ok. tolerating ted clears. ambulating without difficulty. no sob/chest pain.     ROS: all 10 systems reviewed and is as above otherwise negative.     Vital Signs Last 24 Hrs  T(C): 37.5 (31 Dec 2021 07:12), Max: 37.5 (31 Dec 2021 07:12)  T(F): 99.5 (31 Dec 2021 07:12), Max: 99.5 (31 Dec 2021 07:12)  HR: 76 (31 Dec 2021 07:12) (61 - 76)  BP: 141/76 (31 Dec 2021 07:12) (110/54 - 142/72)  RR: 18 (31 Dec 2021 07:12) (18 - 18)  SpO2: 97% (31 Dec 2021 07:12) (97% - 97%)    PHYSICAL EXAM:    GENERAL: Comfortable, obese, no acute distress   HEAD:  Normocephalic, atraumatic  EYES: EOMI, PERRLA  HEENT: Moist mucous membranes  NECK: Supple, No JVD  NERVOUS SYSTEM:  Alert & Oriented X3, Motor Strength 5/5 B/L upper and lower extremities  CHEST/LUNG: Clear to auscultation bilaterally  HEART: Regular rate and rhythm  ABDOMEN: Soft, appropriate post op tenderness, lap sites intact, bs+  GENITOURINARY: Voiding, no palpable bladder  EXTREMITIES:   No clubbing, cyanosis, or edema  MUSCULOSKELETAL- No muscle tenderness, no joint tenderness  SKIN-no rash    LABS:                        11.0   10.31 )-----------( 306      ( 31 Dec 2021 06:52 )             32.1     12-31    139  |  106  |  17  ----------------------------<  85  3.7   |  26  |  1.43<H>    Ca    9.3      31 Dec 2021 06:52          MEDICATIONS  (STANDING):  acetaminophen   IVPB .. 1000 milliGRAM(s) IV Intermittent once  clonazePAM  Tablet 1 milliGRAM(s) Oral two times a day  cloNIDine 0.1 milliGRAM(s) Oral three times a day  DULoxetine 30 milliGRAM(s) Oral at bedtime  DULoxetine 60 milliGRAM(s) Oral daily  gabapentin 400 milliGRAM(s) Oral three times a day  heparin   Injectable 5000 Unit(s) SubCutaneous every 8 hours  lactated ringers. 1000 milliLiter(s) (150 mL/Hr) IV Continuous <Continuous>  lamoTRIgine 200 milliGRAM(s) Oral daily  pantoprazole  Injectable 40 milliGRAM(s) IV Push every 24 hours  pantoprazole  Injectable 40 milliGRAM(s) IV Push daily  traZODone 150 milliGRAM(s) Oral at bedtime    MEDICATIONS  (PRN):  acetaminophen   IVPB .. 1000 milliGRAM(s) IV Intermittent once PRN Mild Pain (1 - 3)  HYDROmorphone  Injectable 1 milliGRAM(s) IV Push every 4 hours PRN Pain  LORazepam   Injectable 0.5 milliGRAM(s) IV Push every 6 hours PRN Anxiety  ondansetron Injectable 4 milliGRAM(s) IV Push every 6 hours PRN Nausea and/or Vomiting  oxyCODONE    IR 5 milliGRAM(s) Oral every 6 hours PRN Moderate Pain (4 - 6)  oxyCODONE    Solution 5 milliGRAM(s) Oral every 4 hours PRN Mild Pain (1 - 3)  oxyCODONE    Solution 10 milliGRAM(s) Oral every 4 hours PRN Moderate Pain (4 - 6)            ASSESSMENT AND PLAN:  30f, PMH as above a/w:    1. TOBIAS/Hyperkalemia:  -likely Prerenal +/- ATN  -hyperkalemia resolved.  -nephrology following.   -renal function slowly improving.   -will need to f/u with nephrology outpt.    2. Morbid obesity/acute appendicitis:  -s/p Lap appy/gastric bypass  -ted clears  -PPI    3. h/o HTN now with borderline bp:  -ace-i on hold for #1.  -on clonidine.    4. Anxiety/depression/borderline personality d/o:  -mood appears stable.   -continue lamictal, duloxetine, klonipin, trazodone.    5. Prediabetes:  -sugars acceptable.     6. DVT px:  -hep sq.             
Post Op Day#: 2  Procedure:  Laparoscopic gastric bypass & appendectomy    The patient is doing well without complaints, with decreasing Cr. Patient seen by renal & given TOBIAS resolving can follow-up outpatient.     Vital Signs Last 24 Hrs  T(C): 37.5 (31 Dec 2021 07:12), Max: 37.5 (31 Dec 2021 07:12)  T(F): 99.5 (31 Dec 2021 07:12), Max: 99.5 (31 Dec 2021 07:12)  HR: 76 (31 Dec 2021 07:12) (61 - 76)  BP: 141/76 (31 Dec 2021 07:12) (110/54 - 166/97)  BP(mean): --  RR: 18 (31 Dec 2021 07:12) (18 - 20)  SpO2: 97% (31 Dec 2021 07:12) (96% - 97%)    PHYSICAL EXAM:  General: NAD.  HEENT: no JVD, no jaundice.  LUNGS: CTAB.  Heart: S1 S2 RRR  Abd: soft nt/nd   Wounds: clean dry and intact                          11.0   10.31 )-----------( 306      ( 31 Dec 2021 06:52 )             32.1       12-31    139  |  106  |  17  ----------------------------<  85  3.7   |  26  |  1.43<H>    Ca    9.3      31 Dec 2021 06:52          
C/C: renal failure    HPI: 30F, pmh of HTN, HLD, DMII, Chronic back pain, Borderline personality d/o, anxiety, Depression,  MCTD, FSGS, PCOS, Obesity, scheduled for gastric bypass 12/29 who presented to the ER 12/27 with n/v/abd pain/diarrhea after eating sushi. She had been on a liquid diet in prep for bariatric surgery scheduled for 12/29. She had a few pieces of sushi from her mom which "didn't agree" with her and she started vomiting and having abd pain. Symptoms worsened, so she came to the ER  had a CT with iv contrast and was subsequently found be in acute renal failure with hyperkalemia. She was admitted to surgical service. Given ivf, calcium gluconate, bicarb with correction of potassium.   Lisinopril held after being seen by nephrology. Was also noted to be taking motrin pta.      pt seen and examined this am. s/p lap appendectomy/gastric bypass yesterday. c/o post surgical pain. Ambulated. No sob/chest pain. tolerating clears.   No difficulty voiding.     ROS: all 10 systems reviewed and is as above otherwise negative.     Vital Signs Last 24 Hrs  T(C): 37.3 (30 Dec 2021 09:02), Max: 38.4 (29 Dec 2021 18:58)  T(F): 99.1 (30 Dec 2021 09:02), Max: 101.1 (29 Dec 2021 18:58)  HR: 64 (30 Dec 2021 13:08) (64 - 94)  BP: 166/97 (30 Dec 2021 13:08) (124/62 - 166/97)  RR: 20 (30 Dec 2021 13:08) (17 - 23)  SpO2: 96% (30 Dec 2021 09:02) (94% - 98%)      PHYSICAL EXAM:    GENERAL: Comfortable, obese, no acute distress   HEAD:  Normocephalic, atraumatic  EYES: EOMI, PERRLA  HEENT: Moist mucous membranes  NECK: Supple, No JVD  NERVOUS SYSTEM:  Alert & Oriented X3, Motor Strength 5/5 B/L upper and lower extremities  CHEST/LUNG: Clear to auscultation bilaterally  HEART: Regular rate and rhythm  ABDOMEN: Soft, appropriate post op tenderness, lap sites intact, bs+  GENITOURINARY: Voiding, no palpable bladder  EXTREMITIES:   No clubbing, cyanosis, or edema  MUSCULOSKELETAL- No muscle tenderness, no joint tenderness  SKIN-no rash    LABS:                        11.0   9.03  )-----------( 326      ( 30 Dec 2021 08:21 )             32.3     12-30    137  |  106  |  24<H>  ----------------------------<  107<H>  4.3   |  27  |  1.69<H>    Ca    9.2      30 Dec 2021 08:21  Phos  3.9     12-29  Mg     1.5     12-29    TPro  7.4  /  Alb  2.7<L>  /  TBili  0.4  /  DBili  x   /  AST  64<H>  /  ALT  45  /  AlkPhos  74  12-29    PT/INR - ( 29 Dec 2021 04:26 )   PT: 14.8 sec;   INR: 1.28 ratio         PTT - ( 29 Dec 2021 04:26 )  PTT:37.6 sec    MEDICATIONS  (STANDING):  acetaminophen   IVPB .. 1000 milliGRAM(s) IV Intermittent once  clonazePAM  Tablet 1 milliGRAM(s) Oral two times a day  cloNIDine 0.1 milliGRAM(s) Oral three times a day  DULoxetine 30 milliGRAM(s) Oral at bedtime  DULoxetine 60 milliGRAM(s) Oral daily  gabapentin 400 milliGRAM(s) Oral three times a day  heparin   Injectable 5000 Unit(s) SubCutaneous every 8 hours  lactated ringers. 1000 milliLiter(s) (150 mL/Hr) IV Continuous <Continuous>  lamoTRIgine 200 milliGRAM(s) Oral daily  pantoprazole  Injectable 40 milliGRAM(s) IV Push every 24 hours  pantoprazole  Injectable 40 milliGRAM(s) IV Push daily  traZODone 150 milliGRAM(s) Oral at bedtime    MEDICATIONS  (PRN):  acetaminophen   IVPB .. 1000 milliGRAM(s) IV Intermittent once PRN Mild Pain (1 - 3)  HYDROmorphone  Injectable 1 milliGRAM(s) IV Push every 4 hours PRN Pain  LORazepam   Injectable 0.5 milliGRAM(s) IV Push every 6 hours PRN Anxiety  ondansetron Injectable 4 milliGRAM(s) IV Push every 6 hours PRN Nausea and/or Vomiting  oxyCODONE    IR 5 milliGRAM(s) Oral every 6 hours PRN Moderate Pain (4 - 6)  oxyCODONE    Solution 5 milliGRAM(s) Oral every 4 hours PRN Mild Pain (1 - 3)  oxyCODONE    Solution 10 milliGRAM(s) Oral every 4 hours PRN Moderate Pain (4 - 6)            ASSESSMENT AND PLAN:  30f, PMH as above a/w:    1. TOBIAS/Hyperkalemia:  -likely Prerenal +/- ATN  -hyperkalemia resolved.  -nephrology following.   -renal function similar to yesterday.   -continue to hold ace-i/nsaids.   -will need to f/u with nephrology outpt.    2. Morbid obesity/acute appendicitis:  -s/p Lap appy/gastric bypass  -ted clears  -ivf  -ppi  -incentive spirometry  -ambulate.     3. h/o HTN now with borderline bp:  -ace-i on hold for #1.  -clonidine with hold parameters for thc477.    4. Anxiety/depression/borderline personality d/o:  -mood appears stable.   -continue lamictal, duloxetine, klonipin, trazodone.    5. Prediabetes:  -sugars acceptable.     6. DVT px:  -hep sq.             
Patient was seen and examined at the bedside this morning  Complaining of pain at the site of surgery and pain medications had to be adjusted  Tolerating diet and passing gas, no bowel     O/E:  T(C): 37.3 (12-30-21 @ 09:02), Max: 38.4 (12-29-21 @ 18:58)  HR: 70 (12-30-21 @ 09:02) (70 - 94)  BP: 142/60 (12-30-21 @ 09:02) (124/62 - 157/84)  RR: 18 (12-30-21 @ 09:02) (17 - 23)  SpO2: 96% (12-30-21 @ 09:02) (94% - 98%)  Alert, conscious, oriented  No pallor, jaundice or cyanosis  Not in pain or distress  Chest clear, equal air entry bilaterally  Abdomen soft and lax, no tenderness, incisions clean and dry  Extremities: No swelling or tenderness    12-29-21 @ 07:01  -  12-30-21 @ 07:00  --------------------------------------------------------  IN: 960 mL / OUT: 200 mL / NET: 760 mL                            11.0   9.03  )-----------( 326      ( 30 Dec 2021 08:21 )             32.3   12-30    137  |  106  |  24<H>  ----------------------------<  107<H>  4.3   |  27  |  1.69<H>        Ca    9.2      30 Dec 2021 08:21  Phos  3.9     12-29  Mg     1.5     12-29      TPro  7.4  /  Alb  2.7<L>  /  TBili  0.4  /  DBili  x   /  AST  64<H>  /  ALT  45  /  AlkPhos  74  12-29    MEDICATIONS  (STANDING):  acetaminophen   IVPB .. 1000 milliGRAM(s) IV Intermittent once  clonazePAM  Tablet 1 milliGRAM(s) Oral two times a day  cloNIDine 0.1 milliGRAM(s) Oral three times a day  DULoxetine 30 milliGRAM(s) Oral at bedtime  DULoxetine 60 milliGRAM(s) Oral daily  gabapentin 400 milliGRAM(s) Oral three times a day  heparin   Injectable 5000 Unit(s) SubCutaneous every 8 hours  lactated ringers. 1000 milliLiter(s) (150 mL/Hr) IV Continuous <Continuous>  lamoTRIgine 200 milliGRAM(s) Oral daily  pantoprazole  Injectable 40 milliGRAM(s) IV Push every 24 hours  pantoprazole  Injectable 40 milliGRAM(s) IV Push daily  traZODone 150 milliGRAM(s) Oral at bedtime    MEDICATIONS  (PRN):  acetaminophen   IVPB .. 1000 milliGRAM(s) IV Intermittent once PRN Mild Pain (1 - 3)  HYDROmorphone  Injectable 1 milliGRAM(s) IV Push every 4 hours PRN Pain  LORazepam   Injectable 0.5 milliGRAM(s) IV Push every 6 hours PRN Anxiety  ondansetron Injectable 4 milliGRAM(s) IV Push every 6 hours PRN Nausea and/or Vomiting  oxyCODONE    IR 5 milliGRAM(s) Oral every 6 hours PRN Moderate Pain (4 - 6)  oxyCODONE    Solution 5 milliGRAM(s) Oral every 4 hours PRN Mild Pain (1 - 3)  oxyCODONE    Solution 10 milliGRAM(s) Oral every 4 hours PRN Moderate Pain (4 - 6)  
Patient was seen and examined at the bedside this morning  No acute events overnight  Pain is better controlled  No nausea or vomiting  Planned for surgery today    O/E:  T(C): 36.6 (12-28-21 @ 22:05), Max: 36.7 (12-28-21 @ 16:01)  HR: 63 (12-29-21 @ 07:06) (63 - 78)  BP: 99/54 (12-29-21 @ 07:06) (97/56 - 110/59)  RR: 18 (12-28-21 @ 22:05) (18 - 18)  SpO2: 96% (12-28-21 @ 22:05) (96% - 98%)  Alert, conscious, oriented  No pallor, jaundice or cyanosis  Not in pain or distress  Chest clear, equal air entry bilaterally  Abdomen soft and lax, no tenderness  Extremities: No swelling or tenderness                          11.3   8.68  )-----------( 363      ( 29 Dec 2021 04:26 )             33.5   12-29    139  |  107  |  20  ----------------------------<  85  4.4   |  26  |  1.67<H>    Ca    10.1      29 Dec 2021 13:19  Phos  3.9     12-29  Mg     1.5     12-29    TPro  7.4  /  Alb  2.7<L>  /  TBili  0.4  /  DBili  x   /  AST  64<H>  /  ALT  45  /  AlkPhos  74  12-29    MEDICATIONS  (STANDING):  clonazePAM  Tablet 1 milliGRAM(s) Oral two times a day  cloNIDine 0.1 milliGRAM(s) Oral three times a day  DULoxetine 30 milliGRAM(s) Oral at bedtime  DULoxetine 60 milliGRAM(s) Oral daily  gabapentin 400 milliGRAM(s) Oral three times a day  heparin   Injectable 5000 Unit(s) SubCutaneous every 8 hours  lactated ringers. 1000 milliLiter(s) (200 mL/Hr) IV Continuous <Continuous>  lamoTRIgine 200 milliGRAM(s) Oral daily  pantoprazole  Injectable 40 milliGRAM(s) IV Push daily  traZODone 150 milliGRAM(s) Oral at bedtime    MEDICATIONS  (PRN):  acetaminophen   IVPB .. 1000 milliGRAM(s) IV Intermittent once PRN Mild Pain (1 - 3)  ondansetron Injectable 4 milliGRAM(s) IV Push every 6 hours PRN Nausea  oxyCODONE    IR 5 milliGRAM(s) Oral every 6 hours PRN Moderate Pain (4 - 6)  
SURGERY DAILY PROGRESS NOTE:     Subjective:  Patient seen and examined this AM at bedside. No acute events overnight and patient resting comfortably. Pain is better controlled, ambulating, having bowel function. Denies fever/chills, shortness of breath, chest pain. VS reviewed    Objective:    MEDICATIONS  (STANDING):  acetaminophen   IVPB .. 1000 milliGRAM(s) IV Intermittent once  clonazePAM  Tablet 1 milliGRAM(s) Oral two times a day  cloNIDine 0.1 milliGRAM(s) Oral three times a day  DULoxetine 30 milliGRAM(s) Oral at bedtime  DULoxetine 60 milliGRAM(s) Oral daily  gabapentin 400 milliGRAM(s) Oral three times a day  heparin   Injectable 5000 Unit(s) SubCutaneous every 8 hours  lactated ringers. 1000 milliLiter(s) (150 mL/Hr) IV Continuous <Continuous>  lamoTRIgine 200 milliGRAM(s) Oral daily  pantoprazole  Injectable 40 milliGRAM(s) IV Push every 24 hours  pantoprazole  Injectable 40 milliGRAM(s) IV Push daily  traZODone 150 milliGRAM(s) Oral at bedtime    MEDICATIONS  (PRN):  acetaminophen   IVPB .. 1000 milliGRAM(s) IV Intermittent once PRN Mild Pain (1 - 3)  HYDROmorphone  Injectable 1 milliGRAM(s) IV Push every 4 hours PRN Pain  LORazepam   Injectable 0.5 milliGRAM(s) IV Push every 6 hours PRN Anxiety  ondansetron Injectable 4 milliGRAM(s) IV Push every 6 hours PRN Nausea and/or Vomiting  oxyCODONE    IR 5 milliGRAM(s) Oral every 6 hours PRN Moderate Pain (4 - 6)  oxyCODONE    Solution 5 milliGRAM(s) Oral every 4 hours PRN Mild Pain (1 - 3)  oxyCODONE    Solution 10 milliGRAM(s) Oral every 4 hours PRN Moderate Pain (4 - 6)      Vital Signs Last 24 Hrs  T(C): 36.9 (31 Dec 2021 00:25), Max: 37.3 (30 Dec 2021 09:02)  T(F): 98.5 (31 Dec 2021 00:25), Max: 99.1 (30 Dec 2021 09:02)  HR: 61 (31 Dec 2021 00:25) (61 - 70)  BP: 110/54 (31 Dec 2021 00:25) (110/54 - 166/97)  BP(mean): --  RR: 18 (31 Dec 2021 00:25) (18 - 20)  SpO2: 97% (31 Dec 2021 00:25) (96% - 97%)      PHYSICAL EXAM   GENERAL: NAD, AOx3, well developed  NECK: supple, No JVD, midline trachea  CHEST/LUNG: unlabored respirations b/l  Heart: S1, S2 normal  ABDOMEN: soft, nondistended, mild surgical tenderness to palpation, surgical sites c/d/i  NERVOUS SYSTEM: AOx3, speech clear, no neuro-deficits  MSK: full ROM, no deformities  SKIN: warm to touch, no rash or lesions      I&O's Detail    29 Dec 2021 07:01  -  30 Dec 2021 07:00  --------------------------------------------------------  IN:    Oral Fluid: 60 mL    Other (mL): 900 mL  Total IN: 960 mL    OUT:    Voided (mL): 200 mL  Total OUT: 200 mL    Total NET: 760 mL      30 Dec 2021 07:01  -  31 Dec 2021 06:21  --------------------------------------------------------  IN:    Oral Fluid: 90 mL  Total IN: 90 mL    OUT:  Total OUT: 0 mL    Total NET: 90 mL          Daily     Daily     LABS:                        11.0   9.03  )-----------( 326      ( 30 Dec 2021 08:21 )             32.3     12-30    137  |  106  |  24<H>  ----------------------------<  107<H>  4.3   |  27  |  1.69<H>    Ca    9.2      30 Dec 2021 08:21            RADIOLOGY & ADDITIONAL STUDIES:        
Hospital Day#: 0      The patient is doing well with improvement of her abdominal pain, nausea/vomiting, & diarrhea. Patient admits to eating sushi yesterday then she developed multiple episodes of vomiting with diarrhea. Imaging revealed dilated appendix without inflammatory changes. Patient also had Ct-A/P done recently also revealed dilated appendix. Patient is currently stable without fevers & improving labs.     Vital Signs Last 24 Hrs  T(C): 36.8 (28 Dec 2021 08:52), Max: 37.1 (28 Dec 2021 06:12)  T(F): 98.2 (28 Dec 2021 08:52), Max: 98.7 (28 Dec 2021 06:12)  HR: 82 (28 Dec 2021 08:52) (82 - 116)  BP: 123/70 (28 Dec 2021 08:52) (117/62 - 142/94)  BP(mean): 75 (28 Dec 2021 06:12) (71 - 101)  RR: 18 (28 Dec 2021 08:52) (17 - 20)  SpO2: 98% (28 Dec 2021 08:52) (95% - 100%)    PHYSICAL EXAM:  General: NAD.  HEENT: no JVD, no jaundice.  LUNGS: CTAB.  Heart: S1 S2 RRR  Abd: soft nt/nd                             12.9   11.08 )-----------( 454      ( 28 Dec 2021 07:37 )             38.0       12-28    137  |  109<H>  |  42<H>  ----------------------------<  88  4.8   |  23  |  2.49<H>    Ca    10.5<H>      28 Dec 2021 07:37  Phos  3.4     12-28  Mg     1.6     12-28    TPro  8.4<H>  /  Alb  3.0<L>  /  TBili  0.3  /  DBili  x   /  AST  54<H>  /  ALT  42  /  AlkPhos  83  12-28

## 2021-12-31 NOTE — DISCHARGE NOTE NURSING/CASE MANAGEMENT/SOCIAL WORK - PATIENT PORTAL LINK FT
You can access the FollowMyHealth Patient Portal offered by [facility] by registering at the following website: [email or web address]. By joining [facility]’s FollowMyHealth portal, you will also be able to view your health information using other applications (apps) compatible with our system.

## 2021-12-31 NOTE — DISCHARGE NOTE NURSING/CASE MANAGEMENT/SOCIAL WORK - NSDCPEFALRISK_GEN_ALL_CORE
For information on Fall & Injury Prevention, visit: [email or web address] OR  [email or web address] OR  https://www.cdc.gov/steadi/patient.html

## 2022-01-02 NOTE — CHART NOTE - NSCHARTNOTEFT_GEN_A_CORE
Operative Note    Patient: Rebecca Cobb  : Dec 31, 1990  Surgery date: 2021    Pre-Operative Diagnosis: Refractory morbid obesity with multiple comorbid conditions.    Post-operative Diagnosis: Same, Mesenteric and Omental Masses, Large Appendix    Primary Procedure  [60465] Munir-En-Y Gastric Bypass - Laparoscopic     Secondary Procedure(s)  [77742] Uppr Gi Endoscopy, Diagnosis   [57558] Transversus Abdominis Plan (TAP) Block Bilateral   [57534] Laparoscopy, Appendectomy   [77617] Laparoscopy, Biopsy     Surgeon: Zak Silverio M.D., FACS   Assistant surgeon: Wilfredo Howe MD     Anesthesia type: General Anesthesia     ICD9 Code   Diagnosis   [E28.2]   POLYCYSTIC OVARIAN SYNDROME (Stable)   [E66.01]   MORBID SEVERE OBES D/T EXCESS KINSEY (Stable)   [F32.9]   SAMRA DEPRESS D/O SINGLE EPIS UNS (Stable)   [F41.9]   ANXIETY DISORDER UNSPECIFIED (Stable)   [I10]   ESSENTIAL PRIMARY HYPERTENSION (Stable)   [K21.9]   GERD WITHOUT ESOPHAGITIS (Stable)   [K44.9]   DIAPH HERNIA W/O OBST/GANGRENE (Stable)   [K75.81]   NONALCOHOLIC STEATOHEPATITIS LARA (Stable)   [M12.9]   ARTHROPATHY UNSPECIFIED (Stable)   [M54.9]   DORSALGIA UNSPECIFIED (Stable)   [Z68.41]   BODY MASS INDEX 40.0-44.9 ADULT (Stable)     Estimated blood loss: 30 ml       DRAINS: none    COMPLICATIONS: none    INDICATIONS FOR THE PROCEDURE:  The patient is a 31-year-old female who is morbidly obese with a body mass index of 41. The patient had multiple comorbidities due to her morbid obesity including hypertension and polycystic ovarian syndrome. The patient tried multiple medical diets to try to lose weight but has been unable to lose any significant amount of weight, so surgical correction of her morbid obesity was indicated.  The patient was admitted to the hospital with abdominal pain and was found to have a very large appendix greater than 12mm The patient meets NIH criteria fro bariatric surgery and underwent appropriate preoperative workup, education, and signed the consent form freely. The patient had risks and benefits explained including, but not limited to, bleeding, leak, infection, disability, injury to transient structures, aspiration, DVT, pulmonary embolism, and death. The patient understood and agreed to proceed with surgery.    DESCRIPTION OF THE PROCEDURE:  The patient was identified properly via a time-out. The patient was brought into the operating room and was placed on the operating room table in supine position. The patient was then given general endotracheal anesthesia and was given preoperative antibiotics. Preoperative heparin was given in the ambulatory surgery unit. The patient was then prepped and draped in the usual sterile fashion. An Exparel mixture was mixed at the back table with 20 mL of Exparel, 30 mL of 0.25% Marcaine and 150 mL of normal saline. A Veress needle was placed in the left upper quadrant. The abdomen was insufflated to 15 millimeters of mercury. Once the abdomen was insufflated, the Exparel mixture was given subcutaneously at the sites of incision. An incision was made within the umbilicus and a 12-millimeter trocar was placed in the abdomen. The laparoscope was inserted and there was no injury on initial trocar placement or initial Veress needle placement. A Transversus Abdominus Plane Block was then conducted by placing 20 mL of the Exparel mixture preperitoneal at the distribution of the spinal nerves on the lateral abdominal wall. This was started at the costal margin at the mid axillary line. 20cc of the Exparel mixture was placed in this area. Another 20 mL was placed four centimeters caudal to this area. Another 20 mL was placed four centimeters caudal to this area and another 15 mL was placed four centimeters caudal to this area. This was repeated on the opposite side of the body in a similar fashion. The rest of the Exparel was placed into the subcutaneous tissues and preperitoneal at every trocar site. A 5-millimeter and 15-millimeter trocar was placed in the right upper quadrant. A 12-millimeter and 5-millimeter trocar was placed in left upper quadrant. An incision was made in subxiphoid area and a liver retractor was placed to hold the liver anteriorly and superiorly and was held in place using Mediflex device. The patient was then placed into steep reverse Trendelenburg position. The ligament of Treitz was identified after retracting the colon and greater omentum superiorly. The small bowel was measured for 150 cm from the ligament of Treitz and divided using a 60 mm I drive stapler using the tan load. The Munir limb was then measured at this point for 150 cm. The Munir limb was marked using a Penrose drain and was suture to the end of the Munir limb using a 2-0 Surgidac suture. The biliopancreatic limb and the Munir limb were then approximated using two sutures using the Endostitch device. Enterotomies were made in both these limbs. A single firing of the 60 mm purple load was then used to create a functional side to side anastomosis. The enterotomies sites were close using a running 2-0 Vicryl suture using the Endostitch device. On completion of the anastomosis, the mesenteric defects between the small bowel mesentery were closed with the 2-0 Surgidac suture using the Endostitch device. The greater omentum was then divided longitudinally to create space for the Munir limb to come into the anticolic and antigastric position. There were to biopsies that were taken from the omentum and the mesentery. The omental biopsy looked like a chocolate cyst and the mesenteric biopsy looked like mucus. Both biopsies were excisionally biopsied with a ligasure device. At this time, this section was done at the angle of His to create a window into the lesser sac using the harmonic scalpel. A window was then created in a perigastric fashion in the lesser sac along the lesser curvature between the second and third veins along the lesser curvature. At this time, the Orogastric tube and esophageal thermometer were removed. A 45 mm purple load was used to create a transverse cut into the stomach for the creation of the pouch and three other vertical firings using the same load were made to complete the 30 mL pouch. The Munir limb and the gastric pouch were then approximated using 2-0 Vicryl suture using the Endostitch device. Enterotomies measuring approximately 1 cm were made in both the gastric pouch and the Munir limb. A completely hand sewn anastomosis was then performed using an inner layer of running Vicryl suture and we then performed an outer layer of running 2-0 Vicryl suture in a Lembert fashion. After completion of the anastomosis, the Munir limb was clamped and in intraoperative endoscopy was performed. It was found that there was no bleeding or stricture at the anastomosis, and after pumping air and submerging the anastomosis underwater no leak was found. Hemostasis was assured. The abdominal cavity was irrigated and suctioned. Fibrin glue was applied to the staple lines. Satisfied with the surgery at this point, the liver retractor was removed under direct vision. The remaining trocars were then removed. The skin was then closed with running Monocryl sutures and dermabond was applied over that. The patient tolerated the procedure well. The patient was extubated in the operating room.    Disposition  To recovery room, VS stable.

## 2022-01-07 NOTE — CDI QUERY NOTE - NSCDIOTHERTXTBX_GEN_ALL_CORE_HH
Clarification regarding ATN documentation    Clinical documentation indicates that this patient has an elevated creatinine . Documentation includes ATN +_ and VS  In order to accurately capture this lab finding to the greatest degree of specificity reflecting the patient’s actual severity of illness please document the diagnosis, if known, associated with the below creatinine values & clinical evidence:      The National Kidney Foundation's (KDIGO) definition of TOBIAS includes an increase in serum creatnine of greater than or equal to 0.3 mg/dl from the baseline within 48 hours, or an increase in serum creatinine level to greater than or equal to 1.5 times the baseline which is known or presumed ot have occured within the prior 7 days, or urine volume less than 0.5 ml/kg/hr for 6 hours, which the [facility] policy for reporting TOBIAS is based on.  Please refer to [facility] Policy #C1 "The Reporting of TOBIAS in Adult/Pediatric Patients" for full detail.      A. ATN ruled in   B. ATN ruled out   C. Other please specify  D. Unable to determine    Supporting Documentation and/or Clinical Evidence:      Creatinine, Serum: 1.43 mg/dL (12.31.21 @ 06:52)   Creatinine, Serum: 1.69 mg/dL (12.30.21 @ 08:21)   Creatinine, Serum: 1.67 mg/dL (12.29.21 @ 13:19)   Creatinine, Serum: 1.76 mg/dL (12.29.21 @ 04:26)   Creatinine, Serum: 2.49 mg/dL (12.28.21 @ 07:37)   Creatinine, Serum: 2.73 mg/dL (12.27.21 @ 23:22)   Creatinine, Serum: 1.24 mg/dL (12.16.21 @ 14:02)     Nephrology consult presents w/ abdominal pain, nausea/vomiting and diarrhea since last night. Patient endorses that she started vomiting all the sushi she ate, and had associated abdominal pain with diarrhea. . Patient was also scheduled for gastric bypass this Wednesday with Dr. Silverio. Denies fever/chills, shortness of breath, chest pain.   Renal eval called for TOBIAS TOBIAS - prerenal v ATN ( hypotension ) while on ACE and taking NSAID's at home    cr stable     Medicine note 12/30 1. TOBIAS/Hyperkalemia:-likely Prerenal +/- ATN-hyperkalemia resolved.-nephrology following. -renal function similar to yesterday. -continue to hold ace-i/nsaids. -will need to f/u with nephrology outpt.    Surgery note 12/31 Post Op Day#: 2  Procedure:  Laparoscopic gastric bypass & appendectomy    The patient is doing well without complaints, with decreasing Cr. Patient seen by renal & given TOBIAS resolving can follow-up outpatient.

## 2022-01-14 DIAGNOSIS — Z91.048 OTHER NONMEDICINAL SUBSTANCE ALLERGY STATUS: ICD-10-CM

## 2022-01-14 DIAGNOSIS — I12.9 HYPERTENSIVE CHRONIC KIDNEY DISEASE WITH STAGE 1 THROUGH STAGE 4 CHRONIC KIDNEY DISEASE, OR UNSPECIFIED CHRONIC KIDNEY DISEASE: ICD-10-CM

## 2022-01-14 DIAGNOSIS — K52.9 NONINFECTIVE GASTROENTERITIS AND COLITIS, UNSPECIFIED: ICD-10-CM

## 2022-01-14 DIAGNOSIS — N18.9 CHRONIC KIDNEY DISEASE, UNSPECIFIED: ICD-10-CM

## 2022-01-14 DIAGNOSIS — Z91.51 PERSONAL HISTORY OF SUICIDAL BEHAVIOR: ICD-10-CM

## 2022-01-14 DIAGNOSIS — T46.4X5A ADVERSE EFFECT OF ANGIOTENSIN-CONVERTING-ENZYME INHIBITORS, INITIAL ENCOUNTER: ICD-10-CM

## 2022-01-14 DIAGNOSIS — E66.01 MORBID (SEVERE) OBESITY DUE TO EXCESS CALORIES: ICD-10-CM

## 2022-01-14 DIAGNOSIS — T39.395A ADVERSE EFFECT OF OTHER NONSTEROIDAL ANTI-INFLAMMATORY DRUGS [NSAID], INITIAL ENCOUNTER: ICD-10-CM

## 2022-01-14 DIAGNOSIS — K35.80 UNSPECIFIED ACUTE APPENDICITIS: ICD-10-CM

## 2022-01-14 DIAGNOSIS — F32.9 MAJOR DEPRESSIVE DISORDER, SINGLE EPISODE, UNSPECIFIED: ICD-10-CM

## 2022-01-14 DIAGNOSIS — I10 ESSENTIAL (PRIMARY) HYPERTENSION: ICD-10-CM

## 2022-01-14 DIAGNOSIS — A05.9 BACTERIAL FOODBORNE INTOXICATION, UNSPECIFIED: ICD-10-CM

## 2022-01-14 DIAGNOSIS — E87.5 HYPERKALEMIA: ICD-10-CM

## 2022-01-14 DIAGNOSIS — K21.9 GASTRO-ESOPHAGEAL REFLUX DISEASE WITHOUT ESOPHAGITIS: ICD-10-CM

## 2022-01-14 DIAGNOSIS — F41.9 ANXIETY DISORDER, UNSPECIFIED: ICD-10-CM

## 2022-01-14 DIAGNOSIS — K75.81 NONALCOHOLIC STEATOHEPATITIS (NASH): ICD-10-CM

## 2022-01-14 DIAGNOSIS — E11.9 TYPE 2 DIABETES MELLITUS WITHOUT COMPLICATIONS: ICD-10-CM

## 2022-01-14 DIAGNOSIS — Z79.84 LONG TERM (CURRENT) USE OF ORAL HYPOGLYCEMIC DRUGS: ICD-10-CM

## 2022-01-14 DIAGNOSIS — F60.3 BORDERLINE PERSONALITY DISORDER: ICD-10-CM

## 2022-01-14 DIAGNOSIS — E28.2 POLYCYSTIC OVARIAN SYNDROME: ICD-10-CM

## 2022-01-14 DIAGNOSIS — Z91.018 ALLERGY TO OTHER FOODS: ICD-10-CM

## 2022-01-14 DIAGNOSIS — E11.22 TYPE 2 DIABETES MELLITUS WITH DIABETIC CHRONIC KIDNEY DISEASE: ICD-10-CM

## 2022-01-14 DIAGNOSIS — Z91.010 ALLERGY TO PEANUTS: ICD-10-CM

## 2022-01-14 DIAGNOSIS — Z87.440 PERSONAL HISTORY OF URINARY (TRACT) INFECTIONS: ICD-10-CM

## 2022-01-14 DIAGNOSIS — K44.9 DIAPHRAGMATIC HERNIA WITHOUT OBSTRUCTION OR GANGRENE: ICD-10-CM

## 2022-01-14 DIAGNOSIS — Z88.0 ALLERGY STATUS TO PENICILLIN: ICD-10-CM

## 2022-01-15 ENCOUNTER — INPATIENT (INPATIENT)
Facility: HOSPITAL | Age: 32
LOS: 2 days | Discharge: HOME | DRG: 918 | End: 2022-01-18
Attending: FAMILY MEDICINE | Admitting: STUDENT IN AN ORGANIZED HEALTH CARE EDUCATION/TRAINING PROGRAM
Payer: COMMERCIAL

## 2022-01-15 VITALS
OXYGEN SATURATION: 96 % | DIASTOLIC BLOOD PRESSURE: 59 MMHG | RESPIRATION RATE: 15 BRPM | SYSTOLIC BLOOD PRESSURE: 87 MMHG | HEART RATE: 76 BPM | HEIGHT: 67 IN

## 2022-01-15 DIAGNOSIS — Z90.79 ACQUIRED ABSENCE OF OTHER GENITAL ORGAN(S): Chronic | ICD-10-CM

## 2022-01-15 DIAGNOSIS — Z90.49 ACQUIRED ABSENCE OF OTHER SPECIFIED PARTS OF DIGESTIVE TRACT: Chronic | ICD-10-CM

## 2022-01-15 DIAGNOSIS — Z98.890 OTHER SPECIFIED POSTPROCEDURAL STATES: Chronic | ICD-10-CM

## 2022-01-15 LAB
ALBUMIN SERPL ELPH-MCNC: 3.1 G/DL — LOW (ref 3.3–5)
ALP SERPL-CCNC: 94 U/L — SIGNIFICANT CHANGE UP (ref 40–120)
ALT FLD-CCNC: 33 U/L — SIGNIFICANT CHANGE UP (ref 12–78)
ANION GAP SERPL CALC-SCNC: 7 MMOL/L — SIGNIFICANT CHANGE UP (ref 5–17)
AST SERPL-CCNC: 31 U/L — SIGNIFICANT CHANGE UP (ref 15–37)
BILIRUB SERPL-MCNC: 0.3 MG/DL — SIGNIFICANT CHANGE UP (ref 0.2–1.2)
BUN SERPL-MCNC: 21 MG/DL — SIGNIFICANT CHANGE UP (ref 7–23)
CALCIUM SERPL-MCNC: 8.9 MG/DL — SIGNIFICANT CHANGE UP (ref 8.5–10.1)
CHLORIDE SERPL-SCNC: 109 MMOL/L — HIGH (ref 96–108)
CO2 SERPL-SCNC: 25 MMOL/L — SIGNIFICANT CHANGE UP (ref 22–31)
CREAT SERPL-MCNC: 2 MG/DL — HIGH (ref 0.5–1.3)
ETHANOL SERPL-MCNC: <10 MG/DL — SIGNIFICANT CHANGE UP (ref 0–10)
GLUCOSE SERPL-MCNC: 123 MG/DL — HIGH (ref 70–99)
HCG SERPL-ACNC: <1 MIU/ML — SIGNIFICANT CHANGE UP
HCT VFR BLD CALC: 29.2 % — LOW (ref 34.5–45)
HGB BLD-MCNC: 10.1 G/DL — LOW (ref 11.5–15.5)
MCHC RBC-ENTMCNC: 28.7 PG — SIGNIFICANT CHANGE UP (ref 27–34)
MCHC RBC-ENTMCNC: 34.6 GM/DL — SIGNIFICANT CHANGE UP (ref 32–36)
MCV RBC AUTO: 83 FL — SIGNIFICANT CHANGE UP (ref 80–100)
NRBC # BLD: 0 /100 WBCS — SIGNIFICANT CHANGE UP (ref 0–0)
PLATELET # BLD AUTO: 316 K/UL — SIGNIFICANT CHANGE UP (ref 150–400)
POTASSIUM SERPL-MCNC: 3.6 MMOL/L — SIGNIFICANT CHANGE UP (ref 3.5–5.3)
POTASSIUM SERPL-SCNC: 3.6 MMOL/L — SIGNIFICANT CHANGE UP (ref 3.5–5.3)
PROT SERPL-MCNC: 7 G/DL — SIGNIFICANT CHANGE UP (ref 6–8.3)
RBC # BLD: 3.52 M/UL — LOW (ref 3.8–5.2)
RBC # FLD: 16 % — HIGH (ref 10.3–14.5)
SODIUM SERPL-SCNC: 141 MMOL/L — SIGNIFICANT CHANGE UP (ref 135–145)
TROPONIN I, HIGH SENSITIVITY RESULT: 14.9 NG/L — SIGNIFICANT CHANGE UP
WBC # BLD: 7.69 K/UL — SIGNIFICANT CHANGE UP (ref 3.8–10.5)
WBC # FLD AUTO: 7.69 K/UL — SIGNIFICANT CHANGE UP (ref 3.8–10.5)

## 2022-01-15 PROCEDURE — 93010 ELECTROCARDIOGRAM REPORT: CPT

## 2022-01-15 PROCEDURE — 99285 EMERGENCY DEPT VISIT HI MDM: CPT

## 2022-01-15 RX ORDER — SODIUM CHLORIDE 9 MG/ML
1000 INJECTION INTRAMUSCULAR; INTRAVENOUS; SUBCUTANEOUS ONCE
Refills: 0 | Status: COMPLETED | OUTPATIENT
Start: 2022-01-15 | End: 2022-01-15

## 2022-01-15 RX ADMIN — SODIUM CHLORIDE 1000 MILLILITER(S): 9 INJECTION INTRAMUSCULAR; INTRAVENOUS; SUBCUTANEOUS at 23:30

## 2022-01-15 NOTE — ED PROVIDER NOTE - OBJECTIVE STATEMENT
chronic back pain, accidental overdose, took two percocet and tramadol for the pain, found unresponsive , mother initiated CPR, paramedics administered Narcan, and the patient became aroused  BP hypotensive ? recent surgery 32 y/o female H/O focal glomeruli sclerosis, acute on chronic back pain, recent gastric bypass C/C  accidental overdose, took two percocet and tramadol for the pain, found unresponsive , mother initiated CPR, paramedics administered Narcan 4mg IV, and the patient became aroused, her  BP is  hypotensive due to BP meds protective of FGS. Recent fall two days ago and reinjuring her low back. 30 y/o female H/O focal glomeruli sclerosis, acute on chronic back pain, recent gastric bypass C/C  accidental overdose, took two percocet and tramadol for the pain, found unresponsive , mother initiated CPR, paramedics administered Narcan 4mg IV, and the patient became aroused, in triage she is   hypotensive, on  BP meds for the  FGS. She did not take her BP meds this evening.  Recent fall two days ago and reinjuring her low back. 32 y/o female H/O focal glomeruli sclerosis, acute on chronic back pain, recent gastric bypass and appendectomy  C/C  accidental overdose, took two percocet and tramadol for the pain, found unresponsive , mother initiated CPR, paramedics administered Narcan 4mg IV, and the patient became aroused, in triage she is   hypotensive, on  BP meds for the  FGS. She did not take her BP meds this evening.  Recent fall two days ago and reinjuring her low back. 32 y/o female H/O focal glomeruli sclerosis, acute on chronic back pain, recent gastric bypass and appendectomy  C/C  accidental overdose, took two percocet and tramadol for the pain, she was  found unresponsive , the patients mother initiated CPR, paramedics arrived and  administered Narcan 4mg IV with good response, the patient was A/O , in triage she is   hypotensive, on  BP meds for the  FGS. She did not take her BP meds this evening.  Recent fall two days ago and reinjuring her low back.

## 2022-01-15 NOTE — ED PROVIDER NOTE - CARE PLAN
Principal Discharge DX:	Drug overdose  Secondary Diagnosis:	TOBIAS (acute kidney injury)  Secondary Diagnosis:	Dehydration  Secondary Diagnosis:	Back pain   1

## 2022-01-15 NOTE — ED ADULT TRIAGE NOTE - CHIEF COMPLAINT QUOTE
brought in by EMS from home; found unconscious by mother tonight. patient admits to taking 2 percocet and 1 tramadol for pain. patient was given 4mg IN narcan and became responsive. patient denies suicidal or homicidal ideation

## 2022-01-15 NOTE — ED PROVIDER NOTE - IV ALTEPLASE INCLUSION HIDDEN
Quality 130: Documentation Of Current Medications In The Medical Record: Current Medications Documented Quality 111:Pneumonia Vaccination Status For Older Adults: Pneumococcal Vaccination Previously Received Detail Level: Detailed show

## 2022-01-15 NOTE — ED PROVIDER NOTE - CLINICAL SUMMARY MEDICAL DECISION MAKING FREE TEXT BOX
acute accidental opiate overdose and increasing lumbar  back pain , found to have worsening TOBIAS  also h/o psych but  no SI, no HI, states that OD was accidental and pain medication was for the  acute on chronic back pain, admit to medicine

## 2022-01-15 NOTE — ED PROVIDER NOTE - SIGNIFICANT NEGATIVE FINDINGS
no headache, no chest pain, no SOB, no palpitations, no abdominal pain, no fever, no chills,  no n/v/d, no urinary symptoms, no bleeding. no neuro changes.a

## 2022-01-16 DIAGNOSIS — F11.20 OPIOID DEPENDENCE, UNCOMPLICATED: ICD-10-CM

## 2022-01-16 DIAGNOSIS — I10 ESSENTIAL (PRIMARY) HYPERTENSION: ICD-10-CM

## 2022-01-16 DIAGNOSIS — F32.9 MAJOR DEPRESSIVE DISORDER, SINGLE EPISODE, UNSPECIFIED: ICD-10-CM

## 2022-01-16 DIAGNOSIS — K21.9 GASTRO-ESOPHAGEAL REFLUX DISEASE WITHOUT ESOPHAGITIS: ICD-10-CM

## 2022-01-16 DIAGNOSIS — T50.901A POISONING BY UNSPECIFIED DRUGS, MEDICAMENTS AND BIOLOGICAL SUBSTANCES, ACCIDENTAL (UNINTENTIONAL), INITIAL ENCOUNTER: ICD-10-CM

## 2022-01-16 DIAGNOSIS — F41.9 ANXIETY DISORDER, UNSPECIFIED: ICD-10-CM

## 2022-01-16 DIAGNOSIS — F60.3 BORDERLINE PERSONALITY DISORDER: ICD-10-CM

## 2022-01-16 DIAGNOSIS — E78.5 HYPERLIPIDEMIA, UNSPECIFIED: ICD-10-CM

## 2022-01-16 DIAGNOSIS — Z29.9 ENCOUNTER FOR PROPHYLACTIC MEASURES, UNSPECIFIED: ICD-10-CM

## 2022-01-16 DIAGNOSIS — N17.9 ACUTE KIDNEY FAILURE, UNSPECIFIED: ICD-10-CM

## 2022-01-16 DIAGNOSIS — E28.2 POLYCYSTIC OVARIAN SYNDROME: ICD-10-CM

## 2022-01-16 LAB
ALBUMIN SERPL ELPH-MCNC: 2.9 G/DL — LOW (ref 3.3–5)
ALP SERPL-CCNC: 93 U/L — SIGNIFICANT CHANGE UP (ref 40–120)
ALT FLD-CCNC: 32 U/L — SIGNIFICANT CHANGE UP (ref 12–78)
AMPHET UR-MCNC: NEGATIVE — SIGNIFICANT CHANGE UP
ANION GAP SERPL CALC-SCNC: 7 MMOL/L — SIGNIFICANT CHANGE UP (ref 5–17)
APAP SERPL-MCNC: 10 UG/ML — SIGNIFICANT CHANGE UP (ref 10–30)
AST SERPL-CCNC: 26 U/L — SIGNIFICANT CHANGE UP (ref 15–37)
BARBITURATES UR SCN-MCNC: NEGATIVE — SIGNIFICANT CHANGE UP
BENZODIAZ UR-MCNC: NEGATIVE — SIGNIFICANT CHANGE UP
BILIRUB SERPL-MCNC: 0.3 MG/DL — SIGNIFICANT CHANGE UP (ref 0.2–1.2)
BUN SERPL-MCNC: 13 MG/DL — SIGNIFICANT CHANGE UP (ref 7–23)
CALCIUM SERPL-MCNC: 8.7 MG/DL — SIGNIFICANT CHANGE UP (ref 8.5–10.1)
CHLORIDE SERPL-SCNC: 111 MMOL/L — HIGH (ref 96–108)
CO2 SERPL-SCNC: 26 MMOL/L — SIGNIFICANT CHANGE UP (ref 22–31)
COCAINE METAB.OTHER UR-MCNC: NEGATIVE — SIGNIFICANT CHANGE UP
CREAT SERPL-MCNC: 1.2 MG/DL — SIGNIFICANT CHANGE UP (ref 0.5–1.3)
GLUCOSE SERPL-MCNC: 86 MG/DL — SIGNIFICANT CHANGE UP (ref 70–99)
HCT VFR BLD CALC: 28.5 % — LOW (ref 34.5–45)
HGB BLD-MCNC: 9.6 G/DL — LOW (ref 11.5–15.5)
MCHC RBC-ENTMCNC: 28.1 PG — SIGNIFICANT CHANGE UP (ref 27–34)
MCHC RBC-ENTMCNC: 33.7 GM/DL — SIGNIFICANT CHANGE UP (ref 32–36)
MCV RBC AUTO: 83.3 FL — SIGNIFICANT CHANGE UP (ref 80–100)
METHADONE UR-MCNC: NEGATIVE — SIGNIFICANT CHANGE UP
NRBC # BLD: 0 /100 WBCS — SIGNIFICANT CHANGE UP (ref 0–0)
OPIATES UR-MCNC: POSITIVE — SIGNIFICANT CHANGE UP
PCP SPEC-MCNC: SIGNIFICANT CHANGE UP
PCP UR-MCNC: NEGATIVE — SIGNIFICANT CHANGE UP
PLATELET # BLD AUTO: 278 K/UL — SIGNIFICANT CHANGE UP (ref 150–400)
POTASSIUM SERPL-MCNC: 4.5 MMOL/L — SIGNIFICANT CHANGE UP (ref 3.5–5.3)
POTASSIUM SERPL-SCNC: 4.5 MMOL/L — SIGNIFICANT CHANGE UP (ref 3.5–5.3)
PROT SERPL-MCNC: 7.1 G/DL — SIGNIFICANT CHANGE UP (ref 6–8.3)
RBC # BLD: 3.42 M/UL — LOW (ref 3.8–5.2)
RBC # FLD: 16.1 % — HIGH (ref 10.3–14.5)
SALICYLATES SERPL-MCNC: <1.7 MG/DL — LOW (ref 2.8–20)
SARS-COV-2 RNA SPEC QL NAA+PROBE: SIGNIFICANT CHANGE UP
SODIUM SERPL-SCNC: 144 MMOL/L — SIGNIFICANT CHANGE UP (ref 135–145)
THC UR QL: NEGATIVE — SIGNIFICANT CHANGE UP
WBC # BLD: 6.19 K/UL — SIGNIFICANT CHANGE UP (ref 3.8–10.5)
WBC # FLD AUTO: 6.19 K/UL — SIGNIFICANT CHANGE UP (ref 3.8–10.5)

## 2022-01-16 PROCEDURE — 72131 CT LUMBAR SPINE W/O DYE: CPT | Mod: 26,MA

## 2022-01-16 PROCEDURE — 90792 PSYCH DIAG EVAL W/MED SRVCS: CPT | Mod: 95

## 2022-01-16 PROCEDURE — 74176 CT ABD & PELVIS W/O CONTRAST: CPT | Mod: 26,MA

## 2022-01-16 PROCEDURE — 99223 1ST HOSP IP/OBS HIGH 75: CPT | Mod: GC

## 2022-01-16 PROCEDURE — 76376 3D RENDER W/INTRP POSTPROCES: CPT | Mod: 26

## 2022-01-16 PROCEDURE — 12345: CPT | Mod: NC

## 2022-01-16 PROCEDURE — 71045 X-RAY EXAM CHEST 1 VIEW: CPT | Mod: 26

## 2022-01-16 RX ORDER — HEPARIN SODIUM 5000 [USP'U]/ML
5000 INJECTION INTRAVENOUS; SUBCUTANEOUS EVERY 8 HOURS
Refills: 0 | Status: DISCONTINUED | OUTPATIENT
Start: 2022-01-16 | End: 2022-01-18

## 2022-01-16 RX ORDER — LURASIDONE HYDROCHLORIDE 40 MG/1
20 TABLET ORAL
Refills: 0 | Status: DISCONTINUED | OUTPATIENT
Start: 2022-01-16 | End: 2022-01-18

## 2022-01-16 RX ORDER — ACETAMINOPHEN 500 MG
650 TABLET ORAL ONCE
Refills: 0 | Status: COMPLETED | OUTPATIENT
Start: 2022-01-16 | End: 2022-01-16

## 2022-01-16 RX ORDER — TRAZODONE HCL 50 MG
100 TABLET ORAL AT BEDTIME
Refills: 0 | Status: DISCONTINUED | OUTPATIENT
Start: 2022-01-16 | End: 2022-01-18

## 2022-01-16 RX ORDER — LURASIDONE HYDROCHLORIDE 40 MG/1
1 TABLET ORAL
Qty: 0 | Refills: 0 | DISCHARGE

## 2022-01-16 RX ORDER — ACETAMINOPHEN 500 MG
950 TABLET ORAL EVERY 6 HOURS
Refills: 0 | Status: DISCONTINUED | OUTPATIENT
Start: 2022-01-16 | End: 2022-01-16

## 2022-01-16 RX ORDER — LAMOTRIGINE 25 MG/1
200 TABLET, ORALLY DISINTEGRATING ORAL AT BEDTIME
Refills: 0 | Status: DISCONTINUED | OUTPATIENT
Start: 2022-01-16 | End: 2022-01-18

## 2022-01-16 RX ORDER — ACETAMINOPHEN 500 MG
1000 TABLET ORAL ONCE
Refills: 0 | Status: COMPLETED | OUTPATIENT
Start: 2022-01-16 | End: 2022-01-16

## 2022-01-16 RX ORDER — LANOLIN ALCOHOL/MO/W.PET/CERES
3 CREAM (GRAM) TOPICAL AT BEDTIME
Refills: 0 | Status: DISCONTINUED | OUTPATIENT
Start: 2022-01-16 | End: 2022-01-16

## 2022-01-16 RX ORDER — DULOXETINE HYDROCHLORIDE 30 MG/1
60 CAPSULE, DELAYED RELEASE ORAL DAILY
Refills: 0 | Status: DISCONTINUED | OUTPATIENT
Start: 2022-01-16 | End: 2022-01-18

## 2022-01-16 RX ORDER — ACETAMINOPHEN 500 MG
650 TABLET ORAL EVERY 6 HOURS
Refills: 0 | Status: DISCONTINUED | OUTPATIENT
Start: 2022-01-16 | End: 2022-01-16

## 2022-01-16 RX ORDER — CLONAZEPAM 1 MG
1 TABLET ORAL THREE TIMES A DAY
Refills: 0 | Status: DISCONTINUED | OUTPATIENT
Start: 2022-01-16 | End: 2022-01-18

## 2022-01-16 RX ORDER — LANOLIN ALCOHOL/MO/W.PET/CERES
5 CREAM (GRAM) TOPICAL AT BEDTIME
Refills: 0 | Status: DISCONTINUED | OUTPATIENT
Start: 2022-01-16 | End: 2022-01-18

## 2022-01-16 RX ORDER — PANTOPRAZOLE SODIUM 20 MG/1
40 TABLET, DELAYED RELEASE ORAL
Refills: 0 | Status: DISCONTINUED | OUTPATIENT
Start: 2022-01-16 | End: 2022-01-18

## 2022-01-16 RX ORDER — OXYCODONE AND ACETAMINOPHEN 5; 325 MG/1; MG/1
1 TABLET ORAL EVERY 6 HOURS
Refills: 0 | Status: DISCONTINUED | OUTPATIENT
Start: 2022-01-16 | End: 2022-01-18

## 2022-01-16 RX ORDER — ONDANSETRON 8 MG/1
4 TABLET, FILM COATED ORAL EVERY 8 HOURS
Refills: 0 | Status: DISCONTINUED | OUTPATIENT
Start: 2022-01-16 | End: 2022-01-18

## 2022-01-16 RX ORDER — DULOXETINE HYDROCHLORIDE 30 MG/1
30 CAPSULE, DELAYED RELEASE ORAL AT BEDTIME
Refills: 0 | Status: DISCONTINUED | OUTPATIENT
Start: 2022-01-16 | End: 2022-01-18

## 2022-01-16 RX ORDER — SODIUM CHLORIDE 9 MG/ML
1000 INJECTION INTRAMUSCULAR; INTRAVENOUS; SUBCUTANEOUS
Refills: 0 | Status: DISCONTINUED | OUTPATIENT
Start: 2022-01-16 | End: 2022-01-16

## 2022-01-16 RX ORDER — SODIUM CHLORIDE 9 MG/ML
1000 INJECTION INTRAMUSCULAR; INTRAVENOUS; SUBCUTANEOUS
Refills: 0 | Status: DISCONTINUED | OUTPATIENT
Start: 2022-01-16 | End: 2022-01-18

## 2022-01-16 RX ORDER — LIDOCAINE 4 G/100G
1 CREAM TOPICAL DAILY
Refills: 0 | Status: DISCONTINUED | OUTPATIENT
Start: 2022-01-16 | End: 2022-01-18

## 2022-01-16 RX ADMIN — Medication 650 MILLIGRAM(S): at 20:57

## 2022-01-16 RX ADMIN — ONDANSETRON 4 MILLIGRAM(S): 8 TABLET, FILM COATED ORAL at 13:31

## 2022-01-16 RX ADMIN — LAMOTRIGINE 200 MILLIGRAM(S): 25 TABLET, ORALLY DISINTEGRATING ORAL at 21:51

## 2022-01-16 RX ADMIN — Medication 1 MILLIGRAM(S): at 10:19

## 2022-01-16 RX ADMIN — LIDOCAINE 1 PATCH: 4 CREAM TOPICAL at 04:16

## 2022-01-16 RX ADMIN — Medication 1 MILLIGRAM(S): at 21:51

## 2022-01-16 RX ADMIN — Medication 400 MILLIGRAM(S): at 15:22

## 2022-01-16 RX ADMIN — OXYCODONE AND ACETAMINOPHEN 1 TABLET(S): 5; 325 TABLET ORAL at 22:30

## 2022-01-16 RX ADMIN — Medication 650 MILLIGRAM(S): at 21:30

## 2022-01-16 RX ADMIN — LURASIDONE HYDROCHLORIDE 20 MILLIGRAM(S): 40 TABLET ORAL at 17:54

## 2022-01-16 RX ADMIN — SODIUM CHLORIDE 1000 MILLILITER(S): 9 INJECTION INTRAMUSCULAR; INTRAVENOUS; SUBCUTANEOUS at 00:35

## 2022-01-16 RX ADMIN — DULOXETINE HYDROCHLORIDE 30 MILLIGRAM(S): 30 CAPSULE, DELAYED RELEASE ORAL at 21:52

## 2022-01-16 RX ADMIN — SODIUM CHLORIDE 75 MILLILITER(S): 9 INJECTION INTRAMUSCULAR; INTRAVENOUS; SUBCUTANEOUS at 05:46

## 2022-01-16 RX ADMIN — OXYCODONE AND ACETAMINOPHEN 1 TABLET(S): 5; 325 TABLET ORAL at 22:19

## 2022-01-16 RX ADMIN — PANTOPRAZOLE SODIUM 40 MILLIGRAM(S): 20 TABLET, DELAYED RELEASE ORAL at 06:36

## 2022-01-16 RX ADMIN — LURASIDONE HYDROCHLORIDE 20 MILLIGRAM(S): 40 TABLET ORAL at 06:36

## 2022-01-16 RX ADMIN — Medication 400 MILLIGRAM(S): at 00:35

## 2022-01-16 RX ADMIN — DULOXETINE HYDROCHLORIDE 60 MILLIGRAM(S): 30 CAPSULE, DELAYED RELEASE ORAL at 11:37

## 2022-01-16 RX ADMIN — SODIUM CHLORIDE 1000 MILLILITER(S): 9 INJECTION INTRAMUSCULAR; INTRAVENOUS; SUBCUTANEOUS at 04:51

## 2022-01-16 RX ADMIN — Medication 100 MILLIGRAM(S): at 21:52

## 2022-01-16 RX ADMIN — Medication 650 MILLIGRAM(S): at 04:15

## 2022-01-16 RX ADMIN — Medication 1 TABLET(S): at 11:37

## 2022-01-16 RX ADMIN — Medication 1000 MILLIGRAM(S): at 15:37

## 2022-01-16 NOTE — BH CONSULTATION LIAISON ASSESSMENT NOTE - NSBHCHARTREVIEWVS_PSY_A_CORE FT
Vital Signs Last 24 Hrs  T(C): 36.8 (16 Jan 2022 16:29), Max: 36.8 (16 Jan 2022 16:29)  T(F): 98.3 (16 Jan 2022 16:29), Max: 98.3 (16 Jan 2022 16:29)  HR: 88 (16 Jan 2022 16:29) (68 - 88)  BP: 136/80 (16 Jan 2022 16:29) (75/38 - 136/80)  BP(mean): --  RR: 15 (16 Jan 2022 16:29) (15 - 17)  SpO2: 98% (16 Jan 2022 16:29) (94% - 100%)

## 2022-01-16 NOTE — BH CONSULTATION LIAISON ASSESSMENT NOTE - CURRENT MEDICATION
MEDICATIONS  (STANDING):  DULoxetine 30 milliGRAM(s) Oral at bedtime  DULoxetine 60 milliGRAM(s) Oral daily  heparin   Injectable 5000 Unit(s) SubCutaneous every 8 hours  lamoTRIgine 200 milliGRAM(s) Oral at bedtime  lurasidone 20 milliGRAM(s) Oral <User Schedule>  multivitamin 1 Tablet(s) Oral daily  pantoprazole    Tablet 40 milliGRAM(s) Oral before breakfast  sodium chloride 0.9%. 1000 milliLiter(s) (75 mL/Hr) IV Continuous <Continuous>  traZODone 100 milliGRAM(s) Oral at bedtime    MEDICATIONS  (PRN):  aluminum hydroxide/magnesium hydroxide/simethicone Suspension 30 milliLiter(s) Oral every 4 hours PRN Dyspepsia  clonazePAM  Tablet 1 milliGRAM(s) Oral three times a day PRN anxiety  lidocaine   4% Patch 1 Patch Transdermal daily PRN pain  melatonin 5 milliGRAM(s) Oral at bedtime PRN Insomnia  ondansetron Injectable 4 milliGRAM(s) IV Push every 8 hours PRN Nausea and/or Vomiting  oxycodone    5 mG/acetaminophen 325 mG 1 Tablet(s) Oral every 6 hours PRN Severe Pain (7 - 10)

## 2022-01-16 NOTE — H&P ADULT - NSHPOUTPATIENTPROVIDERS_GEN_ALL_CORE
PCP: Dr. Sanchez  Nephrologist: Dr. Clarence Peterson  GI: Dr. Beckford   Heme: Dr. Toledo  PMR: Dr. Tripp  Counselor:  Rodriguez Akers  Psych: Sabrina HELM

## 2022-01-16 NOTE — CONSULT NOTE ADULT - SUBJECTIVE AND OBJECTIVE BOX
Date/Time Patient Seen:  		  Referring MD:   Data Reviewed	       Patient is a 31y old  Female who presents with a chief complaint of Drug overdose (16 Jan 2022 11:05)      Subjective/HPI      Patient Identity:  · Birth Sex	Unknown     History of Present Illness:  Reason for Admission: Drug overdose  History of Present Illness:   31F PMHx of HTN, HLD, DM2, Chronic back pain, Borderline personality, anxiety, Depression, hx of suicide attempts, hx of self harm, Mixed connective tissue disease, FSGS, PCOS, Obesity, s/p recent gastric bypass surgery and appendectomy on 12/29/2021 presenting with drug overdose. She has been taking oxycodone for pain regularly at home, however after the recent surgery she was prescribed percocet and tramadol. Today she felt the pain was worse than usual and she took 2 percocet pills, 1 tramadol, 2 tylenol, 2 advil, Trazadone and tizanidine at one time. She states she usually dose not take percocet and tramadol together, so she feels the combination of medications she took at once may have caused her to become unresponsive. She was found unresponsive by her mother who initiated CPR. Paramedics arrived and administered Narcan 4mg IV with good response. Pt now responsive and AO x3, however with hypotension. She states that she was hypotensive during her hospitalization as well and was given fluids at that time. She has not taken her blood pressure medications in 2 days because 2 days ago she fell down the stairs and attributes it to having low blood pressure. Her last BM was a few days ago and she has been taking senna and colase regularly.   Of note, patient adamantly denies this was a suicide attempt however since 2016 patient has multiple documented suicide attempts, with the most recent in 2021. most of these attempts involved overdosing on her prescribed and OTC medicaiton. Patient dismisses these suicide attempts when brought up.  (please see prior behavioral health notes)   PAST MEDICAL & SURGICAL HISTORY:  Cholecystitis  S/P cholycystectomy    Mixed connective tissue disease    Anxiety    Depression    Polycystic ovarian syndrome    HTN (hypertension)    Chronic back pain    Obesity    GERD (gastroesophageal reflux disease)    Depression    Nephrosclerosis    Suicide attempt by drug overdose  2019 Tylenol OD    Borderline personality disorder    ADHD (attention deficit hyperactivity disorder)    CKD (chronic kidney disease)    DM2 (diabetes mellitus, type 2)    HLD (hyperlipidemia)    UTI (urinary tract infection)    Anemia    PCOS (polycystic ovarian syndrome)    Lymphocytosis    Suicide attempt  Tylenol Overdose    Opiate dependence    History of cholecystectomy  2016    H/O knee surgery  2008 2009 2015    H/O ovarian cystectomy  2003 2017    H/O unilateral salpingectomy  right    History of lumbar laminectomy  6/2021    PAST SURGICAL HISTORY:  H/O knee surgery 2008 2009 2015    H/O ovarian cystectomy 2003 2017    H/O unilateral salpingectomy right    History of cholecystectomy 2016    History of lumbar laminectomy 6/2021.     FAMILY HISTORY:  Grandparent  Still living? Unknown  Family history of heart disease, Age at diagnosis: Age Unknown.     Social History:  Social History (marital status, living situation, occupation, tobacco use, alcohol and drug use, and sexual history): Patient lives with her mother and step father  Denies tobacco use, alcohol use or recreational drug use  Vaccianted for COVID Moderna 3/3 last dose 12/2021  Flu vaccinate 12/2021     Tobacco Screening:  · Core Measure Site	Yes  · Has the patient used tobacco in the past 30 days?	No    Risk Assessment:    Present on Admission:  Deep Venous Thrombosis	no  Pulmonary Embolus	no     Heart Failure:  Does this patient have a history of or has been diagnosed with heart failure? no.    HIV Screen (per [facility] Department of Health, HIV screening must be offered to every individual between ages 13 and 64)	Offered and patient declined        Medication list         MEDICATIONS  (STANDING):  DULoxetine 30 milliGRAM(s) Oral at bedtime  DULoxetine 60 milliGRAM(s) Oral daily  heparin   Injectable 5000 Unit(s) SubCutaneous every 8 hours  lamoTRIgine 200 milliGRAM(s) Oral at bedtime  lurasidone 20 milliGRAM(s) Oral <User Schedule>  multivitamin 1 Tablet(s) Oral daily  pantoprazole    Tablet 40 milliGRAM(s) Oral before breakfast  sodium chloride 0.9%. 1000 milliLiter(s) (75 mL/Hr) IV Continuous <Continuous>  traZODone 100 milliGRAM(s) Oral at bedtime    MEDICATIONS  (PRN):  acetaminophen     Tablet .. 650 milliGRAM(s) Oral every 6 hours PRN Temp greater or equal to 38C (100.4F), Mild Pain (1 - 3)  acetaminophen     Tablet .. 950 milliGRAM(s) Oral every 6 hours PRN Severe Pain (7 - 10)  aluminum hydroxide/magnesium hydroxide/simethicone Suspension 30 milliLiter(s) Oral every 4 hours PRN Dyspepsia  clonazePAM  Tablet 1 milliGRAM(s) Oral three times a day PRN anxiety  lidocaine   4% Patch 1 Patch Transdermal daily PRN pain  melatonin 5 milliGRAM(s) Oral at bedtime PRN Insomnia  ondansetron Injectable 4 milliGRAM(s) IV Push every 8 hours PRN Nausea and/or Vomiting         Vitals log        ICU Vital Signs Last 24 Hrs  T(C): 36.4 (16 Jan 2022 11:53), Max: 36.5 (16 Jan 2022 07:35)  T(F): 97.5 (16 Jan 2022 11:53), Max: 97.7 (16 Jan 2022 07:35)  HR: 70 (16 Jan 2022 11:53) (68 - 76)  BP: 106/70 (16 Jan 2022 11:53) (75/38 - 108/59)  BP(mean): --  ABP: --  ABP(mean): --  RR: 16 (16 Jan 2022 11:53) (15 - 17)  SpO2: 98% (16 Jan 2022 11:53) (94% - 100%)     · Note Type	Event Note  iStop      Others' Prescriptions  Patient Name: Rebecca CobbBirth Date: 1990  Address: [address] [place] [id number]: Female  Rx Written	Rx Dispensed	Drug	Quantity	Days Supply	Prescriber Name  01/05/2022	01/05/2022	oxycodone-acetaminophen 5-325 mg tab	120	30	Carmen Nunez NP  Prescriber Jennifer # [id number]  Payment Method Insurance  Dispenser [facility] #[id number]  01/03/2022	01/03/2022	tramadol hcl 50 mg tablet	12	3	Marco Beach P, NP  Prescriber Jennifer # [id number]  Payment Method Insurance  Dispenser [facility] #[id number]  01/03/2022	01/03/2022	clonazepam 1 mg tablet	45	15	Sabrina Leiva  Prescriber Jennifer # [id number]  Payment Method Insurance  Dispenser [facility] #[id number]  12/28/2021	12/31/2021	tramadol hcl 50 mg tablet	10	2	Marco Beach P, NP  Prescriber Jennifer # [id number]  Payment Method Insurance  Dispenser [facility] #[id number]  12/31/2021	12/31/2021	oxycodone-acetaminophen 5-325 mg tab	20	2	Kurtis Adrian ALEX  Prescriber Jennifer # [id number]  Payment Method Insurance  Dispenser [facility][id number]  12/08/2021	12/09/2021	clonazepam 1 mg tablet	30	30	Sabrina Leiva  Prescriber Jennifer # [id number]  Payment Method Insurance  Dispenser [facility] #[id number]  12/09/2021	12/09/2021	oxycodone hcl (ir) 5 mg tablet	120	30	Edvin Tripp  Prescriber Jennifer # [id number]  Payment Method Insurance  Dispenser [facility] #[id number]  11/10/2021	11/10/2021	clonazepam 1 mg tablet	30	30	Sabrina Leiva  Prescriber Jennifer # [id number]  Payment Method Insurance  Dispenser [facility] #[id number]  11/10/2021	11/10/2021	oxycodone hcl 5 mg tablet	120	30	Carmen Nunez NP  Prescriber Jennifer # [id number]  Payment Method Insurance  Dispenser [facility] #[id number]  10/07/2021	10/14/2021	oxycontin er 10 mg tablet	90	30	Linda Newman  Prescriber Jennifer # [id number]  Payment Method Insurance  Dispenser [facility] #[id number]  10/06/2021	10/06/2021	clonazepam 1 mg tablet	30	30	Sabrina Leiva      Input and Output:  I&O's Detail      Lab Data                        9.6    6.19  )-----------( 278      ( 16 Jan 2022 12:02 )             28.5     01-15    141  |  109<H>  |  21  ----------------------------<  123<H>  3.6   |  25  |  2.00<H>    Ca    8.9      15 John 2022 23:27    TPro  7.0  /  Alb  3.1<L>  /  TBili  0.3  /  DBili  x   /  AST  31  /  ALT  33  /  AlkPhos  94  01-15            Review of Systems	  verbal  alert  depressed      Objective     Physical Examination  heart s1s2  lung dc BS  abd soft  head nc  cn grossly int        Pertinent Lab findings & Imaging      Alvares:  NO   Adequate UO     I&O's Detail           Discussed with:     Cultures:	        Radiology    ACC: [id number] EXAM:  CT 3D RECONSTRUCT JOSE MOONEY                        ACC: [id number] EXAM:  CT PELVIS BONY ONLY                        ACC: [id number] EXAM:  CT LUMBAR SPINE                        ACC: [id number] EXAM:  CT ABDOMEN AND PELVIS                          PROCEDURE DATE:  01/16/2022          INTERPRETATION:  CLINICAL INFORMATION: Fall, hypotension, back pain,   recent gastric bypass, drug overdose.    COMPARISON: 12/28/2021.    CONTRAST/COMPLICATIONS:  IV Contrast: NONE  Oral Contrast: NONE  Complications: None reported at time of study completion    PROCEDURE:  CT of the Abdomen and Pelvis was performed.  Sagittal and coronal reformats were performed.  Thin section reformatted images of the lumbar spine and pelvis in axial,   sagittal, and coronal planes are submitted. Three-D reformatted images of   the lumbar spine are submitted.    FINDINGS:  LOWER CHEST: Mild bibasilar dependent atelectasis.    LIVER: Within normal limits.  BILE DUCTS: Normal caliber.  GALLBLADDER: Absent.  SPLEEN: Within normal limits.  PANCREAS: Within normal limits.  ADRENALS: Within normal limits.  KIDNEYS/URETERS: No right or left hydroureteronephrosis or   nephrolithiasis. No asymmetric perinephric stranding.    BLADDER: Within normal limits.  REPRODUCTIVE ORGANS: Uterus and adnexa are unremarkable.    BOWEL: Post gastric bypass. No bowel obstruction. Appendix is surgically   absent.  PERITONEUM: Trace pelvic free fluid. No pneumoperitoneum. Scattered   nonspecific subcentimeter mesenteric root lymph nodes and nodes.  VESSELS: Within normal limits.  RETROPERITONEUM/LYMPH NODES: Scattered nonspecific subcentimeter   retroperitoneal lymph nodes.  ABDOMINAL WALL: Postsurgical changes of the ventral abdominal wall.  BONES: No acute lumbar spine fracture lucency or compression deformity.   Unilateral spondylolysis at L5 on the right, unchanged. No evidence of   traumatic subluxation. Mild nonspecific infiltration of the midline   paraspinal fat at the level of L4-L5. No significant paravertebral   hematoma. Mild multilevel intervertebral disc space narrowing, small disc   bulges, and mild facet and ligamentous hypertrophy resulting in mild   multilevel segmental canal stenosis. No high-grade central spinal canal   stenosis. Facet hypertrophy resulting in mild bilateral neural foraminal   narrowing at L4-L5 and L5-S1. No acute fracture of the pelvic bones are   proximal femurs. The pelvic bones are well aligned.    IMPRESSION:  No acute intra-abdominal or pelvic findings on this unenhanced CT.    No acute lumbar spine or pelvic bone fracture.            --- End of Report ---            JUAN PALBO WESTBROOK DO; Attending Radiologist            ACC: [id number] EXAM:  XR CHEST PORTABLE IMMED 1V                          PROCEDURE DATE:  01/16/2022          INTERPRETATION:  AP semierect chest on January 16, 2022 at 12:37 AM.   Patient had an overdose.    Heart magnified by technique.    Lungs remain clear.    Chest is similar to July 19, 2018 and December 16, 2021.    IMPRESSION: No acute finding or change.    --- End of Report ---            YAIMA CARPENTER MD; Attending Radiologist

## 2022-01-16 NOTE — H&P ADULT - NSHPPHYSICALEXAM_GEN_ALL_CORE
T(C): --  HR: 74 (01-16-22 @ 01:22) (72 - 76)  BP: 97/52 (01-16-22 @ 01:22) (75/38 - 97/52)  RR: 16 (01-16-22 @ 01:22) (15 - 16)  SpO2: 95% (01-16-22 @ 01:22) (94% - 96%)    GENERAL: patient appears well, no acute distress, appropriate, pleasant  EYES: sclera clear, no exudates  ENMT: oropharynx clear without erythema, no exudates, dry mucous membranes  NECK: supple, soft, no thyromegaly noted  LUNGS: good air entry bilaterally, clear to auscultation, symmetric breath sounds, no wheezing or rhonchi appreciated  HEART: soft S1/S2, regular rate and rhythm, no murmurs noted, no lower extremity edema  GASTROINTESTINAL: abdomen is soft, nontender, nondistended, normoactive bowel sounds, no palpable masses  INTEGUMENT: Small scabs on LLE from fall  MUSCULOSKELETAL: no clubbing or cyanosis, no obvious deformity  NEUROLOGIC: awake, alert, oriented x3, good muscle tone in 4 extremities, no obvious sensory deficits  PSYCHIATRIC: Anxious appearing  HEME/LYMPH: no palpable supraclavicular nodules, no obvious ecchymosis or petechiae

## 2022-01-16 NOTE — ED BEHAVIORAL HEALTH NOTE - BEHAVIORAL HEALTH NOTE
========================  COLLATERAL  ========================  NAME: Rodriguez Akers  NUMBER: [phone]  RELATIONSHIP: Therapist   COMMENTS: Office closed on Sunday, not able to reach therapist     NAME: Sabrina Leiva  NUMBER: [phone]  RELATIONSHIP: Psychiatrist   COMMENTS: Office closed today and not able to reach provider.    NAME: Althea Wilhelm  NUMBER: [phone]  RELATIONSHIP: Mother   RELIABILITY: Good   COMMENTS: Does not believe this is a suicide attempt.    “Patient gives permission to obtain collateral from _mental health providers and mother:  ( X ) Yes  equivalent. Details:     ========================  PATIENT DEMOGRAPHICS: 32y/o, single, domiciled with parents, unemployed since last June due to chronic pain, not a caregiver.    ========================  HPI  BASELINE FUNCTIONING: Pt can care for her ADL’s. Pt. has a PPH of MDD, borderline PD, anxiety, and multiple prior hospitalization. Last inpt. was for [facility] for an overdose at [facility] on June 2021. Denies hx of substance abuse, legal issues, no hx of trauma, with past medical hx of obesity, PCOS, HTN, chronic back and leg pain.   DATE HPI STARTED:  2 days ago   DECOMPENSATION: Her mother found pt. unresponsive in her bedroom and called 911 last night. Per patients’ mother, this was not a suicide attempt, this was accidental. She had surgery two nights ago bariatric, and her appendix removed and was in a lot of pain and dizzy. Last night she fell from the stairs and was very lightheaded. The pt. had decided to take her medications and go to bed, half an hour later collateral found her unresponsive. She believes what she took was due to her confusion and not as an attempt to take her life. Collateral reports she knows what it looks like when her daughter is depressed, and that the pt. has been doing well and in high spirits. The pt. has been attending her mental health appointments and taking her medications as prescribed. The pt. has reported wanting to lloyd off her pain medications for her back/legs and wants to be med managed on how to do this, collateral believes this is promising. The pt. has not made any type of suicidal statements or changes to her baseline. Collateral does not believe she would benefit from a psych admission at this time.  Denies that the pt. currently is a danger to herself or others.       COVID Exposure Screen- collateral (i.e. third-party, chart review, belongings, etc; include EMS and ED staff)    1. *Has the patient been tested for COVID-19 in the last 90 days? (X ) Yes ( ) No ( ) Unknown- Reason: _____  IF YES PROCEED TO QUESTION #2. IF NO OR UNKNOWN, PLEASE SKIP TO QUESTION #3.  2. Date of test(s), type of test(s), result(s) for ALL tests in last 90 days: __PCR negative 01/16/22______  3. *Has the patient received a COVID-19 vaccine? ( ) Yes ( ) No ( ) Unknown- Reason: _____  IF YES PROCEED TO QUESTION #4. IF NO or UNKNOWN, PLEASE SKIP TO QUESTION #7.  4. Moderna (X ) Pfizer ( ) J&J ( )  5. Number of doses: ____2____  6. Date of last dose: ___2/6_____  7. *In the past 10 days, has the patient been around anyone with a positive COVID-19 test?* ( ) Yes ( X) No ( ) Unknown- Reason: ____  IF YES PLEASE ANSWER THE FOLLOWING QUESTIONS:  8. Was the patient within 6 feet of them for at least 15 minutes? ( ) Yes ( ) No ( ) Unknown- Reason: _____  9. Did the patient provide care for them? ( ) Yes ( ) No ( ) Unknown- Reason: ______  10. Did the patient have direct physical contact with them (touched, hugged, or kissed them)? ( ) Yes ( ) No ( ) Unknown- Reason: __  11. Did the patient share eating or drinking utensils with them? ( ) Yes ( ) No ( ) Unknown- Reason: ____  12. Did they sneeze, cough, or somehow get respiratory droplets on the patient? ( ) Yes ( ) No ( ) Unknown- Reason: ______

## 2022-01-16 NOTE — CHART NOTE - NSCHARTNOTEFT_GEN_A_CORE
Others' Prescriptions  Patient Name: Rebecca CobbBirth Date: 1990  Address: [address] [place] [id number]: Female  Rx Written	Rx Dispensed	Drug	Quantity	Days Supply	Prescriber Name  01/05/2022	01/05/2022	oxycodone-acetaminophen 5-325 mg tab	120	30	Carmen Nunez NP  Prescriber Jennifer # [id number]  Payment Method Insurance  Dispenser [facility] #[id number]  01/03/2022	01/03/2022	tramadol hcl 50 mg tablet	12	3	Marco Beach P NP  Prescriber Jennifer # [id number]  Payment Method Insurance  Dispenser [facility] #[id number]  01/03/2022	01/03/2022	clonazepam 1 mg tablet	45	15	Sabrina Leiva  Prescriber Jennifer # [id number]  Payment Method Insurance  Dispenser [facility] #[id number]  12/28/2021	12/31/2021	tramadol hcl 50 mg tablet	10	2	Marco Beach P NP  Prescriber Jennifer # [id number]  Payment Method Insurance  Dispenser [facility] #[id number]  12/31/2021	12/31/2021	oxycodone-acetaminophen 5-325 mg tab	20	2	Kurtis Adrian  Prescriber Jennifer # [id number]  Payment Method Insurance  Dispenser [facility] #[id number]  12/08/2021	12/09/2021	clonazepam 1 mg tablet	30	30	Sabrina Leiva  Prescriber Jennifer # [id number]  Payment Method Insurance  Dispenser [facility] #[id number]  12/09/2021	12/09/2021	oxycodone hcl (ir) 5 mg tablet	120	30	Edvin Tripp  Prescriber Jennifer # [id number]  Payment Method Insurance  Dispenser [facility] #[id number]  11/10/2021	11/10/2021	clonazepam 1 mg tablet	30	30	Sabrina Leiva  Prescriber Jennifer # [id number]  Payment Method Insurance  Dispenser [facility] #[id number]  11/10/2021	11/10/2021	oxycodone hcl 5 mg tablet	120	30	Carmen Nunez NP  Prescriber Jennifer # [id number]  Payment Method Insurance  Dispenser [facility] #[id number]  10/07/2021	10/14/2021	oxycontin er 10 mg tablet	90	30	Linda Newman  Prescriber Jennifer # [id number]  Payment Method Insurance  Dispenser [facility] #[id number]  10/06/2021	10/06/2021	clonazepam 1 mg tablet	30	30	Sabrina Leiva  Prescriber Jennifer # [id number]  Payment Method Insurance  Dispenser [facility] #[id number]  09/16/2021	09/16/2021	clonazepam 0.5 mg tablet	10	10	Sabrina Leiva  Prescriber Jennifer # [id number]  Payment Method Insurance  Dispenser [facility] #[id number]  09/15/2021	09/15/2021	oxycontin er 10 mg tablet	90	30	Edvin Tripp  Prescriber Jennifer # [id number]  Payment Method Insurance  Dispenser [facility] #[id number]  09/08/2021	09/08/2021	clonazepam 0.5 mg tablet	14	7	Sabrina Leiva  Prescriber Jennifer # [id number]  Payment Method Insurance  [id number]  08/24/2021	08/25/2021	oxycodone hcl 5 mg tablet	100	30	Allis, Lora Rogers MD  Prescriber Jennifer # [id number]  Payment Method Insurance  [facility][id number]  08/24/2021	08/24/2021	oxycodone hcl 5 mg tablet	20	5	Allis, Lora Rogers MD  Prescriber Jennifer # [id number]  Payment Method Insurance  [facility][id number]  08/10/2021	08/14/2021	oxycodone hcl 10 mg tablet	30	30	Allis, Lora Rogers MD  Prescriber Jennifer # [id number]  Payment Method Insurance  Dispenser [facility] #[id number]  08/10/2021	08/10/2021	oxycontin er 10 mg tablet	60	30	AllisLora MD  Prescriber Jennifer # [id number]  Payment Method Insurance  Dispenser [facility] #[id number]  07/28/2021	08/01/2021	clonazepam 0.5 mg tablet	15	15	Sb Clark A  Prescriber Jennifer # [id number]  Payment Method Insurance  Dispenser [facility] #[id number]  04/16/2021	07/30/2021	theraceed [id number] thc and 0.5mg cbd/2 pumps (1ml)	1	4	Georgina Spicer)  Prescriber Jennifer # [id number]  Payment Method Cash  Dispenser [facility]  04/16/2021	07/30/2021	theraceed [id number] thc and 0.5mg cbd/2 pumps (1ml)	1	4	Georgina Spicer)  Prescriber Jennifer # [id number]  Payment Method Cash  [facility]enser [facility]  04/16/2021	07/30/2021	eleceed lotion 1t:1c 5mg thc and 5mg cbd/2 pumps	1	4	Georgina Spicer)  Prescriber Jennifer # [id number]  Payment Method Cash  Dispenser [facility]  07/28/2021	07/28/2021	oxycodone hcl 5 mg tablet	60	30	Karan Rosen  Prescriber Jennifer # [id number]  Payment Method Insurance  Dispenser [facility] #[id number]  07/23/2021	07/24/2021	clonazepam 0.5 mg tablet	7	7	Tyra Arango  Prescriber Jennifer # [id number]  Payment Method Insurance  Dispenser [facility] #[id number]  07/09/2021	07/10/2021	clonazepam 0.5 mg tablet	15	15	Sb Clark A  Prescriber Jennifer # [id number]  Payment Method Insurance  Dispenser [facility] #[id number]  07/08/2021	07/08/2021	oxycontin er 10 mg tablet	60	30	Hafsa Carter  Prescriber Jennifer # [id number]  Payment Method Insurance  Dispenser [facility] #[id number]  06/29/2021	07/03/2021	clonazepam 0.5 mg tablet	7	7	Sb Clark A  Prescriber Jennifer # [id number]  Payment Method Insurance  Dispenser [facility] #[id number]  06/30/2021	07/03/2021	oxycodone hcl 10 mg tablet	120	30	Sarah Long  Prescriber Jennifer # [id number]  Payment Method Insurance  Dispenser [facility] #[id number]  06/19/2021	06/25/2021	oxycodone-acetaminophen  mg tab	12	4	Karan Rosen  Prescriber Jennifer # [id number]  Payment Method Insurance  Dispenser [facility] #[id number]  06/23/2021	06/23/2021	oxycodone hcl 10 mg tablet	72	12	Derian Rojas  Prescriber Jennifer # [id number]  Payment Method Insurance  Dispenser [facility] #[id number]  06/11/2021	06/12/2021	clonazepam 0.5 mg tablet	6	6	Lora Warren MD  Prescriber Jennifer # [id number]  Payment Method Insurance  Dispenser [facility] #[id number]  06/01/2021	06/06/2021	oxycodone hcl 5 mg tablet	120	30	Georgina Spicer)  Prescriber Jennifer # [id number]  Payment Method Insurance  Dispenser [facility] #[id number]  05/18/2021	05/23/2021	clonazepam 0.5 mg tablet	4	4	PinardAnthonys MS, [facility]  Prescriber Jennifer # [id number]  Payment Method Insurance  Dispenser [facility] #[id number]  05/12/2021	05/12/2021	clonazepam 0.5 mg tablet	6	6	Pinard, Deborah MS, [facility]  Prescriber Jennifer # [id number]  Payment Method Insurance  Dispenser [facility] #[id number]  04/16/2021	05/06/2021	theraceed [id number] thc and 0.5mg cbd/2 pumps (1ml)	1	7	Georgina Spicer (M  Prescriber Jennifer # [id number]  Payment Method Cash  Dispenser [facility]  04/16/2021	05/06/2021	theraceed [id number] thc and 0.5mg cbd/2 pumps (1ml)	1	7	Georgina Spicer (M  Prescriber Jennifer # [id number]  Payment Method Cash  Dispenser [facility]  04/16/2021	05/06/2021	eleceed lotion 1t:1c 5mg thc and 5mg cbd/2 pumps	1	7	Georgina Spicer (M  Prescriber Jennifer # [id number]  Payment Method Cash  Dispenser [facility]  04/16/2021	05/06/2021	symmetry 1:1 vape cart 2.5mg thc and 2.5mg cbd/3 sec inh	1	3	Georgina Spicer (M  Prescriber Jennifer # [id number]  Payment Method Cash  Dispenser [facility]  05/06/2021	05/06/2021	oxycodone hcl 5 mg tablet	120	30	Kinyungu, Georgina M (M  Prescriber Jennifer # [id number]  Payment Method Insurance  Dispenser [facility] #[id number]  04/16/2021	04/25/2021	eleceed lotion 1t:1c 5mg thc and 5mg cbd/2 pumps	1	4	Kinyungu, Georgina M (M  Prescriber Jennifer # [id number]  Payment Method Cash  Dispenser [facility]  04/16/2021	04/25/2021	eleceed lotion 1t:1c 5mg thc and 5mg cbd/2 pumps	1	4	Kinyungu, Georgina M (M  Prescriber Jennifer # [id number]  Payment Method Cash  Dispenser [facility]  10/12/2020	04/09/2021	balance lotion 5mg thc and 5mg cbd/4 pumps	1	8	Kinyungu Georgina M (M  Prescriber Jennifer # [id number]  Payment Method Cash  Dispenser [facility]  10/12/2020	04/09/2021	balance lotion 5mg thc and 5mg cbd/4 pumps	1	8	Adeungu Georgina M (M  Prescriber Jennifer # [id number]  Payment Method Cash  Dispenser [facility]  04/06/2021	04/07/2021	oxycodone hcl 5 mg tablet	120	30	Adeungu, Georgina M (M  Prescriber Jennifer # [id number]  Payment Method Insurance  Dispenser [facility] #[id number]  10/12/2020	04/02/2021	balance lotion 5mg thc and 5mg cbd/4 pumps	1	8	Adeungradha Georgina M (M  Prescriber Jennifer # [id number]  Payment Method Cash  Dispenser [facility]  10/12/2020	04/02/2021	renew hybrid 10mg thc; <0.05mg cbd/capsule	1	1	Adeungradha Georgina M (M  Prescriber Jennifer # [id number]  [facility] Method Cash  [facility]  10/12/2020	04/02/2021	renew indica 10mg thc; <0.05mg cbd/capsule	1	1	Adeungradha Georgina M (M  Prescriber Jennifer # [id number]  Payment Method Cash  Dispenser [facility]  10/12/2020	04/02/2021	curaleaf capsule (20:1) 9mg thc and 0.45 mg cbd/capsule	1	3	Georgina Spicer (M  Prescriber Jennifer # [id number]  Payment Method Cash  Dispenser [place] [facility] [facility]  03/29/2021	04/01/2021	adderall xr 25 mg capsule	30	30	PinarbenDeborah MS,  Prescriber Jennifer # [id number]  Payment Method Insurance  Dispenser [facility] #[id number]  03/29/2021	03/29/2021	clonazepam 0.5 mg tablet	6	6	PinardDeborah MS,  Prescriber Jennifer # [id number]  Payment Method Insurance  Dispenser [facility] #[id number]  10/12/2020	03/26/2021	curaleaf capsule (20:1) 9mg thc and 0.45 mg cbd/capsule	1	3	Georgina Spicer (M  Prescriber Jennifer # [id number]  Payment Method Cash  [facility] [place] [facility]  10/12/2020	03/26/2021	curaleaf capsule (20:1) 9mg thc and 0.45 mg cbd/capsule	1	3	Georgina Spicer (M  Prescriber Jennifer # [id number]  Payment Method Cash  Dispenser [place] [employer] [facility]  10/12/2020	03/15/2021	eleceed lotion 1t:1c 5mg thc and 5mg cbd/2 pumps	1	7	Georgina Spicer (M  Prescriber Jennifer # [id number]  Payment Method Cash  Dispenser [facility]  10/12/2020	03/15/2021	theraceed [id number] thc and 0.5mg cbd/2 pumps (1ml)	1	7	Georgina Spicer (M  Prescriber Jennifer # [id number]  Payment Method Cash  [facility]  10/12/2020	03/15/2021	theraceed [id number] thc and 0.5mg cbd/2 pumps (1ml)	1	7	Georgina Spicer (M  Prescriber Jennifer # [id number]  Payment Method Cash  Dispenser [facility]  03/09/2021	03/10/2021	oxycodone hcl 5 mg tablet	120	30	Georgina Spicer (M  Prescriber Jennifer # [id number]  Payment Method Insurance  Dispenser [facility] #[id number]  10/12/2020	03/09/2021	curaleaf capsule (20:1) 9mg thc and 0.45 mg cbd/capsule	1	3	Georgina Spicer (M  Prescriber Jennifer # [id number]  Payment Method Cash  Dispenser [place] [employer]  10/12/2020	03/05/2021	balance lotion 5.5mg thc and 5.5mg cbd/4 pumps	1	7	Georgina Spicer (M  Prescriber Jennifer # [id number]  Payment Method Cash  Dispenser [facility]  10/12/2020	03/05/2021	eleceed lotion 1t:1c 5mg thc and 5mg cbd/2 pumps	1	10	Georgina Spicer (M  Prescriber Jennifer # [id number]  Payment Method Cash  Dispenser [facility]  10/12/2020	03/05/2021	theraceed [id number] thc and 0.5mg cbd/2 pumps (1ml)	1	10	Georgina Spicer (M  Prescriber Jennifer # [id number]  Payment Method Cash  Dispenser [facility]  10/12/2020	02/26/2021	blue extra strength 9.5 mg thc/<0.5 mg cbd/capsule	1	3	Georgina Spicer (M  Prescriber Jennifer # [id number]  Payment Method Cash  Dispenser [place] [employer]  10/12/2020	02/26/2021	blue extra strength 9.5 mg thc/<0.5 mg cbd/capsule	1	3	Georgina Spicer (M  Prescriber Jennifer # [id number]  Payment Method Cash  Dispenser [place] [employer]  10/12/2020	02/22/2021	balance lotion 5.5mg thc and 5.5mg cbd/4 pumps	1	7	Georgina Spicer (M  Prescriber Jennifer # [id number]  Payment Method Cash  Dispenser [facility]  10/12/2020	02/22/2021	blue extra strength 9.5 mg thc/<0.5 mg cbd/capsule	1	4	Georgina Spicer (M  Prescriber Jennifer # [id number]  Payment Method Cash  Dispenser [facility]  10/12/2020	02/22/2021	blue extra strength 9.5 mg thc/<0.5 mg cbd/capsule	1	4	Georgina Spicer (M  Prescriber Jennifer # [id number]  Payment Method Cash  Dispenser [facility]  10/12/2020	02/17/2021	blue jh1 ef90 9mg thc and <0.5mg cbd per 0.01ml vape	1	5	Georgina Spicer (M  Prescriber Jennifer # [id number]  Payment Method Cash  Dispenser [facility]  10/12/2020	02/17/2021	blue extra strength 9.5 mg thc/<0.5 mg cbd/capsule	1	3	Georgina Spicer (M  Prescriber Jennifer # [id number]  Payment Method Cash  Dispenser [facility]  10/12/2020	02/17/2021	blue extra strength 9.5 mg thc/<0.5 mg cbd/capsule	1	3	Georgina Spicer (M  Prescriber Jennifer # [id number]  Payment Method Cash  [facility] [place] [facility]  02/16/2021	02/17/2021	adderall xr 25 mg capsule	30	30	Deborah Rhodes MS,  Prescriber Jennifer # [id number]  Payment Method Insurance  Dispenser [facility] #[id number]  02/16/2021	02/17/2021	clonazepam 0.5 mg tablet	6	6	Deborah Rhodes MS,  Prescriber Jennifer # [id number]  Payment Method Insurance  Dispenser [facility] #[id number]  10/12/2020	02/12/2021	curaleaf capsule (20:1) 9mg thc and 0.45 mg cbd/capsule	1	3	Georgina Spicer M (M  Prescriber Jennifer # [id number]  [facility] [place] [facility]  10/12/2020	02/12/2021	curaleaf capsule (20:1) 9mg thc and 0.45 mg cbd/capsule	1	3	Georgina Spicer (M  Prescriber Jennifer # [id number]  Payment Method Cash  [facility]ens[facility] [place] [facility]  10/12/2020	02/12/2021	renew hybrid 10mg thc; <0.05mg cbd/capsule	1	1	Georgina Spicer  Prescriber Jennifer # [id number]  Payment Method Cash  Dispenser [facility]  10/12/2020	02/12/2021	renew hybrid 10mg thc; <0.05mg cbd/capsule	1	1	Georgina Spicer  Prescriber Jennifer # [id number]  Payment Method Cash  Dispenser [facility]  02/09/2021	02/10/2021	oxycodone hcl 5 mg tablet	120	30	Georgina Spicer (MD)  Prescriber Jennifer # [id number]  Payment Method Insurance  Dispenser [facility] #[id number]  10/12/2020	02/05/2021	curaleaf capsule (20:1) 9mg thc and 0.45 mg cbd/capsule	1	3	Georgina Spicer)  Prescriber Jennifer # [id number]  Payment Method Cash  Dispenser [facility]  10/12/2020	02/05/2021	curaleaf capsule (20:1) 9mg thc and 0.45 mg cbd/capsule	1	3	Georgina Spicer)  Prescriber Jennifer # [id number]  Payment Method Cash  Dispenser [facility]  10/12/2020	02/05/2021	renew hybrid 10mg thc; <0.05mg cbd/capsule	1	1	Georgina Spicer)  Prescriber Jennifer # [id number]  Payment Method Cash  Dispenser [facility]  10/12/2020	02/03/2021	balance lotion 5.5mg thc and 5.5mg cbd/4 pumps	1	5	Georgina Spicer)  Prescriber Jennifer # [id number]  Payment Method Cash  Dispenser [facility]  10/12/2020	02/03/2021	balance lotion 5.5mg thc and 5.5mg cbd/4 pumps	1	5	Georgina Spicer)  Prescriber Jennifer # [id number]  Payment Method Cash  Dispenser [facility]  10/12/2020	01/31/2021	renew hybrid 10mg thc; <0.05mg cbd/capsule	1	1	Georgina Spicer)  Prescriber Jennifer # [id number]  Payment Method Cash  [facility]  10/12/2020	01/31/2021	curaleaf capsule (20:1) 9mg thc and 0.45 mg cbd/capsule	1	3	Georgina Spicer (MD)  Prescriber Jennifer # [id number]  Payment Method [facility] [employer] [facility]  10/12/2020	01/29/2021	blue fpb ef90 9mg thc and <0.5mg cbd per 0.01ml vape	1	5	Georgina Spicer (MD)  Prescriber Jennifer # [id number]  [facility] [place] [facility]  10/12/2020	01/29/2021	curaleaf capsule (20:1) 9mg thc and 0.45 mg cbd/capsule	1	3	Georgina Spicer)  Prescriber Jennifer # [id number]  [facility]  10/12/2020	01/28/2021	curaleaf capsule (20:1) 9mg thc and 0.45 mg cbd/capsule	1	3	Georgina Spicer)  Prescriber Jennifer # [id number]  [facility] [place] [facility]  10/12/2020	01/28/2021	renew hybrid 10mg thc; <0.05mg cbd/capsule	1	1	Georgina Spicer)  Prescriber Jennifer # [id number]  Payment Method Cash  [facility] [place] [facility]  10/12/2020	01/25/2021	balance lotion 5.5mg thc and 5.5mg cbd/4 pumps	1	10	Georgina Spicer)  Prescriber Jennifer # [id number]  Payment Method Cash  [facility]  10/12/2020	01/25/2021	balance lotion 5.5mg thc and 5.5mg cbd/4 pumps	1	10	Georgina Spicer)  Prescriber Jennifer # [id number]  Payment Method Cash  [facility]  10/12/2020	01/24/2021	curaleaf capsule (20:1) 9mg thc and 0.45 mg cbd/capsule	1	3	Georgina Spicer)  Prescriber Jennifer # [id number]  Payment Method Cash  Dispenser [place] [employer] [facility]  10/12/2020	01/22/2021	curaleaf capsule (20:1) 9mg thc and 0.45 mg cbd/capsule	1	3	Georgina Spicer (MD)  Prescriber Jennifer # [id number]  Payment Method [facility] [employer] [facility]  10/12/2020	01/22/2021	aqua txc ef90 vape 6mg thc and 3mg cbd/0.01ml dose	1	5	Georgina Spicer (MD)  Prescriber Jennifer # [id number]  [facility] Method [facility] [place] [employer] [facility]  10/12/2020	01/22/2021	balance lotion 5.5mg thc and 5.5mg cbd/4 pumps	1	7	Georgina Spicer)  Prescriber Jennifer # [id number]  Payment Method [facility] [employer] [facility]

## 2022-01-16 NOTE — H&P ADULT - PROBLEM SELECTOR PLAN 1
Patient presenting with drug overdose found unresponsive and is not AOx3 s/p Narcan  - Declines suicide attempt however has multiple suicide attempts in the past  - Utox positive for opiates   - Will hold all opiates (percocet and trazadone) overnight, reassess for AM   - Desaturating on RA, continue supplemental O2, and will reassess for second dose of Narcan  - Seizure precautions and fall precautions   - Consult psych, Dr. Kwong for overdose and likely suicide attempt Patient presenting with drug overdose found unresponsive and is not AOx3 s/p Narcan  - Declines suicide attempt however has multiple suicide attempts in the past  - Utox positive for opiates   - Will hold all opiates (percocet and trazadone) overnight, reassess for AM   - Tylenol PRN and lidocaine patch PRN for pain  - Desaturating on RA, continue supplemental O2, and will reassess for second dose of Narcan  - Seizure precautions and fall precautions   - Consult psych, Dr. Kwong for overdose and likely suicide attempt Patient presenting with drug overdose found unresponsive and is now AOx3 s/p Narcan  - Declines suicide attempt however has multiple suicide attempts in the past involving ingestions   - Utox positive for opiates   - Will hold all opiates (percocet and trazadone) overnight, reassess for AM and consult pain management. will continue bzo prn to avoid withdrawal.   -monitor for lethargy and sedation  - Tylenol PRN and lidocaine patch PRN for pain  - Desaturating on RA, continue supplemental O2, and will reassess for second dose of Narcan  - Seizure precautions, asp precautions and fall precautions   - 1:1 observation   - pt has complex history of opioid dependence, chronic pain, suicide attempts. Per patient her mother usually locks up her medication and distributes it to her however she sometimes gets lax and she takes additional medication. Suspect pt is abusing her prescribed medications. Pt is high risk for overdose whether intentional or unintentional. consult psych and pain management.   - social work for possible inpatient treatment for opioid dependence

## 2022-01-16 NOTE — H&P ADULT - PROBLEM SELECTOR PLAN 4
Continue clonazepam 1mg TID PRN starting in AM Oliverio reviewed and placed in chart  - Patient taking opiates for chronic back pain and additional medications for recent surgery   - pt is high risk for overdose   - Consult pain management, Dr. Francisco Higgins

## 2022-01-16 NOTE — PROGRESS NOTE ADULT - PROBLEM SELECTOR PLAN 1
Patient presenting with drug overdose found unresponsive and is now AOx3 s/p Narcan  - Declines suicide attempt however has multiple suicide attempts in the past involving ingestions   - Utox positive for opiates   - Will hold all opiates (percocet and trazadone) overnight, reassess for AM and consult pain management. f/u recs  - will continue bzo klonipin prn to avoid withdrawal.   - monitor for lethargy and sedation  - Tylenol PRN and lidocaine patch PRN for pain  - Desaturating on RA, continue supplemental O2, and will reassess for second dose of Narcan, wean as tolerated  - Seizure precautions, asp precautions and fall precautions   - 1:1 observation   - pt has complex history of opioid dependence, chronic pain, suicide attempts. Per patient her mother usually locks up her medication and distributes it to her however she sometimes gets lax and she takes additional medication. Suspect pt is abusing her prescribed medications. Pt is high risk for overdose whether intentional or unintentional. consult psych and pain management.   - social work for possible inpatient treatment for opioid dependence  - tele-psych to see patient today Patient presenting with drug overdose found unresponsive and is now AOx3 s/p Narcan  - Declines suicide attempt however has multiple suicide attempts in the past involving ingestions   - Utox positive for opiates   - Will hold all opiates (percocet and trazadone) overnight, reassess for AM and consult pain management. f/u recs  - will continue bzo klonipin prn to avoid withdrawal.   - monitor for lethargy and sedation  - Tylenol PRN and lidocaine patch PRN for pain  - Desaturating on RA, continue supplemental O2, and will reassess for second dose of Narcan, wean as tolerated  - Seizure precautions, asp precautions and fall precautions   - 1:1 observation   - pt has complex history of opioid dependence, chronic pain, suicide attempts. Per patient her mother usually locks up her medication and distributes it to her however she sometimes gets lax and she takes additional medication. Suspect pt is abusing her prescribed medications. Pt is high risk for overdose whether intentional or unintentional. consult psych and pain management.   - social work for possible inpatient treatment for opioid dependence  - Called tele-psych to see patient today and evaluate. was told by tele-psych attending over phone that they do not see patients in ED bed and to call back after patient is in bed on the medicine floor. will consult tele-psych once patient has bed on the medicine unit.

## 2022-01-16 NOTE — ED ADULT NURSE REASSESSMENT NOTE - NS ED NURSE REASSESS COMMENT FT1
Was just evaluated by telepsych, pending tele bed.  Ambulating halls.  VSS.
Presents to ER S/P overdose.  Pt states she had recent bariatric surgery at [facility] and took too many pain pills by accident.  EMS found pt unconscious at home and gave Narcan.  Arrived to ER A.O x4.  Pt denies any suicidal ideations, however, is placed on constant observation.  Pt denies any suicidal thoughts currently.  States she has a hx of suicidal ideations, but ever since she was placed on Latuda, all of those thoughts are no longer present. Plan of care updated, wait time explained.  Pending psych evaluation.

## 2022-01-16 NOTE — H&P ADULT - ASSESSMENT
31F PMHx of HTN, HLD, DM2, Chronic back pain, Borderline personality, anxiety, Depression, hx of suicide attempts, hx of self harm, MCTD, FSGS, PCOS, Obesity, s/p recent gastric bypass surgery and appendectomy on 12/29/2021 admitted for drug overdose

## 2022-01-16 NOTE — BH CONSULTATION LIAISON ASSESSMENT NOTE - NSBHCHARTREVIEWLAB_PSY_A_CORE FT
10.3   6.13  )-----------( 320      ( 18 Jan 2022 08:01 )             30.1   01-18    144  |  109<H>  |  11  ----------------------------<  84  3.7   |  29  |  1.10    Ca    8.9      18 Jan 2022 08:01    TPro  6.7  /  Alb  2.8<L>  /  TBili  0.3  /  DBili  x   /  AST  27  /  ALT  28  /  AlkPhos  84  01-17

## 2022-01-16 NOTE — H&P ADULT - PROBLEM SELECTOR PLAN 7
Admitted for overdose, likely suicide attempt with diagnosis of depression  - Continue home medication of duloxetine Heparin 5000U q8

## 2022-01-16 NOTE — H&P ADULT - HISTORY OF PRESENT ILLNESS
**Charting in progress***  31F, pmh of HTN, HLD, DM2, Chronic back pain, Borderline personality, anxiety, Depression, MCTD, FSGS, PCOS, Obesity,  s/p recent gastric bypass surgery and appendectomy on 1/2/2022 presenting with accidental overdose. She took two percocet and tramadol for the pain and was found unresponsive by her mother who initiated CPR. Paramedics arrived and  administered Narcan 4mg IV with good response. Pt now responsive and AO x3, however with hypotension.  She also had a recent fall two days ago and reinjuring her low back.    ED course  Vitals:  BP 87/59 -> 97/52  Signifcant labs: Cr 2   Patient recieved: Ofirmev 1g IV, 1L NS x2   CT 3D reconstruction, CT pelvis, CT lumbar spine, CT amdomen pelvis: No acute intra-abdominal or pelvic findings on this unenhanced CT. No acute lumbar spine or pelvic bone fracture.       EKG:    31F PMHx of HTN, HLD, DM2, Chronic back pain, Borderline personality, anxiety, Depression, hx of suicide attempts, hx of self harm, MCTD, FSGS, PCOS, Obesity, s/p recent gastric bypass surgery and appendectomy on 12/29/2021 presenting with drug overdose. She has been taking oxycodone for pain regularly at home, however after the recent surgery she was prescribed percocet and tramadol. Today she felt the pain was worse than usual and she took 2 percocet pills, 1 tramadol, 2 tylenol, 2 advil, Trazadone and tizanidine at one time. She states she usually dose not take percocet and tramadol together, so she feels the combination of medications she took at once may have caused her to become unresponsive. She was found unresponsive by her mother who initiated CPR. Paramedics arrived and administered Narcan 4mg IV with good response. Pt now responsive and AO x3, however with hypotension. She states that she was hypotensive during her hospitalization as well and was given fluids at that time. She has not taken her blood pressure medications in 2 days because 2 days ago she fell down the stairs and attributes it to having low blood pressure. Her last BM was a few days ago and she has been taking senna and colase regularly.   Of note, patient declines this was a suicide attempt however since 2016 patient has multiple documented suicide attempts, with the most recent in 2021. Patient dismisses these suicide attempts when brought up.      ED course  Vitals:  BP 87/59 -> 97/52, RR 16, SpO2 96% on NRB  Significant labs: Cr 2, Drug screen positive for opiates   Patient received: Ofirmev 1g IV, 1L NS x2   CT 3D reconstruction, CT pelvis, CT lumbar spine, CT abdomen pelvis: No acute intra-abdominal or pelvic findings on this unenhanced CT. No acute lumbar spine or pelvic bone fracture.        31F PMHx of HTN, HLD, DM2, Chronic back pain, Borderline personality, anxiety, Depression, hx of suicide attempts, hx of self harm, Mixed connective tissue disease, FSGS, PCOS, Obesity, s/p recent gastric bypass surgery and appendectomy on 12/29/2021 presenting with drug overdose. She has been taking oxycodone for pain regularly at home, however after the recent surgery she was prescribed percocet and tramadol. Today she felt the pain was worse than usual and she took 2 percocet pills, 1 tramadol, 2 tylenol, 2 advil, Trazadone and tizanidine at one time. She states she usually dose not take percocet and tramadol together, so she feels the combination of medications she took at once may have caused her to become unresponsive. She was found unresponsive by her mother who initiated CPR. Paramedics arrived and administered Narcan 4mg IV with good response. Pt now responsive and AO x3, however with hypotension. She states that she was hypotensive during her hospitalization as well and was given fluids at that time. She has not taken her blood pressure medications in 2 days because 2 days ago she fell down the stairs and attributes it to having low blood pressure. Her last BM was a few days ago and she has been taking senna and colase regularly.   Of note, patient adamantly denies this was a suicide attempt however since 2016 patient has multiple documented suicide attempts, with the most recent in 2021. most of these attempts involved overdosing on her prescribed and OTC medicaiton. Patient dismisses these suicide attempts when brought up.  (please see prior behavioral health notes)     ED course  Vitals:  BP 87/59 -> 97/52, RR 16, SpO2 96% on NRB  Significant labs: Cr 2, Drug screen positive for opiates   Patient received: Ofirmev 1g IV, 1L NS x2   CT 3D reconstruction, CT pelvis, CT lumbar spine, CT abdomen pelvis: No acute intra-abdominal or pelvic findings on this unenhanced CT. No acute lumbar spine or pelvic bone fracture.
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
## 2022-01-16 NOTE — BH CONSULTATION LIAISON ASSESSMENT NOTE - HPI (INCLUDE ILLNESS QUALITY, SEVERITY, DURATION, TIMING, CONTEXT, MODIFYING FACTORS, ASSOCIATED SIGNS AND SYMPTOMS)
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  pt reports taking an overdose of her meds and losing consiousness, being found by her mother and brought to ED.  pt reports taking last night an overdose of 2 percocet5/325 , 1 tramadol 50mg , 1 trazodone 150mg , 1 [id number] , 1 10mg melatonin  she denies intentionally overdosing to harm herself. she states these are her regularly preseribed medications.   states she has been in pain and had low blood pressures since having   gastric bypass sx on 12/29, and appendicitis prior to that.   she denies it was a suicide attempt.  she reports her depression is overall improved on latuda. she denies currently feeling depressed or suicidal.  states she sees a therapist anjel yun lcsw at [facility], and for med mgt , ALICJA Carrizales at [facility].  she denies current suicidal ideation or depressive sxs. denies hi/ah/vh or manic sxs.  see  note by [facility] for gera from mother; messages left for therapist and psychiatrist.   ISTOP  reports she takes cymbalta 90mg total daily; latuda 20mg bid; neurontin 800mg tid; trazodone 150mg qhs, lamictal 200mg qd  klonopin 1mg tid prn; percocet 4tbs prn daily; tramadol 50g daily; melatonin 10mg daily; tizanidine 4mg; lisinopril 20mg  Rx Written	Rx Dispensed	Drug	Quantity	Days Supply	Prescriber Name	Prescriber Jennifer #	Payment Method	Dispenser  01/05/2022	01/05/2022	oxycodone-acetaminophen 5-325 mg tab	120	30	Carmen Nunez NP	[id number]	[facility] #[id number]  01/03/2022	01/03/2022	tramadol hcl 50 mg tablet	12	3	Marco Beach, P, NP	[id number]	Insurance	[facility] #[id number]  01/03/2022	01/03/2022	clonazepam 1 mg tablet	45	15	Sabrina Leiva	[id number]	Insurance	[facility] #[id number]  12/28/2021	12/31/2021	tramadol hcl 50 mg tablet	10	2	Marco Beach, P, NP	[id number]	Insurance	[facility] #[id number]  12/31/2021	12/31/2021	oxycodone-acetaminophen 5-325 mg tab	20	2	Kurtis Adrian	[id number]	Insurance	[facility] #[id number]  12/08/2021	12/09/2021	clonazepam 1 mg tablet	30	30	Sabrina Leiva	[id number]	[facility] #[id number] Tomasa seen, evaluated and chart reviewed. 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 Patient reports taking an overdose of her meds and losing consciousness, being found by her mother and brought to ED.  She currently sees a therapist Rodriguez Akers lcsw at [facility], and for med mgt , ALICJA Carrizales at [facility].  She denies current suicidal ideation or depressive sxs. denies hi/ah/vh or manic sxs. Currently denies symptoms of psychosis, christophe or depression, strongly denying suicidal attempt. The mother agrees that it was not a suicidal attempt and expresses no safety concern at this time.

## 2022-01-16 NOTE — H&P ADULT - PROBLEM SELECTOR PROBLEM 8
07-21 Na 139 mmol/L Glu 132 mg/dL<H> K+ 5.0 mmol/L Cr <0.20 mg/dL BUN 8 mg/dL Phos 6.0 mg/dL GERD (gastroesophageal reflux disease)

## 2022-01-16 NOTE — PATIENT PROFILE ADULT - FALL HARM RISK - UNIVERSAL INTERVENTIONS
Bed in lowest position, wheels locked, appropriate side rails in place/Call bell, personal items and telephone in reach/Instruct patient to call for assistance before getting out of bed or chair/Non-slip footwear when patient is out of bed/[place] to call system/Physically safe environment - no spills, clutter or unnecessary equipment/Purposeful Proactive Rounding/Room/bathroom lighting operational, light cord in reach

## 2022-01-16 NOTE — H&P ADULT - PROBLEM SELECTOR PLAN 5
Oliverio reviewed and placed in chart  - Patient taking opiates for chronic back pain and additional medications for recent surgery   - Consult pain management, Dr. Francisco Higgins Admitted for overdose, possible suicide attempt with diagnosis of depression  h/o bipolar depression , anxiety, borderline personality disorder   - Continue home medication of duloxetine  - c/w lamictal   - c/w latuda  - Continue clonazepam 1mg TID PRN starting in AM

## 2022-01-16 NOTE — H&P ADULT - PROBLEM SELECTOR PLAN 3
Hypotensive on admission, likely due to excessive medication intake    - Hold clonidine and lisinopril in the setting of hypotension

## 2022-01-16 NOTE — H&P ADULT - NSHPREVIEWOFSYSTEMS_GEN_ALL_CORE
CONSTITUTIONAL: denies fever, chills, fatigue, weakness  HEENT: denies blurred vision, sore throat  SKIN: denies new lesions, rash  CARDIOVASCULAR: denies chest pain, chest pressure, palpitations  RESPIRATORY: denies shortness of breath, sputum production  GASTROINTESTINAL: + constipation. Denies nausea, vomiting, abdominal pain  GENITOURINARY: denies dysuria, discharge  NEUROLOGICAL: denies numbness, headache, focal weakness  MUSCULOSKELETAL: +chronic back pain, + buttock pain  HEMATOLOGIC: + bruising on LLE. denies gross bleeding  LYMPHATICS: denies enlarged lymph nodes, extremity swelling  PSYCHIATRIC: denies recent changes in anxiety, depression  ENDOCRINOLOGIC: denies sweating, cold or heat intolerance

## 2022-01-16 NOTE — BH CONSULTATION LIAISON ASSESSMENT NOTE - ADULT OR CHILD PROTECTIVE SERVICES INVOLVEMENT
Detail Level: Zone Continue Regimen: Betamethasone Apply to affected area Twice a day for two weeks on and two weeks off repeat as directed Initiate Treatment: Metronidazole 0.75% Apply to face every morning No

## 2022-01-16 NOTE — H&P ADULT - NSHPSOCIALHISTORY_GEN_ALL_CORE
Patient lives with her mother and step father  Denies tobacco use, alcohol use or recreational drug use  Vaccianted for COVID Moderna 3/3 last dose 12/2021  Flu vaccinate 12/2021

## 2022-01-16 NOTE — BH CONSULTATION LIAISON ASSESSMENT NOTE - SUMMARY
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  pt reports taking an overdose of her meds and losing consiousness, being found by her mother and brought to ED.  while pt denies overdosing to harm herself intentionally, she has multiple acute and chronic risk factors, including prior serious   overdoses, as well as recent medical and psychosocial stressors; severe mood and personality disorders.   messages left today for pt's outpatient providers.  she is currently minimizing of, and showing poor insight into, the severity of her overdose.   Will possibly need transfer to inpatient psychiatry once medically stabilized, or safety/discharge planning with family and outpatient providers  if pt continues to deny acute suicidality.  telepsych c/l will follow and pt's risk of self harm will continue to be reassessed.  31 year old female, status post accidental overdose

## 2022-01-16 NOTE — H&P ADULT - PROBLEM SELECTOR PLAN 2
CKD stage 3 with baseline Cr of 1.6  - Creatinine of 2.0 on admission   - continue to monitor renal indices CKD stage 3 with baseline Cr of 1.6  - Creatinine of 2.0 on admission   - s/p 2L NS, c/w gentle ivf, f/u am bmp  - avoid NSAIDs, nephrotoxic meds

## 2022-01-16 NOTE — CONSULT NOTE ADULT - ASSESSMENT
31F PMHx of HTN, HLD, DM2, Chronic back pain, Borderline personality, anxiety, Depression, hx of suicide attempts, hx of self harm, MCTD, FSGS, PCOS, Obesity, s/p recent gastric bypass surgery and appendectomy on 12/29/2021 admitted for drug overdose    GRACIA  Opioid use - Benzo use -   DM care  CKD TOBIAS  IVF - monitor renal indices  I and O  old records reviewed  possibly alteration of Absorption Mechanics in the gut due to recent Bariatric Surgery - may need Less Opioid and Benzo dosing  iStop reviewed  spoke with Mother of the patient  pt is s/p Narcan Resus  pt is considering Suboxone as Opioid dep therapy - will need to pursue - as outpatient - she has contact for Addiction Med and Pain Management practices

## 2022-01-17 LAB
A1C WITH ESTIMATED AVERAGE GLUCOSE RESULT: 5 % — SIGNIFICANT CHANGE UP (ref 4–5.6)
ALBUMIN SERPL ELPH-MCNC: 2.8 G/DL — LOW (ref 3.3–5)
ALP SERPL-CCNC: 84 U/L — SIGNIFICANT CHANGE UP (ref 40–120)
ALT FLD-CCNC: 28 U/L — SIGNIFICANT CHANGE UP (ref 12–78)
ANION GAP SERPL CALC-SCNC: 5 MMOL/L — SIGNIFICANT CHANGE UP (ref 5–17)
AST SERPL-CCNC: 27 U/L — SIGNIFICANT CHANGE UP (ref 15–37)
BASOPHILS # BLD AUTO: 0.04 K/UL — SIGNIFICANT CHANGE UP (ref 0–0.2)
BASOPHILS NFR BLD AUTO: 0.6 % — SIGNIFICANT CHANGE UP (ref 0–2)
BILIRUB SERPL-MCNC: 0.3 MG/DL — SIGNIFICANT CHANGE UP (ref 0.2–1.2)
BUN SERPL-MCNC: 9 MG/DL — SIGNIFICANT CHANGE UP (ref 7–23)
CALCIUM SERPL-MCNC: 8.7 MG/DL — SIGNIFICANT CHANGE UP (ref 8.5–10.1)
CHLORIDE SERPL-SCNC: 110 MMOL/L — HIGH (ref 96–108)
CO2 SERPL-SCNC: 30 MMOL/L — SIGNIFICANT CHANGE UP (ref 22–31)
CREAT SERPL-MCNC: 1 MG/DL — SIGNIFICANT CHANGE UP (ref 0.5–1.3)
EOSINOPHIL # BLD AUTO: 0.36 K/UL — SIGNIFICANT CHANGE UP (ref 0–0.5)
EOSINOPHIL NFR BLD AUTO: 5.7 % — SIGNIFICANT CHANGE UP (ref 0–6)
ESTIMATED AVERAGE GLUCOSE: 97 MG/DL — SIGNIFICANT CHANGE UP (ref 68–114)
GLUCOSE SERPL-MCNC: 79 MG/DL — SIGNIFICANT CHANGE UP (ref 70–99)
HCT VFR BLD CALC: 29.6 % — LOW (ref 34.5–45)
HGB BLD-MCNC: 10.1 G/DL — LOW (ref 11.5–15.5)
IMM GRANULOCYTES NFR BLD AUTO: 0.2 % — SIGNIFICANT CHANGE UP (ref 0–1.5)
LYMPHOCYTES # BLD AUTO: 3.19 K/UL — SIGNIFICANT CHANGE UP (ref 1–3.3)
LYMPHOCYTES # BLD AUTO: 50.8 % — HIGH (ref 13–44)
MCHC RBC-ENTMCNC: 28.5 PG — SIGNIFICANT CHANGE UP (ref 27–34)
MCHC RBC-ENTMCNC: 34.1 GM/DL — SIGNIFICANT CHANGE UP (ref 32–36)
MCV RBC AUTO: 83.4 FL — SIGNIFICANT CHANGE UP (ref 80–100)
MONOCYTES # BLD AUTO: 0.68 K/UL — SIGNIFICANT CHANGE UP (ref 0–0.9)
MONOCYTES NFR BLD AUTO: 10.8 % — SIGNIFICANT CHANGE UP (ref 2–14)
NEUTROPHILS # BLD AUTO: 2 K/UL — SIGNIFICANT CHANGE UP (ref 1.8–7.4)
NEUTROPHILS NFR BLD AUTO: 31.9 % — LOW (ref 43–77)
NRBC # BLD: 0 /100 WBCS — SIGNIFICANT CHANGE UP (ref 0–0)
PLATELET # BLD AUTO: 284 K/UL — SIGNIFICANT CHANGE UP (ref 150–400)
POTASSIUM SERPL-MCNC: 4.3 MMOL/L — SIGNIFICANT CHANGE UP (ref 3.5–5.3)
POTASSIUM SERPL-SCNC: 4.3 MMOL/L — SIGNIFICANT CHANGE UP (ref 3.5–5.3)
PROT SERPL-MCNC: 6.7 G/DL — SIGNIFICANT CHANGE UP (ref 6–8.3)
RBC # BLD: 3.55 M/UL — LOW (ref 3.8–5.2)
RBC # FLD: 16.2 % — HIGH (ref 10.3–14.5)
SODIUM SERPL-SCNC: 145 MMOL/L — SIGNIFICANT CHANGE UP (ref 135–145)
WBC # BLD: 6.28 K/UL — SIGNIFICANT CHANGE UP (ref 3.8–10.5)
WBC # FLD AUTO: 6.28 K/UL — SIGNIFICANT CHANGE UP (ref 3.8–10.5)

## 2022-01-17 PROCEDURE — 99233 SBSQ HOSP IP/OBS HIGH 50: CPT | Mod: GC

## 2022-01-17 RX ORDER — KETOROLAC TROMETHAMINE 30 MG/ML
15 SYRINGE (ML) INJECTION ONCE
Refills: 0 | Status: DISCONTINUED | OUTPATIENT
Start: 2022-01-17 | End: 2022-01-17

## 2022-01-17 RX ADMIN — LAMOTRIGINE 200 MILLIGRAM(S): 25 TABLET, ORALLY DISINTEGRATING ORAL at 21:41

## 2022-01-17 RX ADMIN — Medication 15 MILLIGRAM(S): at 02:20

## 2022-01-17 RX ADMIN — DULOXETINE HYDROCHLORIDE 30 MILLIGRAM(S): 30 CAPSULE, DELAYED RELEASE ORAL at 21:42

## 2022-01-17 RX ADMIN — Medication 100 MILLIGRAM(S): at 21:41

## 2022-01-17 RX ADMIN — DULOXETINE HYDROCHLORIDE 60 MILLIGRAM(S): 30 CAPSULE, DELAYED RELEASE ORAL at 11:33

## 2022-01-17 RX ADMIN — Medication 1 MILLIGRAM(S): at 14:56

## 2022-01-17 RX ADMIN — PANTOPRAZOLE SODIUM 40 MILLIGRAM(S): 20 TABLET, DELAYED RELEASE ORAL at 06:56

## 2022-01-17 RX ADMIN — OXYCODONE AND ACETAMINOPHEN 1 TABLET(S): 5; 325 TABLET ORAL at 22:15

## 2022-01-17 RX ADMIN — OXYCODONE AND ACETAMINOPHEN 1 TABLET(S): 5; 325 TABLET ORAL at 11:33

## 2022-01-17 RX ADMIN — Medication 1 MILLIGRAM(S): at 22:44

## 2022-01-17 RX ADMIN — ONDANSETRON 4 MILLIGRAM(S): 8 TABLET, FILM COATED ORAL at 23:18

## 2022-01-17 RX ADMIN — Medication 1 TABLET(S): at 11:33

## 2022-01-17 RX ADMIN — LURASIDONE HYDROCHLORIDE 20 MILLIGRAM(S): 40 TABLET ORAL at 17:11

## 2022-01-17 RX ADMIN — Medication 1 MILLIGRAM(S): at 09:50

## 2022-01-17 RX ADMIN — Medication 15 MILLIGRAM(S): at 02:50

## 2022-01-17 RX ADMIN — OXYCODONE AND ACETAMINOPHEN 1 TABLET(S): 5; 325 TABLET ORAL at 21:41

## 2022-01-17 RX ADMIN — OXYCODONE AND ACETAMINOPHEN 1 TABLET(S): 5; 325 TABLET ORAL at 13:18

## 2022-01-17 RX ADMIN — LURASIDONE HYDROCHLORIDE 20 MILLIGRAM(S): 40 TABLET ORAL at 07:45

## 2022-01-17 RX ADMIN — OXYCODONE AND ACETAMINOPHEN 1 TABLET(S): 5; 325 TABLET ORAL at 04:35

## 2022-01-17 NOTE — DIETITIAN INITIAL EVALUATION ADULT. - OTHER INFO
31 year old female Dx drug overdose PMH GERD HTN obesity CKD UTI HLD gastric bypass surgery   patient reports current wt ~ 240# usual wt ~ 260# states lost 10# on liquid diet prior to surgery and lost another 10-15# since bypass surgery  patient takes MVI at home

## 2022-01-17 NOTE — DIETITIAN INITIAL EVALUATION ADULT. - PROBLEM SELECTOR PLAN 2
CKD stage 3 with baseline Cr of 1.6  - Creatinine of 2.0 on admission   - s/p 2L NS, c/w gentle ivf, f/u am bmp  - avoid NSAIDs, nephrotoxic meds

## 2022-01-17 NOTE — DIETITIAN INITIAL EVALUATION ADULT. - PROBLEM SELECTOR PLAN 4
Oliverio reviewed and placed in chart  - Patient taking opiates for chronic back pain and additional medications for recent surgery   - pt is high risk for overdose   - Consult pain management, Dr. Francisco Higgins

## 2022-01-17 NOTE — CHART NOTE - NSCHARTNOTEFT_GEN_A_CORE
Called by RN as patient continues to have acute on chronic back pain. Patient has received 2g IV ofirmev in past 14hours, along with 650mg po tylenol x1 dose, and 325mg po tylenol x1 as percocet. Given patient's improving TOBIAS Cr 1.2 1/16AM, will give toradol IV 15mg x1 dose, as patient has been receiving frequent tylenol. Will monitor response to pain. RN to call with any changes.

## 2022-01-17 NOTE — PROGRESS NOTE ADULT - PROBLEM SELECTOR PLAN 3
Hypotensive on admission, likely due to excessive medication intake    - Hold clonidine and lisinopril in the setting of hypotension
Hypotensive on admission, likely due to excessive medication intake    - Hold clonidine and lisinopril in the setting of hypotension

## 2022-01-17 NOTE — DIETITIAN INITIAL EVALUATION ADULT. - PROBLEM SELECTOR PLAN 5
Admitted for overdose, possible suicide attempt with diagnosis of depression  h/o bipolar depression , anxiety, borderline personality disorder   - Continue home medication of duloxetine  - c/w lamictal   - c/w latuda  - Continue clonazepam 1mg TID PRN starting in AM

## 2022-01-17 NOTE — DIETITIAN INITIAL EVALUATION ADULT. - ORAL INTAKE PTA/DIET HISTORY
patient reports status post gastric bypass surgery December 29 , 2021  states was taking high protein breakfast and high protein shakes PTA here in hospital specific food requests noted  egg white cheese breakfast, turkey york 1 slice and oatmeal, water apple juice, and turkey sandwich lunch and dinner states only really eats turkey a small amount of bread  willing to accept ensure clear supplement at this time  tree nut allergy noted

## 2022-01-17 NOTE — PROGRESS NOTE ADULT - PROBLEM SELECTOR PLAN 1
Patient presenting with drug overdose found unresponsive and is now AOx3 s/p Narcan  - Declines suicide attempt however has multiple suicide attempts in the past involving ingestions   - Utox positive for opiates   - continue bzo prn to avoid withdrawal.   -monitor for lethargy and sedation  - Tylenol PRN and lidocaine patch PRN for pain  - Desaturating on RA, continue supplemental O2, and will reassess for second dose of Narcan  - Seizure precautions, asp precautions and fall precautions   - 1:1 observation   - pt has complex history of opioid dependence, chronic pain, suicide attempts. Per patient her mother usually locks up her medication and distributes it to her however she sometimes gets lax and she takes additional medication. Suspect pt is abusing her prescribed medications. Pt is high risk for overdose whether intentional or unintentional. consult psych  dr pastor  and pain management.   - social work for possible inpatient treatment for opioid dependence

## 2022-01-17 NOTE — PROGRESS NOTE ADULT - ATTENDING COMMENTS
pt seen and examine today see above plan -admitted for drug overdose/ opioids over dose possible unintentional    -- Utox positive for opiates   - continue bzo prn to avoid withdrawal , dr pastor to see pt before dc plan  if pt bp remain stable .
Overdose on multiple medications.   Depression, pain syndrome on opiates maintenance .   Recent bariatric surgery    HR: 74 (01-16-22 @ 01:22) (72 - 76)  BP: 97/52 (01-16-22 @ 01:22) (75/38 - 97/52)  RR: 16 (01-16-22 @ 01:22) (15 - 16)  SpO2: 95% (01-16-22 @ 01:22) (94% - 96%)    PHYSICAL EXAM  GENERAL: patient appears well, no acute distress, anxious appearing   EYES: sclera clear, no exudates  ENMT: oropharynx clear without erythema, no exudates, dry mucous membranes  LUNGS: good air entry bilaterally, clear to auscultation, symmetric breath sounds, no wheezing or rhonchi appreciated  HEART: soft S1/S2, regular rate and rhythm, no murmurs noted, no lower extremity edema  GASTROINTESTINAL: abdomen is soft, nontender, nondistended, normoactive bowel sounds, no palpable masses  INTEGUMENT: Small scabs on LLE from fall  MUSCULOSKELETAL: no clubbing or cyanosis, no obvious deformity  NEUROLOGIC: awake, alert, oriented x3, good muscle tone in 4 extremities, no obvious sensory deficits  HEME/LYMPH: no palpable supraclavicular nodules, no obvious ecchymosis or petechiae    Medication overdose   Major depression   Discussed with psychiatry dr Alessia Uribe restart percocet 5/325 for overwhelming anxiety and agitations  Continue klonopin , antidepressants   For further psychiatric safety assessment.   Psych to contact outpatient psych provider 1/17   Supportive care

## 2022-01-17 NOTE — PROGRESS NOTE ADULT - PROBLEM SELECTOR PLAN 4
Oliverio reviewed and placed in chart  - Patient taking opiates for chronic back pain and additional medications for recent surgery   - pt is high risk for overdose   - Consult pain management, Dr. Francisco Higgins
Oliverio reviewed and placed in chart  - Patient taking opiates for chronic back pain and additional medications for recent surgery   - pt is high risk for overdose   - Consult pain management, Dr. Francisco Higgins

## 2022-01-17 NOTE — DIETITIAN INITIAL EVALUATION ADULT. - CONTINUE CURRENT NUTRITION CARE PLAN
will provide food preferences follow diet tolerance . change to full fluids with ensure clear as appropriate if diet not tolerated/yes

## 2022-01-17 NOTE — DIETITIAN INITIAL EVALUATION ADULT. - PROBLEM SELECTOR PLAN 1
Patient presenting with drug overdose found unresponsive and is now AOx3 s/p Narcan  - Declines suicide attempt however has multiple suicide attempts in the past involving ingestions   - Utox positive for opiates   - Will hold all opiates (percocet and trazadone) overnight, reassess for AM and consult pain management. will continue bzo prn to avoid withdrawal.   -monitor for lethargy and sedation  - Tylenol PRN and lidocaine patch PRN for pain  - Desaturating on RA, continue supplemental O2, and will reassess for second dose of Narcan  - Seizure precautions, asp precautions and fall precautions   - 1:1 observation   - pt has complex history of opioid dependence, chronic pain, suicide attempts. Per patient her mother usually locks up her medication and distributes it to her however she sometimes gets lax and she takes additional medication. Suspect pt is abusing her prescribed medications. Pt is high risk for overdose whether intentional or unintentional. consult psych and pain management.   - social work for possible inpatient treatment for opioid dependence

## 2022-01-17 NOTE — PROGRESS NOTE ADULT - PROBLEM SELECTOR PLAN 2
CKD stage 3 with baseline Cr of 1.6  - Creatinine of 2.0 on admission   - s/p 2L NS with  gentle ivf - resolved cr wnl  - avoid NSAIDs, nephrotoxic meds
CKD stage 3 with baseline Cr of 1.6  - Creatinine of 2.0 on admission   - s/p 2L NS, c/w gentle ivf, f/u am bmp  - avoid NSAIDs, nephrotoxic meds

## 2022-01-17 NOTE — PROGRESS NOTE ADULT - PROBLEM SELECTOR PLAN 5
Admitted for overdose, possible suicide attempt with diagnosis of depression  - h/o bipolar depression , anxiety, borderline personality disorder   - Continue home medication of duloxetine  - c/w lamictal   - c/w latuda  - Continue clonazepam 1mg TID PRN starting in AM
Admitted for overdose, possible suicide attempt with diagnosis of depression  h/o bipolar depression , anxiety, borderline personality disorder   - Continue home medication of duloxetine  - c/w lamictal   - c/w latuda  - Continue clonazepam 1mg TID PRN starting in AM

## 2022-01-18 ENCOUNTER — TRANSCRIPTION ENCOUNTER (OUTPATIENT)
Age: 32
End: 2022-01-18

## 2022-01-18 VITALS
RESPIRATION RATE: 16 BRPM | DIASTOLIC BLOOD PRESSURE: 78 MMHG | HEART RATE: 70 BPM | SYSTOLIC BLOOD PRESSURE: 146 MMHG | TEMPERATURE: 98 F | OXYGEN SATURATION: 94 %

## 2022-01-18 DIAGNOSIS — E86.0 DEHYDRATION: ICD-10-CM

## 2022-01-18 LAB
ANION GAP SERPL CALC-SCNC: 6 MMOL/L — SIGNIFICANT CHANGE UP (ref 5–17)
BASOPHILS # BLD AUTO: 0.04 K/UL — SIGNIFICANT CHANGE UP (ref 0–0.2)
BASOPHILS NFR BLD AUTO: 0.7 % — SIGNIFICANT CHANGE UP (ref 0–2)
BUN SERPL-MCNC: 11 MG/DL — SIGNIFICANT CHANGE UP (ref 7–23)
CALCIUM SERPL-MCNC: 8.9 MG/DL — SIGNIFICANT CHANGE UP (ref 8.5–10.1)
CHLORIDE SERPL-SCNC: 109 MMOL/L — HIGH (ref 96–108)
CO2 SERPL-SCNC: 29 MMOL/L — SIGNIFICANT CHANGE UP (ref 22–31)
CREAT SERPL-MCNC: 1.1 MG/DL — SIGNIFICANT CHANGE UP (ref 0.5–1.3)
EOSINOPHIL # BLD AUTO: 0.28 K/UL — SIGNIFICANT CHANGE UP (ref 0–0.5)
EOSINOPHIL NFR BLD AUTO: 4.6 % — SIGNIFICANT CHANGE UP (ref 0–6)
GLUCOSE SERPL-MCNC: 84 MG/DL — SIGNIFICANT CHANGE UP (ref 70–99)
HCT VFR BLD CALC: 30.1 % — LOW (ref 34.5–45)
HGB BLD-MCNC: 10.3 G/DL — LOW (ref 11.5–15.5)
IMM GRANULOCYTES NFR BLD AUTO: 0.2 % — SIGNIFICANT CHANGE UP (ref 0–1.5)
LYMPHOCYTES # BLD AUTO: 2.9 K/UL — SIGNIFICANT CHANGE UP (ref 1–3.3)
LYMPHOCYTES # BLD AUTO: 47.3 % — HIGH (ref 13–44)
MCHC RBC-ENTMCNC: 28.4 PG — SIGNIFICANT CHANGE UP (ref 27–34)
MCHC RBC-ENTMCNC: 34.2 GM/DL — SIGNIFICANT CHANGE UP (ref 32–36)
MCV RBC AUTO: 82.9 FL — SIGNIFICANT CHANGE UP (ref 80–100)
MONOCYTES # BLD AUTO: 0.58 K/UL — SIGNIFICANT CHANGE UP (ref 0–0.9)
MONOCYTES NFR BLD AUTO: 9.5 % — SIGNIFICANT CHANGE UP (ref 2–14)
NEUTROPHILS # BLD AUTO: 2.32 K/UL — SIGNIFICANT CHANGE UP (ref 1.8–7.4)
NEUTROPHILS NFR BLD AUTO: 37.7 % — LOW (ref 43–77)
NRBC # BLD: 0 /100 WBCS — SIGNIFICANT CHANGE UP (ref 0–0)
PLATELET # BLD AUTO: 320 K/UL — SIGNIFICANT CHANGE UP (ref 150–400)
POTASSIUM SERPL-MCNC: 3.7 MMOL/L — SIGNIFICANT CHANGE UP (ref 3.5–5.3)
POTASSIUM SERPL-SCNC: 3.7 MMOL/L — SIGNIFICANT CHANGE UP (ref 3.5–5.3)
RBC # BLD: 3.63 M/UL — LOW (ref 3.8–5.2)
RBC # FLD: 15.9 % — HIGH (ref 10.3–14.5)
SODIUM SERPL-SCNC: 144 MMOL/L — SIGNIFICANT CHANGE UP (ref 135–145)
WBC # BLD: 6.13 K/UL — SIGNIFICANT CHANGE UP (ref 3.8–10.5)
WBC # FLD AUTO: 6.13 K/UL — SIGNIFICANT CHANGE UP (ref 3.8–10.5)

## 2022-01-18 PROCEDURE — 83036 HEMOGLOBIN GLYCOSYLATED A1C: CPT

## 2022-01-18 PROCEDURE — 72192 CT PELVIS W/O DYE: CPT | Mod: MA

## 2022-01-18 PROCEDURE — 80307 DRUG TEST PRSMV CHEM ANLYZR: CPT

## 2022-01-18 PROCEDURE — 80053 COMPREHEN METABOLIC PANEL: CPT

## 2022-01-18 PROCEDURE — 85027 COMPLETE CBC AUTOMATED: CPT

## 2022-01-18 PROCEDURE — 87635 SARS-COV-2 COVID-19 AMP PRB: CPT

## 2022-01-18 PROCEDURE — 76376 3D RENDER W/INTRP POSTPROCES: CPT

## 2022-01-18 PROCEDURE — 84702 CHORIONIC GONADOTROPIN TEST: CPT

## 2022-01-18 PROCEDURE — 99285 EMERGENCY DEPT VISIT HI MDM: CPT

## 2022-01-18 PROCEDURE — 99239 HOSP IP/OBS DSCHRG MGMT >30: CPT

## 2022-01-18 PROCEDURE — 36415 COLL VENOUS BLD VENIPUNCTURE: CPT

## 2022-01-18 PROCEDURE — 93005 ELECTROCARDIOGRAM TRACING: CPT

## 2022-01-18 PROCEDURE — 96374 THER/PROPH/DIAG INJ IV PUSH: CPT

## 2022-01-18 PROCEDURE — 74176 CT ABD & PELVIS W/O CONTRAST: CPT | Mod: MA

## 2022-01-18 PROCEDURE — 72131 CT LUMBAR SPINE W/O DYE: CPT | Mod: MA

## 2022-01-18 PROCEDURE — 84484 ASSAY OF TROPONIN QUANT: CPT

## 2022-01-18 PROCEDURE — 71045 X-RAY EXAM CHEST 1 VIEW: CPT

## 2022-01-18 PROCEDURE — 85025 COMPLETE CBC W/AUTO DIFF WBC: CPT

## 2022-01-18 PROCEDURE — 80048 BASIC METABOLIC PNL TOTAL CA: CPT

## 2022-01-18 PROCEDURE — 96361 HYDRATE IV INFUSION ADD-ON: CPT

## 2022-01-18 RX ORDER — KETOROLAC TROMETHAMINE 30 MG/ML
15 SYRINGE (ML) INJECTION ONCE
Refills: 0 | Status: DISCONTINUED | OUTPATIENT
Start: 2022-01-18 | End: 2022-01-18

## 2022-01-18 RX ORDER — CLONAZEPAM 1 MG
1 TABLET ORAL
Qty: 0 | Refills: 0 | DISCHARGE

## 2022-01-18 RX ORDER — LIDOCAINE 4 G/100G
1 CREAM TOPICAL
Qty: 0 | Refills: 0 | DISCHARGE
Start: 2022-01-18

## 2022-01-18 RX ORDER — ACETAMINOPHEN 500 MG
650 TABLET ORAL ONCE
Refills: 0 | Status: COMPLETED | OUTPATIENT
Start: 2022-01-18 | End: 2022-01-18

## 2022-01-18 RX ADMIN — OXYCODONE AND ACETAMINOPHEN 1 TABLET(S): 5; 325 TABLET ORAL at 04:50

## 2022-01-18 RX ADMIN — Medication 1 TABLET(S): at 10:38

## 2022-01-18 RX ADMIN — OXYCODONE AND ACETAMINOPHEN 1 TABLET(S): 5; 325 TABLET ORAL at 11:35

## 2022-01-18 RX ADMIN — LIDOCAINE 1 PATCH: 4 CREAM TOPICAL at 08:09

## 2022-01-18 RX ADMIN — PANTOPRAZOLE SODIUM 40 MILLIGRAM(S): 20 TABLET, DELAYED RELEASE ORAL at 05:20

## 2022-01-18 RX ADMIN — OXYCODONE AND ACETAMINOPHEN 1 TABLET(S): 5; 325 TABLET ORAL at 10:38

## 2022-01-18 RX ADMIN — Medication 650 MILLIGRAM(S): at 08:09

## 2022-01-18 RX ADMIN — DULOXETINE HYDROCHLORIDE 60 MILLIGRAM(S): 30 CAPSULE, DELAYED RELEASE ORAL at 10:38

## 2022-01-18 RX ADMIN — Medication 1 MILLIGRAM(S): at 08:08

## 2022-01-18 RX ADMIN — Medication 15 MILLIGRAM(S): at 02:00

## 2022-01-18 RX ADMIN — Medication 15 MILLIGRAM(S): at 01:40

## 2022-01-18 RX ADMIN — OXYCODONE AND ACETAMINOPHEN 1 TABLET(S): 5; 325 TABLET ORAL at 04:21

## 2022-01-18 RX ADMIN — Medication 650 MILLIGRAM(S): at 09:05

## 2022-01-18 RX ADMIN — LURASIDONE HYDROCHLORIDE 20 MILLIGRAM(S): 40 TABLET ORAL at 05:20

## 2022-01-18 NOTE — DISCHARGE NOTE NURSING/CASE MANAGEMENT/SOCIAL WORK - PATIENT PORTAL LINK FT
You can access the FollowMyHealth Patient Portal offered by [facility] by registering at the following website: [email or web address]. By joining [facility]’s FollowMyHealth portal, you will also be able to view your health information using other applications (apps) compatible with our system.

## 2022-01-18 NOTE — DISCHARGE NOTE PROVIDER - NSDCMRMEDTOKEN_GEN_ALL_CORE_FT
cloNIDine 0.1 mg oral tablet: 1 tab(s) orally 3 times a day  DULoxetine 30 mg oral delayed release capsule: 1 cap(s) orally once a day (at bedtime)  DULoxetine 60 mg oral delayed release capsule: 1 cap(s) orally once a day  KlonoPIN 1 mg oral tablet: 1 tab(s) orally 3 times a day, As Needed  LaMICtal 200 mg oral tablet: 1 tab(s) orally PM  Latuda 20 mg oral tablet: 1 tab(s) orally 2 times a day  lidocaine 4% topical film: Apply topically to affected area once a day, As Needed  lisinopril 20 mg oral tablet: 1 tab(s) orally 2 times a day  Melatonin 10 mg oral capsule: 1 cap(s) orally once a day (at bedtime), As Needed  Multiple Vitamins oral tablet: 1 tab(s) orally once a day  oxycodone-acetaminophen 5 mg-325 mg oral tablet: 1 tab(s) orally every 6 hours, As needed, Severe Pain (7 - 10)  pantoprazole 40 mg oral delayed release tablet: 1 tab(s) orally once a day (before a meal)  tiZANidine 4 mg oral tablet: 1 tab(s) orally every 8 hours  traZODone 100 mg oral tablet: 1 tab(s) orally once a day (at bedtime)

## 2022-01-18 NOTE — DISCHARGE NOTE PROVIDER - CARE PROVIDERS DIRECT ADDRESSES
,DirectAddress_Unknown,DirectAddress_Unknown,[email or web address]

## 2022-01-18 NOTE — PROGRESS NOTE ADULT - ASSESSMENT
31F PMHx of HTN, HLD, DM2, Chronic back pain, Borderline personality, anxiety, Depression, hx of suicide attempts, hx of self harm, MCTD, FSGS, PCOS, Obesity, s/p recent gastric bypass surgery and appendectomy on 12/29/2021 admitted for drug overdose    pt with pain - percocet on order - s/p IV tylenol -   psych note reviewed  VS noted  pt should have Narcan at home - may need a prescription upon DC    GRACIA  Opioid use - Benzo use -   DM care  CKD TOBIAS  s/p IVF - monitor renal indices  I and O  old records reviewed  possibly alteration of Absorption Mechanics in the gut due to recent Bariatric Surgery - may need Less Opioid and Benzo dosing  iStop reviewed  spoke with Mother of the patient  pt is s/p Narcan Resus on admission  pt is considering Suboxone as Opioid dep therapy - will need to pursue - as outpatient - she has contact for Addiction Med and Pain Management practices  
31F PMHx of HTN, HLD, DM2, Chronic back pain, Borderline personality, anxiety, Depression, hx of suicide attempts, hx of self harm, MCTD, FSGS, PCOS, Obesity, s/p recent gastric bypass surgery and appendectomy on 12/29/2021 admitted for drug overdose    pt with pain - percocet on order - s/p IV tylenol -   psych note reviewed  VS noted  pt should have Narcan at home - may need a prescription upon DC    GRACIA  Opioid use - Benzo use -   DM care  CKD TOBIAS  s/p IVF - monitor renal indices  I and O  old records reviewed  possibly alteration of Absorption Mechanics in the gut due to recent Bariatric Surgery - may need Less Opioid and Benzo dosing  iStop reviewed  spoke with Mother of the patient  pt is s/p Narcan Resus on admission  pt is considering Suboxone as Opioid dep therapy - will need to pursue - as outpatient - she has contact for Addiction Med and Pain Management practices  
31F PMHx of HTN, HLD, DM2, Chronic back pain, Borderline personality, anxiety, Depression, hx of suicide attempts, hx of self harm, MCTD, FSGS, PCOS, Obesity, s/p recent gastric bypass surgery and appendectomy on 12/29/2021 admitted for drug overdose
31F PMHx of HTN, HLD, DM2, Chronic back pain, Borderline personality, anxiety, Depression, hx of suicide attempts, hx of self harm, MCTD, FSGS, PCOS, Obesity, s/p recent gastric bypass surgery and appendectomy on 12/29/2021 admitted for drug overdose

## 2022-01-18 NOTE — DISCHARGE NOTE PROVIDER - NSDCDCMDCOMP_GEN_ALL_CORE
This document is complete and the patient is ready for discharge. Benign prostatic hyperplasia, unspecified whether lower urinary tract symptoms present

## 2022-01-18 NOTE — DISCHARGE NOTE PROVIDER - CARE PROVIDER_API CALL
Lopez Sanchez)  Family Medicine  [address] [place] [address]  Phone: [phone]  Fax: [phone]  Follow Up Time: 1 week    BRO CRAWFORD  Anesthesiology  N  [facility], Phys,    Phone: ()-  Fax: ()-  Follow Up Time: 1 week    Clarence Peterson  INTERNAL MEDICINE  [address] [place] [address]  Phone: [phone]  Fax: [phone]  Follow Up Time: 1 week

## 2022-01-18 NOTE — DISCHARGE NOTE PROVIDER - PROVIDER TOKENS
PROVIDER:[TOKEN:[[id number]],FOLLOWUP:[1 week]],PROVIDER:[TOKEN:[[id number]],FOLLOWUP:[1 week]],PROVIDER:[TOKEN:[[id number]],FOLLOWUP:[1 week]]

## 2022-01-18 NOTE — DISCHARGE NOTE PROVIDER - NSDCCPCAREPLAN_GEN_ALL_CORE_FT
PRINCIPAL DISCHARGE DIAGNOSIS  Diagnosis: Drug overdose  Assessment and Plan of Treatment: You were admitted for a possible overdose of your opioid medications. You were given an antidote medication called Narcan, which improved your responsiveness and treated this possible opioid overdose. You were monitored in the hospital for any complications of this overdose, and were eventually cleared for discharge to home. Follow up with your outpatient psychiatrist within 1 week after discharge.      SECONDARY DISCHARGE DIAGNOSES  Diagnosis: TOBIAS (acute kidney injury)  Assessment and Plan of Treatment: You had decreased kidney function on admission, likely due to dehydration. Your kidney function (which we monitored through your creatinine levels) improved with IV fluid administration back to its baseline function. Follow up with your primary care doctor and nephrologist in 1 week to continue monitoring your kidney function.    Diagnosis: Back pain  Assessment and Plan of Treatment: You were treated with oxycodone, tylenol, and lidocaine patch for your chronic low back pain. On discharge, continue using oxycodone/acetaminophen 5 mg/325 mg every 6 hours for SEVERE pain only, and lidocaine patch once a day for your back pain.   - Follow up with your pain management and addiction medicine doctors 1 week after discharge for long-term pain management that will minimize the risk for overdose and sedation.    Diagnosis: HTN (hypertension)  Assessment and Plan of Treatment: Your blood pressure medications (lisinopril and clonidine) were held onn admission due to concern for low blood pressure. Your blood pressure eventually stabilized during your hospitalization.   - You may restart your lisinopril and clonidine at home.   - Follow up with your primary care doctor for further monitoring of your blood pressure and any medication adjustments.     PRINCIPAL DISCHARGE DIAGNOSIS  Diagnosis: Drug overdose  Assessment and Plan of Treatment: You were admitted for a possible overdose of your opioid medications. You were given an antidote medication called Narcan, which improved your responsiveness and treated this possible opioid overdose. You were monitored in the hospital for any complications of this overdose, and were eventually cleared for discharge to home. Follow up with your outpatient psychiatrist within 1 week after discharge. Dr. Kwong from Psych saw here and cleared for discharge.         SECONDARY DISCHARGE DIAGNOSES  Diagnosis: TOBIAS (acute kidney injury)  Assessment and Plan of Treatment: You had decreased kidney function on admission, likely due to dehydration. Your kidney function (which we monitored through your creatinine levels) improved with IV fluid administration back to its baseline function. Follow up with your primary care doctor and nephrologist in 3 to 5 days  to continue monitoring your kidney function.    Diagnosis: Back pain  Assessment and Plan of Treatment: You were treated with oxycodone, tylenol, and lidocaine patch for your chronic low back pain. On discharge, continue using oxycodone/acetaminophen 5 mg/325 mg every 6 hours for SEVERE pain only, and lidocaine patch once a day for your back pain. This medications has a lot of side affects including confusion, fall, addiction and dependance. Recommended use medications with low side affects and safe likey tylenol and try not to take opiates and strong meds. F/u with your doctor withi 3 to 5 days   - Follow up with your pain management and addiction medicine doctors as soon as possible,  after discharge for long-term pain management that will minimize the risk for overdose and sedation.    Diagnosis: HTN (hypertension)  Assessment and Plan of Treatment: Your blood pressure medications (lisinopril and clonidine) were held onn admission due to concern for low blood pressure. Your blood pressure eventually stabilized during your hospitalization.   - You may restart your lisinopril and clonidine at home.   - Follow up with your primary care doctor for further monitoring of your blood pressure and any medication adjustments.

## 2022-01-18 NOTE — DISCHARGE NOTE PROVIDER - HOSPITAL COURSE
FROM ADMISSION H+P:   HPI:  31F PMHx of HTN, HLD, DM2, Chronic back pain, Borderline personality, anxiety, Depression, hx of suicide attempts, hx of self harm, Mixed connective tissue disease, FSGS, PCOS, Obesity, s/p recent gastric bypass surgery and appendectomy on 12/29/2021 presenting with drug overdose. She has been taking oxycodone for pain regularly at home, however after the recent surgery she was prescribed percocet and tramadol. Today she felt the pain was worse than usual and she took 2 percocet pills, 1 tramadol, 2 tylenol, 2 advil, Trazadone and tizanidine at one time. She states she usually dose not take percocet and tramadol together, so she feels the combination of medications she took at once may have caused her to become unresponsive. She was found unresponsive by her mother who initiated CPR. Paramedics arrived and administered Narcan 4mg IV with good response. Pt now responsive and AO x3, however with hypotension. She states that she was hypotensive during her hospitalization as well and was given fluids at that time. She has not taken her blood pressure medications in 2 days because 2 days ago she fell down the stairs and attributes it to having low blood pressure. Her last BM was a few days ago and she has been taking senna and colace regularly.   Of note, patient adamantly denies this was a suicide attempt however since 2016 patient has multiple documented suicide attempts, with the most recent in 2021. most of these attempts involved overdosing on her prescribed and OTC medicaiton. Patient dismisses these suicide attempts when brought up.  (please see prior behavioral health notes)     ED course  Vitals:  BP 87/59 -> 97/52, RR 16, SpO2 96% on NRB  Significant labs: Cr 2, Drug screen positive for opiates   Patient received: Ofirmev 1g IV, 1L NS x2   CT 3D reconstruction, CT pelvis, CT lumbar spine, CT abdomen pelvis: No acute intra-abdominal or pelvic findings on this unenhanced CT. No acute lumbar spine or pelvic bone fracture.        (16 Jan 2022 01:26)      ---  HOSPITAL COURSE: Pt was admitted to the medical floor for suspected suicide attempt 2/2 opioid overdose. Pt was unresponsive at home but improved to a normal mental status after administration of Narcan. She was placed on constant observation, and throughout hospitalization, she denied any suicidal or homicidal ideation. Pt was evaluated by psychiatry, who cleared her for discharge to home. Pt was found to be in TOBIAS on CKD on admission, which resolved with IV hydration. She was given tylenol, lidocaine patch and oxycodone 5 mg q6h for her chronic lower back pain. Addiction medicine was consulted, and recommended close follow up with addiction medicine and pain management for possible suboxone treatment.    Patient was medically optimized and improved clinically throughout hospital course. Her vital signs normalized and remained wnl. Patient seen and examined on day of discharge.    Vital Signs Last 24 Hrs  T(C): 36.7 (18 Jan 2022 04:59), Max: 36.7 (17 Jan 2022 11:54)  T(F): 98.1 (18 Jan 2022 04:59), Max: 98.1 (18 Jan 2022 04:59)  HR: 65 (18 Jan 2022 04:59) (65 - 74)  BP: 111/60 (18 Jan 2022 04:59) (107/67 - 132/85)  RR: 18 (18 Jan 2022 04:59) (14 - 18)  SpO2: 94% (18 Jan 2022 04:59) (90% - 94%)    Physical Exam on day of discharge:  General: Well developed, well nourished, in no acute distress  HEENT: NCAT, PERRLA  Neck: Supple, nontender, no mass  Neurology: A&Ox3, responding to questions and following commands appropriately  Respiratory: CTA B/L, No wheezing, rales, or rhonchi  CV: RRR, S1/S2 present, no murmurs, rubs, or gallops  Abdominal: Soft, nontender, non-distended, normoactive bowel sounds  Extremities: No C/C/E, peripheral pulses present  Skin: warm and well perfused    Patient is medically stable for discharge to home with outpatient follow up.  ---  CONSULTANTS:   Addiction medicine- Dr. Fulton  Psychiatry- Dr. Carcamo  ---  TIME SPENT:  I, the attending physician, was physically present for the key portions of the evaluation and management (E/M) service provided. The total amount of time spent reviewing the hospital notes, laboratory values, imaging findings, assessing/counseling the patient, discussing with consultant physicians, social work, nursing staff was -- minutes    ---  Primary care provider was made aware of plan for discharge:      [  ] NO     [  ] YES

## 2022-01-18 NOTE — PROGRESS NOTE ADULT - SUBJECTIVE AND OBJECTIVE BOX
Date/Time Patient Seen:  		  Referring MD:   Data Reviewed	       Patient is a 31y old  Female who presents with a chief complaint of Drug overdose (16 Jan 2022 12:19)      Subjective/HPI     PAST MEDICAL & SURGICAL HISTORY:  Cholecystitis  S/P cholycystectomy    Mixed connective tissue disease    Anxiety    Depression    Polycystic ovarian syndrome    HTN (hypertension)    Chronic back pain    Obesity    GERD (gastroesophageal reflux disease)    Depression    Nephrosclerosis    Suicide attempt by drug overdose  2019 Tylenol OD    Borderline personality disorder    ADHD (attention deficit hyperactivity disorder)    CKD (chronic kidney disease)    DM2 (diabetes mellitus, type 2)    HLD (hyperlipidemia)    UTI (urinary tract infection)    Anemia    PCOS (polycystic ovarian syndrome)    Lymphocytosis    Suicide attempt  Tylenol Overdose    Opiate dependence    History of cholecystectomy  2016    H/O knee surgery  2008 2009 2015    H/O ovarian cystectomy  2003 2017    H/O unilateral salpingectomy  right    History of lumbar laminectomy  6/2021          Medication list         MEDICATIONS  (STANDING):  DULoxetine 30 milliGRAM(s) Oral at bedtime  DULoxetine 60 milliGRAM(s) Oral daily  heparin   Injectable 5000 Unit(s) SubCutaneous every 8 hours  lamoTRIgine 200 milliGRAM(s) Oral at bedtime  lurasidone 20 milliGRAM(s) Oral <User Schedule>  multivitamin 1 Tablet(s) Oral daily  pantoprazole    Tablet 40 milliGRAM(s) Oral before breakfast  sodium chloride 0.9%. 1000 milliLiter(s) (75 mL/Hr) IV Continuous <Continuous>  traZODone 100 milliGRAM(s) Oral at bedtime    MEDICATIONS  (PRN):  aluminum hydroxide/magnesium hydroxide/simethicone Suspension 30 milliLiter(s) Oral every 4 hours PRN Dyspepsia  clonazePAM  Tablet 1 milliGRAM(s) Oral three times a day PRN anxiety  lidocaine   4% Patch 1 Patch Transdermal daily PRN pain  melatonin 5 milliGRAM(s) Oral at bedtime PRN Insomnia  ondansetron Injectable 4 milliGRAM(s) IV Push every 8 hours PRN Nausea and/or Vomiting  oxycodone    5 mG/acetaminophen 325 mG 1 Tablet(s) Oral every 6 hours PRN Severe Pain (7 - 10)         Vitals log        ICU Vital Signs Last 24 Hrs  T(C): 36.6 (16 Jan 2022 21:15), Max: 36.8 (16 Jan 2022 16:29)  T(F): 97.8 (16 Jan 2022 21:15), Max: 98.3 (16 Jan 2022 16:29)  HR: 77 (16 Jan 2022 21:15) (68 - 88)  BP: 105/72 (16 Jan 2022 21:15) (90/44 - 136/80)  BP(mean): --  ABP: --  ABP(mean): --  RR: 16 (16 Jan 2022 21:15) (15 - 17)  SpO2: 96% (16 Jan 2022 21:15) (96% - 99%)           Input and Output:  I&O's Detail      Lab Data                        9.6    6.19  )-----------( 278      ( 16 Jan 2022 12:02 )             28.5     01-16    144  |  111<H>  |  13  ----------------------------<  86  4.5   |  26  |  1.20    Ca    8.7      16 Jan 2022 12:02    TPro  7.1  /  Alb  2.9<L>  /  TBili  0.3  /  DBili  x   /  AST  26  /  ALT  32  /  AlkPhos  93  01-16            Review of Systems	      Objective     Physical Examination    heart 1s2  lung dec bS  abd soft  head nc  obese  cn grossly int  verbal      Pertinent Lab findings & Imaging      Dia:  NO   Adequate UO     I&O's Detail           Discussed with:     Cultures:	        Radiology                            
Date/Time Patient Seen:  		  Referring MD:   Data Reviewed	       Patient is a 31y old  Female who presents with a chief complaint of Drug overdose (17 Jan 2022 12:19)      Subjective/HPI     PAST MEDICAL & SURGICAL HISTORY:  Cholecystitis  S/P cholycystectomy    Mixed connective tissue disease    Anxiety    Depression    Polycystic ovarian syndrome    HTN (hypertension)    Chronic back pain    Obesity    GERD (gastroesophageal reflux disease)    Depression    Nephrosclerosis    Suicide attempt by drug overdose  2019 Tylenol OD    Borderline personality disorder    ADHD (attention deficit hyperactivity disorder)    CKD (chronic kidney disease)    DM2 (diabetes mellitus, type 2)    HLD (hyperlipidemia)    UTI (urinary tract infection)    Anemia    PCOS (polycystic ovarian syndrome)    Lymphocytosis    Suicide attempt  Tylenol Overdose    Opiate dependence    History of cholecystectomy  2016    H/O knee surgery  2008 2009 2015    H/O ovarian cystectomy  2003 2017    H/O unilateral salpingectomy  right    History of lumbar laminectomy  6/2021          Medication list         MEDICATIONS  (STANDING):  DULoxetine 30 milliGRAM(s) Oral at bedtime  DULoxetine 60 milliGRAM(s) Oral daily  heparin   Injectable 5000 Unit(s) SubCutaneous every 8 hours  lamoTRIgine 200 milliGRAM(s) Oral at bedtime  lurasidone 20 milliGRAM(s) Oral <User Schedule>  multivitamin 1 Tablet(s) Oral daily  pantoprazole    Tablet 40 milliGRAM(s) Oral before breakfast  sodium chloride 0.9%. 1000 milliLiter(s) (75 mL/Hr) IV Continuous <Continuous>  traZODone 100 milliGRAM(s) Oral at bedtime    MEDICATIONS  (PRN):  aluminum hydroxide/magnesium hydroxide/simethicone Suspension 30 milliLiter(s) Oral every 4 hours PRN Dyspepsia  clonazePAM  Tablet 1 milliGRAM(s) Oral three times a day PRN anxiety  lidocaine   4% Patch 1 Patch Transdermal daily PRN pain  melatonin 5 milliGRAM(s) Oral at bedtime PRN Insomnia  ondansetron Injectable 4 milliGRAM(s) IV Push every 8 hours PRN Nausea and/or Vomiting  oxycodone    5 mG/acetaminophen 325 mG 1 Tablet(s) Oral every 6 hours PRN Severe Pain (7 - 10)         Vitals log        ICU Vital Signs Last 24 Hrs  T(C): 36.7 (18 Jan 2022 04:59), Max: 36.7 (17 Jan 2022 11:54)  T(F): 98.1 (18 Jan 2022 04:59), Max: 98.1 (18 Jan 2022 04:59)  HR: 65 (18 Jan 2022 04:59) (65 - 74)  BP: 111/60 (18 Jan 2022 04:59) (107/67 - 132/85)  BP(mean): --  ABP: --  ABP(mean): --  RR: 18 (18 Jan 2022 04:59) (14 - 18)  SpO2: 94% (18 Jan 2022 04:59) (90% - 94%)           Input and Output:  I&O's Detail      Lab Data                        10.1   6.28  )-----------( 284      ( 17 Jan 2022 07:46 )             29.6     01-17    145  |  110<H>  |  9   ----------------------------<  79  4.3   |  30  |  1.00    Ca    8.7      17 Jan 2022 07:46    TPro  6.7  /  Alb  2.8<L>  /  TBili  0.3  /  DBili  x   /  AST  27  /  ALT  28  /  AlkPhos  84  01-17            Review of Systems	      Objective     Physical Examination    heart s1s2  lung dec BS  abd soft      Pertinent Lab findings & Imaging      Dia:  NO   Adequate UO     I&O's Detail           Discussed with:     Cultures:	        Radiology                            
Patient is a 31y old  Female who presents with a chief complaint of Drug overdose (17 Jan 2022 10:44)  admitted for drug overdose  pt seen and examine today   alert awake  state no suicidal thought it was mistake , no fever , tolerating po .  INTERVAL HPI/OVERNIGHT EVENTS:     T(C): 36.7 (01-17-22 @ 11:54), Max: 36.8 (01-16-22 @ 16:29)  HR: 72 (01-17-22 @ 11:54) (72 - 88)  BP: 107/67 (01-17-22 @ 11:54) (105/72 - 136/80)  RR: 14 (01-17-22 @ 11:54) (14 - 16)  SpO2: 90% (01-17-22 @ 11:54) (90% - 98%)  Wt(kg): --  I&O's Summary      REVIEW OF SYSTEMS:  CONSTITUTIONAL: No fever, weight loss, or fatigue  EYES: No eye pain, visual disturbances, or discharge  ENMT:  No sinus or throat pain  NECK: No pain or stiffness  BREASTS: No pain, no masses  RESPIRATORY: No cough, wheezing, chills or hemoptysis; No shortness of breath  CARDIOVASCULAR: No chest pain, palpitations, dizziness, or leg swelling  GASTROINTESTINAL: No abdominal or epigastric pain. No nausea, vomiting,   No diarrhea or constipation. No melena or hematochezia.  GENITOURINARY: No dysuria, frequency, hematuria,   NEUROLOGICAL: No headaches, memory loss, loss of strength, numbness, or tremors  SKIN: No itching, burning, rashes, or lesions   MUSCULOSKELETAL: No joint pain or swelling; No muscle, back, or extremity pain  mood calm   PHYSICAL EXAM:  GENERAL: NAD, well-groomed, well-developed  HEAD:  Atraumatic, Normocephalic  EYES: EOMI, PERRLA, conjunctiva and sclera clear  ENMT: ; Moist mucous membranes  NECK: Supple, No JVD  NERVOUS SYSTEM:  Alert & Oriented X3; Motor Strength 5/5 B/L upper and lower extremities; DTRs 2+ intact and symmetric  CHEST/LUNG:  percussion bilaterally; No rales, rhonchi, wheezing,  HEART: Regular rate and rhythm; No murmurs,  no tachy   ABDOMEN: Soft, Nontender, Nondistended; Bowel sounds present  EXTREMITIES:  2+ Peripheral Pulses, No clubbing, cyanosis, or edema  SKIN: No rashes or lesions    MEDICATIONS  (STANDING):  DULoxetine 30 milliGRAM(s) Oral at bedtime  DULoxetine 60 milliGRAM(s) Oral daily  heparin   Injectable 5000 Unit(s) SubCutaneous every 8 hours  lamoTRIgine 200 milliGRAM(s) Oral at bedtime  lurasidone 20 milliGRAM(s) Oral <User Schedule>  multivitamin 1 Tablet(s) Oral daily  pantoprazole    Tablet 40 milliGRAM(s) Oral before breakfast  sodium chloride 0.9%. 1000 milliLiter(s) (75 mL/Hr) IV Continuous <Continuous>  traZODone 100 milliGRAM(s) Oral at bedtime    MEDICATIONS  (PRN):  aluminum hydroxide/magnesium hydroxide/simethicone Suspension 30 milliLiter(s) Oral every 4 hours PRN Dyspepsia  clonazePAM  Tablet 1 milliGRAM(s) Oral three times a day PRN anxiety  lidocaine   4% Patch 1 Patch Transdermal daily PRN pain  melatonin 5 milliGRAM(s) Oral at bedtime PRN Insomnia  ondansetron Injectable 4 milliGRAM(s) IV Push every 8 hours PRN Nausea and/or Vomiting  oxycodone    5 mG/acetaminophen 325 mG 1 Tablet(s) Oral every 6 hours PRN Severe Pain (7 - 10)      LABS:                        10.1   6.28  )-----------( 284      ( 17 Jan 2022 07:46 )             29.6     01-17    145  |  110<H>  |  9   ----------------------------<  79  4.3   |  30  |  1.00    Ca    8.7      17 Jan 2022 07:46    TPro  6.7  /  Alb  2.8<L>  /  TBili  0.3  /  DBili  x   /  AST  27  /  ALT  28  /  AlkPhos  84  01-17                    RADIOLOGY & ADDITIONAL TESTS:    Imaging Personally Reviewed:     no new test   Advance Directives:  full code     Palliative Care:  Appropriate    
Patient is a 31y old  Female who presents with a chief complaint of Drug overdose (16 Jan 2022 01:26)      INTERVAL HPI/OVERNIGHT EVENTS: patient admitted overnight. seen and examined at bedside this am. anxious but has no acute complaints. denies fever, chills, cp, sob, abd pain, n/v/d, or suicidal ideations.     MEDICATIONS  (STANDING):  DULoxetine 30 milliGRAM(s) Oral at bedtime  DULoxetine 60 milliGRAM(s) Oral daily  heparin   Injectable 5000 Unit(s) SubCutaneous every 8 hours  lamoTRIgine 200 milliGRAM(s) Oral at bedtime  lurasidone 20 milliGRAM(s) Oral <User Schedule>  multivitamin 1 Tablet(s) Oral daily  pantoprazole    Tablet 40 milliGRAM(s) Oral before breakfast  sodium chloride 0.9%. 1000 milliLiter(s) (75 mL/Hr) IV Continuous <Continuous>  traZODone 100 milliGRAM(s) Oral at bedtime    MEDICATIONS  (PRN):  acetaminophen     Tablet .. 650 milliGRAM(s) Oral every 6 hours PRN Temp greater or equal to 38C (100.4F), Mild Pain (1 - 3)  acetaminophen     Tablet .. 950 milliGRAM(s) Oral every 6 hours PRN Severe Pain (7 - 10)  aluminum hydroxide/magnesium hydroxide/simethicone Suspension 30 milliLiter(s) Oral every 4 hours PRN Dyspepsia  clonazePAM  Tablet 1 milliGRAM(s) Oral three times a day PRN anxiety  lidocaine   4% Patch 1 Patch Transdermal daily PRN pain  melatonin 5 milliGRAM(s) Oral at bedtime PRN Insomnia  ondansetron Injectable 4 milliGRAM(s) IV Push every 8 hours PRN Nausea and/or Vomiting      Allergies    adhesives (Blisters; Rash)  amoxicillin (Rash)  peanuts (Anaphylaxis)  Tree Nuts (Anaphylaxis)    Intolerances        REVIEW OF SYSTEMS:  CONSTITUTIONAL: No fever or chills  HEENT:  No headache, no sore throat  RESPIRATORY: No cough, wheezing, or shortness of breath  CARDIOVASCULAR: No chest pain, palpitations  GASTROINTESTINAL: No abd pain, nausea, vomiting, or diarrhea  GENITOURINARY: No dysuria, frequency, or hematuria  NEUROLOGICAL: no focal weakness or dizziness  MUSCULOSKELETAL: no myalgias     Vital Signs Last 24 Hrs  T(C): 36.5 (16 Jan 2022 07:35), Max: 36.5 (16 Jan 2022 07:35)  T(F): 97.7 (16 Jan 2022 07:35), Max: 97.7 (16 Jan 2022 07:35)  HR: 68 (16 Jan 2022 07:35) (68 - 76)  BP: 101/68 (16 Jan 2022 07:35) (75/38 - 108/59)  BP(mean): --  RR: 17 (16 Jan 2022 07:35) (15 - 17)  SpO2: 99% (16 Jan 2022 07:35) (94% - 100%)    Physical Exam: T(C): --  HR: 74 (01-16-22 @ 01:22) (72 - 76)  BP: 97/52 (01-16-22 @ 01:22) (75/38 - 97/52)  RR: 16 (01-16-22 @ 01:22) (15 - 16)  SpO2: 95% (01-16-22 @ 01:22) (94% - 96%)    PHYSICAL EXAM  GENERAL: patient appears well, no acute distress, anxious appearing   EYES: sclera clear, no exudates  ENMT: oropharynx clear without erythema, no exudates, dry mucous membranes  LUNGS: good air entry bilaterally, clear to auscultation, symmetric breath sounds, no wheezing or rhonchi appreciated  HEART: soft S1/S2, regular rate and rhythm, no murmurs noted, no lower extremity edema  GASTROINTESTINAL: abdomen is soft, nontender, nondistended, normoactive bowel sounds, no palpable masses  INTEGUMENT: Small scabs on LLE from fall  MUSCULOSKELETAL: no clubbing or cyanosis, no obvious deformity  NEUROLOGIC: awake, alert, oriented x3, good muscle tone in 4 extremities, no obvious sensory deficits  HEME/LYMPH: no palpable supraclavicular nodules, no obvious ecchymosis or petechiae      LABS:                        10.1   7.69  )-----------( 316      ( 15 John 2022 23:27 )             29.2     CBC Full  -  ( 15 John 2022 23:27 )  WBC Count : 7.69 K/uL  Hemoglobin : 10.1 g/dL  Hematocrit : 29.2 %  Platelet Count - Automated : 316 K/uL  Mean Cell Volume : 83.0 fl  Mean Cell Hemoglobin : 28.7 pg  Mean Cell Hemoglobin Concentration : 34.6 gm/dL  Auto Neutrophil # : x  Auto Lymphocyte # : x  Auto Monocyte # : x  Auto Eosinophil # : x  Auto Basophil # : x  Auto Neutrophil % : x  Auto Lymphocyte % : x  Auto Monocyte % : x  Auto Eosinophil % : x  Auto Basophil % : x    15 John 2022 23:27    141    |  109    |  21     ----------------------------<  123    3.6     |  25     |  2.00     Ca    8.9        15 John 2022 23:27    TPro  7.0    /  Alb  3.1    /  TBili  0.3    /  DBili  x      /  AST  31     /  ALT  33     /  AlkPhos  94     15 John 2022 23:27        CAPILLARY BLOOD GLUCOSE              RADIOLOGY & ADDITIONAL TESTS:    Personally reviewed.     Consultant(s) Notes Reviewed:  [x] YES  [ ] NO

## 2022-01-27 NOTE — DISCHARGE NOTE BEHAVIORAL HEALTH - NSBHDCRESPONSEFT_PSY_A_CORE
Please clarify with patient - she was given 90 with 1 refill on 12/2/21. She should have plenty through May   Patient responded positively to antidepressant, tolerating medication well with no noted side effects. Patient saw improvement and alleviation of depressive symptoms. Patient has no suicidal ideation/intent/plan.

## 2022-02-07 NOTE — ED PROVIDER NOTE - NSTIMEPROVIDERCAREINITIATE_GEN_ER
Impression: Myopia, bilateral: H52.13.  Plan: MRx given
Impression: Nonexudative age-related macular degeneration of right eye, early dry stage: H35.3111. Plan: from 12/13/21 recent vision loss OD, BCVA OD 20/60, pt reports vision loss after having flu 2 years ago.   Today's testing shows deep inf arcuate defect with paracentral defcts likely 2/2 glc, to rule out other retina pathology such as BRAO, keep appt with Dr. Kramer Muss
Impression: Primary open-angle glaucoma, bilateral, moderate stage: T94.4994. Plan: OD>OS
IOP 11/11 on latanoprost qhs oU (good IOP lowering response)
pachs 480s Repeat HVF today: reliable, OD: dense inf arcuate, paracentral defects with value of zero, OS: few paracentral defects OCT RNFL 1/4/22: OD: sup thinning, OS: bdl sup thinning
cont latanoprost qhs OU
IOP check in 4 months
02-Jun-2021 02:25

## 2022-02-16 ENCOUNTER — APPOINTMENT (OUTPATIENT)
Dept: ORTHOPEDIC SURGERY | Facility: CLINIC | Age: 32
End: 2022-02-16
Payer: COMMERCIAL

## 2022-02-16 VITALS
BODY MASS INDEX: 34.53 KG/M2 | SYSTOLIC BLOOD PRESSURE: 105 MMHG | HEART RATE: 94 BPM | HEIGHT: 67 IN | WEIGHT: 220 LBS | DIASTOLIC BLOOD PRESSURE: 70 MMHG

## 2022-02-16 DIAGNOSIS — Z87.39 PERSONAL HISTORY OF OTHER DISEASES OF THE MUSCULOSKELETAL SYSTEM AND CONNECTIVE TISSUE: ICD-10-CM

## 2022-02-16 DIAGNOSIS — M25.562 PAIN IN LEFT KNEE: ICD-10-CM

## 2022-02-16 DIAGNOSIS — Z82.61 FAMILY HISTORY OF ARTHRITIS: ICD-10-CM

## 2022-02-16 PROCEDURE — 99203 OFFICE O/P NEW LOW 30 MIN: CPT

## 2022-02-16 PROCEDURE — 73560 X-RAY EXAM OF KNEE 1 OR 2: CPT | Mod: LT

## 2022-02-17 PROBLEM — M25.562 ACUTE PAIN OF LEFT KNEE: Status: ACTIVE | Noted: 2022-02-16

## 2022-02-17 NOTE — PHYSICAL EXAM
[de-identified] : Right Knee:\par Knee: Range of Motion in Degrees	\par 	                  Claimant:	Normal:	\par Flexion Active	  135 	                135-degrees	\par Flexion Passive	  135	                135-degrees	\par Extension Active	  0-5	                0-5-degrees	\par Extension Passive	  0-5	                0-5-degrees	\par \par No weakness to flexion/extension.  No evidence of instability in the AP plane or varus or valgus stress.  Negative  Lachman.  Negative pivot shift.  Negative anterior drawer test.  Negative posterior drawer test.  Negative Corrina.  Negative Apley grind.  No medial or lateral joint line tenderness.  No tenderness over the medial and lateral facet of the patella.  No patellofemoral crepitations.  No lateral tilting patella.  No patellar apprehension.  No crepitation in the medial and lateral femoral condyle.  No proximal or distal swelling, edema or tenderness.  No gross motor or sensory deficits.  No intra-articular swelling.  2+ DP and PT pulses. No varus or valgus malalignment.  Well-healed scar.\par \par Left Knee:\par Knee: Range of Motion in Degrees	\par 	                  Claimant:	Normal:	\par Flexion Active	  135 	                135-degrees	\par Flexion Passive	  135	                135-degrees	\par Extension Active	  0-5	                0-5-degrees	\par Extension Passive	  0-5	                0-5-degrees	\par \par No weakness to flexion/extension.  No evidence of instability in the AP plane or varus or valgus stress.  Negative  Lachman.  Negative pivot shift.  Negative anterior drawer test.  Negative posterior drawer test.  Positive medial joint line tenderness.  Negative lateral joint line tenderness.  Positive Corrina.  Positive Apley grind.  Positive tenderness over the lateral facet of the patella - mild.  No tenderness over the medial facet of the patella.  Positive patellofemoral crepitations - mild.  No lateral tilting patella.  No patella apprehension.  No crepitation in the medial and lateral femoral condyle.  No proximal or distal swelling, edema or tenderness.  No gross motor or sensory deficits.  Mild intra-articular swelling.  2+ DP and PT pulses.  No varus or valgus malalignment.  Well-healed scar.\par  [de-identified] : Gait and Station:  Ambulating with a slightly antalgic to antalgic gait.  Station:  Normal.  [de-identified] : Appearance:  Well-developed, well-nourished female in no acute distress.\par   [de-identified] : Radiographs, one to two views of the left knee, show evidence of prior Slim.

## 2022-02-17 NOTE — DISCUSSION/SUMMARY
[de-identified] : At this time, due to probable meniscal tear of the left knee and because of the severity of the patient's complaints with mechanical episodes of the knee catching, popping and locking, I recommend an MRI.  She will be reassessed after the MRI.

## 2022-02-17 NOTE — REVIEW OF SYSTEMS
[Joint Pain] : joint pain [Negative] : Heme/Lymph [Arthralgia] : arthralgia [Joint Stiffness] : joint stiffness [Joint Swelling] : joint swelling [Anxiety] : anxiety [Depression] : depression [Muscle Weakness] : muscle weakness

## 2022-02-17 NOTE — HISTORY OF PRESENT ILLNESS
[10] : a current pain level of 10/10 [de-identified] : walking, any movement [de-identified] : rest, medication, ice [8] : the relief from treatment is 8/10 [(no relief)  0] : the relief from treatment is 0/10 (no relief) [7] : the relief from medicine is 7/10 [4] : the relief from medicine is 4/10 [9] : the ailment interference is 9/10 [10  (interferes completely)] : the ailment interference is10/10 (interferes completely) [] : Yes [de-identified] : Physical therapy [de-identified] : Injections [de-identified] : Weight loss [de-identified] : Dr. Tripp [de-identified] : Oxycodone  [de-identified] : Advil [de-identified] : Tylenol [de-identified] : Falls.

## 2022-02-17 NOTE — ADDENDUM
[FreeTextEntry1] : This note was written by Missy Calles on 02/17/2022 acting as scribe for Doug Galindo III, MD

## 2022-02-17 NOTE — CONSULT LETTER
[Dear  ___] : Dear  [unfilled], [Consult Letter:] : I had the pleasure of evaluating your patient, [unfilled]. [Please see my note below.] : Please see my note below. [Consult Closing:] : Thank you very much for allowing me to participate in the care of this patient.  If you have any questions, please do not hesitate to contact me. [Sincerely,] : Sincerely, [FreeTextEntry3] : Doug Galindo, III, MD\par

## 2022-02-28 ENCOUNTER — EMERGENCY (EMERGENCY)
Facility: HOSPITAL | Age: 32
LOS: 0 days | Discharge: HOME | End: 2022-02-28
Attending: EMERGENCY MEDICINE
Payer: COMMERCIAL

## 2022-02-28 VITALS — WEIGHT: 214.95 LBS | HEIGHT: 67 IN

## 2022-02-28 VITALS
TEMPERATURE: 98 F | RESPIRATION RATE: 18 BRPM | DIASTOLIC BLOOD PRESSURE: 67 MMHG | HEART RATE: 61 BPM | OXYGEN SATURATION: 98 % | SYSTOLIC BLOOD PRESSURE: 114 MMHG

## 2022-02-28 DIAGNOSIS — Z98.890 OTHER SPECIFIED POSTPROCEDURAL STATES: Chronic | ICD-10-CM

## 2022-02-28 DIAGNOSIS — I12.9 HYPERTENSIVE CHRONIC KIDNEY DISEASE WITH STAGE 1 THROUGH STAGE 4 CHRONIC KIDNEY DISEASE, OR UNSPECIFIED CHRONIC KIDNEY DISEASE: ICD-10-CM

## 2022-02-28 DIAGNOSIS — Z91.048 OTHER NONMEDICINAL SUBSTANCE ALLERGY STATUS: ICD-10-CM

## 2022-02-28 DIAGNOSIS — K62.5 HEMORRHAGE OF ANUS AND RECTUM: ICD-10-CM

## 2022-02-28 DIAGNOSIS — R07.89 OTHER CHEST PAIN: ICD-10-CM

## 2022-02-28 DIAGNOSIS — R10.9 UNSPECIFIED ABDOMINAL PAIN: ICD-10-CM

## 2022-02-28 DIAGNOSIS — E78.5 HYPERLIPIDEMIA, UNSPECIFIED: ICD-10-CM

## 2022-02-28 DIAGNOSIS — N18.9 CHRONIC KIDNEY DISEASE, UNSPECIFIED: ICD-10-CM

## 2022-02-28 DIAGNOSIS — F41.9 ANXIETY DISORDER, UNSPECIFIED: ICD-10-CM

## 2022-02-28 DIAGNOSIS — Z88.1 ALLERGY STATUS TO OTHER ANTIBIOTIC AGENTS STATUS: ICD-10-CM

## 2022-02-28 DIAGNOSIS — Z90.6 ACQUIRED ABSENCE OF OTHER PARTS OF URINARY TRACT: ICD-10-CM

## 2022-02-28 DIAGNOSIS — Z87.440 PERSONAL HISTORY OF URINARY (TRACT) INFECTIONS: ICD-10-CM

## 2022-02-28 DIAGNOSIS — F32.A DEPRESSION, UNSPECIFIED: ICD-10-CM

## 2022-02-28 DIAGNOSIS — Z90.49 ACQUIRED ABSENCE OF OTHER SPECIFIED PARTS OF DIGESTIVE TRACT: ICD-10-CM

## 2022-02-28 DIAGNOSIS — R11.2 NAUSEA WITH VOMITING, UNSPECIFIED: ICD-10-CM

## 2022-02-28 DIAGNOSIS — F90.9 ATTENTION-DEFICIT HYPERACTIVITY DISORDER, UNSPECIFIED TYPE: ICD-10-CM

## 2022-02-28 DIAGNOSIS — Z91.010 ALLERGY TO PEANUTS: ICD-10-CM

## 2022-02-28 DIAGNOSIS — E11.22 TYPE 2 DIABETES MELLITUS WITH DIABETIC CHRONIC KIDNEY DISEASE: ICD-10-CM

## 2022-02-28 DIAGNOSIS — Z90.721 ACQUIRED ABSENCE OF OVARIES, UNILATERAL: ICD-10-CM

## 2022-02-28 DIAGNOSIS — Z91.018 ALLERGY TO OTHER FOODS: ICD-10-CM

## 2022-02-28 DIAGNOSIS — F60.3 BORDERLINE PERSONALITY DISORDER: ICD-10-CM

## 2022-02-28 DIAGNOSIS — Z20.822 CONTACT WITH AND (SUSPECTED) EXPOSURE TO COVID-19: ICD-10-CM

## 2022-02-28 DIAGNOSIS — Z90.49 ACQUIRED ABSENCE OF OTHER SPECIFIED PARTS OF DIGESTIVE TRACT: Chronic | ICD-10-CM

## 2022-02-28 DIAGNOSIS — E28.2 POLYCYSTIC OVARIAN SYNDROME: ICD-10-CM

## 2022-02-28 DIAGNOSIS — R30.0 DYSURIA: ICD-10-CM

## 2022-02-28 DIAGNOSIS — F11.20 OPIOID DEPENDENCE, UNCOMPLICATED: ICD-10-CM

## 2022-02-28 DIAGNOSIS — Z90.79 ACQUIRED ABSENCE OF OTHER GENITAL ORGAN(S): Chronic | ICD-10-CM

## 2022-02-28 LAB
ADD ON TEST-SPECIMEN IN LAB: SIGNIFICANT CHANGE UP
ADD ON TEST-SPECIMEN IN LAB: SIGNIFICANT CHANGE UP
ALBUMIN SERPL ELPH-MCNC: 3.3 G/DL — SIGNIFICANT CHANGE UP (ref 3.3–5)
ALBUMIN SERPL ELPH-MCNC: 3.6 G/DL — SIGNIFICANT CHANGE UP (ref 3.3–5)
ALP SERPL-CCNC: 135 U/L — HIGH (ref 40–120)
ALP SERPL-CCNC: 151 U/L — HIGH (ref 40–120)
ALT FLD-CCNC: 57 U/L — SIGNIFICANT CHANGE UP (ref 12–78)
ALT FLD-CCNC: 61 U/L — SIGNIFICANT CHANGE UP (ref 12–78)
ANION GAP SERPL CALC-SCNC: 6 MMOL/L — SIGNIFICANT CHANGE UP (ref 5–17)
ANION GAP SERPL CALC-SCNC: 6 MMOL/L — SIGNIFICANT CHANGE UP (ref 5–17)
APPEARANCE UR: CLEAR — SIGNIFICANT CHANGE UP
APTT BLD: 44 SEC — HIGH (ref 27.5–35.5)
AST SERPL-CCNC: 60 U/L — HIGH (ref 15–37)
AST SERPL-CCNC: 64 U/L — HIGH (ref 15–37)
BASOPHILS # BLD AUTO: 0.07 K/UL — SIGNIFICANT CHANGE UP (ref 0–0.2)
BASOPHILS NFR BLD AUTO: 0.9 % — SIGNIFICANT CHANGE UP (ref 0–2)
BILIRUB SERPL-MCNC: 0.4 MG/DL — SIGNIFICANT CHANGE UP (ref 0.2–1.2)
BILIRUB SERPL-MCNC: 0.5 MG/DL — SIGNIFICANT CHANGE UP (ref 0.2–1.2)
BILIRUB UR-MCNC: NEGATIVE — SIGNIFICANT CHANGE UP
BUN SERPL-MCNC: 20 MG/DL — SIGNIFICANT CHANGE UP (ref 7–23)
BUN SERPL-MCNC: 22 MG/DL — SIGNIFICANT CHANGE UP (ref 7–23)
CALCIUM SERPL-MCNC: 10 MG/DL — SIGNIFICANT CHANGE UP (ref 8.5–10.1)
CALCIUM SERPL-MCNC: 9.3 MG/DL — SIGNIFICANT CHANGE UP (ref 8.5–10.1)
CHLORIDE SERPL-SCNC: 106 MMOL/L — SIGNIFICANT CHANGE UP (ref 96–108)
CHLORIDE SERPL-SCNC: 109 MMOL/L — HIGH (ref 96–108)
CO2 SERPL-SCNC: 26 MMOL/L — SIGNIFICANT CHANGE UP (ref 22–31)
CO2 SERPL-SCNC: 27 MMOL/L — SIGNIFICANT CHANGE UP (ref 22–31)
COLOR SPEC: YELLOW — SIGNIFICANT CHANGE UP
CREAT SERPL-MCNC: 1.36 MG/DL — HIGH (ref 0.5–1.3)
CREAT SERPL-MCNC: 1.67 MG/DL — HIGH (ref 0.5–1.3)
DIFF PNL FLD: NEGATIVE — SIGNIFICANT CHANGE UP
EGFR: 42 ML/MIN/1.73M2 — LOW
EGFR: 53 ML/MIN/1.73M2 — LOW
EOSINOPHIL # BLD AUTO: 0.41 K/UL — SIGNIFICANT CHANGE UP (ref 0–0.5)
EOSINOPHIL NFR BLD AUTO: 5.2 % — SIGNIFICANT CHANGE UP (ref 0–6)
GLUCOSE SERPL-MCNC: 84 MG/DL — SIGNIFICANT CHANGE UP (ref 70–99)
GLUCOSE SERPL-MCNC: 89 MG/DL — SIGNIFICANT CHANGE UP (ref 70–99)
GLUCOSE UR QL: NEGATIVE — SIGNIFICANT CHANGE UP
HCT VFR BLD CALC: 39.5 % — SIGNIFICANT CHANGE UP (ref 34.5–45)
HGB BLD-MCNC: 13.3 G/DL — SIGNIFICANT CHANGE UP (ref 11.5–15.5)
IMM GRANULOCYTES NFR BLD AUTO: 0.1 % — SIGNIFICANT CHANGE UP (ref 0–1.5)
INR BLD: 1.21 RATIO — HIGH (ref 0.88–1.16)
KETONES UR-MCNC: ABNORMAL
LEUKOCYTE ESTERASE UR-ACNC: ABNORMAL
LYMPHOCYTES # BLD AUTO: 2.77 K/UL — SIGNIFICANT CHANGE UP (ref 1–3.3)
LYMPHOCYTES # BLD AUTO: 35.5 % — SIGNIFICANT CHANGE UP (ref 13–44)
MCHC RBC-ENTMCNC: 27.8 PG — SIGNIFICANT CHANGE UP (ref 27–34)
MCHC RBC-ENTMCNC: 33.7 GM/DL — SIGNIFICANT CHANGE UP (ref 32–36)
MCV RBC AUTO: 82.5 FL — SIGNIFICANT CHANGE UP (ref 80–100)
MONOCYTES # BLD AUTO: 0.49 K/UL — SIGNIFICANT CHANGE UP (ref 0–0.9)
MONOCYTES NFR BLD AUTO: 6.3 % — SIGNIFICANT CHANGE UP (ref 2–14)
NEUTROPHILS # BLD AUTO: 4.06 K/UL — SIGNIFICANT CHANGE UP (ref 1.8–7.4)
NEUTROPHILS NFR BLD AUTO: 52 % — SIGNIFICANT CHANGE UP (ref 43–77)
NITRITE UR-MCNC: NEGATIVE — SIGNIFICANT CHANGE UP
PH UR: 5 — SIGNIFICANT CHANGE UP (ref 5–8)
PLATELET # BLD AUTO: 298 K/UL — SIGNIFICANT CHANGE UP (ref 150–400)
POTASSIUM SERPL-MCNC: 3.9 MMOL/L — SIGNIFICANT CHANGE UP (ref 3.5–5.3)
POTASSIUM SERPL-MCNC: 4.2 MMOL/L — SIGNIFICANT CHANGE UP (ref 3.5–5.3)
POTASSIUM SERPL-SCNC: 3.9 MMOL/L — SIGNIFICANT CHANGE UP (ref 3.5–5.3)
POTASSIUM SERPL-SCNC: 4.2 MMOL/L — SIGNIFICANT CHANGE UP (ref 3.5–5.3)
PROT SERPL-MCNC: 7.6 GM/DL — SIGNIFICANT CHANGE UP (ref 6–8.3)
PROT SERPL-MCNC: 8.4 GM/DL — HIGH (ref 6–8.3)
PROT UR-MCNC: NEGATIVE — SIGNIFICANT CHANGE UP
PROTHROM AB SERPL-ACNC: 14.1 SEC — HIGH (ref 10.5–13.4)
RBC # BLD: 4.79 M/UL — SIGNIFICANT CHANGE UP (ref 3.8–5.2)
RBC # FLD: 13.7 % — SIGNIFICANT CHANGE UP (ref 10.3–14.5)
SARS-COV-2 RNA SPEC QL NAA+PROBE: SIGNIFICANT CHANGE UP
SODIUM SERPL-SCNC: 139 MMOL/L — SIGNIFICANT CHANGE UP (ref 135–145)
SODIUM SERPL-SCNC: 141 MMOL/L — SIGNIFICANT CHANGE UP (ref 135–145)
SP GR SPEC: 1.02 — SIGNIFICANT CHANGE UP (ref 1.01–1.02)
TROPONIN I, HIGH SENSITIVITY RESULT: 7.3 NG/L — SIGNIFICANT CHANGE UP
UROBILINOGEN FLD QL: 1
WBC # BLD: 7.81 K/UL — SIGNIFICANT CHANGE UP (ref 3.8–10.5)
WBC # FLD AUTO: 7.81 K/UL — SIGNIFICANT CHANGE UP (ref 3.8–10.5)

## 2022-02-28 PROCEDURE — 96375 TX/PRO/DX INJ NEW DRUG ADDON: CPT

## 2022-02-28 PROCEDURE — 99285 EMERGENCY DEPT VISIT HI MDM: CPT | Mod: 25

## 2022-02-28 PROCEDURE — 96374 THER/PROPH/DIAG INJ IV PUSH: CPT

## 2022-02-28 PROCEDURE — 99285 EMERGENCY DEPT VISIT HI MDM: CPT

## 2022-02-28 PROCEDURE — 71275 CT ANGIOGRAPHY CHEST: CPT | Mod: 26,MA

## 2022-02-28 PROCEDURE — 71045 X-RAY EXAM CHEST 1 VIEW: CPT

## 2022-02-28 PROCEDURE — 80053 COMPREHEN METABOLIC PANEL: CPT

## 2022-02-28 PROCEDURE — 85730 THROMBOPLASTIN TIME PARTIAL: CPT

## 2022-02-28 PROCEDURE — 85610 PROTHROMBIN TIME: CPT

## 2022-02-28 PROCEDURE — 85025 COMPLETE CBC W/AUTO DIFF WBC: CPT

## 2022-02-28 PROCEDURE — 86850 RBC ANTIBODY SCREEN: CPT

## 2022-02-28 PROCEDURE — 84484 ASSAY OF TROPONIN QUANT: CPT

## 2022-02-28 PROCEDURE — U0005: CPT

## 2022-02-28 PROCEDURE — 84702 CHORIONIC GONADOTROPIN TEST: CPT

## 2022-02-28 PROCEDURE — 81025 URINE PREGNANCY TEST: CPT

## 2022-02-28 PROCEDURE — 71045 X-RAY EXAM CHEST 1 VIEW: CPT | Mod: 26

## 2022-02-28 PROCEDURE — 93010 ELECTROCARDIOGRAM REPORT: CPT

## 2022-02-28 PROCEDURE — 86900 BLOOD TYPING SEROLOGIC ABO: CPT

## 2022-02-28 PROCEDURE — 36415 COLL VENOUS BLD VENIPUNCTURE: CPT

## 2022-02-28 PROCEDURE — 86901 BLOOD TYPING SEROLOGIC RH(D): CPT

## 2022-02-28 PROCEDURE — 81001 URINALYSIS AUTO W/SCOPE: CPT

## 2022-02-28 PROCEDURE — 74177 CT ABD & PELVIS W/CONTRAST: CPT | Mod: 26,MA

## 2022-02-28 PROCEDURE — U0003: CPT

## 2022-02-28 PROCEDURE — 74177 CT ABD & PELVIS W/CONTRAST: CPT | Mod: MA

## 2022-02-28 PROCEDURE — 83690 ASSAY OF LIPASE: CPT

## 2022-02-28 PROCEDURE — 93005 ELECTROCARDIOGRAM TRACING: CPT

## 2022-02-28 PROCEDURE — 71275 CT ANGIOGRAPHY CHEST: CPT | Mod: MA

## 2022-02-28 RX ORDER — MORPHINE SULFATE 50 MG/1
4 CAPSULE, EXTENDED RELEASE ORAL ONCE
Refills: 0 | Status: DISCONTINUED | OUTPATIENT
Start: 2022-02-28 | End: 2022-02-28

## 2022-02-28 RX ORDER — ONDANSETRON 8 MG/1
1 TABLET, FILM COATED ORAL
Qty: 15 | Refills: 0
Start: 2022-02-28 | End: 2022-03-04

## 2022-02-28 RX ORDER — CEFUROXIME AXETIL 250 MG
500 TABLET ORAL EVERY 12 HOURS
Refills: 0 | Status: DISCONTINUED | OUTPATIENT
Start: 2022-02-28 | End: 2022-02-28

## 2022-02-28 RX ORDER — SODIUM CHLORIDE 9 MG/ML
2000 INJECTION INTRAMUSCULAR; INTRAVENOUS; SUBCUTANEOUS ONCE
Refills: 0 | Status: COMPLETED | OUTPATIENT
Start: 2022-02-28 | End: 2022-02-28

## 2022-02-28 RX ORDER — CEFUROXIME AXETIL 250 MG
1 TABLET ORAL
Qty: 14 | Refills: 0
Start: 2022-02-28 | End: 2022-03-06

## 2022-02-28 RX ORDER — ONDANSETRON 8 MG/1
4 TABLET, FILM COATED ORAL ONCE
Refills: 0 | Status: COMPLETED | OUTPATIENT
Start: 2022-02-28 | End: 2022-02-28

## 2022-02-28 RX ADMIN — Medication 500 MILLIGRAM(S): at 16:40

## 2022-02-28 RX ADMIN — SODIUM CHLORIDE 2000 MILLILITER(S): 9 INJECTION INTRAMUSCULAR; INTRAVENOUS; SUBCUTANEOUS at 13:27

## 2022-02-28 RX ADMIN — MORPHINE SULFATE 4 MILLIGRAM(S): 50 CAPSULE, EXTENDED RELEASE ORAL at 13:35

## 2022-02-28 RX ADMIN — ONDANSETRON 4 MILLIGRAM(S): 8 TABLET, FILM COATED ORAL at 13:27

## 2022-02-28 NOTE — ED PROVIDER NOTE - OBJECTIVE STATEMENT
31 F hx HTN, HLD, DM2, Chronic back pain, Borderline personality, anxiety, Depression, hx of suicide attempts, hx of self harm, Mixed connective tissue disease, FSGS, PCOS, Obesity, s/p gastric bypass surgery and appendectomy on 12/29/2021  here with multiple medical complaints. pt here c/o 1 episode of rectal bleeding for the last 3 days. reports that her stools have been very hard and states she has checked and did not see any external hemorrhoids. pt also reports abdominal pain, N/V x few days and states she has been having difficulty keeping  food down.  Also pt states she has been having intermittent episodes  chest pain that wraps around her chest. +dysuria, states she  thinks she possibly has a UTI.

## 2022-02-28 NOTE — ED PROVIDER NOTE - PATIENT PORTAL LINK FT
You can access the FollowMyHealth Patient Portal offered by [facility] by registering at the following website: [email or web address]. By joining [facility]’s FollowMyHealth portal, you will also be able to view your health information using other applications (apps) compatible with our system.

## 2022-02-28 NOTE — ED ADULT TRIAGE NOTE - CHIEF COMPLAINT QUOTE
Pt presents to er with complaints of non-radiating left sided chest tightness, states she had the bypass surgery this past December with , pt complaining of rectal bleeding for past couple of days and hypotension feeling like she is going to pass out, denies use of blood thinners.

## 2022-02-28 NOTE — ED PROVIDER NOTE - PROGRESS NOTE DETAILS
Radha Sena: Dr. Nursat flores Stool guaiac performed by Dr. Sena with Gypsy Soto RN as chaperone, Lot #: (insert lot#[id number] ); Result: (brown stool, heme negative); QC- reactive. Attending MD Weber : Received s/o from Dr Sena. Pt here with L chest pain and abdominal discomfort, N/V/D. Favoring gastroenteritis but given hx will obtain trop, CTA, CTAP. Already discussed with bariatrics per Dr Sena, and patient cleared to d/c if CT nonactionable. Attending MD Weber : TOBIAS noted on labs. Will rpt CMP prior to discharge. patient now asymptomatic and feels "much better" after fluids. Attending MD Weber : Cr improving. Suspect prerrenal etiology. Will d/c with zofran and keflex for possible UTI/pyelo. Radha Sena: Dr. Silverio paged  d/w Nusrat Allison and agrees with plan in ED.  If ct neg, pt can be d/c and follow up with him as outpt.

## 2022-02-28 NOTE — ED PROVIDER NOTE - CLINICAL SUMMARY MEDICAL DECISION MAKING FREE TEXT BOX
30 y/o pt presents to the ED with hard stool with blood and chest discomfort. Plan: check labs and CT scan.

## 2022-02-28 NOTE — ED ADULT NURSE NOTE - OBJECTIVE STATEMENT
pt pw chest pressure, rectal bleeding, nausea, urinary complaints. Pt on the monitor, medicated for chest pressure. hx gastric bypass. will ctm

## 2022-02-28 NOTE — ED PROVIDER NOTE - BIRTH SEX
Scribe Attestation (For Scribes USE Only)... Unknown Scribe Attestation (For Scribes USE Only).../Attending Attestation (For Attendings USE Only)...

## 2022-02-28 NOTE — ED PROVIDER NOTE - CPE EDP NEURO NORM
Post-Care Instructions: I reviewed with the patient in detail post-care instructions. Patient is to wear sunprotection, and avoid picking at any of the treated lesions. Pt may apply Vaseline to crusted or scabbing areas. Consent: The patient's consent was obtained including but not limited to risks of crusting, scabbing, blistering, scarring, darker or lighter pigmentary change, recurrence, incomplete removal and infection. Render Post-Care Instructions In Note?: yes Detail Level: Simple Number Of Freeze-Thaw Cycles: 1 freeze-thaw cycle normal... Duration Of Freeze Thaw-Cycle (Seconds): 2

## 2022-02-28 NOTE — ED PROVIDER NOTE - NSFOLLOWUPINSTRUCTIONS_ED_ALL_ED_FT
An exerpt from your CT report is " 1 cm indeterminate low-density area in the right kidney. Differential  diagnosis includes neoplasm and pyelonephritis. Recommend short-term follow-up MRI to better characterize."    Please follow-up with a urologist as an outpatient to address the above. Please return to the ED for any worsening nausea, vomiting, abdominal pain, fevers, chills, back pain or any other concerns.     Please follow-up with your primary care doctor in the next 3 days.     Your urinalysis and CT suggested that you may have a UTI. Please take Ceftin 500mg twice daily as directed. Please call in 2 days to obtain your urine culture results. If your urine is negative, you may discontinue the antibiotic.      An important exerpt from your CT report is " 1 cm indeterminate low-density area in the right kidney. Differential  diagnosis includes neoplasm and pyelonephritis. Recommend short-term follow-up MRI to better characterize."    Please follow-up with a urologist as an outpatient to address the above.

## 2022-02-28 NOTE — ED STATDOCS - PROGRESS NOTE DETAILS
Scribe DM for Dr. Lynne: 30 y/o female with a PMHx of CKD, PCOS, DMT2, GERD, HLD, HTN, UTI, s/p gastric bypass (recent by Dr. Silverio) presents to the ED with rectal bleeding, dizziness, decreased po intake, chest pain radiating to the back. Given recent surgery, and multiple medical complaints. Will send pt to main ED for further workup and management.

## 2022-03-03 ENCOUNTER — APPOINTMENT (OUTPATIENT)
Dept: ORTHOPEDIC SURGERY | Facility: CLINIC | Age: 32
End: 2022-03-03
Payer: COMMERCIAL

## 2022-03-03 VITALS
BODY MASS INDEX: 34.53 KG/M2 | DIASTOLIC BLOOD PRESSURE: 78 MMHG | SYSTOLIC BLOOD PRESSURE: 117 MMHG | HEART RATE: 130 BPM | HEIGHT: 67 IN | WEIGHT: 220 LBS

## 2022-03-03 PROCEDURE — 99213 OFFICE O/P EST LOW 20 MIN: CPT

## 2022-03-07 ENCOUNTER — APPOINTMENT (OUTPATIENT)
Dept: ORTHOPEDIC SURGERY | Facility: CLINIC | Age: 32
End: 2022-03-07
Payer: COMMERCIAL

## 2022-03-07 PROCEDURE — 99441: CPT

## 2022-03-07 NOTE — ADDENDUM
[FreeTextEntry1] : This note was written by Juan M Alex on 03/07/2022, acting as a scribe for Doug Galindo III, MD

## 2022-03-07 NOTE — HISTORY OF PRESENT ILLNESS
[de-identified] : The patient comes in today with persistent complaints to her left knee.  Review of MRI of the left knee is noted below.\par

## 2022-03-07 NOTE — DISCUSSION/SUMMARY
[de-identified] : At this time, due to left knee osteoarthritis and lateral meniscal tear, I recommended to start physical therapy, anti-inflammatory and reassessment in 4-6 weeks.\par \par

## 2022-03-07 NOTE — PHYSICAL EXAM
[de-identified] : Left Knee: Range of Motion in Degrees	\par 	                  Claimant:	Normal:	\par Flexion Active	  135 	                135-degrees	\par Flexion Passive	  135	                135-degrees	\par Extension Active	  0-5	                0-5-degrees	\par Extension Passive	  0-5	                0-5-degrees	\par \par No weakness to flexion/extension.  No evidence of instability in the AP plane or varus or valgus stress.  Negative  Lachman.  Negative pivot shift.  Negative anterior drawer test.  Negative posterior drawer test.  Positive lateral joint line tenderness.  Positive Corrina.  Positive Apley grind. Positive tenderness over the medial and lateral facet of the patella.  Positive patellofemoral crepitations.  No lateral tilting patella.  No patella apprehension.  Positive crepitation in the medial and lateral femoral condyle.  No proximal or distal swelling, edema or tenderness.  No gross motor or sensory deficits.  Mild intra-articular swelling.  2+ DP and PT pulses.  No varus or valgus malalignment.  Skin is intact.  No rashes, scars or lesions. \par  [de-identified] : Gait and Station:  Ambulating with a slightly antalgic to antalgic gait.  Normal Station.  [de-identified] : Appearance:  Well developed, well-nourished female in no acute distress.\par   [de-identified] : Review of MRI of the left knee shows a lateral meniscal tear and some diffuse arthritic changes.\par

## 2022-03-13 ENCOUNTER — EMERGENCY (EMERGENCY)
Facility: HOSPITAL | Age: 32
LOS: 0 days | Discharge: HOME | End: 2022-03-14
Attending: EMERGENCY MEDICINE
Payer: COMMERCIAL

## 2022-03-13 VITALS
TEMPERATURE: 98 F | SYSTOLIC BLOOD PRESSURE: 121 MMHG | OXYGEN SATURATION: 97 % | DIASTOLIC BLOOD PRESSURE: 82 MMHG | RESPIRATION RATE: 18 BRPM | HEIGHT: 67 IN | HEART RATE: 103 BPM

## 2022-03-13 DIAGNOSIS — E78.5 HYPERLIPIDEMIA, UNSPECIFIED: ICD-10-CM

## 2022-03-13 DIAGNOSIS — Z90.79 ACQUIRED ABSENCE OF OTHER GENITAL ORGAN(S): Chronic | ICD-10-CM

## 2022-03-13 DIAGNOSIS — N18.9 CHRONIC KIDNEY DISEASE, UNSPECIFIED: ICD-10-CM

## 2022-03-13 DIAGNOSIS — I12.9 HYPERTENSIVE CHRONIC KIDNEY DISEASE WITH STAGE 1 THROUGH STAGE 4 CHRONIC KIDNEY DISEASE, OR UNSPECIFIED CHRONIC KIDNEY DISEASE: ICD-10-CM

## 2022-03-13 DIAGNOSIS — E11.22 TYPE 2 DIABETES MELLITUS WITH DIABETIC CHRONIC KIDNEY DISEASE: ICD-10-CM

## 2022-03-13 DIAGNOSIS — R11.10 VOMITING, UNSPECIFIED: ICD-10-CM

## 2022-03-13 DIAGNOSIS — Z91.010 ALLERGY TO PEANUTS: ICD-10-CM

## 2022-03-13 DIAGNOSIS — R11.2 NAUSEA WITH VOMITING, UNSPECIFIED: ICD-10-CM

## 2022-03-13 DIAGNOSIS — Z88.0 ALLERGY STATUS TO PENICILLIN: ICD-10-CM

## 2022-03-13 DIAGNOSIS — Z91.018 ALLERGY TO OTHER FOODS: ICD-10-CM

## 2022-03-13 DIAGNOSIS — Z98.890 OTHER SPECIFIED POSTPROCEDURAL STATES: Chronic | ICD-10-CM

## 2022-03-13 DIAGNOSIS — Z90.49 ACQUIRED ABSENCE OF OTHER SPECIFIED PARTS OF DIGESTIVE TRACT: Chronic | ICD-10-CM

## 2022-03-13 DIAGNOSIS — F32.A DEPRESSION, UNSPECIFIED: ICD-10-CM

## 2022-03-13 DIAGNOSIS — F41.9 ANXIETY DISORDER, UNSPECIFIED: ICD-10-CM

## 2022-03-13 PROCEDURE — 36415 COLL VENOUS BLD VENIPUNCTURE: CPT

## 2022-03-13 PROCEDURE — 96375 TX/PRO/DX INJ NEW DRUG ADDON: CPT

## 2022-03-13 PROCEDURE — 99284 EMERGENCY DEPT VISIT MOD MDM: CPT | Mod: 25

## 2022-03-13 PROCEDURE — 85025 COMPLETE CBC W/AUTO DIFF WBC: CPT

## 2022-03-13 PROCEDURE — 96365 THER/PROPH/DIAG IV INF INIT: CPT

## 2022-03-13 PROCEDURE — 83690 ASSAY OF LIPASE: CPT

## 2022-03-13 PROCEDURE — 84702 CHORIONIC GONADOTROPIN TEST: CPT

## 2022-03-13 PROCEDURE — 80053 COMPREHEN METABOLIC PANEL: CPT

## 2022-03-13 PROCEDURE — 99284 EMERGENCY DEPT VISIT MOD MDM: CPT

## 2022-03-13 RX ORDER — ACETAMINOPHEN 500 MG
1000 TABLET ORAL ONCE
Refills: 0 | Status: DISCONTINUED | OUTPATIENT
Start: 2022-03-13 | End: 2022-03-13

## 2022-03-13 RX ORDER — ONDANSETRON 8 MG/1
8 TABLET, FILM COATED ORAL ONCE
Refills: 0 | Status: COMPLETED | OUTPATIENT
Start: 2022-03-13 | End: 2022-03-13

## 2022-03-13 RX ORDER — FAMOTIDINE 10 MG/ML
20 INJECTION INTRAVENOUS ONCE
Refills: 0 | Status: DISCONTINUED | OUTPATIENT
Start: 2022-03-13 | End: 2022-03-13

## 2022-03-13 RX ORDER — SODIUM CHLORIDE 9 MG/ML
1000 INJECTION, SOLUTION INTRAVENOUS ONCE
Refills: 0 | Status: COMPLETED | OUTPATIENT
Start: 2022-03-13 | End: 2022-03-13

## 2022-03-13 RX ORDER — SODIUM CHLORIDE 9 MG/ML
1000 INJECTION INTRAMUSCULAR; INTRAVENOUS; SUBCUTANEOUS ONCE
Refills: 0 | Status: DISCONTINUED | OUTPATIENT
Start: 2022-03-13 | End: 2022-03-13

## 2022-03-13 RX ORDER — KETOROLAC TROMETHAMINE 30 MG/ML
30 SYRINGE (ML) INJECTION ONCE
Refills: 0 | Status: DISCONTINUED | OUTPATIENT
Start: 2022-03-13 | End: 2022-03-13

## 2022-03-13 RX ORDER — ACETAMINOPHEN 500 MG
1000 TABLET ORAL ONCE
Refills: 0 | Status: COMPLETED | OUTPATIENT
Start: 2022-03-13 | End: 2022-03-13

## 2022-03-13 RX ORDER — ONDANSETRON 8 MG/1
4 TABLET, FILM COATED ORAL ONCE
Refills: 0 | Status: DISCONTINUED | OUTPATIENT
Start: 2022-03-13 | End: 2022-03-13

## 2022-03-13 NOTE — ED PROVIDER NOTE - OBJECTIVE STATEMENT
31 F hx HTN, HLD, DM2, Chronic back pain, Borderline personality, anxiety, Depression, hx of suicide attempts, hx of self harm, Mixed connective tissue disease, FSGS, PCOS, Obesity, s/p gastric bypass surgery and appendectomy on 12/29/2021 presents to ED c/o nausea non bloody non blious vomiting x 3 months worse today than nml some relief with her home zofran. she is having nml stools . denies fever. denies HA or neck pain. no chest pain or sob. no abd pain. no blood in stool or vomit . no urinary f/u/d. no back pain. no motor or sensory deficits. denies illicit drug use. no recent travel. no rash. no other acute issues symptoms or concerns

## 2022-03-13 NOTE — ED PROVIDER NOTE - CARE PROVIDER_API CALL
Venkatesh Sidhu)  Gastroenterology; Internal Medicine  [address] [place] [address]  Phone: [phone]  Fax: [phone]  Follow Up Time:

## 2022-03-13 NOTE — ED ADULT TRIAGE NOTE - CHIEF COMPLAINT QUOTE
Pt presents to ED s/p bariatric surgery 12/29. states since surgery she has been having n/v/d, not tolerating PO intake.

## 2022-03-13 NOTE — ED PROVIDER NOTE - PATIENT PORTAL LINK FT
You can access the FollowMyHealth Patient Portal offered by [facility] by registering at the following website: [email or web address]. By joining [employer]’s FollowMyHealth portal, you will also be able to view your health information using other applications (apps) compatible with our system.

## 2022-03-13 NOTE — ED PROVIDER NOTE - PROGRESS NOTE DETAILS
do not suspect SBO clincally abd soft nt nd. spoke with pt and she agrees risk radiation and cancer causing potential greater than her clincal need and acute risk at this time for ct to rule out emergent intrabdominal process will treat symptoms make sure electrolyes are ok and ultimately needs f/u with her bariatric surgeon and GI which pt states she is to see dr ramirez soon and I will refer her to GI pt ambulating in nad in ED vital nml tolerating po fluids in nad f/u established bariatric surgeon and GI

## 2022-03-14 VITALS
DIASTOLIC BLOOD PRESSURE: 86 MMHG | RESPIRATION RATE: 16 BRPM | SYSTOLIC BLOOD PRESSURE: 124 MMHG | OXYGEN SATURATION: 100 % | TEMPERATURE: 98 F | HEART RATE: 56 BPM

## 2022-03-14 LAB
ALBUMIN SERPL ELPH-MCNC: 3.6 G/DL — SIGNIFICANT CHANGE UP (ref 3.3–5)
ALP SERPL-CCNC: 155 U/L — HIGH (ref 40–120)
ALT FLD-CCNC: 58 U/L — SIGNIFICANT CHANGE UP (ref 12–78)
ANION GAP SERPL CALC-SCNC: 5 MMOL/L — SIGNIFICANT CHANGE UP (ref 5–17)
AST SERPL-CCNC: 43 U/L — HIGH (ref 15–37)
BASOPHILS # BLD AUTO: 0.06 K/UL — SIGNIFICANT CHANGE UP (ref 0–0.2)
BASOPHILS NFR BLD AUTO: 0.9 % — SIGNIFICANT CHANGE UP (ref 0–2)
BILIRUB SERPL-MCNC: 0.5 MG/DL — SIGNIFICANT CHANGE UP (ref 0.2–1.2)
BUN SERPL-MCNC: 13 MG/DL — SIGNIFICANT CHANGE UP (ref 7–23)
CALCIUM SERPL-MCNC: 9.9 MG/DL — SIGNIFICANT CHANGE UP (ref 8.5–10.1)
CHLORIDE SERPL-SCNC: 106 MMOL/L — SIGNIFICANT CHANGE UP (ref 96–108)
CO2 SERPL-SCNC: 28 MMOL/L — SIGNIFICANT CHANGE UP (ref 22–31)
CREAT SERPL-MCNC: 1.22 MG/DL — SIGNIFICANT CHANGE UP (ref 0.5–1.3)
EGFR: 61 ML/MIN/1.73M2 — SIGNIFICANT CHANGE UP
EOSINOPHIL # BLD AUTO: 0.29 K/UL — SIGNIFICANT CHANGE UP (ref 0–0.5)
EOSINOPHIL NFR BLD AUTO: 4.1 % — SIGNIFICANT CHANGE UP (ref 0–6)
GLUCOSE SERPL-MCNC: 91 MG/DL — SIGNIFICANT CHANGE UP (ref 70–99)
HCG SERPL-ACNC: <1 MIU/ML — SIGNIFICANT CHANGE UP
HCT VFR BLD CALC: 40.3 % — SIGNIFICANT CHANGE UP (ref 34.5–45)
HGB BLD-MCNC: 13.7 G/DL — SIGNIFICANT CHANGE UP (ref 11.5–15.5)
IMM GRANULOCYTES NFR BLD AUTO: 0.1 % — SIGNIFICANT CHANGE UP (ref 0–1.5)
LIDOCAIN IGE QN: 171 U/L — SIGNIFICANT CHANGE UP (ref 73–393)
LYMPHOCYTES # BLD AUTO: 2.68 K/UL — SIGNIFICANT CHANGE UP (ref 1–3.3)
LYMPHOCYTES # BLD AUTO: 38 % — SIGNIFICANT CHANGE UP (ref 13–44)
MCHC RBC-ENTMCNC: 27.6 PG — SIGNIFICANT CHANGE UP (ref 27–34)
MCHC RBC-ENTMCNC: 34 GM/DL — SIGNIFICANT CHANGE UP (ref 32–36)
MCV RBC AUTO: 81.3 FL — SIGNIFICANT CHANGE UP (ref 80–100)
MONOCYTES # BLD AUTO: 0.48 K/UL — SIGNIFICANT CHANGE UP (ref 0–0.9)
MONOCYTES NFR BLD AUTO: 6.8 % — SIGNIFICANT CHANGE UP (ref 2–14)
NEUTROPHILS # BLD AUTO: 3.53 K/UL — SIGNIFICANT CHANGE UP (ref 1.8–7.4)
NEUTROPHILS NFR BLD AUTO: 50.1 % — SIGNIFICANT CHANGE UP (ref 43–77)
PLATELET # BLD AUTO: 328 K/UL — SIGNIFICANT CHANGE UP (ref 150–400)
POTASSIUM SERPL-MCNC: 4 MMOL/L — SIGNIFICANT CHANGE UP (ref 3.5–5.3)
POTASSIUM SERPL-SCNC: 4 MMOL/L — SIGNIFICANT CHANGE UP (ref 3.5–5.3)
PROT SERPL-MCNC: 8.4 GM/DL — HIGH (ref 6–8.3)
RBC # BLD: 4.96 M/UL — SIGNIFICANT CHANGE UP (ref 3.8–5.2)
RBC # FLD: 13.8 % — SIGNIFICANT CHANGE UP (ref 10.3–14.5)
SODIUM SERPL-SCNC: 139 MMOL/L — SIGNIFICANT CHANGE UP (ref 135–145)
WBC # BLD: 7.05 K/UL — SIGNIFICANT CHANGE UP (ref 3.8–10.5)
WBC # FLD AUTO: 7.05 K/UL — SIGNIFICANT CHANGE UP (ref 3.8–10.5)

## 2022-03-14 RX ORDER — LIDOCAINE 4 G/100G
10 CREAM TOPICAL ONCE
Refills: 0 | Status: COMPLETED | OUTPATIENT
Start: 2022-03-14 | End: 2022-03-14

## 2022-03-14 RX ADMIN — Medication 30 MILLIGRAM(S): at 00:00

## 2022-03-14 RX ADMIN — SODIUM CHLORIDE 2000 MILLILITER(S): 9 INJECTION, SOLUTION INTRAVENOUS at 00:28

## 2022-03-14 RX ADMIN — Medication 400 MILLIGRAM(S): at 00:01

## 2022-03-14 RX ADMIN — Medication 30 MILLIGRAM(S): at 01:06

## 2022-03-14 RX ADMIN — Medication 1000 MILLIGRAM(S): at 00:28

## 2022-03-14 RX ADMIN — Medication 1000 MILLIGRAM(S): at 01:06

## 2022-03-14 RX ADMIN — Medication 30 MILLILITER(S): at 01:00

## 2022-03-14 RX ADMIN — ONDANSETRON 8 MILLIGRAM(S): 8 TABLET, FILM COATED ORAL at 00:00

## 2022-03-14 NOTE — ED ADULT NURSE REASSESSMENT NOTE - NS ED NURSE REASSESS COMMENT FT1
Pt resting comfortably in bed, c/o "reflux and pain" Dr. Arellano made aware, meds given as ordered, refused viscous lidocaine, states she will try to take maalox at this time, will reassess.

## 2022-03-14 NOTE — ED ADULT NURSE NOTE - OBJECTIVE STATEMENT
Pt presents to ED, A&Ox4, ambulatory w/o assistance, here for evaluation of abdominal pain w/ nausea and vomiting x3days. Denies chest pain, shortness of breath, palpitations, diaphoresis, headaches, fevers, dizziness, nausea, vomiting, diarrhea, or urinary symptoms at this time. Pt states she is not pregnant and is refusing to provide urine sample for UCG testing: Pt made aware of risks/ benefits of medication administration: Pt verbalizes understanding and requests medication to be administered. IV established in left arm with a 20G, labs drawn and sent, call bell in reach, warm blanket provided, bed in lowest position, side rails up x2, MD evaluation in progress. Will continue to monitor.

## 2022-03-17 NOTE — PATIENT PROFILE BEHAVIORAL HEALTH - MEDICATION ADMINISTRATION INFO, PROFILE
Contrast Dose Calculator:   Patient's age: 45.   Patient's sex: Female. Patient weight (kg) = 87.5. Creatinine level (mg/dL) = 0.66. Creatinine clearance (mL/min): 160. Contrast concentration (mg/mL) = 370. MACD = 300 mL. Max Contrast dose per Creatinine Cl calculator = 360 mL. no concerns

## 2022-03-18 ENCOUNTER — APPOINTMENT (OUTPATIENT)
Dept: ORTHOPEDIC SURGERY | Facility: CLINIC | Age: 32
End: 2022-03-18
Payer: COMMERCIAL

## 2022-03-18 VITALS
SYSTOLIC BLOOD PRESSURE: 129 MMHG | WEIGHT: 220 LBS | DIASTOLIC BLOOD PRESSURE: 85 MMHG | BODY MASS INDEX: 34.46 KG/M2 | HEART RATE: 101 BPM

## 2022-03-18 DIAGNOSIS — M25.551 PAIN IN RIGHT HIP: ICD-10-CM

## 2022-03-18 PROCEDURE — 99213 OFFICE O/P EST LOW 20 MIN: CPT

## 2022-03-18 PROCEDURE — 73502 X-RAY EXAM HIP UNI 2-3 VIEWS: CPT | Mod: RT

## 2022-03-21 NOTE — ADDENDUM
[FreeTextEntry1] : This note was written by Daniela Norman on 03/21/2022 acting as scribe for Yamila Mejia, OTR/L, PA

## 2022-03-21 NOTE — PHYSICAL EXAM
[de-identified] : Right Hip:\par Range of motion of her right hip is within functional limits, but she does have some complaints when she does internal rotation.  She does have a little bit of bursitis.  She complains of radiating pain down her leg, so she does have more of a sciatic issue going on as well.\par \par Left Hip: \par Range of Motion in Degrees:\par 	                                 Claimant:	   Normal:	\par Flexion (Active) 	                 120 	   120-degrees	\par Flexion (Passive)	                 120	   120-degrees	\par Extension (Active)	                 -30	   -30-degrees	\par Extension (Passive)	 -30	   -30-degrees	\par Abduction (Active)	                 45-50	   [id number]	\par Abduction (Passive)	 45-50	   [id number]	\par Adduction (Active)  	 20-30	   [id number]	\par Adduction (Passive)	 20-30	   [id number]	\par Internal Rotation (Active) 	 35	   35-degrees	\par Internal Rotation (Passive)	 35	   35-degrees	\par External Rotation (Active)	 45	   45-degrees	\par External Rotation (Passive)	 45	   45-degrees	\par \par No tenderness with internal or external rotation or axial load.  No tenderness to palpation over the greater trochanter.  Negative Trendelenburg.  No tenderness with resisted abduction.  No weakness to flexion, extension, abduction or adduction.  No evidence of instability.  No motor or sensory deficits.  2+ DP and PT pulses.  Skin is intact.  No scars, rashes or lesions.  \par   [de-identified] : Ambulating with a normal gait pattern.  Station:  Normal.  [de-identified] : Appearance:  Well-developed, well-nourished female in no acute distress.\par   [de-identified] : Radiographs, two-three views of the right hip, including the pelvis, reveals no acute fractures or dislocations noted.

## 2022-03-21 NOTE — HISTORY OF PRESENT ILLNESS
[de-identified] : The patient comes in today with complaints to her right hip.  The patient's blood pressure went low and she fell to the right side and hurt her right hip.

## 2022-03-21 NOTE — DISCUSSION/SUMMARY
[de-identified] : At this time, due to right hip pain, as well as lumbar pain radiating down the leg, the patient will be sent to a spine doctor or for possible trigger point injections.  She will also have a right hip MRI to rule out any occult fractures.

## 2022-03-24 ENCOUNTER — OUTPATIENT (OUTPATIENT)
Dept: OUTPATIENT SERVICES | Facility: HOSPITAL | Age: 32
LOS: 1 days | End: 2022-03-24
Payer: COMMERCIAL

## 2022-03-24 VITALS
RESPIRATION RATE: 16 BRPM | HEART RATE: 63 BPM | WEIGHT: 210.1 LBS | TEMPERATURE: 99 F | SYSTOLIC BLOOD PRESSURE: 123 MMHG | DIASTOLIC BLOOD PRESSURE: 78 MMHG | OXYGEN SATURATION: 99 % | HEIGHT: 67 IN

## 2022-03-24 DIAGNOSIS — Z01.818 ENCOUNTER FOR OTHER PREPROCEDURAL EXAMINATION: ICD-10-CM

## 2022-03-24 DIAGNOSIS — Z90.79 ACQUIRED ABSENCE OF OTHER GENITAL ORGAN(S): Chronic | ICD-10-CM

## 2022-03-24 DIAGNOSIS — Z98.890 OTHER SPECIFIED POSTPROCEDURAL STATES: Chronic | ICD-10-CM

## 2022-03-24 DIAGNOSIS — M17.12 UNILATERAL PRIMARY OSTEOARTHRITIS, LEFT KNEE: ICD-10-CM

## 2022-03-24 DIAGNOSIS — Z98.84 BARIATRIC SURGERY STATUS: Chronic | ICD-10-CM

## 2022-03-24 DIAGNOSIS — Z90.49 ACQUIRED ABSENCE OF OTHER SPECIFIED PARTS OF DIGESTIVE TRACT: Chronic | ICD-10-CM

## 2022-03-24 LAB
ANION GAP SERPL CALC-SCNC: 5 MMOL/L — SIGNIFICANT CHANGE UP (ref 5–17)
APTT BLD: 34.9 SEC — SIGNIFICANT CHANGE UP (ref 27.5–35.5)
BASOPHILS # BLD AUTO: 0.02 K/UL — SIGNIFICANT CHANGE UP (ref 0–0.2)
BASOPHILS NFR BLD AUTO: 0.2 % — SIGNIFICANT CHANGE UP (ref 0–2)
BUN SERPL-MCNC: 20 MG/DL — SIGNIFICANT CHANGE UP (ref 7–23)
CALCIUM SERPL-MCNC: 9.7 MG/DL — SIGNIFICANT CHANGE UP (ref 8.5–10.1)
CHLORIDE SERPL-SCNC: 107 MMOL/L — SIGNIFICANT CHANGE UP (ref 96–108)
CO2 SERPL-SCNC: 27 MMOL/L — SIGNIFICANT CHANGE UP (ref 22–31)
CREAT SERPL-MCNC: 0.94 MG/DL — SIGNIFICANT CHANGE UP (ref 0.5–1.3)
EGFR: 83 ML/MIN/1.73M2 — SIGNIFICANT CHANGE UP
EOSINOPHIL # BLD AUTO: 0 K/UL — SIGNIFICANT CHANGE UP (ref 0–0.5)
EOSINOPHIL NFR BLD AUTO: 0 % — SIGNIFICANT CHANGE UP (ref 0–6)
GLUCOSE SERPL-MCNC: 82 MG/DL — SIGNIFICANT CHANGE UP (ref 70–99)
HCT VFR BLD CALC: 41.6 % — SIGNIFICANT CHANGE UP (ref 34.5–45)
HGB BLD-MCNC: 14.4 G/DL — SIGNIFICANT CHANGE UP (ref 11.5–15.5)
IMM GRANULOCYTES NFR BLD AUTO: 0.3 % — SIGNIFICANT CHANGE UP (ref 0–1.5)
INR BLD: 1.2 RATIO — HIGH (ref 0.88–1.16)
LYMPHOCYTES # BLD AUTO: 33 % — SIGNIFICANT CHANGE UP (ref 13–44)
LYMPHOCYTES # BLD AUTO: 4.17 K/UL — HIGH (ref 1–3.3)
MCHC RBC-ENTMCNC: 28.3 PG — SIGNIFICANT CHANGE UP (ref 27–34)
MCHC RBC-ENTMCNC: 34.6 GM/DL — SIGNIFICANT CHANGE UP (ref 32–36)
MCV RBC AUTO: 81.9 FL — SIGNIFICANT CHANGE UP (ref 80–100)
MONOCYTES # BLD AUTO: 0.78 K/UL — SIGNIFICANT CHANGE UP (ref 0–0.9)
MONOCYTES NFR BLD AUTO: 6.2 % — SIGNIFICANT CHANGE UP (ref 2–14)
NEUTROPHILS # BLD AUTO: 7.62 K/UL — HIGH (ref 1.8–7.4)
NEUTROPHILS NFR BLD AUTO: 60.3 % — SIGNIFICANT CHANGE UP (ref 43–77)
PLATELET # BLD AUTO: 356 K/UL — SIGNIFICANT CHANGE UP (ref 150–400)
POTASSIUM SERPL-MCNC: 3.9 MMOL/L — SIGNIFICANT CHANGE UP (ref 3.5–5.3)
POTASSIUM SERPL-SCNC: 3.9 MMOL/L — SIGNIFICANT CHANGE UP (ref 3.5–5.3)
PROTHROM AB SERPL-ACNC: 13.9 SEC — HIGH (ref 10.5–13.4)
RBC # BLD: 5.08 M/UL — SIGNIFICANT CHANGE UP (ref 3.8–5.2)
RBC # FLD: 14 % — SIGNIFICANT CHANGE UP (ref 10.3–14.5)
SODIUM SERPL-SCNC: 139 MMOL/L — SIGNIFICANT CHANGE UP (ref 135–145)
WBC # BLD: 12.63 K/UL — HIGH (ref 3.8–10.5)
WBC # FLD AUTO: 12.63 K/UL — HIGH (ref 3.8–10.5)

## 2022-03-24 PROCEDURE — 80048 BASIC METABOLIC PNL TOTAL CA: CPT

## 2022-03-24 PROCEDURE — 85730 THROMBOPLASTIN TIME PARTIAL: CPT

## 2022-03-24 PROCEDURE — 36415 COLL VENOUS BLD VENIPUNCTURE: CPT

## 2022-03-24 PROCEDURE — 85610 PROTHROMBIN TIME: CPT

## 2022-03-24 PROCEDURE — 85025 COMPLETE CBC W/AUTO DIFF WBC: CPT

## 2022-03-24 PROCEDURE — 93005 ELECTROCARDIOGRAM TRACING: CPT

## 2022-03-24 PROCEDURE — G0463: CPT | Mod: 25

## 2022-03-24 PROCEDURE — 93010 ELECTROCARDIOGRAM REPORT: CPT

## 2022-03-24 NOTE — H&P PST ADULT - ACTIVITY
able to climb 2 flight stairs able to walk 1-2 blocks housework ADL walks around house light house work

## 2022-03-24 NOTE — H&P PST ADULT - ASSESSMENT
30 year old female with morbid obesity with morbid obesity c/o difficulty with weight loss he presents to PST  for laparoscopic gastric bypass  intraoperative endoscopy     Plan:  1. PST instructions given ; NPO status instructions to be given by ASU   2. Pt instructed to take following meds with sip of water : lisinopril clonidine klonopin   3. Pt instructed to take routine evening medications unless indicated   4. Stop NSAIDS ( Aspirin Alev Motrin Mobic Diclofenac), herbal supplements , MVI , Vitamin fish oil 7 days prior to surgery  unless   directed by surgeon or cardiologist;   5. Medical Optimization  with    6. EZ wash instructions given & mupirocin instructions given  7. Labs EKG CXR as per surgeon request   8. Pt instructed to self quarantine after Covid test   9. Covid Testing scheduled Pt notified and aware  10. Pt denies covid symptoms shortness of breath fever cough   11. Urine for pregnancy on day of surgery       CAPRINI SCORE [CLOT]    AGE RELATED RISK FACTORS                                                       MOBILITY RELATED FACTORS  [ ] Age 41-60 years                                            (1 Point)                  [ ] Bed rest                                                        (1 Point)  [ ] Age: 61-74 years                                           (2 Points)                 [ ] Plaster cast                                                   (2 Points)  [ ] Age= 75 years                                              (3 Points)                 [ ] Bed bound for more than 72 hours                 (2 Points)    DISEASE RELATED RISK FACTORS                                               GENDER SPECIFIC FACTORS  [ ] Edema in the lower extremities                       (1 Point)                  [ ] Pregnancy                                                     (1 Point)  [ ] Varicose veins                                               (1 Point)                  [ ] Post-partum < 6 weeks                                   (1 Point)             [ x] BMI > 25 Kg/m2                                            (1 Point)                  [ ] Hormonal therapy  or oral contraception          (1 Point)                 [ ] Sepsis (in the previous month)                        (1 Point)                  [ ] History of pregnancy complications                 (1 point)  [ ] Pneumonia or serious lung disease                                               [ ] Unexplained or recurrent                     (1 Point)           (in the previous month)                               (1 Point)  [ ] Abnormal pulmonary function test                     (1 Point)                 SURGERY RELATED RISK FACTORS  [ ] Acute myocardial infarction                              (1 Point)                 [ ]  Section                                             (1 Point)  [ ] Congestive heart failure (in the previous month)  (1 Point)               [ ] Minor surgery                                                  (1 Point)   [ ] Inflammatory bowel disease                             (1 Point)                 [ ] Arthroscopic surgery                                        (2 Points)  [ ] Central venous access                                      (2 Points)                [ x] General surgery lasting more than 45 minutes   (2 Points)       [ ] Stroke (in the previous month)                          (5 Points)               [ ] Elective arthroplasty                                         (5 Points)            ( ) past and present malignancy                             ( 2 points)                                                                                                                                    HEMATOLOGY RELATED FACTORS                                                 TRAUMA RELATED RISK FACTORS  [ ] Prior episodes of VTE                                     (3 Points)                 [ ] Fracture of the hip, pelvis, or leg                       (5 Points)  [ ] Positive family history for VTE                         (3 Points)                 [ ] Acute spinal cord injury (in the previous month)  (5 Points)  [ ] Prothrombin 33901 A                                     (3 Points)                 [ ] Paralysis  (less than 1 month)                             (5 Points)  [ ] Factor V Leiden                                             (3 Points)                  [ ] Multiple Trauma within 1 month                        (5 Points)  [ ] Lupus anticoagulants                                     (3 Points)                                                           [ ] Anticardiolipin antibodies                               (3 Points)                                                       [ ] High homocysteine in the blood                      (3 Points)                                             [ ] Other congenital or acquired thrombophilia      (3 Points)                                                [ ] Heparin induced thrombocytopenia                  (3 Points)                                          Total Score [   3       ]    The Caprini score indicates this patient is at risk for a VTE event (score 3-5).  Most surgical patients in this group would benefit from pharmacologic prophylaxis.  The surgical team will determine the balance between VTE risk and bleeding risk   30 year old female with morbid obesity with morbid obesity c/o difficulty with weight loss he presents to PST  for laparoscopic gastric bypass  intraoperative endoscopy     Plan:  1. PST instructions given ; NPO status instructions to be given by ASU   2. Pt instructed to take following meds with sip of water :cymbalta latuda    klonopin prn  3. Pt instructed to take routine evening medications unless indicated   4. Stop NSAIDS ( Aspirin Alev Motrin Mobic Diclofenac), herbal supplements , MVI , Vitamin fish oil 7 days prior to surgery  unless   directed by surgeon or cardiologist;   5. Medical Optimization  with Dr Diez   6. EZ wash instructions given   7. Labs EKG as per surgeon request   8. Pt instructed to self quarantine after Covid test   9. Covid Testing scheduled Pt notified and aware  10. Pt denies covid symptoms shortness of breath fever cough   11. Urine for pregnancy on day of surgery       CAPRINI SCORE [CLOT]    AGE RELATED RISK FACTORS                                                       MOBILITY RELATED FACTORS  [ ] Age 41-60 years                                            (1 Point)                  [ ] Bed rest                                                        (1 Point)  [ ] Age: 61-74 years                                           (2 Points)                 [ ] Plaster cast                                                   (2 Points)  [ ] Age= 75 years                                              (3 Points)                 [ ] Bed bound for more than 72 hours                 (2 Points)    DISEASE RELATED RISK FACTORS                                               GENDER SPECIFIC FACTORS  [ ] Edema in the lower extremities                       (1 Point)                  [ ] Pregnancy                                                     (1 Point)  [ ] Varicose veins                                               (1 Point)                  [ ] Post-partum < 6 weeks                                   (1 Point)             [ x] BMI > 25 Kg/m2                                            (1 Point)                  [ ] Hormonal therapy  or oral contraception          (1 Point)                 [ ] Sepsis (in the previous month)                        (1 Point)                  [ ] History of pregnancy complications                 (1 point)  [ ] Pneumonia or serious lung disease                                               [ ] Unexplained or recurrent                     (1 Point)           (in the previous month)                               (1 Point)  [ ] Abnormal pulmonary function test                     (1 Point)                 SURGERY RELATED RISK FACTORS  [ ] Acute myocardial infarction                              (1 Point)                 [ ]  Section                                             (1 Point)  [ ] Congestive heart failure (in the previous month)  (1 Point)               [ ] Minor surgery                                                  (1 Point)   [ ] Inflammatory bowel disease                             (1 Point)                 [ ] Arthroscopic surgery                                        (2 Points)  [ ] Central venous access                                      (2 Points)                [ x] General surgery lasting more than 45 minutes   (2 Points)       [ ] Stroke (in the previous month)                          (5 Points)               [ ] Elective arthroplasty                                         (5 Points)            ( ) past and present malignancy                             ( 2 points)                                                                                                                                    HEMATOLOGY RELATED FACTORS                                                 TRAUMA RELATED RISK FACTORS  [ ] Prior episodes of VTE                                     (3 Points)                 [ ] Fracture of the hip, pelvis, or leg                       (5 Points)  [ ] Positive family history for VTE                         (3 Points)                 [ ] Acute spinal cord injury (in the previous month)  (5 Points)  [ ] Prothrombin 63070 A                                     (3 Points)                 [ ] Paralysis  (less than 1 month)                             (5 Points)  [ ] Factor V Leiden                                             (3 Points)                  [ ] Multiple Trauma within 1 month                        (5 Points)  [ ] Lupus anticoagulants                                     (3 Points)                                                           [ ] Anticardiolipin antibodies                               (3 Points)                                                       [ ] High homocysteine in the blood                      (3 Points)                                             [ ] Other congenital or acquired thrombophilia      (3 Points)                                                [ ] Heparin induced thrombocytopenia                  (3 Points)                                          Total Score [   3       ]    The Caprini score indicates this patient is at risk for a VTE event (score 3-5).  Most surgical patients in this group would benefit from pharmacologic prophylaxis.  The surgical team will determine the balance between VTE risk and bleeding risk

## 2022-03-24 NOTE — H&P PST ADULT - NSICDXPASTSURGICALHX_GEN_ALL_CORE_FT
PAST SURGICAL HISTORY:  H/O knee surgery 2008 2009 2015    H/O ovarian cystectomy 2003 2017    H/O unilateral salpingectomy right    History of cholecystectomy 2016    History of lumbar laminectomy 6/2021     PAST SURGICAL HISTORY:  H/O gastric bypass lost 50 lbs 12/2021    H/O knee surgery 2008 2009 2015    H/O ovarian cystectomy 2003 2017    H/O unilateral salpingectomy right    History of cholecystectomy 2016    History of lumbar laminectomy 6/2021     PAST SURGICAL HISTORY:  H/O gastric bypass lost 50 lbs 12/2021 and appendectomy    H/O knee surgery 2008 2009 2015    H/O ovarian cystectomy 2003 2017    H/O unilateral salpingectomy right    History of cholecystectomy 2016    History of lumbar laminectomy 6/2021

## 2022-03-24 NOTE — H&P PST ADULT - HEALTH CARE MAINTENANCE
Pt denies travel out of [facility] for the past 14 days Pt denies  travel internationally for the past 21 days

## 2022-03-24 NOTE — H&P PST ADULT - FALL HARM RISK - HARM RISK INTERVENTIONS

## 2022-03-24 NOTE — H&P PST ADULT - MUSCULOSKELETAL
No joint pain, swelling or deformity; no limitation of movement details… detailed exam decreased ROM due to pain

## 2022-03-24 NOTE — H&P PST ADULT - FALL HARM RISK - UNIVERSAL INTERVENTIONS
Bed in lowest position, wheels locked, appropriate side rails in place/Call bell, personal items and telephone in reach/Instruct patient to call for assistance before getting out of bed or chair/Non-slip footwear when patient is out of bed/[place] to call system/Physically safe environment - no spills, clutter or unnecessary equipment/Purposeful Proactive Rounding/Room/bathroom lighting operational, light cord in reach

## 2022-03-24 NOTE — H&P PST ADULT - HISTORY OF PRESENT ILLNESS
30 year old female with morbid obesity with morbid obesity c/o difficulty with weight loss he presents to [facility]  for laparoscopic gastric bypass  intraoperative endoscopy  31  year old female diagnosed with torn meniscus left  knee c/o right knee pain with ROM; presents to PST for planned left  knee arthroscopy meniscectomy

## 2022-03-24 NOTE — H&P PST ADULT - NSICDXPASTMEDICALHX_GEN_ALL_CORE_FT
PAST MEDICAL HISTORY:  ADHD (attention deficit hyperactivity disorder)     Anemia     Anxiety     Borderline personality disorder     Cholecystitis S/P cholycystectomy    Chronic back pain     CKD (chronic kidney disease)     Depression     DM2 (diabetes mellitus, type 2)     GERD (gastroesophageal reflux disease)     HLD (hyperlipidemia)     HTN (hypertension)     Lymphocytosis     Mixed connective tissue disease     Nephrosclerosis     Obesity     Opiate dependence     PCOS (polycystic ovarian syndrome)     Polycystic ovarian syndrome     Suicide attempt by drug overdose 2019 Tylenol OD    UTI (urinary tract infection)      PAST MEDICAL HISTORY:  ADHD (attention deficit hyperactivity disorder)     Anemia     Anxiety     Borderline personality disorder     Cholecystitis S/P cholycystectomy    Chronic back pain     CKD (chronic kidney disease)     Depression     DM2 (diabetes mellitus, type 2)     Fractured coccyx     GERD (gastroesophageal reflux disease)     HLD (hyperlipidemia)     HTN (hypertension)     Hypotension     Lymphocytosis     Mixed connective tissue disease     Nephrosclerosis     Obesity     Opiate dependence     PCOS (polycystic ovarian syndrome)     Polycystic ovarian syndrome     Suicide attempt by drug overdose 2019 Tylenol OD    Torn meniscus right knee    UTI (urinary tract infection)      PAST MEDICAL HISTORY:  ADHD (attention deficit hyperactivity disorder)     Anemia     Anxiety     Borderline personality disorder     Bradycardia     Cholecystitis S/P cholycystectomy    Chronic back pain     CKD (chronic kidney disease)     Depression     DM2 (diabetes mellitus, type 2)     Fractured coccyx     GERD (gastroesophageal reflux disease)     H/O appendicitis     HLD (hyperlipidemia)     HTN (hypertension)     Hypotension     Lymphocytosis     Mixed connective tissue disease     Nephrosclerosis     Obesity     Opiate dependence     PCOS (polycystic ovarian syndrome)     Polycystic ovarian syndrome     Suicide attempt by drug overdose 2019 Tylenol OD    Torn meniscus right knee    UTI (urinary tract infection)

## 2022-03-25 DIAGNOSIS — Z01.818 ENCOUNTER FOR OTHER PREPROCEDURAL EXAMINATION: ICD-10-CM

## 2022-03-25 DIAGNOSIS — M17.12 UNILATERAL PRIMARY OSTEOARTHRITIS, LEFT KNEE: ICD-10-CM

## 2022-03-27 ENCOUNTER — EMERGENCY (EMERGENCY)
Facility: HOSPITAL | Age: 32
LOS: 0 days | Discharge: HOME | End: 2022-03-27
Attending: EMERGENCY MEDICINE
Payer: COMMERCIAL

## 2022-03-27 VITALS — WEIGHT: 205.03 LBS | HEIGHT: 67 IN

## 2022-03-27 VITALS
DIASTOLIC BLOOD PRESSURE: 72 MMHG | HEART RATE: 88 BPM | OXYGEN SATURATION: 96 % | SYSTOLIC BLOOD PRESSURE: 116 MMHG | TEMPERATURE: 99 F | RESPIRATION RATE: 19 BRPM

## 2022-03-27 DIAGNOSIS — Z90.49 ACQUIRED ABSENCE OF OTHER SPECIFIED PARTS OF DIGESTIVE TRACT: ICD-10-CM

## 2022-03-27 DIAGNOSIS — Z98.890 OTHER SPECIFIED POSTPROCEDURAL STATES: Chronic | ICD-10-CM

## 2022-03-27 DIAGNOSIS — F41.9 ANXIETY DISORDER, UNSPECIFIED: ICD-10-CM

## 2022-03-27 DIAGNOSIS — M53.3 SACROCOCCYGEAL DISORDERS, NOT ELSEWHERE CLASSIFIED: ICD-10-CM

## 2022-03-27 DIAGNOSIS — E11.22 TYPE 2 DIABETES MELLITUS WITH DIABETIC CHRONIC KIDNEY DISEASE: ICD-10-CM

## 2022-03-27 DIAGNOSIS — E78.5 HYPERLIPIDEMIA, UNSPECIFIED: ICD-10-CM

## 2022-03-27 DIAGNOSIS — Z91.018 ALLERGY TO OTHER FOODS: ICD-10-CM

## 2022-03-27 DIAGNOSIS — F90.9 ATTENTION-DEFICIT HYPERACTIVITY DISORDER, UNSPECIFIED TYPE: ICD-10-CM

## 2022-03-27 DIAGNOSIS — Z98.84 BARIATRIC SURGERY STATUS: Chronic | ICD-10-CM

## 2022-03-27 DIAGNOSIS — M54.50 LOW BACK PAIN, UNSPECIFIED: ICD-10-CM

## 2022-03-27 DIAGNOSIS — F32.A DEPRESSION, UNSPECIFIED: ICD-10-CM

## 2022-03-27 DIAGNOSIS — G89.29 OTHER CHRONIC PAIN: ICD-10-CM

## 2022-03-27 DIAGNOSIS — I12.9 HYPERTENSIVE CHRONIC KIDNEY DISEASE WITH STAGE 1 THROUGH STAGE 4 CHRONIC KIDNEY DISEASE, OR UNSPECIFIED CHRONIC KIDNEY DISEASE: ICD-10-CM

## 2022-03-27 DIAGNOSIS — N18.9 CHRONIC KIDNEY DISEASE, UNSPECIFIED: ICD-10-CM

## 2022-03-27 DIAGNOSIS — M54.9 DORSALGIA, UNSPECIFIED: ICD-10-CM

## 2022-03-27 DIAGNOSIS — Z90.49 ACQUIRED ABSENCE OF OTHER SPECIFIED PARTS OF DIGESTIVE TRACT: Chronic | ICD-10-CM

## 2022-03-27 DIAGNOSIS — K21.9 GASTRO-ESOPHAGEAL REFLUX DISEASE WITHOUT ESOPHAGITIS: ICD-10-CM

## 2022-03-27 DIAGNOSIS — Z91.010 ALLERGY TO PEANUTS: ICD-10-CM

## 2022-03-27 DIAGNOSIS — Z88.0 ALLERGY STATUS TO PENICILLIN: ICD-10-CM

## 2022-03-27 DIAGNOSIS — Z90.79 ACQUIRED ABSENCE OF OTHER GENITAL ORGAN(S): Chronic | ICD-10-CM

## 2022-03-27 DIAGNOSIS — D63.1 ANEMIA IN CHRONIC KIDNEY DISEASE: ICD-10-CM

## 2022-03-27 PROCEDURE — 99283 EMERGENCY DEPT VISIT LOW MDM: CPT

## 2022-03-27 PROCEDURE — 99284 EMERGENCY DEPT VISIT MOD MDM: CPT

## 2022-03-27 PROCEDURE — 96372 THER/PROPH/DIAG INJ SC/IM: CPT

## 2022-03-27 RX ORDER — KETOROLAC TROMETHAMINE 30 MG/ML
60 SYRINGE (ML) INJECTION ONCE
Refills: 0 | Status: DISCONTINUED | OUTPATIENT
Start: 2022-03-27 | End: 2022-03-27

## 2022-03-27 RX ORDER — LIDOCAINE 4 G/100G
1 CREAM TOPICAL ONCE
Refills: 0 | Status: COMPLETED | OUTPATIENT
Start: 2022-03-27 | End: 2022-03-27

## 2022-03-27 RX ORDER — OXYCODONE AND ACETAMINOPHEN 5; 325 MG/1; MG/1
1 TABLET ORAL ONCE
Refills: 0 | Status: DISCONTINUED | OUTPATIENT
Start: 2022-03-27 | End: 2022-03-27

## 2022-03-27 RX ADMIN — Medication 60 MILLIGRAM(S): at 22:16

## 2022-03-27 RX ADMIN — OXYCODONE AND ACETAMINOPHEN 1 TABLET(S): 5; 325 TABLET ORAL at 22:16

## 2022-03-27 RX ADMIN — LIDOCAINE 1 PATCH: 4 CREAM TOPICAL at 22:16

## 2022-03-27 NOTE — ED STATDOCS - PATIENT PORTAL LINK FT
You can access the FollowMyHealth Patient Portal offered by [facility] by registering at the following website: [email or web address]. By joining [email or web address]’s FollowMyHealth portal, you will also be able to view your health information using other applications (apps) compatible with our system.

## 2022-03-27 NOTE — ED STATDOCS - NSFOLLOWUPINSTRUCTIONS_ED_ALL_ED_FT
FOLLOW UP WITH PAIN MANAGEMENT AND SPINE AS SOON AS POSSIBLE. CALL THE OFFICE TO MAKE AN APPOINTMENT. RETURN TO ER FOR ANY WORSENING SYMPTOMS OR NEW CONCERNS.     Chronic Back Pain      When back pain lasts longer than 3 months, it is called chronic back pain. The cause of your back pain may not be known. Some common causes include:  •Wear and tear (degenerative disease) of the bones, ligaments, or disks in your back.      •Inflammation and stiffness in your back (arthritis).      People who have chronic back pain often go through certain periods in which the pain is more intense (flare-ups). Many people can learn to manage the pain with home care.      Follow these instructions at home:    Pay attention to any changes in your symptoms. Take these actions to help with your pain:      Managing pain and stiffness                 •If directed, apply ice to the painful area. Your health care provider may recommend applying ice during the first 24–48 hours after a flare-up begins. To do this:  •Put ice in a plastic bag.      •Place a towel between your skin and the bag.      •Leave the ice on for 20 minutes, 2–3 times per day.      •If directed, apply heat to the affected area as often as told by your health care provider. Use the heat source that your health care provider recommends, such as a moist heat pack or a heating pad.  •Place a towel between your skin and the heat source.      •Leave the heat on for 20–30 minutes.      •Remove the heat if your skin turns bright red. This is especially important if you are unable to feel pain, heat, or cold. You may have a greater risk of getting burned.        •Try soaking in a warm tub.        Activity      •Avoid bending and other activities that make the problem worse.    •Maintain a proper position when standing or sitting:  •When standing, keep your upper back and neck straight, with your shoulders pulled back. Avoid slouching.      •When sitting, keep your back straight and relax your shoulders. Do not round your shoulders or pull them backward.        • Do not sit or  one place for long periods of time.      •Take brief periods of rest throughout the day. This will reduce your pain. Resting in a lying or standing position is usually better than sitting to rest.      •When you are resting for longer periods, mix in some mild activity or stretching between periods of rest. This will help to prevent stiffness and pain.      •Get regular exercise. Ask your health care provider what activities are safe for you.    • Do not lift anything that is heavier than 10 lb (4.5 kg), or the limit that you are told, until your health care provider says that it is safe. Always use proper lifting technique, which includes:  •Bending your knees.      •Keeping the load close to your body.      •Avoiding twisting.        •Sleep on a firm mattress in a comfortable position. Try lying on your side with your knees slightly bent. If you lie on your back, put a pillow under your knees.      Medicines     •Treatment may include medicines for pain and inflammation taken by mouth or applied to the skin, prescription pain medicine, or muscle relaxants. Take over-the-counter and prescription medicines only as told by your health care provider.    •Ask your health care provider if the medicine prescribed to you:  •Requires you to avoid driving or using machinery.    •Can cause constipation. You may need to take these actions to prevent or treat constipation:  •Drink enough fluid to keep your urine pale yellow.      •Take over-the-counter or prescription medicines.      •Eat foods that are high in fiber, such as beans, whole grains, and fresh fruits and vegetables.      •Limit foods that are high in fat and processed sugars, such as fried or sweet foods.          General instructions     • Do not use any products that contain nicotine or tobacco, such as cigarettes, e-cigarettes, and chewing tobacco. If you need help quitting, ask your health care provider.      •Keep all follow-up visits as told by your health care provider. This is important.        Contact a health care provider if:    •You have pain that is not relieved with rest or medicine.      •Your pain gets worse, or you have new pain.      •You have a high fever.      •You have rapid weight loss.      •You have trouble doing your normal activities.        Get help right away if:    •You have weakness or numbness in one or both of your legs or feet.      •You have trouble controlling your bladder or your bowels.    •You have severe back pain and have any of the following:  •Nausea or vomiting.      •Pain in your abdomen.      •Shortness of breath or you faint.          Summary    •Chronic back pain is back pain that lasts longer than 3 months.      •When a flare-up begins, apply ice to the painful area for the first 24–48 hours.      •Apply a moist heat pad or use a heating pad on the painful area as directed by your health care provider.      •When you are resting for longer periods, mix in some mild activity or stretching between periods of rest. This will help to prevent stiffness and pain.      This information is not intended to replace advice given to you by your health care provider. Make sure you discuss any questions you have with your health care provider.      Document Revised: 01/27/2021 Document Reviewed: 01/27/2021    Elsevier Patient Education © 2022 [employer]

## 2022-03-27 NOTE — ED STATDOCS - CARE PROVIDER_API CALL
Saeid Ha)  Orthopaedic Surgery  [address] [place] [address]  Phone: [phone]  Fax: [phone]  Follow Up Time:

## 2022-03-27 NOTE — ED STATDOCS - PHYSICAL EXAMINATION
Constitutional: NAD AAOx3  Eyes: PERRLA EOMI  Head: Normocephalic atraumatic  Mouth: MMM  Cardiac: regular rate   Resp: Lungs CTAB  GI: Abd s/nt/nd, no rebound or guarding.  Neuro: awake, alert, moving all extremities, cranial nerves 2-12 intact, sensation intact, no dysmetria.  MSK: +Midline incision scar, lower back minimally tender, no erythema, no bruising, no signs of infection   Skin: No rashes

## 2022-03-27 NOTE — ED STATDOCS - NSICDXPASTSURGICALHX_GEN_ALL_CORE_FT
PAST SURGICAL HISTORY:  H/O gastric bypass lost 50 lbs 12/2021 and appendectomy    H/O knee surgery 2008 2009 2015    H/O ovarian cystectomy 2003 2017    H/O unilateral salpingectomy right    History of cholecystectomy 2016    History of lumbar laminectomy 6/2021

## 2022-03-27 NOTE — ED STATDOCS - CLINICAL SUMMARY MEDICAL DECISION MAKING FREE TEXT BOX
32 y/o female here with chronic low back and coccyx pain. Pt states medications at home are not working, pt has pain management doctor, states he is not helping her, will give referral for pain management. Will give 1 shot IM Toradol, Lidocaine patch and 1 percocet. Will DC patient with pain management follow up.

## 2022-03-27 NOTE — ED STATDOCS - ATTENDING CONTRIBUTION TO CARE
I, Molly Cerda MD, performed the initial face to face bedside interview with this patient regarding history of present illness, review of symptoms and relevant past medical, social and family history.  I completed an independent physical examination.  I was the initial provider who evaluated this patient. I have signed out the follow up of any pending tests (i.e. labs, radiological studies) to the ACP.  I have communicated the patient’s plan of care and disposition with the ACP.  The history, relevant review of systems, past medical and surgical history, medical decision making, and physical examination was documented by the scribe in my presence and I attest to the accuracy of the documentation.

## 2022-03-27 NOTE — ED STATDOCS - OBJECTIVE STATEMENT
32 y/o female with a PMHx of HTN, HLD, DM2, GERD, Chronic back pain, Borderline personality, anxiety, Depression, hx of suicide attempts, hx of self harm, Mixed connective tissue disease, FSGS, PCOS, Obesity, s/p gastric bypass surgery and appendectomy on 12/29/2021, s/p R salpingectomy, s/p cholecystectomy presents to the Ed with low back/coccyx pain. Pre reports she fractured her coccyx in February. Pt states pain is unbearable, tried Motrin, Tylenol, Advil, Gabapentin, Tramazoline with no relief. Pt reports she has multiple fall due to hypotension since her gastric bypass. Pt reports she had MRIs done last week. Pt's pain management doctor referred her to the ED for evaluation.

## 2022-03-27 NOTE — ED STATDOCS - NS ED ATTENDING STATEMENT MOD
This was a shared visit with the CJ. I reviewed and verified the documentation and independently performed the documented:

## 2022-03-27 NOTE — ED STATDOCS - PROGRESS NOTE DETAILS
signed Sarah Barrios PA-C Pt seen and evaluated in intake by Dr Cerda.   31F here with her mother c/o unrelenting back pain. pt had coccyx fx last month, had recent MRI ordered by her pain management doctor. pt notes her home medications aren't working and she would like a new pain management doctor and toradol or morphine. Will give morphine and lidocaine patch. signed Sarah Barrios PA-C   Of note, in pts chart, pt was treated for overdose in January of this year. In light of this will not send any rx for narcotic substances over concern for safety of pt. pt has muscle relaxer at home.

## 2022-03-27 NOTE — ED ADULT TRIAGE NOTE - CHIEF COMPLAINT QUOTE
pt arrived to ED c/o  increased coccyx pain. pt states she fractured her coccyx in february. pt last took motrin and tizanidine at 1900 this evening with no relief. no acute distress noted in triage.

## 2022-03-27 NOTE — ED ADULT TRIAGE NOTE - INTERNATIONAL TRAVEL
Problem: Patient Care Overview  Goal: Plan of Care Review  Outcome: Ongoing (interventions implemented as appropriate)  Greeted patient and gained consent for nursing care. POC reviewed, verbalized understanding. Prepped and made comfortable for the night. Bed on lowest position, locked. Call bell within reach. Incontinent of urine, perineal care done. No complaint of pain. Assisted in his needs. Will continue to monitor.        No

## 2022-03-27 NOTE — ED STATDOCS - NS ED ROS FT
Constitutional: No fever or chills  Eyes: No visual changes  HEENT: No throat pain  CV: No chest pain  Resp: No SOB no cough  GI: No abd pain, nausea or vomiting  : No dysuria  MSK: +Back pain +Coccyx pain  Skin: No rash  Neuro: No headache

## 2022-03-27 NOTE — ED STATDOCS - NSICDXPASTMEDICALHX_GEN_ALL_CORE_FT
PAST MEDICAL HISTORY:  ADHD (attention deficit hyperactivity disorder)     Anemia     Anxiety     Borderline personality disorder     Bradycardia     Cholecystitis S/P cholycystectomy    Chronic back pain     CKD (chronic kidney disease)     Depression     DM2 (diabetes mellitus, type 2)     Fractured coccyx     GERD (gastroesophageal reflux disease)     H/O appendicitis     HLD (hyperlipidemia)     HTN (hypertension)     Hypotension     Lymphocytosis     Mixed connective tissue disease     Nephrosclerosis     Obesity     Opiate dependence     PCOS (polycystic ovarian syndrome)     Polycystic ovarian syndrome     Suicide attempt by drug overdose 2019 Tylenol OD    Torn meniscus right knee    UTI (urinary tract infection)

## 2022-03-28 PROBLEM — S83.209A UNSPECIFIED TEAR OF UNSPECIFIED MENISCUS, CURRENT INJURY, UNSPECIFIED KNEE, INITIAL ENCOUNTER: Chronic | Status: ACTIVE | Noted: 2022-03-24

## 2022-03-28 PROBLEM — I95.9 HYPOTENSION, UNSPECIFIED: Chronic | Status: ACTIVE | Noted: 2022-03-24

## 2022-03-28 PROBLEM — Z87.19 PERSONAL HISTORY OF OTHER DISEASES OF THE DIGESTIVE SYSTEM: Chronic | Status: ACTIVE | Noted: 2022-03-24

## 2022-03-28 PROBLEM — S32.2XXA FRACTURE OF COCCYX, INITIAL ENCOUNTER FOR CLOSED FRACTURE: Chronic | Status: ACTIVE | Noted: 2022-03-24

## 2022-03-28 PROBLEM — R00.1 BRADYCARDIA, UNSPECIFIED: Chronic | Status: ACTIVE | Noted: 2022-03-24

## 2022-03-29 ENCOUNTER — APPOINTMENT (OUTPATIENT)
Dept: ORTHOPEDIC SURGERY | Facility: CLINIC | Age: 32
End: 2022-03-29
Payer: COMMERCIAL

## 2022-03-29 VITALS
SYSTOLIC BLOOD PRESSURE: 125 MMHG | DIASTOLIC BLOOD PRESSURE: 80 MMHG | HEART RATE: 89 BPM | HEIGHT: 67 IN | WEIGHT: 220 LBS | BODY MASS INDEX: 34.53 KG/M2

## 2022-03-29 PROCEDURE — 99214 OFFICE O/P EST MOD 30 MIN: CPT

## 2022-03-30 LAB — SARS-COV-2 N GENE NPH QL NAA+PROBE: NOT DETECTED

## 2022-03-30 NOTE — ADDENDUM
[FreeTextEntry1] : Documented by Saúl Gan acting as a scribe for Dr. Nugent on 03/29/2022. \par \par All medical record entries made by the Scribe were at my, Dr. Nugent's, direction and\par personally dictated by me on 03/29/2022. I have reviewed the chart and agree that the record\par accurately reflects my personal performance of the history, physical exam, procedure and imaging.

## 2022-03-30 NOTE — HISTORY OF PRESENT ILLNESS
[Worsening] : worsening [___ mths] : [unfilled] month(s) ago [2] : a current pain level of 2/10 [4] : an average pain level of 4/10 [Running] : worsened by running [Walking] : worsened by walking [Knee Extension] : worsened with knee extension [de-identified] : TIFFANY ANGEL is a 31 year y/o female who presents for initial visit of Left knee pain. She reports intermittent pain since past few months. She localizes most of her pain over lateral aspect of the knee. She describes pain as dull achy, that exacerbates with movement. She had 2 prior knee surgery, Dr. Lozano did frederick procedure in 2009, and Dr Irvin did medial meniscus repair in 2016. She states both surgeries did not relieve her pain, and patient elected for multiple cortisone injections in past. She also had radiating pain towards her lumbar region. She reports pain with walking, and endorses occasional instability inside knee joint. She denies locking, or buckling of the knee joint. Patient reports 1 month ago, she had fracture to her coccyx. She was booked for surgical intervention with Dr. Galindo, but unfortunately was not able to proceed with it.

## 2022-03-30 NOTE — DISCUSSION/SUMMARY
[de-identified] : TIFFANY ANGEL is a 31 year y/o female who presents for initial visit of Left knee pain. She reports intermittent pain since past few months. She localizes most of her pain over lateral aspect of the knee. She describes pain as dull achy, that exacerbates with movement. She had 2 prior knee surgery, Dr. Lozano did frederick procedure in 2009, and Dr Irvin did medial meniscus repair in 2016. She states both surgeries did not relieve her pain, and patient elected for multiple cortisone injections in past. She also had radiating pain towards her lumbar region. She reports pain with walking, and endorses occasional instability inside knee joint. She denies locking, or buckling of the knee joint. Patient reports 1 month ago, she had fracture to her coccyx. She was booked for surgical intervention with Dr. Galindo, but unfortunately was not able to proceed with it. Patient is nurse at high school. \par \par We had a thorough discussion regarding the nature of her pain, the pathophysiology, as well as all treatment options. I discussed operative and non-operative treatment modalities. Patient has tried and failed all conservative treatment options including the activity modification, rest, PT, HEP, injection and NSAIDs. Patient is considering surgical treatment . All the risks and benefits of an arthroscopic lateral meniscus repair, possible medial meniscus repair were discussed with the patient. Risks include, but are not limited to, infection, bleeding, dislocation, nerve injury, blood clots and other possible medical complications, which were discussed with the patient. Also the risks of possible readmission were discussed with the patient. Patient completely understands all risks and benefits. The need for postoperative DVT prophylaxis discussed with the patient as well. The patient was given no assurances that all the symptoms will be alleviated. A packet was given to patient that describes pathophysiology, entire procedure, likely outcome, risks, and benefits, along with post operative physical therapy plan.  Patient completely understands all this. She has been advised to get medical clearance before the procedure, in order to decrease complication risk. I had my surgical coordinator Luis meet with pt to go over dates to schedule this procedure. The pt understands continuation of PT until this procedure helps surgical outcomes as he is to continue doing this 2x/week until then. She agrees to the above plan and all questions were answered. \par \par

## 2022-03-30 NOTE — PHYSICAL EXAM
[de-identified] : Physical Exam:\par General: Well appearing, no acute distress\par Neurologic: A&Ox3, No focal deficits\par Head: NCAT without abrasions, lacerations, or ecchymosis to head, face, or scalp\par Eyes: No scleral icterus, no gross abnormalities\par Respiratory: Equal chest wall expansion bilaterally, no accessory muscle use\par Lymphatic: No lymphadenopathy palpated\par Skin: Warm and dry\par Psychiatric: Normal mood and affect\par \par Left knee\par \par Inspection/Palpation: Gait evaluation does reveal a limp. The knee exhibits a moderate effusion. There is no inguinal adenopathy. Limb is well-perfused, without skin lesions, shows a grossly normal motor and sensory examination. 2 screw heads noted. \par ROM: The Leftt knee motion is 0 - 120 degrees with pain at extremes, and crunching. Lateral titlting of patella.  \par Muscle/Nerves: Quad strength decreased secondary to injury. Motor exam 5/ distally, EHL/FHL/GSC/TA\par SILT L4-S1\par Special Tests: \par Joint line tenderness noted lateral joint line at 0 and 90 degrees. \par Positive Thessaly test. Positive Corrina test. Positive Appley grind test\par Stable in varus and valgus stress at 0 and 30 degrees\par Negative posterior drawer. \par The knee has a negative Lachman and negative anterior drawer.\par Normal hip and ankle exam. \par \par Right Knee\par \par Inspection/Palpation: There is no inguinal adenopathy. Well perfused, without skin lesions, shows a grossly normal motor and sensory examination. \par ROM: Normal\par Muscle/Nerves: Quad strength decreased secondary to injury. Motor exam 5/ distally, EHL/FHL/GSC/TA\par SILT L4-S1\par Special Tests: \par No Joint line tenderness noted  at medial and lateral joint line at 0 and 90 degrees. \par Stable in varus and valgus stress at 0 and 30 degrees\par Negative posterior drawer. \par Negative anterior drawer and stable Lachman \par Normal hip and ankle exam.  [de-identified] : Procedure: MRI of Left knee \par Dated: 2/23/2022\par \par Impression:\par \par Horizontal cleavage tear involving the lateral meniscus. \par \par Grade 3 patellar cartilage loss, unchanged. \par  \par The following radiographs were ordered by Dr. Galindo and read by me during this patients visit. I reviewed each radiograph in detail with the patient and discussed the findings as highlighted below. \par \par 2 views of the Left knee show loosening of hardware noted from Slim procedure  no fracture, dislocation or bony lesions. There is no evidence of degenerative change in the medial, lateral and patellofemoral compartments with maintenance of the joint space. Otherwise unremarkable.

## 2022-03-31 ENCOUNTER — APPOINTMENT (OUTPATIENT)
Dept: ORTHOPEDIC SURGERY | Facility: CLINIC | Age: 32
End: 2022-03-31

## 2022-04-01 ENCOUNTER — APPOINTMENT (OUTPATIENT)
Dept: ORTHOPEDIC SURGERY | Facility: HOSPITAL | Age: 32
End: 2022-04-01

## 2022-04-01 ENCOUNTER — APPOINTMENT (OUTPATIENT)
Dept: ORTHOPEDIC SURGERY | Facility: AMBULATORY SURGERY CENTER | Age: 32
End: 2022-04-01
Payer: COMMERCIAL

## 2022-04-01 DIAGNOSIS — S83.207A UNSPECIFIED TEAR OF UNSPECIFIED MENISCUS, CURRENT INJURY, LEFT KNEE, INITIAL ENCOUNTER: ICD-10-CM

## 2022-04-01 PROCEDURE — 29880 ARTHRS KNE SRG MNISECTMY M&L: CPT | Mod: LT

## 2022-04-01 RX ORDER — ONDANSETRON 4 MG/1
4 TABLET ORAL
Qty: 50 | Refills: 0 | Status: ACTIVE | COMMUNITY
Start: 2022-04-01 | End: 1900-01-01

## 2022-04-08 NOTE — ED ADULT TRIAGE NOTE - NS ED TRIAGE AVPU SCALE
Phone disconnected / No Show - letter sent to make an appoint for mammogram   Alert-The patient is alert, awake and responds to voice. The patient is oriented to time, place, and person. The triage nurse is able to obtain subjective information.

## 2022-04-14 NOTE — ED ADULT NURSE NOTE - COVID-19 RESULT
CALL-PEDIATRICKettering Health Washington Township      " NEGATIVE no

## 2022-04-21 ENCOUNTER — APPOINTMENT (OUTPATIENT)
Dept: ORTHOPEDIC SURGERY | Facility: CLINIC | Age: 32
End: 2022-04-21

## 2022-04-30 NOTE — ED BEHAVIORAL HEALTH ASSESSMENT NOTE - NS ED [facility] MED ROS ENDOCRINE
Child woke up from nap today with bilateral eye discharge. Mother states he's been rubbing his eyes constantly. Eyes appear pink, right eye appears more irritated than left. Mild fevers at home per mother's report.      Triage Assessment     Row Name 04/29/22 2006       Triage Assessment (Pediatric)    Airway WDL WDL       Respiratory WDL    Respiratory WDL WDL       Skin Circulation/Temperature WDL    Skin Circulation/Temperature WDL WDL       Cardiac WDL    Cardiac WDL WDL       Peripheral/Neurovascular WDL    Peripheral Neurovascular WDL WDL       Cognitive/Neuro/Behavioral WDL    Cognitive/Neuro/Behavioral WDL WDL              
No complaints

## 2022-05-04 NOTE — ED BEHAVIORAL HEALTH ASSESSMENT NOTE - NS ED [facility] MED ROS NEUROLOGICAL
Problem: Impaired Physical Mobility  Goal: # Bed mobility, ambulation, and ADLs are maintained or returned to baseline during hospitalization  Outcome: Outcome Not Met, Continue to Monitor     Problem: At Risk for Falls  Goal: # Patient does not fall  Outcome: Outcome Met, Continue evaluating goal progress toward completion  Goal: # Takes action to control fall-related risks  Outcome: Outcome Met, Continue evaluating goal progress toward completion  Goal: # Verbalizes understanding of fall risk/precautions  Description: Document education using the patient education activity  Outcome: Outcome Met, Continue evaluating goal progress toward completion     Problem: At Risk for Injury Due to Fall  Goal: # Patient does not fall  Outcome: Outcome Met, Continue evaluating goal progress toward completion  Goal: # Takes action to control condition specific risks  Outcome: Outcome Met, Continue evaluating goal progress toward completion  Goal: # Verbalizes understanding of fall-related injury personal risks  Description: Document education using the patient education activity  Outcome: Outcome Met, Continue evaluating goal progress toward completion     Problem: Pressure Injury, Risk for  Goal: # Skin remains intact  Outcome: Outcome Met, Continue evaluating goal progress toward completion  Goal: No new pressure injury (PI) development  Outcome: Outcome Met, Continue evaluating goal progress toward completion  Goal: # Verbalizes understanding of PI risk factors and prevention strategies  Description: Document education using the patient education activity.   Outcome: Outcome Met, Continue evaluating goal progress toward completion     Problem: VTE, Risk for  Goal: # No s/s of VTE  Outcome: Outcome Met, Continue evaluating goal progress toward completion  Goal: # Verbalizes understanding of VTE risk factors and prevention  Description: Document education using the patient education activity.   Outcome: Outcome Met, Continue  evaluating goal progress toward completion      No complaints

## 2022-05-10 ENCOUNTER — APPOINTMENT (OUTPATIENT)
Dept: ORTHOPEDIC SURGERY | Facility: CLINIC | Age: 32
End: 2022-05-10

## 2022-05-11 ENCOUNTER — RX RENEWAL (OUTPATIENT)
Age: 32
End: 2022-05-11

## 2022-05-25 NOTE — PATIENT PROFILE BEHAVIORAL HEALTH - MEDICATIONS BROUGHT TO HOSPITAL, PROFILE
Problem: Pain:  Intervention: Assess barriers to pain control  Patient is reluctant to take any thing for pain other than tylenol or motrin and feels pain will improve once ovarian cyst pops     Goal: Control of chronic pain  Control of chronic pain   Outcome: Met This Shift  Patient states feels like ovarian cyst is trying to pop like has in the past     Problem: Musculor/Skeletal Functional Status  Intervention: Fall precautions  Patient aware of fall precautions for here and at home -call light in reach while here     Goal: Absence of falls  Outcome: Met This Shift  No falls this admission    Problem: Intellectual/Education/Knowledge Deficit  Intervention: Verbal/written education provided  Discharge instruction sheets    Goal: Teaching initiated upon admission  Outcome: Met This Shift  Reviewed venofer administration  Goal: Written Disposition Instruction form completed  Outcome: Met This Shift  Discharge instructions given and reviewed with patient. All questions answered. Patient verbalized understanding    Problem: Discharge Planning  Intervention: Interaction with patient/family and care team  Patient currently denies any needs or concerns  Intervention: Discharge to appropriate level of care  Discharge home    Goal: Knowledge of discharge instructions  Knowledge of discharge instructions    Outcome: Met This Shift  Patient  able to teach back follow up appointments and when to call the doctor. Patient offers no questions at this time    Comments: Care plan reviewed with patient and she verbalized understanding of the plan of care and contributed to goal setting.
no
2022

## 2022-07-13 NOTE — ED STATDOCS - CONSTITUTIONAL [+], MLM
CHILLS
I have personally performed a history and physical exam on this patient and personally directed the management of the patient.

## 2022-07-28 ENCOUNTER — NON-APPOINTMENT (OUTPATIENT)
Age: 32
End: 2022-07-28

## 2022-07-28 DIAGNOSIS — E28.2 POLYCYSTIC OVARIAN SYNDROME: ICD-10-CM

## 2022-07-28 DIAGNOSIS — Z98.890 OTHER SPECIFIED POSTPROCEDURAL STATES: ICD-10-CM

## 2022-07-28 DIAGNOSIS — Z82.3 FAMILY HISTORY OF STROKE: ICD-10-CM

## 2022-07-28 DIAGNOSIS — Z82.49 FAMILY HISTORY OF ISCHEMIC HEART DISEASE AND OTHER DISEASES OF THE CIRCULATORY SYSTEM: ICD-10-CM

## 2022-07-28 DIAGNOSIS — F32.A ANXIETY DISORDER, UNSPECIFIED: ICD-10-CM

## 2022-07-28 DIAGNOSIS — N91.2 AMENORRHEA, UNSPECIFIED: ICD-10-CM

## 2022-07-28 DIAGNOSIS — Z86.69 PERSONAL HISTORY OF OTHER DISEASES OF THE NERVOUS SYSTEM AND SENSE ORGANS: ICD-10-CM

## 2022-07-28 DIAGNOSIS — Z86.59 PERSONAL HISTORY OF OTHER MENTAL AND BEHAVIORAL DISORDERS: ICD-10-CM

## 2022-07-28 DIAGNOSIS — Z92.89 PERSONAL HISTORY OF OTHER MEDICAL TREATMENT: ICD-10-CM

## 2022-07-28 DIAGNOSIS — F41.9 ANXIETY DISORDER, UNSPECIFIED: ICD-10-CM

## 2022-07-28 DIAGNOSIS — Z87.448 PERSONAL HISTORY OF OTHER DISEASES OF URINARY SYSTEM: ICD-10-CM

## 2022-07-28 DIAGNOSIS — Z81.8 FAMILY HISTORY OF OTHER MENTAL AND BEHAVIORAL DISORDERS: ICD-10-CM

## 2022-07-28 DIAGNOSIS — Z92.29 PERSONAL HISTORY OF OTHER DRUG THERAPY: ICD-10-CM

## 2022-08-04 ENCOUNTER — APPOINTMENT (OUTPATIENT)
Dept: OBGYN | Facility: CLINIC | Age: 32
End: 2022-08-04

## 2022-08-10 NOTE — ED BEHAVIORAL HEALTH ASSESSMENT NOTE - MEDICAL ISSUES AND PLAN (INCLUDE STANDING AND PRN MEDICATION)
no
Pain management consult; Oxycodone 5 mg every 6 hours as needed for pain; Spironolactone 100 mg daily; Metformin 500 mg daily

## 2022-09-07 NOTE — ED PROVIDER NOTE - CPE EDP MUSC NORM
Relevant Problems   NEUROLOGY   (+) Bipolar affective disorder, manic, severe, with psychotic behavior ([facility] [address]       Anesthetic History   No history of anesthetic complications            Review of Systems / Medical History  Patient summary reviewed, nursing notes reviewed and pertinent labs reviewed    Pulmonary  Within defined limits                 Neuro/Psych   Within defined limits      Psychiatric history     Cardiovascular  Within defined limits                Exercise tolerance: >4 METS     GI/Hepatic/Renal  Within defined limits              Endo/Other  Within defined limits      Arthritis     Other Findings   Comments: Bipolar affective disorder, manic, severe, with psychotic behavior            Physical Exam    Airway  Mallampati: II  TM Distance: > 6 cm  Neck ROM: normal range of motion   Mouth opening: Normal     Cardiovascular  Regular rate and rhythm,  S1 and S2 normal,  no murmur, click, rub, or gallop             Dental  No notable dental hx       Pulmonary  Breath sounds clear to auscultation               Abdominal  GI exam deferred       Other Findings            Anesthetic Plan    ASA: 2  Anesthesia type: spinal      Post-op pain plan if not by surgeon: peripheral nerve block single    Induction: Intravenous  Anesthetic plan and risks discussed with: Patient normal...

## 2022-09-12 NOTE — DISCHARGE NOTE BEHAVIORAL HEALTH - NSBHDCSWCOMMENTSFT_PSY_A_CORE
Dec functional mobility secondary to dec strength, balance and endurance
Patient educated to not stop any medication unless otherwise told to do so by outpatient provider

## 2022-10-07 NOTE — ED PROVIDER NOTE - BIRTH SEX
Pt was seen for neuropsychological evaluation at the request of JASWANT Johnson CNP for the purposes of diagnostic clarification and treatment planning. 176 minutes of test administration and scoring were provided by this writer. Please see Dr. Erinn Mckenzie's report for a full interpretation of the findings.    Carmen Mojica  Psychometrist     Unknown

## 2022-11-08 ENCOUNTER — APPOINTMENT (OUTPATIENT)
Dept: OBGYN | Facility: CLINIC | Age: 32
End: 2022-11-08

## 2022-11-08 VITALS — DIASTOLIC BLOOD PRESSURE: 75 MMHG | SYSTOLIC BLOOD PRESSURE: 121 MMHG

## 2022-11-08 PROCEDURE — 99214 OFFICE O/P EST MOD 30 MIN: CPT

## 2022-11-08 NOTE — CHIEF COMPLAINT
[Urgent Visit] : Urgent Visit [FreeTextEntry1] : Patient is a 32y/o female here today for amenorrhea. She has hx of PCOS. She has not had a period since april 2022. She states she had 1 episode of bleeding in april for about 2 days. She was taking provera 10mg for 10 days and has not given her a period, she took is about 3 times. She recently just had bariatric surgery and lost 75lbs\par Patient could not tolerate metformin, and OCPS due to blood pressure.

## 2022-11-08 NOTE — DISCUSSION/SUMMARY
[FreeTextEntry1] : \par Discussed treatment options for her PCOS. OCPs if her blood pressure is WNL, or provera seasonally.\par BP in office WNL.  \par

## 2022-11-20 NOTE — PROGRESS NOTE BEHAVIORAL HEALTH - ORIENTED TO TIME
Yes
Ears: no ear pain and no hearing problems. Nose: no nasal congestion and no nasal drainage. Mouth/Throat: no dysphagia, no hoarseness and no throat pain. Neck: no lumps, no pain, no stiffness and no swollen glands.

## 2023-01-10 ENCOUNTER — EMERGENCY (EMERGENCY)
Facility: HOSPITAL | Age: 33
LOS: 0 days | Discharge: HOME | End: 2023-01-11
Attending: HOSPITALIST
Payer: COMMERCIAL

## 2023-01-10 DIAGNOSIS — F11.20 OPIOID DEPENDENCE, UNCOMPLICATED: ICD-10-CM

## 2023-01-10 DIAGNOSIS — F90.9 ATTENTION-DEFICIT HYPERACTIVITY DISORDER, UNSPECIFIED TYPE: ICD-10-CM

## 2023-01-10 DIAGNOSIS — Z98.84 BARIATRIC SURGERY STATUS: Chronic | ICD-10-CM

## 2023-01-10 DIAGNOSIS — R11.2 NAUSEA WITH VOMITING, UNSPECIFIED: ICD-10-CM

## 2023-01-10 DIAGNOSIS — M54.9 DORSALGIA, UNSPECIFIED: ICD-10-CM

## 2023-01-10 DIAGNOSIS — R10.13 EPIGASTRIC PAIN: ICD-10-CM

## 2023-01-10 DIAGNOSIS — Z88.0 ALLERGY STATUS TO PENICILLIN: ICD-10-CM

## 2023-01-10 DIAGNOSIS — Z98.890 OTHER SPECIFIED POSTPROCEDURAL STATES: Chronic | ICD-10-CM

## 2023-01-10 DIAGNOSIS — G89.29 OTHER CHRONIC PAIN: ICD-10-CM

## 2023-01-10 DIAGNOSIS — F41.8 OTHER SPECIFIED ANXIETY DISORDERS: ICD-10-CM

## 2023-01-10 DIAGNOSIS — Z98.84 BARIATRIC SURGERY STATUS: ICD-10-CM

## 2023-01-10 DIAGNOSIS — I10 ESSENTIAL (PRIMARY) HYPERTENSION: ICD-10-CM

## 2023-01-10 DIAGNOSIS — Z98.890 OTHER SPECIFIED POSTPROCEDURAL STATES: ICD-10-CM

## 2023-01-10 DIAGNOSIS — E28.2 POLYCYSTIC OVARIAN SYNDROME: ICD-10-CM

## 2023-01-10 DIAGNOSIS — Z91.010 ALLERGY TO PEANUTS: ICD-10-CM

## 2023-01-10 DIAGNOSIS — E66.9 OBESITY, UNSPECIFIED: ICD-10-CM

## 2023-01-10 DIAGNOSIS — E11.9 TYPE 2 DIABETES MELLITUS WITHOUT COMPLICATIONS: ICD-10-CM

## 2023-01-10 DIAGNOSIS — R74.01 ELEVATION OF LEVELS OF LIVER TRANSAMINASE LEVELS: ICD-10-CM

## 2023-01-10 DIAGNOSIS — Z91.018 ALLERGY TO OTHER FOODS: ICD-10-CM

## 2023-01-10 DIAGNOSIS — Z91.048 OTHER NONMEDICINAL SUBSTANCE ALLERGY STATUS: ICD-10-CM

## 2023-01-10 DIAGNOSIS — Z20.822 CONTACT WITH AND (SUSPECTED) EXPOSURE TO COVID-19: ICD-10-CM

## 2023-01-10 DIAGNOSIS — E78.5 HYPERLIPIDEMIA, UNSPECIFIED: ICD-10-CM

## 2023-01-10 DIAGNOSIS — Z90.49 ACQUIRED ABSENCE OF OTHER SPECIFIED PARTS OF DIGESTIVE TRACT: Chronic | ICD-10-CM

## 2023-01-10 DIAGNOSIS — Z90.79 ACQUIRED ABSENCE OF OTHER GENITAL ORGAN(S): Chronic | ICD-10-CM

## 2023-01-10 PROCEDURE — 84702 CHORIONIC GONADOTROPIN TEST: CPT

## 2023-01-10 PROCEDURE — 99285 EMERGENCY DEPT VISIT HI MDM: CPT

## 2023-01-10 PROCEDURE — 96374 THER/PROPH/DIAG INJ IV PUSH: CPT

## 2023-01-10 PROCEDURE — 0241U: CPT

## 2023-01-10 PROCEDURE — 99285 EMERGENCY DEPT VISIT HI MDM: CPT | Mod: 25

## 2023-01-10 PROCEDURE — 96375 TX/PRO/DX INJ NEW DRUG ADDON: CPT

## 2023-01-10 PROCEDURE — 83735 ASSAY OF MAGNESIUM: CPT

## 2023-01-10 PROCEDURE — 83690 ASSAY OF LIPASE: CPT

## 2023-01-10 PROCEDURE — 74177 CT ABD & PELVIS W/CONTRAST: CPT | Mod: MA

## 2023-01-10 PROCEDURE — 87186 SC STD MICRODIL/AGAR DIL: CPT

## 2023-01-10 PROCEDURE — 87086 URINE CULTURE/COLONY COUNT: CPT

## 2023-01-10 PROCEDURE — 85025 COMPLETE CBC W/AUTO DIFF WBC: CPT

## 2023-01-10 PROCEDURE — 96361 HYDRATE IV INFUSION ADD-ON: CPT

## 2023-01-10 PROCEDURE — 93005 ELECTROCARDIOGRAM TRACING: CPT

## 2023-01-10 PROCEDURE — 36415 COLL VENOUS BLD VENIPUNCTURE: CPT

## 2023-01-10 PROCEDURE — 80053 COMPREHEN METABOLIC PANEL: CPT

## 2023-01-10 PROCEDURE — 81001 URINALYSIS AUTO W/SCOPE: CPT

## 2023-01-10 PROCEDURE — 87077 CULTURE AEROBIC IDENTIFY: CPT

## 2023-01-11 VITALS — WEIGHT: 179.9 LBS | HEIGHT: 67 IN

## 2023-01-11 VITALS
RESPIRATION RATE: 16 BRPM | HEART RATE: 67 BPM | OXYGEN SATURATION: 97 % | SYSTOLIC BLOOD PRESSURE: 147 MMHG | DIASTOLIC BLOOD PRESSURE: 95 MMHG | TEMPERATURE: 98 F

## 2023-01-11 LAB
ALBUMIN SERPL ELPH-MCNC: 3.3 G/DL — SIGNIFICANT CHANGE UP (ref 3.3–5)
ALP SERPL-CCNC: 210 U/L — HIGH (ref 40–120)
ALT FLD-CCNC: 155 U/L — HIGH (ref 12–78)
ANION GAP SERPL CALC-SCNC: 1 MMOL/L — LOW (ref 5–17)
APPEARANCE UR: CLEAR — SIGNIFICANT CHANGE UP
AST SERPL-CCNC: 155 U/L — HIGH (ref 15–37)
BACTERIA # UR AUTO: ABNORMAL
BASOPHILS # BLD AUTO: 0.05 K/UL — SIGNIFICANT CHANGE UP (ref 0–0.2)
BASOPHILS NFR BLD AUTO: 0.6 % — SIGNIFICANT CHANGE UP (ref 0–2)
BILIRUB SERPL-MCNC: 0.5 MG/DL — SIGNIFICANT CHANGE UP (ref 0.2–1.2)
BILIRUB UR-MCNC: ABNORMAL
BUN SERPL-MCNC: 17 MG/DL — SIGNIFICANT CHANGE UP (ref 7–23)
CALCIUM SERPL-MCNC: 9.6 MG/DL — SIGNIFICANT CHANGE UP (ref 8.5–10.1)
CHLORIDE SERPL-SCNC: 106 MMOL/L — SIGNIFICANT CHANGE UP (ref 96–108)
CO2 SERPL-SCNC: 33 MMOL/L — HIGH (ref 22–31)
COLOR SPEC: YELLOW — SIGNIFICANT CHANGE UP
CREAT SERPL-MCNC: 0.8 MG/DL — SIGNIFICANT CHANGE UP (ref 0.5–1.3)
DIFF PNL FLD: NEGATIVE — SIGNIFICANT CHANGE UP
EGFR: 100 ML/MIN/1.73M2 — SIGNIFICANT CHANGE UP
EOSINOPHIL # BLD AUTO: 0.19 K/UL — SIGNIFICANT CHANGE UP (ref 0–0.5)
EOSINOPHIL NFR BLD AUTO: 2.4 % — SIGNIFICANT CHANGE UP (ref 0–6)
EPI CELLS # UR: ABNORMAL
FLUAV AG NPH QL: SIGNIFICANT CHANGE UP
FLUBV AG NPH QL: SIGNIFICANT CHANGE UP
GLUCOSE SERPL-MCNC: 91 MG/DL — SIGNIFICANT CHANGE UP (ref 70–99)
GLUCOSE UR QL: NEGATIVE — SIGNIFICANT CHANGE UP
HCG SERPL-ACNC: <1 MIU/ML — SIGNIFICANT CHANGE UP
HCT VFR BLD CALC: 42 % — SIGNIFICANT CHANGE UP (ref 34.5–45)
HGB BLD-MCNC: 14.6 G/DL — SIGNIFICANT CHANGE UP (ref 11.5–15.5)
IMM GRANULOCYTES NFR BLD AUTO: 0.1 % — SIGNIFICANT CHANGE UP (ref 0–0.9)
KETONES UR-MCNC: ABNORMAL
LEUKOCYTE ESTERASE UR-ACNC: ABNORMAL
LIDOCAIN IGE QN: 198 U/L — SIGNIFICANT CHANGE UP (ref 73–393)
LYMPHOCYTES # BLD AUTO: 2.69 K/UL — SIGNIFICANT CHANGE UP (ref 1–3.3)
LYMPHOCYTES # BLD AUTO: 33.8 % — SIGNIFICANT CHANGE UP (ref 13–44)
MAGNESIUM SERPL-MCNC: 2.1 MG/DL — SIGNIFICANT CHANGE UP (ref 1.6–2.6)
MCHC RBC-ENTMCNC: 28.9 PG — SIGNIFICANT CHANGE UP (ref 27–34)
MCHC RBC-ENTMCNC: 34.8 GM/DL — SIGNIFICANT CHANGE UP (ref 32–36)
MCV RBC AUTO: 83 FL — SIGNIFICANT CHANGE UP (ref 80–100)
MONOCYTES # BLD AUTO: 0.54 K/UL — SIGNIFICANT CHANGE UP (ref 0–0.9)
MONOCYTES NFR BLD AUTO: 6.8 % — SIGNIFICANT CHANGE UP (ref 2–14)
NEUTROPHILS # BLD AUTO: 4.48 K/UL — SIGNIFICANT CHANGE UP (ref 1.8–7.4)
NEUTROPHILS NFR BLD AUTO: 56.3 % — SIGNIFICANT CHANGE UP (ref 43–77)
NITRITE UR-MCNC: NEGATIVE — SIGNIFICANT CHANGE UP
PH UR: 5 — SIGNIFICANT CHANGE UP (ref 5–8)
PLATELET # BLD AUTO: 276 K/UL — SIGNIFICANT CHANGE UP (ref 150–400)
POTASSIUM SERPL-MCNC: 4.4 MMOL/L — SIGNIFICANT CHANGE UP (ref 3.5–5.3)
POTASSIUM SERPL-SCNC: 4.4 MMOL/L — SIGNIFICANT CHANGE UP (ref 3.5–5.3)
PROT SERPL-MCNC: 7.9 GM/DL — SIGNIFICANT CHANGE UP (ref 6–8.3)
PROT UR-MCNC: 100
RBC # BLD: 5.06 M/UL — SIGNIFICANT CHANGE UP (ref 3.8–5.2)
RBC # FLD: 13.4 % — SIGNIFICANT CHANGE UP (ref 10.3–14.5)
RBC CASTS # UR COMP ASSIST: SIGNIFICANT CHANGE UP /HPF (ref 0–4)
RSV RNA NPH QL NAA+NON-PROBE: SIGNIFICANT CHANGE UP
SARS-COV-2 RNA SPEC QL NAA+PROBE: SIGNIFICANT CHANGE UP
SODIUM SERPL-SCNC: 140 MMOL/L — SIGNIFICANT CHANGE UP (ref 135–145)
SP GR SPEC: 1.02 — SIGNIFICANT CHANGE UP (ref 1.01–1.02)
UROBILINOGEN FLD QL: 4
WBC # BLD: 7.96 K/UL — SIGNIFICANT CHANGE UP (ref 3.8–10.5)
WBC # FLD AUTO: 7.96 K/UL — SIGNIFICANT CHANGE UP (ref 3.8–10.5)
WBC UR QL: ABNORMAL /HPF (ref 0–5)

## 2023-01-11 PROCEDURE — 93010 ELECTROCARDIOGRAM REPORT: CPT

## 2023-01-11 PROCEDURE — 74177 CT ABD & PELVIS W/CONTRAST: CPT | Mod: 26,MA

## 2023-01-11 RX ORDER — SODIUM CHLORIDE 9 MG/ML
1000 INJECTION INTRAMUSCULAR; INTRAVENOUS; SUBCUTANEOUS ONCE
Refills: 0 | Status: COMPLETED | OUTPATIENT
Start: 2023-01-11 | End: 2023-01-11

## 2023-01-11 RX ORDER — FAMOTIDINE 10 MG/ML
20 INJECTION INTRAVENOUS ONCE
Refills: 0 | Status: COMPLETED | OUTPATIENT
Start: 2023-01-11 | End: 2023-01-11

## 2023-01-11 RX ORDER — KETOROLAC TROMETHAMINE 30 MG/ML
30 SYRINGE (ML) INJECTION ONCE
Refills: 0 | Status: DISCONTINUED | OUTPATIENT
Start: 2023-01-11 | End: 2023-01-11

## 2023-01-11 RX ORDER — MORPHINE SULFATE 50 MG/1
4 CAPSULE, EXTENDED RELEASE ORAL ONCE
Refills: 0 | Status: DISCONTINUED | OUTPATIENT
Start: 2023-01-11 | End: 2023-01-11

## 2023-01-11 RX ADMIN — SODIUM CHLORIDE 1000 MILLILITER(S): 9 INJECTION INTRAMUSCULAR; INTRAVENOUS; SUBCUTANEOUS at 01:29

## 2023-01-11 RX ADMIN — Medication 30 MILLIGRAM(S): at 04:21

## 2023-01-11 RX ADMIN — Medication 25 MILLIGRAM(S): at 04:18

## 2023-01-11 RX ADMIN — FAMOTIDINE 20 MILLIGRAM(S): 10 INJECTION INTRAVENOUS at 01:30

## 2023-01-11 RX ADMIN — SODIUM CHLORIDE 1000 MILLILITER(S): 9 INJECTION INTRAMUSCULAR; INTRAVENOUS; SUBCUTANEOUS at 04:21

## 2023-01-11 RX ADMIN — MORPHINE SULFATE 4 MILLIGRAM(S): 50 CAPSULE, EXTENDED RELEASE ORAL at 04:21

## 2023-01-11 RX ADMIN — Medication 30 MILLIGRAM(S): at 01:30

## 2023-01-11 RX ADMIN — MORPHINE SULFATE 4 MILLIGRAM(S): 50 CAPSULE, EXTENDED RELEASE ORAL at 01:29

## 2023-01-11 NOTE — ED PROVIDER NOTE - PROGRESS NOTE DETAILS
Adelina AJSMINE: Elevated LFTs noted.  Discussed with patient.  She has been following with her gastroenterologist for similar numbers.  She is also requesting referral to a new gastroenterologist.  We will give her resources for follow-up

## 2023-01-11 NOTE — ED ADULT TRIAGE NOTE - CHIEF COMPLAINT QUOTE
Had an endoscopy with MD Alarcon on 12/23 and has been having worsening abdominal pain since. Complaining of upper abdominal pain, nausea and vomiting unrelieved by 8mg Zofran. Also complaining of new onset of severe muscle cramps since yesterday.

## 2023-01-11 NOTE — ED PROVIDER NOTE - PHYSICAL EXAMINATION
***GEN - NAD; well appearing; A+O x3 ***HEAD - NC/AT ***EYES/NOSE - PERRL, EOMI, mucous membranes moist, no discharge ***THROAT: Oral cavity and pharynx normal. No inflammation, swelling, exudate, or lesions.  ***NECK:   ***PULMONARY - CTA b/l, symmetric breath sounds. ***CARDIAC -s1s2, RRR, no M,G,R  ***ABDOMEN - +BS, ND, NT, soft, no guarding, no rebound, no masses   ***BACK - no CVA tenderness, Normal  spine ***EXTREMITIES - symmetric pulses, 2+ dp, capillary refill < 2 seconds, no clubbing, no cyanosis, no edema ***SKIN - no rash or bruising   ***NEUROLOGIC - alert, CN 2-12 intact

## 2023-01-11 NOTE — ED PROVIDER NOTE - NSFOLLOWUPINSTRUCTIONS_ED_ALL_ED_FT
Please follow-up with gastroenterology as we discussed.  Please return for any new or worsening symptoms

## 2023-01-11 NOTE — ED PROVIDER NOTE - CLINICAL SUMMARY MEDICAL DECISION MAKING FREE TEXT BOX
32-year-old female with abdominal pain vomiting and nausea.  Will obtain labs with CT abdomen and pelvis.  Will give medicine for nausea and fluids.

## 2023-01-11 NOTE — ED PROVIDER NOTE - CARE PROVIDER_API CALL
Venkatesh Sidhu)  Gastroenterology; Internal Medicine  [address] [place] [address]  Phone: [phone]  Fax: [phone]  Follow Up Time: 4-6 Days

## 2023-01-11 NOTE — ED PROVIDER NOTE - NS ED ROS FT
Constitutional: No fever or chills  Eyes: No visual changes  HEENT: No throat pain  CV: No chest pain  Resp: No SOB no cough  GI: + abdominal pain, nausea and vomiting  : No dysuria  MSK: No musculoskeletal pain. + cramping of calves  Skin: No rash  Neuro: No headache

## 2023-01-11 NOTE — ED PROVIDER NOTE - PATIENT PORTAL LINK FT
You can access the FollowMyHealth Patient Portal offered by [facility] by registering at the following website: [email or web address]. By joining [employer]’s FollowMyHealth portal, you will also be able to view your health information using other applications (apps) compatible with our system.

## 2023-01-11 NOTE — ED PROVIDER NOTE - OBJECTIVE STATEMENT
32F w/hx of HTN, HLD, DM2, Chronic back pain, Borderline personality, anxiety, Depression, hx of suicide attempts, hx of self harm, Mixed connective tissue disease, FSGS, PCOS, Obesity, s/p gastric bypass surgery and appendectomy on 12/29/2021 p/w epigastric pain and vomiting for the past 2 weeks. patient had an endoscopy 12/23/22 and since she has been having nausea and vomiting and epigastric pain. aslo noted recent cramping in her calves.

## 2023-01-12 ENCOUNTER — TRANSCRIPTION ENCOUNTER (OUTPATIENT)
Age: 33
End: 2023-01-12

## 2023-01-24 NOTE — ASU DISCHARGE PLAN (ADULT/PEDIATRIC). - FOR NEXT 12 HOURS DO NOT:
Spoke with patient via phone for follow up anticoagulation visit. Last INR on 01/10/23 was 3.0. Dose decreased. Today's INR is 2.0 and is within goal range. Current warfarin total weekly dose of 25 mg verified. Informed the INR result is within therapeutic range and instructed to maintain current dose per protocol. Discussed dose and return date of 3 weeks for next INR. See Anticoagulation flowsheet. Dr. Magaly Collado is in the office today supervising the treatment. Denies any changes in diet or medications. Denies any unusual bleeding or bruising. Referring physician Alma Herrera MD     Instructed to contact the clinic with any unusual bleeding or bruising, any changes in medications, diet, health status, lifestyle, or any other changes, questions or concerns. Verbalized understanding of all discussed. Statement Selected

## 2023-01-26 ENCOUNTER — APPOINTMENT (OUTPATIENT)
Dept: RADIOLOGY | Facility: CLINIC | Age: 33
End: 2023-01-26
Payer: COMMERCIAL

## 2023-01-26 ENCOUNTER — APPOINTMENT (OUTPATIENT)
Dept: MRI IMAGING | Facility: CLINIC | Age: 33
End: 2023-01-26
Payer: COMMERCIAL

## 2023-01-26 ENCOUNTER — OUTPATIENT (OUTPATIENT)
Dept: OUTPATIENT SERVICES | Facility: HOSPITAL | Age: 33
LOS: 1 days | End: 2023-01-26
Payer: COMMERCIAL

## 2023-01-26 DIAGNOSIS — D30.01 BENIGN NEOPLASM OF RIGHT KIDNEY: ICD-10-CM

## 2023-01-26 PROCEDURE — 74183 MRI ABD W/O CNTR FLWD CNTR: CPT | Mod: 26

## 2023-01-26 PROCEDURE — A9585: CPT

## 2023-01-26 PROCEDURE — 74183 MRI ABD W/O CNTR FLWD CNTR: CPT

## 2023-01-26 PROCEDURE — 72074 X-RAY EXAM THORAC SPINE4/>VW: CPT

## 2023-01-26 PROCEDURE — 72074 X-RAY EXAM THORAC SPINE4/>VW: CPT | Mod: 26

## 2023-02-02 ENCOUNTER — APPOINTMENT (OUTPATIENT)
Dept: UROLOGY | Facility: CLINIC | Age: 33
End: 2023-02-02

## 2023-02-05 ENCOUNTER — INPATIENT (INPATIENT)
Facility: HOSPITAL | Age: 33
LOS: 1 days | Discharge: HOME | DRG: 552 | End: 2023-02-07
Attending: INTERNAL MEDICINE | Admitting: HOSPITALIST
Payer: COMMERCIAL

## 2023-02-05 VITALS
RESPIRATION RATE: 19 BRPM | SYSTOLIC BLOOD PRESSURE: 148 MMHG | HEART RATE: 78 BPM | HEIGHT: 67 IN | DIASTOLIC BLOOD PRESSURE: 97 MMHG | OXYGEN SATURATION: 97 % | TEMPERATURE: 98 F | WEIGHT: 179.9 LBS

## 2023-02-05 DIAGNOSIS — Z90.79 ACQUIRED ABSENCE OF OTHER GENITAL ORGAN(S): Chronic | ICD-10-CM

## 2023-02-05 DIAGNOSIS — Z98.890 OTHER SPECIFIED POSTPROCEDURAL STATES: Chronic | ICD-10-CM

## 2023-02-05 DIAGNOSIS — Z90.49 ACQUIRED ABSENCE OF OTHER SPECIFIED PARTS OF DIGESTIVE TRACT: Chronic | ICD-10-CM

## 2023-02-05 DIAGNOSIS — Z98.84 BARIATRIC SURGERY STATUS: Chronic | ICD-10-CM

## 2023-02-05 LAB
BASOPHILS # BLD AUTO: 0.06 K/UL — SIGNIFICANT CHANGE UP (ref 0–0.2)
BASOPHILS NFR BLD AUTO: 0.8 % — SIGNIFICANT CHANGE UP (ref 0–2)
EOSINOPHIL # BLD AUTO: 0.17 K/UL — SIGNIFICANT CHANGE UP (ref 0–0.5)
EOSINOPHIL NFR BLD AUTO: 2.3 % — SIGNIFICANT CHANGE UP (ref 0–6)
HCT VFR BLD CALC: 42.7 % — SIGNIFICANT CHANGE UP (ref 34.5–45)
HGB BLD-MCNC: 14.5 G/DL — SIGNIFICANT CHANGE UP (ref 11.5–15.5)
IMM GRANULOCYTES NFR BLD AUTO: 0.1 % — SIGNIFICANT CHANGE UP (ref 0–0.9)
LYMPHOCYTES # BLD AUTO: 2.95 K/UL — SIGNIFICANT CHANGE UP (ref 1–3.3)
LYMPHOCYTES # BLD AUTO: 40.5 % — SIGNIFICANT CHANGE UP (ref 13–44)
MCHC RBC-ENTMCNC: 27.8 PG — SIGNIFICANT CHANGE UP (ref 27–34)
MCHC RBC-ENTMCNC: 34 GM/DL — SIGNIFICANT CHANGE UP (ref 32–36)
MCV RBC AUTO: 82 FL — SIGNIFICANT CHANGE UP (ref 80–100)
MONOCYTES # BLD AUTO: 0.51 K/UL — SIGNIFICANT CHANGE UP (ref 0–0.9)
MONOCYTES NFR BLD AUTO: 7 % — SIGNIFICANT CHANGE UP (ref 2–14)
NEUTROPHILS # BLD AUTO: 3.59 K/UL — SIGNIFICANT CHANGE UP (ref 1.8–7.4)
NEUTROPHILS NFR BLD AUTO: 49.3 % — SIGNIFICANT CHANGE UP (ref 43–77)
NRBC # BLD: 0 /100 WBCS — SIGNIFICANT CHANGE UP (ref 0–0)
PLATELET # BLD AUTO: 325 K/UL — SIGNIFICANT CHANGE UP (ref 150–400)
RBC # BLD: 5.21 M/UL — HIGH (ref 3.8–5.2)
RBC # FLD: 12.8 % — SIGNIFICANT CHANGE UP (ref 10.3–14.5)
WBC # BLD: 7.29 K/UL — SIGNIFICANT CHANGE UP (ref 3.8–10.5)
WBC # FLD AUTO: 7.29 K/UL — SIGNIFICANT CHANGE UP (ref 3.8–10.5)

## 2023-02-05 PROCEDURE — 99285 EMERGENCY DEPT VISIT HI MDM: CPT

## 2023-02-05 RX ORDER — OXYCODONE HYDROCHLORIDE 5 MG/1
5 TABLET ORAL ONCE
Refills: 0 | Status: DISCONTINUED | OUTPATIENT
Start: 2023-02-05 | End: 2023-02-05

## 2023-02-05 RX ORDER — DIAZEPAM 5 MG
5 TABLET ORAL ONCE
Refills: 0 | Status: DISCONTINUED | OUTPATIENT
Start: 2023-02-05 | End: 2023-02-05

## 2023-02-05 RX ORDER — KETOROLAC TROMETHAMINE 30 MG/ML
15 SYRINGE (ML) INJECTION ONCE
Refills: 0 | Status: DISCONTINUED | OUTPATIENT
Start: 2023-02-05 | End: 2023-02-05

## 2023-02-05 RX ADMIN — OXYCODONE HYDROCHLORIDE 5 MILLIGRAM(S): 5 TABLET ORAL at 23:15

## 2023-02-05 RX ADMIN — Medication 5 MILLIGRAM(S): at 23:15

## 2023-02-05 RX ADMIN — Medication 15 MILLIGRAM(S): at 23:26

## 2023-02-05 NOTE — ED ADULT TRIAGE NOTE - HEIGHT IN FEET
5 A severe allergic reaction (e.g. anaphylaxis) to any component of the applicable vaccine being administered

## 2023-02-05 NOTE — ED ADULT TRIAGE NOTE - CHIEF COMPLAINT QUOTE
C/o back pain radiating to chest a/w SOB x 1 day. Endorses nausea, denies vomiting. Denies cardiac hx
Negative

## 2023-02-05 NOTE — ED ADULT NURSE NOTE - CHIEF COMPLAINT QUOTE
C/o back pain radiating to chest a/w SOB x 1 day. Endorses nausea, denies vomiting. Denies cardiac hx

## 2023-02-05 NOTE — ED ADULT NURSE NOTE - OBJECTIVE STATEMENT
33 yo female presents to the ED AAox4 with complaints of back pain, x 1 day. PMH Laminectomy 2021, 31 yo female presents to the ED AAox4 with complaints of back pain, x 1 day. PMH Laminectomy 2021, Bradycardia, Hypotension, DM 2. 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 Pt endorsing numbness tingling to area. Pt endorses mild SOB with pain. Denies headache, dizziness, vision changes, chest pain,  abdominal pain, nausea, vomiting, diarrhea, fevers, chills, dysuria, hematuria, recent illness travel or fall.

## 2023-02-06 DIAGNOSIS — M54.9 DORSALGIA, UNSPECIFIED: ICD-10-CM

## 2023-02-06 DIAGNOSIS — F41.9 ANXIETY DISORDER, UNSPECIFIED: ICD-10-CM

## 2023-02-06 DIAGNOSIS — F90.9 ATTENTION-DEFICIT HYPERACTIVITY DISORDER, UNSPECIFIED TYPE: ICD-10-CM

## 2023-02-06 DIAGNOSIS — Z29.9 ENCOUNTER FOR PROPHYLACTIC MEASURES, UNSPECIFIED: ICD-10-CM

## 2023-02-06 DIAGNOSIS — K75.81 NONALCOHOLIC STEATOHEPATITIS (NASH): ICD-10-CM

## 2023-02-06 LAB
ALBUMIN SERPL ELPH-MCNC: 4.3 G/DL — SIGNIFICANT CHANGE UP (ref 3.3–5)
ALP SERPL-CCNC: 188 U/L — HIGH (ref 40–120)
ALT FLD-CCNC: 79 U/L — HIGH (ref 10–45)
ANION GAP SERPL CALC-SCNC: 11 MMOL/L — SIGNIFICANT CHANGE UP (ref 5–17)
AST SERPL-CCNC: 63 U/L — HIGH (ref 10–40)
BILIRUB SERPL-MCNC: 0.4 MG/DL — SIGNIFICANT CHANGE UP (ref 0.2–1.2)
BUN SERPL-MCNC: 14 MG/DL — SIGNIFICANT CHANGE UP (ref 7–23)
CALCIUM SERPL-MCNC: 10.5 MG/DL — SIGNIFICANT CHANGE UP (ref 8.4–10.5)
CHLORIDE SERPL-SCNC: 100 MMOL/L — SIGNIFICANT CHANGE UP (ref 96–108)
CO2 SERPL-SCNC: 26 MMOL/L — SIGNIFICANT CHANGE UP (ref 22–31)
CREAT SERPL-MCNC: 0.86 MG/DL — SIGNIFICANT CHANGE UP (ref 0.5–1.3)
EGFR: 92 ML/MIN/1.73M2 — SIGNIFICANT CHANGE UP
FLUAV AG NPH QL: SIGNIFICANT CHANGE UP
FLUBV AG NPH QL: SIGNIFICANT CHANGE UP
GLUCOSE SERPL-MCNC: 90 MG/DL — SIGNIFICANT CHANGE UP (ref 70–99)
HCG SERPL-ACNC: <2 MIU/ML — SIGNIFICANT CHANGE UP
POTASSIUM SERPL-MCNC: 4.5 MMOL/L — SIGNIFICANT CHANGE UP (ref 3.5–5.3)
POTASSIUM SERPL-SCNC: 4.5 MMOL/L — SIGNIFICANT CHANGE UP (ref 3.5–5.3)
PROT SERPL-MCNC: 8.2 G/DL — SIGNIFICANT CHANGE UP (ref 6–8.3)
RSV RNA NPH QL NAA+NON-PROBE: SIGNIFICANT CHANGE UP
SARS-COV-2 RNA SPEC QL NAA+PROBE: SIGNIFICANT CHANGE UP
SODIUM SERPL-SCNC: 137 MMOL/L — SIGNIFICANT CHANGE UP (ref 135–145)

## 2023-02-06 PROCEDURE — 99222 1ST HOSP IP/OBS MODERATE 55: CPT

## 2023-02-06 RX ORDER — TIZANIDINE 4 MG/1
2 TABLET ORAL
Qty: 0 | Refills: 0 | DISCHARGE

## 2023-02-06 RX ORDER — CYCLOBENZAPRINE HYDROCHLORIDE 10 MG/1
5 TABLET, FILM COATED ORAL ONCE
Refills: 0 | Status: COMPLETED | OUTPATIENT
Start: 2023-02-06 | End: 2023-02-06

## 2023-02-06 RX ORDER — GABAPENTIN 400 MG/1
1 CAPSULE ORAL
Qty: 0 | Refills: 0 | DISCHARGE

## 2023-02-06 RX ORDER — DULOXETINE HYDROCHLORIDE 30 MG/1
60 CAPSULE, DELAYED RELEASE ORAL DAILY
Refills: 0 | Status: DISCONTINUED | OUTPATIENT
Start: 2023-02-06 | End: 2023-02-07

## 2023-02-06 RX ORDER — LURASIDONE HYDROCHLORIDE 40 MG/1
1 TABLET ORAL
Qty: 0 | Refills: 0 | DISCHARGE

## 2023-02-06 RX ORDER — TRAZODONE HCL 50 MG
100 TABLET ORAL AT BEDTIME
Refills: 0 | Status: DISCONTINUED | OUTPATIENT
Start: 2023-02-06 | End: 2023-02-07

## 2023-02-06 RX ORDER — LANOLIN ALCOHOL/MO/W.PET/CERES
3 CREAM (GRAM) TOPICAL AT BEDTIME
Refills: 0 | Status: DISCONTINUED | OUTPATIENT
Start: 2023-02-06 | End: 2023-02-07

## 2023-02-06 RX ORDER — OXYCODONE HYDROCHLORIDE 5 MG/1
5 TABLET ORAL EVERY 6 HOURS
Refills: 0 | Status: DISCONTINUED | OUTPATIENT
Start: 2023-02-06 | End: 2023-02-07

## 2023-02-06 RX ORDER — GABAPENTIN 400 MG/1
600 CAPSULE ORAL
Refills: 0 | Status: DISCONTINUED | OUTPATIENT
Start: 2023-02-06 | End: 2023-02-07

## 2023-02-06 RX ORDER — LURASIDONE HYDROCHLORIDE 40 MG/1
40 TABLET ORAL DAILY
Refills: 0 | Status: DISCONTINUED | OUTPATIENT
Start: 2023-02-06 | End: 2023-02-07

## 2023-02-06 RX ORDER — IBUPROFEN 200 MG
600 TABLET ORAL EVERY 6 HOURS
Refills: 0 | Status: DISCONTINUED | OUTPATIENT
Start: 2023-02-06 | End: 2023-02-07

## 2023-02-06 RX ORDER — DULOXETINE HYDROCHLORIDE 30 MG/1
30 CAPSULE, DELAYED RELEASE ORAL ONCE
Refills: 0 | Status: COMPLETED | OUTPATIENT
Start: 2023-02-06 | End: 2023-02-06

## 2023-02-06 RX ORDER — CLONAZEPAM 1 MG
1 TABLET ORAL
Qty: 0 | Refills: 0 | DISCHARGE

## 2023-02-06 RX ORDER — ACETAMINOPHEN 500 MG
975 TABLET ORAL ONCE
Refills: 0 | Status: COMPLETED | OUTPATIENT
Start: 2023-02-06 | End: 2023-02-06

## 2023-02-06 RX ORDER — LURASIDONE HYDROCHLORIDE 40 MG/1
40 TABLET ORAL ONCE
Refills: 0 | Status: COMPLETED | OUTPATIENT
Start: 2023-02-06 | End: 2023-02-06

## 2023-02-06 RX ORDER — MORPHINE SULFATE 50 MG/1
2 CAPSULE, EXTENDED RELEASE ORAL ONCE
Refills: 0 | Status: DISCONTINUED | OUTPATIENT
Start: 2023-02-06 | End: 2023-02-06

## 2023-02-06 RX ORDER — GUANFACINE 3 MG/1
1 TABLET, EXTENDED RELEASE ORAL
Qty: 0 | Refills: 0 | DISCHARGE

## 2023-02-06 RX ORDER — KETOROLAC TROMETHAMINE 30 MG/ML
15 SYRINGE (ML) INJECTION EVERY 8 HOURS
Refills: 0 | Status: DISCONTINUED | OUTPATIENT
Start: 2023-02-06 | End: 2023-02-07

## 2023-02-06 RX ORDER — TIZANIDINE 4 MG/1
4 TABLET ORAL
Refills: 0 | Status: DISCONTINUED | OUTPATIENT
Start: 2023-02-06 | End: 2023-02-07

## 2023-02-06 RX ORDER — ACETAMINOPHEN 500 MG
650 TABLET ORAL EVERY 6 HOURS
Refills: 0 | Status: DISCONTINUED | OUTPATIENT
Start: 2023-02-06 | End: 2023-02-07

## 2023-02-06 RX ORDER — LAMOTRIGINE 25 MG/1
200 TABLET, ORALLY DISINTEGRATING ORAL AT BEDTIME
Refills: 0 | Status: DISCONTINUED | OUTPATIENT
Start: 2023-02-06 | End: 2023-02-07

## 2023-02-06 RX ORDER — TIZANIDINE 4 MG/1
1 TABLET ORAL
Qty: 0 | Refills: 0 | DISCHARGE

## 2023-02-06 RX ORDER — HYDROXYZINE HCL 10 MG
1 TABLET ORAL
Qty: 0 | Refills: 0 | DISCHARGE

## 2023-02-06 RX ORDER — LAMOTRIGINE 25 MG/1
1 TABLET, ORALLY DISINTEGRATING ORAL
Qty: 0 | Refills: 0 | DISCHARGE

## 2023-02-06 RX ORDER — GUANFACINE 3 MG/1
1 TABLET, EXTENDED RELEASE ORAL ONCE
Refills: 0 | Status: COMPLETED | OUTPATIENT
Start: 2023-02-06 | End: 2023-02-06

## 2023-02-06 RX ORDER — DULOXETINE HYDROCHLORIDE 30 MG/1
30 CAPSULE, DELAYED RELEASE ORAL AT BEDTIME
Refills: 0 | Status: DISCONTINUED | OUTPATIENT
Start: 2023-02-06 | End: 2023-02-07

## 2023-02-06 RX ORDER — LAMOTRIGINE 25 MG/1
200 TABLET, ORALLY DISINTEGRATING ORAL ONCE
Refills: 0 | Status: COMPLETED | OUTPATIENT
Start: 2023-02-06 | End: 2023-02-06

## 2023-02-06 RX ORDER — HYDROXYZINE HCL 10 MG
50 TABLET ORAL THREE TIMES A DAY
Refills: 0 | Status: DISCONTINUED | OUTPATIENT
Start: 2023-02-06 | End: 2023-02-07

## 2023-02-06 RX ORDER — GUANFACINE 3 MG/1
1 TABLET, EXTENDED RELEASE ORAL
Refills: 0 | Status: DISCONTINUED | OUTPATIENT
Start: 2023-02-06 | End: 2023-02-07

## 2023-02-06 RX ORDER — ONDANSETRON 8 MG/1
4 TABLET, FILM COATED ORAL EVERY 8 HOURS
Refills: 0 | Status: DISCONTINUED | OUTPATIENT
Start: 2023-02-06 | End: 2023-02-07

## 2023-02-06 RX ORDER — LURASIDONE HYDROCHLORIDE 40 MG/1
20 TABLET ORAL AT BEDTIME
Refills: 0 | Status: DISCONTINUED | OUTPATIENT
Start: 2023-02-06 | End: 2023-02-07

## 2023-02-06 RX ORDER — MORPHINE SULFATE 50 MG/1
4 CAPSULE, EXTENDED RELEASE ORAL ONCE
Refills: 0 | Status: DISCONTINUED | OUTPATIENT
Start: 2023-02-06 | End: 2023-02-06

## 2023-02-06 RX ORDER — LIDOCAINE 4 G/100G
1 CREAM TOPICAL DAILY
Refills: 0 | Status: DISCONTINUED | OUTPATIENT
Start: 2023-02-06 | End: 2023-02-07

## 2023-02-06 RX ADMIN — Medication 15 MILLIGRAM(S): at 22:47

## 2023-02-06 RX ADMIN — Medication 650 MILLIGRAM(S): at 12:18

## 2023-02-06 RX ADMIN — Medication 650 MILLIGRAM(S): at 17:33

## 2023-02-06 RX ADMIN — GUANFACINE 1 MILLIGRAM(S): 3 TABLET, EXTENDED RELEASE ORAL at 08:04

## 2023-02-06 RX ADMIN — Medication 600 MILLIGRAM(S): at 12:18

## 2023-02-06 RX ADMIN — LURASIDONE HYDROCHLORIDE 20 MILLIGRAM(S): 40 TABLET ORAL at 23:23

## 2023-02-06 RX ADMIN — DULOXETINE HYDROCHLORIDE 60 MILLIGRAM(S): 30 CAPSULE, DELAYED RELEASE ORAL at 08:05

## 2023-02-06 RX ADMIN — LAMOTRIGINE 200 MILLIGRAM(S): 25 TABLET, ORALLY DISINTEGRATING ORAL at 23:23

## 2023-02-06 RX ADMIN — Medication 100 MILLIGRAM(S): at 23:24

## 2023-02-06 RX ADMIN — LIDOCAINE 1 PATCH: 4 CREAM TOPICAL at 08:05

## 2023-02-06 RX ADMIN — DULOXETINE HYDROCHLORIDE 30 MILLIGRAM(S): 30 CAPSULE, DELAYED RELEASE ORAL at 23:25

## 2023-02-06 RX ADMIN — Medication 600 MILLIGRAM(S): at 17:26

## 2023-02-06 RX ADMIN — GABAPENTIN 600 MILLIGRAM(S): 400 CAPSULE ORAL at 23:24

## 2023-02-06 RX ADMIN — GABAPENTIN 600 MILLIGRAM(S): 400 CAPSULE ORAL at 05:28

## 2023-02-06 RX ADMIN — OXYCODONE HYDROCHLORIDE 5 MILLIGRAM(S): 5 TABLET ORAL at 06:57

## 2023-02-06 RX ADMIN — TIZANIDINE 4 MILLIGRAM(S): 4 TABLET ORAL at 23:21

## 2023-02-06 RX ADMIN — DULOXETINE HYDROCHLORIDE 30 MILLIGRAM(S): 30 CAPSULE, DELAYED RELEASE ORAL at 02:24

## 2023-02-06 RX ADMIN — Medication 15 MILLIGRAM(S): at 00:23

## 2023-02-06 RX ADMIN — LAMOTRIGINE 200 MILLIGRAM(S): 25 TABLET, ORALLY DISINTEGRATING ORAL at 02:38

## 2023-02-06 RX ADMIN — MORPHINE SULFATE 4 MILLIGRAM(S): 50 CAPSULE, EXTENDED RELEASE ORAL at 00:52

## 2023-02-06 RX ADMIN — Medication 650 MILLIGRAM(S): at 05:27

## 2023-02-06 RX ADMIN — Medication 975 MILLIGRAM(S): at 00:53

## 2023-02-06 RX ADMIN — MORPHINE SULFATE 4 MILLIGRAM(S): 50 CAPSULE, EXTENDED RELEASE ORAL at 02:39

## 2023-02-06 RX ADMIN — OXYCODONE HYDROCHLORIDE 5 MILLIGRAM(S): 5 TABLET ORAL at 00:23

## 2023-02-06 RX ADMIN — GUANFACINE 1 MILLIGRAM(S): 3 TABLET, EXTENDED RELEASE ORAL at 02:24

## 2023-02-06 RX ADMIN — MORPHINE SULFATE 2 MILLIGRAM(S): 50 CAPSULE, EXTENDED RELEASE ORAL at 08:05

## 2023-02-06 RX ADMIN — LURASIDONE HYDROCHLORIDE 40 MILLIGRAM(S): 40 TABLET ORAL at 08:03

## 2023-02-06 RX ADMIN — Medication 650 MILLIGRAM(S): at 17:26

## 2023-02-06 RX ADMIN — Medication 600 MILLIGRAM(S): at 05:27

## 2023-02-06 RX ADMIN — OXYCODONE HYDROCHLORIDE 5 MILLIGRAM(S): 5 TABLET ORAL at 16:15

## 2023-02-06 RX ADMIN — CYCLOBENZAPRINE HYDROCHLORIDE 5 MILLIGRAM(S): 10 TABLET, FILM COATED ORAL at 00:53

## 2023-02-06 RX ADMIN — LURASIDONE HYDROCHLORIDE 40 MILLIGRAM(S): 40 TABLET ORAL at 02:38

## 2023-02-06 RX ADMIN — GUANFACINE 1 MILLIGRAM(S): 3 TABLET, EXTENDED RELEASE ORAL at 23:22

## 2023-02-06 RX ADMIN — Medication 15 MILLIGRAM(S): at 23:17

## 2023-02-06 NOTE — ED PROVIDER NOTE - PHYSICAL EXAMINATION
General: well appearing, no acute distress, AOx3  Skin: no rash, no pallor  Head: normocephalic, atraumatic  Eyes: clear conjunctiva, EOMI  ENMT: airway patent, no nasal discharge  Cardiovascular: normal rate, normal rhythm, S1/S2  Pulmonary: clear to auscultation bilaterally, no rales, rhonchi, or wheeze  Abdomen: soft, nontender, bladder nondistended   Musculoskeletal: moving extremities well, no deformity, mild midline thoracic spine tenderness, 4/5 strength in extremities 2/2 pain, sensation objectively intact   Psych: normal mood, normal affect

## 2023-02-06 NOTE — CONSULT NOTE ADULT - ASSESSMENT
32F mult med hx inc anxiety, depression, chronic pain (sees pain med and gets steroid injections), reports prior L4 or L5 lami for "cord compression" but per CT ab from 1/2023, lamina look intact. Unreliable narrator. 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 Reports pain radiates around mid-thoracic rib cage on R side. Was told to come to ED by pain doc. Per ED, pt reported urinating prior to ED visit; however, pt told neurosurgery that she hasn't urinated since Sunday AM. No BM for few days but denies new saddle anesthesia. Bladder scanned for 124cc. Exam: AOx3, b/l HG 5/5 b/l delt 5/5, L bicep/tricep 4/5, R bicep/tricep 3/5, RLE KF/KE 4+/5 HF/HE 4+/5, LLE KF/KE 4-4+/5 HF/HE 4-4+/5, no rios, no clonus. Strength is mid-back-pain limited.   -No acute nsgy intervention  -Rec adm med for PT/OT, pain control  -MRI T/L w/o contrast     32F mult med hx inc anxiety, depression, chronic pain (sees pain med and gets steroid injections), reports prior L4 or L5 lami for "cord compression" in 2021 but per CT ab from 1/2023, lamina look intact. Unreliable narrator. 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 Reports pain radiates around mid-thoracic rib cage on R side. Was told to come to ED by pain doc. Per ED, pt reported urinating prior to ED visit; however, pt told neurosurgery that she hasn't urinated since Sunday AM. No BM for few days but denies new saddle anesthesia. Bladder scanned for 124cc. Exam: AOx3, b/l HG 5/5 b/l delt 5/5, L bicep/tricep 4/5, R bicep/tricep 3/5, RLE KF/KE 4+/5 HF/HE 4+/5, LLE KF/KE 4-4+/5 HF/HE 4-4+/5, no rios, no clonus. Strength is mid-back-pain limited.   -No acute nsgy intervention  -Rec adm med for PT/OT, pain control  -MRI T/L w/o contrast

## 2023-02-06 NOTE — H&P ADULT - ASSESSMENT
32 year old female with a PMHx of anxiety and depression, obesity s/p gastric bypass procedure, LARA, opioid use disorder, chronic back pain (sees pain med and gets steroid injections), sp laminectomy at [facility] in 2021 who presents with severe back pain after lifting a child. Ddx lumbar sprain; rule out     Of note, the patient has a prior history of opioid use disorder, was previously on suboxone; however states she has not been on this since November/December because she was in the midst of establishing care with a new pain management group. Although she has this history, the patient does not seem to be pain- med seeking. She states that her primary concern and reason for hospital presentation is to rule out significant injury to her back given her history of laminectomy.  32 year old female with a PMHx of anxiety and depression, obesity s/p gastric bypass procedure, LARA, opioid use disorder, chronic back pain (sees pain med and gets steroid injections), sp laminectomy at [facility] in 2021 who presents with severe back pain after lifting a child. Ddx lumbar sprain; rule out     Of note, the patient has a prior history of opioid use disorder, was previously on suboxone; however states she has not been on this since November/December because she was in the midst of establishing care with a new pain management group. Although she has this history, the patient does not seem to be pain- med seeking at this time. She states that her primary concern and reason for hospital presentation is to rule out significant injury to her back given her history of laminectomy. She received oxycodone  5mg and morphine 4mg IV already by the time of my examination. She has not asked for any more paid meds. 32 year old female with a PMHx of anxiety and depression, obesity s/p gastric bypass procedure, LARA, opioid use disorder, chronic back pain (sees pain med and gets steroid injections), sp laminectomy at [facility] in 2021 who presents with severe back pain after lifting a child. Ddx lumbar sprain; rule out     Of note, the patient has a prior history of opioid use disorder, was previously on suboxone and buprenorphine (see istop); however states she has not been on this since November/December because she was in the midst of establishing care with a new pain management group. Although she has this history, the patient does not seem to be pain- med seeking at this time. She states that her primary concern and reason for hospital presentation is to rule out significant injury to her back given her history of laminectomy. Thus far, she received oxycodone  5mg and morphine 4mg IV by the time of my examination and has not asked for any more paid meds.

## 2023-02-06 NOTE — H&P ADULT - HISTORY OF PRESENT ILLNESS
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       ED course: pt was treated with valium, flexeril, acetaminophen, Toradol, oxycodone, morphine. 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         Of note, the patient was admitted to [facility] in Jan 2022 after opioid overdose requiring narcan resuscitation. She was ultimately discharged with f/u with addiction medicine. She was seen again at [facility] in April 2022 after suicide attempt with drug overdose (acetaminophen and clonidine). She was stabilized at the ICU there before being transferred to inpatient psychiatry.    ED course: pt was treated with valium, flexeril, acetaminophen, Toradol, oxycodone, morphine. 32 year old female with a PMHx of anxiety and depression, obesity s/p gastric bypass procedure, opioid use disorder, chronic back pain (sees pain med and gets steroid injections), sp laminectomy at [facility] in 2021 who presents with acute onset back pain. Pain is located in the upper to lower back, with radiation across the lower ribs bilaterally and into the proximal legs bilaterally. 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 She tried to treat her pain at home using motrin (800 mg), acetaminophen, tizanidine gabapentin, and topical tiger balm, lidocaine patch and CBD oil; however, pain remained at a 10/10. The patient states she had a laminectomy in 2021 d/t spinal cord impingement. Given the severity of her symptoms, she presents to the ED for further evaluation.    On chart review, the patient was admitted to [facility] in Jan 2022 after opioid overdose requiring narcan resuscitation. She was ultimately discharged with f/u with addiction medicine. She was seen again at [place] in April 2022 after suicide attempt with drug overdose (acetaminophen and clonidine). She was stabilized at the ICU there before being transferred to inpatient psychiatry.    ED course: pt was treated with valium, flexeril, acetaminophen, Toradol, oxycodone, morphine. 32 year old female with a PMHx of anxiety and depression, obesity s/p gastric bypass procedure, LARA, opioid use disorder, chronic back pain (sees pain med and gets steroid injections), sp laminectomy at [facility] in 2021 who presents with acute onset back pain. Pain is located in the upper to lower back, with radiation across the lower ribs bilaterally and into the proximal legs bilaterally. 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 She tried to treat her pain at home using motrin (800 mg), acetaminophen, tizanidine gabapentin, and topical tiger balm, lidocaine patch and CBD oil; however, pain remained at a 10/10. The patient states she had a laminectomy in 2021 d/t spinal cord impingement. Given the severity of her symptoms, she presents to the ED for further evaluation.    On chart review, the patient was admitted to [facility] in Jan 2022 after opioid overdose requiring narcan resuscitation. She was ultimately discharged with f/u with addiction medicine. She was seen again at [place] in April 2022 after suicide attempt with drug overdose (acetaminophen and clonidine). She was stabilized at the ICU there before being transferred to inpatient psychiatry.    ED course: pt was treated with valium, flexeril, acetaminophen, Toradol, oxycodone, morphine.

## 2023-02-06 NOTE — CHART NOTE - NSCHARTNOTEFT_GEN_A_CORE
iSTOP prescription monitoring    This report was requested by: Callie Zamudio | Reference #: [id number]    Others' Prescriptions  Patient Name: Rebecca CobbBirth Date: 1990  Address: [address] [place] [id number]: Female  Rx Written	Rx Dispensed	Drug	Quantity	Days Supply	Prescriber Name  11/18/2022	12/05/2022	sublocade 100 mg/0.5 ml syring	1ml	28	Coriolan, Ardouin (FNP)  Prescriber Jennifer # [id number]  Payment Method Insurance  Dispenser [facility] #[id number]  10/25/2022	10/31/2022	sublocade 100 mg/0.5 ml syring	1ml	28	Coriolan, Ardouin (FNP)  Prescriber Jennifer # [id number]  Payment Method Insurance  Dis[facility]ens[facility] #[id number]  09/30/2022	10/05/2022	sublocade 100 mg/0.5 ml syring	1ml	28	Coriolan, Ardouin (FNP)  Prescriber Jennifer # [id number]  Payment Method Insurance  Dispenser [facility][id number]  08/19/2022	09/06/2022	sublocade 100 mg/0.5 ml syring	1ml	28	Coriolan, Ardouin (FNP)  Prescriber Jennifer # [id number]  Payment Method Insurance  Dispenser [facility] #[id number]  08/03/2022	08/11/2022	sublocade 100 mg/0.5 ml syring	1ml	28	Coriolan, Ardouin (FNP)  Prescriber Jennifer # [id number]  Payment Method Insurance  Dispens[facility] #[id number]    Patient Name: Rebecca CobbBirth Date: 1990  Address: [address] [place] [id number]: Female  Rx Written	Rx Dispensed	Drug	Quantity	Days Supply	Prescriber Name  12/02/2022	12/02/2022	buprenorphine-naloxone 8-2 mg sl film	7	7	Coriolan, Ardouin (FNP)  Prescriber Jennifer # [id number]  Payment Method Insurance  Dispenser [facility] #[id number]  08/05/2022	08/05/2022	buprenorphine-naloxone 8-2 mg sl tablet	21	7	Coriolan, Ardouin (FNP)  Prescriber Jennifer # [id number]  Payment Method Insurance  Dispenser [facility] #[id number]  04/01/2022	04/17/2022	oxycodone-acetaminophen 5-325 mg tab	15	5	Emerson Nugent (DO)  Prescriber Jennifer # [id number]  Payment Method Insurance  Dispenser [facility] #[id number]  04/02/2022	04/14/2022	oxycodone hcl (ir) 10 mg tab	15	4	Emerson Nugent (DO)  Prescriber Jennifer # [id number]  Payment Method Insurance  Dispenser [facility] #[id number]  03/28/2022	03/28/2022	oxycodone-acetaminophen 5-325 mg tab	21	7	Katlyn Diez T (DO)  Prescriber Jennifer # [id number]  Payment Method Insurance  Dispenser [facility] #[id number]  03/09/2022	03/09/2022	oxycodone hcl (ir) 5 mg tablet	120	30	Carmen Nunez NP  Prescriber Jennifer # [id number]  Payment Method Insurance  Dispenser [facility] #[id number]  02/09/2022	02/10/2022	oxycodone-acetaminophen 5-325 mg tab	28	7	Edvin Tripp  Prescriber Jennifer # [id number]  Payment Method Insurance  Dispenser [facility] #[id number]    Patient Name: Rebecca CobbBirth Date: 1990  Address: [address] [place], [place] [address]Sex: Female  Rx Written	Rx Dispensed	Drug	Quantity	Days Supply	Prescriber Name  03/28/2022	03/28/2022	clonazepam 1 mg tablet	14	14	Liam Kwan  Prescriber Jennifer # [id number]  Payment Method Insurance  Dispenser [facility]  02/14/2022	02/17/2022	clonazepam 1 mg tablet	30	30	Liam Kwan  Prescriber Jennifer # [id number]  Payment Method Insurance  Dispenser [facility]  02/14/2022	02/14/2022	buprenorphine-naloxone 8-2 mg sl tablet	28	14	Liam Kwan  Prescriber Jennifer # [id number]  Payment Method Insurance  Dispenser [facility]

## 2023-02-06 NOTE — CONSULT NOTE ADULT - SUBJECTIVE AND OBJECTIVE BOX
p ([id number])     32F mult med hx inc anxiety, depression, chronic pain (sees pain med and gets steroid injections), reports prior L4 or L5 lami for "cord compression" but per CT ab from 1/2023, lamina look intact. Unreliable narrator. 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 Reports pain radiates around mid-thoracic rib cage on R side. Was told to come to ED by pain doc. Per ED, pt reported urinating prior to ED visit; however, pt told neurosurgery that she hasn't urinated since Sunday AM. No BM for few days but denies new saddle anesthesia. Bladder scanned for 124cc. Exam: AOx3, b/l HG 5/5 b/l delt 5/5, L bicep/tricep 4/5, R bicep/tricep 3/5, RLE KF/KE 4+/5 HF/HE 4+/5, LLE KF/KE 4-4+/5 HF/HE 4-4+/5, no rios, no clonus. Strength is mid-back-pain limited.     --Anticoagulation:    =====================  PAST MEDICAL HISTORY   Cholecystitis    Mixed connective tissue disease    Anxiety    Depression    Polycystic ovarian syndrome    HTN (hypertension)    Chronic back pain    Obesity    GERD (gastroesophageal reflux disease)    Nephrosclerosis    Suicide attempt by drug overdose    Borderline personality disorder    ADHD (attention deficit hyperactivity disorder)    CKD (chronic kidney disease)    DM2 (diabetes mellitus, type 2)    HLD (hyperlipidemia)    UTI (urinary tract infection)    Anemia    PCOS (polycystic ovarian syndrome)    Lymphocytosis    Opiate dependence    Fractured coccyx    Torn meniscus    Hypotension    H/O appendicitis    Bradycardia      PAST SURGICAL HISTORY   History of cholecystectomy    H/O knee surgery    H/O ovarian cystectomy    H/O unilateral salpingectomy    History of lumbar laminectomy    H/O gastric bypass      adhesives (Blisters; Rash)  amoxicillin (Rash)  peanuts (Anaphylaxis)  Tree Nuts (Anaphylaxis)      MEDICATIONS:  Antibiotics:    Neuro:    Other:      SOCIAL HISTORY:   Occupation:   Marital Status:     FAMILY HISTORY:  No pertinent family history in first degree relatives    Family history of hypertension    No pertinent family history in first degree relatives    Family history of heart disease (Grandparent)        ROS: Negative except per HPI    LABS:                          14.5   7.29  )-----------( 325      ( 05 Feb 2023 23:37 )             42.7     02-05    137  |  100  |  14  ----------------------------<  90  4.5   |  26  |  0.86    Ca    10.5      05 Feb 2023 23:37    TPro  8.2  /  Alb  4.3  /  TBili  0.4  /  DBili  x   /  AST  63<H>  /  ALT  79<H>  /  AlkPhos  188<H>  02-05       p ([id number])     32F mult med hx inc anxiety, depression, chronic pain (sees pain med and gets steroid injections), reports prior L4 or L5 lami for "cord compression" in 2021, but per CT ab from 1/2023, lamina look intact. Unreliable narrator. 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 Reports pain radiates around mid-thoracic rib cage on R side. Was told to come to ED by pain doc. Per ED, pt reported urinating prior to ED visit; however, pt told neurosurgery that she hasn't urinated since Sunday AM. No BM for few days but denies new saddle anesthesia. Bladder scanned for 124cc. Exam: AOx3, b/l HG 5/5 b/l delt 5/5, L bicep/tricep 4/5, R bicep/tricep 3/5, RLE KF/KE 4+/5 HF/HE 4+/5, LLE KF/KE 4-4+/5 HF/HE 4-4+/5, no rios, no clonus. Strength is mid-back-pain limited.     --Anticoagulation:    =====================  PAST MEDICAL HISTORY   Cholecystitis    Mixed connective tissue disease    Anxiety    Depression    Polycystic ovarian syndrome    HTN (hypertension)    Chronic back pain    Obesity    GERD (gastroesophageal reflux disease)    Nephrosclerosis    Suicide attempt by drug overdose    Borderline personality disorder    ADHD (attention deficit hyperactivity disorder)    CKD (chronic kidney disease)    DM2 (diabetes mellitus, type 2)    HLD (hyperlipidemia)    UTI (urinary tract infection)    Anemia    PCOS (polycystic ovarian syndrome)    Lymphocytosis    Opiate dependence    Fractured coccyx    Torn meniscus    Hypotension    H/O appendicitis    Bradycardia      PAST SURGICAL HISTORY   History of cholecystectomy    H/O knee surgery    H/O ovarian cystectomy    H/O unilateral salpingectomy    History of lumbar laminectomy    H/O gastric bypass      adhesives (Blisters; Rash)  amoxicillin (Rash)  peanuts (Anaphylaxis)  Tree Nuts (Anaphylaxis)      MEDICATIONS:  Antibiotics:    Neuro:    Other:      SOCIAL HISTORY:   Occupation:   Marital Status:     FAMILY HISTORY:  No pertinent family history in first degree relatives    Family history of hypertension    No pertinent family history in first degree relatives    Family history of heart disease (Grandparent)        ROS: Negative except per HPI    LABS:                          14.5   7.29  )-----------( 325      ( 05 Feb 2023 23:37 )             42.7     02-05    137  |  100  |  14  ----------------------------<  90  4.5   |  26  |  0.86    Ca    10.5      05 Feb 2023 23:37    TPro  8.2  /  Alb  4.3  /  TBili  0.4  /  DBili  x   /  AST  63<H>  /  ALT  79<H>  /  AlkPhos  188<H>  02-05

## 2023-02-06 NOTE — H&P ADULT - PROBLEM SELECTOR PLAN 5
- DVT ppx: ambulate as tolerated  - GI ppx: none  - Diet: regular  - Dispo: pending MRI  - No need for AM labs

## 2023-02-06 NOTE — ED ADULT NURSE REASSESSMENT NOTE - NS ED NURSE REASSESS COMMENT FT1
break coverage RN: patient resting in NAD. patient ambulatory independently with steady gait noted to pantry. RN administered additional medications for pain management. MDs to reassess for dispo. updated on plan of care. comfort and safety maintained

## 2023-02-06 NOTE — H&P ADULT - NSHPPHYSICALEXAM_GEN_ALL_CORE
Vital Signs Last 24 Hrs  T(C): 37 (06 Feb 2023 00:57), Max: 37 (06 Feb 2023 00:57)  T(F): 98.6 (06 Feb 2023 00:57), Max: 98.6 (06 Feb 2023 00:57)  HR: 71 (06 Feb 2023 01:10) (71 - 81)  BP: 119/78 (06 Feb 2023 01:10) (119/78 - 148/97)  BP(mean): --  RR: 15 (06 Feb 2023 00:57) (15 - 19)  SpO2: 100% (06 Feb 2023 00:57) (97% - 100%)    Parameters below as of 06 Feb 2023 00:57  Patient On (Oxygen Delivery Method): room air

## 2023-02-06 NOTE — H&P ADULT - PROBLEM SELECTOR PLAN 1
After heavy lifting  - continue home gabapentin 600mg BID  - start ibuprofen 600mg q6hrs standing  - start acetaminophen 650mg q6hrs standing  - continue home tizanidine, make standing at 4mg BID  - start lidocaine patch  - PRN oxycodone 5mg q6hrs for severe pain  - please note: pt has hx of opioid use disorder. Consider pain management consultation if pt requires discharge with opioids  - F/u MRI thoracic and lumbar spine  - neurosurgery is following ; appreciate input

## 2023-02-06 NOTE — ED PROVIDER NOTE - CLINICAL SUMMARY MEDICAL DECISION MAKING FREE TEXT BOX
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 Pain is located mid thoracic spine and BL paraspinal region radiating around rib cage and down to lower back and down BL legs. No bowel or blader dysf. Pt ambulatory w FROM in ext x4. NO sensory deficits on exam though does report paresthesias from low back around to fronts of BL thighs, labia and BL inner calf area. NO saddle anesthesia or paresthesias. Motor is 4/5 BL hip and knee due to pain. 5/5 BL ankles. Ambulating w normal gait. There is midline spinal TTP along mid thoracic spine w/o deformity. There is L paraspinal spasm and TTP in the T and L spine. Overall I do not suspect acute spinal cord compression based on her exam. She possibly has an acute muscle spasm +/- disc herniation w peripheral nerve root compression. Will give additional pain meds to attempt to address sx. Pt already on numerous pain meds, may require admission for pain mgmt. If requires admission will consult spine and consider imaging.

## 2023-02-06 NOTE — ED PROVIDER NOTE - OBJECTIVE STATEMENT
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 Pain is located mid thoracic spine and BL paraspinal region radiating around rib cage and down to lower back and down BL legs. No bowel or blader dysf. Pt ambulatory, does report paresthesias from low back around to fronts of BL thighs, labia and BL inner calf area. Denies saddle anesthesia or paresthesias. Pt is on Tizanidine and gabapentin daily for her chronic pain. She took motrin and tylenol in addition to her tizanidine and gabapentin w/o relief of sx. SHe took additional dose of gabapentin as well as applied tiger balm, lidocaine patch and CBD oil w/o relief. No fall or trauma.

## 2023-02-06 NOTE — ED PROVIDER NOTE - PROGRESS NOTE DETAILS
Nick Daly, PGY-3- Neurosurgery consulted for back pain and weakness and concern for cord impingement.  Neurosurgery evaluated patient and recommended postvoid residual bladder volume.  Patient with 124 cc in bladder.  Unclear when she last urinated.  Patient to be admitted for emergent MRI.  Does not need stat overnight MRI per neurosurgery.  Patient will need PT/OT and pain control.  Hospitalist paged for admission.  Patient updated. Nick Daly, PGY-3- updated pt. ordered home psych meds for pt. pt to be admitted to hospitalist.

## 2023-02-06 NOTE — H&P ADULT - PROBLEM SELECTOR PLAN 3
Euthymic on exam  - continue home cymbalta 60 mg qAM and 30mg qPM  - continue home lituda 40mg qAM and 20mg qPM  - continue home lamictal 200mg qPM  - continue home tazaodone 100mg qHS  - continue home hydroxyzine 50mg TID PRN for anxiety

## 2023-02-06 NOTE — H&P ADULT - NSHPLABSRESULTS_GEN_ALL_CORE
LABS:                         14.5   7.29  )-----------( 325      ( 05 Feb 2023 23:37 )             42.7     02-05    137  |  100  |  14  ----------------------------<  90  4.5   |  26  |  0.86    Ca    10.5      05 Feb 2023 23:37    TPro  8.2  /  Alb  4.3  /  TBili  0.4  /  DBili  x   /  AST  63<H>  /  ALT  79<H>  /  AlkPhos  188<H>  02-05                Records reviewed from prior hospitalization.  Labs reviewed remarkable for - elevated transaminases with ast 63 and alt 79. Prior LFTs from Jan 2023 was also elevated to AST and ALT both 155  EKG personally reviewed - NSR

## 2023-02-07 ENCOUNTER — TRANSCRIPTION ENCOUNTER (OUTPATIENT)
Age: 33
End: 2023-02-07

## 2023-02-07 VITALS
DIASTOLIC BLOOD PRESSURE: 68 MMHG | OXYGEN SATURATION: 96 % | TEMPERATURE: 98 F | HEART RATE: 60 BPM | SYSTOLIC BLOOD PRESSURE: 103 MMHG | RESPIRATION RATE: 18 BRPM

## 2023-02-07 PROCEDURE — 72146 MRI CHEST SPINE W/O DYE: CPT | Mod: 26

## 2023-02-07 PROCEDURE — 84702 CHORIONIC GONADOTROPIN TEST: CPT

## 2023-02-07 PROCEDURE — 72146 MRI CHEST SPINE W/O DYE: CPT | Mod: MA

## 2023-02-07 PROCEDURE — 99221 1ST HOSP IP/OBS SF/LOW 40: CPT

## 2023-02-07 PROCEDURE — 85025 COMPLETE CBC W/AUTO DIFF WBC: CPT

## 2023-02-07 PROCEDURE — 80053 COMPREHEN METABOLIC PANEL: CPT

## 2023-02-07 PROCEDURE — 87637 SARSCOV2&INF A&B&RSV AMP PRB: CPT

## 2023-02-07 PROCEDURE — 93005 ELECTROCARDIOGRAM TRACING: CPT

## 2023-02-07 PROCEDURE — 72148 MRI LUMBAR SPINE W/O DYE: CPT | Mod: 26

## 2023-02-07 PROCEDURE — G0378: CPT

## 2023-02-07 PROCEDURE — 72148 MRI LUMBAR SPINE W/O DYE: CPT | Mod: MA

## 2023-02-07 PROCEDURE — 99285 EMERGENCY DEPT VISIT HI MDM: CPT

## 2023-02-07 RX ORDER — LIDOCAINE 4 G/100G
1 CREAM TOPICAL
Qty: 0 | Refills: 0 | DISCHARGE
Start: 2023-02-07

## 2023-02-07 RX ORDER — METHOCARBAMOL 500 MG/1
2 TABLET, FILM COATED ORAL
Qty: 42 | Refills: 0
Start: 2023-02-07 | End: 2023-02-13

## 2023-02-07 RX ORDER — ACETAMINOPHEN 500 MG
2 TABLET ORAL
Qty: 0 | Refills: 0 | DISCHARGE
Start: 2023-02-07

## 2023-02-07 RX ORDER — METHOCARBAMOL 500 MG/1
1000 TABLET, FILM COATED ORAL EVERY 8 HOURS
Refills: 0 | Status: DISCONTINUED | OUTPATIENT
Start: 2023-02-07 | End: 2023-02-07

## 2023-02-07 RX ORDER — IBUPROFEN 200 MG
1 TABLET ORAL
Qty: 0 | Refills: 0 | DISCHARGE
Start: 2023-02-07

## 2023-02-07 RX ADMIN — Medication 15 MILLIGRAM(S): at 05:46

## 2023-02-07 RX ADMIN — OXYCODONE HYDROCHLORIDE 5 MILLIGRAM(S): 5 TABLET ORAL at 07:05

## 2023-02-07 RX ADMIN — DULOXETINE HYDROCHLORIDE 60 MILLIGRAM(S): 30 CAPSULE, DELAYED RELEASE ORAL at 12:27

## 2023-02-07 RX ADMIN — Medication 650 MILLIGRAM(S): at 11:30

## 2023-02-07 RX ADMIN — TIZANIDINE 4 MILLIGRAM(S): 4 TABLET ORAL at 12:27

## 2023-02-07 RX ADMIN — Medication 600 MILLIGRAM(S): at 07:11

## 2023-02-07 RX ADMIN — Medication 600 MILLIGRAM(S): at 12:21

## 2023-02-07 RX ADMIN — GABAPENTIN 600 MILLIGRAM(S): 400 CAPSULE ORAL at 07:35

## 2023-02-07 RX ADMIN — LURASIDONE HYDROCHLORIDE 40 MILLIGRAM(S): 40 TABLET ORAL at 12:21

## 2023-02-07 RX ADMIN — LIDOCAINE 1 PATCH: 4 CREAM TOPICAL at 12:22

## 2023-02-07 RX ADMIN — Medication 15 MILLIGRAM(S): at 06:16

## 2023-02-07 RX ADMIN — GUANFACINE 1 MILLIGRAM(S): 3 TABLET, EXTENDED RELEASE ORAL at 07:34

## 2023-02-07 NOTE — DISCHARGE NOTE PROVIDER - PROVIDER TOKENS
FREE:[LAST:[Tzimax],FIRST:[],PHONE:[(   )    -],FAX:[(   )    -],FOLLOWUP:[1 week]],PROVIDER:[TOKEN:[[id number]],FOLLOWUP:[1 week]]

## 2023-02-07 NOTE — PROGRESS NOTE ADULT - SUBJECTIVE AND OBJECTIVE BOX
Patient seen and examined at bedside.    --Anticoagulation--    T(C): 36.7 (02-07-23 @ 05:04), Max: 37.1 (02-07-23 @ 00:50)  HR: 60 (02-07-23 @ 05:04) (60 - 86)  BP: 102/67 (02-07-23 @ 05:04) (95/60 - 130/82)  RR: 18 (02-07-23 @ 05:04) (18 - 18)  SpO2: 96% (02-07-23 @ 05:04) (94% - 96%)  Wt(kg): --    Exam: AOx3, b/l HG 5/5 b/l delt 5/5, L bicep/tricep 4/5, R bicep/tricep 3/5, RLE KF/KE 4+/5 HF/HE 4+/5, LLE KF/KE 4-4+/5 HF/HE 4-4+/5, no rios, no clonus. Strength is mid-back-pain limited.     
HPI:  32 year old female with a PMHx of anxiety and depression, obesity s/p gastric bypass procedure, LARA, opioid use disorder, chronic back pain (sees pain med and gets steroid injections), sp laminectomy at [facility] in 2021 who presents with acute onset back pain. Pain is located in the upper to lower back, with radiation across the lower ribs bilaterally and into the proximal legs bilaterally. 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 She tried to treat her pain at home using motrin (800 mg), acetaminophen, tizanidine gabapentin, and topical tiger balm, lidocaine patch and CBD oil; however, pain remained at a 10/10. The patient states she had a laminectomy in 2021 d/t spinal cord impingement. Given the severity of her symptoms, she presents to the ED for further evaluation.    On chart review, the patient was admitted to [facility] in Jan 2022 after opioid overdose requiring narcan resuscitation. She was ultimately discharged with f/u with addiction medicine. She was seen again at [facility] in April 2022 after suicide attempt with drug overdose (acetaminophen and clonidine). She was stabilized at the ICU there before being transferred to inpatient psychiatry.    ED course: pt was treated with valium, flexeril, acetaminophen, Toradol, oxycodone, morphine. (06 Feb 2023 02:39)    Patient with pmain seeking Behaviour refusing oral meds     Pain mangement consulted   D/C planning today   On Medrol pack       PAST MEDICAL & SURGICAL HISTORY:  Cholecystitis  S/P cholycystectomy      Mixed connective tissue disease      Anxiety      Depression      Polycystic ovarian syndrome      HTN (hypertension)      Chronic back pain      Obesity      GERD (gastroesophageal reflux disease)      Nephrosclerosis      Suicide attempt by drug overdose  2019 Tylenol OD      Borderline personality disorder      ADHD (attention deficit hyperactivity disorder)      CKD (chronic kidney disease)      DM2 (diabetes mellitus, type 2)      HLD (hyperlipidemia)      UTI (urinary tract infection)      Anemia      PCOS (polycystic ovarian syndrome)      Lymphocytosis      Opiate dependence      Fractured coccyx      Torn meniscus  right knee      Hypotension      H/O appendicitis      Bradycardia      History of cholecystectomy  2016      H/O knee surgery  2008 2009 2015      H/O ovarian cystectomy  2003 2017      H/O unilateral salpingectomy  right      History of lumbar laminectomy  6/2021      H/O gastric bypass  lost 50 lbs 12/2021 and appendectomy          Review of Systems:   CONSTITUTIONAL: No fever, weight loss, or fatigue  EYES: No eye pain, visual disturbances, or discharge  ENMT:  No difficulty hearing, tinnitus, vertigo; No sinus or throat pain  NECK: No pain or stiffness  BREASTS: No pain, masses, or nipple discharge  RESPIRATORY: No cough, wheezing, chills or hemoptysis; No shortness of breath  CARDIOVASCULAR: No chest pain, palpitations, dizziness, or leg swelling  GASTROINTESTINAL: No abdominal or epigastric pain. No nausea, vomiting, or hematemesis; No diarrhea or constipation. No melena or hematochezia.  GENITOURINARY: No dysuria, frequency, hematuria, or incontinence  NEUROLOGICAL: No headaches, memory loss, loss of strength, numbness, or tremors  SKIN: No itching, burning, rashes, or lesions   LYMPH NODES: No enlarged glands  ENDOCRINE: No heat or cold intolerance; No hair loss  MUSCULOSKELETAL: No joint pain or swelling; No muscle, back, or extremity pain  PSYCHIATRIC: No depression, anxiety, mood swings, or difficulty sleeping  HEME/LYMPH: No easy bruising, or bleeding gums  ALLERY AND IMMUNOLOGIC: No hives or eczema    Allergies    adhesives (Blisters; Rash)  amoxicillin (Rash)  peanuts (Anaphylaxis)  Tree Nuts (Anaphylaxis)    Intolerances        Social History:     FAMILY HISTORY:  Family history of heart disease (Grandparent)        MEDICATIONS  (STANDING):    MEDICATIONS  (PRN):        CAPILLARY BLOOD GLUCOSE        I&O's Summary    07 Feb 2023 07:01  -  08 Feb 2023 07:00  --------------------------------------------------------  IN: 720 mL / OUT: 0 mL / NET: 720 mL        PHYSICAL EXAM:  GENERAL: NAD, well-developed  HEAD:  Atraumatic, Normocephalic  EYES: EOMI, PERRLA, conjunctiva and sclera clear  NECK: Supple, No JVD  CHEST/LUNG: Clear to auscultation bilaterally; No wheeze  HEART: Regular rate and rhythm; No murmurs, rubs, or gallops  ABDOMEN: Soft, Nontender, Nondistended; Bowel sounds present  EXTREMITIES:  2+ Peripheral Pulses, No clubbing, cyanosis, or edema  PSYCH: AAOx3  NEUROLOGY: non-focal  SKIN: No rashes or lesions    LABS:                    RADIOLOGY & ADDITIONAL TESTS:    Imaging Personally Reviewed:    Consultant(s) Notes Reviewed:      Care Discussed with Consultants/Other Providers:

## 2023-02-07 NOTE — DISCHARGE NOTE NURSING/CASE MANAGEMENT/SOCIAL WORK - PATIENT PORTAL LINK FT
You can access the FollowMyHealth Patient Portal offered by [facility] by registering at the following website: [email or web address]. By joining [facility]’s FollowMyHealth portal, you will also be able to view your health information using other applications (apps) compatible with our system.

## 2023-02-07 NOTE — CONSULT NOTE ADULT - ASSESSMENT
32 year old female with a PMHx of anxiety and depression, obesity s/p gastric bypass procedure, LARA, opioid use disorder, chronic back pain (sees pain med and gets steroid injections), S/P laminectomy at [facility] in 2021 who presents with acute onset back pain. Pain is located in the upper to lower back, with radiation across the lower ribs bilaterally and into the proximal legs bilaterally. 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 She tried to treat her pain at home using motrin (800 mg), acetaminophen, tizanidine gabapentin, and topical tiger balm, lidocaine patch and CBD oil; however, pain remained at a 10/10. The patient states she had a laminectomy in 2021 d/t spinal cord impingement. Given the severity of her symptoms, she presents to the ED for further evaluation.    On chart review, the patient was admitted to [facility] in Jan 2022 after opioid overdose requiring narcan resuscitation. She was ultimately discharged with f/u with addiction medicine. She was seen again at [facility] in April 2022 after suicide attempt with drug overdose (acetaminophen and clonidine). She was stabilized at the ICU there before being transferred to inpatient psychiatry.  On further review, the pt underwent an L5-S1 microdiscectomy for a R L5-S1 HNP with impingement of the R S1 nerve root on 6/20/2022.    Current out- patient pain regimen: none  Last percocet prescription was April 2022; then on suboxone and sublocade IM (long acting buprenorphine)  Out Patient Pain Management provider: none    Neurosurgery 2/7/2023:  -MRI T/L pend final read, but mild disc bulge at T8/9 w/o sig central stenosis/cord signal, not consistent with the patient's symptoms, therefore the patient would not benefit from a procedure.    Pt without significant findings on T/L MRIs.  No surgical intervention per Neurosurgery.  In light of pt's past H/O 3 SA, one with tylenol, one with opiates, and one with tylenol and clonidine, suggest discontinuing oxycodone.  Pt repeatedly asking for IV morphine and the "real dose" of IV toradol for her mid back pain.    Continue cymbalta 60 mg QAM and 30 mg QPM.  Continue neurontin 600 mg Q 6 hours- current CrCl is 120.9.  Continue trazodone 100 mg QHS.  Continue motrin 600 mg Q 6 hours PRN.  Pt does not want zanaflex, states it makes her too sleepy.  Is asking for flexeril.  Explained potential serotonin syndrome withe her antidepressants.  Agreed to trial robaxin 1000 mg Q 8 hours PRN.  Agree with medrol dosepack on discharge.    Discussed the role of PT as an outpatient with focus on thorax spasm.    Will find outpatient pain management provider in [facility].      Minutes spent on total encounter: 30 minutes      Chronic Pain Service  [phone]

## 2023-02-07 NOTE — DISCHARGE NOTE PROVIDER - NSDCFUSCHEDAPPT_GEN_ALL_CORE_FT
[facility] [address] [facility]  Scheduled Appointment: 02/20/2023    Live [place]  [facility]  OBGYNGDIONISIO [address] [facility]  Scheduled Appointment: 03/07/2023

## 2023-02-07 NOTE — PROGRESS NOTE ADULT - ASSESSMENT
32F mult med hx inc anxiety, depression, chronic pain (sees pain med and gets steroid injections), reports prior L4 or L5 lami for "cord compression" in 2021 but per CT ab from 1/2023, lamina look intact. Unreliable narrator. 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 Reports pain radiates around mid-thoracic rib cage on R side. Was told to come to ED by pain doc. Per ED, pt reported urinating prior to ED visit; however, pt told neurosurgery that she hasn't urinated since Sunday AM. No BM for few days but denies new saddle anesthesia. Bladder scanned for 124cc. Exam: AOx3, b/l HG 5/5 b/l delt 5/5, L bicep/tricep 4/5, R bicep/tricep 3/5, RLE KF/KE 4+/5 HF/HE 4+/5, LLE KF/KE 4-4+/5 HF/HE 4-4+/5, no rios, no clonus. Strength is mid-back-pain limited.   -No acute nsgy intervention  -Rec adm med for PT/OT, pain control  -MRI T/L pend final read, but mild disc bulge at T8/9 w/o sig central stenosis/cord signal, not consistent with the patient's symptoms, therefore the patient would not benefit from a procedure   
32 year old female with a PMHx of anxiety and depression, obesity s/p gastric bypass procedure, LARA, opioid use disorder, chronic back pain (sees pain med and gets steroid injections), sp laminectomy at [facility] in 2021 who presents with severe back pain after lifting a child. Ddx lumbar sprain; rule out     Of note, the patient has a prior history of opioid use disorder, was previously on suboxone and buprenorphine (see istop); however states she has not been on this since November/December because she was in the midst of establishing care with a new pain management group. Although she has this history, the patient does not seem to be pain- med seeking at this time. She states that her primary concern and reason for hospital presentation is to rule out significant injury to her back given her history of laminectomy. Thus far, she received oxycodone  5mg and morphine 4mg IV by the time of my examination and has not asked for any more paid meds.

## 2023-02-07 NOTE — DISCHARGE NOTE NURSING/CASE MANAGEMENT/SOCIAL WORK - NSDCPEFALRISK_GEN_ALL_CORE
For information on Fall & Injury Prevention, visit: [email or web address] OR  [email or web address] OR  https://www.cdc.gov/steadi/patient.html

## 2023-02-07 NOTE — PROGRESS NOTE ADULT - PROBLEM SELECTOR PLAN 1
After heavy lifting  -MRI T/L pend final read, but mild disc bulge at T8/9 w/o sig central stenosis/cord signal, not consistent with the patient's symptoms, therefore the patient would not benefit from a procedure.    D/C planning on Medrol pack   Pain management consult appreciated   D/C planning on meds as per Pain Management   D/W Team bed side     Time spent coordinating plan 40 minutes

## 2023-02-07 NOTE — DISCHARGE NOTE PROVIDER - NSDCCPCAREPLAN_GEN_ALL_CORE_FT
PRINCIPAL DISCHARGE DIAGNOSIS  Diagnosis: Back pain  Assessment and Plan of Treatment: -No acute nsgy intervention  -Rec adm med for PT/OT, pain control  -MRI T/L pend final read, but mild disc bulge at T8/9 w/o sig central stenosis/cord signal, not consistent with the patient's symptoms, therefore the patient would not benefit from a procedure  follow up with outpatient physical therapy, STIM therapy or TENS therapy  Avoid long term use of NSAIDS. When taking NSAIDS, always take with food.      SECONDARY DISCHARGE DIAGNOSES  Diagnosis: Anxiety and depression  Assessment and Plan of Treatment: continue curent medications  follow up with your behavorial [facility] practitioner after discharge

## 2023-02-07 NOTE — DISCHARGE NOTE PROVIDER - HOSPITAL COURSE
32 year old female with a PMHx of anxiety and depression, obesity s/p gastric bypass procedure, LARA, opioid use disorder, chronic back pain (sees pain med and gets steroid injections), sp laminectomy at [facility] in 2021 who presents with acute onset back pain. Pain is located in the upper to lower back, with radiation across the lower ribs bilaterally and into the proximal legs bilaterally. 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 She tried to treat her pain at home using motrin (800 mg), acetaminophen, tizanidine gabapentin, and topical tiger balm, lidocaine patch and CBD oil; however, pain remained at a 10/10. The patient states she had a laminectomy in 2021 d/t spinal cord impingement. Given the severity of her symptoms, she presents to the ED for further evaluation.    On chart review, the patient was admitted to [facility] in Jan 2022 after opioid overdose requiring narcan resuscitation. She was ultimately discharged with f/u with addiction medicine. She was seen again at [facility] in April 2022 after suicide attempt with drug overdose (acetaminophen and clonidine). She was stabilized at the ICU there before being transferred to inpatient psychiatry.    Thoracic spine MRI: Mild degenerative changes at T6-7, T7-8 and T9-10.   Slightly increased size of T7-8 central disc herniation abutting the   spinal cord. New small central disc herniation at T6-7 and left   parasagittal disc herniation at T9-10.    Lumbar spine MRI: New degenerative changes at L4-5 with small right   parasagittal disc herniation contacting the right L5 nerve root.   Transitional type anatomy.  -No acute nsgy intervention  -Rec adm med for PT/OT, pain control  -MRI T/L pend final read, but mild disc bulge at T8/9 w/o sig central stenosis/cord signal, not consistent with the patient's symptoms, therefore the patient would not benefit from a procedure

## 2023-02-07 NOTE — DISCHARGE NOTE PROVIDER - CARE PROVIDER_API CALL
Dr. J Carlos  Phone: (   )    -  Fax: (   )    -  Follow Up Time: 1 week    Loretta Amaral (NP; RN)  OP SvcSubstance Abuse  [phone] [place] [id number]  Phone: [phone]  Fax: [phone]  Follow Up Time: 1 week

## 2023-02-07 NOTE — DISCHARGE NOTE PROVIDER - NSDCMRMEDTOKEN_GEN_ALL_CORE_FT
acetaminophen 325 mg oral tablet: 2 tab(s) orally every 6 hours  Calcium 500+D oral tablet, chewable:   DULoxetine 30 mg oral delayed release capsule: 1 cap(s) orally once a day (at bedtime)  DULoxetine 60 mg oral delayed release capsule: 1 cap(s) orally once a day (in the morning)  gabapentin 600 mg oral tablet: 1 tab(s) orally 2 times a day  guanFACINE 1 mg oral tablet: 1 tab(s) orally 2 times a day  hydrOXYzine hydrochloride 50 mg oral tablet: 1 tab(s) orally 3 times a day, As Needed  ibuprofen 600 mg oral tablet: 1 tab(s) orally every 6 hours  stop after 5 days  LaMICtal 200 mg oral tablet: 1 tab(s) orally PM  Latuda 20 mg oral tablet: 1 tab(s) orally once a day (at bedtime)  Latuda 40 mg oral tablet: 1 tab(s) orally once a day (in the morning)  lidocaine 4% topical film: Apply topically to affected area every 24 hours  methocarbamol 500 mg oral tablet: 2 tab(s) orally every 8 hours, As needed, Muscle Spasm  tiZANidine 4 mg oral tablet: 1 tab(s) orally 2 times a day, As Needed  traZODone 100 mg oral tablet: 1 tab(s) orally once a day (at bedtime)   acetaminophen 325 mg oral tablet: 2 tab(s) orally every 6 hours  Calcium 500+D oral tablet, chewable:   DULoxetine 30 mg oral delayed release capsule: 1 cap(s) orally once a day (at bedtime)  DULoxetine 60 mg oral delayed release capsule: 1 cap(s) orally once a day (in the morning)  gabapentin 600 mg oral tablet: 1 tab(s) orally 2 times a day  guanFACINE 1 mg oral tablet: 1 tab(s) orally 2 times a day  hydrOXYzine hydrochloride 50 mg oral tablet: 1 tab(s) orally 3 times a day, As Needed  ibuprofen 600 mg oral tablet: 1 tab(s) orally every 6 hours  stop after 5 days  LaMICtal 200 mg oral tablet: 1 tab(s) orally PM  Latuda 20 mg oral tablet: 1 tab(s) orally once a day (at bedtime)  Latuda 40 mg oral tablet: 1 tab(s) orally once a day (in the morning)  lidocaine 4% topical film: Apply topically to affected area every 24 hours  Medrol Dosepak 4 mg oral tablet: 1 packet(s) orally 4 times a day   methocarbamol 500 mg oral tablet: 2 tab(s) orally every 8 hours, As needed, Muscle Spasm  tiZANidine 4 mg oral tablet: 1 tab(s) orally 2 times a day, As Needed  traZODone 100 mg oral tablet: 1 tab(s) orally once a day (at bedtime)

## 2023-02-07 NOTE — CONSULT NOTE ADULT - SUBJECTIVE AND OBJECTIVE BOX
Chief Complaint:  Patient is a 32y old  Female who presents with a chief complaint of rule out cord impingement (07 Feb 2023 05:37)    HPI:  32 year old female with a PMHx of anxiety and depression, obesity s/p gastric bypass procedure, LARA, opioid use disorder, chronic back pain (sees pain med and gets steroid injections), S/P laminectomy at [facility] in 2021 who presents with acute onset back pain. Pain is located in the upper to lower back, with radiation across the lower ribs bilaterally and into the proximal legs bilaterally. 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 She tried to treat her pain at home using motrin (800 mg), acetaminophen, tizanidine gabapentin, and topical tiger balm, lidocaine patch and CBD oil; however, pain remained at a 10/10. The patient states she had a laminectomy in 2021 d/t spinal cord impingement. Given the severity of her symptoms, she presents to the ED for further evaluation.    On chart review, the patient was admitted to [facility] in Jan 2022 after opioid overdose requiring narcan resuscitation. She was ultimately discharged with f/u with addiction medicine. She was seen again at [facility] in April 2022 after suicide attempt with drug overdose (acetaminophen and clonidine). She was stabilized at the ICU there before being transferred to inpatient psychiatry.  On further review, the pt underwent an L5-S1 microdiscectomy for a R L5-S1 HNP with impingement of the R S1 nerve root on 6/20/2022.    Current Pain Score: 8/10 down to 0/10 with medications    Current out- patient pain regimen: none  Last percocet prescription was April 2022; then on suboxone and sublocade IM (long acting buprenorphine)    Out Patient Pain Management provider: none    [facility] Prescription Monitoring Program: Reference #[id number]    PAST MEDICAL & SURGICAL HISTORY:  Cholecystitis  S/P cholycystectomy      Mixed connective tissue disease      Anxiety      Depression      Polycystic ovarian syndrome      HTN (hypertension)      Chronic back pain      Obesity      GERD (gastroesophageal reflux disease)      Nephrosclerosis      Suicide attempt by drug overdose  2019 Tylenol OD      Borderline personality disorder      ADHD (attention deficit hyperactivity disorder)      CKD (chronic kidney disease)      DM2 (diabetes mellitus, type 2)      HLD (hyperlipidemia)      UTI (urinary tract infection)      Anemia      PCOS (polycystic ovarian syndrome)      Lymphocytosis      Opiate dependence      Fractured coccyx      Torn meniscus  right knee      Hypotension      H/O appendicitis      Bradycardia      History of cholecystectomy  2016      H/O knee surgery  2008 2009 2015      H/O ovarian cystectomy  2003 2017      H/O unilateral salpingectomy  right      History of lumbar laminectomy  6/2021      H/O gastric bypass  lost 50 lbs 12/2021 and appendectomy        FAMILY HISTORY:  Family history of heart disease (Grandparent)      SOCIAL HISTORY:  Tobacco Use: denies  Alcohol Use: denies  Recreational Marijuana: denies  Illicit Drug Use: denies    Opioid Risk Tool (ORT-OUD) Score: high    Allergies    adhesives (Blisters; Rash)  amoxicillin (Rash)  peanuts (Anaphylaxis)  Tree Nuts (Anaphylaxis)    Intolerances    None known    REVIEW OF SYSTEMS:  CONSTITUTIONAL: No fever, weight loss, fatigue, falls  NEURO: No headaches, memory loss, tremors, dizziness or blurred vision  RESP: No shortness of breath, cough  CV: No chest pain, palpitations  GI: No abdominal pain, nausea, vomiting, diarrhea, constipation   : No urinary incontinence/retention, dysuria  MSK: No joint pain; + mid back pain; no upper or lower motor strength weakness; no saddle anesthesia   SKIN: No itching, burning, rashes   PSYCHIATRIC: + depression; + anxiety; no difficulty sleeping      MEDICATIONS  (STANDING):  acetaminophen     Tablet .. 650 milliGRAM(s) Oral every 6 hours  DULoxetine 60 milliGRAM(s) Oral daily  DULoxetine 30 milliGRAM(s) Oral at bedtime  gabapentin 600 milliGRAM(s) Oral two times a day  guanFACINE 1 milliGRAM(s) Oral <User Schedule>  ibuprofen  Tablet. 600 milliGRAM(s) Oral every 6 hours  lamoTRIgine 200 milliGRAM(s) Oral at bedtime  lidocaine   4% Patch 1 Patch Transdermal daily  lurasidone 20 milliGRAM(s) Oral at bedtime  lurasidone 40 milliGRAM(s) Oral daily  traZODone 100 milliGRAM(s) Oral at bedtime    MEDICATIONS  (PRN):  aluminum hydroxide/magnesium hydroxide/simethicone Suspension 30 milliLiter(s) Oral every 4 hours PRN Dyspepsia  hydrOXYzine hydrochloride 50 milliGRAM(s) Oral three times a day PRN Anxiety  melatonin 3 milliGRAM(s) Oral at bedtime PRN Insomnia  methocarbamol 1000 milliGRAM(s) Oral every 8 hours PRN Muscle Spasm  ondansetron Injectable 4 milliGRAM(s) IV Push every 8 hours PRN Nausea and/or Vomiting      PHYSICAL EXAM  GENERAL: seen at bedside; NAD; well developed, well groomed   HEENT: head atraumatic, normocephalic; anicteric; speech clear and fluent  NEURO: A + O X 3; good concentration; Cranial Nerves II- XII intact; no sensory loss  GI: abdomen soft, non distended; + BM; appetite good  : voiding  EXTREMITIES/ PV: SALGUERO; + peripheral pulses; no cyanosis, edema or clubbing  MUSCULOSKELETAL: motor strength intact B/L LE; independent ambulator  SKIN: no rashes, lesions    PSYCH: affect flat; good eye contact; + depression  Vital Signs:  T(C): 36.8 (02-07-23 @ 14:07)  HR: 60 (02-07-23 @ 14:07)  BP: 103/68 (02-07-23 @ 14:07)  RR: 18 (02-07-23 @ 14:07)  SpO2: 96% (02-07-23 @ 14:07)    Pertinent labs/radiology:                        14.5   7.29  )-----------( 325      ( 05 Feb 2023 23:37 )             42.7   02-05    137  |  100  |  14  ----------------------------<  90  4.5   |  26  |  0.86    Ca    10.5      05 Feb 2023 23:37    TPro  8.2  /  Alb  4.3  /  TBili  0.4  /  DBili  x   /  AST  63<H>  /  ALT  79<H>  /  AlkPhos  188<H>  02-05    from: MR Lumbar Spine No Cont (02.07.23 @ 09:43)   IMPRESSION:    Thoracic spine MRI: Mild degenerative changes at T6-7, T7-8 and T9-10.   Slightly increased size of T7-8 central disc herniation abutting the   spinal cord. New small central disc herniation at T6-7 and left   parasagittal disc herniation at T9-10.    Lumbar spine MRI: New degenerative changes at L4-5 with small right   parasagittal disc herniation contacting the right L5 nerve root.   Transitional type anatomy.

## 2023-02-20 ENCOUNTER — APPOINTMENT (OUTPATIENT)
Dept: ULTRASOUND IMAGING | Facility: CLINIC | Age: 33
End: 2023-02-20
Payer: COMMERCIAL

## 2023-02-20 PROCEDURE — 76700 US EXAM ABDOM COMPLETE: CPT

## 2023-02-20 PROCEDURE — 76982 USE 1ST TARGET LESION: CPT | Mod: XS

## 2023-02-22 ENCOUNTER — APPOINTMENT (OUTPATIENT)
Dept: NEUROSURGERY | Facility: CLINIC | Age: 33
End: 2023-02-22
Payer: COMMERCIAL

## 2023-02-22 VITALS
SYSTOLIC BLOOD PRESSURE: 126 MMHG | OXYGEN SATURATION: 98 % | WEIGHT: 180 LBS | DIASTOLIC BLOOD PRESSURE: 82 MMHG | HEIGHT: 67 IN | HEART RATE: 80 BPM | BODY MASS INDEX: 28.25 KG/M2 | TEMPERATURE: 98.6 F

## 2023-02-22 DIAGNOSIS — G89.29 OTHER CHRONIC PAIN: ICD-10-CM

## 2023-02-22 DIAGNOSIS — M54.6 PAIN IN THORACIC SPINE: ICD-10-CM

## 2023-02-22 PROCEDURE — 99204 OFFICE O/P NEW MOD 45 MIN: CPT

## 2023-02-23 PROBLEM — M54.6 THORACIC BACK PAIN: Status: ACTIVE | Noted: 2023-02-23

## 2023-02-23 NOTE — CONSULT LETTER
[Dear  ___] : Dear  [unfilled], [Courtesy Letter:] : I had the pleasure of seeing your patient, [unfilled], in my office today. [Sincerely,] : Sincerely, [FreeTextEntry2] : Katlyn Diez,  [address] [facility] [address], [place] [address]  [FreeTextEntry1] : Ms. Cobb is a 32-year-old female patient who was seen in our office today in regards to acute thoracic pain.\par \par The patient was previously seen 5 years ago in regards to chronic pain but was not deemed a surgical candidate.  The patient subsequently underwent surgical intervention by another surgeon in the form of a laminectomy and discectomy in June 2021.  The patient returns today complaining of acute thoracic pain, upper abdominal pain, low back pain, buttock pain, and bilateral leg pain.  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  The patient's current medical regimen for pain includes gabapentin and tizanidine.  The patient states she lost 80 pounds since her last visit with us.  The patient continues to rate her pain as a 10/10 in severity at these regions.  The patient endorses lifting, bending, twisting, and walking making her pain worse whereas heat, cold, and narcotics makes her pain better.\par \par The patient endorses depression and anxiety as part of her medical history.  The patient also endorses a fatty liver and polycystic ovarian syndrome as part of her medical history.  The patient endorses allergies to amoxicillin which causes hives.  The patient endorses a current medical regimen including Latuda, Cymbalta, gabapentin, tizanidine, and Lamictal.\par \par The patient is alert, oriented, and compliant with the exam.  The patient ambulates well.  The patient moves all extremities grossly.\par \par The patient is accompanied with an MRI scan of the thoracic spine dated February 6, 2023.  These images do not demonstrate any surgical lesion though degenerative changes are noted.  No cord or nerve root compression is noted on these images.  In the lumbar spine, the patient is accompanied with an MRI scan performed on February 6, 2023.  The patient has a desiccated disc bulge at L5/S1 eccentric to the right possibly causing nerve root irritation.  This has progressed compared to her previous images from 2018.  The rest of the lumbar spine demonstrates only mild degenerative changes without nerve root or cauda equina compression.\par \par Taken together, I still do not have a structural lesion to account for the patient's whole body pain.  The patient is being followed up by a rheumatologist for the possibility of a mixed connective tissue disorder and pain management physician as well as participating with physical therapy.  At this time, I reassured the patient that there are no new surgical lesions in the spine and have recommended follow-up with us on an as-needed basis. [FreeTextEntry3] : Oz Sena MD, PhD, CS, FAANS Attending Neurosurgeon  of Neurosurgery [facility] [employer] at [facility] at [address], [place] [address] Office: [phone] Fax: [phone]

## 2023-03-06 ENCOUNTER — EMERGENCY (EMERGENCY)
Facility: HOSPITAL | Age: 33
LOS: 0 days | Discharge: HOME | End: 2023-03-06
Attending: EMERGENCY MEDICINE
Payer: COMMERCIAL

## 2023-03-06 VITALS
WEIGHT: 179.9 LBS | HEIGHT: 67 IN | DIASTOLIC BLOOD PRESSURE: 84 MMHG | TEMPERATURE: 98 F | OXYGEN SATURATION: 97 % | SYSTOLIC BLOOD PRESSURE: 124 MMHG | RESPIRATION RATE: 18 BRPM | HEART RATE: 59 BPM

## 2023-03-06 VITALS
SYSTOLIC BLOOD PRESSURE: 123 MMHG | TEMPERATURE: 98 F | HEART RATE: 62 BPM | RESPIRATION RATE: 16 BRPM | OXYGEN SATURATION: 98 % | DIASTOLIC BLOOD PRESSURE: 78 MMHG

## 2023-03-06 DIAGNOSIS — G89.29 OTHER CHRONIC PAIN: ICD-10-CM

## 2023-03-06 DIAGNOSIS — Z98.890 OTHER SPECIFIED POSTPROCEDURAL STATES: Chronic | ICD-10-CM

## 2023-03-06 DIAGNOSIS — Z90.79 ACQUIRED ABSENCE OF OTHER GENITAL ORGAN(S): Chronic | ICD-10-CM

## 2023-03-06 DIAGNOSIS — R11.2 NAUSEA WITH VOMITING, UNSPECIFIED: ICD-10-CM

## 2023-03-06 DIAGNOSIS — Z88.0 ALLERGY STATUS TO PENICILLIN: ICD-10-CM

## 2023-03-06 DIAGNOSIS — F32.A DEPRESSION, UNSPECIFIED: ICD-10-CM

## 2023-03-06 DIAGNOSIS — I95.9 HYPOTENSION, UNSPECIFIED: ICD-10-CM

## 2023-03-06 DIAGNOSIS — Z91.09 OTHER ALLERGY STATUS, OTHER THAN TO DRUGS AND BIOLOGICAL SUBSTANCES: ICD-10-CM

## 2023-03-06 DIAGNOSIS — E78.5 HYPERLIPIDEMIA, UNSPECIFIED: ICD-10-CM

## 2023-03-06 DIAGNOSIS — Z90.49 ACQUIRED ABSENCE OF OTHER SPECIFIED PARTS OF DIGESTIVE TRACT: Chronic | ICD-10-CM

## 2023-03-06 DIAGNOSIS — F41.9 ANXIETY DISORDER, UNSPECIFIED: ICD-10-CM

## 2023-03-06 DIAGNOSIS — D63.1 ANEMIA IN CHRONIC KIDNEY DISEASE: ICD-10-CM

## 2023-03-06 DIAGNOSIS — M54.9 DORSALGIA, UNSPECIFIED: ICD-10-CM

## 2023-03-06 DIAGNOSIS — R10.11 RIGHT UPPER QUADRANT PAIN: ICD-10-CM

## 2023-03-06 DIAGNOSIS — Z20.822 CONTACT WITH AND (SUSPECTED) EXPOSURE TO COVID-19: ICD-10-CM

## 2023-03-06 DIAGNOSIS — R10.9 UNSPECIFIED ABDOMINAL PAIN: ICD-10-CM

## 2023-03-06 DIAGNOSIS — Z91.51 PERSONAL HISTORY OF SUICIDAL BEHAVIOR: ICD-10-CM

## 2023-03-06 DIAGNOSIS — Z98.84 BARIATRIC SURGERY STATUS: ICD-10-CM

## 2023-03-06 DIAGNOSIS — Z98.84 BARIATRIC SURGERY STATUS: Chronic | ICD-10-CM

## 2023-03-06 DIAGNOSIS — F90.9 ATTENTION-DEFICIT HYPERACTIVITY DISORDER, UNSPECIFIED TYPE: ICD-10-CM

## 2023-03-06 DIAGNOSIS — Z90.79 ACQUIRED ABSENCE OF OTHER GENITAL ORGAN(S): ICD-10-CM

## 2023-03-06 DIAGNOSIS — Z90.49 ACQUIRED ABSENCE OF OTHER SPECIFIED PARTS OF DIGESTIVE TRACT: ICD-10-CM

## 2023-03-06 DIAGNOSIS — I12.9 HYPERTENSIVE CHRONIC KIDNEY DISEASE WITH STAGE 1 THROUGH STAGE 4 CHRONIC KIDNEY DISEASE, OR UNSPECIFIED CHRONIC KIDNEY DISEASE: ICD-10-CM

## 2023-03-06 DIAGNOSIS — N18.9 CHRONIC KIDNEY DISEASE, UNSPECIFIED: ICD-10-CM

## 2023-03-06 DIAGNOSIS — F60.3 BORDERLINE PERSONALITY DISORDER: ICD-10-CM

## 2023-03-06 DIAGNOSIS — E11.22 TYPE 2 DIABETES MELLITUS WITH DIABETIC CHRONIC KIDNEY DISEASE: ICD-10-CM

## 2023-03-06 LAB
ALBUMIN SERPL ELPH-MCNC: 3.8 G/DL — SIGNIFICANT CHANGE UP (ref 3.3–5)
ALP SERPL-CCNC: 226 U/L — HIGH (ref 40–120)
ALT FLD-CCNC: 122 U/L — HIGH (ref 12–78)
ANION GAP SERPL CALC-SCNC: 4 MMOL/L — LOW (ref 5–17)
APPEARANCE UR: ABNORMAL
APTT BLD: 39.3 SEC — HIGH (ref 27.5–35.5)
AST SERPL-CCNC: 78 U/L — HIGH (ref 15–37)
BACTERIA # UR AUTO: ABNORMAL
BASOPHILS # BLD AUTO: 0.06 K/UL — SIGNIFICANT CHANGE UP (ref 0–0.2)
BASOPHILS NFR BLD AUTO: 0.7 % — SIGNIFICANT CHANGE UP (ref 0–2)
BILIRUB SERPL-MCNC: 0.5 MG/DL — SIGNIFICANT CHANGE UP (ref 0.2–1.2)
BILIRUB UR-MCNC: NEGATIVE — SIGNIFICANT CHANGE UP
BUN SERPL-MCNC: 9 MG/DL — SIGNIFICANT CHANGE UP (ref 7–23)
CALCIUM SERPL-MCNC: 9.9 MG/DL — SIGNIFICANT CHANGE UP (ref 8.5–10.1)
CHLORIDE SERPL-SCNC: 107 MMOL/L — SIGNIFICANT CHANGE UP (ref 96–108)
CO2 SERPL-SCNC: 29 MMOL/L — SIGNIFICANT CHANGE UP (ref 22–31)
COLOR SPEC: YELLOW — SIGNIFICANT CHANGE UP
CREAT SERPL-MCNC: 0.76 MG/DL — SIGNIFICANT CHANGE UP (ref 0.5–1.3)
DIFF PNL FLD: NEGATIVE — SIGNIFICANT CHANGE UP
EGFR: 107 ML/MIN/1.73M2 — SIGNIFICANT CHANGE UP
EOSINOPHIL # BLD AUTO: 0.12 K/UL — SIGNIFICANT CHANGE UP (ref 0–0.5)
EOSINOPHIL NFR BLD AUTO: 1.4 % — SIGNIFICANT CHANGE UP (ref 0–6)
EPI CELLS # UR: ABNORMAL
FLUAV AG NPH QL: SIGNIFICANT CHANGE UP
FLUBV AG NPH QL: SIGNIFICANT CHANGE UP
GLUCOSE SERPL-MCNC: 88 MG/DL — SIGNIFICANT CHANGE UP (ref 70–99)
GLUCOSE UR QL: NEGATIVE — SIGNIFICANT CHANGE UP
HCG SERPL-ACNC: <1 MIU/ML — SIGNIFICANT CHANGE UP
HCT VFR BLD CALC: 42.7 % — SIGNIFICANT CHANGE UP (ref 34.5–45)
HGB BLD-MCNC: 14.9 G/DL — SIGNIFICANT CHANGE UP (ref 11.5–15.5)
IMM GRANULOCYTES NFR BLD AUTO: 0.2 % — SIGNIFICANT CHANGE UP (ref 0–0.9)
INR BLD: 1.19 RATIO — HIGH (ref 0.88–1.16)
KETONES UR-MCNC: NEGATIVE — SIGNIFICANT CHANGE UP
LEUKOCYTE ESTERASE UR-ACNC: ABNORMAL
LIDOCAIN IGE QN: 242 U/L — SIGNIFICANT CHANGE UP (ref 73–393)
LYMPHOCYTES # BLD AUTO: 2.66 K/UL — SIGNIFICANT CHANGE UP (ref 1–3.3)
LYMPHOCYTES # BLD AUTO: 32 % — SIGNIFICANT CHANGE UP (ref 13–44)
MCHC RBC-ENTMCNC: 28.8 PG — SIGNIFICANT CHANGE UP (ref 27–34)
MCHC RBC-ENTMCNC: 34.9 GM/DL — SIGNIFICANT CHANGE UP (ref 32–36)
MCV RBC AUTO: 82.4 FL — SIGNIFICANT CHANGE UP (ref 80–100)
MONOCYTES # BLD AUTO: 0.52 K/UL — SIGNIFICANT CHANGE UP (ref 0–0.9)
MONOCYTES NFR BLD AUTO: 6.3 % — SIGNIFICANT CHANGE UP (ref 2–14)
NEUTROPHILS # BLD AUTO: 4.93 K/UL — SIGNIFICANT CHANGE UP (ref 1.8–7.4)
NEUTROPHILS NFR BLD AUTO: 59.4 % — SIGNIFICANT CHANGE UP (ref 43–77)
NITRITE UR-MCNC: NEGATIVE — SIGNIFICANT CHANGE UP
PH UR: 7 — SIGNIFICANT CHANGE UP (ref 5–8)
PLATELET # BLD AUTO: 351 K/UL — SIGNIFICANT CHANGE UP (ref 150–400)
POTASSIUM SERPL-MCNC: 3.3 MMOL/L — LOW (ref 3.5–5.3)
POTASSIUM SERPL-SCNC: 3.3 MMOL/L — LOW (ref 3.5–5.3)
PROT SERPL-MCNC: 8.4 GM/DL — HIGH (ref 6–8.3)
PROT UR-MCNC: SIGNIFICANT CHANGE UP MG/DL
PROTHROM AB SERPL-ACNC: 13.8 SEC — HIGH (ref 10.5–13.4)
RBC # BLD: 5.18 M/UL — SIGNIFICANT CHANGE UP (ref 3.8–5.2)
RBC # FLD: 12.8 % — SIGNIFICANT CHANGE UP (ref 10.3–14.5)
RBC CASTS # UR COMP ASSIST: NEGATIVE /HPF — SIGNIFICANT CHANGE UP (ref 0–4)
RSV RNA NPH QL NAA+NON-PROBE: SIGNIFICANT CHANGE UP
SARS-COV-2 RNA SPEC QL NAA+PROBE: SIGNIFICANT CHANGE UP
SODIUM SERPL-SCNC: 140 MMOL/L — SIGNIFICANT CHANGE UP (ref 135–145)
SP GR SPEC: 1 — LOW (ref 1.01–1.02)
UROBILINOGEN FLD QL: 1
WBC # BLD: 8.31 K/UL — SIGNIFICANT CHANGE UP (ref 3.8–10.5)
WBC # FLD AUTO: 8.31 K/UL — SIGNIFICANT CHANGE UP (ref 3.8–10.5)
WBC UR QL: SIGNIFICANT CHANGE UP /HPF (ref 0–5)

## 2023-03-06 PROCEDURE — 0241U: CPT

## 2023-03-06 PROCEDURE — 83690 ASSAY OF LIPASE: CPT

## 2023-03-06 PROCEDURE — 85610 PROTHROMBIN TIME: CPT

## 2023-03-06 PROCEDURE — 84702 CHORIONIC GONADOTROPIN TEST: CPT

## 2023-03-06 PROCEDURE — 87086 URINE CULTURE/COLONY COUNT: CPT

## 2023-03-06 PROCEDURE — 96375 TX/PRO/DX INJ NEW DRUG ADDON: CPT

## 2023-03-06 PROCEDURE — 85025 COMPLETE CBC W/AUTO DIFF WBC: CPT

## 2023-03-06 PROCEDURE — 85730 THROMBOPLASTIN TIME PARTIAL: CPT

## 2023-03-06 PROCEDURE — 81001 URINALYSIS AUTO W/SCOPE: CPT

## 2023-03-06 PROCEDURE — 36415 COLL VENOUS BLD VENIPUNCTURE: CPT

## 2023-03-06 PROCEDURE — 99285 EMERGENCY DEPT VISIT HI MDM: CPT

## 2023-03-06 PROCEDURE — 80053 COMPREHEN METABOLIC PANEL: CPT

## 2023-03-06 PROCEDURE — 99284 EMERGENCY DEPT VISIT MOD MDM: CPT | Mod: 25

## 2023-03-06 PROCEDURE — 74177 CT ABD & PELVIS W/CONTRAST: CPT | Mod: 26,MA

## 2023-03-06 PROCEDURE — 96374 THER/PROPH/DIAG INJ IV PUSH: CPT | Mod: XU

## 2023-03-06 PROCEDURE — 74177 CT ABD & PELVIS W/CONTRAST: CPT | Mod: MA

## 2023-03-06 RX ORDER — ONDANSETRON 8 MG/1
4 TABLET, FILM COATED ORAL ONCE
Refills: 0 | Status: COMPLETED | OUTPATIENT
Start: 2023-03-06 | End: 2023-03-06

## 2023-03-06 RX ORDER — MORPHINE SULFATE 50 MG/1
4 CAPSULE, EXTENDED RELEASE ORAL ONCE
Refills: 0 | Status: DISCONTINUED | OUTPATIENT
Start: 2023-03-06 | End: 2023-03-06

## 2023-03-06 RX ORDER — ACETAMINOPHEN 500 MG
650 TABLET ORAL ONCE
Refills: 0 | Status: COMPLETED | OUTPATIENT
Start: 2023-03-06 | End: 2023-03-06

## 2023-03-06 RX ORDER — SODIUM CHLORIDE 9 MG/ML
1000 INJECTION INTRAMUSCULAR; INTRAVENOUS; SUBCUTANEOUS
Refills: 0 | Status: DISCONTINUED | OUTPATIENT
Start: 2023-03-06 | End: 2023-03-06

## 2023-03-06 RX ORDER — CLONAZEPAM 1 MG
1 TABLET ORAL ONCE
Refills: 0 | Status: DISCONTINUED | OUTPATIENT
Start: 2023-03-06 | End: 2023-03-06

## 2023-03-06 RX ORDER — SODIUM CHLORIDE 9 MG/ML
1000 INJECTION INTRAMUSCULAR; INTRAVENOUS; SUBCUTANEOUS ONCE
Refills: 0 | Status: COMPLETED | OUTPATIENT
Start: 2023-03-06 | End: 2023-03-06

## 2023-03-06 RX ADMIN — ONDANSETRON 4 MILLIGRAM(S): 8 TABLET, FILM COATED ORAL at 10:48

## 2023-03-06 RX ADMIN — Medication 1 MILLIGRAM(S): at 10:06

## 2023-03-06 RX ADMIN — SODIUM CHLORIDE 200 MILLILITER(S): 9 INJECTION INTRAMUSCULAR; INTRAVENOUS; SUBCUTANEOUS at 12:10

## 2023-03-06 RX ADMIN — Medication 650 MILLIGRAM(S): at 12:09

## 2023-03-06 RX ADMIN — SODIUM CHLORIDE 1000 MILLILITER(S): 9 INJECTION INTRAMUSCULAR; INTRAVENOUS; SUBCUTANEOUS at 10:48

## 2023-03-06 RX ADMIN — MORPHINE SULFATE 4 MILLIGRAM(S): 50 CAPSULE, EXTENDED RELEASE ORAL at 10:48

## 2023-03-06 NOTE — ED PROVIDER NOTE - CROS ED RESP ALL NEG
Last filled 5/17/2021    Last ov 4/28/2021  F/u in 1 year     Labs done 5/5/21 & 5/24/2021
negative...

## 2023-03-06 NOTE — ED PROVIDER NOTE - PROVIDER TOKENS
PROVIDER:[TOKEN:[[id number]]] PROVIDER:[TOKEN:[[id number]]],PROVIDER:[TOKEN:[[id number]]]

## 2023-03-06 NOTE — CONSULT NOTE ADULT - SUBJECTIVE AND OBJECTIVE BOX
HPI:  31yo F w/ anxiety/depression, obesity (s/p gastric bypass), LARA, opioid use disorder, chronic back pain (s/p laminectomy, see pain management, steroid injection) presents w/ epigastric/RUQ pain that radiates to back. Patient could not tolerate anything orally for 3 days. Patient also had associated n/v. Last bowel movement yesterday. Patient has been taking tylenol and advil routinely due to the back pain. Lost 80lb since surgery. Denies fever/chills, shortness of breath, chest pain. VS reviewed    PAST MEDICAL & SURGICAL HISTORY:  Cholecystitis  S/P cholycystectomy      Mixed connective tissue disease      Anxiety      Depression      Polycystic ovarian syndrome      HTN (hypertension)      Chronic back pain      Obesity      GERD (gastroesophageal reflux disease)      Nephrosclerosis      Suicide attempt by drug overdose   Tylenol OD      Borderline personality disorder      ADHD (attention deficit hyperactivity disorder)      CKD (chronic kidney disease)      DM2 (diabetes mellitus, type 2)      HLD (hyperlipidemia)      UTI (urinary tract infection)      Anemia      PCOS (polycystic ovarian syndrome)      Lymphocytosis      Opiate dependence      Fractured coccyx      Torn meniscus  right knee      Hypotension      H/O appendicitis      Bradycardia      History of cholecystectomy        H/O knee surgery  2008      H/O ovarian cystectomy  2003      H/O unilateral salpingectomy  right      History of lumbar laminectomy  2021      H/O gastric bypass  lost 50 lbs 2021 and appendectomy          MEDICATIONS  (STANDING):  sodium chloride 0.9%. 1000 milliLiter(s) (200 mL/Hr) IV Continuous <Continuous>    MEDICATIONS  (PRN):      Allergies    [This allergen will not trigger allergy alert] amoxicillin / clavulanate  Reaction: Unknown (Unknown)  adhesives (Blisters; Rash)  amoxicillin (Rash)    Intolerances        SOCIAL HISTORY:    FAMILY HISTORY:  Family history of heart disease (Grandparent)            Physical Exam:  GENERAL: NAD, AOx3, well developed  HEAD: Atraumatic, normocephalic  EYES: EOMI, PERRLA, conjunctiva and sclera clear  ENT: moist mucous membrane  NECK: supple, No JVD, midline trachea  CHEST/LUNG: unlabored respirations b/l  Heart: S1, S2 normal  ABDOMEN: soft, nondistended, tender to palpation epigastric area  NERVOUS SYSTEM: AOx3, speech clear, no neuro-deficits  MSK: full ROM, no deformities  SKIN: warm to touch, no rash or lesions        Vital Signs Last 24 Hrs  T(C): 36.6 (06 Mar 2023 07:11), Max: 36.6 (06 Mar 2023 07:11)  T(F): 97.9 (06 Mar 2023 07:11), Max: 97.9 (06 Mar 2023 07:11)  HR: 59 (06 Mar 2023 07:11) (59 - 59)  BP: 124/84 (06 Mar 2023 07:11) (124/84 - 124/84)  BP(mean): 96 (06 Mar 2023 07:11) (96 - 96)  RR: 18 (06 Mar 2023 07:11) (18 - 18)  SpO2: 97% (06 Mar 2023 07:11) (97% - 97%)    Parameters below as of 06 Mar 2023 07:11  Patient On (Oxygen Delivery Method): room air        I&O's Summary          LABS:                        14.9   8.31  )-----------( 351      ( 06 Mar 2023 10:18 )             42.7     03-06    140  |  107  |  9   ----------------------------<  88  3.3<L>   |  29  |  0.76    Ca    9.9      06 Mar 2023 10:18    TPro  8.4<H>  /  Alb  3.8  /  TBili  0.5  /  DBili  x   /  AST  78<H>  /  ALT  122<H>  /  AlkPhos  226<H>  03-06    PT/INR - ( 06 Mar 2023 10:18 )   PT: 13.8 sec;   INR: 1.19 ratio         PTT - ( 06 Mar 2023 10:18 )  PTT:39.3 sec  Urinalysis Basic - ( 06 Mar 2023 12:15 )    Color: Yellow / Appearance: Slightly Turbid / S.005 / pH: x  Gluc: x / Ketone: Negative  / Bili: Negative / Urobili: 1   Blood: x / Protein: trace mg/dL / Nitrite: Negative   Leuk Esterase: Trace / RBC: Negative /HPF / WBC 0-2 /HPF   Sq Epi: x / Non Sq Epi: Many / Bacteria: Occasional      CAPILLARY BLOOD GLUCOSE        LIVER FUNCTIONS - ( 06 Mar 2023 10:18 )  Alb: 3.8 g/dL / Pro: 8.4 gm/dL / ALK PHOS: 226 U/L / ALT: 122 U/L / AST: 78 U/L / GGT: x             Cultures:      RADIOLOGY & ADDITIONAL STUDIES:

## 2023-03-06 NOTE — ED PROVIDER NOTE - DIFFERENTIAL DIAGNOSIS
Differential Diagnosis Abdominal pain with extensive surgical, medical and behavioral hx - ?SBO or other complication of prior surgery, ?progression of liver disease, ?medication adverse effect, ?acute infectious process

## 2023-03-06 NOTE — ED PROVIDER NOTE - PATIENT PORTAL LINK FT
You can access the FollowMyHealth Patient Portal offered by [facility] by registering at the following website: [email or web address]. By joining [facility]’s FollowMyHealth portal, you will also be able to view your health information using other applications (apps) compatible with our system.

## 2023-03-06 NOTE — ED ADULT TRIAGE NOTE - CHIEF COMPLAINT QUOTE
p/w fatigue, nausea, vomiting, epigastric and upper R abdominal pain, chills since Friday. recently started taking new medicine (seglentis)

## 2023-03-06 NOTE — ED PROVIDER NOTE - CARE PROVIDER_API CALL
Zak Silverio)  Surgery  [address], [address]  [place] [address]  Phone: [phone]  Fax: [phone]  Follow Up Time:    Zak Silverio)  Surgery  [address], [address]  [place] [address]  Phone: [phone]  Fax: [phone]  Follow Up Time:     Adriano Alarcon)  Gastroenterology; Internal Medicine  [address], [address]  [place] [address]  Phone: [phone]  Fax: [phone]  Follow Up Time:

## 2023-03-06 NOTE — ED PROVIDER NOTE - OBJECTIVE STATEMENT
32 year old female with a PMHx of anxiety and depression, obesity s/p gastric bypass procedure, LARA, opioid use disorder, chronic back pain here with RUQ pain, nausea/vomiting since Friday. Normal BMs. Unable to tolerate PO. Recently started on new medication - ?trigger

## 2023-03-06 NOTE — CONSULT NOTE ADULT - ASSESSMENT
31yo F presents w/ abdominal pain, n/v. Patient has hx of gastric bypass  Surgery consulted for possible internal hernia    CT abd/pelvis unremarkable, other than pelvic fluid collection, possible hemorrhagic ovarian cyst, however no sign of internal hernia or obstruction    Recommendation:  - hydration and potassium repletion in ED  - GI consult  - pain management - patient should not take advil  - patient can follow up with Dr. Silverio in his clinic in 2 weeks    Plan discussed with bariatric surgery attending, Dr. Adrian

## 2023-03-06 NOTE — ED PROVIDER NOTE - RELIEVING FACTORS
pt has a history of open heart surgery for aneurysm  , pt c/o left sided chest pain since this morning , which radiates to his back , pt denies any SOB , pt also c/o facial pain
none

## 2023-03-07 ENCOUNTER — APPOINTMENT (OUTPATIENT)
Dept: OBGYN | Facility: CLINIC | Age: 33
End: 2023-03-07

## 2023-03-07 LAB
CULTURE RESULTS: SIGNIFICANT CHANGE UP
SPECIMEN SOURCE: SIGNIFICANT CHANGE UP

## 2023-04-02 NOTE — ED PROVIDER NOTE - MEDICAL DECISION MAKING DETAILS
26 y/o female HTN, PCOS presents with severe back pain, difficulty urinary and groin numbness starting last night. S/p Spinal ablation 2 weeks ago and sx worse since yesterday. On exam pt with Mild saddle anesthesia, 100cc urine in bladder, +B/L straight leg raise, +spinal TTP. Given hx and exam concern for cord compression vs epidural abscess/hematoma. Plan for Labs, MRI, sx control and reassess.
fall down stairs

## 2023-06-01 NOTE — ED ADULT TRIAGE NOTE - LOCATION:
Health Maintenance Due   Topic Date Due   • COVID-19 Vaccine (1) Never done       Patient is due for topics as listed above but is not proceeding with Immunization(s) COVID-19 at this time.      Right arm;

## 2023-07-05 NOTE — PROGRESS NOTE BEHAVIORAL HEALTH - NSBHFUPREASONCONS_PSY_A_CORE
EPS Progress Note    S:     pt was given digoxin load in addition to diltiazem 30mg q6hr. rates improved, now 100s.       O: T(C): 36.2 (07-05-23 @ 08:49), Max: 36.8 (07-05-23 @ 01:11)  HR: 138 (07-05-23 @ 10:00) (106 - 156)  BP: 106/72 (07-05-23 @ 10:00) (105/53 - 129/70)  RR: 18 (07-05-23 @ 08:40) (16 - 20)  SpO2: 95% (07-05-23 @ 10:00) (92% - 98%)      PHYSICAL  General:  NAD        Chest:  CTA B/L, no w/r/r  Cardiac: irregularly irregular   Abdomen:   soft ND/NT  Extremities: No edema, b/l groin no hematoma/bleeding/oozing  Skin: no rash noted, normal color and pigmentation  Psych: A&Ox3, normal affect and mood  Neuro: no deficit noted     LABS:                        10.5   9.42  )-----------( 215      ( 05 Jul 2023 06:01 )             33.9     07-05    140  |  104  |  16  ----------------------------<  164<H>  4.1   |  24  |  0.50    Ca    8.5      05 Jul 2023 06:01  Phos  3.0     07-05  Mg     2.1     07-05    TPro  7.0  /  Alb  3.1<L>  /  TBili  0.5  /  DBili  x   /  AST  26  /  ALT  16  /  AlkPhos  91  07-05    PTT - ( 04 Jul 2023 05:30 )  PTT:31.9 sec      MEDICATIONS:  acetaminophen   Oral Liquid .. 650 milliGRAM(s) Oral every 6 hours PRN  albuterol/ipratropium for Nebulization 3 milliLiter(s) Nebulizer every 6 hours  aspirin  chewable 81 milliGRAM(s) Oral every 24 hours  atorvastatin 20 milliGRAM(s) Oral at bedtime  budesonide  80 MICROgram(s)/formoterol 4.5 MICROgram(s) Inhaler 2 Puff(s) Inhalation two times a day  busPIRone 7.5 milliGRAM(s) Oral <User Schedule>  chlorhexidine 0.12% Liquid 5 milliLiter(s) Oral Mucosa two times a day  chlorhexidine 2% Cloths 1 Application(s) Topical <User Schedule>  clonazePAM  Tablet 0.5 milliGRAM(s) Oral two times a day  clopidogrel Tablet 75 milliGRAM(s) Oral daily  diltiazem    Tablet 30 milliGRAM(s) Oral every 6 hours  pantoprazole   Suspension 40 milliGRAM(s) Oral daily  piperacillin/tazobactam IVPB.. 4.5 Gram(s) IV Intermittent every 8 hours  polyethylene glycol 3350 17 Gram(s) Oral daily  QUEtiapine 25 milliGRAM(s) Oral daily      ASSESSMENT/PLAN    73yo Cantonese-speaking F with a PMHx of HTN, HLD, CAD s/p bioprosthetic AVR and CABG (11/2018 SVG-RCA, AVR Catalan 21mm), endocarditis (on lifelong penicillin), AF (Warfarin), AAA, PVD, bronchiectasis, presented w/ hemoptysis of unclear etiology, currently on abx with course complicated by episode of rapid afib/aflutter, rates now improved      -check dig level in AM  -if dig level WNL tomorrow, can continue 250mcg daily  -cont diltiazem  -no AC- history of watchman  -cont tele monitoring.       
EPS Progress Note    S:     pt was given digoxin load in addition to diltiazem 30mg q6hr. rates improved, now 100s.       O: T(C): 36.2 (07-05-23 @ 08:49), Max: 36.8 (07-05-23 @ 01:11)  HR: 138 (07-05-23 @ 10:00) (106 - 156)  BP: 106/72 (07-05-23 @ 10:00) (105/53 - 129/70)  RR: 18 (07-05-23 @ 08:40) (16 - 20)  SpO2: 95% (07-05-23 @ 10:00) (92% - 98%)      PHYSICAL  General:  NAD        Chest:  CTA B/L, no w/r/r  Cardiac: irregularly irregular   Abdomen:   soft ND/NT  Extremities: No edema, b/l groin no hematoma/bleeding/oozing  Skin: no rash noted, normal color and pigmentation  Psych: A&Ox3, normal affect and mood  Neuro: no deficit noted     LABS:                        10.5   9.42  )-----------( 215      ( 05 Jul 2023 06:01 )             33.9     07-05    140  |  104  |  16  ----------------------------<  164<H>  4.1   |  24  |  0.50    Ca    8.5      05 Jul 2023 06:01  Phos  3.0     07-05  Mg     2.1     07-05    TPro  7.0  /  Alb  3.1<L>  /  TBili  0.5  /  DBili  x   /  AST  26  /  ALT  16  /  AlkPhos  91  07-05    PTT - ( 04 Jul 2023 05:30 )  PTT:31.9 sec      MEDICATIONS:  acetaminophen   Oral Liquid .. 650 milliGRAM(s) Oral every 6 hours PRN  albuterol/ipratropium for Nebulization 3 milliLiter(s) Nebulizer every 6 hours  aspirin  chewable 81 milliGRAM(s) Oral every 24 hours  atorvastatin 20 milliGRAM(s) Oral at bedtime  budesonide  80 MICROgram(s)/formoterol 4.5 MICROgram(s) Inhaler 2 Puff(s) Inhalation two times a day  busPIRone 7.5 milliGRAM(s) Oral <User Schedule>  chlorhexidine 0.12% Liquid 5 milliLiter(s) Oral Mucosa two times a day  chlorhexidine 2% Cloths 1 Application(s) Topical <User Schedule>  clonazePAM  Tablet 0.5 milliGRAM(s) Oral two times a day  clopidogrel Tablet 75 milliGRAM(s) Oral daily  diltiazem    Tablet 30 milliGRAM(s) Oral every 6 hours  pantoprazole   Suspension 40 milliGRAM(s) Oral daily  piperacillin/tazobactam IVPB.. 4.5 Gram(s) IV Intermittent every 8 hours  polyethylene glycol 3350 17 Gram(s) Oral daily  QUEtiapine 25 milliGRAM(s) Oral daily      ASSESSMENT/PLAN    73yo Cantonese-speaking F with a PMHx of HTN, HLD, CAD s/p bioprosthetic AVR and CABG (11/2018 SVG-RCA, AVR Catalan 21mm), endocarditis (on lifelong penicillin), AF (Warfarin), AAA, PVD, bronchiectasis, s/p NOEMÍ closure with WATCHMAN device implanted on 6/28 presented w/ hemoptysis of unclear etiology, currently on abx with course complicated by episode of rapid afib/aflutter, rates now improved      -check dig level in AM  -if dig level WNL tomorrow, can continue 250mcg daily  -cont diltiazem  -watchman implanted on 6/28- not on oral AC at the moment, pt on aspirin/ plavix.   -cont tele monitoring.       
suicidality/depression
depression
suicidality/agitation/other...

## 2023-07-12 ENCOUNTER — APPOINTMENT (OUTPATIENT)
Dept: OBGYN | Facility: CLINIC | Age: 33
End: 2023-07-12

## 2023-07-13 NOTE — ASU PATIENT PROFILE, ADULT - BLOOD TRANSFUSION, PREVIOUS, PROFILE
[facility]  Nurses Note -- 4 Eyes      7-12-23.      Skin assessed on: Q Shift      [x] No Pressure Injuries Present    []Prevention Measures Documented    [] Yes LDA  for Pressure Injury Previously documented     [] Yes New Pressure Injury Discovered   [] LDA for New Pressure Injury Added      Attending RN:  Bharti Wynn, RN     Second RN:  Debo Thacker RN          no

## 2023-07-29 NOTE — ED ADULT NURSE NOTE - CAS DISCH TRANSFER METHOD
OFFICE VISIT    Patient: Christine Birmingham   : 1943 MRN: [id number]    SUBJECTIVE:  Chief Complaint   Patient presents with   • Follow-up   • Hypertension       Patient has given consent to record this visit for documentation in their clinical record.    A 80 year old female presents for a follow-up of multiple medical conditions.      HISTORY OF PRESENT ILLNESS:  Historian: Self and accompanied by others.    Benign essential hypertension:  Currently, on amLODIPine (NORVASC) 5 MG tablet. Reports normal blood pressure today.     Essential familial hypercholesterolemia:  Currently, on atorvastatin (LIPITOR) 20 MG tablet. Previous labs were done in January and everything reported normal, except that cholesterol levels were 223 mg/dl.    Primary osteoarthritis involving multiple joints:  Complains of pain in hands and knees. Taking Tramadol for pain. She is wearing the left wrist splint for arthritis.    NSAID induced gastritis:  Mentions of gastritis due to NSAIDS.       PAST MEDICAL HISTORY:  Past Medical History:   Diagnosis Date   • Advance directive in chart 2017   • Arthritis    • Essential (primary) hypertension      MEDICATIONS:  Current Outpatient Medications   Medication Sig Dispense Refill   • amLODIPine (NORVASC) 5 MG tablet TAKE ONE TABLET BY MOUTH DAILY 90 tablet 3   • atorvastatin (LIPITOR) 20 MG tablet Take 1 tablet by mouth daily. 90 tablet 3     No current facility-administered medications for this visit.     ALLERGIES:  Allergies as of 2023   • (No Known Allergies)     FAMILY HISTORY:  Family History   Problem Relation Age of Onset   • Patient is unaware of any medical problems Mother    • Hypertension Father    • Hypertension Sister    • Patient is unaware of any medical problems Brother      SOCIAL HISTORY:  Social History     Tobacco Use   • Smoking status: Never   • Smokeless tobacco: Never   Vaping Use   • Vaping Use: never used   Substance Use Topics   • Alcohol use: No   • Drug use:  No     Past Surgical HISTORY  Past Surgical History:   Procedure Laterality Date   • No past surgeries         REVIEW OF SYSTEMS:  Musculoskeletal: Per HPI.    OBJECTIVE:  Visit Vitals  /74   Pulse 80   Temp 98 °F (36.7 °C) (Tympanic)   Resp 16   Ht 5' 5\"   Wt 46.6 kg (102 lb 11.8 oz)   SpO2 98%   BMI 17.10 kg/m²       PHYSICAL EXAM:  Constitutional: In no acute distress. Well-developed.  Eyes: The conjunctiva exhibited no abnormalities. The sclera was normal.  Pulmonary: no respiratory distress, normal respiratory rate and effort and no accessory muscle use. breath sounds clear to auscultation bilaterally.   Cardiovascular: normal rate, no murmurs were heard, regular rhythm, normal S1 and normal S2. edema was not present in the lower extremities.   Musculoskeletal: normal gait. no musculoskeletal erythema was seen, no joint swelling seen and Tenderness over hands and knees. Range of motion is painful. there was no joint instability noted. muscle strength and tone were normal.   Psychiatric: Oriented to time, place, and person. The mood was normal. The affect was normal. The memory was unimpaired.   Skin: Skin moisture and turgor normal.    DIAGNOSTIC STUDIES:  LAB RESULTS:  No visits with results within 1 Month(s) from this visit.   Latest known visit with results is:   Lab Services on 01/10/2023   Component Date Value Ref Range Status   • Sodium 01/10/2023 140  135 - 145 mmol/L Final   • Potassium 01/10/2023 3.9  3.4 - 5.1 mmol/L Final   • Chloride 01/10/2023 106  97 - 110 mmol/L Final   • Carbon Dioxide 01/10/2023 27  21 - 32 mmol/L Final   • Anion Gap 01/10/2023 11  7 - 19 mmol/L Final   • Glucose 01/10/2023 80  70 - 99 mg/dL Final   • BUN 01/10/2023 11  6 - 20 mg/dL Final   • Creatinine 01/10/2023 0.64  0.51 - 0.95 mg/dL Final   • Glomerular Filtration Rate 01/10/2023 90  >=60 Final   • BUN/Cr 01/10/2023 17  7 - 25 Final   • Calcium 01/10/2023 9.3  8.4 - 10.2 mg/dL Final   • Bilirubin, Total 01/10/2023  0.4  0.2 - 1.0 mg/dL Final   • GOT/AST 01/10/2023 13  <=37 Units/L Final   • GPT/ALT 01/10/2023 10  <64 Units/L Final   • Alkaline Phosphatase 01/10/2023 70  45 - 117 Units/L Final   • Albumin 01/10/2023 3.5 (L)  3.6 - 5.1 g/dL Final   • Protein, Total 01/10/2023 7.3  6.4 - 8.2 g/dL Final   • Globulin 01/10/2023 3.8  2.0 - 4.0 g/dL Final   • A/G Ratio 01/10/2023 0.9 (L)  1.0 - 2.4 Final   • Cholesterol 01/10/2023 223 (H)  <=199 mg/dL Final   • Triglycerides 01/10/2023 107  <=149 mg/dL Final   • HDL 01/10/2023 66  >=50 mg/dL Final   • LDL 01/10/2023 136 (H)  <=129 mg/dL Final   • Non-HDL Cholesterol 01/10/2023 157  mg/dL Final   • Cholesterol/ HDL Ratio 01/10/2023 3.4  <=4.4 Final   • WBC 01/10/2023 3.8 (L)  4.2 - 11.0 K/mcL Final   • RBC 01/10/2023 4.00  4.00 - 5.20 mil/mcL Final   • HGB 01/10/2023 12.0  12.0 - 15.5 g/dL Final   • HCT 01/10/2023 37.6  36.0 - 46.5 % Final   • MCV 01/10/2023 94.0  78.0 - 100.0 fl Final   • MCH 01/10/2023 30.0  26.0 - 34.0 pg Final   • MCHC 01/10/2023 31.9 (L)  32.0 - 36.5 g/dL Final   • RDW-CV 01/10/2023 12.7  11.0 - 15.0 % Final   • RDW-SD 01/10/2023 43.5  39.0 - 50.0 fL Final   • PLT 01/10/2023 197  140 - 450 K/mcL Final   • NRBC 01/10/2023 0  <=0 /100 WBC Final   • Neutrophil, Percent 01/10/2023 49  % Final   • Lymphocytes, Percent 01/10/2023 39  % Final   • Mono, Percent 01/10/2023 9  % Final   • Eosinophils, Percent 01/10/2023 2  % Final   • Basophils, Percent 01/10/2023 1  % Final   • Immature Granulocytes 01/10/2023 0  % Final   • Absolute Neutrophils 01/10/2023 1.9  1.8 - 7.7 K/mcL Final   • Absolute Lymphocytes 01/10/2023 1.5  1.0 - 4.0 K/mcL Final   • Absolute Monocytes 01/10/2023 0.3  0.3 - 0.9 K/mcL Final   • Absolute Eosinophils  01/10/2023 0.1  0.0 - 0.5 K/mcL Final   • Absolute Basophils 01/10/2023 0.0  0.0 - 0.3 K/mcL Final   • Absolute Immature Granulocytes 01/10/2023 0.0  0.0 - 0.2 K/mcL Final       ASSESSMENT AND PLAN:  This 80 year old female presents with :  1.  Benign essential hypertension    2. Essential familial hypercholesterolemia    3. Primary osteoarthritis involving multiple joints    4. NSAID induced gastritis        No orders of the defined types were placed in this encounter.      PLAN:  Benign essential hypertension:  Continue current medications.    Essential familial hypercholesterolemia:  Reviewed and discussed previous lab reports.  Continue current medications.  Advised to exercise regularly.    Primary osteoarthritis involving multiple joints:  Recommended taking Tylenol.    NSAID induced gastritis:  Continue current management.    Follow up as needed.    Refer to orders.  Medical compliance with plan discussed and risks of non-compliance reviewed.  Patient education completed on disease process, etiology & prognosis.  Proper usage and side effects of medications reviewed & discussed.  Return to clinic as clinically indicated as discussed with the patient who verbalized understanding of the plan and is in agreement with the plan.    I,  Dr. Asael Pathak, have created a visit summary document based on the audio recording between Dr. Rock Ledbetter MD and this patient for the physician to review, edit as needed, and authenticate.    Creation Date: 7/29/2023      I have reviewed and edited the visit summary above and attest that it is accurate.       Private car

## 2023-08-10 ENCOUNTER — APPOINTMENT (OUTPATIENT)
Dept: OBGYN | Facility: CLINIC | Age: 33
End: 2023-08-10

## 2023-08-16 NOTE — PATIENT PROFILE ADULT - NSPROGENBLOODRESTRICT_GEN_A_NUR
none Partial Purse String (Intermediate) Text: Given the location of the defect and the characteristics of the surrounding skin an intermediate purse string closure was deemed most appropriate.  Undermining was performed circumferentially around the surgical defect.  A purse string suture was then placed and tightened. Wound tension of the circular defect prevented complete closure of the wound.

## 2023-08-24 NOTE — ED PROVIDER NOTE - EYES, MLM
Tdap; 15-Jul-2022 20:03; Cindy Roberts (RN); [employer]; [id number] (Exp. Date: 11-Mar-2024); IntraMuscular; Deltoid Left.; 0.5 milliLiter(s); VIS (VIS Published: 09-May-2013, VIS Presented: 15-Jul-2022);   
Clear bilaterally, pupils equal, round and reactive to light.

## 2023-08-27 NOTE — ED PROVIDER NOTE - ATTENDING CONTRIBUTION TO CARE
see mdm
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
## 2023-09-18 NOTE — ED PROVIDER NOTE - IV ALTEPLASE EXCL REL HIDDEN
show Colchicine Counseling:  Patient counseled regarding adverse effects including but not limited to stomach upset (nausea, vomiting, stomach pain, or diarrhea).  Patient instructed to limit alcohol consumption while taking this medication.  Colchicine may reduce blood counts especially with prolonged use.  The patient understands that monitoring of kidney function and blood counts may be required, especially at baseline. The patient verbalized understanding of the proper use and possible adverse effects of colchicine.  All of the patient's questions and concerns were addressed.

## 2023-09-20 ENCOUNTER — APPOINTMENT (OUTPATIENT)
Dept: OBGYN | Facility: CLINIC | Age: 33
End: 2023-09-20
Payer: COMMERCIAL

## 2023-09-20 PROCEDURE — 99213 OFFICE O/P EST LOW 20 MIN: CPT

## 2023-09-20 PROCEDURE — 85018 HEMOGLOBIN: CPT | Mod: QW

## 2023-09-30 ENCOUNTER — NON-APPOINTMENT (OUTPATIENT)
Age: 33
End: 2023-09-30

## 2023-09-30 LAB — HEMOGLOBIN: 12

## 2023-10-02 ENCOUNTER — APPOINTMENT (OUTPATIENT)
Dept: ORTHOPEDIC SURGERY | Facility: CLINIC | Age: 33
End: 2023-10-02

## 2023-10-06 ENCOUNTER — NON-APPOINTMENT (OUTPATIENT)
Age: 33
End: 2023-10-06

## 2023-10-23 ENCOUNTER — APPOINTMENT (OUTPATIENT)
Dept: HEPATOLOGY | Facility: CLINIC | Age: 33
End: 2023-10-23
Payer: COMMERCIAL

## 2023-10-23 VITALS
HEIGHT: 67 IN | WEIGHT: 186 LBS | HEART RATE: 82 BPM | OXYGEN SATURATION: 98 % | BODY MASS INDEX: 29.19 KG/M2 | TEMPERATURE: 97.2 F | SYSTOLIC BLOOD PRESSURE: 141 MMHG | DIASTOLIC BLOOD PRESSURE: 96 MMHG

## 2023-10-23 DIAGNOSIS — Z00.00 ENCOUNTER FOR GENERAL ADULT MEDICAL EXAMINATION W/OUT ABNORMAL FINDINGS: ICD-10-CM

## 2023-10-23 PROCEDURE — 99204 OFFICE O/P NEW MOD 45 MIN: CPT

## 2023-10-23 RX ORDER — OXYCODONE 10 MG/1
10 TABLET ORAL EVERY 6 HOURS
Qty: 15 | Refills: 0 | Status: DISCONTINUED | COMMUNITY
Start: 2022-04-02 | End: 2023-10-23

## 2023-10-23 RX ORDER — NORETHINDRONE ACETATE AND ETHINYL ESTRADIOL 1MG-20(24)
1-20 KIT ORAL DAILY
Refills: 0 | Status: ACTIVE | COMMUNITY

## 2023-10-23 RX ORDER — MAGNESIUM GLYCINATE 100 MG
CAPSULE ORAL DAILY
Refills: 0 | Status: ACTIVE | COMMUNITY

## 2023-10-23 RX ORDER — IBUPROFEN 200 MG/1
TABLET, COATED ORAL
Refills: 0 | Status: ACTIVE | COMMUNITY

## 2023-10-23 RX ORDER — NAPROXEN 500 MG/1
500 TABLET ORAL
Qty: 60 | Refills: 0 | Status: DISCONTINUED | COMMUNITY
Start: 2022-04-01 | End: 2023-10-23

## 2023-10-23 RX ORDER — DULOXETINE HYDROCHLORIDE 30 MG/1
30 CAPSULE, DELAYED RELEASE ORAL
Refills: 0 | Status: ACTIVE | COMMUNITY

## 2023-10-23 RX ORDER — LAMOTRIGINE 200 MG/1
200 TABLET ORAL AT BEDTIME
Refills: 0 | Status: ACTIVE | COMMUNITY

## 2023-10-23 RX ORDER — GABAPENTIN 600 MG/1
600 TABLET, COATED ORAL 3 TIMES DAILY
Refills: 0 | Status: ACTIVE | COMMUNITY

## 2023-10-23 RX ORDER — OXYCODONE AND ACETAMINOPHEN 5; 325 MG/1; MG/1
5-325 TABLET ORAL
Qty: 15 | Refills: 0 | Status: DISCONTINUED | COMMUNITY
Start: 2022-04-01 | End: 2023-10-23

## 2023-10-23 RX ORDER — ACETAMINOPHEN 500 MG/1
500 TABLET ORAL
Refills: 0 | Status: ACTIVE | COMMUNITY

## 2023-10-23 RX ORDER — BUPRENORPHINE HYDROCHLORIDE, NALOXONE HYDROCHLORIDE 4; 1 MG/1; MG/1
4-1 FILM, SOLUBLE BUCCAL; SUBLINGUAL
Refills: 0 | Status: ACTIVE | COMMUNITY

## 2023-10-23 RX ORDER — TIZANIDINE 4 MG/1
4 TABLET ORAL 3 TIMES DAILY
Refills: 0 | Status: ACTIVE | COMMUNITY

## 2023-10-23 RX ORDER — NORETHINDRONE ACETATE AND ETHINYL ESTRADIOL AND FERROUS FUMARATE 1MG-20(24)
1-20 KIT ORAL DAILY
Qty: 3 | Refills: 3 | Status: DISCONTINUED | COMMUNITY
Start: 2022-11-08 | End: 2023-10-23

## 2023-10-23 RX ORDER — HYDROCODONE BITARTRATE AND ACETAMINOPHEN 5; 325 MG/1; MG/1
5-325 TABLET ORAL EVERY 6 HOURS
Qty: 30 | Refills: 0 | Status: DISCONTINUED | COMMUNITY
Start: 2022-04-01 | End: 2023-10-23

## 2023-10-23 NOTE — ED ADULT NURSE NOTE - NS ED NURSE RECORD ANOTHER VITAL SIGN
Problem: Hemodialysis  Goal: Dialysis: Safe, effective, and comfortable hemodialysis treatment (Hemodialysis nurse only)  Outcome: Outcome Met, Continue evaluating goal progress toward completion  Note: Pt tolerated HD tx well. 2L removed of 1-2 L ordered over 3.5 hour ordered tx time. Pt remained hemodynamically stable w/o interventions. 0 complications.   Goal: Dialysis: Free of complications related to initiation/termination of dialysis (Hemodialysis nurse only)  Outcome: Outcome Met, Continue evaluating goal progress toward completion  Note: Cannulated LUE AVG using 15 g needles w/o incident, bruit/thrill present. Ran at 450 BFR t/o tx w/o issues. Less than 10 min to hemostasis.       Yes

## 2023-10-24 LAB
AFP-TM SERPL-MCNC: <1.8 NG/ML
ALBUMIN SERPL ELPH-MCNC: 4.8 G/DL
ALP BLD-CCNC: 262 U/L
ALT SERPL-CCNC: 113 U/L
ANION GAP SERPL CALC-SCNC: 13 MMOL/L
AST SERPL-CCNC: 154 U/L
BASOPHILS # BLD AUTO: 0.05 K/UL
BASOPHILS NFR BLD AUTO: 0.8 %
BILIRUB SERPL-MCNC: 0.4 MG/DL
BUN SERPL-MCNC: 13 MG/DL
CALCIUM SERPL-MCNC: 10.3 MG/DL
CHLORIDE SERPL-SCNC: 102 MMOL/L
CO2 SERPL-SCNC: 25 MMOL/L
CREAT SERPL-MCNC: 0.76 MG/DL
EGFR: 107 ML/MIN/1.73M2
EOSINOPHIL # BLD AUTO: 0.07 K/UL
EOSINOPHIL NFR BLD AUTO: 1.1 %
GLUCOSE SERPL-MCNC: 97 MG/DL
HCT VFR BLD CALC: 45.3 %
HGB BLD-MCNC: 15.2 G/DL
IMM GRANULOCYTES NFR BLD AUTO: 0.2 %
INR PPP: 0.99 RATIO
LYMPHOCYTES # BLD AUTO: 2.42 K/UL
LYMPHOCYTES NFR BLD AUTO: 37.9 %
MAN DIFF?: NORMAL
MCHC RBC-ENTMCNC: 28.1 PG
MCHC RBC-ENTMCNC: 33.6 GM/DL
MCV RBC AUTO: 83.7 FL
MONOCYTES # BLD AUTO: 0.44 K/UL
MONOCYTES NFR BLD AUTO: 6.9 %
NEUTROPHILS # BLD AUTO: 3.39 K/UL
NEUTROPHILS NFR BLD AUTO: 53.1 %
PLATELET # BLD AUTO: 450 K/UL
POTASSIUM SERPL-SCNC: 4.8 MMOL/L
PROT SERPL-MCNC: 8.7 G/DL
PT BLD: 11.2 SEC
RBC # BLD: 5.41 M/UL
RBC # FLD: 13.4 %
SODIUM SERPL-SCNC: 140 MMOL/L
WBC # FLD AUTO: 6.38 K/UL

## 2023-10-25 LAB
MITOCHONDRIA AB SER IF-ACNC: NORMAL
SMOOTH MUSCLE AB SER QL IF: NORMAL

## 2023-10-26 LAB
ANA PAT FLD IF-IMP: NORMAL
ANA SER IF-ACNC: ABNORMAL

## 2023-10-26 NOTE — PATIENT PROFILE ADULT. - BLOOD TRANSFUSION, PREVIOUS, PROFILE
October 26, 2023       Ankita Bradley MD  [address]  [facility] [address]  Via In Basket      Patient: Abiodun Ibarra   YOB: 2010   Date of Visit: 10/26/2023       Dear Dr. Bradley:    I saw your patient, Abiodun Ibarra, for an evaluation. Below are my notes for this visit with him.    If you have questions, please do not hesitate to call me.      Sincerely,        Magen Hackett MD        CC: No Recipients  Magen Hackett MD  10/26/2023 11:50 AM  Signed  Reason For Visit  Chief Complaint   Patient presents with   • Office Visit   • Ear Problem   Abiodun Ibarra is accompanied by parent.      Referred By  Patient was referred by Ankita Bradley MD.     History of Present Illness  13 year old male with a history of right TM perforation.    He has a history of right myringoplasty in Fall 2020 and right tympanoplasty in March 2021. Previous adenoidectomy.    I saw last in October 2021, and I said, \"this patient has normal hearing.  He continues to have a small right-sided anterior superior perforation of the eardrum.  He has had myringoplasty and tympanoplasty and has not close this.  He has not had any problems with it, is entirely asymptomatic.  I advised ongoing dry ear precautions, observation, no role at this point for repeat attempted closure.  We will see him back in a year to reassess.\"    No ear infections  No ear drainage  No ear pain  No hearing concerns  Been keeping the right ear dry.    He had a hearing test that shows normal hearing bilaterally, flat tympanogram on the right side, and hypermobile tympanogram on the left side.     Review of Systems  10 systems were reviewed and are negative aside from those detailed above.     Past Medical History  Significant past medical history: Yes     Past Medical History:   Diagnosis Date   • Allergic rhinitis 04/21/2014   • Asthma    • Eczema 06/30/2017   • History of ear infections    • RAD (reactive airway disease)       Surgical History  Significant past surgical history: Yes  Past Surgical History:   Procedure Laterality Date   • Myringoplasty w/ paper patch Right 09/2020   • Tympanostomy tube placement       Social History  Custody status: Parents have full custody of the patient:  Yes     Current Meds  Current Outpatient Medications   Medication Sig Dispense Refill   • albuterol (ProAir HFA) 108 (90 Base) MCG/ACT inhaler Inhale 4 puffs into the lungs every 4 hours as needed for Shortness of Breath or Wheezing (cough). with the Aerochamber 2 each 2   • albuterol (VENTOLIN) (2.5 MG/3ML) 0.083% nebulizer solution Take 3 mLs by nebulization every 4 hours as needed for Wheezing or Shortness of Breath (cough). For 2 weeks 75 mL 1   • famotidine (PEPCID) 20 MG tablet Take 1 tablet by mouth daily (before breakfast). 30 tablet 0   • Respiratory Therapy Supplies (Nebulizer) Device To use with albuterol 1 each 0   • Spacer/Aero-Holding Chambers (AeroChamber MV) Misc Use with Inhaler 1 each 1   • ibuprofen (CHILDRENS ADVIL) 100 MG/5ML suspension Take 20 mLs by mouth every 6 hours as needed for Pain. 1 Bottle 0     No current facility-administered medications for this visit.     Vitals      Visit Vitals  Temp 97 °F (36.1 °C) (Temporal)   Wt (!) 87.5 kg (192 lb 14.4 oz)     Physical Exam  General:  The patient is awake, alert, NAD.  Head/Face:  Atraumatic, normocephalic. No dysmorphic features.  Eyes:  Conjunctiva normal.   Gaze conjugate.   Ears:  Right:  normal pinna, external auditory meatus is normal. The external auditory canal clear. Tympanic membrane perforation is about 15% of the total area, just anterior to the malleus.   Left:    normal pinna, external auditory meatus is normal. The external auditory canal clear. Tympanic membrane intact, with normal surface anatomy. Middle ear space aerated. Tympanic membrane is mobile on pneumatic otoscopy.  Nose:   Anterior rhinoscopy clear. Breathing through nose.   Oral Cavity:  The  vestibule is normal appearing. Oral cavity mucosa is without lesion. The tongue is mobile and midline. Hard Palate is intact and without lesions. No ankyloglossia.  Oropharynx:  Soft palate elevates in the midline. Uvula normal. Tonsils small, symmetric. Base of tongue soft without lesions. Posterior oropharyngeal wall clear.  Larynx:   No stridor. Normal voice.  Neck:  normal surface anatomy.   Lymphatic:  No cervical lymphadenopathy.  Neuro:  Alert, no focal deficits.     Assessment  Problem List Items Addressed This Visit        ENT    Perforation of right tympanic membrane    Relevant Orders    SERVICE TO AUDIOLOGY   Other Visit Diagnoses     Dysfunction of both eustachian tubes    -  Primary    Relevant Orders    SERVICE TO AUDIOLOGY        Discussion/Summary   this patient has longstanding right tympanic membrane perforation with normal hearing.  There is a slight discrepancy between the left and the right sides, and he notices that subjectively, but is not terribly bothered by it.  The eardrum perforation is just anterior to the malleus, and my guess is that the location of the perforation is the reason that it did not close.  There is a high likelihood of closure at this point with revision type I tympanoplasty, now more than 2 years later and after significant growth of his eustachian tubes.    Dad will talk to mom, they will decide with the patient about timing.  They will let us know.      Magen Hackett MD       no

## 2023-10-27 ENCOUNTER — APPOINTMENT (OUTPATIENT)
Dept: ANTEPARTUM | Facility: CLINIC | Age: 33
End: 2023-10-27

## 2023-10-27 ENCOUNTER — APPOINTMENT (OUTPATIENT)
Dept: OBGYN | Facility: CLINIC | Age: 33
End: 2023-10-27

## 2023-10-27 LAB
LKM AB SER QL IF: <20.1 UNITS
SOLUBLE LIVER IGG SER IA-ACNC: 3.8

## 2023-11-01 LAB
LYSOSOMAL ACID LIPASE INTERPRETATION: NORMAL
LYSOSOMAL ACID LIPASE: 103 PMOL/HR/UL

## 2023-11-07 NOTE — ED STATDOCS - GASTROINTESTINAL, MLM
Medication:    dulaglutide (Trulicity) 4.5 MG/0.5ML pen-injector         Sig: Inject 4.5 mg into the skin every 7 days.    Disp:  2 mL    Refills:  1    Start: 11/7/2023    Class: Eprescribe    Non-formulary For: Type 2 diabetes mellitus with hyperlipidemia (CMD)    To pharmacy: Please send updated RX with diagnosis  code.  Thank you!!    Last ordered: 2 months ago (9/6/2023) by ERASMO Ledesma    Last dispensed: 10/11/2023    Rx #: [id number]    GLP-1 Agonists Antihyperglycemics Refill Protocol - 12 Month Protocol Failed 11/07/2023 09:04 AM   Protocol Details  Hgb A1c less than 8 within last 12 months looking at last value -- IF CRITERIA FAILED REFER TO PROTOCOL DETAILS          Last office visit date: 9/6/23  Medication Refill Protocol Failed.      abdomen soft, non-tender, and non-distended. Bowel sounds present.

## 2023-11-09 ENCOUNTER — APPOINTMENT (OUTPATIENT)
Dept: ORTHOPEDIC SURGERY | Facility: CLINIC | Age: 33
End: 2023-11-09
Payer: COMMERCIAL

## 2023-11-09 VITALS — WEIGHT: 186 LBS | BODY MASS INDEX: 29.19 KG/M2 | HEIGHT: 67 IN

## 2023-11-09 PROCEDURE — 73564 X-RAY EXAM KNEE 4 OR MORE: CPT | Mod: LT

## 2023-11-09 PROCEDURE — 99214 OFFICE O/P EST MOD 30 MIN: CPT

## 2023-11-10 ENCOUNTER — APPOINTMENT (OUTPATIENT)
Dept: OBGYN | Facility: CLINIC | Age: 33
End: 2023-11-10
Payer: COMMERCIAL

## 2023-11-10 ENCOUNTER — RESULT CHARGE (OUTPATIENT)
Age: 33
End: 2023-11-10

## 2023-11-10 VITALS
WEIGHT: 182 LBS | BODY MASS INDEX: 28.56 KG/M2 | DIASTOLIC BLOOD PRESSURE: 91 MMHG | HEART RATE: 80 BPM | SYSTOLIC BLOOD PRESSURE: 142 MMHG | HEIGHT: 67 IN

## 2023-11-10 LAB
BILIRUB UR QL STRIP: NORMAL
CLARITY UR: CLEAR
COLLECTION METHOD: NORMAL
GLUCOSE UR-MCNC: NORMAL
HCG UR QL: 1 EU/DL
HEMOGLOBIN: 14.8
HGB UR QL STRIP.AUTO: NORMAL
KETONES UR-MCNC: NORMAL
LEUKOCYTE ESTERASE UR QL STRIP: NORMAL
NITRITE UR QL STRIP: NORMAL
PH UR STRIP: 7.5
PROT UR STRIP-MCNC: 300
SP GR UR STRIP: 1.02

## 2023-11-10 PROCEDURE — 81003 URINALYSIS AUTO W/O SCOPE: CPT | Mod: QW

## 2023-11-10 PROCEDURE — 99395 PREV VISIT EST AGE 18-39: CPT

## 2023-11-14 NOTE — PATIENT PROFILE BEHAVIORAL HEALTH - IS PATIENT PREGNANT?
Subjective   Patient ID: Sheryl is a 54 year old female.    Chief Complaint   Patient presents with   • Office Visit     Pt c/o rectal bleeding.    • Follow-up     Pt having a nerve block procedure tomorrow on back. Pt is inquiring about medication and procedure clearance.          HPI having rectal bleeding daily x 3 days without obvious constipation.  History of bleeding hemorrhoids.  No abdominal pain.  Requesting medical clearance for lumbar medial branch block by Dr. Amadou Rodriguez on December 6, 2023 at [facility].    Review of Systems   Constitutional: Negative.    Respiratory: Negative.    Cardiovascular: Negative.    Gastrointestinal: Positive for anal bleeding.   Musculoskeletal: Positive for back pain.   Psychiatric/Behavioral: Negative.        ALLERGIES:   Allergen Reactions   • Lactose Intolerance (No Food Restrictions)    (Food) DIARRHEA       Current Outpatient Medications   Medication Sig Dispense Refill   • hydroCORTisone (ANUSOL-HC) 2.5 % rectal cream Place 1 application. rectally 4 times daily as needed for Hemorrhoids. 30 g 11   • buPROPion XL (WELLBUTRIN XL) 300 MG 24 hr tablet TAKE 1 TABLET BY MOUTH EVERY DAY 90 tablet 1   • DULoxetine (CYMBALTA) 30 MG capsule TAKE 1 CAPSULE BY MOUTH DAILY. TAKE WITH 60MG FOR A TOTAL OF 90MG DAILY. 90 capsule 1   • blood glucose (OneTouch Verio) test strip Test blood sugar 3  times daily. Diagnosis: DM type ll 100 each 11   • famotidine (PEPCID) 20 MG tablet TAKE 1 TABLET BY MOUTH EVERY DAY AT NIGHT 90 tablet 1   • hydrOXYzine (ATARAX) 10 MG tablet TAKE 1 TABLET BY MOUTH NIGHTLY. MAY MAKE YOU SLEEPY     • levothyroxine 100 MCG tablet Take 100 mcg by mouth daily.     • metoCLOPramide (REGLAN) 10 MG tablet TAKE 1 TABLET BY MOUTH 3 TIMES DAILY AS NEEDED FOR NAUSEA OR VOMITING 60 tablet 0   • lisinopril (ZESTRIL) 20 MG tablet TAKE 1 TABLET BY MOUTH EVERY DAY 90 tablet 0   • fluticasone (FLONASE) 50 MCG/ACT nasal spray SPRAY SPRAY 1 SPRAY INTO EACH NOSTRIL  EVERY DAY FOR 30 DAYS 48 mL 2   • dulaglutide (TRULICITY) 1.5 MG/0.5ML pen-injector Inject 1.5 mg into the skin every 7 days. Indications: Type 2 Diabetes 2 mL 3   • ferrous sulfate 324 (65 Fe) MG EC tablet TAKE 1 TABLET BY MOUTH TWICE A DAY IN THE MORNING AND IN THE EVENING 180 tablet 1   • hydroCHLOROthiazide (HYDRODIURIL) 12.5 MG tablet TAKE 1 TABLET BY MOUTH EVERY DAY 90 tablet 1   • amLODIPine (NORVASC) 5 MG tablet TAKE 1 TABLET BY MOUTH EVERY DAY 90 tablet 0   • gemfibrozil (LOPID) 600 MG tablet TAKE 1 TABLET BY MOUTH TWICE A  tablet 3   • BD Pen Needle Mariah 2nd Gen 32G X 4 MM Misc USE TO INJECT INSULIN 3 TIMES A DAY REMOVE NEEDLE COVERS TO EXPOSE NEEDLE BEFORE INJECTING 100 each 3   • insulin degludec (TRESIBA FLEXTOUCH) 100 UNIT/ML pen-injector Inject 50 Units into the skin in the morning and 50 Units in the evening. Prime 2 units before each dose. 15 mL 11   • metFORMIN (GLUCOPHAGE-XR) 500 MG 24 hr tablet TAKE 2 TABLETS BY MOUTH TWICE A  tablet 3   • levothyroxine 150 MCG tablet TAKE 1 TABLET BY MOUTH EVERY DAY AS DIRECTED 90 tablet 3   • tacrolimus (PROTOPIC) 0.1 % ointment Apply 1 Application topically in the morning and 1 Application in the evening. As needed     • levothyroxine 100 MCG tablet TAKE 1 TABLET BY MOUTH EVERY DAY 90 tablet 3   • metoPROLOL succinate (TOPROL-XL) 100 MG 24 hr tablet TAKE 1 TABLET BY MOUTH EVERY DAY 90 tablet 3   • dicyclomine (BENTYL) 10 MG capsule Take 10 mg by mouth every 6 hours as needed (for stomach pain).     • fexofenadine (ALLEGRA) 180 MG tablet Take 180 mg by mouth daily.     • Blood Glucose Monitoring Suppl (OneTouch Verio) w/Device Kit 1 Device  in the morning and 1 Device at noon and 1 Device in the evening. 1 kit 0   • OneTouch Delica Lancets 33G Misc To check blood sugar 3 times a day. 300 each 2   • Azelastine HCl 137 MCG/SPRAY Solution 1 PUFF IN EACH NOSTRIL NASALLY TWICE A DAY 30     • pregabalin (LYRICA) 75 MG capsule Take 75 mg by mouth in the  morning and 75 mg at noon and 75 mg in the evening.     • promethazine (PHENERGAN) 25 MG tablet Take 25 mg by mouth every 6 hours as needed for Nausea.     • Multiple Vitamins-Minerals (Multivitamin Women) Tab Take 1 capsule by mouth daily. (Patient taking differently: Take 1 tablet by mouth daily.) 90 tablet 3   • baclofen (LIORESAL) 10 MG tablet Take 10 mg by mouth in the morning and 10 mg at noon and 10 mg in the evening.     • NovoLOG FlexPen 100 UNIT/ML pen-injector INJECT 30 UNITS INTO THE SKIN 3 TIMES DAILY (BEFORE MEALS). PRIME 2 UNITS BEFORE EACH DOSE. 90 each 5   • clobetasol 0.05 % shampoo Shampoo every other day for up to one month then as needed for flares     • docusate sodium (Colace) 100 MG capsule Take 1 capsule by mouth in the morning and 1 capsule in the evening. 60 capsule 11   • chlorhexidine gluconate (PERIDEX) 0.12 % solution Swish and spit 15 mLs  in the morning and 15 mLs in the evening.     • triamcinolone (ARISTOSPAN) 0.1 % lotion Apply topically 2 times daily. 60 mL 3   • FARXIGA 10 MG tablet TAKE 1 TABLET BY MOUTH EVERY DAY 30 tablet 5     No current facility-administered medications for this visit.       Past Medical History:   Diagnosis Date   • Anxiety    • Bipolar disorder (CMD)    • Carpal tunnel syndrome    • Cerebral arterial aneurysm    • Chronic pain    • Colon polyps    • Depression    • Diabetes mellitus (CMD)    • Diverticulitis of colon    • Fracture    • GERD (gastroesophageal reflux disease)    • HTN (hypertension)    • Hypothyroidism    • Morbid obesity (CMD)    • HOMERO (obstructive sleep apnea)     not using a cpap currently stating it was recalled   • Osteoarthritis    • Psoriasis    • Trigger thumb of both hands        Past Surgical History:   Procedure Laterality Date   • Ankle fracture surgery     • Bone biopsy  2022    right distal tibia   • Brain aneurysm surgery      trouble waking up after surgery   •  section, low transverse     • Colonoscopy   2022    polyps   • Forearm/wrist surgery unlisted Left    • Hemorhoidectomy int ext single column group     • Hysterectomy     • Inner ear surgery Right 10/07/2020   • Knee arthroscopy w/ acl reconstruction Right     [facility]   • Knee surgery Right    • Knee surgery Bilateral     nerve ablation   • Leg surgery Right    • Nail removal         Family History   Problem Relation Age of Onset   • Cancer Mother         throat   • Hypertension Mother    • Diabetes Father    • Hypertension Father    • Thyroid Father    • Diabetes Sister    • Depression Sister    • Alcohol Abuse Sister    • Substance Abuse Sister    • Patient is unaware of any medical problems Daughter    • Patient is unaware of any medical problems Maternal Grandmother    • Patient is unaware of any medical problems Maternal Grandfather    • Patient is unaware of any medical problems Paternal Grandmother    • Patient is unaware of any medical problems Paternal Grandfather    • Clotting Disorder Neg Hx        Social History     Socioeconomic History   • Marital status: Single     Spouse name: Not on file   • Number of children: Not on file   • Years of education: Not on file   • Highest education level: Not on file   Occupational History   • Occupation: disabled   Tobacco Use   • Smoking status: Former     Current packs/day: 0.00     Types: Cigarettes     Quit date: 1986     Years since quittin.2   • Smokeless tobacco: Never   • Tobacco comments:     quit on    Vaping Use   • Vaping Use: never used   Substance and Sexual Activity   • Alcohol use: No   • Drug use: Yes     Types: Marijuana     Comment: occ   • Sexual activity: Not Currently     Partners: Male   Other Topics Concern   • Not on file   Social History Narrative   • Not on file     Social Determinants of Health     Financial Resource Strain: Low Risk  (2023)    Financial Resource Strain    • Social Determinants: Financial Resource Strain: None    Food Insecurity: Food Insecurity Present (4/26/2023)    Food Insecurity    • Social Determinants: Food Insecurity: Sometimes   Transportation Needs: No Transportation Needs (4/26/2023)    PRAPARE - Transportation    • Lack of Transportation (Medical): No    • Lack of Transportation (Non-Medical): No   Physical Activity: Insufficiently Active (4/26/2023)    Exercise Vital Sign    • Days of Exercise per Week: 2 days    • Minutes of Exercise per Session: 30 min   Stress: Medium Risk (4/26/2023)    Stress    • Social Determinants: Stress: Somewhat   Social Connections: Socially Integrated (4/26/2023)    Social Connections    • Social Determinants: Social Connections: 5 or more times a week   Intimate Partner Violence: Not At Risk (4/26/2023)    Intimate Partner Violence    • Social Determinants: Intimate Partner Violence Past Fear: No    • Social Determinants: Intimate Partner Violence Current Fear: No       Patient Care Team:  Umer Rodriguez MD as PCP - General  Umer Rodriguez MD as Internal Medicine (Internal Medicine)  Aren Sparks MD (Gastroenterology)  Di Briones RD as Dietitian (Dietitian, Registered)  Catalina Fitzgerald RN as Ambulatory Care Manager  (Registered Nurse)    I have personally reviewed and updated the following EPIC sections: Current medications, Allergies, Problem list, Past Medical History, Past Surgical History, Social History and Family History    Blood pressure 136/76, pulse 67, temperature 95.2 °F (35.1 °C), temperature source Temporal, resp. rate 16, weight (!) 150.6 kg (332 lb), SpO2 96 %.  Body mass index is 49.03 kg/m².    Physical Exam  Vitals and nursing note reviewed.   Constitutional:       General: She is not in acute distress.     Appearance: She is well-developed. She is obese.   Cardiovascular:      Rate and Rhythm: Normal rate and regular rhythm.      Heart sounds: Normal heart sounds.   Pulmonary:      Effort: Pulmonary effort is normal. No respiratory distress.       Breath sounds: Normal breath sounds. No wheezing.   Genitourinary:     Comments: Patient refused rectal exam  Neurological:      Mental Status: She is alert and oriented to person, place, and time.   Psychiatric:         Mood and Affect: Mood normal.         Behavior: Behavior normal.         Thought Content: Thought content normal.         Judgment: Judgment normal.         Lab:    Most Recent Immunizations   Administered Date(s) Administered   • COVID Pfizer 12Y+ 08/01/2022   • COVID Pfizer 12Y+ (Requires Dilution) 08/01/2022   • Influenza, quadrivalent, MDCK, preserve-free (FLUCELVAX) 09/19/2019   • Influenza, quadrivalent, preserve-free 10/03/2023   • Influenza, seasonal, injectable, preservative free 09/01/2020   • Influenza, seasonal, injectable, trivalent 09/01/2020   • Pneumococcal Conjugate 13 Valent Vacc (Prevnar 13) 02/01/2022, 02/01/2022   • Pneumococcal Polysaccharide Vacc (Pneumovax 23) 02/15/2023, 02/15/2023   • Shingrix (Shingles Zoster) 08/23/2022   • Tdap 10/02/2017   • Zostavax (Zoster Shingles) 08/23/2022       Procedures        No orders of the defined types were placed in this encounter.      Assessment   Problem List Items Addressed This Visit    None  Visit Diagnoses     Bleeding hemorrhoids    -  Primary    Relevant Medications    hydroCORTisone (ANUSOL-HC) 2.5 % rectal cream        1. Bleeding hemorrhoids         PLAN: Warm water sitz bath and high-fiber diet.  May take senna plus  Hydrocortisone 2.5% rectal cream 3-4 times a day as needed.  Patient is medically optimized and low risk for nerve block.  A copy to Dr. Amadou Rodriguez  Time Spent    Umer Rodriguez MD                  This note was created using the Dragon voice recognition system. Errors in content may be related to improper recognition of the system. Effort to review and correct the note has been made but irregularities may still be present.     no

## 2023-11-15 LAB — CYTOLOGY CVX/VAG DOC THIN PREP: NORMAL

## 2023-11-17 ENCOUNTER — TRANSCRIPTION ENCOUNTER (OUTPATIENT)
Age: 33
End: 2023-11-17

## 2023-11-19 ENCOUNTER — APPOINTMENT (OUTPATIENT)
Dept: MRI IMAGING | Facility: CLINIC | Age: 33
End: 2023-11-19
Payer: COMMERCIAL

## 2023-11-19 ENCOUNTER — OUTPATIENT (OUTPATIENT)
Dept: OUTPATIENT SERVICES | Facility: HOSPITAL | Age: 33
LOS: 1 days | End: 2023-11-19
Payer: COMMERCIAL

## 2023-11-19 DIAGNOSIS — Z90.79 ACQUIRED ABSENCE OF OTHER GENITAL ORGAN(S): Chronic | ICD-10-CM

## 2023-11-19 DIAGNOSIS — Z98.890 OTHER SPECIFIED POSTPROCEDURAL STATES: Chronic | ICD-10-CM

## 2023-11-19 DIAGNOSIS — S83.282D OTHER TEAR OF LATERAL MENISCUS, CURRENT INJURY, LEFT KNEE, SUBSEQUENT ENCOUNTER: ICD-10-CM

## 2023-11-19 DIAGNOSIS — Z98.84 BARIATRIC SURGERY STATUS: Chronic | ICD-10-CM

## 2023-11-19 DIAGNOSIS — Z90.49 ACQUIRED ABSENCE OF OTHER SPECIFIED PARTS OF DIGESTIVE TRACT: Chronic | ICD-10-CM

## 2023-11-19 PROCEDURE — 73721 MRI JNT OF LWR EXTRE W/O DYE: CPT

## 2023-11-19 PROCEDURE — 73721 MRI JNT OF LWR EXTRE W/O DYE: CPT | Mod: 26,LT

## 2023-11-20 ENCOUNTER — APPOINTMENT (OUTPATIENT)
Dept: ORTHOPEDIC SURGERY | Facility: CLINIC | Age: 33
End: 2023-11-20
Payer: COMMERCIAL

## 2023-11-20 DIAGNOSIS — S83.282D OTHER TEAR OF LATERAL MENISCUS, CURRENT INJURY, LEFT KNEE, SUBSEQUENT ENCOUNTER: ICD-10-CM

## 2023-11-20 PROCEDURE — 99442: CPT

## 2023-11-21 ENCOUNTER — TRANSCRIPTION ENCOUNTER (OUTPATIENT)
Age: 33
End: 2023-11-21

## 2023-11-26 ENCOUNTER — TRANSCRIPTION ENCOUNTER (OUTPATIENT)
Age: 33
End: 2023-11-26

## 2023-11-26 LAB
ALBUMIN SERPL ELPH-MCNC: 4.6 G/DL
ALP BLD-CCNC: 205 U/L
ALT SERPL-CCNC: 44 U/L
ANION GAP SERPL CALC-SCNC: 14 MMOL/L
AST SERPL-CCNC: 48 U/L
BILIRUB SERPL-MCNC: 0.3 MG/DL
BUN SERPL-MCNC: 12 MG/DL
CALCIUM SERPL-MCNC: 10.2 MG/DL
CHLORIDE SERPL-SCNC: 102 MMOL/L
CO2 SERPL-SCNC: 23 MMOL/L
CREAT SERPL-MCNC: 0.74 MG/DL
DEPRECATED KAPPA LC FREE/LAMBDA SER: 1.5 RATIO
EGFR: 110 ML/MIN/1.73M2
GGT SERPL-CCNC: 83 U/L
GLUCOSE SERPL-MCNC: 83 MG/DL
IGA SER QL IEP: 533 MG/DL
IGG SER QL IEP: 1386 MG/DL
IGM SER QL IEP: 104 MG/DL
KAPPA LC CSF-MCNC: 2.03 MG/DL
KAPPA LC SERPL-MCNC: 3.04 MG/DL
POTASSIUM SERPL-SCNC: 4.9 MMOL/L
PROT SERPL-MCNC: 8.4 G/DL
SODIUM SERPL-SCNC: 139 MMOL/L

## 2023-11-28 ENCOUNTER — TRANSCRIPTION ENCOUNTER (OUTPATIENT)
Age: 33
End: 2023-11-28

## 2023-11-29 ENCOUNTER — TRANSCRIPTION ENCOUNTER (OUTPATIENT)
Age: 33
End: 2023-11-29

## 2023-11-30 ENCOUNTER — TRANSCRIPTION ENCOUNTER (OUTPATIENT)
Age: 33
End: 2023-11-30

## 2023-12-03 ENCOUNTER — APPOINTMENT (OUTPATIENT)
Dept: MRI IMAGING | Facility: CLINIC | Age: 33
End: 2023-12-03
Payer: COMMERCIAL

## 2023-12-03 ENCOUNTER — OUTPATIENT (OUTPATIENT)
Dept: OUTPATIENT SERVICES | Facility: HOSPITAL | Age: 33
LOS: 1 days | End: 2023-12-03
Payer: COMMERCIAL

## 2023-12-03 DIAGNOSIS — Z98.84 BARIATRIC SURGERY STATUS: Chronic | ICD-10-CM

## 2023-12-03 DIAGNOSIS — K75.81 NONALCOHOLIC STEATOHEPATITIS (NASH): ICD-10-CM

## 2023-12-03 DIAGNOSIS — Z98.890 OTHER SPECIFIED POSTPROCEDURAL STATES: Chronic | ICD-10-CM

## 2023-12-03 DIAGNOSIS — Z90.49 ACQUIRED ABSENCE OF OTHER SPECIFIED PARTS OF DIGESTIVE TRACT: Chronic | ICD-10-CM

## 2023-12-03 PROCEDURE — A9585: CPT

## 2023-12-03 PROCEDURE — 74183 MRI ABD W/O CNTR FLWD CNTR: CPT

## 2023-12-03 PROCEDURE — 74183 MRI ABD W/O CNTR FLWD CNTR: CPT | Mod: 26

## 2023-12-04 ENCOUNTER — APPOINTMENT (OUTPATIENT)
Dept: ORTHOPEDIC SURGERY | Facility: CLINIC | Age: 33
End: 2023-12-04
Payer: COMMERCIAL

## 2023-12-04 ENCOUNTER — TRANSCRIPTION ENCOUNTER (OUTPATIENT)
Age: 33
End: 2023-12-04

## 2023-12-04 VITALS — BODY MASS INDEX: 28.56 KG/M2 | WEIGHT: 182 LBS | HEIGHT: 67 IN

## 2023-12-04 DIAGNOSIS — L29.9 PRURITUS, UNSPECIFIED: ICD-10-CM

## 2023-12-04 DIAGNOSIS — S83.92XA SPRAIN OF UNSPECIFIED SITE OF LEFT KNEE, INITIAL ENCOUNTER: ICD-10-CM

## 2023-12-04 PROCEDURE — 99214 OFFICE O/P EST MOD 30 MIN: CPT | Mod: 25

## 2023-12-04 PROCEDURE — 20611 DRAIN/INJ JOINT/BURSA W/US: CPT | Mod: LT

## 2023-12-04 NOTE — ED ADULT NURSE NOTE - CHPI ED NUR TIMING2

## 2023-12-05 ENCOUNTER — TRANSCRIPTION ENCOUNTER (OUTPATIENT)
Age: 33
End: 2023-12-05

## 2023-12-06 ENCOUNTER — APPOINTMENT (OUTPATIENT)
Dept: OBGYN | Facility: CLINIC | Age: 33
End: 2023-12-06
Payer: COMMERCIAL

## 2023-12-06 ENCOUNTER — TRANSCRIPTION ENCOUNTER (OUTPATIENT)
Age: 33
End: 2023-12-06

## 2023-12-06 DIAGNOSIS — D21.9 BENIGN NEOPLASM OF CONNECTIVE AND OTHER SOFT TISSUE, UNSPECIFIED: ICD-10-CM

## 2023-12-06 PROCEDURE — 99213 OFFICE O/P EST LOW 20 MIN: CPT

## 2023-12-07 PROBLEM — D21.9 FIBROIDS: Status: ACTIVE | Noted: 2022-07-28

## 2023-12-08 ENCOUNTER — TRANSCRIPTION ENCOUNTER (OUTPATIENT)
Age: 33
End: 2023-12-08

## 2023-12-20 ENCOUNTER — NON-APPOINTMENT (OUTPATIENT)
Age: 33
End: 2023-12-20

## 2023-12-27 NOTE — DISCHARGE NOTE BEHAVIORAL HEALTH - NSBHDCPTEDU_PSY_A_CORE
yes
Diet, DASH/TLC:   Sodium & Cholesterol Restricted  Minced and Moist (MINCEDMOIST)  Mildly Thick Liquids (MILDTHICKLIQS) (12-27-23 @ 12:41)

## 2024-01-03 NOTE — H&P ADULT - NSICDXPASTSURGICALHX_GEN_ALL_CORE_FT
Physical Therapy Discharge    Visit Type: Discharge Summary  Visit: 15  Referring Provider: Tenisha Frazier PA-C  Medical Diagnosis (from order): M79.601 - Right arm pain     SUBJECTIVE                                                                                                               Patient stating that she feels that she feels some plateau in shoulder progress. She does note that she can raise her arm to the side with more ease but still sore.       OBJECTIVE                                                                                                                     Range of Motion (ROM)   (degrees unless noted; active unless noted; norms in ( ); negative=lacking to 0, positive=beyond 0)  WNL: right shoulder  Shoulder:    - Abduction (180):         Right: 165    Strength  (out of 5 unless noted, standard test position unless noted)   Shoulder:     - Abduction:         Right: 5    - External Rotation:          Right (at 0°): 5                     Outcome/Assessments  Outcome Measures:   Quick Disabilities of the Arm, Shoulder and Hand: QuickDash Total Score (Score will not calculate if more then 2 questions are left blank): 9.09  (scored 0-100; a higher score indicates greater disability) see flowsheet for additional documentation          Treatment     Therapeutic Exercise  Passive shoulder range of motion all directions  Re-assessment    Manual Therapy   Myofascial release to right pectorals, teres minor  Clearing distal clavicle  Soft tissue mobilization anterior shoulder    Neuromuscular Re-Education  Cross friction mobilization right supraspinatus, right biceps tendon  Shoulder external rotation 2 plates 2 x 10  Shoulder external rotation with overhead press, red loop at wrists x 10  Leap frog 2.5 lb at top shelf x 2 laps (standing on 4 inch box)  Shoulder flexion to 90 degrees, red loop at wrist, 3 way arm lifts to fatigue bilateral  D2 flexion 2 plates x 15  Goal posts 3 plates x 10  Shoulder  horizontal abduction hold to shoulder abduction 2 plates x 10     Skilled input: verbal instruction/cues, tactile instruction/cues, facilitation, posture correction, as detailed above, demonstration and inhibition    Writer verbally educated and received verbal consent for hand placement, positioning of patient, and techniques to be performed today from patient for clothing adjustments for techniques, therapist position for techniques and hand placement and palpation for techniques as described above and how they are pertinent to the patient's plan of care.  Home Exercise Program  Access Code: [email or web address]  URL: [email or web address]  Date: 01/03/2024  Prepared by: Megha Pagan    Program Notes  1. Red theraband loop at wrist: pulse from waist to overhead. 2 x 22. Red theraband loop at wrist and arms in front of body at shoulder level. 3 way arm lifts to fatigue. Switch sides.3. Red theraband loop at wrist. Elbows at side. Rotate arms out and then raise arms overhead while maintaining resistance against band. x 10    Exercises  - Shoulder External Rotation with Anchored Resistance  - 1 x daily - 4 x weekly - 1-2 sets - 10 reps  - Single Arm Scaption with Resistance  - 1 x daily - 4 x weekly - 2-3 sets - 10 reps  - Reverse Fly with Anchored Resistance  - 1 x daily - 3 x weekly - 1-2 sets - 10 reps  - Standing Shoulder Single Arm PNF D2 Flexion with Resistance  - 1 x daily - 3 x weekly - 1 sets - 10 reps      ASSESSMENT                                                                                                            Patient has full shoulder range of motion and strength on the right. Patient completing exercises without pain onset however fatigue especially with scapular strengthening. Improved form and tolerance to shoulder external rotation strengthening. Goals are met at this time. If pain persists, recommend that patient follow up with sports medicine.  Education:   - Results of  above outlined education: Verbalizes understanding and Demonstrates understanding    PLAN                                                                                                                           Discharge from skilled therapy with instructions/recommendations to: continue home exercise program    Suggestions for next session as indicated: Progress per plan of care      Goals  Long Term Goals: to be met by end of plan of care  1. Patient will be independent in progressive home exercise program to self manage symptoms. Status: met   2. Patient will be able to dress upper body/groom hair without increased pain Status: met   3. Patient will demonstrate appropriate sitting and standing posture with minimal cuing Status: met   4. Patient will be able to lift 5 lbs overhead without pain and good scapulohumeral rhythm  Status: met       Therapy procedure time and total treatment time can be found documented on the Time Entry flowsheet     PAST SURGICAL HISTORY:  H/O gastric bypass lost 50 lbs 12/2021 and appendectomy    H/O knee surgery 2008 2009 2015    H/O ovarian cystectomy 2003 2017    H/O unilateral salpingectomy right    History of cholecystectomy 2016    History of lumbar laminectomy 6/2021

## 2024-01-04 ENCOUNTER — APPOINTMENT (OUTPATIENT)
Dept: ORTHOPEDIC SURGERY | Facility: CLINIC | Age: 34
End: 2024-01-04

## 2024-01-10 ENCOUNTER — EMERGENCY (EMERGENCY)
Facility: HOSPITAL | Age: 34
LOS: 0 days | Discharge: HOME | End: 2024-01-10
Attending: EMERGENCY MEDICINE
Payer: COMMERCIAL

## 2024-01-10 VITALS
RESPIRATION RATE: 20 BRPM | HEART RATE: 97 BPM | OXYGEN SATURATION: 98 % | SYSTOLIC BLOOD PRESSURE: 137 MMHG | TEMPERATURE: 98 F | DIASTOLIC BLOOD PRESSURE: 79 MMHG

## 2024-01-10 VITALS — HEIGHT: 67 IN | WEIGHT: 285.06 LBS

## 2024-01-10 DIAGNOSIS — Z90.79 ACQUIRED ABSENCE OF OTHER GENITAL ORGAN(S): Chronic | ICD-10-CM

## 2024-01-10 DIAGNOSIS — Z91.048 OTHER NONMEDICINAL SUBSTANCE ALLERGY STATUS: ICD-10-CM

## 2024-01-10 DIAGNOSIS — Z98.890 OTHER SPECIFIED POSTPROCEDURAL STATES: Chronic | ICD-10-CM

## 2024-01-10 DIAGNOSIS — R25.1 TREMOR, UNSPECIFIED: ICD-10-CM

## 2024-01-10 DIAGNOSIS — Z90.49 ACQUIRED ABSENCE OF OTHER SPECIFIED PARTS OF DIGESTIVE TRACT: Chronic | ICD-10-CM

## 2024-01-10 DIAGNOSIS — Z01.89 ENCOUNTER FOR OTHER SPECIFIED SPECIAL EXAMINATIONS: ICD-10-CM

## 2024-01-10 DIAGNOSIS — Z88.0 ALLERGY STATUS TO PENICILLIN: ICD-10-CM

## 2024-01-10 DIAGNOSIS — Z98.84 BARIATRIC SURGERY STATUS: Chronic | ICD-10-CM

## 2024-01-10 PROCEDURE — 99282 EMERGENCY DEPT VISIT SF MDM: CPT

## 2024-01-10 PROCEDURE — 99283 EMERGENCY DEPT VISIT LOW MDM: CPT

## 2024-01-10 NOTE — ED STATDOCS - CARE PROVIDER_API CALL
Gabriele Perla  Neurology  [address] [place] [address], [address] [place] [address]  Phone: [phone]  Fax: [phone]  Follow Up Time:    Gabriele Perla  Neurology  [address] [place] [address], [address] [place] [address]  Phone: [phone]  Fax: [phone]  Follow Up Time:

## 2024-01-10 NOTE — ED STATDOCS - NSDCPRINTRESULTS_ED_ALL_ED
[No Ischemia] : no Ischemia [___] : [unfilled] Patient requests all Lab, Cardiology, and Radiology Results on their Discharge Instructions

## 2024-01-10 NOTE — ED STATDOCS - NSFOLLOWUPINSTRUCTIONS_ED_ALL_ED_FT
return to ed for intractable HA, persistent vomiting, or new onset motor/sensory deficits return to ed for intractable HA, persistent vomiting, or new onset motor/sensory deficits  follow-up with neurology

## 2024-01-10 NOTE — ED ADULT TRIAGE NOTE - IDEAL BODY WEIGHT(KG)
62 Calcipotriene Pregnancy And Lactation Text: This medication has not been proven safe during pregnancy. It is unknown if this medication is excreted in breast milk.

## 2024-01-10 NOTE — ED STATDOCS - PATIENT PORTAL LINK FT
You can access the FollowMyHealth Patient Portal offered by [facility] by registering at the following website: [email or web address]. By joining [employer]s FollowMyHealth portal, you will also be able to view your health information using other applications (apps) compatible with our system. You can access the FollowMyHealth Patient Portal offered by [facility] by registering at the following website: [email or web address]. By joining [employer]’s FollowMyHealth portal, you will also be able to view your health information using other applications (apps) compatible with our system.

## 2024-01-10 NOTE — ED STATDOCS - CARE PROVIDERS DIRECT ADDRESSES
[email or web address]

## 2024-01-10 NOTE — ED STATDOCS - CLINICAL SUMMARY MEDICAL DECISION MAKING FREE TEXT BOX
32 y/o female presents to the ED c/o body tremors for several weeks, worse over past few days. Followed by PMD who thought it could be iron deficiency so sent her for transfusion. During transfusion today was advised to come to the ED for evaluation. Explained to patient that she nees to f/u with neurology for this issue. No indication for emergent work-up at that this time given length of symptoms and no other localized symptoms. 32 y/o female presents to the ED c/o body tremors for several weeks, worse over past few days. Followed by PMD who thought it could be iron deficiency so sent her for transfusion. During transfusion today was advised to come to the ED for evaluation. Explained to patient that she nees to f/u with neurology for this issue. No indication for emergent work-up at that this time given length of symptoms and no other localized symptoms. Pt concerned that her symptoms may be restless leg, advised patient that restless leg does not require and emergent diagnosis. Pt advised to f/u with neurology, states she tried and they did not have appointments until may, offered to help expedite follow-up however patient declining

## 2024-01-10 NOTE — ED STATDOCS - OBJECTIVE STATEMENT
34 y/o female presents to the ED c/o body tremors for several weeks, worse over past few days. Followed by PMD who thought it could be iron deficiency so sent her for transfusion. During transfusion today was advised to come to the ED for evaluation 32 y/o female presents to the ED c/o body tremors for several weeks, worse over past few days. Followed by PMD who thought it could be iron deficiency so sent her for transfusion. During transfusion today was advised to come to the ED for evaluation

## 2024-01-10 NOTE — ED ADULT TRIAGE NOTE - CHIEF COMPLAINT QUOTE
Pt presents to ED c/o b/l leg pain and tremors x couple of weeks worse the last 2days. Pt states "I told my doctor about it and they told me to come to the ER to get blood work. The tremors are what concern me they happen all the time." Denies fall/injury

## 2024-01-10 NOTE — ED ADULT TRIAGE NOTE - GLASGOW COMA SCALE: BEST MOTOR RESPONSE, MLM
(M6) obeys commands Post-Care Instructions: I reviewed with the patient in detail post-care instructions. Patient is not to engage in any heavy lifting, exercise, or swimming for the next 14 days. Should the patient develop any fevers, chills, bleeding, severe pain patient will contact the office immediately.

## 2024-01-11 ENCOUNTER — EMERGENCY (EMERGENCY)
Facility: HOSPITAL | Age: 34
LOS: 1 days | Discharge: HOME | End: 2024-01-11
Attending: STUDENT IN AN ORGANIZED HEALTH CARE EDUCATION/TRAINING PROGRAM | Admitting: STUDENT IN AN ORGANIZED HEALTH CARE EDUCATION/TRAINING PROGRAM
Payer: COMMERCIAL

## 2024-01-11 ENCOUNTER — TRANSCRIPTION ENCOUNTER (OUTPATIENT)
Age: 34
End: 2024-01-11

## 2024-01-11 VITALS
DIASTOLIC BLOOD PRESSURE: 94 MMHG | SYSTOLIC BLOOD PRESSURE: 156 MMHG | TEMPERATURE: 99 F | OXYGEN SATURATION: 100 % | HEART RATE: 96 BPM | RESPIRATION RATE: 17 BRPM

## 2024-01-11 VITALS
RESPIRATION RATE: 16 BRPM | TEMPERATURE: 99 F | WEIGHT: 179.9 LBS | SYSTOLIC BLOOD PRESSURE: 157 MMHG | HEIGHT: 67 IN | DIASTOLIC BLOOD PRESSURE: 101 MMHG | HEART RATE: 98 BPM | OXYGEN SATURATION: 100 %

## 2024-01-11 DIAGNOSIS — Z98.890 OTHER SPECIFIED POSTPROCEDURAL STATES: Chronic | ICD-10-CM

## 2024-01-11 DIAGNOSIS — Z98.84 BARIATRIC SURGERY STATUS: Chronic | ICD-10-CM

## 2024-01-11 DIAGNOSIS — Z90.79 ACQUIRED ABSENCE OF OTHER GENITAL ORGAN(S): Chronic | ICD-10-CM

## 2024-01-11 DIAGNOSIS — Z90.49 ACQUIRED ABSENCE OF OTHER SPECIFIED PARTS OF DIGESTIVE TRACT: Chronic | ICD-10-CM

## 2024-01-11 LAB
ALBUMIN SERPL ELPH-MCNC: 3.3 G/DL — SIGNIFICANT CHANGE UP (ref 3.3–5)
ALBUMIN SERPL ELPH-MCNC: 3.3 G/DL — SIGNIFICANT CHANGE UP (ref 3.3–5)
ALP SERPL-CCNC: 165 U/L — HIGH (ref 40–120)
ALP SERPL-CCNC: 165 U/L — HIGH (ref 40–120)
ALT FLD-CCNC: 51 U/L — SIGNIFICANT CHANGE UP (ref 12–78)
ALT FLD-CCNC: 51 U/L — SIGNIFICANT CHANGE UP (ref 12–78)
ANION GAP SERPL CALC-SCNC: 5 MMOL/L — SIGNIFICANT CHANGE UP (ref 5–17)
ANION GAP SERPL CALC-SCNC: 5 MMOL/L — SIGNIFICANT CHANGE UP (ref 5–17)
AST SERPL-CCNC: 64 U/L — HIGH (ref 15–37)
AST SERPL-CCNC: 64 U/L — HIGH (ref 15–37)
BASOPHILS # BLD AUTO: 0.04 K/UL — SIGNIFICANT CHANGE UP (ref 0–0.2)
BASOPHILS # BLD AUTO: 0.04 K/UL — SIGNIFICANT CHANGE UP (ref 0–0.2)
BASOPHILS NFR BLD AUTO: 0.7 % — SIGNIFICANT CHANGE UP (ref 0–2)
BASOPHILS NFR BLD AUTO: 0.7 % — SIGNIFICANT CHANGE UP (ref 0–2)
BILIRUB SERPL-MCNC: 0.3 MG/DL — SIGNIFICANT CHANGE UP (ref 0.2–1.2)
BILIRUB SERPL-MCNC: 0.3 MG/DL — SIGNIFICANT CHANGE UP (ref 0.2–1.2)
BUN SERPL-MCNC: 14 MG/DL — SIGNIFICANT CHANGE UP (ref 7–23)
BUN SERPL-MCNC: 14 MG/DL — SIGNIFICANT CHANGE UP (ref 7–23)
CALCIUM SERPL-MCNC: 8.9 MG/DL — SIGNIFICANT CHANGE UP (ref 8.5–10.1)
CALCIUM SERPL-MCNC: 8.9 MG/DL — SIGNIFICANT CHANGE UP (ref 8.5–10.1)
CHLORIDE SERPL-SCNC: 109 MMOL/L — HIGH (ref 96–108)
CHLORIDE SERPL-SCNC: 109 MMOL/L — HIGH (ref 96–108)
CO2 SERPL-SCNC: 25 MMOL/L — SIGNIFICANT CHANGE UP (ref 22–31)
CO2 SERPL-SCNC: 25 MMOL/L — SIGNIFICANT CHANGE UP (ref 22–31)
CREAT SERPL-MCNC: 0.74 MG/DL — SIGNIFICANT CHANGE UP (ref 0.5–1.3)
CREAT SERPL-MCNC: 0.74 MG/DL — SIGNIFICANT CHANGE UP (ref 0.5–1.3)
EGFR: 109 ML/MIN/1.73M2 — SIGNIFICANT CHANGE UP
EGFR: 109 ML/MIN/1.73M2 — SIGNIFICANT CHANGE UP
EOSINOPHIL # BLD AUTO: 0.04 K/UL — SIGNIFICANT CHANGE UP (ref 0–0.5)
EOSINOPHIL # BLD AUTO: 0.04 K/UL — SIGNIFICANT CHANGE UP (ref 0–0.5)
EOSINOPHIL NFR BLD AUTO: 0.7 % — SIGNIFICANT CHANGE UP (ref 0–6)
EOSINOPHIL NFR BLD AUTO: 0.7 % — SIGNIFICANT CHANGE UP (ref 0–6)
GLUCOSE SERPL-MCNC: 77 MG/DL — SIGNIFICANT CHANGE UP (ref 70–99)
GLUCOSE SERPL-MCNC: 77 MG/DL — SIGNIFICANT CHANGE UP (ref 70–99)
HCG SERPL-ACNC: <1 MIU/ML — SIGNIFICANT CHANGE UP
HCG SERPL-ACNC: <1 MIU/ML — SIGNIFICANT CHANGE UP
HCT VFR BLD CALC: 39.8 % — SIGNIFICANT CHANGE UP (ref 34.5–45)
HCT VFR BLD CALC: 39.8 % — SIGNIFICANT CHANGE UP (ref 34.5–45)
HGB BLD-MCNC: 13.7 G/DL — SIGNIFICANT CHANGE UP (ref 11.5–15.5)
HGB BLD-MCNC: 13.7 G/DL — SIGNIFICANT CHANGE UP (ref 11.5–15.5)
IMM GRANULOCYTES NFR BLD AUTO: 0.3 % — SIGNIFICANT CHANGE UP (ref 0–0.9)
IMM GRANULOCYTES NFR BLD AUTO: 0.3 % — SIGNIFICANT CHANGE UP (ref 0–0.9)
LYMPHOCYTES # BLD AUTO: 2.03 K/UL — SIGNIFICANT CHANGE UP (ref 1–3.3)
LYMPHOCYTES # BLD AUTO: 2.03 K/UL — SIGNIFICANT CHANGE UP (ref 1–3.3)
LYMPHOCYTES # BLD AUTO: 33.1 % — SIGNIFICANT CHANGE UP (ref 13–44)
LYMPHOCYTES # BLD AUTO: 33.1 % — SIGNIFICANT CHANGE UP (ref 13–44)
MAGNESIUM SERPL-MCNC: 2.2 MG/DL — SIGNIFICANT CHANGE UP (ref 1.6–2.6)
MAGNESIUM SERPL-MCNC: 2.2 MG/DL — SIGNIFICANT CHANGE UP (ref 1.6–2.6)
MCHC RBC-ENTMCNC: 28.2 PG — SIGNIFICANT CHANGE UP (ref 27–34)
MCHC RBC-ENTMCNC: 28.2 PG — SIGNIFICANT CHANGE UP (ref 27–34)
MCHC RBC-ENTMCNC: 34.4 GM/DL — SIGNIFICANT CHANGE UP (ref 32–36)
MCHC RBC-ENTMCNC: 34.4 GM/DL — SIGNIFICANT CHANGE UP (ref 32–36)
MCV RBC AUTO: 81.9 FL — SIGNIFICANT CHANGE UP (ref 80–100)
MCV RBC AUTO: 81.9 FL — SIGNIFICANT CHANGE UP (ref 80–100)
MONOCYTES # BLD AUTO: 0.44 K/UL — SIGNIFICANT CHANGE UP (ref 0–0.9)
MONOCYTES # BLD AUTO: 0.44 K/UL — SIGNIFICANT CHANGE UP (ref 0–0.9)
MONOCYTES NFR BLD AUTO: 7.2 % — SIGNIFICANT CHANGE UP (ref 2–14)
MONOCYTES NFR BLD AUTO: 7.2 % — SIGNIFICANT CHANGE UP (ref 2–14)
NEUTROPHILS # BLD AUTO: 3.56 K/UL — SIGNIFICANT CHANGE UP (ref 1.8–7.4)
NEUTROPHILS # BLD AUTO: 3.56 K/UL — SIGNIFICANT CHANGE UP (ref 1.8–7.4)
NEUTROPHILS NFR BLD AUTO: 58 % — SIGNIFICANT CHANGE UP (ref 43–77)
NEUTROPHILS NFR BLD AUTO: 58 % — SIGNIFICANT CHANGE UP (ref 43–77)
NRBC # BLD: 0 /100 WBCS — SIGNIFICANT CHANGE UP (ref 0–0)
NRBC # BLD: 0 /100 WBCS — SIGNIFICANT CHANGE UP (ref 0–0)
PLATELET # BLD AUTO: 352 K/UL — SIGNIFICANT CHANGE UP (ref 150–400)
PLATELET # BLD AUTO: 352 K/UL — SIGNIFICANT CHANGE UP (ref 150–400)
POTASSIUM SERPL-MCNC: 4.3 MMOL/L — SIGNIFICANT CHANGE UP (ref 3.5–5.3)
POTASSIUM SERPL-MCNC: 4.3 MMOL/L — SIGNIFICANT CHANGE UP (ref 3.5–5.3)
POTASSIUM SERPL-SCNC: 4.3 MMOL/L — SIGNIFICANT CHANGE UP (ref 3.5–5.3)
POTASSIUM SERPL-SCNC: 4.3 MMOL/L — SIGNIFICANT CHANGE UP (ref 3.5–5.3)
PROT SERPL-MCNC: 7.8 G/DL — SIGNIFICANT CHANGE UP (ref 6–8.3)
PROT SERPL-MCNC: 7.8 G/DL — SIGNIFICANT CHANGE UP (ref 6–8.3)
RBC # BLD: 4.86 M/UL — SIGNIFICANT CHANGE UP (ref 3.8–5.2)
RBC # BLD: 4.86 M/UL — SIGNIFICANT CHANGE UP (ref 3.8–5.2)
RBC # FLD: 14.9 % — HIGH (ref 10.3–14.5)
RBC # FLD: 14.9 % — HIGH (ref 10.3–14.5)
SODIUM SERPL-SCNC: 139 MMOL/L — SIGNIFICANT CHANGE UP (ref 135–145)
SODIUM SERPL-SCNC: 139 MMOL/L — SIGNIFICANT CHANGE UP (ref 135–145)
TSH SERPL-MCNC: 1.56 UIU/ML — SIGNIFICANT CHANGE UP (ref 0.36–3.74)
TSH SERPL-MCNC: 1.56 UIU/ML — SIGNIFICANT CHANGE UP (ref 0.36–3.74)
WBC # BLD: 6.13 K/UL — SIGNIFICANT CHANGE UP (ref 3.8–10.5)
WBC # BLD: 6.13 K/UL — SIGNIFICANT CHANGE UP (ref 3.8–10.5)
WBC # FLD AUTO: 6.13 K/UL — SIGNIFICANT CHANGE UP (ref 3.8–10.5)
WBC # FLD AUTO: 6.13 K/UL — SIGNIFICANT CHANGE UP (ref 3.8–10.5)

## 2024-01-11 PROCEDURE — 99284 EMERGENCY DEPT VISIT MOD MDM: CPT

## 2024-01-11 PROCEDURE — 84702 CHORIONIC GONADOTROPIN TEST: CPT

## 2024-01-11 PROCEDURE — 80053 COMPREHEN METABOLIC PANEL: CPT

## 2024-01-11 PROCEDURE — 84443 ASSAY THYROID STIM HORMONE: CPT

## 2024-01-11 PROCEDURE — 93005 ELECTROCARDIOGRAM TRACING: CPT

## 2024-01-11 PROCEDURE — 99283 EMERGENCY DEPT VISIT LOW MDM: CPT | Mod: 25

## 2024-01-11 PROCEDURE — 85025 COMPLETE CBC W/AUTO DIFF WBC: CPT

## 2024-01-11 PROCEDURE — 93010 ELECTROCARDIOGRAM REPORT: CPT

## 2024-01-11 PROCEDURE — 36415 COLL VENOUS BLD VENIPUNCTURE: CPT

## 2024-01-11 PROCEDURE — 83735 ASSAY OF MAGNESIUM: CPT

## 2024-01-11 RX ORDER — SODIUM CHLORIDE 9 MG/ML
1000 INJECTION INTRAMUSCULAR; INTRAVENOUS; SUBCUTANEOUS ONCE
Refills: 0 | Status: COMPLETED | OUTPATIENT
Start: 2024-01-11 | End: 2024-01-11

## 2024-01-11 RX ADMIN — SODIUM CHLORIDE 1000 MILLILITER(S): 9 INJECTION INTRAMUSCULAR; INTRAVENOUS; SUBCUTANEOUS at 10:24

## 2024-01-11 NOTE — ED PROVIDER NOTE - CLINICAL SUMMARY MEDICAL DECISION MAKING FREE TEXT BOX
32yo F ho anxiety, depression, pw body tremors and paresthesias. has been feeling tremulous for last few days and today feels tingling in her body. no other symptoms, was seen [facility] yesterday but did not have a workup done, was told to fu with her pcpa nd neurologist, she cant get a neurology appt until May. she also gets iron infusions for heavy menstrual cycles and gastric bypass. no headache, fevers, chills, vomiting, diarrhea, or ther symptoms at this time. no weakness, no gait instability or othe rneuro complaint   tremulousness and paresthesais, will check labs, lytes, tsh  will arrange for neuro fu 34yo F ho anxiety, depression, pw body tremors and paresthesias. has been feeling tremulous for last few days and today feels tingling in her body. no other symptoms, was seen in [facility] yesterday but did not have a workup done, was told to fu with her pcpa nd neurologist, she cant get a neurology appt until May. she also gets iron infusions for heavy menstrual cycles and gastric bypass. no headache, fevers, chills, vomiting, diarrhea, or ther symptoms at this time. no weakness, no gait instability or othe rneuro complaint   tremulousness and paresthesais, will check labs, lytes, tsh  will arrange for neuro fu

## 2024-01-11 NOTE — ED ADULT NURSE NOTE - NSFALLUNIVINTERV_ED_ALL_ED
Bed/Stretcher in lowest position, wheels locked, appropriate side rails in place/Call bell, personal items and telephone in reach/Instruct patient to call for assistance before getting out of bed/chair/stretcher/Non-slip footwear applied when patient is off stretcher/[place] to call system/Physically safe environment - no spills, clutter or unnecessary equipment/Purposeful proactive rounding/Room/bathroom lighting operational, light cord in reach Bed/Stretcher in lowest position, wheels locked, appropriate side rails in place/Call bell, personal items and telephone in reach/Instruct patient to call for assistance before getting out of bed/chair/stretcher/Non-slip footwear applied when patient is off stretcher/[place] to call system/Physically safe environment - no spills, clutter or unnecessary equipment/Purposeful proactive rounding/Room/bathroom lighting operational, light cord in reach

## 2024-01-11 NOTE — ED PROVIDER NOTE - NSFOLLOWUPINSTRUCTIONS_ED_ALL_ED_FT
1. TAKE ALL MEDICATIONS AS DIRECTED.    2. FOR PAIN OR FEVER YOU CAN TAKE IBUPROFEN (MOTRIN, ADVIL) OR ACETAMINOPHEN (TYLENOL) AS NEEDED, AS DIRECTED ON PACKAGING.  3. FOLLOW UP WITH YOUR PRIMARY DOCTOR WITHIN 5 DAYS AS DIRECTED.  4. IF YOU HAD LABS OR IMAGING DONE, YOU WERE GIVEN COPIES OF ALL LABS AND/OR IMAGING RESULTS FROM YOUR ER VISIT--PLEASE TAKE THEM WITH YOU TO YOUR FOLLOW UP APPOINTMENTS.  5. IF NEEDED, CALL PATIENT ACCESS SERVICES AT [phone] TO FIND A PRIMARY CARE PHYSICIAN.  OR CALL [phone] TO MAKE AN APPOINTMENT WITH THE CLINIC.  6. RETURN TO THE ER FOR ANY WORSENING SYMPTOMS OR CONCERNS.    Follow up with neurology tomorrow        Log Out.  [employer]® CareNotes®  :  [facility]        TREMORS - General Information    Tremors    WHAT YOU NEED TO KNOW:    What is a tremor? A tremor is a movement you cannot control that occurs in a rhythm. Tremors most commonly occur in the hands. Other common places include the head or face, trunk, or legs. Your voice can also have a tremor and sound shaky when you speak. A tremor may be caused by a nerve problem, too much thyroid hormone, or by certain medicines, caffeine, or alcohol. Tremors may be temporary or permanent. The tremor may go away and return, or worsen with stress. Tremors can happen at any age, but they are more common in later years.    What are the types of tremors? A tremor may happen when you are at rest. For example, your hands may move even though you are resting them in your lap. Tremors may also happen when you move. You may have a tremor when you hold your arms out, or when you move your wrists. A tremor may happen when you try to reach for something, or when you write or speak. You may also have a tremor when you contract a muscle, even if you do not move the body part. You may have tremors in your legs when you stand. Anxiety or a strong emotion can also cause tremors.    What increases my risk for tremors?    Older age    Certain medicines, such as steroids, antipsychotics, or amphetamines    A family history of tremors    A condition such as Parkinson disease, multiple sclerosis, a thyroid problem, or liver failure    A brain injury, stroke, or disease that affects your brainstem    Alcohol abuse, or withdrawal from alcohol  How is the cause of a tremor diagnosed? Your healthcare provider will ask about your tremors and when they began. Tell your provider if you have tremors on one or both sides of your body. Tell your provider if anything triggers a tremor or makes it stop. Your provider may also ask if anyone in your family has a history of tremors.    Blood or urine tests may be used to check for medicines, illegal drugs, or alcohol.    A neurologic exam may be used to check for nerve damage. Your healthcare provider may ask you to use an eating utensil or write words on paper. You may be asked to touch your finger to your nose. These tests can help your provider see tremors that happen during these activities.    CT or MRI pictures may be used to check for a brain problem or injury. You may be given contrast liquid to help your brain show up better in the pictures. Tell the healthcare provider if you have ever had an allergic reaction to contrast liquid. Do not enter the MRI room with anything metal. Metal can cause serious injury. Tell the healthcare provider if you have any metal in or on your body.    An electromyogram may be used to check for muscle or nerve problems. A machine measures the activity in your muscles when the muscles are stimulated. A needle with a device called an electrode attached to the end will be inserted into your muscle through your skin. An electric current is sent through the needle and into the muscle. Your provider may measure muscle activity when you are at rest and when you move the muscle.  How are tremors treated? You may not need treatment if your tremor is mild. You may need treatment for a condition that can cause tremors, such as a thyroid disorder. Your healthcare provider may stop or change a medicine that may be causing tremors. Do not stop or change any medicine unless your doctor tells you to.    Medicines may be used to help control some kinds of tremors.    Deep brain stimulation is used to control some types of tremors. Electric currents are delivered into parts of the brain that control movement. The currents disrupt the tremor.    Thalamotomy is surgery that may be used to control some types of tremors if other treatments do not work. During surgery, parts of the thalamus are destroyed. The thalamus is a small area of the brain. It is located deep inside the brain and helps control movement.  What can I do to manage my symptoms?    Do not have caffeine or other chemicals that affect your nerves. Limit or do not have caffeine. Caffeine can make tremors worse. Talk to your healthcare provider about herbal medicines, teas, and supplements. They may also increase tremors. Do not use illegal drugs.    Go to physical and occupational therapy as directed. A physical therapist can teach you exercises to help reduce the tremor and improve muscle control. You may be shown how to hold the body part during a tremor to help control the movement. The therapist can also help you build strength and balance. An occupational therapist can show you how to use adaptive devices to help you move more easily.    Use objects that will help you control movements. You may have more hand control if you add a watch or bracelet to your wrist. It may be easier for you to drink from a straw, or to fill your cup only half full. Cups with lids, such as travel mugs, can also help you drink with more control. Heavy eating utensils can help you eat more easily. A button fastener can help you button clothing if tremors in your hands make this difficult.    Set a regular sleep schedule. Lack of sleep can make tremors worse. Try to go to bed at the same time each night and wake up at the same time each morning.  When should I contact my healthcare provider?    You have a new or worsening tremor.    You have tremors that make it difficult to do your regular activities.    You have questions or concerns about your condition or care.  CARE AGREEMENT:    You have the right to help plan your care. Learn about your health condition and how it may be treated. Discuss treatment options with your healthcare providers to decide what care you want to receive. You always have the right to refuse treatment.    © [employer] 1973, 2024    	  back to top            © [employer] 1973, 2024 1. TAKE ALL MEDICATIONS AS DIRECTED.    2. FOR PAIN OR FEVER YOU CAN TAKE IBUPROFEN (MOTRIN, ADVIL) OR ACETAMINOPHEN (TYLENOL) AS NEEDED, AS DIRECTED ON PACKAGING.  3. FOLLOW UP WITH YOUR PRIMARY DOCTOR WITHIN 5 DAYS AS DIRECTED.  4. IF YOU HAD LABS OR IMAGING DONE, YOU WERE GIVEN COPIES OF ALL LABS AND/OR IMAGING RESULTS FROM YOUR ER VISIT--PLEASE TAKE THEM WITH YOU TO YOUR FOLLOW UP APPOINTMENTS.  5. IF NEEDED, CALL PATIENT ACCESS SERVICES AT [phone] TO FIND A PRIMARY CARE PHYSICIAN.  OR CALL [phone] TO MAKE AN APPOINTMENT WITH THE CLINIC.  6. RETURN TO THE ER FOR ANY WORSENING SYMPTOMS OR CONCERNS.    Follow up with neurology tomorrow        Log Out.  [employer] CareNotes®  :  [facility]        TREMORS - General Information    Tremors    WHAT YOU NEED TO KNOW:    What is a tremor? A tremor is a movement you cannot control that occurs in a rhythm. Tremors most commonly occur in the hands. Other common places include the head or face, trunk, or legs. Your voice can also have a tremor and sound shaky when you speak. A tremor may be caused by a nerve problem, too much thyroid hormone, or by certain medicines, caffeine, or alcohol. Tremors may be temporary or permanent. The tremor may go away and return, or worsen with stress. Tremors can happen at any age, but they are more common in later years.    What are the types of tremors? A tremor may happen when you are at rest. For example, your hands may move even though you are resting them in your lap. Tremors may also happen when you move. You may have a tremor when you hold your arms out, or when you move your wrists. A tremor may happen when you try to reach for something, or when you write or speak. You may also have a tremor when you contract a muscle, even if you do not move the body part. You may have tremors in your legs when you stand. Anxiety or a strong emotion can also cause tremors.    What increases my risk for tremors?    Older age    Certain medicines, such as steroids, antipsychotics, or amphetamines    A family history of tremors    A condition such as Parkinson disease, multiple sclerosis, a thyroid problem, or liver failure    A brain injury, stroke, or disease that affects your brainstem    Alcohol abuse, or withdrawal from alcohol  How is the cause of a tremor diagnosed? Your healthcare provider will ask about your tremors and when they began. Tell your provider if you have tremors on one or both sides of your body. Tell your provider if anything triggers a tremor or makes it stop. Your provider may also ask if anyone in your family has a history of tremors.    Blood or urine tests may be used to check for medicines, illegal drugs, or alcohol.    A neurologic exam may be used to check for nerve damage. Your healthcare provider may ask you to use an eating utensil or write words on paper. You may be asked to touch your finger to your nose. These tests can help your provider see tremors that happen during these activities.    CT or MRI pictures may be used to check for a brain problem or injury. You may be given contrast liquid to help your brain show up better in the pictures. Tell the healthcare provider if you have ever had an allergic reaction to contrast liquid. Do not enter the MRI room with anything metal. Metal can cause serious injury. Tell the healthcare provider if you have any metal in or on your body.    An electromyogram may be used to check for muscle or nerve problems. A machine measures the activity in your muscles when the muscles are stimulated. A needle with a device called an electrode attached to the end will be inserted into your muscle through your skin. An electric current is sent through the needle and into the muscle. Your provider may measure muscle activity when you are at rest and when you move the muscle.  How are tremors treated? You may not need treatment if your tremor is mild. You may need treatment for a condition that can cause tremors, such as a thyroid disorder. Your healthcare provider may stop or change a medicine that may be causing tremors. Do not stop or change any medicine unless your doctor tells you to.    Medicines may be used to help control some kinds of tremors.    Deep brain stimulation is used to control some types of tremors. Electric currents are delivered into parts of the brain that control movement. The currents disrupt the tremor.    Thalamotomy is surgery that may be used to control some types of tremors if other treatments do not work. During surgery, parts of the thalamus are destroyed. The thalamus is a small area of the brain. It is located deep inside the brain and helps control movement.  What can I do to manage my symptoms?    Do not have caffeine or other chemicals that affect your nerves. Limit or do not have caffeine. Caffeine can make tremors worse. Talk to your healthcare provider about herbal medicines, teas, and supplements. They may also increase tremors. Do not use illegal drugs.    Go to physical and occupational therapy as directed. A physical therapist can teach you exercises to help reduce the tremor and improve muscle control. You may be shown how to hold the body part during a tremor to help control the movement. The therapist can also help you build strength and balance. An occupational therapist can show you how to use adaptive devices to help you move more easily.    Use objects that will help you control movements. You may have more hand control if you add a watch or bracelet to your wrist. It may be easier for you to drink from a straw, or to fill your cup only half full. Cups with lids, such as travel mugs, can also help you drink with more control. Heavy eating utensils can help you eat more easily. A button fastener can help you button clothing if tremors in your hands make this difficult.    Set a regular sleep schedule. Lack of sleep can make tremors worse. Try to go to bed at the same time each night and wake up at the same time each morning.  When should I contact my healthcare provider?    You have a new or worsening tremor.    You have tremors that make it difficult to do your regular activities.    You have questions or concerns about your condition or care.  CARE AGREEMENT:    You have the right to help plan your care. Learn about your health condition and how it may be treated. Discuss treatment options with your healthcare providers to decide what care you want to receive. You always have the right to refuse treatment.    © [employer] 1973, 2024    	  back to top            © [employer] 1973, 2024

## 2024-01-11 NOTE — ED PROVIDER NOTE - PHYSICAL EXAMINATION
Gen: Well appearing in NAD  Head: NC/AT  Neck: trachea midline  cv: rrr  Resp:  No distress, lungs clear  abd nontender   Ext: no deformities  Neuro:  A&O appears non focal, no motor sor sensory deficit, normal gait, no dysmetria   Skin:  Warm and dry as visualized  Psych:  Normal affect and mood

## 2024-01-11 NOTE — ED ADULT NURSE REASSESSMENT NOTE - NS ED NURSE REASSESS COMMENT FT1
1210:  patient d/c.  iv removed, site benign.  the patient already has an appt scheduled for tomorrow with her PMD.  she will bring all of her PT results to her neuro .  the patient verbalized understanding of all instructions and she ambulated out of the ed in stable condition, no distress and with all of her belongings.  vitals recorded.  eli ashby.

## 2024-01-11 NOTE — ED PROVIDER NOTE - PATIENT PORTAL LINK FT
You can access the FollowMyHealth Patient Portal offered by [facility] by registering at the following website: [email or web address]. By joining [employer]’s FollowMyHealth portal, you will also be able to view your health information using other applications (apps) compatible with our system. You can access the FollowMyHealth Patient Portal offered by [facility] by registering at the following website: [email or web address]. By joining [employer]’s FollowMyHealth portal, you will also be able to view your health information using other applications (apps) compatible with our system.

## 2024-01-11 NOTE — ED PROVIDER NOTE - INTERPRETATION
normal sinus rhythm, Normal axis, Normal VA interval and QRS complex. There are no acute ischemic ST or T-wave changes. normal sinus rhythm, Normal axis, Normal NH interval and QRS complex. There are no acute ischemic ST or T-wave changes.

## 2024-01-11 NOTE — ED PROVIDER NOTE - OBJECTIVE STATEMENT
34yo F ho anxiety, depression, pw body tremors and paresthesias. has been feeling tremulous for last few days and today feels tingling in her body. no other symptoms, [facility] but did not have a workup done, was told to fu with her pcpa nd neurologist, she cant get a neurology appt until May. she also gets iron infusions for heavy menstrual cycles and gastric bypass. no headache, fevers, chills, vomiting, diarrhea, or ther symptoms at this time. no weakness, no gait instability or othe rneuro complaint

## 2024-01-11 NOTE — ED ADULT NURSE NOTE - CHIEF COMPLAINT QUOTE
Pt states that she develop tremors in BLLE two days ago and today she developed numbness and tingling in BLUE. Pt appears anxious. Pt is also c/o L sided CP and Left arm pain

## 2024-01-11 NOTE — ED ADULT TRIAGE NOTE - CHIEF COMPLAINT QUOTE
Pt states that she develop tremors in BLLE two days ago and today she developed numbness and tingling in BLUE. Pt appears amxuous Pt states that she develop tremors in BLLE two days ago and today she developed numbness and tingling in BLUE. Pt appears anxious. Pt is also c/o L sided CP and Left arm pain

## 2024-01-11 NOTE — ED ADULT NURSE NOTE - OBJECTIVE STATEMENT
Pt arrived to ED c/o tremors x 3 days now accompanied by CP 4/10 tightness and numbness to hands b/l that woke her this AM. A/O x 3, sitting calmly in bed, ambulatory. Denies SOB/N/V/D/f/c. Pt placed on bedside monitor, EKG done. Has sig hx of anxiety and depression for which she is being treated and continues to f/u. Will continue to monitor. Pt arrived to ED c/o tremors x 3 days now accompanied by CP 4/10 tightness and numbness to hands b/l that woke her this AM. A/O x 3, sitting calmly in bed, ambulatory. Denies SOB/N/V/D/f/c. Pt placed on bedside monitor, EKG done. Has sig hx of anxiety and depression for which she is being treated and continues to f/u. Will continue to monitor.  (the patient states that she was at [facility] yesterday where "they were terrible.  they did nothing for me and told me to leave, that my complaints were not emergent". Pt arrived to ED c/o tremors x 3 days now accompanied by CP 4/10 tightness and numbness to hands b/l that woke her this AM. A/O x 3, sitting calmly in bed, ambulatory. Denies SOB/N/V/D/f/c. Pt placed on bedside monitor, EKG done. Has sig hx of anxiety and depression for which she is being treated and continues to f/u. Will continue to monitor.  (the patient states that she was at [facility] yesterday where "they were terrible.  they did nothing for me and told me to leave, that my complaints were not emergent".

## 2024-01-12 ENCOUNTER — APPOINTMENT (OUTPATIENT)
Dept: NEUROLOGY | Facility: CLINIC | Age: 34
End: 2024-01-12

## 2024-01-18 ENCOUNTER — NON-APPOINTMENT (OUTPATIENT)
Age: 34
End: 2024-01-18

## 2024-01-18 ENCOUNTER — APPOINTMENT (OUTPATIENT)
Dept: NEUROLOGY | Facility: CLINIC | Age: 34
End: 2024-01-18
Payer: COMMERCIAL

## 2024-01-18 VITALS
WEIGHT: 185 LBS | TEMPERATURE: 98.2 F | HEART RATE: 68 BPM | DIASTOLIC BLOOD PRESSURE: 78 MMHG | SYSTOLIC BLOOD PRESSURE: 117 MMHG | HEIGHT: 67 IN | BODY MASS INDEX: 29.03 KG/M2

## 2024-01-18 DIAGNOSIS — R25.3 FASCICULATION: ICD-10-CM

## 2024-01-18 DIAGNOSIS — R25.9 UNSPECIFIED ABNORMAL INVOLUNTARY MOVEMENTS: ICD-10-CM

## 2024-01-18 PROCEDURE — 99205 OFFICE O/P NEW HI 60 MIN: CPT

## 2024-01-18 RX ORDER — HYALURONATE SODIUM 20 MG/2 ML
20 SYRINGE (ML) INTRAARTICULAR
Qty: 1 | Refills: 0 | Status: DISCONTINUED | COMMUNITY
Start: 2023-11-20 | End: 2024-01-18

## 2024-01-18 RX ORDER — HYALURONATE SODIUM 20 MG/2 ML
20 SYRINGE (ML) INTRAARTICULAR
Qty: 1 | Refills: 0 | Status: DISCONTINUED | COMMUNITY
Start: 2023-11-22 | End: 2024-01-18

## 2024-01-18 RX ORDER — CHOLESTYRAMINE 4 G/9G
4 POWDER, FOR SUSPENSION ORAL
Qty: 1 | Refills: 3 | Status: DISCONTINUED | COMMUNITY
Start: 2023-12-04 | End: 2024-01-18

## 2024-01-18 NOTE — HISTORY OF PRESENT ILLNESS
[FreeTextEntry1] : Ms. Cobb is here today for neurology evaluation. She reports tremors in her legs which began about 2.5 months ago. Initially these movements were intermittent but are now occurring with greater frequency. About two weeks ago her symptoms were "24/7" for about one week. They are now becoming less frequent. There is no associated pain but feels uncomfortable and says that it causes her a lot of "mental anguish". She has a restless sensation. She feels fidgety. She is not sure if the movements themselves are uncomfortable or if the urge to move is uncomfortable.  She denies having any weakness or numbness in her lower extremities. She does not have involuntary movements in her arms. She once woke up with numbness/tingling in her left arm. She denies any increased activity. She started OCPs prior to the onset of these movements, otherwise had not started any new medications. She takes duloxetine, gabapentin, lamotrigine, Latuda. Symptoms are improved over the last two days.  Initially she thought this may have been related to iron deficiency anemia. She has been receiving iron infusion. Her ferritin on 11/27/23 was 9. She has also taken vitamin B12 injections for a level of 264. Vitamin D was low at 23 She has spoken with her hepatologist; she has non-alcoholic fatty liver.  She went to the ED and blood tests were unremarkable including magnesium and TSH.  She drinks about two cups of coffee per day.

## 2024-01-18 NOTE — CONSULT LETTER
[Dear  ___] : Dear  [unfilled], [Consult Letter:] : I had the pleasure of evaluating your patient, [unfilled]. [Please see my note below.] : Please see my note below. [Consult Closing:] : Thank you very much for allowing me to participate in the care of this patient.  If you have any questions, please do not hesitate to contact me. [FreeTextEntry2] : Katlyn Diez [FreeTextEntry3] : Sincerely,   Shari Mejia MD Diplomate, American Academy of Psychiatry and Neurology Board Certified in the Subspecialty of Clinical Neurophysiology Board Certified in the Subspecialty of Sleep Medicine Board Certified in the Subspecialty of Epilepsy

## 2024-01-18 NOTE — PHYSICAL EXAM
[FreeTextEntry1] : Examination: Constitutional: normal, no apparent distress Eyes: normal conjunctiva b/l, no ptosis, visual fields full Respiratory: no respiratory distress, normal effort, normal auscultation Cardiovascular: normal rate, rhythm, no murmurs Neck: supple, no masses Vascular: carotids normal Skin: normal color, no rashes Psych: normal mood, affect  Neurological: Memory: normal memory, oriented to person, place, time Language intact/no aphasia Cranial Nerves: II-XII intact, Pupils equally round and reactive to light, ocular muscles/movements intact, no ptosis, no facial weakness, tongue protrudes normally in the midline,  Motor: normal tone, no pronator drift, full strength in all four extremities in the proximal and distal muscle groups Coordination: Fine motor movements intact, rapid alternating movements intact, finger to nose intact bilaterally Sensory: intact to light touch, vibration, joint position sense, negative Romberg examination DTRs: symmetric, 2+ in b/l triceps, 2+ in b/l biceps, 2+ in b/l brachioradialis, 2+ in bilateral patellars, 2+ in left Achilles, absent in right Achilles, Babinskis negative bilaterally Gait: narrow based, steady

## 2024-01-18 NOTE — DISCUSSION/SUMMARY
[FreeTextEntry1] : Ms. Cobb is a 33 year old woman with chronic pain, major depression who presents with several months of involuntary movements of her lower extremities.  She has a non-focal neurological examination other than an absent ankle reflex on the right. A video of her symptoms shows non-continuous lateral movements of the legs and occasional muscle fasciculations/twitches. I advised her that fasciculations in the absence of weakness are generally benign.  The urge to move is suggestive of Restless Legs Syndrome, however, she states that symptoms are not relieved by movements which would be atypical of RLS. I do think that metabolic causes including iron deficiency (ferritin was 9) and vitamin B12 deficiency are likely causes.  She has had recent improvement in her symptoms which likely correlates with iron infusions and vitamin B12 injectiosn. -Continue iron and B12 supplementation. Goal for ferritin of > 50 and vitamin B12 > 400. -Polypharmacy, particularly with antidepressants and dopamine blocking medications may also be contributing.  -If ferritin and vitamin B12 levels normalize and symptoms persist, can consider a decrease in Latuda if safe from a psychiatric perspective. -She is also weaning off Suboxone. Opiates may help with involuntary movements; it is possible that suboxone was masking these movements to some extent. -Can continue gabapentin -Advise staying well hydrated, avoiding excess caffeine use. -Advise light exercise, stretching. -If symptoms do not improve, can get MRI brain.

## 2024-01-25 ENCOUNTER — APPOINTMENT (OUTPATIENT)
Dept: HEPATOLOGY | Facility: CLINIC | Age: 34
End: 2024-01-25
Payer: COMMERCIAL

## 2024-01-25 VITALS
SYSTOLIC BLOOD PRESSURE: 129 MMHG | TEMPERATURE: 96.3 F | BODY MASS INDEX: 29.03 KG/M2 | DIASTOLIC BLOOD PRESSURE: 81 MMHG | HEIGHT: 67 IN | HEART RATE: 76 BPM | RESPIRATION RATE: 16 BRPM | WEIGHT: 185 LBS | OXYGEN SATURATION: 98 %

## 2024-01-25 DIAGNOSIS — R74.8 ABNORMAL LEVELS OF OTHER SERUM ENZYMES: ICD-10-CM

## 2024-01-25 PROCEDURE — 99214 OFFICE O/P EST MOD 30 MIN: CPT

## 2024-02-01 NOTE — ED BEHAVIORAL HEALTH ASSESSMENT NOTE - HOMICIDALITY / AGGRESSION (CURRENT/PAST)
"Patient reports several falls in the last week.  He reports the first fall last week on Wednesday, seen at  for right leg pain.  He reports no fx and was prescribed oxycodone and lidocaine patches.  He reports little relief with the oxy.  He reports 2 more falls after and one more additionally this morning.  He reports he feels his right leg \"is not working right\".  He reports he did hit his head once with a fall on Friday.  ABCS intact, A&Ox4.     Triage Assessment (Adult)       Row Name 02/01/24 1142          Triage Assessment    Airway WDL WDL        Respiratory WDL    Respiratory WDL WDL        Skin Circulation/Temperature WDL    Skin Circulation/Temperature WDL WDL        Cardiac WDL    Cardiac WDL WDL        Peripheral/Neurovascular WDL    Peripheral Neurovascular WDL WDL        Cognitive/Neuro/Behavioral WDL    Cognitive/Neuro/Behavioral WDL WDL                     " Yes

## 2024-02-03 ENCOUNTER — APPOINTMENT (OUTPATIENT)
Dept: MRI IMAGING | Facility: CLINIC | Age: 34
End: 2024-02-03

## 2024-02-07 ENCOUNTER — APPOINTMENT (OUTPATIENT)
Dept: NEPHROLOGY | Facility: CLINIC | Age: 34
End: 2024-02-07
Payer: COMMERCIAL

## 2024-02-07 VITALS
DIASTOLIC BLOOD PRESSURE: 68 MMHG | OXYGEN SATURATION: 99 % | BODY MASS INDEX: 29.35 KG/M2 | SYSTOLIC BLOOD PRESSURE: 104 MMHG | TEMPERATURE: 96.8 F | HEART RATE: 79 BPM | HEIGHT: 67 IN | WEIGHT: 187 LBS

## 2024-02-07 PROCEDURE — 99205 OFFICE O/P NEW HI 60 MIN: CPT

## 2024-02-07 RX ORDER — CHROMIUM 200 MCG
TABLET ORAL
Refills: 0 | Status: ACTIVE | COMMUNITY

## 2024-02-07 RX ORDER — PNV NO.95/FERROUS FUM/FOLIC AC 28MG-0.8MG
TABLET ORAL
Refills: 0 | Status: ACTIVE | COMMUNITY

## 2024-02-07 NOTE — HISTORY OF PRESENT ILLNESS
[FreeTextEntry1] : here for eval of proteinruria has hx of proteinuria fu with dr kashif gross in past, no bx performed  hx of severe fatty liver and gastric bypass ( thuy en y ) performed on 12/2021 with weight from 260-187 noted with proteinuria with assumed ORG/FSGS on lisinopril  post op with significant weight loss and drop in bp and needing dc of lisinopril, clonidine and amlodipine she was on has transferred her liver transplant care from [facility] to [facility] for her severe fatty liver daily 4 tab of advil  use for her knee and back pain, in past on oxycodone

## 2024-02-07 NOTE — ASSESSMENT
[FreeTextEntry1] : ====================================== # proteinuria  - ? 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

## 2024-02-07 NOTE — PHYSICAL EXAM
[General Appearance - Alert] : alert [General Appearance - In No Acute Distress] : in no acute distress [Outer Ear] : the ears and nose were normal in appearance [Neck Appearance] : the appearance of the neck was normal [] : no respiratory distress [Respiration, Rhythm And Depth] : normal respiratory rhythm and effort [Auscultation Breath Sounds / Voice Sounds] : lungs were clear to auscultation bilaterally [Heart Rate And Rhythm] : heart rate was normal and rhythm regular [Heart Sounds] : normal S1 and S2 [Murmurs] : no murmurs [Edema] : there was no peripheral edema [No CVA Tenderness] : no ~M costovertebral angle tenderness [Oriented To Time, Place, And Person] : oriented to person, place, and time

## 2024-02-09 LAB
APPEARANCE: CLEAR
BACTERIA: NEGATIVE /HPF
BILIRUBIN URINE: NEGATIVE
BLOOD URINE: NEGATIVE
CAST: 0 /LPF
COLOR: NORMAL
CREAT SPEC-SCNC: 118 MG/DL
CREAT/PROT UR: 0.5 RATIO
EPITHELIAL CELLS: 8 /HPF
GLUCOSE QUALITATIVE U: NEGATIVE MG/DL
KETONES URINE: NEGATIVE MG/DL
LEUKOCYTE ESTERASE URINE: NEGATIVE
MICROSCOPIC-UA: NORMAL
NITRITE URINE: NEGATIVE
PH URINE: 6.5
PROT UR-MCNC: 58 MG/DL
PROTEIN URINE: 30 MG/DL
RED BLOOD CELLS URINE: 4 /HPF
SPECIFIC GRAVITY URINE: 1.03
UROBILINOGEN URINE: 1 MG/DL
WHITE BLOOD CELLS URINE: 0 /HPF

## 2024-02-16 NOTE — PROGRESS NOTE BEHAVIORAL HEALTH - [facility] REVIEW OF ED CHART AVAILABLE LABS REVIEWED
Vascular Surgery Progress Note      POD#  3  S/P Right lower extremity Guillotine ampuation (2/13/2024)    Chief Complaint: Postop follow up      SUBJECTIVE:  Has no new complaints. Apologetic for yesterday's behavioiur    OBJECTIVE    Physical  CURRENT VITALS:  /68   Pulse 73   Temp 97.7 °F (36.5 °C) (Oral)   Resp 16   Ht 1.753 m (5' 9\")   Wt 68 kg (150 lb)   SpO2 96%   BMI 22.15 kg/m²   24 HR INTAKE/OUTPUT:    Intake/Output Summary (Last 24 hours) at 2/16/2024 1158  Last data filed at 2/16/2024 0353  Gross per 24 hour   Intake 822 ml   Output 2350 ml   Net -1528 ml       RLE with ace wrap clean, dry and intact  Pain controlled      Data  Lab Results   Component Value Date    WBC 8.1 02/16/2024    HGB 9.3 (L) 02/16/2024    HCT 28.0 (L) 02/16/2024    MCV 91.4 02/16/2024     (H) 02/16/2024     Lab Results   Component Value Date/Time     02/16/2024 06:55 AM    K 4.1 02/16/2024 06:55 AM     02/16/2024 06:55 AM    CO2 23 02/16/2024 06:55 AM    BUN 10 02/16/2024 06:55 AM    CREATININE <0.5 02/16/2024 06:55 AM    GLUCOSE 99 02/16/2024 06:55 AM    CALCIUM 8.0 02/16/2024 06:55 AM    LABGLOM >60 02/16/2024 06:55 AM      Lab Results   Component Value Date    CRP 26.3 (H) 02/16/2024       Current Inpatient Medications  Current Facility-Administered Medications: albuterol sulfate HFA (PROVENTIL;VENTOLIN;PROAIR) 108 (90 Base) MCG/ACT inhaler 2 puff, 2 puff, Inhalation, Q6H PRN  ipratropium 0.5 mg-albuterol 2.5 mg (DUONEB) nebulizer solution 1 Dose, 1 Dose, Inhalation, Q4H PRN  oxyCODONE-acetaminophen (PERCOCET) 7.5-325 MG per tablet 1 tablet, 1 tablet, Oral, Q4H PRN  morphine sulfate (PF) injection 4 mg, 4 mg, IntraVENous, Q4H PRN  sodium chloride flush 0.9 % injection 5-40 mL, 5-40 mL, IntraVENous, 2 times per day  sodium chloride flush 0.9 % injection 5-40 mL, 5-40 mL, IntraVENous, PRN  0.9 % sodium chloride infusion, , IntraVENous, PRN  ondansetron (ZOFRAN-ODT) disintegrating tablet 4 mg, 4 
mg, Oral, Q8H PRN **OR** ondansetron (ZOFRAN) injection 4 mg, 4 mg, IntraVENous, Q6H PRN  polyethylene glycol (GLYCOLAX) packet 17 g, 17 g, Oral, Daily PRN  acetaminophen (TYLENOL) tablet 650 mg, 650 mg, Oral, Q6H PRN **OR** acetaminophen (TYLENOL) suppository 650 mg, 650 mg, Rectal, Q6H PRN  nicotine (NICODERM CQ) 21 MG/24HR 1 patch, 1 patch, TransDERmal, Daily  fluticasone (FLONASE) 50 MCG/ACT nasal spray 1 spray, 1 spray, Nasal, Daily  mometasone-formoterol (DULERA) 100-5 MCG/ACT inhaler 2 puff, 2 puff, Inhalation, BID RT  glucose chewable tablet 16 g, 4 tablet, Oral, PRN  dextrose bolus 10% 125 mL, 125 mL, IntraVENous, PRN **OR** dextrose bolus 10% 250 mL, 250 mL, IntraVENous, PRN  glucagon injection 1 mg, 1 mg, SubCUTAneous, PRN  dextrose 10 % infusion, , IntraVENous, Continuous PRN  insulin lispro (HUMALOG) injection vial 0-4 Units, 0-4 Units, SubCUTAneous, TID WC  insulin lispro (HUMALOG) injection vial 0-4 Units, 0-4 Units, SubCUTAneous, Nightly    ASSESSMENT/PLAN  Severe osteomyelitis and gross infection of right foot with non-salvageable foot    - Doing well POD #3 RLE guillotine amputation    - continue cefepime and Flagyl antibiotics    - keep current dressing in place.     - Right BKA with IPOP on Monday. He has no additional questions and wishes to proceed.          Noemi Wynne, APRN  2/16/2024  11:58 AM           
None available
Yes
Yes

## 2024-03-01 ENCOUNTER — APPOINTMENT (OUTPATIENT)
Dept: OTOLARYNGOLOGY | Facility: CLINIC | Age: 34
End: 2024-03-01
Payer: COMMERCIAL

## 2024-03-01 VITALS — BODY MASS INDEX: 28.25 KG/M2 | HEIGHT: 67 IN | WEIGHT: 180 LBS

## 2024-03-01 DIAGNOSIS — M26.609 UNSPECIFIED TEMPOROMANDIBULAR JOINT DISORDER: ICD-10-CM

## 2024-03-01 DIAGNOSIS — J00 ACUTE NASOPHARYNGITIS [COMMON COLD]: ICD-10-CM

## 2024-03-01 DIAGNOSIS — J34.89 OTHER SPECIFIED DISORDERS OF NOSE AND NASAL SINUSES: ICD-10-CM

## 2024-03-01 DIAGNOSIS — J30.0 VASOMOTOR RHINITIS: ICD-10-CM

## 2024-03-01 DIAGNOSIS — J34.3 HYPERTROPHY OF NASAL TURBINATES: ICD-10-CM

## 2024-03-01 DIAGNOSIS — J31.0 CHRONIC RHINITIS: ICD-10-CM

## 2024-03-01 PROCEDURE — 99204 OFFICE O/P NEW MOD 45 MIN: CPT | Mod: 25

## 2024-03-01 PROCEDURE — 31231 NASAL ENDOSCOPY DX: CPT

## 2024-03-01 NOTE — CONSULT LETTER
[Please see my note below.] : Please see my note below. [FreeTextEntry1] : Dear Dr. GABRIELLA BAY  I had the pleasure of evaluating your patient TIFFANY ANGEL, thank you for allowing us to participate in their care. please see full note detailing our visit below. If you have any questions, please do not hesitate to call me and I would be happy to discuss further.   [place] M.D. Attending Physician,   Department of Otolaryngology - Head and Neck Surgery [facility]  Office: [phone] Fax: [phone]

## 2024-03-01 NOTE — HISTORY OF PRESENT ILLNESS
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 States has tried OTC allergy meds and flunisolide spray which does not help. States it starts after she gets out of bed and leans forward. Thin watery mucus from both nostrils. Really affecting her quality of life. Was tested for allergies when younger but it was negative. Has tried Flonase and azelastine but it has not helped. Denies nausea and sensitives.   Also with bilateral ear pain.  scarring on R TM hx of tubes  with hx of severe TMJ

## 2024-03-01 NOTE — PROCEDURE
[FreeTextEntry6] : Procedure performed: Nasal Endoscopy- Diagnostic Pre-op indication(s): nasal congestion Post-op indication(s): nasal congestion Verbal and/or written consent obtained from patient Anterior rhinoscopy insufficient to account for symptoms Scope #: 3, flexible fiber optic telescope The scope was introduced in the nasal passage between the middle and inferior turbinates to exam the inferior portion of the middle meatus and the fontanelle, as well as the maxillary ostia. Next, the scope was passed medically and posteriorly to the middle turbinates to examine the sphenoethmoid recess and the superior turbinate region.  Upon visualization the finders are as follows: Nasal Septum: mild L DNS Bilateral - Mucosa: boggy turbinates, Mucous: scant, Polyp: not seen, Inferior Turbinate: boggy, Middle Turbinate: normal, Superior Turbinate: normal, Inferior Meatus: narrow, Middle Meatus: narrow, Super Meatus: normal, Sphenoethmoidal Recess: clear mild BITH

## 2024-03-01 NOTE — END OF VISIT
[FreeTextEntry3] : I personally saw and examined the patient in detail. I spoke to ALICJA Jennings regarding the assessment and plan of care.  I preformed the procedures and I reviewed the above assessment and plan of care, and agree. I have made changes in changes in the body of the note where appropriate.

## 2024-03-01 NOTE — ASSESSMENT
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  - Discussed possible triggers and will do trial of Atrovent nasal spray to use as needed to see if will improve symptoms - specimen cup given for possible CSF leak, will call to discuss results  - discussed risks, alternatives of rhinear procedure if symptoms persist  - allergy testing   Also with bilateral ear pain, with hx of TM tube placement and severe TMJ. On exam. scarring on R TM. Patient likely has inflammation of the temporomandibular joint as a cause of their discomfort. I discussed with them the pathophysiology of TMJ disorders and we reviewed treatment options. At this point we will begin with a regiment to decrease inflammation, local muscle spasms and demands on the joint to allow for recovery. We also discussed exercises and stretches to preform to decrease pain and relapse. Reviewed possible further interventions including muscle relaxants and injections. They will let me know if it does not completely resolve. discussed further workup

## 2024-03-15 NOTE — ED STATDOCS - MUSCULOSKELETAL [+], MLM
Christine Matos,   I just saw Dax for hospital follow up after ICD discharge-sustained ventricular tachycardia.  He was restarted on amiodarone, mexiletine and his metoprolol was changed.  He was worried about his upcoming surgery, still feeling tired.  With his recent medication changes I feel it is best postpone the surgery.  Dr. Ayala looping you in to get your thoughts on a chance of progression of this cancer if we wait an additional month or 2?  If agreeable I will have the patient come back in 1 month for a new preoperative visit.
muscle aches

## 2024-03-15 NOTE — ED BEHAVIORAL HEALTH ASSESSMENT NOTE - RISK ASSESSMENT
No mod risk of self harm due to impulsive overdose and poor coping skills and impaired interpersonal relationships at work and losing her job.  Pt in therapy and compliant with meds.

## 2024-03-18 NOTE — ED PROVIDER NOTE - CHIEF COMPLAINT
Detail Level: Zone Detail Level: Simple Detail Level: Detailed Detail Level: Generalized The patient is a 31y Female complaining of vomiting.

## 2024-03-25 ENCOUNTER — RX CHANGE (OUTPATIENT)
Age: 34
End: 2024-03-25

## 2024-03-25 RX ORDER — IPRATROPIUM BROMIDE 21 UG/1
0.03 SPRAY NASAL 3 TIMES DAILY
Qty: 30 | Refills: 1 | Status: DISCONTINUED | COMMUNITY
Start: 2024-03-01 | End: 2024-03-25

## 2024-03-25 RX ORDER — IPRATROPIUM BROMIDE 21 UG/1
0.03 SPRAY NASAL
Qty: 90 | Refills: 1 | Status: ACTIVE | COMMUNITY
Start: 2024-03-25 | End: 1900-01-01

## 2024-04-08 ENCOUNTER — APPOINTMENT (OUTPATIENT)
Dept: ORTHOPEDIC SURGERY | Facility: CLINIC | Age: 34
End: 2024-04-08
Payer: COMMERCIAL

## 2024-04-08 VITALS — BODY MASS INDEX: 28.25 KG/M2 | WEIGHT: 180 LBS | HEIGHT: 67 IN

## 2024-04-08 DIAGNOSIS — M17.12 UNILATERAL PRIMARY OSTEOARTHRITIS, LEFT KNEE: ICD-10-CM

## 2024-04-08 DIAGNOSIS — M25.852 OTHER SPECIFIED JOINT DISORDERS, LEFT HIP: ICD-10-CM

## 2024-04-08 PROCEDURE — 73503 X-RAY EXAM HIP UNI 4/> VIEWS: CPT

## 2024-04-08 PROCEDURE — 99214 OFFICE O/P EST MOD 30 MIN: CPT

## 2024-04-08 NOTE — HISTORY OF PRESENT ILLNESS
[de-identified] : The patient is a 32-year-old female here today for follow-up evaluation of her left knee. She reports she fell down the stairs and her pain worsened. She has done PT and a home exercise program. She has a history of a Slim procedure as well as a meniscal repair on the left side. She notes that her hardware from surgery is palpable. She is also had 1 surgery on her right knee. She has a history of liver issues.  She also suffers from hypoglycemia.

## 2024-04-08 NOTE — DISCUSSION/SUMMARY
[de-identified] : We had a thorough discussion regarding the nature of her pain, the pathophysiology, as well as all treatment options. Considering the patient's current presentation of pain, physical exam, and radiographs an MRI is indicated at this time for the left hip. A prescription for this was given to the patient. We will go over the MRI results with her upon obtaining the results in the office and advise her of further treatment options. She agrees with the above plan and all questions were answered.

## 2024-04-08 NOTE — ADDENDUM
[FreeTextEntry1] : Documented by Jessica Heath acting as a scribe for Dr. Nugent on 04/08/2024. All medical record entries made by the Scribe were at my, Dr. Nugent's, direction and personally dictated by me on 04/08/2024. I have reviewed the chart and agree that the record accurately reflects my personal performance of the history, physical exam, procedure and imaging.

## 2024-04-08 NOTE — CONSULT LETTER
[Dear  ___] : Dear  [unfilled], [Consult Letter:] : I had the pleasure of evaluating your patient, [unfilled]. [Please see my note below.] : Please see my note below. [Sincerely,] : Sincerely, [FreeTextEntry3] : Dr. Emerson Nugent

## 2024-04-08 NOTE — PHYSICAL EXAM
[de-identified] : Physical Exam:  General: Well appearing, no acute distress  Neurologic: A&Ox3, No focal deficits  Head: NCAT without abrasions, lacerations, or ecchymosis to head, face, or scalp  Eyes: No scleral icterus, no gross abnormalities  Respiratory: Equal chest wall expansion bilaterally, no accessory muscle use  Lymphatic: No lymphadenopathy palpated  Skin: Warm and dry  Psychiatric: Normal mood and affect  Left knee incisions are well-healed.  She has tenderness in the lateral joint line as well as over the lateral femoral condyle lateral tibial plateau and LCL. There is minimal medial sided tenderness.  She has range of motion from 3 through 95 degrees with pain.  Her calf and thigh are soft and nontender.  She does not tolerate much more of an exam.  Examination of the Left hip reveals no obvious deformity or leg length discrepancy. There is no swelling noted. The patient is nontender to palpation over the greater trochanter, groin, and IT band. The patients range of motion is to 110 degrees of hip flexion, 40 degrees of abduction, 40 degrees of abduction, 20 degrees of adduction, 15 degrees of internal rotation and 35 degrees of external rotation. The patient has 5/5 strength to resisted hip flexion, abduction and adduction. The patient has a negative JEB and positive FADIR Test. Positive Wong Test. Positive resisted SLR test at 30 degrees of hip flexion ([facility]). Negative Piriformis Test. No SI joint instability. There is no pain with logrolling. The calf and thigh are soft and nontender bilaterally. The patient is grossly neurovascularly intact distally.   [de-identified] : The following radiographs were ordered and read by me during this patient's visit. I reviewed each radiograph in detail with the patient and discussed the findings as highlighted below. 3 views of the pelvis and 2 views of the left hip were obtained today that show no fracture, dislocation. Alpha angle is 61 degrees on the right, 60 degrees on the left. Lateral center-edge angle 45 degrees bilaterally. Anterior center-edge angle 42 degrees on the left. Tonnis grade 0, tonnis angle 3.

## 2024-04-11 ENCOUNTER — APPOINTMENT (OUTPATIENT)
Dept: DERMATOLOGY | Facility: CLINIC | Age: 34
End: 2024-04-11

## 2024-04-12 NOTE — ED BEHAVIORAL HEALTH ASSESSMENT NOTE - RECENT MEMORY
[facility]ensive Care ([facility])  Cardiology  Progress Note    Patient Name: Chucho Clark  MRN: [id number]  Admission Date: 4/2/2024  Hospital Length of Stay: 9 days  Code Status: Full Code   Attending Physician: Rosalina Whatley MD   Primary Care Physician: Ale, Primary Doctor  Expected Discharge Date:   Principal Problem:Acute on chronic congestive heart failure    Subjective:     HPI:  78 y.o. male patient, PMHx: Diastolic HF, HTN, DVT, CAD. Presented to the Emergency Department for evaluation of CP which onset night PTA. Pt visited Dr. Corrales (Cardiology) for the first time on 4/1/24 and was initiated on Eliquis. Pt notes that he also felt some upper abd pain PTA that felt like acid reflux. Pt and son originally thought sxs were cold/flu sxs, but tested negative in the clinic. Symptoms are constant and moderate in severity. No mitigating or exacerbating factors reported. Associated sxs include SOB, palpitations, and increased HR. Patient denies any cough, numbness, HA, fever, n/v/d, and all other sxs at this time. Pt has not had a stress test. In the ED, vitals: 130/69, 121, 22, 97.8F, 95% RA. Significant Labs: BNP: 560, Trop: 0.064, Bili: 1.1. CXR: interstitial edema. EKG: Neg for STEMI. Cardiology consulted in ED. Treated with ASA, Nitro, BB, diuretic. Initiated on Heparin Infusion. Patient is a full code. Placed in observation under the care of Hospital Medicine for management of elevated troponin, ACS rule out.        Cardiology consulted  Hospital Course:   4/3/24-Patient seen and examined today, resting in bed. Feeling better. Less SOB, diuresed well overnight. Still has significant BLE edema. Labs reviewed/stable. EKG with aflutter. TTE with EF of 20-25%, decreased RV function, severe MR. Troponin flat. Discussed ischemic workup possibly tmw pending clinical condition.     4.4.2024  Still in aflutter, swelling significantly improved   In bed, laying comfortably    4/5/24-Patient seen and examined  today, s/p R/LHC yesterday which showed severe triple vessel disease. Underwent RODERICK/DCCV with restoration of SR. Feels ok. Does have some increase in SOB/orthopnea. No CP symptoms. Remains in SR. Reviewed case with patient/CTS, will proceed with repeat LHC today and PCI. LifeVest ordered for SCD prevention.    4.7.2024  Still in aflutter  Discussed with son and patient , sister   Agreeable with dccv    4/8/24-Patient seen and examined today, resting in bed. Family at bedside. Feels ok. SOB stable. No CP. Remains in SR. IABP in place. Labs reviewed. Na 132, creatinine 1.1. Repeat LHC with RCA intervention planned today by Dr. Mayers.    4/9/24-Patient seen and examined today, s/p LHC yesterday with successful PCI of RCA. IABP removed this AM. Patient feels ok, does admit to fatigue. No CP or SOB. Labs reviewed/stable. BP soft, BNP pending. Will likely switch to po Lasix tmw.    4/10/24: Pt seen and examined in ICU this am. No acute issues noted.  No angina/CHF issues.  Labs/chart reviewed. Pt states  he feels ok today.  BP soft.    4/11/24-Patient seen and examined today, sitting up in bedside chair. Ambulated earlier. Complains of back/buttock/left shin pain. No SOB or CP. BP ok, po Lasix initiated. Remains in SR. Labs reviewed.     4/12/24: pt seen and examined in ICU this am. No acute issues noted. Stable CV status. Chart/labs reviewed.  Going to rehab next step.          Review of Systems   Constitutional: Positive for malaise/fatigue.   HENT: Negative.     Eyes: Negative.    Cardiovascular: Negative.    Respiratory: Negative.     Endocrine: Negative.    Hematologic/Lymphatic: Negative.    Skin: Negative.    Musculoskeletal:  Positive for arthritis, back pain, joint pain and stiffness.   Gastrointestinal: Negative.    Genitourinary: Negative.    Neurological:  Positive for weakness.   Psychiatric/Behavioral: Negative.     Allergic/Immunologic: Negative.      Objective:     Vital Signs (Most Recent):  Temp: 98.2 °F  (36.8 °C) (04/12/24 0710)  Pulse: 66 (04/12/24 0800)  Resp: 18 (04/12/24 0853)  BP: 124/65 (04/12/24 0800)  SpO2: 96 % (04/12/24 0800) Vital Signs (24h Range):  Temp:  [98 °F (36.7 °C)-98.2 °F (36.8 °C)] 98.2 °F (36.8 °C)  Pulse:  [61-67] 66  Resp:  [16-30] 18  SpO2:  [93 %-97 %] 96 %  BP: ()/(52-65) 124/65     Weight: 118 kg (260 lb 2.3 oz)  Body mass index is 36.28 kg/m².     SpO2: 96 %         Intake/Output Summary (Last 24 hours) at 4/12/2024 1303  Last data filed at 4/12/2024 0638  Gross per 24 hour   Intake 240 ml   Output 1135 ml   Net -895 ml       Lines/Drains/Airways       Peripherally Inserted Central Catheter Line  Duration             PICC Double Lumen 04/06/24 1325 right basilic 5 days              Drain  Duration                  Urethral Catheter 04/11/24 0030 1 day                       Physical Exam  Vitals and nursing note reviewed.   Constitutional:       Appearance: He is well-developed.   HENT:      Head: Normocephalic.   Neck:      Thyroid: No thyromegaly.      Vascular: No carotid bruit or JVD.   Cardiovascular:      Rate and Rhythm: Normal rate and regular rhythm.      Pulses:           Radial pulses are 2+ on the right side and 2+ on the left side.      Heart sounds: S1 normal and S2 normal. Heart sounds not distant. No midsystolic click and no opening snap. No murmur heard.     No friction rub. No S3 or S4 sounds.   Pulmonary:      Effort: Pulmonary effort is normal.      Breath sounds: Normal breath sounds. No wheezing or rales.   Abdominal:      General: Bowel sounds are normal. There is no distension or abdominal bruit.      Palpations: Abdomen is soft. There is no mass.      Tenderness: There is no abdominal tenderness.   Musculoskeletal:      Cervical back: Neck supple.      Right lower leg: Edema present.      Left lower leg: Edema present.   Skin:     General: Skin is warm.   Neurological:      Mental Status: He is alert and oriented to person, place, and time.   Psychiatric:   "       Behavior: Behavior normal.            Significant Labs: ABG: No results for input(s): "PH", "PCO2", "HCO3", "POCSATURATED", "BE" in the last 48 hours., Blood Culture: No results for input(s): "LABBLOO" in the last 48 hours., BMP:   Recent Labs   Lab 04/11/24 0418 04/12/24  0331   GLU 95 118*   * 130*   K 3.8 4.3   CL 96 96   CO2 30* 29   BUN 26* 24*   CREATININE 0.9 0.8   CALCIUM 8.2* 8.8   MG 2.0 2.2   , CMP   Recent Labs   Lab 04/11/24 0418 04/12/24  0331   * 130*   K 3.8 4.3   CL 96 96   CO2 30* 29   GLU 95 118*   BUN 26* 24*   CREATININE 0.9 0.8   CALCIUM 8.2* 8.8   PROT 5.8*  --    ALBUMIN 2.4*  --    BILITOT 1.2*  --    ALKPHOS 63  --    AST 42*  --    ALT 26  --    ANIONGAP 4* 5*   , CBC   Recent Labs   Lab 04/11/24 0418 04/12/24 0331   WBC 5.25 5.28   HGB 9.9* 9.7*   HCT 29.7* 29.0*    182   , INR No results for input(s): "INR", "PROTIME" in the last 48 hours., Lipid Panel No results for input(s): "CHOL", "HDL", "LDLCALC", "TRIG", "CHOLHDL" in the last 48 hours., and Troponin No results for input(s): "TROPONINI" in the last 48 hours.    Significant Imaging: Echocardiogram: Transthoracic echo (TTE) complete (Cupid Only):   Results for orders placed or performed during the hospital encounter of 04/02/24   Echo   Result Value Ref Range    BSA 2.55 m2    LVOT stroke volume 61.14 cm3    LVIDd 5.50 3.5 - 6.0 cm    LV Systolic Volume 93.48 mL    LV Systolic Volume Index 38.0 mL/m2    LVIDs 4.52 (A) 2.1 - 4.0 cm    LV Diastolic Volume 147.39 mL    LV Diastolic Volume Index 59.91 mL/m2    IVS 1.70 (A) 0.6 - 1.1 cm    LVOT diameter 2.03 cm    LVOT area 3.2 cm2    FS 18 (A) 28 - 44 %    Left Ventricle Relative Wall Thickness 0.49 cm    Posterior Wall 1.35 (A) 0.6 - 1.1 cm    LV mass 382.20 g    LV Mass Index 155 g/m2    MV Peak E Guru 1.29 m/s    MV Peak A Guru 0.49 m/s    TR Max Guru 3.07 m/s    E/A ratio 2.63     E wave deceleration time 154.04 msec    LVOT peak guru 1.01 m/s    Left " Ventricular Outflow Tract Mean Velocity 0.94 cm/s    Left Ventricular Outflow Tract Mean Gradient 3.53 mmHg    TAPSE 2.25 cm    LA size 5.05 cm    Left Atrium Minor Axis 7.53 cm    Left Atrium Major Axis 7.28 cm    RA Major Axis 5.96 cm    AV regurgitation pressure 1/2 time [id number] ms    AR Max Guru 2.83 m/s    AV mean gradient 8 mmHg    AV peak gradient 13 mmHg    Ao peak ugru 1.81 m/s    Ao VTI 33.00 cm    LVOT peak VTI 18.90 cm    AV valve area 1.85 cm²    AV Velocity Ratio 0.56     AV index (prosthetic) 0.57     ZENIA by Velocity Ratio 1.81 cm²    Mr max guru 4.90 m/s    MV mean gradient 4 mmHg    MV peak gradient 12 mmHg    MV stenosis pressure 1/2 time 44.67 ms    MV valve area p 1/2 method 4.93 cm2    MV valve area by continuity eq 1.66 cm2    MV VTI 36.9 cm    TV mean gradient 33 mmHg    Triscuspid Valve Regurgitation Peak Gradient 38 mmHg    Ao root annulus 3.51 cm    STJ 3.32 cm    Ascending aorta 3.26 cm    IVC diameter 2.72 cm    ZLVIDS -4.00     ZLVIDD -8.28     LA Volume Index 69.8 mL/m2    LA volume 171.60 cm3    LA WIDTH 5.4 cm    RA Width 3.8 cm    TV resting pulmonary artery pressure 41 mmHg    RV TB RVSP 6 mmHg    Est. RA pres 3 mmHg    Narrative      Left Ventricle: The left ventricle is severely dilated. Mildly   increased wall thickness. There is moderately reduced systolic function   with a visually estimated ejection fraction of 30 - 35%. There is   diastolic dysfunction.    Right Ventricle: Normal right ventricular cavity size. Wall thickness   is normal. Right ventricle wall motion  is normal. Systolic function is   normal.    Aortic Valve: There is mild aortic regurgitation.    Mitral Valve: There is severe regurgitation.    Pulmonary Artery: The estimated pulmonary artery systolic pressure is   41 mmHg.    IVC/SVC: Normal venous pressure at 3 mmHg.       Assessment and Plan:     STABLE CV STATUS ON CURRENT TX.  CONTINUE CURRENT CV MEDS AND TX.  TO REHAB SOON.  AS BELOW.  F/U CARDS  CLINIC AFTER DISCHARGE.    * Acute on chronic congestive heart failure  Patient is identified as having Diastolic (HFpEF) heart failure that is Acute. CHF is currently uncontrolled due to Pulmonary edema/pleural effusion on CXR. Latest ECHO performed and demonstrates- No results found for this or any previous visit.  . Continue Furosemide and monitor clinical status closely. Monitor on telemetry. Patient is on CHF pathway.  Monitor strict Is&Os and daily weights.  Place on fluid restriction of 2 L. Cardiology has been consulted. Continue to stress to patient importance of self efficacy and  on diet for CHF. Last BNP reviewed- and noted below   Recent Labs   Lab 04/09/24  1335   *     Cw iv lasix     4/3/24  -TTE with EF of 20-25%, moderately reduced RV function, pulmonary HTN, severe MR  -Continue IV diuresis  -Continue BB  -Will add ARB vs Entresto pending creatinine/BP trend  -Strict I's/O's  -Probable R/LHC tmw pending reassessment    4.4.2024  Right and left heart catheterization today.    Lasix held due to drop in pressure yesterday evening.    Discussed risks and benefits with the family, son and daughter at bedside.    4/5/24  -More SOB this AM with cough/orthopnea  -IV Lasix x one dose  -Continue Aldactone, BB    4.6.2024  Continue with balloon pump for today.    We will get a central line.  Discussed with ICU staff.  Check CVP and mixed venous.    Lactic acid normal.    Continue with IV diuresis.     4/8/24  -IABP remains in place  -Continue IV diuresis for now  -Continue BB  -Will optimize regimen further post procedure     4/9/24  -Hold further IV diuresis  -Continue BB  -BNP pending, will attempt to switch to po Lasix and add Entresto tmw    4/11/24  -Stable, po Lasix resumed  -Continue BB  -BP soft, will add ARB vs ARNI if tolerated      Left bundle branch block  We will have him evaluated by EP for possible CRT.        Atrial flutter  -Currently in aflutter HR low 100's  -Contributing to  volume status/cardiomyopathy  -Continue BB  -Start amiodarone drip  -Continue heparin drip  -Will need RODERICK/DCCV post ischemic workup as well as EP evaluation to discuss ablation in future    4.4.2024  RODERICK cardioversion today after heart catheterization.    4/5/24  -Remains in SR s/p RODERICK/DCCV  -Amiodarone switched to po  -Continue BB  -Heparin gtt for now, will need Eliquis upon d/c    4.6.2024  BACK IN A FLUTTER THIS MORNING.  We will resume IV amiodarone.  Continue with p.o..    Add digoxin IV.    Continue with beta-blockers.    Continue with balloon pump for now.    He may need a repeat cardioversion if does not convert back to normal with medications.    Future plan for ablation as well.      4.7.2024  Dccv today   Family and patient agreeable     4/8/24  -Remains in SR s/p DCCV yesterday  -Continue amiodarone, BB, digoxin for now  -Continue heparin gtt, will need OAC prior to d/c    4/9/24  -Continue heparin gtt  -Continue amiodarone, BB      4/11/24  -Remains in SR  -Continue amiodarone, BB  -EP f/u as OP    Orthopnea  -IV Lasix x one dose    Chest pain  Heparin iv   Trend trop   Echo   Aspirin   Will need ischemic work up prior to discharge  [facility]    4/3/24  -Stable, troponin flat  -Continue ASA, BB, statin, heparin gtt  -Probable LHC tmw pending reassessment of volume status    4/5/24  -Multivessel CAD on cath    Elevated troponin  -Flat  -Likely secondary to demand ischemia from new onset atrial flutter and combined CHF  -Continue ASA, BB, heparin gtt, statin  -Ischemic workup with [facility] once better compensated    4/5/24  -s/p LHC which showed triple vessel disease  -Case discussed with CTS and patient/family, repeat LHC today with PCI. Dr. Hernandez explained all risks, benefits, and treatment alternatives. All questions answered. Patient agreeable with proceeding.     4/8/24  -See plan under CAD    Essential hypertension  -Continue BB  -Monitor BP trend    Coronary artery disease involving native coronary  artery of native heart  04/06/2024.    Status post PCI to LAD and circumflex yesterday.    He will eventually need at this point PCI of his RCA  to help improve his mitral valve function    4/8/24  -Stable, CP free  -IABP remains in place  -Repeat [facility] with RCA intervention today by Dr. Mayers  -Continue ASA, statin, Plavix, heparin gtt, Imdur, BB    4/9/24  -s/p LH with successful intervention of RCA  -IABP removed  -Patient denies CP/angina  -Continue ASA, statin, Plavix, heparin gtt, Imdur, BB    4/11/24  -Stable, see above        VTE Risk Mitigation (From admission, onward)           Ordered     apixaban tablet 5 mg  2 times daily         04/09/24 1323     Reason for No Pharmacological VTE Prophylaxis  Once        Comments: Heparin Infusion   Question:  Reasons:  Answer:  Physician Provided (leave comment)    04/02/24 1153     IP VTE HIGH RISK PATIENT  Once         04/02/24 1153     Place sequential compression device  Until discontinued         04/02/24 1153                    Clement Estrella MD  Cardiology  [facility] - Int[facility] Care ([facility])     Normal

## 2024-04-15 ENCOUNTER — NON-APPOINTMENT (OUTPATIENT)
Age: 34
End: 2024-04-15

## 2024-04-15 LAB — BETA-2 TRANSFERRIN: NEGATIVE

## 2024-04-22 NOTE — ED PROVIDER NOTE - GASTROINTESTINAL, MLM
The patient's spouse got on the phone and asked if they would have to ask about the o\Ozempic or if the staff would call to find out for them. Please call to advise.   Abdomen soft, non-tender, no guarding.

## 2024-04-27 ENCOUNTER — APPOINTMENT (OUTPATIENT)
Dept: MRI IMAGING | Facility: CLINIC | Age: 34
End: 2024-04-27
Payer: COMMERCIAL

## 2024-04-27 ENCOUNTER — OUTPATIENT (OUTPATIENT)
Dept: OUTPATIENT SERVICES | Facility: HOSPITAL | Age: 34
LOS: 1 days | End: 2024-04-27
Payer: COMMERCIAL

## 2024-04-27 DIAGNOSIS — Z98.890 OTHER SPECIFIED POSTPROCEDURAL STATES: Chronic | ICD-10-CM

## 2024-04-27 DIAGNOSIS — Z98.84 BARIATRIC SURGERY STATUS: Chronic | ICD-10-CM

## 2024-04-27 DIAGNOSIS — Z90.49 ACQUIRED ABSENCE OF OTHER SPECIFIED PARTS OF DIGESTIVE TRACT: Chronic | ICD-10-CM

## 2024-04-27 DIAGNOSIS — Z90.79 ACQUIRED ABSENCE OF OTHER GENITAL ORGAN(S): Chronic | ICD-10-CM

## 2024-04-27 DIAGNOSIS — M25.852 OTHER SPECIFIED JOINT DISORDERS, LEFT HIP: ICD-10-CM

## 2024-04-27 PROCEDURE — 73721 MRI JNT OF LWR EXTRE W/O DYE: CPT

## 2024-04-27 PROCEDURE — 73721 MRI JNT OF LWR EXTRE W/O DYE: CPT | Mod: 26,LT

## 2024-05-11 NOTE — PATIENT PROFILE BEHAVIORAL HEALTH - NSBHSENSOR_PSY_A_CORE
Recommendation: Remove DASH/TLC diet restriction to liberalize diet & encourage adequate po intake. Order Ensure Plus High Protein twice daily (350 kcal, 20 g protein per serving). Order Magic Cup twice daily (290 kcal, 9 g protein per serving). alert

## 2024-05-13 ENCOUNTER — APPOINTMENT (OUTPATIENT)
Dept: ORTHOPEDIC SURGERY | Facility: CLINIC | Age: 34
End: 2024-05-13
Payer: COMMERCIAL

## 2024-05-13 PROCEDURE — 99442: CPT

## 2024-05-19 NOTE — ED ADULT TRIAGE NOTE - HEIGHT IN INCHES
Refill Decision Note   Gracie Irizarry  is requesting a refill authorization.  Brief Assessment and Rationale for Refill:  Quick Discontinue     Medication Therapy Plan:  Receipt confirmed by pharmacy (5/17/2024  4:03 PM CDT)      Comments:     Note composed:3:19 AM 05/19/2024            7

## 2024-05-25 ENCOUNTER — OFFICE (OUTPATIENT)
Dept: URBAN - METROPOLITAN AREA CLINIC 102 | Facility: CLINIC | Age: 34
Setting detail: OPHTHALMOLOGY
End: 2024-05-25
Payer: COMMERCIAL

## 2024-05-25 DIAGNOSIS — H40.1134: ICD-10-CM

## 2024-05-25 PROCEDURE — 92020 GONIOSCOPY: CPT | Performed by: STUDENT IN AN ORGANIZED HEALTH CARE EDUCATION/TRAINING PROGRAM

## 2024-05-25 PROCEDURE — 92083 EXTENDED VISUAL FIELD XM: CPT | Performed by: STUDENT IN AN ORGANIZED HEALTH CARE EDUCATION/TRAINING PROGRAM

## 2024-05-25 PROCEDURE — 99203 OFFICE O/P NEW LOW 30 MIN: CPT | Performed by: STUDENT IN AN ORGANIZED HEALTH CARE EDUCATION/TRAINING PROGRAM

## 2024-05-25 PROCEDURE — 92133 CPTRZD OPH DX IMG PST SGM ON: CPT | Performed by: STUDENT IN AN ORGANIZED HEALTH CARE EDUCATION/TRAINING PROGRAM

## 2024-05-25 ASSESSMENT — CONFRONTATIONAL VISUAL FIELD TEST (CVF)
OS_FINDINGS: FULL
OD_FINDINGS: FULL

## 2024-06-05 ENCOUNTER — TRANSCRIPTION ENCOUNTER (OUTPATIENT)
Age: 34
End: 2024-06-05

## 2024-06-05 ENCOUNTER — NON-APPOINTMENT (OUTPATIENT)
Age: 34
End: 2024-06-05

## 2024-06-16 ENCOUNTER — NON-APPOINTMENT (OUTPATIENT)
Age: 34
End: 2024-06-16

## 2024-06-17 ENCOUNTER — APPOINTMENT (OUTPATIENT)
Dept: OTOLARYNGOLOGY | Facility: CLINIC | Age: 34
End: 2024-06-17
Payer: COMMERCIAL

## 2024-06-17 VITALS
BODY MASS INDEX: 29.03 KG/M2 | HEART RATE: 76 BPM | WEIGHT: 185 LBS | DIASTOLIC BLOOD PRESSURE: 75 MMHG | SYSTOLIC BLOOD PRESSURE: 115 MMHG | HEIGHT: 67 IN

## 2024-06-17 DIAGNOSIS — K13.70 UNSPECIFIED LESIONS OF ORAL MUCOSA: ICD-10-CM

## 2024-06-17 PROCEDURE — 99213 OFFICE O/P EST LOW 20 MIN: CPT

## 2024-06-17 RX ORDER — LURASIDONE HYDROCHLORIDE 60 MG/1
60 TABLET, FILM COATED ORAL DAILY
Refills: 0 | Status: COMPLETED | COMMUNITY
End: 2024-06-17

## 2024-06-17 NOTE — HISTORY OF PRESENT ILLNESS
[de-identified] : Pt hx of TMJ is referred by Katlyn Pandey for sore all over the mouth on and off for one month. Pt has blood test done for mono and etc wnl. Pt has no sore now but irritation, but trigger by certain food.  Pt saw her dentist and given 2 types of mouth wash no relief.  never smoke.

## 2024-06-17 NOTE — PHYSICAL EXAM
[FreeTextEntry1] : Small area of leukoplakia with what appears to be well-defined superficial ulceration along the left buccal mucosa.  No other lesions in the OC/OPx. [Normal] : no rashes

## 2024-06-19 ENCOUNTER — APPOINTMENT (OUTPATIENT)
Dept: NEPHROLOGY | Facility: CLINIC | Age: 34
End: 2024-06-19
Payer: COMMERCIAL

## 2024-06-19 ENCOUNTER — APPOINTMENT (OUTPATIENT)
Dept: DERMATOLOGY | Facility: CLINIC | Age: 34
End: 2024-06-19

## 2024-06-19 VITALS
SYSTOLIC BLOOD PRESSURE: 112 MMHG | OXYGEN SATURATION: 98 % | HEART RATE: 100 BPM | DIASTOLIC BLOOD PRESSURE: 68 MMHG | BODY MASS INDEX: 28.88 KG/M2 | WEIGHT: 184 LBS | TEMPERATURE: 95.9 F | HEIGHT: 67 IN

## 2024-06-19 DIAGNOSIS — R80.9 PROTEINURIA, UNSPECIFIED: ICD-10-CM

## 2024-06-19 DIAGNOSIS — K75.81 NONALCOHOLIC STEATOHEPATITIS (NASH): ICD-10-CM

## 2024-06-19 PROCEDURE — 99214 OFFICE O/P EST MOD 30 MIN: CPT

## 2024-06-19 RX ORDER — ENALAPRIL MALEATE 2.5 MG/1
2.5 TABLET ORAL DAILY
Qty: 90 | Refills: 1 | Status: ACTIVE | COMMUNITY
Start: 2024-02-07 | End: 1900-01-01

## 2024-06-19 NOTE — PHYSICAL EXAM
[General Appearance - Alert] : alert [General Appearance - In No Acute Distress] : in no acute distress [Outer Ear] : the ears and nose were normal in appearance [Neck Appearance] : the appearance of the neck was normal [] : no respiratory distress [Respiration, Rhythm And Depth] : normal respiratory rhythm and effort [Auscultation Breath Sounds / Voice Sounds] : lungs were clear to auscultation bilaterally [Heart Rate And Rhythm] : heart rate was normal and rhythm regular [Heart Sounds] : normal S1 and S2 [Murmurs] : no murmurs [Edema] : there was no peripheral edema [No CVA Tenderness] : no ~M costovertebral angle tenderness [Involuntary Movements] : no involuntary movements were seen [No Focal Deficits] : no focal deficits [Oriented To Time, Place, And Person] : oriented to person, place, and time

## 2024-06-19 NOTE — HISTORY OF PRESENT ILLNESS
[FreeTextEntry1] : back pain continues  on daily tylenol and advil  weight has stabilized  ================================= CONSULTATION  here for eval of proteinruria has hx of proteinuria fu with dr kashif gross in past, no bx performed  hx of severe fatty liver and gastric bypass ( thuy en y ) performed on 12/2021 with weight from 260-187 noted with proteinuria with assumed ORG/FSGS on lisinopril  post op with significant weight loss and drop in bp and needing dc of lisinopril, clonidine and amlodipine she was on has transferred her liver transplant care from [facility] to [facility] for her severe fatty liver daily 4 tab of advil  use for her knee and back pain, in past on oxycodone

## 2024-06-19 NOTE — ASSESSMENT
[FreeTextEntry1] : ====================================== # proteinuria  - ? 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

## 2024-06-26 ENCOUNTER — APPOINTMENT (OUTPATIENT)
Dept: ULTRASOUND IMAGING | Facility: CLINIC | Age: 34
End: 2024-06-26

## 2024-06-26 ENCOUNTER — OUTPATIENT (OUTPATIENT)
Dept: OUTPATIENT SERVICES | Facility: HOSPITAL | Age: 34
LOS: 1 days | End: 2024-06-26
Payer: COMMERCIAL

## 2024-06-26 DIAGNOSIS — Z98.890 OTHER SPECIFIED POSTPROCEDURAL STATES: Chronic | ICD-10-CM

## 2024-06-26 DIAGNOSIS — K75.81 NONALCOHOLIC STEATOHEPATITIS (NASH): ICD-10-CM

## 2024-06-26 DIAGNOSIS — Z90.79 ACQUIRED ABSENCE OF OTHER GENITAL ORGAN(S): Chronic | ICD-10-CM

## 2024-06-26 DIAGNOSIS — Z98.84 BARIATRIC SURGERY STATUS: Chronic | ICD-10-CM

## 2024-06-26 DIAGNOSIS — Z90.49 ACQUIRED ABSENCE OF OTHER SPECIFIED PARTS OF DIGESTIVE TRACT: Chronic | ICD-10-CM

## 2024-06-26 LAB
APPEARANCE: CLEAR
BACTERIA: NEGATIVE /HPF
BILIRUBIN URINE: NEGATIVE
BLOOD URINE: NEGATIVE
CAST: 0 /LPF
COLOR: YELLOW
CREAT SPEC-SCNC: 99 MG/DL
CREAT/PROT UR: 0.5 RATIO
EPITHELIAL CELLS: 4 /HPF
GLUCOSE QUALITATIVE U: NEGATIVE MG/DL
KETONES URINE: NEGATIVE MG/DL
LEUKOCYTE ESTERASE URINE: NEGATIVE
MICROSCOPIC-UA: NORMAL
NITRITE URINE: NEGATIVE
PH URINE: 6
PROT UR-MCNC: 47 MG/DL
PROTEIN URINE: 30 MG/DL
RED BLOOD CELLS URINE: 2 /HPF
SPECIFIC GRAVITY URINE: 1.03
UROBILINOGEN URINE: 1 MG/DL
WHITE BLOOD CELLS URINE: 0 /HPF

## 2024-06-26 PROCEDURE — 76700 US EXAM ABDOM COMPLETE: CPT | Mod: 26

## 2024-07-01 LAB
AFP-TM SERPL-MCNC: <1.8 NG/ML
ALBUMIN SERPL ELPH-MCNC: 4 G/DL
ALP BLD-CCNC: 168 U/L
ALT SERPL-CCNC: 83 U/L
ANION GAP SERPL CALC-SCNC: 12 MMOL/L
AST SERPL-CCNC: 74 U/L
BASOPHILS # BLD AUTO: 0.04 K/UL
BASOPHILS NFR BLD AUTO: 0.6 %
BILIRUB SERPL-MCNC: 0.3 MG/DL
BUN SERPL-MCNC: 12 MG/DL
CALCIUM SERPL-MCNC: 9.9 MG/DL
CHLORIDE SERPL-SCNC: 105 MMOL/L
CO2 SERPL-SCNC: 24 MMOL/L
CREAT SERPL-MCNC: 0.72 MG/DL
EGFR: 113 ML/MIN/1.73M2
EOSINOPHIL # BLD AUTO: 0.13 K/UL
EOSINOPHIL NFR BLD AUTO: 2 %
GLUCOSE SERPL-MCNC: 74 MG/DL
HCT VFR BLD CALC: 39.1 %
HGB BLD-MCNC: 13.5 G/DL
IMM GRANULOCYTES NFR BLD AUTO: 0.2 %
INR PPP: 0.95 RATIO
LYMPHOCYTES # BLD AUTO: 2.74 K/UL
LYMPHOCYTES NFR BLD AUTO: 41.1 %
MAN DIFF?: NORMAL
MCHC RBC-ENTMCNC: 30 PG
MCHC RBC-ENTMCNC: 34.5 GM/DL
MCV RBC AUTO: 86.9 FL
MONOCYTES # BLD AUTO: 0.4 K/UL
MONOCYTES NFR BLD AUTO: 6 %
NEUTROPHILS # BLD AUTO: 3.34 K/UL
NEUTROPHILS NFR BLD AUTO: 50.1 %
PLATELET # BLD AUTO: 312 K/UL
POTASSIUM SERPL-SCNC: 5.1 MMOL/L
PROT SERPL-MCNC: 7 G/DL
PT BLD: 10.8 SEC
RBC # BLD: 4.5 M/UL
RBC # FLD: 13.2 %
SODIUM SERPL-SCNC: 140 MMOL/L
WBC # FLD AUTO: 6.66 K/UL

## 2024-07-11 ENCOUNTER — APPOINTMENT (OUTPATIENT)
Dept: HEPATOLOGY | Facility: CLINIC | Age: 34
End: 2024-07-11
Payer: COMMERCIAL

## 2024-07-11 VITALS
WEIGHT: 185 LBS | BODY MASS INDEX: 29.03 KG/M2 | DIASTOLIC BLOOD PRESSURE: 77 MMHG | SYSTOLIC BLOOD PRESSURE: 127 MMHG | HEIGHT: 67 IN | OXYGEN SATURATION: 98 % | TEMPERATURE: 97.2 F | HEART RATE: 65 BPM

## 2024-07-11 DIAGNOSIS — R74.8 ABNORMAL LEVELS OF OTHER SERUM ENZYMES: ICD-10-CM

## 2024-07-11 DIAGNOSIS — K75.81 NONALCOHOLIC STEATOHEPATITIS (NASH): ICD-10-CM

## 2024-07-11 PROCEDURE — 76981 USE PARENCHYMA: CPT

## 2024-07-11 PROCEDURE — 99214 OFFICE O/P EST MOD 30 MIN: CPT

## 2024-07-15 ENCOUNTER — TRANSCRIPTION ENCOUNTER (OUTPATIENT)
Age: 34
End: 2024-07-15

## 2024-07-16 ENCOUNTER — TRANSCRIPTION ENCOUNTER (OUTPATIENT)
Age: 34
End: 2024-07-16

## 2024-07-26 ENCOUNTER — RESULT REVIEW (OUTPATIENT)
Age: 34
End: 2024-07-26

## 2024-07-27 NOTE — PROGRESS NOTE BEHAVIORAL HEALTH - ABNORMAL MOVEMENTS
No abnormal movements
Orientation to room/Call light is within reach, educate patient/family on its functionality/Environment clear of unused equipment, furniture's in place, clear of hazards/Document fall prevention teaching and include in plan of care
No abnormal movements

## 2024-07-29 ENCOUNTER — TRANSCRIPTION ENCOUNTER (OUTPATIENT)
Age: 34
End: 2024-07-29

## 2024-07-30 DIAGNOSIS — R10.9 UNSPECIFIED ABDOMINAL PAIN: ICD-10-CM

## 2024-07-31 ENCOUNTER — TRANSCRIPTION ENCOUNTER (OUTPATIENT)
Age: 34
End: 2024-07-31

## 2024-08-01 ENCOUNTER — TRANSCRIPTION ENCOUNTER (OUTPATIENT)
Age: 34
End: 2024-08-01

## 2024-08-02 DIAGNOSIS — L29.9 PRURITUS, UNSPECIFIED: ICD-10-CM

## 2024-08-02 DIAGNOSIS — R74.8 ABNORMAL LEVELS OF OTHER SERUM ENZYMES: ICD-10-CM

## 2024-08-05 ENCOUNTER — TRANSCRIPTION ENCOUNTER (OUTPATIENT)
Age: 34
End: 2024-08-05

## 2024-08-06 LAB
ALBUMIN SERPL ELPH-MCNC: 4.3 G/DL
ALP BLD-CCNC: 185 U/L
ALT SERPL-CCNC: 96 U/L
ANION GAP SERPL CALC-SCNC: 15 MMOL/L
AST SERPL-CCNC: 50 U/L
BASOPHILS # BLD AUTO: 0.07 K/UL
BASOPHILS NFR BLD AUTO: 0.7 %
BILIRUB SERPL-MCNC: 0.4 MG/DL
BUN SERPL-MCNC: 13 MG/DL
CALCIUM SERPL-MCNC: 10.1 MG/DL
CHLORIDE SERPL-SCNC: 107 MMOL/L
CO2 SERPL-SCNC: 24 MMOL/L
CREAT SERPL-MCNC: 0.85 MG/DL
EGFR: 93 ML/MIN/1.73M2
EOSINOPHIL # BLD AUTO: 0.44 K/UL
EOSINOPHIL NFR BLD AUTO: 4.5 %
GLUCOSE SERPL-MCNC: 75 MG/DL
HCT VFR BLD CALC: 39.6 %
HGB BLD-MCNC: 13.7 G/DL
IMM GRANULOCYTES NFR BLD AUTO: 0.2 %
LYMPHOCYTES # BLD AUTO: 2.72 K/UL
LYMPHOCYTES NFR BLD AUTO: 27.8 %
MAN DIFF?: NORMAL
MCHC RBC-ENTMCNC: 29.8 PG
MCHC RBC-ENTMCNC: 34.6 GM/DL
MCV RBC AUTO: 86.3 FL
MONOCYTES # BLD AUTO: 0.57 K/UL
MONOCYTES NFR BLD AUTO: 5.8 %
NEUTROPHILS # BLD AUTO: 5.97 K/UL
NEUTROPHILS NFR BLD AUTO: 61 %
PLATELET # BLD AUTO: 311 K/UL
POTASSIUM SERPL-SCNC: 4.7 MMOL/L
PROT SERPL-MCNC: 7.4 G/DL
RBC # BLD: 4.59 M/UL
RBC # FLD: 12.7 %
SODIUM SERPL-SCNC: 147 MMOL/L
WBC # FLD AUTO: 9.79 K/UL

## 2024-08-07 PROBLEM — Z30.41 ORAL CONTRACEPTIVE PILL SURVEILLANCE: Status: ACTIVE | Noted: 2024-08-07

## 2024-08-09 ENCOUNTER — TRANSCRIPTION ENCOUNTER (OUTPATIENT)
Age: 34
End: 2024-08-09

## 2024-08-10 LAB
BILEACIDS T: 25 UMOL/L
CHENDEOXYCHOLIC ACID: 9.3 UMOL/L
CHOLIC ACID: 9.1 UMOL/L
DEOXYCHOLIC ACID: 6 UMOL/L
URSODEOXYCH: 0.3 UMOL/L

## 2024-08-12 ENCOUNTER — TRANSCRIPTION ENCOUNTER (OUTPATIENT)
Age: 34
End: 2024-08-12

## 2024-08-13 ENCOUNTER — TRANSCRIPTION ENCOUNTER (OUTPATIENT)
Age: 34
End: 2024-08-13

## 2024-08-14 ENCOUNTER — TRANSCRIPTION ENCOUNTER (OUTPATIENT)
Age: 34
End: 2024-08-14

## 2024-08-14 DIAGNOSIS — K75.81 NONALCOHOLIC STEATOHEPATITIS (NASH): ICD-10-CM

## 2024-08-15 ENCOUNTER — APPOINTMENT (OUTPATIENT)
Dept: DERMATOLOGY | Facility: CLINIC | Age: 34
End: 2024-08-15
Payer: COMMERCIAL

## 2024-08-15 ENCOUNTER — NON-APPOINTMENT (OUTPATIENT)
Age: 34
End: 2024-08-15

## 2024-08-15 DIAGNOSIS — L82.1 OTHER SEBORRHEIC KERATOSIS: ICD-10-CM

## 2024-08-15 DIAGNOSIS — L65.8 OTHER SPECIFIED NONSCARRING HAIR LOSS: ICD-10-CM

## 2024-08-15 DIAGNOSIS — Z12.83 ENCOUNTER FOR SCREENING FOR MALIGNANT NEOPLASM OF SKIN: ICD-10-CM

## 2024-08-15 DIAGNOSIS — L50.3 DERMATOGRAPHIC URTICARIA: ICD-10-CM

## 2024-08-15 DIAGNOSIS — D22.9 MELANOCYTIC NEVI, UNSPECIFIED: ICD-10-CM

## 2024-08-15 DIAGNOSIS — L91.8 OTHER HYPERTROPHIC DISORDERS OF THE SKIN: ICD-10-CM

## 2024-08-15 PROCEDURE — 99204 OFFICE O/P NEW MOD 45 MIN: CPT

## 2024-08-15 NOTE — PHYSICAL EXAM
[Alert] : alert [Oriented x 3] : ~L oriented x 3 [Well Nourished] : well nourished [Conjunctiva Non-injected] : conjunctiva non-injected [No Visual Lymphadenopathy] : no visual  lymphadenopathy [No Clubbing] : no clubbing [No Edema] : no edema [No Bromhidrosis] : no bromhidrosis [No Chromhidrosis] : no chromhidrosis [Full Body Skin Exam Performed] : performed [FreeTextEntry3] : General: Alert and oriented, in NAD.  All of the following were examined and were within normal limits, except as noted:   Scalp: Face, including eyelids, nose, lips, ears, oropharynx: Neck: Chest/Back/Abdomen: Bilateral Arms/Hands: Bilateral Legs/Feet: Buttocks, Genitalia, Anus/perineum:  	 Hair, Nails, Oral Mucosa, Eyes:   - Nails not examined due to polish - yellow papules under bl eyelids - signific reduced hair density on crown of scalp - stuckon waxy brown papule L cheek - brown homogenous macules and papules on scalp, face, body - +inducible dermatographism

## 2024-08-15 NOTE — ASSESSMENT
[FreeTextEntry1] : #Pattern Alopecia, moderate to severe - chronic; flaring - Diagnosis and treatment options discussed with the patient. Pt defers oral medication - Start men's or women's 5% Minoxidil foam and apply 4-5 drops to the top of the scalp BID. SED  #Dermatographism/pruritus - new dx of uncertain prognosis - Diagnosis, chronic nature, disease course, treatment options and goals of therapy discussed - Start Allegra 180mg once daily; increase to BID PRN  #Sebaceous hyperplasia, face - Benign condition discussed with patient - No further treatment indicated at this time  #Seborrheic Keratosis - These growths are benign - Related to genetics - these lesions run in families; NOT related to sun exposure - No treatment warranted unless inflamed; can use OTC Sarna lotion PRN itch  #Skin tags - Benign - Cosmetic fee discussed for removal  #Multiple benign nevi  #Screening exam for skin cancer - no suspicious lesions on exam today - TBSE performed today - Advised sun protection. Recommended OTC sunscreen products (EltaMD/Neutrogena/La Roche Posay), including SPF30+ with broadband UV protection as well as proper use. Discussed OTC sun protective clothing - Counseled patient to monitor for changes, including mole monitoring and self-skin exams

## 2024-08-15 NOTE — HISTORY OF PRESENT ILLNESS
[FreeTextEntry1] : spots [de-identified] : Ms. TIFFANY ANGEL is a 33 year old F with PCOS (on OCP) and fibromyalgia, also with autoimmune condition (followed by Rheum, no meds), NAFLD here for evaluation of below   #Hairloss, x10 years worse in last 3 years after gastric bypass surgery. on OCPs for PCOS Prev did rogaine but stopped due to greasy scalp. Wears wigs  #Itching all over, no rash. x8 months.  Has hx of liver disease, was noted to have elevated bile. Doesn't take antihistamines regularly. Using topical benadryl cream.    #FBSE.  Spots scattered on body x years. Asymptomatic and unchanged. No alleviating/aggravating factors. Never been treated.    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

## 2024-08-19 ENCOUNTER — TRANSCRIPTION ENCOUNTER (OUTPATIENT)
Age: 34
End: 2024-08-19

## 2024-08-28 NOTE — ED STATDOCS - CARDIAC, MLM
Outreach attempts to coordinate scheduling on the patient's Service to Behavioral Health order in workqueue [id number] requested on 8/28/2024 have been conducted.    The order has been removed from the workqueue but is still valid for one year from date the order was placed. No additional orders will need to be placed for this request. Patient may call Central Scheduling at [phone] and we would be happy to assist them.    
normal rate, regular rhythm, and no murmur.

## 2024-09-04 ENCOUNTER — TRANSCRIPTION ENCOUNTER (OUTPATIENT)
Age: 34
End: 2024-09-04

## 2024-09-04 RX ORDER — COLESTIPOL HYDROCHLORIDE 1 G/1
1 TABLET, FILM COATED ORAL
Qty: 180 | Refills: 3 | Status: ACTIVE | COMMUNITY
Start: 2024-09-04 | End: 1900-01-01

## 2024-09-09 ENCOUNTER — TRANSCRIPTION ENCOUNTER (OUTPATIENT)
Age: 34
End: 2024-09-09

## 2024-09-11 ENCOUNTER — APPOINTMENT (OUTPATIENT)
Dept: HEPATOLOGY | Facility: CLINIC | Age: 34
End: 2024-09-11

## 2024-09-11 PROCEDURE — 99214 OFFICE O/P EST MOD 30 MIN: CPT

## 2024-09-11 PROCEDURE — G2211 COMPLEX E/M VISIT ADD ON: CPT

## 2024-09-11 NOTE — REASON FOR VISIT
[Home] : at home, [unfilled] , at the time of the visit. [Medical Office: ([facility])___] : at the medical office located in  [Patient] : the patient [Self] : self [Follow-Up: _____] : a [unfilled] follow-up visit

## 2024-09-11 NOTE — HISTORY OF PRESENT ILLNESS
[de-identified] : Ms. Cobb is a 32 yo F with a history of obesity (s/p Munir-en-Y gastric bypass in 12/2021 with subsequent intentional weight loss of ~80 lbs and now with BMI in overweight range), hypertension, dyslipidemia, PCOS (previously on metformin for "insulin resistance" but with most recent HbA1c within the normal range, also with a history of ovarian cystectomy and right salpingectomy), dysmenorrhea (on OCP), GERD, ADHD, anxiety, depression (with a history of a suicide attempt in 4/2022 involving acetaminophen and clonidine overdose), history of GRACIA (opioids, with a history of an overdose necessitating Narcan in 1/2022), history of left knee surgeries and osteoarthritis, chronic back pain (with history of lumbar laminectomy in 6/2021), history of adenomatous colon polyp (on colonoscopy on 7/14/20, with no polyps on more recent colonoscopy in 2023), and biopsy-proven metabolic dysfunction-associated steatohepatitis (MASH). She has undergone two prior liver biopsies: (1) an intra-operative liver biopsy done at the time of her cholecystectomy (8/5/16) that showed mild steatosis (20-30%), grade 1 steatohepatitis, and stage 1 fibrosis with focal perisinusoidal and pericellular fibrosis (re-reviewed at [facility] and felt to show stage 2 fibrosis); and (2) a liver biopsy (7/28/20) that showed steatohepatitis (grade 3) with bridging fibrosis (stage 3-4).  PCP: Dr. Katlyn Diez GI: Dr. Alcira Valdez -> Dr. Adriano Alarcon Hepatology (prior): Dr. Phil Lobato ([facility])  MRI abdomen with and without contrast (12/3/23): No definite morphologic evidence of cirrhosis. Mild steatosis. No focal hepatic lesion. Prior gastric bypass and cholecystectomy. Normal size spleen. No ascites. Patent hepatic vasculature.  FibroScan (7/11/24): Median liver stiffness 7.4 kPa (consistent with F0-1 fibrosis) and CAP score 270 dB/m (consistent with S0-1 steatosis).  She was last seen by me on 1/25/24 and is here today for follow-up and FibroScan (above) as MR elastography was not approved by her insurance. She reports feeling well overall. Her pruritus comes and goes but she is not having any currently. No recent changes in medications apart from that that Latuda was discontinued in 3/2024. No herbal or dietary supplements apart from her ongoing use of vitamins B12, D, and E.  She has no known family history of liver disease.  She is an RN, currently working as a private duty nurse. She does not drink alcohol (including prior to her liver biopsies). She has a past history of GRACIA (opioids), now on Suboxone. No recreational drug use.

## 2024-09-11 NOTE — PHYSICAL EXAM
[TextEntry] : Constitutional: Well-developed, well-nourished, healthy appearing, in no acute distress. HEENT: No bitemporal wasting. Sclera anicteric. Respiratory: Unlabored breathing on room air. Skin: No jaundice or rash on visualized skin. Neurologic: Alert, conversant with appropriate affect. VITAL SIGNS: Last 24 Hours  T(C): 36.5 (22 Dec 2022 22:04), Max: 36.5 (22 Dec 2022 22:04)  T(F): 97.7 (22 Dec 2022 22:04), Max: 97.7 (22 Dec 2022 22:04)  HR: 93 (23 Dec 2022 00:24) (93 - 150)  BP: 135/79 (23 Dec 2022 00:24) (77/57 - 135/79)  BP(mean): --  RR: 20 (22 Dec 2022 22:04) (20 - 20)  SpO2: 99% (22 Dec 2022 22:04) (99% - 99%)    LABS:                        12.7   7.53  )-----------( 126      ( 22 Dec 2022 22:58 )             40.3     12-22    141  |  104  |  23<H>  ----------------------------<  123<H>  4.9   |  22  |  1.1    Ca    9.2      22 Dec 2022 22:58  Mg     1.9     12-22    TPro  6.9  /  Alb  4.1  /  TBili  0.4  /  DBili  x   /  AST  28  /  ALT  21  /  AlkPhos  161<H>  12-22          Troponin T, Serum: <0.01 ng/mL (12-22-22 @ 22:58)      CARDIAC MARKERS ( 22 Dec 2022 22:58 )  x     / <0.01 ng/mL / x     / x     / x          RADIOLOGY:

## 2024-09-11 NOTE — ASSESSMENT
[FreeTextEntry1] : # Elevated liver enzymes and biopsy-proven metabolic dysfunction-associated steatohepatitis (MASH): - Prior laboratory work-up was notable for positive CISCO and ASMA, but there was no definite evidence of autoimmune hepatitis (AIH) on her prior liver biopsies (2016 or 2020). There has also been some concern for prior idiosyncratic drug-induced liver injury (DILI) based on her liver enzyme elevations' degree and trends, but also not confirmed by biopsy. Based on her last liver biopsy (7/28/20), she had MASH with stage 3 fibrosis that progressed from stage 1-2 in 2016, though we need to take into account potential sampling variability. - Since the second biopsy, she has had significant intentional weight loss after Munir-en-Y gastric bypass surgery and this may have led to some interval regression of MASH. MR elastography was previously ordered but was denied by her insurance. Recent MRI (12/3/23) showed non-cirrhotic liver morphology and mild steatosis. FibroScan today (7/11/24) suggested F0-1 fibrosis and mild if any hepatic steatosis but may be an underestimate of her true degree of fibrosis based on the prior biopsy results from 2020. - Today, we discussed obtaining a repeat percutaneous liver biopsy for re-staging of fibrosis. The risks, benefits, and alternatives of the procedure were discussed with the patient in detail. Risks include bleeding, infection, damage to adjacent structures (such as the bowel, lung, or kidney), adverse reaction to sedation (if given), and inadequate tissue specimen precluding a histologic diagnosis. All questions were answered. The patient expressed understanding of these risks and is agreeable to proceed. - If her repeat liver biopsy confirms MASH with stage 2-3 fibrosis, we will start resmetirom. The potential benefits, risks, and side effects were discussed today and she is amenable with starting treatment pending her biopsy results. If she is starting treatment after the biopsy, we will plan for earlier laboratory monitoring (at 2 weeks, 1 month, 2 months, 3 months, and 6 months after starting treatment). - Meanwhile, we will also continue vitamin E 800 IU po daily. - Given prior concern for advanced hepatic fibrosis, we will also continue q6 month screening for hepatocellular carcinoma (HCC) for now, with no suspicious hepatic lesion on last US (6/26/24) and repeat US and AFP due in 12/2024, to be ordered at her next visit. - She has no history of overt hepatic decompensations (i.e., no history of jaundice, variceal hemorrhage, ascites, or hepatic encephalopathy) and, based on her most recent labs, she has preserved liver synthetic function and normal platelet count. She also did not have any reported varices on EGD done in early 2023 and would not be expected to be at risk of variceal hemorrhage based on Baveno consensus criteria. - We have discussed the diagnosis of MASH at length. I reviewed the natural history, evaluation and staging of the disease, potential need for repeat liver biopsy, and prognosis, including possible risks of development of compensated cirrhosis, decompensated cirrhosis, and hepatocellular carcinoma (HCC) as well as increased risks of diabetes mellitus, chronic kidney disease, and cardiovascular disease. - We discussed that MASH is potentially reversible. We discussed that lifestyle modifications are crucial for management of MASH. I recommended continued gradual weight loss to achieve and maintain a healthy BMI. I recommended coffee consumption (up to 3-4 cups/day if desired), adherence to a Mediterranean style diet with increased consumption of vegetables and lean proteins, avoidance of red meat and high fructose corn syrup, and avoidance of calorie-containing beverages. I recommended moderate intensity exercise for a minimum of 20-30 minutes at least 3 times per week. These changes have been shown to lead to regression or even resolution of steatosis, inflammation, and even fibrosis in some patients.  # Pruritus: Improved and unlikely to be related to her very mild bile acid level elevation. She was intolerant of cholestyramine. If pruritus recurs, she should follow up with an allergist and/or dermatologist.  # Health maintenance: - HAV and HBV immune. - Menorrhagia and iron deficiency: She was encouraged to continue follow up with her gynecologist and hematologist. She has previously received IV iron. - Last colonoscopy (7/14/20) with a tubular adenoma, with plan for repeat in 5 years (7/2025). - Ms. ANGEL was counseled to: abstain from alcohol; avoid use of herbal and dietary supplements due to potential hepatotoxicity; and limit use of acetaminophen to <2 grams per day (preferably no more than 500 mg po q6h as needed).  Next follow-up: 3 months

## 2024-09-20 NOTE — DISCHARGE NOTE BEHAVIORAL HEALTH - NS MD DC FALL RISK RISK
-Continue eating your regular diet as tolerated and drinking plenty of fluids.   -For pain, you may take over-the-counter Tylenol and/or NSAIDs (such as Motrin/Advil) as labeled, as needed. For breakthrough pain you may take Oxycodone. Please take Colace 1 tab twice daily while taking narcotic pain medication to avoid constipation.  -Please take all home medications as prescribed unless advised not to take a particular medication.  -Please take all new medications as prescribed.  -No heavy lifting >20 pounds or strenuous exercise.  -You may remove your bandages 48 hours after surgery. Please keep wounds clean and dry.   -Do not remove any Steri-strips if you have them in place; they will fall off on their own after a few showers.  -You may shower but NO baths and NO swimming. Keep your incisions clean & dry. Avoid a direct stream of water over incision sites. Do not scrub. Pat dry when done.  -Contact your doctor or go to the ER for fever > 101.5, chills, nausea, vomiting, chest pain, shortness of breath, pain not controlled by medication or excessive bleeding. 
For information on Fall & Injury Prevention, visit [email or web address]

## 2024-09-25 ENCOUNTER — TRANSCRIPTION ENCOUNTER (OUTPATIENT)
Age: 34
End: 2024-09-25

## 2024-09-27 NOTE — ED PROVIDER NOTE - NEUROLOGICAL PRONATOR DRIFT
Care Management    09/27: CHW met with pt and patient's daughter at bedside, introduced self and explained the role of a Community Health Worker. CHW reviewed the Medicare Outpatient Observation Notice (MOON) and answered any questions and concerns the pt had. CHW encouraged patient and family to call to follow up with their insurance to receive the most accurate information. Patient signed GARIBAY form and received a copy. CHW faxed to [phone] and placed the original form in pt's chart.        Soni MCCALL  ICU & ED/OBS  Phone: [phone]  Available via [employer]    none

## 2024-09-30 ENCOUNTER — TRANSCRIPTION ENCOUNTER (OUTPATIENT)
Age: 34
End: 2024-09-30

## 2024-10-01 NOTE — PATIENT PROFILE BEHAVIORAL HEALTH - SW.
October 1, 2024     Patient: Prabhu Arambula   YOB: 2013   Date of Visit: 10/1/2024       To Whom It May Concern:    Prabhu Arambula was seen in my clinic on 10/1/2024 at 9:20 am. Please excuse Prabhu for his absence from school on this day to make the appointment.    If you have any questions or concerns, please don't hesitate to call.         Sincerely,         Rafat Blakely MD        CC: No Recipients  
social work

## 2024-10-03 ENCOUNTER — RX ONLY (RX ONLY)
Age: 34
End: 2024-10-03

## 2024-10-03 ENCOUNTER — OFFICE (OUTPATIENT)
Dept: URBAN - METROPOLITAN AREA CLINIC 12 | Facility: CLINIC | Age: 34
Setting detail: OPHTHALMOLOGY
End: 2024-10-03
Payer: COMMERCIAL

## 2024-10-03 DIAGNOSIS — H40.002: ICD-10-CM

## 2024-10-03 DIAGNOSIS — H40.1111: ICD-10-CM

## 2024-10-03 PROCEDURE — 92250 FUNDUS PHOTOGRAPHY W/I&R: CPT | Performed by: STUDENT IN AN ORGANIZED HEALTH CARE EDUCATION/TRAINING PROGRAM

## 2024-10-03 PROCEDURE — 92014 COMPRE OPH EXAM EST PT 1/>: CPT | Performed by: STUDENT IN AN ORGANIZED HEALTH CARE EDUCATION/TRAINING PROGRAM

## 2024-10-03 PROCEDURE — 76514 ECHO EXAM OF EYE THICKNESS: CPT | Performed by: STUDENT IN AN ORGANIZED HEALTH CARE EDUCATION/TRAINING PROGRAM

## 2024-10-03 ASSESSMENT — REFRACTION_CURRENTRX
OS_OVR_VA: 20/
OD_VPRISM_DIRECTION: SV
OD_AXIS: 003
OD_SPHERE: -1.75
OD_OVR_VA: 20/
OS_VPRISM_DIRECTION: SV
OD_CYLINDER: -4.50
OS_SPHERE: -2.50
OS_AXIS: 169
OS_CYLINDER: -2.00

## 2024-10-03 ASSESSMENT — VISUAL ACUITY
OD_BCVA: 20/20-2
OS_BCVA: 20/20-1

## 2024-10-03 ASSESSMENT — PACHYMETRY
OS_CT_UM: 599
OS_CT_CORRECTION: -4
OD_CT_UM: 590
OD_CT_CORRECTION: -4

## 2024-10-03 ASSESSMENT — KERATOMETRY
OS_K2POWER_DIOPTERS: 47.75
OS_K1POWER_DIOPTERS: 45.75
OD_K2POWER_DIOPTERS: 48.25
OD_AXISANGLE_DEGREES: 104
OD_K1POWER_DIOPTERS: 45.50
OS_AXISANGLE_DEGREES: 081

## 2024-10-03 ASSESSMENT — CONFRONTATIONAL VISUAL FIELD TEST (CVF)
OD_FINDINGS: FULL
OS_FINDINGS: FULL

## 2024-10-03 ASSESSMENT — REFRACTION_AUTOREFRACTION
OD_AXIS: 020
OS_CYLINDER: -1.50
OD_SPHERE: -1.00
OS_SPHERE: -1.50
OD_CYLINDER: -2.50
OS_AXIS: 175

## 2024-10-03 ASSESSMENT — TONOMETRY
OS_IOP_MMHG: 21
OD_IOP_MMHG: 20

## 2024-10-11 ENCOUNTER — TRANSCRIPTION ENCOUNTER (OUTPATIENT)
Age: 34
End: 2024-10-11

## 2024-10-14 ENCOUNTER — TRANSCRIPTION ENCOUNTER (OUTPATIENT)
Age: 34
End: 2024-10-14

## 2024-10-17 ENCOUNTER — TRANSCRIPTION ENCOUNTER (OUTPATIENT)
Age: 34
End: 2024-10-17

## 2024-10-24 ENCOUNTER — APPOINTMENT (OUTPATIENT)
Dept: HEPATOLOGY | Facility: CLINIC | Age: 34
End: 2024-10-24

## 2024-11-02 ENCOUNTER — OFFICE (OUTPATIENT)
Dept: URBAN - METROPOLITAN AREA CLINIC 102 | Facility: CLINIC | Age: 34
Setting detail: OPHTHALMOLOGY
End: 2024-11-02
Payer: COMMERCIAL

## 2024-11-02 DIAGNOSIS — H40.1111: ICD-10-CM

## 2024-11-02 PROBLEM — H40.002 GLAUCOMA SUSPECT; LEFT EYE: Status: ACTIVE | Noted: 2024-10-03

## 2024-11-02 PROCEDURE — 65855 TRABECULOPLASTY LASER SURG: CPT | Mod: RT | Performed by: STUDENT IN AN ORGANIZED HEALTH CARE EDUCATION/TRAINING PROGRAM

## 2024-11-02 ASSESSMENT — REFRACTION_CURRENTRX
OS_AXIS: 169
OD_VPRISM_DIRECTION: SV
OS_SPHERE: -2.50
OS_VPRISM_DIRECTION: SV
OS_OVR_VA: 20/
OD_CYLINDER: -4.50
OD_OVR_VA: 20/
OD_AXIS: 003
OD_SPHERE: -1.75
OS_CYLINDER: -2.00

## 2024-11-02 ASSESSMENT — KERATOMETRY
OD_K1POWER_DIOPTERS: 45.50
OD_AXISANGLE_DEGREES: 104
OD_K2POWER_DIOPTERS: 47.75
OS_K2POWER_DIOPTERS: 47.50
OS_AXISANGLE_DEGREES: 083
OS_K1POWER_DIOPTERS: 46.00

## 2024-11-02 ASSESSMENT — REFRACTION_AUTOREFRACTION
OD_CYLINDER: -2.25
OS_AXIS: 175
OD_AXIS: 017
OS_CYLINDER: -1.00
OS_SPHERE: -1.75
OD_SPHERE: -1.25

## 2024-11-02 ASSESSMENT — VISUAL ACUITY
OD_BCVA: 20/20-1
OS_BCVA: 20/20

## 2024-11-13 ENCOUNTER — TRANSCRIPTION ENCOUNTER (OUTPATIENT)
Age: 34
End: 2024-11-13

## 2024-11-13 ENCOUNTER — APPOINTMENT (OUTPATIENT)
Age: 34
End: 2024-11-13

## 2024-11-16 ENCOUNTER — OFFICE (OUTPATIENT)
Dept: URBAN - METROPOLITAN AREA CLINIC 102 | Facility: CLINIC | Age: 34
Setting detail: OPHTHALMOLOGY
End: 2024-11-16
Payer: COMMERCIAL

## 2024-11-16 DIAGNOSIS — H40.002: ICD-10-CM

## 2024-11-16 PROCEDURE — 65855 TRABECULOPLASTY LASER SURG: CPT | Mod: LT | Performed by: STUDENT IN AN ORGANIZED HEALTH CARE EDUCATION/TRAINING PROGRAM

## 2024-11-16 ASSESSMENT — REFRACTION_AUTOREFRACTION
OD_CYLINDER: -2.50
OD_SPHERE: -1.25
OS_SPHERE: -1.75
OS_AXIS: 178
OD_AXIS: 19
OS_CYLINDER: -1.50

## 2024-11-16 ASSESSMENT — REFRACTION_CURRENTRX
OS_SPHERE: -2.50
OS_AXIS: 169
OD_VPRISM_DIRECTION: SV
OS_CYLINDER: -2.00
OD_SPHERE: -1.75
OD_OVR_VA: 20/
OS_OVR_VA: 20/
OS_VPRISM_DIRECTION: SV
OD_AXIS: 003
OD_CYLINDER: -4.50

## 2024-11-16 ASSESSMENT — KERATOMETRY
OD_K1POWER_DIOPTERS: 45.75
OD_AXISANGLE_DEGREES: 103
OS_K2POWER_DIOPTERS: 48.00
OS_AXISANGLE_DEGREES: 82
OS_K1POWER_DIOPTERS: 46.00
OD_K2POWER_DIOPTERS: 48.25

## 2024-11-16 ASSESSMENT — VISUAL ACUITY
OS_BCVA: 20/20
OD_BCVA: 20/20

## 2024-11-18 NOTE — ED STATDOCS - CARE PLAN
PAST MEDICAL HISTORY:  Anxiety and depression     CAD (coronary artery disease)     COVID-19     GERD (gastroesophageal reflux disease)     Hyperlipidemia     Hypertension      Principal Discharge DX:	Upper abdominal pain  Secondary Diagnosis:	Nausea and vomiting, intractability of vomiting not specified, unspecified vomiting type  Secondary Diagnosis:	Gastritis, presence of bleeding unspecified, unspecified chronicity, unspecified gastritis type  Secondary Diagnosis:	Hiatal hernia with GERD

## 2024-11-26 NOTE — ED BEHAVIORAL HEALTH ASSESSMENT NOTE - PERCEPTIONS
Shakila Jones (: 1967) is a 56 y.o. female, here for evaluation of the following chief complaint(s):  Labs Only (Lab review)       ASSESSMENT/PLAN:  Below is the assessment and plan developed based on review of pertinent history, physical exam, labs, studies, and medications.    1. Prediabetes  Assessment & Plan:   Patient does not want to be started on any medications.    BMI is out of normal parameters and plan is as follows: Discussed in great detail on diet, portion control, exercise, avoiding foods high in sugar, carbs and starches, fatty/greasy foods and to eat until satisfied not til full. I have counseled this patient on diet and exercise regimens as well.      Orders:  -     Hemoglobin A1C; Future  2. Hyperlipidemia, unspecified hyperlipidemia type  Assessment & Plan:   Patient does not want to be started on any medications.  Patient would be watching her diet, reducing fatty/fried foods and increasing exercise.  Orders:  -     Lipid Panel; Future  3. Vitamin D deficiency  -     vitamin D (ERGOCALCIFEROL) 1.25 MG (72593 UT) CAPS capsule; Take 1 capsule by mouth once a week, Disp-12 capsule, R-1Normal  -     Vitamin D 25 Hydroxy; Future  4. Seizure (HCC)  Assessment & Plan:   Patient to follow-up with neurology.  Orders:  -     [facility] - Al Mixon MD, Neurology, [place] ([facility])  -     phenytoin (DILANTIN) 100 MG ER capsule; Take 4 capsules by mouth daily, Disp-120 capsule, R-2Normal  5. Anxiety  -     Amb External Referral To Psychiatry      Return in about 3 months (around 2025) for labs 1 week before next OV, follow up on chronic conditions.     SUBJECTIVE/OBJECTIVE:  HPI  Patient is doing fine.  No chest pain or palpitations or shortness of breath.  No GI/ symptoms.     Patient has history of seizures.  Patient to follow-up with neurology.  Referral in place.    Patient also complains of anxiety.  Patient requesting referral for behavioral health.  No suicidal  No abnormalities

## 2024-12-04 ENCOUNTER — APPOINTMENT (OUTPATIENT)
Dept: HEPATOLOGY | Facility: CLINIC | Age: 34
End: 2024-12-04

## 2024-12-04 VITALS
HEIGHT: 67 IN | BODY MASS INDEX: 28.25 KG/M2 | SYSTOLIC BLOOD PRESSURE: 162 MMHG | DIASTOLIC BLOOD PRESSURE: 90 MMHG | OXYGEN SATURATION: 97 % | HEART RATE: 98 BPM | TEMPERATURE: 97 F | WEIGHT: 180 LBS

## 2024-12-04 PROCEDURE — 99214 OFFICE O/P EST MOD 30 MIN: CPT

## 2024-12-05 LAB
AFP-TM SERPL-MCNC: <1.8 NG/ML
ALBUMIN SERPL ELPH-MCNC: 4.5 G/DL
ALP BLD-CCNC: 173 U/L
ALT SERPL-CCNC: 38 U/L
ANION GAP SERPL CALC-SCNC: 16 MMOL/L
AST SERPL-CCNC: 32 U/L
BASOPHILS # BLD AUTO: 0.06 K/UL
BASOPHILS NFR BLD AUTO: 0.6 %
BILIRUB SERPL-MCNC: 0.4 MG/DL
BUN SERPL-MCNC: 10 MG/DL
CALCIUM SERPL-MCNC: 10.4 MG/DL
CHLORIDE SERPL-SCNC: 102 MMOL/L
CO2 SERPL-SCNC: 24 MMOL/L
CREAT SERPL-MCNC: 0.66 MG/DL
EGFR: 119 ML/MIN/1.73M2
EOSINOPHIL # BLD AUTO: 0.03 K/UL
EOSINOPHIL NFR BLD AUTO: 0.3 %
ESTIMATED AVERAGE GLUCOSE: 82 MG/DL
GGT SERPL-CCNC: 85 U/L
GLUCOSE SERPL-MCNC: 79 MG/DL
HBA1C MFR BLD HPLC: 4.5 %
HCT VFR BLD CALC: 43.9 %
HGB BLD-MCNC: 14.3 G/DL
IGG SER QL IEP: 1316 MG/DL
IMM GRANULOCYTES NFR BLD AUTO: 0.3 %
INR PPP: 0.93 RATIO
LYMPHOCYTES # BLD AUTO: 2.98 K/UL
LYMPHOCYTES NFR BLD AUTO: 27.9 %
MAN DIFF?: NORMAL
MCHC RBC-ENTMCNC: 29.1 PG
MCHC RBC-ENTMCNC: 32.6 G/DL
MCV RBC AUTO: 89.4 FL
MONOCYTES # BLD AUTO: 0.42 K/UL
MONOCYTES NFR BLD AUTO: 3.9 %
NEUTROPHILS # BLD AUTO: 7.15 K/UL
NEUTROPHILS NFR BLD AUTO: 67 %
PLATELET # BLD AUTO: 411 K/UL
POTASSIUM SERPL-SCNC: 5 MMOL/L
PROT SERPL-MCNC: 8.3 G/DL
PT BLD: 11.1 SEC
RBC # BLD: 4.91 M/UL
RBC # FLD: 13.1 %
SMOOTH MUSCLE AB SER QL IF: NORMAL
SODIUM SERPL-SCNC: 141 MMOL/L
WBC # FLD AUTO: 10.67 K/UL

## 2024-12-09 ENCOUNTER — APPOINTMENT (OUTPATIENT)
Dept: NEUROLOGY | Facility: CLINIC | Age: 34
End: 2024-12-09

## 2024-12-09 PROBLEM — Z01.419 ENCOUNTER FOR ANNUAL ROUTINE GYNECOLOGICAL EXAMINATION: Status: ACTIVE | Noted: 2024-12-09

## 2024-12-09 PROBLEM — Z12.4 CERVICAL CANCER SCREENING: Status: ACTIVE | Noted: 2024-12-09

## 2024-12-09 LAB
ANA SER IF-ACNC: NEGATIVE
BILE AC SER-MCNC: 9.6 UMOL/L

## 2024-12-10 NOTE — BH CONSULTATION LIAISON ASSESSMENT NOTE - KNOWN PSYCHIATRIC ADMISSION WITHIN THE PAST 30 DAYS
tablet  Commonly known as: NORVASC     atorvastatin 40 MG tablet  Commonly known as: LIPITOR     cyclobenzaprine 10 MG tablet  Commonly known as: FLEXERIL     famotidine 20 MG tablet  Commonly known as: PEPCID     FLUoxetine 40 MG capsule  Commonly known as: PROzac     metFORMIN 500 MG tablet  Commonly known as: GLUCOPHAGE     mycophenolate 500 MG tablet  Commonly known as: CELLCEPT     NOVOLOG SC     pyRIDostigmine 60 MG tablet  Commonly known as: MESTINON     rOPINIRole 2 MG tablet  Commonly known as: REQUIP     tamsulosin 0.4 MG capsule  Commonly known as: FLOMAX     Ultomiris 300 MG/30ML Soln injection  Generic drug: ravulizumab-cwvz     vitamin D 1.25 MG (00587 UT) Caps capsule  Commonly known as: ERGOCALCIFEROL            STOP taking these medications      finasteride 5 MG tablet  Commonly known as: PROSCAR     IVIG 15G                     Time Spent on discharge is 29 minutes in the examination, evaluation, counseling and review of medications and discharge plan.    Thank you Katy Chaudhary DO for the opportunity to be involved in this patient's care.      Signed:    Electronically signed by JOSE RAMON Morrissey CNP on 12/10/24 at 12:43 PM EST        No

## 2024-12-11 ENCOUNTER — TRANSCRIPTION ENCOUNTER (OUTPATIENT)
Age: 34
End: 2024-12-11

## 2024-12-11 LAB — SOLUBLE LIVER IGG SER IA-ACNC: 3.5

## 2024-12-12 DIAGNOSIS — K75.81 NONALCOHOLIC STEATOHEPATITIS (NASH): ICD-10-CM

## 2024-12-16 ENCOUNTER — APPOINTMENT (OUTPATIENT)
Dept: OBGYN | Facility: CLINIC | Age: 34
End: 2024-12-16
Payer: COMMERCIAL

## 2024-12-16 ENCOUNTER — RESULT CHARGE (OUTPATIENT)
Age: 34
End: 2024-12-16

## 2024-12-16 VITALS
WEIGHT: 180 LBS | BODY MASS INDEX: 28.25 KG/M2 | SYSTOLIC BLOOD PRESSURE: 149 MMHG | HEART RATE: 66 BPM | HEIGHT: 67 IN | DIASTOLIC BLOOD PRESSURE: 95 MMHG

## 2024-12-16 DIAGNOSIS — Z01.419 ENCOUNTER FOR GYNECOLOGICAL EXAMINATION (GENERAL) (ROUTINE) W/OUT ABNORMAL FINDINGS: ICD-10-CM

## 2024-12-16 DIAGNOSIS — Z12.4 ENCOUNTER FOR SCREENING FOR MALIGNANT NEOPLASM OF CERVIX: ICD-10-CM

## 2024-12-16 LAB
BILIRUB UR QL STRIP: NORMAL
CLARITY UR: CLEAR
COLLECTION METHOD: NORMAL
GLUCOSE UR-MCNC: NEGATIVE
HCG UR QL: 0 EU/DL
HEMOGLOBIN: 13
HGB UR QL STRIP.AUTO: NEGATIVE
KETONES UR-MCNC: NORMAL
LEUKOCYTE ESTERASE UR QL STRIP: NEGATIVE
NITRITE UR QL STRIP: NEGATIVE
PH UR STRIP: 5
PROT UR STRIP-MCNC: NORMAL
SP GR UR STRIP: 1

## 2024-12-16 PROCEDURE — 81003 URINALYSIS AUTO W/O SCOPE: CPT | Mod: QW

## 2024-12-16 PROCEDURE — 85018 HEMOGLOBIN: CPT | Mod: QW

## 2024-12-16 PROCEDURE — 99395 PREV VISIT EST AGE 18-39: CPT

## 2024-12-16 PROCEDURE — 99459 PELVIC EXAMINATION: CPT

## 2024-12-22 LAB — CYTOLOGY CVX/VAG DOC THIN PREP: NORMAL

## 2024-12-23 ENCOUNTER — OFFICE (OUTPATIENT)
Dept: URBAN - METROPOLITAN AREA CLINIC 12 | Facility: CLINIC | Age: 34
Setting detail: OPHTHALMOLOGY
End: 2024-12-23
Payer: COMMERCIAL

## 2024-12-23 DIAGNOSIS — H40.1111: ICD-10-CM

## 2024-12-23 DIAGNOSIS — H40.002: ICD-10-CM

## 2024-12-23 PROCEDURE — 99024 POSTOP FOLLOW-UP VISIT: CPT | Performed by: STUDENT IN AN ORGANIZED HEALTH CARE EDUCATION/TRAINING PROGRAM

## 2024-12-23 ASSESSMENT — REFRACTION_AUTOREFRACTION
OS_CYLINDER: -1.25
OS_AXIS: 176
OD_SPHERE: -1.50
OD_AXIS: 016
OD_CYLINDER: -2.25
OS_SPHERE: -1.75

## 2024-12-23 ASSESSMENT — KERATOMETRY
OS_AXISANGLE_DEGREES: 079
OS_K1POWER_DIOPTERS: 45.75
OD_AXISANGLE_DEGREES: 105
OD_K2POWER_DIOPTERS: 48.00
OS_K2POWER_DIOPTERS: 47.50
OD_K1POWER_DIOPTERS: 45.50

## 2024-12-23 ASSESSMENT — REFRACTION_CURRENTRX
OD_VPRISM_DIRECTION: SV
OD_CYLINDER: -4.50
OS_SPHERE: -2.50
OD_AXIS: 003
OD_SPHERE: -1.75
OS_VPRISM_DIRECTION: SV
OS_CYLINDER: -2.00
OD_OVR_VA: 20/
OS_OVR_VA: 20/
OS_AXIS: 169

## 2024-12-23 ASSESSMENT — TONOMETRY
OS_IOP_MMHG: 20
OD_IOP_MMHG: 20

## 2024-12-23 ASSESSMENT — PACHYMETRY
OS_CT_UM: 599
OD_CT_CORRECTION: -4
OD_CT_UM: 590
OS_CT_CORRECTION: -4

## 2024-12-23 ASSESSMENT — VISUAL ACUITY
OS_BCVA: 20/30-1
OD_BCVA: 20/20

## 2024-12-23 ASSESSMENT — CONFRONTATIONAL VISUAL FIELD TEST (CVF)
OS_FINDINGS: FULL
OD_FINDINGS: FULL

## 2025-01-02 NOTE — ED ADULT NURSE NOTE - PSH
2025    Clarisse Swann   1972        To Whom it May Concern;    Please excuse Clarisse Swann from work/school for a healthcare visit on 2025.    Sincerely,        Tali Morley DNP, NP-C  
History of cholecystectomy

## 2025-01-07 ENCOUNTER — TRANSCRIPTION ENCOUNTER (OUTPATIENT)
Age: 35
End: 2025-01-07

## 2025-01-09 NOTE — PROGRESS NOTE BEHAVIORAL HEALTH - MOOD
History of vertigo in the past had used meclizine in the past did not help restart vestibular therapy  Orders:  •  Ambulatory referral to Physical Therapy; Future  
Anxious
Anxious
Irritable/Anxious/Depressed
Anxious/Depressed
Irritable/Anxious/Depressed
Anxious
Anxious/Depressed
Depressed/Anxious/Irritable
Irritable/Depressed/Anxious

## 2025-01-13 ENCOUNTER — APPOINTMENT (OUTPATIENT)
Dept: OBGYN | Facility: CLINIC | Age: 35
End: 2025-01-13
Payer: COMMERCIAL

## 2025-01-13 DIAGNOSIS — N76.4 ABSCESS OF VULVA: ICD-10-CM

## 2025-01-13 PROCEDURE — 99213 OFFICE O/P EST LOW 20 MIN: CPT

## 2025-01-13 PROCEDURE — 99459 PELVIC EXAMINATION: CPT

## 2025-01-17 ENCOUNTER — APPOINTMENT (OUTPATIENT)
Dept: OBGYN | Facility: CLINIC | Age: 35
End: 2025-01-17

## 2025-01-21 ENCOUNTER — APPOINTMENT (OUTPATIENT)
Dept: NEPHROLOGY | Facility: CLINIC | Age: 35
End: 2025-01-21
Payer: COMMERCIAL

## 2025-01-21 VITALS
OXYGEN SATURATION: 98 % | HEART RATE: 71 BPM | DIASTOLIC BLOOD PRESSURE: 90 MMHG | SYSTOLIC BLOOD PRESSURE: 152 MMHG | BODY MASS INDEX: 27.47 KG/M2 | HEIGHT: 67 IN | WEIGHT: 175 LBS | TEMPERATURE: 97 F

## 2025-01-21 VITALS — DIASTOLIC BLOOD PRESSURE: 90 MMHG | SYSTOLIC BLOOD PRESSURE: 140 MMHG

## 2025-01-21 DIAGNOSIS — R80.9 PROTEINURIA, UNSPECIFIED: ICD-10-CM

## 2025-01-21 LAB
ALBUMIN SERPL ELPH-MCNC: 4.4 G/DL
ANION GAP SERPL CALC-SCNC: 12 MMOL/L
APPEARANCE: CLEAR
BACTERIA: NEGATIVE /HPF
BILIRUBIN URINE: NEGATIVE
BLOOD URINE: NEGATIVE
BUN SERPL-MCNC: 13 MG/DL
CALCIUM SERPL-MCNC: 10.1 MG/DL
CAST: 0 /LPF
CHLORIDE SERPL-SCNC: 105 MMOL/L
CO2 SERPL-SCNC: 24 MMOL/L
COLOR: YELLOW
CREAT SERPL-MCNC: 0.76 MG/DL
CREAT SPEC-SCNC: 98 MG/DL
CREAT/PROT UR: 0.3 RATIO
EGFR: 105 ML/MIN/1.73M2
EPITHELIAL CELLS: 3 /HPF
GLUCOSE QUALITATIVE U: NEGATIVE MG/DL
GLUCOSE SERPL-MCNC: 74 MG/DL
KETONES URINE: NEGATIVE MG/DL
LEUKOCYTE ESTERASE URINE: NEGATIVE
MICROSCOPIC-UA: NORMAL
NITRITE URINE: NEGATIVE
PH URINE: 5.5
PHOSPHATE SERPL-MCNC: 4.3 MG/DL
POTASSIUM SERPL-SCNC: 4.6 MMOL/L
PROT UR-MCNC: 32 MG/DL
PROTEIN URINE: 30 MG/DL
RED BLOOD CELLS URINE: 1 /HPF
SODIUM SERPL-SCNC: 141 MMOL/L
SPECIFIC GRAVITY URINE: 1.02
UROBILINOGEN URINE: 1 MG/DL
WHITE BLOOD CELLS URINE: 0 /HPF

## 2025-01-21 PROCEDURE — 99214 OFFICE O/P EST MOD 30 MIN: CPT

## 2025-01-21 RX ORDER — DULOXETINE HYDROCHLORIDE 60 MG/1
60 CAPSULE, DELAYED RELEASE ORAL
Refills: 0 | Status: ACTIVE | COMMUNITY

## 2025-01-21 NOTE — ED PROVIDER NOTE - CPE EDP GASTRO NORM
Up-to-date on age-appropriate screenings.  Declines vaccines given prior GBS history.        normal...

## 2025-01-22 ENCOUNTER — TRANSCRIPTION ENCOUNTER (OUTPATIENT)
Age: 35
End: 2025-01-22

## 2025-01-29 ENCOUNTER — RX RENEWAL (OUTPATIENT)
Age: 35
End: 2025-01-29

## 2025-02-04 ENCOUNTER — TRANSCRIPTION ENCOUNTER (OUTPATIENT)
Age: 35
End: 2025-02-04

## 2025-02-05 ENCOUNTER — TRANSCRIPTION ENCOUNTER (OUTPATIENT)
Age: 35
End: 2025-02-05

## 2025-02-19 ENCOUNTER — APPOINTMENT (OUTPATIENT)
Dept: ULTRASOUND IMAGING | Facility: CLINIC | Age: 35
End: 2025-02-19

## 2025-02-19 ENCOUNTER — OUTPATIENT (OUTPATIENT)
Dept: OUTPATIENT SERVICES | Facility: HOSPITAL | Age: 35
LOS: 1 days | End: 2025-02-19
Payer: COMMERCIAL

## 2025-02-19 DIAGNOSIS — K75.81 NONALCOHOLIC STEATOHEPATITIS (NASH): ICD-10-CM

## 2025-02-19 DIAGNOSIS — Z90.79 ACQUIRED ABSENCE OF OTHER GENITAL ORGAN(S): Chronic | ICD-10-CM

## 2025-02-19 DIAGNOSIS — Z98.84 BARIATRIC SURGERY STATUS: Chronic | ICD-10-CM

## 2025-02-19 DIAGNOSIS — Z98.890 OTHER SPECIFIED POSTPROCEDURAL STATES: Chronic | ICD-10-CM

## 2025-02-19 DIAGNOSIS — Z90.49 ACQUIRED ABSENCE OF OTHER SPECIFIED PARTS OF DIGESTIVE TRACT: Chronic | ICD-10-CM

## 2025-02-19 PROCEDURE — 76700 US EXAM ABDOM COMPLETE: CPT | Mod: 26

## 2025-02-23 ENCOUNTER — EMERGENCY (EMERGENCY)
Facility: HOSPITAL | Age: 35
LOS: 0 days | Discharge: HOME | End: 2025-02-23
Attending: EMERGENCY MEDICINE
Payer: COMMERCIAL

## 2025-02-23 VITALS
DIASTOLIC BLOOD PRESSURE: 90 MMHG | HEART RATE: 77 BPM | TEMPERATURE: 98 F | OXYGEN SATURATION: 98 % | SYSTOLIC BLOOD PRESSURE: 142 MMHG | RESPIRATION RATE: 16 BRPM

## 2025-02-23 VITALS — WEIGHT: 172.4 LBS

## 2025-02-23 DIAGNOSIS — Z90.79 ACQUIRED ABSENCE OF OTHER GENITAL ORGAN(S): Chronic | ICD-10-CM

## 2025-02-23 DIAGNOSIS — Z87.42 PERSONAL HISTORY OF OTHER DISEASES OF THE FEMALE GENITAL TRACT: ICD-10-CM

## 2025-02-23 DIAGNOSIS — K76.0 FATTY (CHANGE OF) LIVER, NOT ELSEWHERE CLASSIFIED: ICD-10-CM

## 2025-02-23 DIAGNOSIS — Z88.0 ALLERGY STATUS TO PENICILLIN: ICD-10-CM

## 2025-02-23 DIAGNOSIS — Z98.84 BARIATRIC SURGERY STATUS: ICD-10-CM

## 2025-02-23 DIAGNOSIS — Z98.890 OTHER SPECIFIED POSTPROCEDURAL STATES: Chronic | ICD-10-CM

## 2025-02-23 DIAGNOSIS — R79.89 OTHER SPECIFIED ABNORMAL FINDINGS OF BLOOD CHEMISTRY: ICD-10-CM

## 2025-02-23 DIAGNOSIS — Z98.84 BARIATRIC SURGERY STATUS: Chronic | ICD-10-CM

## 2025-02-23 DIAGNOSIS — Z91.09 OTHER ALLERGY STATUS, OTHER THAN TO DRUGS AND BIOLOGICAL SUBSTANCES: ICD-10-CM

## 2025-02-23 DIAGNOSIS — Z90.49 ACQUIRED ABSENCE OF OTHER SPECIFIED PARTS OF DIGESTIVE TRACT: Chronic | ICD-10-CM

## 2025-02-23 DIAGNOSIS — R19.7 DIARRHEA, UNSPECIFIED: ICD-10-CM

## 2025-02-23 LAB
ALBUMIN SERPL ELPH-MCNC: 3.8 G/DL — SIGNIFICANT CHANGE UP (ref 3.3–5)
ALP SERPL-CCNC: 163 U/L — HIGH (ref 40–120)
ALT FLD-CCNC: 84 U/L — HIGH (ref 12–78)
ANION GAP SERPL CALC-SCNC: 6 MMOL/L — SIGNIFICANT CHANGE UP (ref 5–17)
APPEARANCE UR: CLEAR — SIGNIFICANT CHANGE UP
AST SERPL-CCNC: 168 U/L — HIGH (ref 15–37)
BACTERIA # UR AUTO: NEGATIVE /HPF — SIGNIFICANT CHANGE UP
BASOPHILS # BLD AUTO: 0.06 K/UL — SIGNIFICANT CHANGE UP (ref 0–0.2)
BASOPHILS NFR BLD AUTO: 0.5 % — SIGNIFICANT CHANGE UP (ref 0–2)
BILIRUB SERPL-MCNC: 1.1 MG/DL — SIGNIFICANT CHANGE UP (ref 0.2–1.2)
BILIRUB UR-MCNC: ABNORMAL
BUN SERPL-MCNC: 12 MG/DL — SIGNIFICANT CHANGE UP (ref 7–23)
CALCIUM SERPL-MCNC: 9.6 MG/DL — SIGNIFICANT CHANGE UP (ref 8.5–10.1)
CHLORIDE SERPL-SCNC: 106 MMOL/L — SIGNIFICANT CHANGE UP (ref 96–108)
CO2 SERPL-SCNC: 25 MMOL/L — SIGNIFICANT CHANGE UP (ref 22–31)
COLOR SPEC: SIGNIFICANT CHANGE UP
CREAT SERPL-MCNC: 0.73 MG/DL — SIGNIFICANT CHANGE UP (ref 0.5–1.3)
DIFF PNL FLD: NEGATIVE — SIGNIFICANT CHANGE UP
EGFR: 111 ML/MIN/1.73M2 — SIGNIFICANT CHANGE UP
EOSINOPHIL # BLD AUTO: 0.08 K/UL — SIGNIFICANT CHANGE UP (ref 0–0.5)
EOSINOPHIL NFR BLD AUTO: 0.7 % — SIGNIFICANT CHANGE UP (ref 0–6)
GLUCOSE SERPL-MCNC: 87 MG/DL — SIGNIFICANT CHANGE UP (ref 70–99)
GLUCOSE UR QL: NEGATIVE MG/DL — SIGNIFICANT CHANGE UP
HCG SERPL-ACNC: <1 MIU/ML — SIGNIFICANT CHANGE UP
HCT VFR BLD CALC: 39.7 % — SIGNIFICANT CHANGE UP (ref 34.5–45)
HGB BLD-MCNC: 14.2 G/DL — SIGNIFICANT CHANGE UP (ref 11.5–15.5)
IMM GRANULOCYTES # BLD AUTO: 0.02 K/UL — SIGNIFICANT CHANGE UP (ref 0–0.07)
IMM GRANULOCYTES NFR BLD AUTO: 0.2 % — SIGNIFICANT CHANGE UP (ref 0–0.9)
KETONES UR-MCNC: ABNORMAL MG/DL
LEUKOCYTE ESTERASE UR-ACNC: ABNORMAL
LIDOCAIN IGE QN: 50 U/L — SIGNIFICANT CHANGE UP (ref 13–75)
LYMPHOCYTES # BLD AUTO: 2.77 K/UL — SIGNIFICANT CHANGE UP (ref 1–3.3)
LYMPHOCYTES NFR BLD AUTO: 24.3 % — SIGNIFICANT CHANGE UP (ref 13–44)
MCHC RBC-ENTMCNC: 29.8 PG — SIGNIFICANT CHANGE UP (ref 27–34)
MCHC RBC-ENTMCNC: 35.8 G/DL — SIGNIFICANT CHANGE UP (ref 32–36)
MCV RBC AUTO: 83.4 FL — SIGNIFICANT CHANGE UP (ref 80–100)
MONOCYTES # BLD AUTO: 0.67 K/UL — SIGNIFICANT CHANGE UP (ref 0–0.9)
MONOCYTES NFR BLD AUTO: 5.9 % — SIGNIFICANT CHANGE UP (ref 2–14)
NEUTROPHILS # BLD AUTO: 7.82 K/UL — HIGH (ref 1.8–7.4)
NEUTROPHILS NFR BLD AUTO: 68.4 % — SIGNIFICANT CHANGE UP (ref 43–77)
NITRITE UR-MCNC: NEGATIVE — SIGNIFICANT CHANGE UP
NRBC # BLD AUTO: 0 K/UL — SIGNIFICANT CHANGE UP (ref 0–0)
NRBC # FLD: 0 K/UL — SIGNIFICANT CHANGE UP (ref 0–0)
NRBC BLD AUTO-RTO: 0 /100 WBCS — SIGNIFICANT CHANGE UP (ref 0–0)
PH UR: 5.5 — SIGNIFICANT CHANGE UP (ref 5–8)
PLATELET # BLD AUTO: 371 K/UL — SIGNIFICANT CHANGE UP (ref 150–400)
PMV BLD: 10 FL — SIGNIFICANT CHANGE UP (ref 7–13)
POTASSIUM SERPL-MCNC: 4.3 MMOL/L — SIGNIFICANT CHANGE UP (ref 3.5–5.3)
POTASSIUM SERPL-SCNC: 4.3 MMOL/L — SIGNIFICANT CHANGE UP (ref 3.5–5.3)
PROT SERPL-MCNC: 8.3 GM/DL — SIGNIFICANT CHANGE UP (ref 6–8.3)
PROT UR-MCNC: 100 MG/DL
RBC # BLD: 4.76 M/UL — SIGNIFICANT CHANGE UP (ref 3.8–5.2)
RBC # FLD: 12.4 % — SIGNIFICANT CHANGE UP (ref 10.3–14.5)
RBC CASTS # UR COMP ASSIST: 6 /HPF — HIGH (ref 0–4)
SODIUM SERPL-SCNC: 137 MMOL/L — SIGNIFICANT CHANGE UP (ref 135–145)
SP GR SPEC: 1.03 — SIGNIFICANT CHANGE UP (ref 1–1.03)
SQUAMOUS # UR AUTO: 8 /HPF — HIGH (ref 0–5)
UROBILINOGEN FLD QL: 4 MG/DL (ref 0.2–1)
WBC # BLD: 11.42 K/UL — HIGH (ref 3.8–10.5)
WBC # FLD AUTO: 11.42 K/UL — HIGH (ref 3.8–10.5)
WBC UR QL: 2 /HPF — SIGNIFICANT CHANGE UP (ref 0–5)

## 2025-02-23 PROCEDURE — 96374 THER/PROPH/DIAG INJ IV PUSH: CPT | Mod: XU

## 2025-02-23 PROCEDURE — 99284 EMERGENCY DEPT VISIT MOD MDM: CPT | Mod: 25

## 2025-02-23 PROCEDURE — 84702 CHORIONIC GONADOTROPIN TEST: CPT

## 2025-02-23 PROCEDURE — 99285 EMERGENCY DEPT VISIT HI MDM: CPT

## 2025-02-23 PROCEDURE — 36415 COLL VENOUS BLD VENIPUNCTURE: CPT

## 2025-02-23 PROCEDURE — 83690 ASSAY OF LIPASE: CPT

## 2025-02-23 PROCEDURE — 81001 URINALYSIS AUTO W/SCOPE: CPT

## 2025-02-23 PROCEDURE — 74177 CT ABD & PELVIS W/CONTRAST: CPT | Mod: MC

## 2025-02-23 PROCEDURE — 80053 COMPREHEN METABOLIC PANEL: CPT

## 2025-02-23 PROCEDURE — 85025 COMPLETE CBC W/AUTO DIFF WBC: CPT

## 2025-02-23 PROCEDURE — 96375 TX/PRO/DX INJ NEW DRUG ADDON: CPT

## 2025-02-23 PROCEDURE — 74177 CT ABD & PELVIS W/CONTRAST: CPT | Mod: 26

## 2025-02-23 RX ORDER — ACETAMINOPHEN 160 MG/5ML
1000 SUSPENSION ORAL ONCE
Refills: 0 | Status: COMPLETED | OUTPATIENT
Start: 2025-02-23 | End: 2025-02-23

## 2025-02-23 RX ORDER — MAGNESIUM, ALUMINUM HYDROXIDE 200-225/5
30 SUSPENSION, ORAL (FINAL DOSE FORM) ORAL ONCE
Refills: 0 | Status: COMPLETED | OUTPATIENT
Start: 2025-02-23 | End: 2025-02-23

## 2025-02-23 RX ORDER — KETOROLAC TROMETHAMINE 10 MG
30 TABLET ORAL ONCE
Refills: 0 | Status: DISCONTINUED | OUTPATIENT
Start: 2025-02-23 | End: 2025-02-23

## 2025-02-23 RX ORDER — LIDOCAINE HYDROCHLORIDE 30 MG/G
10 CREAM TOPICAL ONCE
Refills: 0 | Status: COMPLETED | OUTPATIENT
Start: 2025-02-23 | End: 2025-02-23

## 2025-02-23 RX ORDER — BACTERIOSTATIC SODIUM CHLORIDE 0.9 %
1000 VIAL (ML) INJECTION ONCE
Refills: 0 | Status: COMPLETED | OUTPATIENT
Start: 2025-02-23 | End: 2025-02-23

## 2025-02-23 RX ORDER — DICYCLOMINE HCL 20 MG
20 TABLET ORAL ONCE
Refills: 0 | Status: COMPLETED | OUTPATIENT
Start: 2025-02-23 | End: 2025-02-23

## 2025-02-23 RX ADMIN — Medication 30 MILLILITER(S): at 18:08

## 2025-02-23 RX ADMIN — ACETAMINOPHEN 400 MILLIGRAM(S): 160 SUSPENSION ORAL at 16:30

## 2025-02-23 RX ADMIN — Medication 30 MILLIGRAM(S): at 18:09

## 2025-02-23 RX ADMIN — Medication 1000 MILLILITER(S): at 16:23

## 2025-02-23 NOTE — ED STATDOCS - DISCHARGE DATE
Nehal Leslie is a 67 y.o. female here for follow up for: Chief Complaint Patient presents with  Lung Cancer  
  met  Chemotherapy Cycle 7 Day 1  Imfinzi Zometa every 6 weeks 1. Have you been to the ER, urgent care clinic since your last visit? Hospitalized since your last visit? no 
2. Have you seen or consulted any other health care providers outside of the [address] [place] [address] since your last visit? Include any pap smears or colon screening. no 
 
Pt thinks she she has a bladder infection. Otherwise she is doing well. 23-Feb-2025

## 2025-02-23 NOTE — ED STATDOCS - OBJECTIVE STATEMENT
34 year old female with PMHx of fatty liver, gastric bypass 12/2021, ovarian cyst removals, s/p appendectomy, and s/p cholecystectomy presenting to the ED with diarrhea since yesterday and diffuse abdominal pain since 1:30pm today. Pt has fatty liver and follows with liver transplant team and GI Dr. Alarcon. Pt denies history of smoking or drinking. Pt is allergic to penicillins.

## 2025-02-23 NOTE — ED STATDOCS - NSFOLLOWUPINSTRUCTIONS_ED_ALL_ED_FT
Follow up with your gastroenterologist and liver doctor for further evaluation of your symptoms.   Continue taking your home pain medication, tylenol, and try maalox or mylanta at home.   Drink plenty of fluids to stay hydrated.    Return to the Emergency Department for worsening or persistent symptoms, and/or ANY NEW OR CONCERNING SYMPTOMS. If you have issues obtaining follow up, please call: [phone] or [phone]  to obtain a doctor or specialist who takes your insurance in your area.

## 2025-02-23 NOTE — ED STATDOCS - PHYSICAL EXAMINATION
Physical Exam:  Gen: NAD, non-toxic appearing, able to ambulate without assistance  Head: NCAT  HEENT: EOMI, PEERLA, normal conjunctiva, tongue midline, oral mucosa moist  Lung: CTAB, no respiratory distress, no wheezes/rhonchi/rales B/L, speaking in full sentences  CV: RRR, no murmurs, rubs or gallops, distal pulses 2+ b/l  Abd: mild diffuse abdominal tenderness but is soft with no distention, no guarding, no rigidity, no rebound tenderness  MSK: no visible deformities, ROM normal in UE/LE  Skin: Warm, well perfused, no rash  Psych: normal affect, calm

## 2025-02-23 NOTE — ED ADULT TRIAGE NOTE - CHIEF COMPLAINT QUOTE
Pt ambulatory to ED c/o abdominal pain. Pt reports sudden onset of abdominal pain that radiates to her back around 1330. PMHx non alcoholic fatty liver disease. Endorses n/d, denies vomiting, fevers and urinary symptoms.

## 2025-02-23 NOTE — ED STATDOCS - CARE PROVIDER_API CALL
Venkatesh Sidhu  Gastroenterology  [address] [place] [address]  Phone: [phone]  Fax: [phone]  Follow Up Time:     Edvin Pleitez  Gastroenterology  [address] [place] [address]  Phone: [phone]  Follow Up Time:

## 2025-02-23 NOTE — ED ADULT NURSE NOTE - OBJECTIVE STATEMENT
Pt is a 34yr old female, A&OX4 and ambulatory, c/o abdominal pain and diarrhea starting yesterday. HX of gastric bypass. Denies N/V, fever, and urinary symptoms. Pt in no acute distress.

## 2025-02-23 NOTE — ED STATDOCS - PROGRESS NOTE DETAILS
54-year-old female with a past medical history of GERD, status post cholecystectomy, appendectomy, ovarian cyst removal, gastric bypass surgery Dr. Silverio in 2021 presents with diffuse abdominal pain since approximately 1:30 PM today.  Patient states that she has been having diarrhea since last night prior to the pain starting.  Patient has tried to take her IBS medication without relief.  Patient states that she has not had this much pain in a long time.  Patient states that she has had endoscopy/colonoscopy back in the end of 2024 which was unremarkable.  Patient also recently had an ultrasound in February 19 that was performed by her liver doctor and is due to history of having nonalcoholic fatty liver disease. Denies fever, vomiting.  Vitals show pt is slightly tachy and HTN.  Patient looks uncomfortable appearing.  Patient states that she cannot take any narcotic pain medication because she is on Suboxone and because of her bariatric surgery informed her that will would like to avoid Toradol.  Will attempt to use IV Tylenol, labs, CT and reevaluate. -Kurtis Wan PA-C Patient's labs are unremarkable.  LFTs including alk phos were slightly elevated but have been elevated in the past and urine does show that there is moderate bilirubin with trace ketones.  Patient does have a history of liver disease and follows up with a hepatologist.  Advised patient to continue hydrating at home and follow-up with his liver doctor for further evaluation of her lab results.  Also advised patient to follow-up with a gastroenterologist for further evaluation for symptoms.  Patient states that she sees Dr. Alarcon but would prefer somebody else to follow-up with.  Will get a few other numbers and place her for referral for a GI doctor that she can eventually follow-up with for symptoms.  Patient states that Tylenol took the edge off but it would still like something else for pain.  Offered Toradol and also Maalox and discussed lidocaine and advised patient that she can continue taking Tylenol and Maalox at home for symptoms.  Patient and mom are aware and agree with plan. -Kurtis Wan PA-C

## 2025-02-23 NOTE — ED STATDOCS - PATIENT PORTAL LINK FT
You can access the FollowMyHealth Patient Portal offered by [facility] by registering at the following website: [email or web address]. By joining [facility]’s FollowMyHealth portal, you will also be able to view your health information using other applications (apps) compatible with our system.

## 2025-02-23 NOTE — ED STATDOCS - CLINICAL SUMMARY MEDICAL DECISION MAKING FREE TEXT BOX
Pt with h/o gastric bypass, and  fatty liver presenting after experiencing diffuse abdominal pain for the last few hours. Pt is afebrile with stable vitals. Due to history of gastric bypass will do labs and ct imaging.

## 2025-02-23 NOTE — ED ADULT NURSE REASSESSMENT NOTE - NS ED NURSE REASSESS COMMENT FT1
Pt being rude to RN staff, raising her voice, being verbally aggressive. Pt demanding medication throughout entirety of her ED visit, speaking poorly to RN, despite RN explaining that medications were not available/awaiting pharmacy to verify medication. MIRIAN Joseph aware of situation. RN Enriqueta to medicate patient.

## 2025-02-23 NOTE — ED STATDOCS - ATTENDING APP SHARED VISIT CONTRIBUTION OF CARE
I,Low Jean MD,  performed the initial face to face bedside interview with this patient regarding history of present illness, review of symptoms and relevant past medical, social and family history.  I completed an independent physical examination.  I was the initial provider who evaluated this patient. I have signed out the follow up of any pending tests (i.e. labs, radiological studies) to the ACP.  I have communicated the patient’s plan of care and disposition with the ACP.  The history, relevant review of systems, past medical and surgical history, medical decision making, and physical examination was documented by the scribe in my presence and I attest to the accuracy of the documentation.

## 2025-02-25 ENCOUNTER — TRANSCRIPTION ENCOUNTER (OUTPATIENT)
Age: 35
End: 2025-02-25

## 2025-02-26 ENCOUNTER — TRANSCRIPTION ENCOUNTER (OUTPATIENT)
Age: 35
End: 2025-02-26

## 2025-03-01 ENCOUNTER — TRANSCRIPTION ENCOUNTER (OUTPATIENT)
Age: 35
End: 2025-03-01

## 2025-03-10 ENCOUNTER — TRANSCRIPTION ENCOUNTER (OUTPATIENT)
Age: 35
End: 2025-03-10

## 2025-03-11 ENCOUNTER — APPOINTMENT (OUTPATIENT)
Dept: GASTROENTEROLOGY | Facility: CLINIC | Age: 35
End: 2025-03-11

## 2025-03-11 ENCOUNTER — APPOINTMENT (OUTPATIENT)
Dept: HEPATOLOGY | Facility: CLINIC | Age: 35
End: 2025-03-11
Payer: COMMERCIAL

## 2025-03-11 ENCOUNTER — NON-APPOINTMENT (OUTPATIENT)
Age: 35
End: 2025-03-11

## 2025-03-11 VITALS
TEMPERATURE: 97.2 F | OXYGEN SATURATION: 98 % | HEART RATE: 99 BPM | SYSTOLIC BLOOD PRESSURE: 149 MMHG | HEIGHT: 67 IN | WEIGHT: 172 LBS | BODY MASS INDEX: 27 KG/M2 | DIASTOLIC BLOOD PRESSURE: 89 MMHG

## 2025-03-11 VITALS
BODY MASS INDEX: 27.15 KG/M2 | DIASTOLIC BLOOD PRESSURE: 82 MMHG | HEIGHT: 67 IN | WEIGHT: 173 LBS | SYSTOLIC BLOOD PRESSURE: 132 MMHG

## 2025-03-11 DIAGNOSIS — R74.8 ABNORMAL LEVELS OF OTHER SERUM ENZYMES: ICD-10-CM

## 2025-03-11 DIAGNOSIS — R10.9 UNSPECIFIED ABDOMINAL PAIN: ICD-10-CM

## 2025-03-11 DIAGNOSIS — K58.9 IRRITABLE BOWEL SYNDROME, UNSPECIFIED: ICD-10-CM

## 2025-03-11 DIAGNOSIS — K75.81 NONALCOHOLIC STEATOHEPATITIS (NASH): ICD-10-CM

## 2025-03-11 LAB
AFP-TM SERPL-MCNC: <1.8 NG/ML
ALBUMIN SERPL ELPH-MCNC: 4.6 G/DL
ALP BLD-CCNC: 247 U/L
ALT SERPL-CCNC: 98 U/L
ANION GAP SERPL CALC-SCNC: 12 MMOL/L
AST SERPL-CCNC: 75 U/L
BASOPHILS # BLD AUTO: 0.05 K/UL
BASOPHILS NFR BLD AUTO: 0.7 %
BILIRUB DIRECT SERPL-MCNC: 0.1 MG/DL
BILIRUB SERPL-MCNC: 0.4 MG/DL
BUN SERPL-MCNC: 14 MG/DL
CALCIUM SERPL-MCNC: 10.4 MG/DL
CHLORIDE SERPL-SCNC: 105 MMOL/L
CO2 SERPL-SCNC: 26 MMOL/L
CREAT SERPL-MCNC: 0.7 MG/DL
EGFRCR SERPLBLD CKD-EPI 2021: 116 ML/MIN/1.73M2
EOSINOPHIL # BLD AUTO: 0.1 K/UL
EOSINOPHIL NFR BLD AUTO: 1.5 %
GGT SERPL-CCNC: 193 U/L
GLUCOSE SERPL-MCNC: 79 MG/DL
HCT VFR BLD CALC: 42.9 %
HGB BLD-MCNC: 14.9 G/DL
IMM GRANULOCYTES NFR BLD AUTO: 0.1 %
INR PPP: 0.94 RATIO
LYMPHOCYTES # BLD AUTO: 1.74 K/UL
LYMPHOCYTES NFR BLD AUTO: 25.4 %
MAN DIFF?: NORMAL
MCHC RBC-ENTMCNC: 29.8 PG
MCHC RBC-ENTMCNC: 34.7 G/DL
MCV RBC AUTO: 85.8 FL
MONOCYTES # BLD AUTO: 0.43 K/UL
MONOCYTES NFR BLD AUTO: 6.3 %
NEUTROPHILS # BLD AUTO: 4.51 K/UL
NEUTROPHILS NFR BLD AUTO: 66 %
PLATELET # BLD AUTO: 355 K/UL
POTASSIUM SERPL-SCNC: 4.7 MMOL/L
PROT SERPL-MCNC: 8.5 G/DL
PT BLD: 11.1 SEC
RBC # BLD: 5 M/UL
RBC # FLD: 13.6 %
SODIUM SERPL-SCNC: 142 MMOL/L
WBC # FLD AUTO: 6.84 K/UL

## 2025-03-11 PROCEDURE — G2211 COMPLEX E/M VISIT ADD ON: CPT

## 2025-03-11 PROCEDURE — 99204 OFFICE O/P NEW MOD 45 MIN: CPT

## 2025-03-11 PROCEDURE — 99214 OFFICE O/P EST MOD 30 MIN: CPT

## 2025-03-11 RX ORDER — SIMETHICONE 125 MG
CAPSULE ORAL
Refills: 0 | Status: ACTIVE | COMMUNITY

## 2025-03-11 RX ORDER — BUPRENORPHINE HYDROCHLORIDE, NALOXONE HYDROCHLORIDE 8; 2 MG/1; MG/1
8-2 FILM, SOLUBLE BUCCAL; SUBLINGUAL
Refills: 0 | Status: ACTIVE | COMMUNITY

## 2025-03-11 RX ORDER — BISMUTH SUBSALICYLATE 525 MG/1
TABLET ORAL DAILY
Refills: 0 | Status: ACTIVE | COMMUNITY

## 2025-03-11 RX ORDER — PROMETHAZINE HCL 12.5 MG
12.5 TABLET ORAL
Refills: 0 | Status: ACTIVE | COMMUNITY

## 2025-03-11 RX ORDER — HYOSCYAMINE SULFATE 0.12 MG/1
0.12 TABLET ORAL 3 TIMES DAILY
Refills: 0 | Status: ACTIVE | COMMUNITY

## 2025-03-11 RX ORDER — FAMOTIDINE 40 MG/1
40 TABLET, FILM COATED ORAL DAILY
Refills: 0 | Status: ACTIVE | COMMUNITY

## 2025-03-11 RX ORDER — MAG HYDROX/ALUMINUM HYD/SIMETH 200-200-20
SUSPENSION, ORAL (FINAL DOSE FORM) ORAL
Refills: 0 | Status: ACTIVE | COMMUNITY

## 2025-03-12 LAB
APPEARANCE: CLEAR
BACTERIA: NEGATIVE /HPF
BILIRUBIN URINE: NEGATIVE
BLOOD URINE: NEGATIVE
CAST: 0 /LPF
COLOR: YELLOW
DEPRECATED KAPPA LC FREE/LAMBDA SER: 1.29 RATIO
EPITHELIAL CELLS: 12 /HPF
GLUCOSE QUALITATIVE U: NEGATIVE MG/DL
IGA SER QL IEP: 381 MG/DL
IGG SER QL IEP: 1120 MG/DL
IGM SER QL IEP: 78 MG/DL
KAPPA LC CSF-MCNC: 1.75 MG/DL
KAPPA LC SERPL-MCNC: 2.25 MG/DL
KETONES URINE: NEGATIVE MG/DL
LEUKOCYTE ESTERASE URINE: NEGATIVE
MICROSCOPIC-UA: NORMAL
NITRITE URINE: NEGATIVE
PH URINE: 6
PROTEIN URINE: 30 MG/DL
RED BLOOD CELLS URINE: 4 /HPF
REVIEW: NORMAL
SPECIFIC GRAVITY URINE: 1.03
UROBILINOGEN URINE: 0.2 MG/DL
WHITE BLOOD CELLS URINE: 1 /HPF

## 2025-03-13 LAB — BILE AC SER-MCNC: 20.8 UMOL/L

## 2025-03-22 ENCOUNTER — APPOINTMENT (OUTPATIENT)
Dept: MRI IMAGING | Facility: CLINIC | Age: 35
End: 2025-03-22

## 2025-03-22 ENCOUNTER — OUTPATIENT (OUTPATIENT)
Dept: OUTPATIENT SERVICES | Facility: HOSPITAL | Age: 35
LOS: 1 days | End: 2025-03-22
Payer: COMMERCIAL

## 2025-03-22 DIAGNOSIS — Z98.890 OTHER SPECIFIED POSTPROCEDURAL STATES: Chronic | ICD-10-CM

## 2025-03-22 DIAGNOSIS — Z98.84 BARIATRIC SURGERY STATUS: Chronic | ICD-10-CM

## 2025-03-22 DIAGNOSIS — Z00.8 ENCOUNTER FOR OTHER GENERAL EXAMINATION: ICD-10-CM

## 2025-03-22 DIAGNOSIS — Z90.49 ACQUIRED ABSENCE OF OTHER SPECIFIED PARTS OF DIGESTIVE TRACT: Chronic | ICD-10-CM

## 2025-03-22 DIAGNOSIS — R74.8 ABNORMAL LEVELS OF OTHER SERUM ENZYMES: ICD-10-CM

## 2025-03-22 DIAGNOSIS — Z90.79 ACQUIRED ABSENCE OF OTHER GENITAL ORGAN(S): Chronic | ICD-10-CM

## 2025-03-22 PROCEDURE — 74183 MRI ABD W/O CNTR FLWD CNTR: CPT | Mod: 26

## 2025-03-22 PROCEDURE — 74183 MRI ABD W/O CNTR FLWD CNTR: CPT

## 2025-03-22 PROCEDURE — A9585: CPT

## 2025-03-31 ENCOUNTER — TRANSCRIPTION ENCOUNTER (OUTPATIENT)
Age: 35
End: 2025-03-31

## 2025-04-04 ENCOUNTER — APPOINTMENT (OUTPATIENT)
Dept: GASTROENTEROLOGY | Facility: CLINIC | Age: 35
End: 2025-04-04

## 2025-04-04 VITALS
SYSTOLIC BLOOD PRESSURE: 120 MMHG | HEIGHT: 67 IN | DIASTOLIC BLOOD PRESSURE: 80 MMHG | BODY MASS INDEX: 27.47 KG/M2 | WEIGHT: 175 LBS

## 2025-04-04 DIAGNOSIS — Z09 ENCOUNTER FOR FOLLOW-UP EXAMINATION AFTER COMPLETED TREATMENT FOR CONDITIONS OTHER THAN MALIGNANT NEOPLASM: ICD-10-CM

## 2025-04-04 PROCEDURE — 99213 OFFICE O/P EST LOW 20 MIN: CPT

## 2025-04-07 ENCOUNTER — OFFICE (OUTPATIENT)
Dept: URBAN - METROPOLITAN AREA CLINIC 12 | Facility: CLINIC | Age: 35
Setting detail: OPHTHALMOLOGY
End: 2025-04-07
Payer: COMMERCIAL

## 2025-04-07 DIAGNOSIS — H40.002: ICD-10-CM

## 2025-04-07 DIAGNOSIS — H40.1111: ICD-10-CM

## 2025-04-07 PROCEDURE — 92014 COMPRE OPH EXAM EST PT 1/>: CPT | Performed by: STUDENT IN AN ORGANIZED HEALTH CARE EDUCATION/TRAINING PROGRAM

## 2025-04-07 PROCEDURE — 92083 EXTENDED VISUAL FIELD XM: CPT | Performed by: STUDENT IN AN ORGANIZED HEALTH CARE EDUCATION/TRAINING PROGRAM

## 2025-04-07 PROCEDURE — 92133 CPTRZD OPH DX IMG PST SGM ON: CPT | Performed by: STUDENT IN AN ORGANIZED HEALTH CARE EDUCATION/TRAINING PROGRAM

## 2025-04-07 ASSESSMENT — PACHYMETRY
OD_CT_UM: 590
OS_CT_CORRECTION: -4
OS_CT_UM: 599
OD_CT_CORRECTION: -4

## 2025-04-07 ASSESSMENT — REFRACTION_CURRENTRX
OD_OVR_VA: 20/
OD_VPRISM_DIRECTION: SV
OD_SPHERE: -1.75
OS_AXIS: 169
OD_CYLINDER: -4.50
OS_OVR_VA: 20/
OS_VPRISM_DIRECTION: SV
OS_CYLINDER: -2.00
OD_AXIS: 003
OS_SPHERE: -2.50

## 2025-04-07 ASSESSMENT — KERATOMETRY
OS_AXISANGLE_DEGREES: 084
OS_K1POWER_DIOPTERS: 45.75
OD_K2POWER_DIOPTERS: 47.50
OS_K2POWER_DIOPTERS: 47.50
OD_AXISANGLE_DEGREES: 105
OD_K1POWER_DIOPTERS: 45.25

## 2025-04-07 ASSESSMENT — VISUAL ACUITY
OD_BCVA: 20/25-1
OS_BCVA: 20/25-1

## 2025-04-07 ASSESSMENT — CONFRONTATIONAL VISUAL FIELD TEST (CVF)
OS_FINDINGS: FULL
OD_FINDINGS: FULL

## 2025-04-07 ASSESSMENT — REFRACTION_AUTOREFRACTION
OS_SPHERE: -1.75
OD_SPHERE: -1.00
OD_CYLINDER: -2.00
OS_CYLINDER: -1.00
OD_AXIS: 019
OS_AXIS: 176

## 2025-04-10 NOTE — ED BEHAVIORAL HEALTH ASSESSMENT NOTE - DEPENDENTS
Platelet transfusion to be initiated put mask on patient. ADS aware and will order epogen. 2021 None known

## 2025-04-14 RX ORDER — SODIUM SULFATE, POTASSIUM SULFATE AND MAGNESIUM SULFATE 1.6; 3.13; 17.5 G/177ML; G/177ML; G/177ML
17.5-3.13-1.6 SOLUTION ORAL
Qty: 2 | Refills: 0 | Status: ACTIVE | COMMUNITY
Start: 2025-04-14 | End: 1900-01-01

## 2025-04-15 ENCOUNTER — APPOINTMENT (OUTPATIENT)
Dept: GASTROENTEROLOGY | Facility: CLINIC | Age: 35
End: 2025-04-15

## 2025-04-29 NOTE — ED ADULT NURSE NOTE - NSICDXFAMILYHX_GEN_ALL_CORE_FT
Patient in for blood pressure check at Dr. Brown's direction. Measure today at:  Vitals:    04/29/25 1504   BP: 124/64   Pulse: 64     Shingrix - 2nd dose immunization given. Verbal order for injection from Dr. Brown. Patient tolerated without incident. See immunization grid for documentation.   FAMILY HISTORY:  Grandparent  Still living? Unknown  Family history of heart disease, Age at diagnosis: Age Unknown

## 2025-05-02 ENCOUNTER — APPOINTMENT (OUTPATIENT)
Dept: GASTROENTEROLOGY | Facility: CLINIC | Age: 35
End: 2025-05-02
Payer: COMMERCIAL

## 2025-05-02 DIAGNOSIS — K58.9 IRRITABLE BOWEL SYNDROME, UNSPECIFIED: ICD-10-CM

## 2025-05-02 DIAGNOSIS — R11.0 NAUSEA: ICD-10-CM

## 2025-05-02 RX ORDER — DICYCLOMINE HYDROCHLORIDE 20 MG/1
20 TABLET ORAL EVERY 6 HOURS
Qty: 360 | Refills: 0 | Status: ACTIVE | COMMUNITY
Start: 2025-05-02 | End: 1900-01-01

## 2025-05-06 ENCOUNTER — RX RENEWAL (OUTPATIENT)
Age: 35
End: 2025-05-06

## 2025-05-06 RX ORDER — FAMOTIDINE 40 MG/1
40 TABLET, FILM COATED ORAL
Qty: 30 | Refills: 0 | Status: ACTIVE | COMMUNITY
Start: 2025-05-06 | End: 1900-01-01

## 2025-05-08 ENCOUNTER — APPOINTMENT (OUTPATIENT)
Dept: OTOLARYNGOLOGY | Facility: CLINIC | Age: 35
End: 2025-05-08
Payer: COMMERCIAL

## 2025-05-08 VITALS — HEIGHT: 67 IN | BODY MASS INDEX: 27.47 KG/M2 | WEIGHT: 175 LBS

## 2025-05-08 DIAGNOSIS — J30.0 VASOMOTOR RHINITIS: ICD-10-CM

## 2025-05-08 DIAGNOSIS — J32.0 CHRONIC MAXILLARY SINUSITIS: ICD-10-CM

## 2025-05-08 DIAGNOSIS — J34.3 HYPERTROPHY OF NASAL TURBINATES: ICD-10-CM

## 2025-05-08 PROCEDURE — 31231 NASAL ENDOSCOPY DX: CPT

## 2025-05-08 PROCEDURE — 99214 OFFICE O/P EST MOD 30 MIN: CPT | Mod: 25

## 2025-05-08 RX ORDER — IPRATROPIUM BROMIDE 42 UG/1
0.06 SPRAY, METERED NASAL 3 TIMES DAILY
Qty: 1 | Refills: 4 | Status: ACTIVE | COMMUNITY
Start: 2025-05-08 | End: 1900-01-01

## 2025-05-20 DIAGNOSIS — R10.2 PELVIC AND PERINEAL PAIN: ICD-10-CM

## 2025-05-30 ENCOUNTER — TRANSCRIPTION ENCOUNTER (OUTPATIENT)
Age: 35
End: 2025-05-30

## 2025-05-30 RX ORDER — DICYCLOMINE HYDROCHLORIDE 10 MG/1
10 CAPSULE ORAL
Qty: 120 | Refills: 0 | Status: ACTIVE | COMMUNITY
Start: 2025-05-30 | End: 1900-01-01

## 2025-06-09 ENCOUNTER — RX ONLY (RX ONLY)
Age: 35
End: 2025-06-09

## 2025-06-09 ENCOUNTER — OFFICE (OUTPATIENT)
Dept: URBAN - METROPOLITAN AREA CLINIC 12 | Facility: CLINIC | Age: 35
Setting detail: OPHTHALMOLOGY
End: 2025-06-09
Payer: COMMERCIAL

## 2025-06-09 DIAGNOSIS — H40.1111: ICD-10-CM

## 2025-06-09 PROCEDURE — 99213 OFFICE O/P EST LOW 20 MIN: CPT | Performed by: STUDENT IN AN ORGANIZED HEALTH CARE EDUCATION/TRAINING PROGRAM

## 2025-06-09 ASSESSMENT — PACHYMETRY
OS_CT_UM: 599
OD_CT_CORRECTION: -4
OD_CT_UM: 590
OS_CT_CORRECTION: -4

## 2025-06-09 ASSESSMENT — TONOMETRY
OD_IOP_MMHG: 20
OS_IOP_MMHG: 21

## 2025-06-10 PROBLEM — H53.60: Status: ACTIVE | Noted: 2025-06-09

## 2025-06-10 ASSESSMENT — REFRACTION_CURRENTRX
OS_SPHERE: -2.50
OS_VPRISM_DIRECTION: SV
OD_AXIS: 003
OD_OVR_VA: 20/
OS_CYLINDER: -2.00
OS_OVR_VA: 20/
OD_CYLINDER: -4.50
OS_AXIS: 169
OD_VPRISM_DIRECTION: SV
OD_SPHERE: -1.75

## 2025-06-10 ASSESSMENT — KERATOMETRY
OS_K1POWER_DIOPTERS: 45.75
OS_K2POWER_DIOPTERS: 47.75
OD_AXISANGLE_DEGREES: 104
OS_AXISANGLE_DEGREES: 088
OD_K1POWER_DIOPTERS: 45.50
OD_K2POWER_DIOPTERS: 47.75

## 2025-06-10 ASSESSMENT — REFRACTION_AUTOREFRACTION
OS_AXIS: 179
OD_AXIS: 019
OS_CYLINDER: -1.25
OS_SPHERE: -1.75
OD_CYLINDER: -2.25
OD_SPHERE: -1.00

## 2025-06-10 ASSESSMENT — VISUAL ACUITY
OD_BCVA: 20/20
OS_BCVA: 20/20-2

## 2025-06-19 ENCOUNTER — APPOINTMENT (OUTPATIENT)
Dept: HEPATOLOGY | Facility: CLINIC | Age: 35
End: 2025-06-19
Payer: COMMERCIAL

## 2025-06-19 VITALS
BODY MASS INDEX: 27.47 KG/M2 | HEART RATE: 78 BPM | TEMPERATURE: 98.3 F | OXYGEN SATURATION: 98 % | SYSTOLIC BLOOD PRESSURE: 145 MMHG | WEIGHT: 175 LBS | RESPIRATION RATE: 16 BRPM | HEIGHT: 67 IN | DIASTOLIC BLOOD PRESSURE: 87 MMHG

## 2025-06-19 PROCEDURE — 99214 OFFICE O/P EST MOD 30 MIN: CPT

## 2025-06-19 RX ORDER — CLONIDINE HYDROCHLORIDE 0.1 MG/1
0.1 TABLET ORAL
Refills: 0 | Status: ACTIVE | COMMUNITY

## 2025-06-19 RX ORDER — NORETHINDRONE ACETATE AND ETHINYL ESTRADIOL 1MG-20(24)
KIT ORAL
Refills: 0 | Status: ACTIVE | COMMUNITY

## 2025-06-19 RX ORDER — CYANOCOBALAMIN 1000 UG/ML
1000 INJECTION, SOLUTION INTRAMUSCULAR; SUBCUTANEOUS
Refills: 0 | Status: ACTIVE | COMMUNITY

## 2025-06-19 RX ORDER — TAFLUPROST 0 MG/.3ML
0 SOLUTION/ DROPS OPHTHALMIC
Refills: 0 | Status: ACTIVE | COMMUNITY

## 2025-06-20 ENCOUNTER — TRANSCRIPTION ENCOUNTER (OUTPATIENT)
Age: 35
End: 2025-06-20

## 2025-06-20 LAB
AFP-TM SERPL-MCNC: <1.8 NG/ML
ALBUMIN SERPL ELPH-MCNC: 4.3 G/DL
ALP BLD-CCNC: 185 U/L
ALT SERPL-CCNC: 160 U/L
ANION GAP SERPL CALC-SCNC: 14 MMOL/L
AST SERPL-CCNC: 121 U/L
BASOPHILS # BLD AUTO: 0.05 K/UL
BASOPHILS NFR BLD AUTO: 0.7 %
BILIRUB SERPL-MCNC: 0.5 MG/DL
BUN SERPL-MCNC: 11 MG/DL
CALCIUM SERPL-MCNC: 10 MG/DL
CHLORIDE SERPL-SCNC: 103 MMOL/L
CHOLEST SERPL-MCNC: 156 MG/DL
CO2 SERPL-SCNC: 22 MMOL/L
CREAT SERPL-MCNC: 0.68 MG/DL
EGFRCR SERPLBLD CKD-EPI 2021: 117 ML/MIN/1.73M2
EOSINOPHIL # BLD AUTO: 0.07 K/UL
EOSINOPHIL NFR BLD AUTO: 0.9 %
ESTIMATED AVERAGE GLUCOSE: 91 MG/DL
GGT SERPL-CCNC: 229 U/L
GLUCOSE SERPL-MCNC: 86 MG/DL
HBA1C MFR BLD HPLC: 4.8 %
HCT VFR BLD CALC: 38.7 %
HDLC SERPL-MCNC: 59 MG/DL
HGB BLD-MCNC: 13.5 G/DL
IMM GRANULOCYTES NFR BLD AUTO: 0.1 %
INR PPP: 0.97 RATIO
LDLC SERPL-MCNC: 86 MG/DL
LYMPHOCYTES # BLD AUTO: 2.13 K/UL
LYMPHOCYTES NFR BLD AUTO: 28.2 %
MAN DIFF?: NORMAL
MCHC RBC-ENTMCNC: 30.1 PG
MCHC RBC-ENTMCNC: 34.9 G/DL
MCV RBC AUTO: 86.4 FL
MONOCYTES # BLD AUTO: 0.43 K/UL
MONOCYTES NFR BLD AUTO: 5.7 %
NEUTROPHILS # BLD AUTO: 4.86 K/UL
NEUTROPHILS NFR BLD AUTO: 64.4 %
NONHDLC SERPL-MCNC: 97 MG/DL
PLATELET # BLD AUTO: 375 K/UL
POTASSIUM SERPL-SCNC: 4.9 MMOL/L
PROT SERPL-MCNC: 7.6 G/DL
PT BLD: 11.5 SEC
RBC # BLD: 4.48 M/UL
RBC # FLD: 13.1 %
SODIUM SERPL-SCNC: 138 MMOL/L
TRIGL SERPL-MCNC: 53 MG/DL
WBC # FLD AUTO: 7.55 K/UL

## 2025-06-22 LAB — BILE AC SER-MCNC: 8.4 UMOL/L

## 2025-07-08 PROBLEM — K58.0 IRRITABLE BOWEL SYNDROME WITH DIARRHEA: Status: ACTIVE | Noted: 2025-07-08

## 2025-07-08 RX ORDER — RIFAXIMIN 550 MG/1
550 TABLET ORAL
Qty: 42 | Refills: 3 | Status: ACTIVE | COMMUNITY
Start: 2025-07-08

## 2025-07-23 ENCOUNTER — TRANSCRIPTION ENCOUNTER (OUTPATIENT)
Age: 35
End: 2025-07-23

## 2025-07-25 ENCOUNTER — TRANSCRIPTION ENCOUNTER (OUTPATIENT)
Age: 35
End: 2025-07-25

## 2025-07-25 ENCOUNTER — APPOINTMENT (OUTPATIENT)
Dept: OBGYN | Facility: CLINIC | Age: 35
End: 2025-07-25

## 2025-07-29 ENCOUNTER — TRANSCRIPTION ENCOUNTER (OUTPATIENT)
Age: 35
End: 2025-07-29

## 2025-07-31 ENCOUNTER — APPOINTMENT (OUTPATIENT)
Dept: DERMATOLOGY | Facility: CLINIC | Age: 35
End: 2025-07-31

## 2025-08-02 ENCOUNTER — RX RENEWAL (OUTPATIENT)
Age: 35
End: 2025-08-02

## 2025-08-07 ENCOUNTER — TRANSCRIPTION ENCOUNTER (OUTPATIENT)
Age: 35
End: 2025-08-07

## 2025-08-07 RX ORDER — HYOSCYAMINE SULFATE 0.12 MG/1
0.12 TABLET SUBLINGUAL 3 TIMES DAILY
Qty: 90 | Refills: 0 | Status: ACTIVE | COMMUNITY
Start: 2025-08-07 | End: 1900-01-01

## 2025-08-13 ENCOUNTER — TRANSCRIPTION ENCOUNTER (OUTPATIENT)
Age: 35
End: 2025-08-13

## 2025-08-16 ENCOUNTER — TRANSCRIPTION ENCOUNTER (OUTPATIENT)
Age: 35
End: 2025-08-16

## 2025-08-21 ENCOUNTER — APPOINTMENT (OUTPATIENT)
Dept: GASTROENTEROLOGY | Facility: CLINIC | Age: 35
End: 2025-08-21

## 2025-08-25 ENCOUNTER — APPOINTMENT (OUTPATIENT)
Dept: GASTROENTEROLOGY | Facility: CLINIC | Age: 35
End: 2025-08-25
Payer: COMMERCIAL

## 2025-08-25 VITALS
HEIGHT: 67 IN | BODY MASS INDEX: 28.72 KG/M2 | DIASTOLIC BLOOD PRESSURE: 70 MMHG | SYSTOLIC BLOOD PRESSURE: 120 MMHG | WEIGHT: 183 LBS

## 2025-08-25 DIAGNOSIS — Z09 ENCOUNTER FOR FOLLOW-UP EXAMINATION AFTER COMPLETED TREATMENT FOR CONDITIONS OTHER THAN MALIGNANT NEOPLASM: ICD-10-CM

## 2025-08-25 DIAGNOSIS — R11.0 NAUSEA: ICD-10-CM

## 2025-08-25 DIAGNOSIS — K58.0 IRRITABLE BOWEL SYNDROME WITH DIARRHEA: ICD-10-CM

## 2025-08-25 PROCEDURE — G2211 COMPLEX E/M VISIT ADD ON: CPT

## 2025-08-25 PROCEDURE — 99214 OFFICE O/P EST MOD 30 MIN: CPT

## 2025-08-26 RX ORDER — DICYCLOMINE HYDROCHLORIDE 12.5 MG/1
10 CAPSULE ORAL 3 TIMES DAILY
Qty: 90 | Refills: 0 | Status: ACTIVE | COMMUNITY
Start: 2025-08-26 | End: 1900-01-01

## 2025-09-03 ENCOUNTER — TRANSCRIPTION ENCOUNTER (OUTPATIENT)
Age: 35
End: 2025-09-03

## 2025-09-03 RX ORDER — HYDROXYZINE HYDROCHLORIDE 50 MG/1
50 TABLET ORAL TWICE DAILY
Qty: 180 | Refills: 3 | Status: ACTIVE | COMMUNITY
Start: 2025-09-03 | End: 1900-01-01

## 2025-09-18 ENCOUNTER — APPOINTMENT (OUTPATIENT)
Dept: DERMATOLOGY | Facility: CLINIC | Age: 35
End: 2025-09-18
Payer: COMMERCIAL

## 2025-09-18 DIAGNOSIS — L57.0 ACTINIC KERATOSIS: ICD-10-CM

## 2025-09-18 PROCEDURE — 99214 OFFICE O/P EST MOD 30 MIN: CPT

## 2025-09-18 RX ORDER — FLUOROURACIL 50 MG/G
5 CREAM TOPICAL
Qty: 40 | Refills: 1 | Status: ACTIVE | COMMUNITY
Start: 2025-09-18 | End: 1900-01-01

## 2025-09-23 PROBLEM — M65.331 TRIGGER MIDDLE FINGER OF RIGHT HAND: Status: ACTIVE | Noted: 2025-09-23

## 2025-09-23 PROBLEM — G56.03 BILATERAL CARPAL TUNNEL SYNDROME: Status: ACTIVE | Noted: 2025-09-23
